# Patient Record
Sex: MALE | Race: WHITE | NOT HISPANIC OR LATINO | Employment: OTHER | URBAN - METROPOLITAN AREA
[De-identification: names, ages, dates, MRNs, and addresses within clinical notes are randomized per-mention and may not be internally consistent; named-entity substitution may affect disease eponyms.]

---

## 2017-08-02 ENCOUNTER — HOSPITAL ENCOUNTER (OUTPATIENT)
Dept: FAMILY MEDICINE CLINIC | Facility: CLINIC | Age: 72
Setting detail: SPECIMEN
Discharge: HOME | End: 2017-08-02
Attending: HOSPITALIST | Admitting: HOSPITALIST

## 2017-08-02 LAB
ALBUMIN SERPL-MCNC: 4.1 G/DL (ref 3.5–4.8)
ALBUMIN/GLOB SERPL: 1.5 {RATIO} (ref 1–1.7)
ALP SERPL-CCNC: 46 IU/L (ref 32–91)
ALT SERPL-CCNC: 32 IU/L (ref 17–63)
ANION GAP SERPL CALC-SCNC: 14.6 MMOL/L (ref 10–20)
AST SERPL-CCNC: 33 IU/L (ref 15–41)
BASOPHILS # BLD AUTO: 0 10*3/UL (ref 0–0.2)
BASOPHILS NFR BLD AUTO: 1 % (ref 0–2)
BILIRUB SERPL-MCNC: 1.3 MG/DL (ref 0.3–1.2)
BUN SERPL-MCNC: 6 MG/DL (ref 8–20)
BUN/CREAT SERPL: 5.5 (ref 6.2–20.3)
CALCIUM SERPL-MCNC: 9.6 MG/DL (ref 8.9–10.3)
CHLORIDE SERPL-SCNC: 102 MMOL/L (ref 101–111)
CHOLEST SERPL-MCNC: 302 MG/DL
CHOLEST/HDLC SERPL: 5 {RATIO}
CONV CO2: 26 MMOL/L (ref 22–32)
CONV LDL CHOLESTEROL DIRECT: 195 MG/DL (ref 0–100)
CONV TOTAL PROTEIN: 6.8 G/DL (ref 6.1–7.9)
CREAT UR-MCNC: 1.1 MG/DL (ref 0.7–1.2)
DIFFERENTIAL METHOD BLD: (no result)
EOSINOPHIL # BLD AUTO: 0.1 10*3/UL (ref 0–0.3)
EOSINOPHIL # BLD AUTO: 4 % (ref 0–3)
ERYTHROCYTE [DISTWIDTH] IN BLOOD BY AUTOMATED COUNT: 13.5 % (ref 11.5–14.5)
FOLATE SERPL-MCNC: >24.8 NG/ML (ref 5.9–24.8)
GLOBULIN UR ELPH-MCNC: 2.7 G/DL (ref 2.5–3.8)
GLUCOSE SERPL-MCNC: 121 MG/DL (ref 65–99)
HCT VFR BLD AUTO: 42.2 % (ref 40–54)
HDLC SERPL-MCNC: 61 MG/DL
HGB BLD-MCNC: 15 G/DL (ref 14–18)
LDLC/HDLC SERPL: 3.2 {RATIO}
LIPID INTERPRETATION: ABNORMAL
LYMPHOCYTES # BLD AUTO: 1 10*3/UL (ref 0.8–4.8)
LYMPHOCYTES NFR BLD AUTO: 30 % (ref 18–42)
MCH RBC QN AUTO: 35.4 PG (ref 26–32)
MCHC RBC AUTO-ENTMCNC: 35.4 G/DL (ref 32–36)
MCV RBC AUTO: 100 FL (ref 80–94)
MONOCYTES # BLD AUTO: 0.4 10*3/UL (ref 0.1–1.3)
MONOCYTES NFR BLD AUTO: 12 % (ref 2–11)
NEUTROPHILS # BLD AUTO: 1.8 10*3/UL (ref 2.3–8.6)
NEUTROPHILS NFR BLD AUTO: 53 % (ref 50–75)
NRBC BLD AUTO-RTO: 0 /100{WBCS}
NRBC/RBC NFR BLD MANUAL: 0 10*3/UL
PLATELET # BLD AUTO: 167 10*3/UL (ref 150–450)
PMV BLD AUTO: 7.8 FL (ref 7.4–10.4)
POTASSIUM SERPL-SCNC: 4.6 MMOL/L (ref 3.6–5.1)
PSA SERPL-MCNC: 0.54 NG/ML (ref 0–4)
RBC # BLD AUTO: 4.22 10*6/UL (ref 4.6–6)
SODIUM SERPL-SCNC: 138 MMOL/L (ref 136–144)
TESTOST SERPL-MCNC: 3.75 NG/ML (ref 1.7–7.8)
TRIGL SERPL-MCNC: 241 MG/DL
TSH SERPL-ACNC: 1.05 UIU/ML (ref 0.34–5.6)
VIT B12 SERPL-MCNC: 444 PG/ML (ref 180–914)
VLDLC SERPL CALC-MCNC: 46.3 MG/DL
WBC # BLD AUTO: 3.4 10*3/UL (ref 4.5–11.5)

## 2017-08-29 ENCOUNTER — HOSPITAL ENCOUNTER (OUTPATIENT)
Dept: FAMILY MEDICINE CLINIC | Facility: CLINIC | Age: 72
Setting detail: SPECIMEN
Discharge: HOME | End: 2017-08-29
Attending: HOSPITALIST | Admitting: HOSPITALIST

## 2018-04-05 ENCOUNTER — HOSPITAL ENCOUNTER (OUTPATIENT)
Dept: NUCLEAR MEDICINE | Facility: HOSPITAL | Age: 73
Discharge: HOME | End: 2018-04-05
Attending: NURSE PRACTITIONER | Admitting: NURSE PRACTITIONER

## 2018-04-12 ENCOUNTER — HOSPITAL ENCOUNTER (OUTPATIENT)
Dept: RESPIRATORY THERAPY | Facility: HOSPITAL | Age: 73
Discharge: HOME | End: 2018-04-12
Attending: HOSPITALIST | Admitting: HOSPITALIST

## 2018-05-10 ENCOUNTER — HOSPITAL ENCOUNTER (OUTPATIENT)
Dept: RESPIRATORY THERAPY | Facility: HOSPITAL | Age: 73
Discharge: HOME | End: 2018-05-10
Attending: INTERNAL MEDICINE | Admitting: INTERNAL MEDICINE

## 2018-05-10 LAB
BASOPHILS # BLD AUTO: 0 10*3/UL (ref 0–0.2)
BASOPHILS NFR BLD AUTO: 1 % (ref 0–2)
DIFFERENTIAL METHOD BLD: (no result)
EOSINOPHIL # BLD AUTO: 0.1 10*3/UL (ref 0–0.3)
EOSINOPHIL # BLD AUTO: 2 % (ref 0–3)
ERYTHROCYTE [DISTWIDTH] IN BLOOD BY AUTOMATED COUNT: 14.7 % (ref 11.5–14.5)
HCT VFR BLD AUTO: 39.8 % (ref 40–54)
HGB BLD-MCNC: 13.2 G/DL (ref 14–18)
LYMPHOCYTES # BLD AUTO: 1.1 10*3/UL (ref 0.8–4.8)
LYMPHOCYTES NFR BLD AUTO: 29 % (ref 18–42)
MCH RBC QN AUTO: 31.2 PG (ref 26–32)
MCHC RBC AUTO-ENTMCNC: 33.1 G/DL (ref 32–36)
MCV RBC AUTO: 94.2 FL (ref 80–94)
MONOCYTES # BLD AUTO: 0.5 10*3/UL (ref 0.1–1.3)
MONOCYTES NFR BLD AUTO: 14 % (ref 2–11)
NEUTROPHILS # BLD AUTO: 2 10*3/UL (ref 2.3–8.6)
NEUTROPHILS NFR BLD AUTO: 54 % (ref 50–75)
NRBC BLD AUTO-RTO: 0 /100{WBCS}
NRBC/RBC NFR BLD MANUAL: 0 10*3/UL
PLATELET # BLD AUTO: 143 10*3/UL (ref 150–450)
PMV BLD AUTO: 8.1 FL (ref 7.4–10.4)
RBC # BLD AUTO: 4.23 10*6/UL (ref 4.6–6)
WBC # BLD AUTO: 3.7 10*3/UL (ref 4.5–11.5)

## 2018-06-04 ENCOUNTER — HOSPITAL ENCOUNTER (OUTPATIENT)
Dept: RESPIRATORY THERAPY | Facility: HOSPITAL | Age: 73
Discharge: HOME | End: 2018-06-04
Attending: INTERNAL MEDICINE | Admitting: INTERNAL MEDICINE

## 2018-11-02 ENCOUNTER — HOSPITAL ENCOUNTER (OUTPATIENT)
Dept: FAMILY MEDICINE CLINIC | Facility: CLINIC | Age: 73
Setting detail: SPECIMEN
Discharge: HOME | End: 2018-11-02
Attending: HOSPITALIST | Admitting: HOSPITALIST

## 2018-11-02 LAB
ALBUMIN SERPL-MCNC: 4.2 G/DL (ref 3.5–4.8)
ALBUMIN/GLOB SERPL: 1.7 {RATIO} (ref 1–1.7)
ALP SERPL-CCNC: 53 IU/L (ref 32–91)
ALT SERPL-CCNC: 27 IU/L (ref 17–63)
ANION GAP SERPL CALC-SCNC: 13.1 MMOL/L (ref 10–20)
AST SERPL-CCNC: 28 IU/L (ref 15–41)
BASOPHILS # BLD AUTO: 0 10*3/UL (ref 0–0.2)
BASOPHILS NFR BLD AUTO: 1 % (ref 0–2)
BILIRUB SERPL-MCNC: 1 MG/DL (ref 0.3–1.2)
BUN SERPL-MCNC: 21 MG/DL (ref 8–20)
BUN/CREAT SERPL: 21 (ref 6.2–20.3)
CALCIUM SERPL-MCNC: 9.2 MG/DL (ref 8.9–10.3)
CHLORIDE SERPL-SCNC: 101 MMOL/L (ref 101–111)
CHOLEST SERPL-MCNC: 264 MG/DL
CHOLEST/HDLC SERPL: 3.9 {RATIO}
CONV CO2: 28 MMOL/L (ref 22–32)
CONV LDL CHOLESTEROL DIRECT: 174 MG/DL (ref 0–100)
CONV TOTAL PROTEIN: 6.7 G/DL (ref 6.1–7.9)
CREAT UR-MCNC: 1 MG/DL (ref 0.7–1.2)
DIFFERENTIAL METHOD BLD: (no result)
EOSINOPHIL # BLD AUTO: 0.1 10*3/UL (ref 0–0.3)
EOSINOPHIL # BLD AUTO: 2 % (ref 0–3)
ERYTHROCYTE [DISTWIDTH] IN BLOOD BY AUTOMATED COUNT: 15 % (ref 11.5–14.5)
GLOBULIN UR ELPH-MCNC: 2.5 G/DL (ref 2.5–3.8)
GLUCOSE SERPL-MCNC: 92 MG/DL (ref 65–99)
HCT VFR BLD AUTO: 38.4 % (ref 40–54)
HDLC SERPL-MCNC: 68 MG/DL
HGB BLD-MCNC: 14.1 G/DL (ref 14–18)
LDLC/HDLC SERPL: 2.6 {RATIO}
LIPID INTERPRETATION: ABNORMAL
LYMPHOCYTES # BLD AUTO: 1.2 10*3/UL (ref 0.8–4.8)
LYMPHOCYTES NFR BLD AUTO: 32 % (ref 18–42)
MCH RBC QN AUTO: 35.4 PG (ref 26–32)
MCHC RBC AUTO-ENTMCNC: 36.6 G/DL (ref 32–36)
MCV RBC AUTO: 96.7 FL (ref 80–94)
MONOCYTES # BLD AUTO: 0.6 10*3/UL (ref 0.1–1.3)
MONOCYTES NFR BLD AUTO: 16 % (ref 2–11)
NEUTROPHILS # BLD AUTO: 1.9 10*3/UL (ref 2.3–8.6)
NEUTROPHILS NFR BLD AUTO: 49 % (ref 50–75)
NRBC BLD AUTO-RTO: 0 /100{WBCS}
NRBC/RBC NFR BLD MANUAL: 0 10*3/UL
PLATELET # BLD AUTO: 196 10*3/UL (ref 150–450)
PMV BLD AUTO: 8.2 FL (ref 7.4–10.4)
POTASSIUM SERPL-SCNC: 4.1 MMOL/L (ref 3.6–5.1)
RBC # BLD AUTO: 3.97 10*6/UL (ref 4.6–6)
SODIUM SERPL-SCNC: 138 MMOL/L (ref 136–144)
TRIGL SERPL-MCNC: 233 MG/DL
VLDLC SERPL CALC-MCNC: 21.9 MG/DL
WBC # BLD AUTO: 3.7 10*3/UL (ref 4.5–11.5)

## 2019-02-14 ENCOUNTER — HOSPITAL ENCOUNTER (OUTPATIENT)
Dept: URGENT CARE | Facility: CLINIC | Age: 74
Discharge: HOME | End: 2019-02-14
Attending: FAMILY MEDICINE | Admitting: FAMILY MEDICINE

## 2019-02-25 ENCOUNTER — HOSPITAL ENCOUNTER (OUTPATIENT)
Dept: RESPIRATORY THERAPY | Facility: HOSPITAL | Age: 74
Discharge: HOME | End: 2019-02-25
Attending: NURSE PRACTITIONER | Admitting: NURSE PRACTITIONER

## 2019-03-18 ENCOUNTER — HOSPITAL ENCOUNTER (OUTPATIENT)
Dept: LAB | Facility: HOSPITAL | Age: 74
Discharge: HOME | End: 2019-03-18
Attending: INTERNAL MEDICINE | Admitting: INTERNAL MEDICINE

## 2019-03-18 LAB
BASOPHILS # BLD AUTO: 0 10*3/UL (ref 0–0.2)
BASOPHILS NFR BLD AUTO: 1 % (ref 0–2)
DIFFERENTIAL METHOD BLD: (no result)
EOSINOPHIL # BLD AUTO: 0.1 10*3/UL (ref 0–0.3)
EOSINOPHIL # BLD AUTO: 2 % (ref 0–3)
ERYTHROCYTE [DISTWIDTH] IN BLOOD BY AUTOMATED COUNT: 14 % (ref 11.5–14.5)
HCT VFR BLD AUTO: 38.7 % (ref 40–54)
HGB BLD-MCNC: 13.4 G/DL (ref 14–18)
LYMPHOCYTES # BLD AUTO: 0.9 10*3/UL (ref 0.8–4.8)
LYMPHOCYTES NFR BLD AUTO: 18 % (ref 18–42)
MCH RBC QN AUTO: 32.8 PG (ref 26–32)
MCHC RBC AUTO-ENTMCNC: 34.5 G/DL (ref 32–36)
MCV RBC AUTO: 94.9 FL (ref 80–94)
MONOCYTES # BLD AUTO: 0.5 10*3/UL (ref 0.1–1.3)
MONOCYTES NFR BLD AUTO: 10 % (ref 2–11)
NEUTROPHILS # BLD AUTO: 3.7 10*3/UL (ref 2.3–8.6)
NEUTROPHILS NFR BLD AUTO: 69 % (ref 50–75)
NRBC BLD AUTO-RTO: 0 /100{WBCS}
NRBC/RBC NFR BLD MANUAL: 0 10*3/UL
PLATELET # BLD AUTO: 200 10*3/UL (ref 150–450)
PMV BLD AUTO: 7.4 FL (ref 7.4–10.4)
RBC # BLD AUTO: 4.07 10*6/UL (ref 4.6–6)
WBC # BLD AUTO: 5.3 10*3/UL (ref 4.5–11.5)

## 2019-04-24 ENCOUNTER — HOSPITAL ENCOUNTER (OUTPATIENT)
Dept: RESPIRATORY THERAPY | Facility: HOSPITAL | Age: 74
Discharge: HOME | End: 2019-04-24
Attending: INTERNAL MEDICINE | Admitting: INTERNAL MEDICINE

## 2019-05-14 ENCOUNTER — HOSPITAL ENCOUNTER (OUTPATIENT)
Dept: FAMILY MEDICINE CLINIC | Facility: CLINIC | Age: 74
Setting detail: SPECIMEN
Discharge: HOME | End: 2019-05-14
Attending: HOSPITALIST | Admitting: HOSPITALIST

## 2019-05-15 LAB
ALBUMIN SERPL-MCNC: 3.9 G/DL (ref 3.5–4.8)
ALBUMIN/GLOB SERPL: 1.6 {RATIO} (ref 1–1.7)
ALP SERPL-CCNC: 42 IU/L (ref 32–91)
ALT SERPL-CCNC: 27 IU/L (ref 17–63)
AMYLASE SERPL-CCNC: 21 U/L (ref 36–128)
ANION GAP SERPL CALC-SCNC: 15.9 MMOL/L (ref 10–20)
AST SERPL-CCNC: 27 IU/L (ref 15–41)
BASOPHILS # BLD AUTO: 0 10*3/UL (ref 0–0.2)
BASOPHILS NFR BLD AUTO: 1 % (ref 0–2)
BILIRUB SERPL-MCNC: 1 MG/DL (ref 0.3–1.2)
BUN SERPL-MCNC: 15 MG/DL (ref 8–20)
BUN/CREAT SERPL: 15 (ref 6.2–20.3)
CALCIUM SERPL-MCNC: 9.1 MG/DL (ref 8.9–10.3)
CHLORIDE SERPL-SCNC: 101 MMOL/L (ref 101–111)
CONV CO2: 24 MMOL/L (ref 22–32)
CONV TOTAL PROTEIN: 6.3 G/DL (ref 6.1–7.9)
CREAT UR-MCNC: 1 MG/DL (ref 0.7–1.2)
DIFFERENTIAL METHOD BLD: (no result)
EOSINOPHIL # BLD AUTO: 0.3 10*3/UL (ref 0–0.3)
EOSINOPHIL # BLD AUTO: 4 % (ref 0–3)
ERYTHROCYTE [DISTWIDTH] IN BLOOD BY AUTOMATED COUNT: 13.6 % (ref 11.5–14.5)
GLOBULIN UR ELPH-MCNC: 2.4 G/DL (ref 2.5–3.8)
GLUCOSE SERPL-MCNC: 103 MG/DL (ref 65–99)
HCT VFR BLD AUTO: 40.5 % (ref 40–54)
HGB BLD-MCNC: 14 G/DL (ref 14–18)
INR PPP: 1.1
LYMPHOCYTES # BLD AUTO: 1.5 10*3/UL (ref 0.8–4.8)
LYMPHOCYTES NFR BLD AUTO: 25 % (ref 18–42)
MCH RBC QN AUTO: 34.2 PG (ref 26–32)
MCHC RBC AUTO-ENTMCNC: 34.5 G/DL (ref 32–36)
MCV RBC AUTO: 99.1 FL (ref 80–94)
MICRO REPORT STATUS: NORMAL
MONOCYTES # BLD AUTO: 0.7 10*3/UL (ref 0.1–1.3)
MONOCYTES NFR BLD AUTO: 11 % (ref 2–11)
NEUTROPHILS # BLD AUTO: 3.6 10*3/UL (ref 2.3–8.6)
NEUTROPHILS NFR BLD AUTO: 59 % (ref 50–75)
NRBC BLD AUTO-RTO: 0 /100{WBCS}
NRBC/RBC NFR BLD MANUAL: 0 10*3/UL
PLATELET # BLD AUTO: 189 10*3/UL (ref 150–450)
PMV BLD AUTO: 9.1 FL (ref 7.4–10.4)
POTASSIUM SERPL-SCNC: 3.9 MMOL/L (ref 3.6–5.1)
PROTHROMBIN TIME: 10.9 SEC (ref 9.6–11.7)
RBC # BLD AUTO: 4.09 10*6/UL (ref 4.6–6)
SODIUM SERPL-SCNC: 137 MMOL/L (ref 136–144)
WBC # BLD AUTO: 6.1 10*3/UL (ref 4.5–11.5)

## 2019-06-18 RX ORDER — DULOXETIN HYDROCHLORIDE 60 MG/1
60 CAPSULE, DELAYED RELEASE ORAL DAILY
Qty: 30 CAPSULE | Refills: 6 | Status: SHIPPED | OUTPATIENT
Start: 2019-06-18 | End: 2019-06-25 | Stop reason: SDUPTHER

## 2019-06-19 ENCOUNTER — TRANSCRIBE ORDERS (OUTPATIENT)
Dept: PHYSICAL THERAPY | Facility: CLINIC | Age: 74
End: 2019-06-19

## 2019-06-19 DIAGNOSIS — M23.91 INTERNAL DERANGEMENT OF RIGHT KNEE: Primary | ICD-10-CM

## 2019-06-21 ENCOUNTER — OFFICE VISIT (OUTPATIENT)
Dept: PHYSICAL THERAPY | Facility: CLINIC | Age: 74
End: 2019-06-21

## 2019-06-21 DIAGNOSIS — M25.561 ACUTE PAIN OF RIGHT KNEE: ICD-10-CM

## 2019-06-21 DIAGNOSIS — M23.91 DERANGEMENT OF COLLATERAL LIGAMENT OF RIGHT KNEE: Primary | ICD-10-CM

## 2019-06-21 PROCEDURE — 97140 MANUAL THERAPY 1/> REGIONS: CPT | Performed by: PHYSICAL THERAPIST

## 2019-06-21 PROCEDURE — 97162 PT EVAL MOD COMPLEX 30 MIN: CPT | Performed by: PHYSICAL THERAPIST

## 2019-06-21 PROCEDURE — 97110 THERAPEUTIC EXERCISES: CPT | Performed by: PHYSICAL THERAPIST

## 2019-06-21 NOTE — PROGRESS NOTES
Physical Therapy Initial Evaluation and Plan of Care      Patient: Omar Choudhary   : 1945  Diagnosis/ICD-10 Code:  No primary diagnosis found.  Referring practitioner: Harsha Samuels, *  Date of Initial Visit: 2019  Today's Date: 2019  Patient seen for Visit count could not be calculated. Make sure you are using a visit which is associated with an episode. sessions           Subjective Questionnaire: Knee outcome survey: 43% (Two questions were left blank, however score determined through subjective history)      Subjective Evaluation    History of Present Illness  Mechanism of injury: Pt reports that surgery was on May 21 for an ACL repair. They also trimmed meniscus and cleaned out the MCL as well. Used allograft for repair. Pt had PT and used CPM. Had follow-up with surgeon and his knee was doing well with good ROM. Pt can walk up the stairs normally with brace on knee, but down stairs is still tricky so he does one step. Pt initially hurt knee because of a skiing accident. Lost control and tried to lower to ground and felt pop in knee. No terrible pain but pt unable to put weight through it. This happened on . Most recent follow-up with surgeon was May 30th. Pt was told that he could stop using crutches after 2 weeks. He progressed to single crutch and then no crutches for ~1 week now. Pain is better in knee now. Occasional throbbing especially when up on feet more. Pt has been exercising at his neighbor's pool. Pt did two weeks outpatient PT and then just got back home early . Pt feels like he needs to work on strengthening now. MD follow-up is in August.     Pain  Current pain ratin  At best pain ratin  At worst pain ratin  Quality: throbbing  Relieving factors: ice, rest and medications  Aggravating factors: movement, standing, stairs and ambulation             Objective       Observations   Left Knee   Positive for incision.     Right Knee   Positive for  incision.     Additional Observation Details  Tape still on R knee     Tenderness     Right Knee   Tenderness in the medial joint line.     Active Range of Motion     Right Knee   Flexion: 130 degrees   Extensor la degrees     Additional Active Range of Motion Details  No pain noted with AROM, only tightness     Patellar Mobility     Right Knee Patellar tendons within functional limits include the medial and lateral. Hypomobile in the superior and inferior patellar tendon(s).     Ambulation   Weight-Bearing Status   Weight-Bearing Status (Right): weight-bearing as tolerated      Observational Gait   Gait: antalgic   Decreased right stance time and right step length.          Assessment & Plan     Assessment  Impairments: abnormal coordination, abnormal gait, abnormal muscle firing, abnormal or restricted ROM, activity intolerance, impaired balance, impaired physical strength, lacks appropriate home exercise program, pain with function and weight-bearing intolerance  Assessment details: Pt is a 73 year old male s/p ACL reconstruction on 2019. Pt demonstrates atrophy in R quad with decreased activation. Pt demonstrates 5 degree extensor lag during ROM assessment and SLR. Pt displays some tenderness over medial aspect of knee. Min hypomobility noted with superior and inferior glides to R patella. Min swelling noted. Pt is ambulating without crutches with brace on and min gait deviations.   Prognosis: good  Functional Limitations: carrying objects, lifting, walking, pulling, pushing, uncomfortable because of pain, standing and unable to perform repetitive tasks  Goals  Plan Goals: STG:  Pt will display increased quad activation during SLR and other tasks within 3 weeks.     Pt will demonstrate increased ROM to 0 degrees extension within 3 weeks.     LTG:  Pt will be independent with home exercises within 6 weeks to assist with pain management and continue to improve function.     Pt will display improved  ability to ambulate with no gait deviations and without brace within 6 weeks.     Pt will be able to return to hobbies and functional tasks by end of POC with no increased pain in knee.     Pt will display increased score on the Knee outcome survey to 85% by end of POC.     Plan  Therapy options: will be seen for skilled physical therapy services  Planned modality interventions: cryotherapy, electrical stimulation/Russian stimulation and thermotherapy (hydrocollator packs)  Planned therapy interventions: manual therapy, motor coordination training, neuromuscular re-education, soft tissue mobilization, strengthening, stretching, therapeutic activities, joint mobilization, home exercise program, gait training, functional ROM exercises, flexibility, body mechanics training, balance/weight-bearing training and ADL retraining  Duration in visits: 15        Manual Therapy:    8     mins  46917;  Therapeutic Exercise:    40     mins  70757;     Neuromuscular Nayeli:        mins  60857;    Therapeutic Activity:          mins  56429;     Gait Training:           mins  03763;     Ultrasound:          mins  14086;    Electrical Stimulation:         mins  24086 ( );  Dry Needling          mins self-pay    Timed Treatment:   38   mins   Total Treatment:     63   mins    PT SIGNATURE: Shalini Hammer, PT   DATE TREATMENT INITIATED: 6/21/2019    Initial Certification  Certification Period: 9/19/2019  I certify that the therapy services are furnished while this patient is under my care.  The services outlined above are required by this patient, and will be reviewed every 90 days.     PHYSICIAN: Harsha Samuels MD      DATE:     Please sign and return via fax to  .. Thank you, [facility].

## 2019-06-25 ENCOUNTER — TELEPHONE (OUTPATIENT)
Dept: FAMILY MEDICINE CLINIC | Facility: CLINIC | Age: 74
End: 2019-06-25

## 2019-06-25 RX ORDER — ALENDRONATE SODIUM 70 MG/1
70 TABLET ORAL
Qty: 12 TABLET | Refills: 3 | Status: SHIPPED | OUTPATIENT
Start: 2019-06-25 | End: 2019-06-25 | Stop reason: SDUPTHER

## 2019-06-25 RX ORDER — ALENDRONATE SODIUM 70 MG/1
70 TABLET ORAL
Qty: 12 TABLET | Refills: 3 | Status: SHIPPED | OUTPATIENT
Start: 2019-06-25 | End: 2019-09-23

## 2019-06-25 RX ORDER — DULOXETIN HYDROCHLORIDE 60 MG/1
60 CAPSULE, DELAYED RELEASE ORAL DAILY
Qty: 30 CAPSULE | Refills: 6 | Status: SHIPPED | OUTPATIENT
Start: 2019-06-25 | End: 2019-06-26 | Stop reason: SDUPTHER

## 2019-06-25 NOTE — TELEPHONE ENCOUNTER
Jazmin left a message from this pt to send his script to [facility]. See message below.

## 2019-06-25 NOTE — TELEPHONE ENCOUNTER
He got the Rx for osteopenia but not the Duloxetine he will call tomorrow if he does not hear from [facility] today.

## 2019-06-25 NOTE — TELEPHONE ENCOUNTER
----- Message from Harsha Samuels MD sent at 6/18/2019 12:51 PM EDT -----  Let patient know I got his note and we will be calling in a rx for him to his pharmacy to help with the moods/depression

## 2019-06-25 NOTE — TELEPHONE ENCOUNTER
New meds sent to [facility].  Added a med for depression and for his osteopenia.  CRP is nothing to worry about after surgery.  We can check it again in a couple of months

## 2019-06-25 NOTE — TELEPHONE ENCOUNTER
1) PATIENT RECEIVED YESI BARNES'S MESSAGE. [facility], [facility], IS THE PHARMACY HE WANTS MED SENT TO PLEASE.    2) RE: HIS LAB RESULTS, THEY SHOW HE HAS OSTEOPENIA. HE WANTS TO KNOW IF HE SHOULD BE TAKING A SUPPLEMENT.    3) RE: HIS ELEVATED C-REACTIVE PROTEIN, HE WANTS TO KNOW YOUR ADVICE ON THAT AS TO WHAT HE NEEDS TO DO.    THANK YOU.

## 2019-06-26 ENCOUNTER — OFFICE VISIT (OUTPATIENT)
Dept: PHYSICAL THERAPY | Facility: CLINIC | Age: 74
End: 2019-06-26

## 2019-06-26 DIAGNOSIS — M25.561 ACUTE PAIN OF RIGHT KNEE: ICD-10-CM

## 2019-06-26 DIAGNOSIS — M23.91 DERANGEMENT OF COLLATERAL LIGAMENT OF RIGHT KNEE: Primary | ICD-10-CM

## 2019-06-26 PROCEDURE — 97112 NEUROMUSCULAR REEDUCATION: CPT | Performed by: PHYSICAL THERAPIST

## 2019-06-26 PROCEDURE — 97110 THERAPEUTIC EXERCISES: CPT | Performed by: PHYSICAL THERAPIST

## 2019-06-26 PROCEDURE — 97140 MANUAL THERAPY 1/> REGIONS: CPT | Performed by: PHYSICAL THERAPIST

## 2019-06-26 RX ORDER — DULOXETIN HYDROCHLORIDE 60 MG/1
60 CAPSULE, DELAYED RELEASE ORAL DAILY
Qty: 30 CAPSULE | Refills: 6 | OUTPATIENT
Start: 2019-06-26 | End: 2020-08-11

## 2019-06-26 NOTE — TELEPHONE ENCOUNTER
Pt called yesterday and said he cannot have the DUloxtine on a mail order it has to be sent to [facility] [address] In [place].

## 2019-06-26 NOTE — PROGRESS NOTES
Physical Therapy Daily Progress Note      Patient: Omar Choudhary   : 1945  Diagnosis/ICD-10 Code:  Derangement of collateral ligament of right knee [M23.91]  Referring practitioner: Harsha Samuels, *  Date of Initial Visit: Type: THERAPY  Noted: 2019  Today's Date: 2019  Patient seen for 2 sessions         Omar Choudhary reports: that his knee is feeling better. He is taking less of the OTC pain medication now. He did some yardwork after last session and felt like it was too much on his knee.       Objective   See Exercise, Manual, and Modality Logs for complete treatment.       Assessment & Plan     Assessment  Assessment details: Pt demonstrates fatigue in R quad with SLR exercises as well as fatigue in hip abductors with sidelying hip abduction. Pt required tactile cues and vc's for proper technique. Min assistance given at ankle during SLR. Pt instructed to perform with brace locked in ext at home due to decreased quad control at this time. No c/o pain during session. Pt tolerated added exercises well.         Progress per Plan of Care           Manual Therapy:    10     mins  01220;  Therapeutic Exercise:    20     mins  34982;     Neuromuscular Nayeli:    10    mins  79848;    Therapeutic Activity:          mins  87101;     Gait Training:           mins  60414;     Ultrasound:          mins  99275;    Electrical Stimulation:         mins  47225 ( );  Dry Needling          mins self-pay    Timed Treatment:   40   mins   Total Treatment:     40   mins    Shalini Hammer, PT  Physical Therapist

## 2019-07-03 ENCOUNTER — OFFICE VISIT (OUTPATIENT)
Dept: PHYSICAL THERAPY | Facility: CLINIC | Age: 74
End: 2019-07-03

## 2019-07-03 DIAGNOSIS — M25.561 ACUTE PAIN OF RIGHT KNEE: ICD-10-CM

## 2019-07-03 DIAGNOSIS — M23.91 DERANGEMENT OF COLLATERAL LIGAMENT OF RIGHT KNEE: Primary | ICD-10-CM

## 2019-07-03 PROCEDURE — 97110 THERAPEUTIC EXERCISES: CPT | Performed by: PHYSICAL THERAPIST

## 2019-07-03 PROCEDURE — 97140 MANUAL THERAPY 1/> REGIONS: CPT | Performed by: PHYSICAL THERAPIST

## 2019-07-03 NOTE — PROGRESS NOTES
Physical Therapy Daily Progress Note        Patient: Omar Choudhary   : 1945  Diagnosis/ICD-10 Code:  Derangement of collateral ligament of right knee [M23.91]  Referring practitioner: Harsha Samuels, *  Date of Initial Visit: Type: THERAPY  Noted: 2019  Today's Date: 7/3/2019  Patient seen for 3 sessions         Omar Choudhary reports: that his leg has been sore from the exercises. Mostly muscle soreness, but he did have some soreness in knee after walking and sleeping without brace yesterday.       Objective   See Exercise, Manual, and Modality Logs for complete treatment.       Assessment & Plan     Assessment  Assessment details: Pt demonstrates increased fatigue with exercise, especially with SLR. Less extensor lag noted during SLR this date. Pt demonstrates no pain with exercises and is progressing well.         Progress per Plan of Care           Manual Therapy:    10     mins  18900;  Therapeutic Exercise:    28     mins  03338;     Neuromuscular Nayeli:        mins  27226;    Therapeutic Activity:          mins  73136;     Gait Training:           mins  14285;     Ultrasound:          mins  33101;    Electrical Stimulation:         mins  90005 ( );  Dry Needling          mins self-pay    Timed Treatment:   38   mins   Total Treatment:     38   mins    Shalini Hammer, PT  Physical Therapist

## 2019-07-10 ENCOUNTER — OFFICE VISIT (OUTPATIENT)
Dept: PHYSICAL THERAPY | Facility: CLINIC | Age: 74
End: 2019-07-10

## 2019-07-10 DIAGNOSIS — M25.561 ACUTE PAIN OF RIGHT KNEE: ICD-10-CM

## 2019-07-10 DIAGNOSIS — M23.91 DERANGEMENT OF COLLATERAL LIGAMENT OF RIGHT KNEE: Primary | ICD-10-CM

## 2019-07-10 PROCEDURE — 97110 THERAPEUTIC EXERCISES: CPT | Performed by: PHYSICAL THERAPIST

## 2019-07-10 PROCEDURE — 97140 MANUAL THERAPY 1/> REGIONS: CPT | Performed by: PHYSICAL THERAPIST

## 2019-07-10 NOTE — PROGRESS NOTES
Physical Therapy Daily Progress Note    VISIT#: 4    Subjective   Omar Choudhary reports: that his knee is doing well overall. He still has some aching in his knee. It does feel like it's getting stronger.       Objective     See Exercise, Manual, and Modality Logs for complete treatment.       Assessment & Plan     Assessment  Assessment details: Pt demonstrates increased fatigue with added exercises this date. Pt displays no pain in knee during treatment. Pt displays weakness in quad. Pt tolerated treatment well overall.           Progress per Plan of Care            Timed:         Manual Therapy:    8     mins  57266;     Therapeutic Exercise:    32     mins  52214;     Neuromuscular Nayeli:        mins  31704;    Therapeutic Activity:          mins  13526;     Gait Training:           mins  14729;     Ultrasound:          mins  05806;    Ionto                                   mins   80186  Self Care                            mins   43761    Un-Timed:  Electrical Stimulation:         mins  65176 ( );  Traction          mins 08523    Timed Treatment:   40   mins   Total Treatment:     40   mins    Shalini Hammer, PT, DPT, OCS  [place] License # [id number]  Physical Therapist

## 2019-07-17 ENCOUNTER — OFFICE VISIT (OUTPATIENT)
Dept: PHYSICAL THERAPY | Facility: CLINIC | Age: 74
End: 2019-07-17

## 2019-07-17 DIAGNOSIS — M25.561 ACUTE PAIN OF RIGHT KNEE: ICD-10-CM

## 2019-07-17 DIAGNOSIS — M23.91 DERANGEMENT OF COLLATERAL LIGAMENT OF RIGHT KNEE: Primary | ICD-10-CM

## 2019-07-17 PROCEDURE — 97140 MANUAL THERAPY 1/> REGIONS: CPT | Performed by: PHYSICAL THERAPIST

## 2019-07-17 PROCEDURE — 97110 THERAPEUTIC EXERCISES: CPT | Performed by: PHYSICAL THERAPIST

## 2019-07-17 NOTE — PROGRESS NOTES
Physical Therapy Daily Progress Note    VISIT#: 5    Subjective   Omar Choudhary reports: that his muscles have been sore from the exercises, but not in knee.       Objective     See Exercise, Manual, and Modality Logs for complete treatment.       Assessment & Plan     Assessment  Assessment details: Pt demonstrates mod fatigue with exercises. Pt displays no pain in knee during treatment. Pt tolerated added exercises and progression well. Pt is progressing well overall.           Progress per Plan of Care            Timed:         Manual Therapy:    10     mins  98654;     Therapeutic Exercise:    30     mins  75071;     Neuromuscular Nayeli:        mins  62256;    Therapeutic Activity:          mins  96072;     Gait Training:           mins  91697;     Ultrasound:          mins  43989;    Ionto                                   mins   09014  Self Care                            mins   12030    Un-Timed:  Electrical Stimulation:         mins  28087 ( );  Traction          mins 11728    Timed Treatment:   40   mins   Total Treatment:     40   mins    Shalini Hammer, PT, DPT, OCS  [place] License # [id number]  Physical Therapist

## 2019-07-31 ENCOUNTER — OFFICE VISIT (OUTPATIENT)
Dept: PHYSICAL THERAPY | Facility: CLINIC | Age: 74
End: 2019-07-31

## 2019-07-31 DIAGNOSIS — M25.561 ACUTE PAIN OF RIGHT KNEE: ICD-10-CM

## 2019-07-31 DIAGNOSIS — M23.91 DERANGEMENT OF COLLATERAL LIGAMENT OF RIGHT KNEE: Primary | ICD-10-CM

## 2019-07-31 PROCEDURE — 97140 MANUAL THERAPY 1/> REGIONS: CPT | Performed by: PHYSICAL THERAPIST

## 2019-07-31 PROCEDURE — 97110 THERAPEUTIC EXERCISES: CPT | Performed by: PHYSICAL THERAPIST

## 2019-07-31 NOTE — PROGRESS NOTES
Physical Therapy Daily Progress Note    VISIT#: 6    Subjective   Omar Choudhary reports: that he is doing well. Still having trouble going down steps, but has been able to if he puts foot flat on step.       Objective     See Exercise, Manual, and Modality Logs for complete treatment.       Assessment & Plan     Assessment  Assessment details: Pt displays improved strength of R quad. Pt demonstrates increased fatigue with exercises. Pt tolerated increased resistance well. No c/o pain during treatment.           Progress per Plan of Care            Timed:         Manual Therapy:    10     mins  12674;     Therapeutic Exercise:    35     mins  08724;     Neuromuscular Nayeli:        mins  42595;    Therapeutic Activity:          mins  69096;     Gait Training:           mins  18274;     Ultrasound:          mins  37321;    Ionto                                   mins   29334  Self Care                            mins   82442    Un-Timed:  Electrical Stimulation:        mins  76967 ( );  Traction          mins 91079    Timed Treatment:   45   mins   Total Treatment:     45   mins    Shalini Hammer, PT, DPT, OCS  [place] License # [id number]  Physical Therapist

## 2019-08-12 RX ORDER — LOSARTAN POTASSIUM AND HYDROCHLOROTHIAZIDE 25; 100 MG/1; MG/1
TABLET ORAL
Qty: 90 TABLET | Refills: 0 | Status: SHIPPED | OUTPATIENT
Start: 2019-08-12 | End: 2019-08-30 | Stop reason: SDUPTHER

## 2019-08-30 ENCOUNTER — TELEPHONE (OUTPATIENT)
Dept: FAMILY MEDICINE CLINIC | Facility: CLINIC | Age: 74
End: 2019-08-30

## 2019-08-30 RX ORDER — LOSARTAN POTASSIUM AND HYDROCHLOROTHIAZIDE 25; 100 MG/1; MG/1
1 TABLET ORAL DAILY
Qty: 90 TABLET | Refills: 0 | Status: SHIPPED | OUTPATIENT
Start: 2019-08-30 | End: 2019-09-03 | Stop reason: SDUPTHER

## 2019-09-03 RX ORDER — MONTELUKAST SODIUM 10 MG/1
TABLET ORAL
Qty: 90 TABLET | Refills: 0 | Status: SHIPPED | OUTPATIENT
Start: 2019-09-03 | End: 2020-01-29 | Stop reason: SDUPTHER

## 2019-09-03 RX ORDER — LOSARTAN POTASSIUM AND HYDROCHLOROTHIAZIDE 25; 100 MG/1; MG/1
1 TABLET ORAL DAILY
Qty: 90 TABLET | Refills: 0 | Status: SHIPPED | OUTPATIENT
Start: 2019-09-03 | End: 2020-05-07 | Stop reason: SDUPTHER

## 2019-09-04 ENCOUNTER — OFFICE VISIT (OUTPATIENT)
Dept: PHYSICAL THERAPY | Facility: CLINIC | Age: 74
End: 2019-09-04

## 2019-09-04 DIAGNOSIS — M25.561 ACUTE PAIN OF RIGHT KNEE: ICD-10-CM

## 2019-09-04 DIAGNOSIS — M23.91 DERANGEMENT OF COLLATERAL LIGAMENT OF RIGHT KNEE: Primary | ICD-10-CM

## 2019-09-04 PROCEDURE — 97140 MANUAL THERAPY 1/> REGIONS: CPT | Performed by: PHYSICAL THERAPIST

## 2019-09-04 PROCEDURE — 97110 THERAPEUTIC EXERCISES: CPT | Performed by: PHYSICAL THERAPIST

## 2019-09-12 DIAGNOSIS — M54.6 THORACIC BACK PAIN, UNSPECIFIED BACK PAIN LATERALITY, UNSPECIFIED CHRONICITY: Primary | ICD-10-CM

## 2019-09-13 ENCOUNTER — TELEPHONE (OUTPATIENT)
Dept: FAMILY MEDICINE CLINIC | Facility: CLINIC | Age: 74
End: 2019-09-13

## 2019-09-13 NOTE — TELEPHONE ENCOUNTER
Found the note it is at their office as well. Spoke with [place] she will take care of the rest. Thanks again.

## 2019-09-13 NOTE — TELEPHONE ENCOUNTER
The reason was on the report he brought in and I put the information on it.  Find it and get it scanned into his chart

## 2019-09-13 NOTE — TELEPHONE ENCOUNTER
Received a call from Dr Robins's office about this pt. wanting to know before they solange him what exactly is Dr Lay looking for with this pt. Please let me know so I can call them back. [phone] ext. [phone]

## 2019-09-16 DIAGNOSIS — K44.9 DIAPHRAGMATIC HERNIA WITHOUT OBSTRUCTION AND WITHOUT GANGRENE: Primary | ICD-10-CM

## 2019-09-24 ENCOUNTER — HOSPITAL ENCOUNTER (OUTPATIENT)
Dept: GENERAL RADIOLOGY | Facility: HOSPITAL | Age: 74
Discharge: HOME | End: 2019-09-24
Admitting: THORACIC SURGERY (CARDIOTHORACIC VASCULAR SURGERY)

## 2019-09-24 DIAGNOSIS — K44.9 DIAPHRAGMATIC HERNIA WITHOUT OBSTRUCTION AND WITHOUT GANGRENE: ICD-10-CM

## 2019-09-24 PROCEDURE — 71046 X-RAY EXAM CHEST 2 VIEWS: CPT

## 2019-09-25 ENCOUNTER — TREATMENT (OUTPATIENT)
Dept: PHYSICAL THERAPY | Facility: CLINIC | Age: 74
End: 2019-09-25

## 2019-09-25 DIAGNOSIS — M23.91 DERANGEMENT OF COLLATERAL LIGAMENT OF RIGHT KNEE: Primary | ICD-10-CM

## 2019-09-25 DIAGNOSIS — M25.561 ACUTE PAIN OF RIGHT KNEE: ICD-10-CM

## 2019-09-25 PROCEDURE — 97110 THERAPEUTIC EXERCISES: CPT | Performed by: PHYSICAL THERAPIST

## 2019-09-25 NOTE — PROGRESS NOTES
Physical Therapy Daily Progress Note    VISIT#: 8    Subjective   Omar Choudhary reports: that his knee is doing well. He has bee going to the gym and leg seems to be getting stronger.       Objective     See Exercise, Manual, and Modality Logs for complete treatment.       Assessment & Plan     Assessment  Assessment details: Pt demonstrates weakness with end range ext during weight-bearing exercises. Pt displays difficulty with balance as well. Overall increased strength noted in R LE. Pt tolerated treatment well with no c/o pain in knee, only soreness in surrounding musculature.           Progress per Plan of Care            Timed:         Manual Therapy:    5     mins  64107;     Therapeutic Exercise:    40     mins  50022;     Neuromuscular Nayeli:        mins  54657;    Therapeutic Activity:          mins  12637;     Gait Training:           mins  44916;     Ultrasound:          mins  85466;    Ionto                                   mins   26033  Self Care                            mins   02352    Un-Timed:  Electrical Stimulation:         mins  02421 ( );  Traction          mins 85549    Timed Treatment:   45   mins   Total Treatment:     45   mins    Shalini Hammer, PT, DPT, OCS  [place] License # [id number]  Physical Therapist

## 2019-09-26 ENCOUNTER — OFFICE VISIT (OUTPATIENT)
Dept: SURGERY | Facility: CLINIC | Age: 74
End: 2019-09-26

## 2019-09-26 VITALS
OXYGEN SATURATION: 96 % | HEART RATE: 62 BPM | SYSTOLIC BLOOD PRESSURE: 127 MMHG | TEMPERATURE: 98.2 F | DIASTOLIC BLOOD PRESSURE: 86 MMHG | WEIGHT: 179 LBS | BODY MASS INDEX: 28.04 KG/M2

## 2019-09-26 DIAGNOSIS — R06.02 SHORTNESS OF BREATH: ICD-10-CM

## 2019-09-26 DIAGNOSIS — J98.6 DIAPHRAGM PARALYSIS: Primary | ICD-10-CM

## 2019-09-26 DIAGNOSIS — G47.33 OBSTRUCTIVE SLEEP APNEA SYNDROME: ICD-10-CM

## 2019-09-26 DIAGNOSIS — J45.20 MILD INTERMITTENT ASTHMA WITHOUT COMPLICATION: ICD-10-CM

## 2019-09-26 PROBLEM — E78.5 HYPERLIPIDEMIA: Status: ACTIVE | Noted: 2019-09-26

## 2019-09-26 PROBLEM — M54.31 SCIATICA OF RIGHT SIDE: Status: ACTIVE | Noted: 2017-08-29

## 2019-09-26 PROBLEM — R05.3 CHRONIC COUGH: Status: ACTIVE | Noted: 2019-02-14

## 2019-09-26 PROBLEM — K57.90 DIVERTICULOSIS: Status: ACTIVE | Noted: 2019-09-26

## 2019-09-26 PROBLEM — R09.82 POSTNASAL DRIP: Status: ACTIVE | Noted: 2018-01-16

## 2019-09-26 PROBLEM — M19.90 OSTEOARTHRITIS: Status: ACTIVE | Noted: 2019-09-26

## 2019-09-26 PROBLEM — M23.91 INTERNAL DERANGEMENT OF RIGHT KNEE: Status: ACTIVE | Noted: 2019-05-14

## 2019-09-26 PROBLEM — Z80.3 FAMILY HISTORY OF MALIGNANT NEOPLASM OF BREAST: Status: ACTIVE | Noted: 2019-09-26

## 2019-09-26 PROBLEM — M85.80 OSTEOPENIA: Status: ACTIVE | Noted: 2019-09-26

## 2019-09-26 PROBLEM — N40.0 BENIGN PROSTATIC HYPERPLASIA: Status: ACTIVE | Noted: 2019-09-26

## 2019-09-26 PROBLEM — J45.909 ASTHMA: Status: ACTIVE | Noted: 2019-02-14

## 2019-09-26 PROBLEM — M53.80 OTHER SPECIFIED DORSOPATHIES, SITE UNSPECIFIED: Status: ACTIVE | Noted: 2019-09-26

## 2019-09-26 PROCEDURE — 99204 OFFICE O/P NEW MOD 45 MIN: CPT | Performed by: THORACIC SURGERY (CARDIOTHORACIC VASCULAR SURGERY)

## 2019-09-26 RX ORDER — UBIDECARENONE 50 MG
CAPSULE ORAL DAILY
Status: ON HOLD | COMMUNITY
End: 2022-10-04

## 2019-09-26 RX ORDER — AMPICILLIN TRIHYDRATE 250 MG
1 CAPSULE ORAL DAILY
COMMUNITY

## 2019-09-26 RX ORDER — CHLORAL HYDRATE 500 MG
1000 CAPSULE ORAL
COMMUNITY

## 2019-09-26 RX ORDER — VITAMIN B COMPLEX
1 CAPSULE ORAL DAILY
COMMUNITY

## 2019-09-26 RX ORDER — ACETAMINOPHEN 500 MG
500 TABLET ORAL EVERY 6 HOURS PRN
COMMUNITY

## 2019-09-26 RX ORDER — CALCIUM CARBONATE 300MG(750)
2 TABLET,CHEWABLE ORAL
COMMUNITY
End: 2021-12-03

## 2019-09-26 RX ORDER — MELOXICAM 15 MG/1
TABLET ORAL EVERY 24 HOURS
COMMUNITY
Start: 2018-04-10 | End: 2019-11-05 | Stop reason: SDUPTHER

## 2019-09-26 RX ORDER — CLONAZEPAM 0.5 MG/1
TABLET ORAL
COMMUNITY
Start: 2018-11-02 | End: 2019-11-05 | Stop reason: SDUPTHER

## 2019-09-26 RX ORDER — BUDESONIDE AND FORMOTEROL FUMARATE DIHYDRATE 160; 4.5 UG/1; UG/1
AEROSOL RESPIRATORY (INHALATION)
COMMUNITY
Start: 2013-03-15 | End: 2020-06-01 | Stop reason: SDUPTHER

## 2019-09-26 RX ORDER — SILDENAFIL CITRATE 20 MG/1
TABLET ORAL
COMMUNITY
Start: 2019-05-14 | End: 2020-05-07 | Stop reason: SDUPTHER

## 2019-09-26 RX ORDER — ZOLPIDEM TARTRATE 10 MG/1
TABLET ORAL EVERY 24 HOURS
COMMUNITY
Start: 2018-11-02 | End: 2019-11-05 | Stop reason: SDUPTHER

## 2019-09-26 NOTE — PROGRESS NOTES
Thoracic surgery consult  Reason for consult: Left diaphragmatic paralysis  Requesting physician: Dr. Samuels    Chief complaint shortness of breath with exertion    Subjective   I have reviewed the hospital record and reviewed the accompanying physician notes.  From [facility] in [place] I have discussed the case with the following physicians: Dr. Samuels indicated nonoperative therapy    History of Present Illness  This patient began to note that he was short of breath with exertion approximately 5 to 6 years ago.  He swims a lot and he also noted decreased buoyancy in the pool.  He also noted that he could not take his deep of breath is normal.  Nonetheless his physical activity remained relatively high.  Recently he was at altitude in [place] and noted rather severe shortness of breath after climbing only one flight of stairs.  This led to a medical work-up.  He was evaluated in [place] at [facility].  Extensive work-up was performed.  This is been reviewed in its entirety by me.  His lung capacity was normal his FVC and FEV1 were symmetrically reduced at approximately 70% of predicted.  His residual volume was 129% of predicted.  His diffusion capacity was 82% of predicted.  Cardiac work-up revealed some degree of diastolic dysfunction.  Exercise stress testing was basically normal.  PA and lateral chest x-ray along with a CT scan demonstrate left diaphragmatic elevation with atelectasis of the left lower lobe.  Sniffed test was positive for a paralyzed diaphragm with paradoxical movement and minimal movement.  The patient has obstructive sleep apnea.  He was instructed to be sure to optimize his  positive pressure system.  Surgical intervention was not specifically recommended.  Patient is otherwise relatively medically healthy.  Does not have any specific surgical contraindication.    Review of Systems  All systems have been reviewed by me and with the patient.  They have been scanned into  the chart.  Pertinent positives include depression arthritic discomfort in the back and legs hemorrhoids high blood pressure.  All other systems are negative.    Past Medical History:   Diagnosis Date   • Allergic    • Arthritis    • Asthma    • Cataract    • Depression    • Osteopenia    • Shortness of breath       Social History     Socioeconomic History   • Marital status:      Spouse name: Not on file   • Number of children: Not on file   • Years of education: Not on file   • Highest education level: Not on file   Tobacco Use   • Smoking status: Former Smoker     Start date:      Last attempt to quit:      Years since quittin.7   • Smokeless tobacco: Never Used   Substance and Sexual Activity   • Alcohol use: Yes     Alcohol/week: 7.2 oz     Types: 10 Glasses of wine, 2 Cans of beer per week   • Drug use: Yes     Types: Marijuana, Mescaline, Psilocybin     Comment: 50 years ago while in college     family history includes Aortic aneurysm in his father; Breast cancer in his mother; Heart attack in his father; Heart disease in his mother; Hypertension in his mother; Liver cancer in his father; Stroke in his mother.   Past Surgical History:   Procedure Laterality Date   • MOUTH SURGERY         Objective      Vital Signs  Temp:  [98.2 °F (36.8 °C)] 98.2 °F (36.8 °C)  Heart Rate:  [62] 62  BP: (127)/(86) 127/86    [unfilled]    Physical Exam   Constitutional: He is oriented to person, place, and time. He appears well-developed and well-nourished. No distress.   HENT:   Head: Normocephalic and atraumatic.   Right Ear: External ear normal.   Left Ear: External ear normal.   Mouth/Throat: No oropharyngeal exudate.   Eyes: Conjunctivae and EOM are normal. Pupils are equal, round, and reactive to light. Right eye exhibits no discharge. Left eye exhibits no discharge.   Neck: Normal range of motion. No JVD present. No tracheal deviation present. No thyromegaly present.   Cardiovascular: Normal rate and  regular rhythm.   Pulmonary/Chest: Effort normal. No stridor. No respiratory distress. He has no wheezes.   Dullness to percussion and diminished breath sounds at the left base.   Abdominal: Soft. Bowel sounds are normal. He exhibits no distension and no mass. There is no tenderness. There is no guarding.   Musculoskeletal: Normal range of motion. He exhibits no edema, tenderness or deformity.   Lymphadenopathy:     He has no cervical adenopathy.   Neurological: He is alert and oriented to person, place, and time. No cranial nerve deficit. He exhibits normal muscle tone.   Skin: Skin is warm and dry. Capillary refill takes less than 2 seconds. No rash noted. He is not diaphoretic. No erythema. No pallor.   Psychiatric: He has a normal mood and affect. His behavior is normal. Judgment and thought content normal.        Results Review:  I have personally reviewed the following radiographs and reviewed the radiology reports.  My independent finds are elevation of the left hemidiaphragm with associated atelectasis of the left lower lobe    Lab Results:  Lab Results (last 24 hours)     ** No results found for the last 24 hours. **           Meds    Current Outpatient Medications:   •  acetaminophen (TYLENOL) 500 MG tablet, Take 500 mg by mouth Every 6 (Six) Hours As Needed for Mild Pain ., Disp: , Rfl:   •  Alpha-Lipoic Acid 100 MG capsule, Take  by mouth., Disp: , Rfl:   •  B Complex Vitamins (VITAMIN B COMPLEX) capsule capsule, Take  by mouth Daily., Disp: , Rfl:   •  budesonide-formoterol (SYMBICORT) 160-4.5 MCG/ACT inhaler, SYMBICORT 160-4.5 MCG/ACT AERO, Disp: , Rfl:   •  clonazePAM (KLONOPIN) 0.5 MG tablet, KLONOPIN 0.5 MG TABS, Disp: , Rfl:   •  coenzyme Q10 50 MG capsule capsule, Take  by mouth Daily., Disp: , Rfl:   •  diphenhydrAMINE HCl (ANTI-HIST PO), Take  by mouth., Disp: , Rfl:   •  DULoxetine (CYMBALTA) 60 MG capsule, Take 1 capsule by mouth Daily., Disp: 30 capsule, Rfl: 6  •   losartan-hydrochlorothiazide (HYZAAR) 100-25 MG per tablet, TAKE 1 TABLET BY MOUTH DAILY, Disp: 90 tablet, Rfl: 0  •  Melatonin 10 MG tablet dispersible, Take 2 tablets by mouth., Disp: , Rfl:   •  meloxicam (MOBIC) 15 MG tablet, Daily., Disp: , Rfl:   •  montelukast (SINGULAIR) 10 MG tablet, TAKE ONE TABLET BY MOUTH ONE TIME DAILY, Disp: 90 tablet, Rfl: 0  •  Omega-3 Fatty Acids (FISH OIL) 1000 MG capsule capsule, Take  by mouth Daily With Breakfast., Disp: , Rfl:   •  Red Yeast Rice 600 MG capsule, Take  by mouth., Disp: , Rfl:   •  sildenafil (REVATIO) 20 MG tablet, SILDENAFIL CITRATE 20 MG TABS, Disp: , Rfl:   •  zolpidem (AMBIEN) 10 MG tablet, Daily., Disp: , Rfl:         Assessment/Plan     #1 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  2.  Work-up also reveals diastolic dysfunction to a mild degree and dysphasia to clear liquids.  #3 reactive airways disease  Diaphragmatic plication including risk benefits which are limited and alternatives were discussed with the patient.  At this point the patient would prefer to continue on his current state and consider further medical management of his other possible contributing factors to his dyspnea including diastolic dysfunction and reactive airways disease.  He should continue using his CPAP optimally.    Gil Fan MD  [facility]  09/26/19  3:14 PM

## 2019-11-05 ENCOUNTER — OFFICE VISIT (OUTPATIENT)
Dept: FAMILY MEDICINE CLINIC | Facility: CLINIC | Age: 74
End: 2019-11-05

## 2019-11-05 VITALS
RESPIRATION RATE: 8 BRPM | DIASTOLIC BLOOD PRESSURE: 94 MMHG | SYSTOLIC BLOOD PRESSURE: 140 MMHG | HEART RATE: 72 BPM | BODY MASS INDEX: 28.19 KG/M2 | WEIGHT: 180 LBS | TEMPERATURE: 99.2 F

## 2019-11-05 DIAGNOSIS — M15.9 OSTEOARTHRITIS OF MULTIPLE JOINTS, UNSPECIFIED OSTEOARTHRITIS TYPE: ICD-10-CM

## 2019-11-05 DIAGNOSIS — I10 ESSENTIAL HYPERTENSION: ICD-10-CM

## 2019-11-05 DIAGNOSIS — N40.0 BENIGN PROSTATIC HYPERPLASIA WITHOUT LOWER URINARY TRACT SYMPTOMS: ICD-10-CM

## 2019-11-05 DIAGNOSIS — E78.2 MIXED HYPERLIPIDEMIA: ICD-10-CM

## 2019-11-05 DIAGNOSIS — J45.20 MILD INTERMITTENT ASTHMA WITHOUT COMPLICATION: ICD-10-CM

## 2019-11-05 DIAGNOSIS — E34.9 TESTOSTERONE DEFICIENCY: ICD-10-CM

## 2019-11-05 DIAGNOSIS — Z00.00 MEDICARE ANNUAL WELLNESS VISIT, SUBSEQUENT: ICD-10-CM

## 2019-11-05 DIAGNOSIS — Z12.5 SCREENING FOR PROSTATE CANCER: ICD-10-CM

## 2019-11-05 DIAGNOSIS — Z23 IMMUNIZATION DUE: Primary | ICD-10-CM

## 2019-11-05 PROCEDURE — 90653 IIV ADJUVANT VACCINE IM: CPT | Performed by: HOSPITALIST

## 2019-11-05 PROCEDURE — 84403 ASSAY OF TOTAL TESTOSTERONE: CPT | Performed by: HOSPITALIST

## 2019-11-05 PROCEDURE — 84439 ASSAY OF FREE THYROXINE: CPT | Performed by: HOSPITALIST

## 2019-11-05 PROCEDURE — 80061 LIPID PANEL: CPT | Performed by: HOSPITALIST

## 2019-11-05 PROCEDURE — 90670 PCV13 VACCINE IM: CPT | Performed by: HOSPITALIST

## 2019-11-05 PROCEDURE — 80053 COMPREHEN METABOLIC PANEL: CPT | Performed by: HOSPITALIST

## 2019-11-05 PROCEDURE — 84443 ASSAY THYROID STIM HORMONE: CPT | Performed by: HOSPITALIST

## 2019-11-05 PROCEDURE — G0008 ADMIN INFLUENZA VIRUS VAC: HCPCS | Performed by: HOSPITALIST

## 2019-11-05 PROCEDURE — 36415 COLL VENOUS BLD VENIPUNCTURE: CPT | Performed by: HOSPITALIST

## 2019-11-05 PROCEDURE — G0103 PSA SCREENING: HCPCS | Performed by: HOSPITALIST

## 2019-11-05 PROCEDURE — 85025 COMPLETE CBC W/AUTO DIFF WBC: CPT | Performed by: HOSPITALIST

## 2019-11-05 PROCEDURE — 82607 VITAMIN B-12: CPT | Performed by: HOSPITALIST

## 2019-11-05 PROCEDURE — G0009 ADMIN PNEUMOCOCCAL VACCINE: HCPCS | Performed by: HOSPITALIST

## 2019-11-05 PROCEDURE — 82746 ASSAY OF FOLIC ACID SERUM: CPT | Performed by: HOSPITALIST

## 2019-11-05 PROCEDURE — G0439 PPPS, SUBSEQ VISIT: HCPCS | Performed by: HOSPITALIST

## 2019-11-05 RX ORDER — ZOLPIDEM TARTRATE 10 MG/1
10 TABLET ORAL EVERY 24 HOURS
Qty: 30 TABLET | Refills: 3 | Status: SHIPPED | OUTPATIENT
Start: 2019-11-05 | End: 2020-08-24 | Stop reason: SDUPTHER

## 2019-11-05 RX ORDER — MELOXICAM 15 MG/1
15 TABLET ORAL EVERY 24 HOURS
Qty: 90 TABLET | Refills: 3 | Status: SHIPPED | OUTPATIENT
Start: 2019-11-05 | End: 2020-12-28 | Stop reason: SDUPTHER

## 2019-11-05 RX ORDER — CLONAZEPAM 0.5 MG/1
0.5 TABLET ORAL 2 TIMES DAILY PRN
Qty: 30 TABLET | Refills: 0 | Status: SHIPPED | OUTPATIENT
Start: 2019-11-05 | End: 2020-03-28 | Stop reason: SDUPTHER

## 2019-11-05 NOTE — PROGRESS NOTES
Subsequent Medicare Wellness Visit   The ABC's of the Annual Wellness Visit    Chief Complaint   Patient presents with   • Medicare Wellness-subsequent       HPI:  Omar Choudhary, -1945, is a 74 y.o. male who presents for a Subsequent Medicare Wellness Visit.    Here for annual exam and check up    Patient states other than the above problems, they are doing ok overall and has no other significant complaints, They also report their routine health maintenence items are UTD, They report their colonoscopy is UTD and They report their PSA is UTD    Health Habits and Functional and Cognitive Screening and Depression Screening:  Functional & Cognitive Status 2019   Do you have difficulty preparing food and eating? No   Do you have difficulty bathing yourself, getting dressed or grooming yourself? No   Do you have difficulty using the toilet? No   Do you have difficulty moving around from place to place? No   Do you have trouble with steps or getting out of a bed or a chair? No   Current Diet Well Balanced Diet   Dental Exam Up to date   Eye Exam Up to date   Exercise (times per week) 3 times per week   Current Exercise Activities Include Cardiovasular Workout on Exercise Equipment   Do you need help using the phone?  No   Are you deaf or do you have serious difficulty hearing?  No   Do you need help with transportation? No   Do you need help shopping? No   Do you need help preparing meals?  No   Do you need help with housework?  No   Do you need help with laundry? No   Do you need help taking your medications? No   Do you need help managing money? No   Do you ever drive or ride in a car without wearing a seat belt? No       Compared to one year ago, the patient feels his physical health and mental health  is the same     Depression Screen:  Patient denies the depression symptoms of depressed mood, feelings of hopelessness, insomnia, hypersomnia, difficulty concentrating, suicidal thoughts and significant  weight change      Past Medical/Family/Social History:  The following portions of the patient's history were reviewed and updated as appropriate and these included the meds, PMHx, family and social histories    Allergies   Allergen Reactions   • Statins Myalgia         Current Outpatient Medications:   •  acetaminophen (TYLENOL) 500 MG tablet, Take 500 mg by mouth Every 6 (Six) Hours As Needed for Mild Pain ., Disp: , Rfl:   •  Alpha-Lipoic Acid 100 MG capsule, Take  by mouth., Disp: , Rfl:   •  B Complex Vitamins (VITAMIN B COMPLEX) capsule capsule, Take  by mouth Daily., Disp: , Rfl:   •  budesonide-formoterol (SYMBICORT) 160-4.5 MCG/ACT inhaler, SYMBICORT 160-4.5 MCG/ACT AERO, Disp: , Rfl:   •  clonazePAM (KLONOPIN) 0.5 MG tablet, Take 1 tablet by mouth 2 (Two) Times a Day As Needed for Seizures., Disp: 30 tablet, Rfl: 0  •  coenzyme Q10 50 MG capsule capsule, Take  by mouth Daily., Disp: , Rfl:   •  diphenhydrAMINE HCl (ANTI-HIST PO), Take  by mouth., Disp: , Rfl:   •  DULoxetine (CYMBALTA) 60 MG capsule, Take 1 capsule by mouth Daily., Disp: 30 capsule, Rfl: 6  •  losartan-hydrochlorothiazide (HYZAAR) 100-25 MG per tablet, TAKE 1 TABLET BY MOUTH DAILY, Disp: 90 tablet, Rfl: 0  •  Melatonin 10 MG tablet dispersible, Take 2 tablets by mouth., Disp: , Rfl:   •  meloxicam (MOBIC) 15 MG tablet, Take 1 tablet by mouth Daily., Disp: 90 tablet, Rfl: 3  •  montelukast (SINGULAIR) 10 MG tablet, TAKE ONE TABLET BY MOUTH ONE TIME DAILY, Disp: 90 tablet, Rfl: 0  •  Omega-3 Fatty Acids (FISH OIL) 1000 MG capsule capsule, Take  by mouth Daily With Breakfast., Disp: , Rfl:   •  Red Yeast Rice 600 MG capsule, Take  by mouth., Disp: , Rfl:   •  sildenafil (REVATIO) 20 MG tablet, SILDENAFIL CITRATE 20 MG TABS, Disp: , Rfl:   •  zolpidem (AMBIEN) 10 MG tablet, Take 1 tablet by mouth Daily., Disp: 30 tablet, Rfl: 3    Family History   Problem Relation Age of Onset   • Heart disease Mother    • Hypertension Mother    • Breast cancer  Mother    • Stroke Mother    • Aortic aneurysm Father    • Heart attack Father    • Liver cancer Father        Social History     Tobacco Use   • Smoking status: Former Smoker     Start date:      Last attempt to quit:      Years since quittin.8   • Smokeless tobacco: Never Used   Substance Use Topics   • Alcohol use: Yes     Alcohol/week: 7.2 oz     Types: 10 Glasses of wine, 2 Cans of beer per week       Past Surgical History:   Procedure Laterality Date   • MOUTH SURGERY         Patient Active Problem List   Diagnosis   • Acute suppurative otitis media   • Benign prostatic hyperplasia   • Bursitis   • Otitis externa   • Otitis media   • Chronic cough   • Depression   • Diverticulosis   • Family history of malignant neoplasm of breast   • Hyperlipidemia   • Hypertension   • Internal derangement of right knee   • Knee effusion   • Asthma   • Obstructive sleep apnea syndrome   • Reactive airway disease   • Osteoarthritis   • Osteopenia   • Other specified dorsopathies, site unspecified   • Pain in knee   • Postnasal drip   • Prepatellar bursitis   • Sciatica of right side   • Shortness of breath   • Weight loss   • Wheezing   • Diaphragm paralysis       Review of Systems   Constitutional: Negative for chills and fever.   Respiratory: Negative for chest tightness and shortness of breath.    Cardiovascular: Negative for chest pain.   Gastrointestinal: Negative for abdominal pain.   Musculoskeletal: Negative for arthralgias and myalgias.   Neurological: Negative for headache.       Objective     Vitals:    19 1502   BP: 140/94   BP Location: Left arm   Patient Position: Sitting   Cuff Size: Adult   Pulse: 72   Resp: 8   Temp: 99.2 °F (37.3 °C)   TempSrc: Oral   Weight: 81.6 kg (180 lb)       Wt Readings from Last 4 Encounters:   19 81.6 kg (180 lb)   19 81.2 kg (179 lb)   19 81.6 kg (179 lb 16 oz)   19 81.6 kg (179 lb 16 oz)       Body mass index is 28.19 kg/m².    Patient's  Body mass index is 28.19 kg/m². BMI is within normal parameters. No follow-up required..      No exam data present    The patient has no evidence of cognitve impairment.     Physical Exam   Constitutional: He is oriented to person, place, and time. He appears well-developed and well-nourished.   HENT:   Head: Normocephalic and atraumatic.   Right Ear: External ear normal.   Left Ear: External ear normal.   Nose: Nose normal.   Mouth/Throat: Oropharynx is clear and moist.   Eyes: EOM are normal. Pupils are equal, round, and reactive to light.   Neck: Neck supple. No thyromegaly present.   Cardiovascular: Normal rate, regular rhythm and normal heart sounds.   No murmur heard.  Pulmonary/Chest: Effort normal and breath sounds normal. No respiratory distress. He has no wheezes. He has no rales.   Abdominal: Soft. Bowel sounds are normal. There is no tenderness. There is no rebound and no guarding.   Musculoskeletal: Normal range of motion.   Neurological: He is alert and oriented to person, place, and time.   Skin: Skin is warm and dry. No rash noted. No erythema.   Psychiatric: He has a normal mood and affect. His behavior is normal.   Nursing note and vitals reviewed.      Recent Lab Results:     Lab Results   Component Value Date    CHOL 264 (H) 11/02/2018    TRIG 233 (H) 11/02/2018    HDL 68 11/02/2018    LDLHDL 2.6 11/02/2018       Assessment/Plan   Age-appropriate Screening Schedule:  Refer to the list below for future screening recommendations based on patient's age, sex and/or medical conditions.      Health Maintenance   Topic Date Due   • TDAP/TD VACCINES (1 - Tdap) 07/24/1966   • ZOSTER VACCINE (1 of 2) 07/24/1997   • PNEUMOCOCCAL VACCINES (65+ LOW/MEDIUM RISK) (1 of 2 - PCV13) 07/24/2012   • COLONOSCOPY  06/17/2019   • DXA SCAN  06/21/2019   • INFLUENZA VACCINE  08/01/2019   • LIPID PANEL  11/02/2019       Medicare Risks and Personalized Health Plan:   We went over their medications, current problems  and  all the recommended health maintenence items for their age group      CMS-Preventive Services Quick Reference  Medicare Preventive Services were  Addressed that were applicable:      Advance Care Planning:  End of life care and wishes were discussed    Diagnoses and all orders for this visit:    1. Medicare annual wellness visit, subsequent (Primary)  -     CBC & Differential  -     Comprehensive Metabolic Panel  -     Lipid Panel  -     PSA Screen  -     T4, Free  -     TSH  -     Testosterone  -     Vitamin B12 & Folate    2. Mixed hyperlipidemia  -     CBC & Differential  -     Comprehensive Metabolic Panel  -     Lipid Panel  -     PSA Screen  -     T4, Free  -     TSH  -     Testosterone  -     Vitamin B12 & Folate    3. Essential hypertension  -     CBC & Differential  -     Comprehensive Metabolic Panel  -     Lipid Panel  -     PSA Screen  -     T4, Free  -     TSH  -     Testosterone  -     Vitamin B12 & Folate    4. Mild intermittent asthma without complication  -     CBC & Differential  -     Comprehensive Metabolic Panel  -     Lipid Panel  -     PSA Screen  -     T4, Free  -     TSH  -     Testosterone  -     Vitamin B12 & Folate    5. Osteoarthritis of multiple joints, unspecified osteoarthritis type  -     CBC & Differential  -     Comprehensive Metabolic Panel  -     Lipid Panel  -     PSA Screen  -     T4, Free  -     TSH  -     Testosterone  -     Vitamin B12 & Folate    6. Benign prostatic hyperplasia without lower urinary tract symptoms  -     CBC & Differential  -     Comprehensive Metabolic Panel  -     Lipid Panel  -     PSA Screen  -     T4, Free  -     TSH  -     Testosterone  -     Vitamin B12 & Folate    7. Screening for prostate cancer  -     CBC & Differential  -     Comprehensive Metabolic Panel  -     Lipid Panel  -     PSA Screen  -     T4, Free  -     TSH  -     Testosterone  -     Vitamin B12 & Folate    8. Testosterone deficiency  -     CBC & Differential  -     Comprehensive  Metabolic Panel  -     Lipid Panel  -     PSA Screen  -     T4, Free  -     TSH  -     Testosterone  -     Vitamin B12 & Folate    Other orders  -     meloxicam (MOBIC) 15 MG tablet; Take 1 tablet by mouth Daily.  Dispense: 90 tablet; Refill: 3  -     zolpidem (AMBIEN) 10 MG tablet; Take 1 tablet by mouth Daily.  Dispense: 30 tablet; Refill: 3  -     clonazePAM (KLONOPIN) 0.5 MG tablet; Take 1 tablet by mouth 2 (Two) Times a Day As Needed for Seizures.  Dispense: 30 tablet; Refill: 0        This patient overall looks good related to their annual checkup.  Problem areas were addressed and they were encouraged to continue good lifestyle habits and behaviors. During this visit for their annual exam, we reviewed their personal history, social history and family history.  We went over their medications and all the recommended health maintenence items for their age group. They were given the opportunity to ask questions and discuss other concerns.      An After Visit Summary and PPPS with all of these plans were given to the patient.      Follow Up:  Return if symptoms worsen or fail to improve.

## 2019-11-06 ENCOUNTER — TELEPHONE (OUTPATIENT)
Dept: FAMILY MEDICINE CLINIC | Facility: CLINIC | Age: 74
End: 2019-11-06

## 2019-11-06 DIAGNOSIS — E78.2 MIXED HYPERLIPIDEMIA: Primary | ICD-10-CM

## 2019-11-06 LAB
ALBUMIN SERPL-MCNC: 4.5 G/DL (ref 3.5–5.2)
ALBUMIN/GLOB SERPL: 1.6 G/DL
ALP SERPL-CCNC: 50 U/L (ref 39–117)
ALT SERPL W P-5'-P-CCNC: 27 U/L (ref 1–41)
ANION GAP SERPL CALCULATED.3IONS-SCNC: 12.5 MMOL/L (ref 5–15)
AST SERPL-CCNC: 28 U/L (ref 1–40)
BASOPHILS # BLD AUTO: 0.01 10*3/MM3 (ref 0–0.2)
BASOPHILS NFR BLD AUTO: 0.2 % (ref 0–1.5)
BILIRUB SERPL-MCNC: 0.7 MG/DL (ref 0.2–1.2)
BUN BLD-MCNC: 14 MG/DL (ref 8–23)
BUN/CREAT SERPL: 14.4 (ref 7–25)
CALCIUM SPEC-SCNC: 9.5 MG/DL (ref 8.6–10.5)
CHLORIDE SERPL-SCNC: 101 MMOL/L (ref 98–107)
CHOLEST SERPL-MCNC: 280 MG/DL (ref 0–200)
CO2 SERPL-SCNC: 26.5 MMOL/L (ref 22–29)
CREAT BLD-MCNC: 0.97 MG/DL (ref 0.76–1.27)
DEPRECATED RDW RBC AUTO: 45.8 FL (ref 37–54)
EOSINOPHIL # BLD AUTO: 0.1 10*3/MM3 (ref 0–0.4)
EOSINOPHIL NFR BLD AUTO: 2.2 % (ref 0.3–6.2)
ERYTHROCYTE [DISTWIDTH] IN BLOOD BY AUTOMATED COUNT: 12.8 % (ref 12.3–15.4)
FOLATE SERPL-MCNC: >20 NG/ML (ref 4.78–24.2)
GFR SERPL CREATININE-BSD FRML MDRD: 76 ML/MIN/1.73
GLOBULIN UR ELPH-MCNC: 2.9 GM/DL
GLUCOSE BLD-MCNC: 98 MG/DL (ref 65–99)
HCT VFR BLD AUTO: 42.1 % (ref 37.5–51)
HDLC SERPL-MCNC: 64 MG/DL (ref 40–60)
HGB BLD-MCNC: 14.2 G/DL (ref 13–17.7)
IMM GRANULOCYTES # BLD AUTO: 0.01 10*3/MM3 (ref 0–0.05)
IMM GRANULOCYTES NFR BLD AUTO: 0.2 % (ref 0–0.5)
LDLC SERPL CALC-MCNC: 183 MG/DL (ref 0–100)
LDLC/HDLC SERPL: 2.85 {RATIO}
LYMPHOCYTES # BLD AUTO: 1.16 10*3/MM3 (ref 0.7–3.1)
LYMPHOCYTES NFR BLD AUTO: 25.1 % (ref 19.6–45.3)
MCH RBC QN AUTO: 32.2 PG (ref 26.6–33)
MCHC RBC AUTO-ENTMCNC: 33.7 G/DL (ref 31.5–35.7)
MCV RBC AUTO: 95.5 FL (ref 79–97)
MONOCYTES # BLD AUTO: 0.57 10*3/MM3 (ref 0.1–0.9)
MONOCYTES NFR BLD AUTO: 12.3 % (ref 5–12)
NEUTROPHILS # BLD AUTO: 2.77 10*3/MM3 (ref 1.7–7)
NEUTROPHILS NFR BLD AUTO: 60 % (ref 42.7–76)
NRBC BLD AUTO-RTO: 0 /100 WBC (ref 0–0.2)
PLATELET # BLD AUTO: 180 10*3/MM3 (ref 140–450)
PMV BLD AUTO: 10.7 FL (ref 6–12)
POTASSIUM BLD-SCNC: 4.1 MMOL/L (ref 3.5–5.2)
PROT SERPL-MCNC: 7.4 G/DL (ref 6–8.5)
PSA SERPL-MCNC: 0.38 NG/ML (ref 0–4)
RBC # BLD AUTO: 4.41 10*6/MM3 (ref 4.14–5.8)
SODIUM BLD-SCNC: 140 MMOL/L (ref 136–145)
T4 FREE SERPL-MCNC: 1.17 NG/DL (ref 0.93–1.7)
TESTOST SERPL-MCNC: 374 NG/DL (ref 193–740)
TRIGL SERPL-MCNC: 167 MG/DL (ref 0–150)
TSH SERPL DL<=0.05 MIU/L-ACNC: 1.81 UIU/ML (ref 0.27–4.2)
VIT B12 BLD-MCNC: 338 PG/ML (ref 211–946)
VLDLC SERPL-MCNC: 33.4 MG/DL (ref 5–40)
WBC NRBC COR # BLD: 4.62 10*3/MM3 (ref 3.4–10.8)

## 2019-11-06 NOTE — PROGRESS NOTES
Cholesterol was high, send diet sheets and repeat in 6-8 weeks after diet changes and if it doesn't improve, they will need to consider medication adjustment or the addition of medication.

## 2019-11-13 ENCOUNTER — OFFICE VISIT (OUTPATIENT)
Dept: PULMONOLOGY | Facility: HOSPITAL | Age: 74
End: 2019-11-13

## 2019-11-13 ENCOUNTER — TREATMENT (OUTPATIENT)
Dept: PHYSICAL THERAPY | Facility: CLINIC | Age: 74
End: 2019-11-13

## 2019-11-13 VITALS
BODY MASS INDEX: 28.93 KG/M2 | HEART RATE: 78 BPM | HEIGHT: 66 IN | DIASTOLIC BLOOD PRESSURE: 81 MMHG | OXYGEN SATURATION: 96 % | WEIGHT: 180 LBS | SYSTOLIC BLOOD PRESSURE: 117 MMHG

## 2019-11-13 DIAGNOSIS — M23.91 DERANGEMENT OF COLLATERAL LIGAMENT OF RIGHT KNEE: Primary | ICD-10-CM

## 2019-11-13 DIAGNOSIS — G47.33 OSA (OBSTRUCTIVE SLEEP APNEA): Primary | ICD-10-CM

## 2019-11-13 DIAGNOSIS — M25.561 ACUTE PAIN OF RIGHT KNEE: ICD-10-CM

## 2019-11-13 PROCEDURE — 97110 THERAPEUTIC EXERCISES: CPT | Performed by: PHYSICAL THERAPIST

## 2019-11-13 PROCEDURE — G0463 HOSPITAL OUTPT CLINIC VISIT: HCPCS

## 2019-11-13 NOTE — PROGRESS NOTES
Physical Therapy Daily Progress Note    VISIT#: 9    Subjective   Omar Choudhary reports: that his knee has been doing well overall. He has been having more muscle soreness but thinks he might be doing too much. He has had some pain on front/inside of knee when going down stairs for the last few days. Otherwise things are going well.       Objective     See Exercise, Manual, and Modality Logs for complete treatment.       Assessment & Plan     Assessment  Assessment details: Pt demonstrates improved ROM of R knee to 0-140 degrees without pain. Patellar mobility is WNL. Some tenderness noted on MCL. Pt instructed to decrease strength training for LE's to twice a week. Discussed using ice on knee and decreasing activity level to decrease pain in knee again. Pt instructed to call or return if he has any questions or other issues. Pt is agreeable to d/c with HEP at this time.           Other: D/c pt with HEP in 2 weeks if he does not return for further treatment.             Timed:         Manual Therapy:         mins  21916;     Therapeutic Exercise:    8     mins  10108;     Neuromuscular Nayeli:        mins  74398;    Therapeutic Activity:          mins  58382;     Gait Training:           mins  85037;     Ultrasound:          mins  47871;    Ionto                                   mins   58446  Self Care                            mins   34482    Un-Timed:  Electrical Stimulation:         mins  71786 ( );  Traction          mins 96626    Timed Treatment:   8   mins   Total Treatment:     8   mins    Shalini Hammer, PT, DPT, OCS  [place] License # [id number]  Physical Therapist

## 2019-11-13 NOTE — PROGRESS NOTES
Pulmonary/ Critical Care/ sleep medicine OUTPATIENT Consult/ Follow up Note        Patient Name:  Omar Choudhary    :  1945    Medical Record:  [id number]    Primary Care Physician     Harsha Samuels MD    Reason for consultation    Omar Choudhary is a 74 y.o. male who is here for follow up of sleep disturbance Last office visit 7 months ago. The patient has a diagnosis of obstructive sleep apnea. The AHI on polysomnography was noted to be 16 per hour. The lowest oxygen saturation recorded was 94 percent. Current treatment includes CPAP therapy. The patient is using a nasal pillows. By report there is good compliance with treatment, good tolerance of treatment and good symptom control. The patient denies any current problems tolerating therapy. The patient denies fatigue, sleepiness when sedentary, snoring or unrefreshing sleep. The patient describes this as unchanged. Symptoms are exacerbated by nothing. Symptoms are relieved by CPAP. The patient goes to sleep at 10:00 p.m. The patient gets up at 7:30 a.m. The patient's Clinton Sleepiness Scale today is 4.  Machine is very noisy and needs replacement. Patient believes the current machine is almost 10 years old    Additional reasons for visit:    Shortness of breath is described as the following:  The patient has shortness of air that occurs with exertion. It is unchanged since the last visit. The patient has not required hospitalization or ER visit since the last visit. Symptoms include cough and wheezing. Symptoms are relieved by rest and bronchodilator use. The patient is currently able to do activities of daily living without limitations. Current treatment includes inhaled beta agonists and leukotriene inhibitors.   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 He is using symbicort and albuterol. Overall feeling stable    Review of Systems    General Present- Fatigue. Not Present-  Appetite Loss, Chills, Fever, Night Sweats and Weight Loss.  Skin Not Present- Cold Skin, New Lesions, Rash and Skin Color Changes.  HEENT Present- Nasal Congestion. Not Present- Ear Pain, Hoarseness, Nose Bleed, Rhinitis, Sore Throat, Swollen glands in neck and Visual Disturbances.  Respiratory Present- Cough, Difficulty Breathing and Wheezing. Not Present- Bloody sputum.  Cardiovascular Not Present- Chest Pain, Edema, Fainting, Irregular Heart Beat, Leg Pain and/or Swelling, Orthopnea and Palpitations.  Gastrointestinal Present- Diarrhea. Not Present- Abdominal Pain, Change in Bowel Habits, Difficulty Swallowing, Heartburn, Indigestion, Nausea and Vomiting.  Male Genitourinary Present- Frequency and Hesitancy.  Musculoskeletal Present- Joint Pain, Joint Swelling and Muscle Pain. Not Present- Leg Cramps, Muscle Weakness and Swelling of Extremities.  Neurological Not Present- Dizziness, Numbness and Tingling.  Psychiatric Not Present- Anxiety, Depression, Insomnia and Memory Loss.  Endocrine Not Present- Appetite Changes and Excessive Thirst.  Hematology Not Present- Anemia, Easy Bruising and Enlarged Lymph Nodes    Medical History    Past Medical History:   Diagnosis Date   • Allergic    • Arthritis    • Asthma    • Cataract    • Depression    • Osteopenia    • Shortness of breath    • Sleep apnea, obstructive         Surgical History    Past Surgical History:   Procedure Laterality Date   • KNEE ARTHROSCOPY W/ ACL RECONSTRUCTION     • MOUTH SURGERY          Family History    Family History   Problem Relation Age of Onset   • Heart disease Mother    • Hypertension Mother    • Breast cancer Mother    • Stroke Mother    • Aortic aneurysm Father    • Heart attack Father    • Liver cancer Father        Social History    Social History     Tobacco Use   • Smoking status: Former Smoker     Start date:      Last attempt to quit:      Years since quittin.8   • Smokeless tobacco: Never Used   Substance Use  Topics   • Alcohol use: Yes     Alcohol/week: 7.2 oz     Types: 10 Glasses of wine, 2 Cans of beer per week        Allergies    Allergies   Allergen Reactions   • Statins Myalgia         Medications    Current Outpatient Medications on File Prior to Visit   Medication Sig Dispense Refill   • acetaminophen (TYLENOL) 500 MG tablet Take 500 mg by mouth Every 6 (Six) Hours As Needed for Mild Pain .     • Alpha-Lipoic Acid 100 MG capsule Take  by mouth.     • B Complex Vitamins (VITAMIN B COMPLEX) capsule capsule Take  by mouth Daily.     • budesonide-formoterol (SYMBICORT) 160-4.5 MCG/ACT inhaler SYMBICORT 160-4.5 MCG/ACT AERO     • clonazePAM (KLONOPIN) 0.5 MG tablet Take 1 tablet by mouth 2 (Two) Times a Day As Needed for Seizures. 30 tablet 0   • coenzyme Q10 50 MG capsule capsule Take  by mouth Daily.     • diphenhydrAMINE HCl (ANTI-HIST PO) Take  by mouth.     • DULoxetine (CYMBALTA) 60 MG capsule Take 1 capsule by mouth Daily. 30 capsule 6   • losartan-hydrochlorothiazide (HYZAAR) 100-25 MG per tablet TAKE 1 TABLET BY MOUTH DAILY 90 tablet 0   • Melatonin 10 MG tablet dispersible Take 2 tablets by mouth.     • meloxicam (MOBIC) 15 MG tablet Take 1 tablet by mouth Daily. 90 tablet 3   • montelukast (SINGULAIR) 10 MG tablet TAKE ONE TABLET BY MOUTH ONE TIME DAILY 90 tablet 0   • Omega-3 Fatty Acids (FISH OIL) 1000 MG capsule capsule Take  by mouth Daily With Breakfast.     • Red Yeast Rice 600 MG capsule Take  by mouth.     • sildenafil (REVATIO) 20 MG tablet SILDENAFIL CITRATE 20 MG TABS     • zolpidem (AMBIEN) 10 MG tablet Take 1 tablet by mouth Daily. 30 tablet 3     No current facility-administered medications on file prior to visit.        Physical Exam    Vitals:    11/13/19 1507   BP: 117/81   Pulse: 78   SpO2: 96%       Constitutional:  Well developed, well nourished, no acute distress, non-toxic appearance   Eyes:  PERRL, conjunctiva normal   HENT:  Atraumatic, external ears normal, nose normal, oropharynx  moist, no pharyngeal exudates. mallampatti   Neck- normal range of motion, no tenderness, supple   Respiratory:  No respiratory distress, normal breath sounds, no rales, no wheezing   Cardiovascular:  Normal rate, normal rhythm, no murmurs, no gallops, no rubs   GI:  Soft, nondistended, normal bowel sounds, nontender, no organomegaly, no mass, no rebound, no guarding   :  No costovertebral angle tenderness   Musculoskeletal:  No edema, no tenderness, no deformities. Back- no tenderness  Integument:  Well hydrated, no rash   Lymphatic:  No lymphadenopathy noted   Neurologic:  Alert & oriented x 3, CN 2-12 normal, normal motor function, normal sensory function, no focal deficits noted   Psychiatric:  Speech and behavior appropriate      Assessment & PLAN:    There are no diagnoses linked to this encounter.   Asthma (J45.909)  Impression: CXR 1/19: Elevated L Hemidiaphragm with bibasilar atelectasis  IgE 5/18: 765  RAST Panel 5/18: +ve  PFts 4/18: Non specific ventilatory defect. Diffusion capacity moderately reduced (FEV1 78%, TLC 82%, DLCO 76, DL/%  Repeat Lab 3/19: WBC 5.3, Eosinophil count normal. Mold Panel:negative. IgE: 515  Tests at [facility] [place]; PFts 8/19: Non specific ventilatory defect. FEV1 70%, + D response, %, DLCO 82%   Methacholine chalenge test 8/19: negative  HRCT : No ILD  VQ: Negative except decrease in LLL related to paralyzed diaphragm  Metabolic cardiopulmonary stress test: negative  Echo: EF 60%, RVSP 31 min  Tried Breo and Bevespi with no improvement in symptoms  Remains on Singulair, symbicort and albuterol.   -Also on allergy shots now    Obstructive sleep apnea (G47.33)  Impression: PAP compliance data reviewed. Days with machine use are 125. Average use 8hrs 27 min. Average AHI 0.2. Patient is on CPAP/BiPAP pressure 8cm.  Patient is compliant with using CPAP/BiPAP therapy and is benefitting from PAP therapy.  Will order a new CPAP at 8cm    Hypertension  (I10)  Impression: Normotensive today. Continue current therapy.    Patient says that he is planning to go to [facility] and will be at [place]000 feet. He has used supplemental O2 when he is at that altitude in the past. He will get it from one of the DMEs when he gets there and pays for it from his pocket for the duration of his trip    F/U 6 weeks for CPAP certification of new machine

## 2020-01-08 ENCOUNTER — DOCUMENTATION (OUTPATIENT)
Dept: PHYSICAL THERAPY | Facility: CLINIC | Age: 75
End: 2020-01-08

## 2020-01-08 NOTE — PROGRESS NOTES
Discharge Summary  Discharge Summary from Physical Therapy Report      Number of Visits: 9     Goals: All Met    Discharge Plan: Continue with current home exercise program as instructed    Comments: Pt was doing well. Pt has made good overall progress. Pt is independent with HEP.     Date of Discharge: 1-8-2020      PT SIGNATURE: Shalini Hammer PT, DPT, OCS           [place] License # [id number]

## 2020-01-29 RX ORDER — MONTELUKAST SODIUM 10 MG/1
10 TABLET ORAL DAILY
Qty: 90 TABLET | Refills: 0 | Status: SHIPPED | OUTPATIENT
Start: 2020-01-29 | End: 2020-11-30 | Stop reason: SDUPTHER

## 2020-02-05 ENCOUNTER — TELEPHONE (OUTPATIENT)
Dept: FAMILY MEDICINE CLINIC | Facility: CLINIC | Age: 75
End: 2020-02-05

## 2020-02-05 NOTE — TELEPHONE ENCOUNTER
Please call pt and remind him that he needs to make misc appt to have his cholesterol checked. thanks

## 2020-03-27 ENCOUNTER — TELEPHONE (OUTPATIENT)
Dept: FAMILY MEDICINE CLINIC | Facility: CLINIC | Age: 75
End: 2020-03-27

## 2020-03-27 NOTE — TELEPHONE ENCOUNTER
Please call pt and remind him that it is time for him to come back in for a recheck on his cholesterol. Please have him make a fasting lab appt. thanks

## 2020-03-27 NOTE — TELEPHONE ENCOUNTER
Pt scheduled fasting lab appointment 3/31/20 9:40 am.    Pt is requesting RF of Clonazepam to be sent to pharmacy. States they requested 1 week ago, but it was probably sent under [facility]'s name. 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 Asking if you would authorize refill. Thank you.

## 2020-03-28 DIAGNOSIS — F41.9 ANXIETY: Primary | ICD-10-CM

## 2020-03-28 RX ORDER — CLONAZEPAM 0.5 MG/1
0.5 TABLET ORAL 2 TIMES DAILY PRN
Qty: 30 TABLET | Refills: 0 | Status: SHIPPED | OUTPATIENT
Start: 2020-03-28 | End: 2020-03-30 | Stop reason: SDUPTHER

## 2020-03-30 DIAGNOSIS — F41.9 ANXIETY: ICD-10-CM

## 2020-03-30 RX ORDER — CLONAZEPAM 0.5 MG/1
0.5 TABLET ORAL 2 TIMES DAILY PRN
Qty: 30 TABLET | Refills: 0 | Status: SHIPPED | OUTPATIENT
Start: 2020-03-30 | End: 2020-12-22

## 2020-03-30 NOTE — TELEPHONE ENCOUNTER
PATIENT NEEDS THE CLONAZEPAM SENT TO THE LOCAL [facility] NOT THE MAIL ORDER. REMOVED MAIL ORDER PHARMACY, HE HAS NEVER USED THIS PHARMACY BEFORE.     PATIENT CALLBACK [phone]

## 2020-04-13 DIAGNOSIS — E78.2 MIXED HYPERLIPIDEMIA: Primary | ICD-10-CM

## 2020-05-07 RX ORDER — LOSARTAN POTASSIUM AND HYDROCHLOROTHIAZIDE 25; 100 MG/1; MG/1
1 TABLET ORAL DAILY
Qty: 90 TABLET | Refills: 0 | Status: SHIPPED | OUTPATIENT
Start: 2020-05-07 | End: 2020-08-06 | Stop reason: SDUPTHER

## 2020-05-07 RX ORDER — SILDENAFIL CITRATE 20 MG/1
20 TABLET ORAL AS NEEDED
Qty: 50 TABLET | Refills: 0 | Status: SHIPPED | OUTPATIENT
Start: 2020-05-07 | End: 2020-08-24 | Stop reason: SDUPTHER

## 2020-06-01 RX ORDER — BUDESONIDE AND FORMOTEROL FUMARATE DIHYDRATE 160; 4.5 UG/1; UG/1
2 AEROSOL RESPIRATORY (INHALATION)
Qty: 10.2 G | Refills: 1 | Status: SHIPPED | OUTPATIENT
Start: 2020-06-01 | End: 2021-06-17

## 2020-06-09 ENCOUNTER — TELEPHONE (OUTPATIENT)
Dept: PULMONOLOGY | Facility: HOSPITAL | Age: 75
End: 2020-06-09

## 2020-06-10 RX ORDER — ALBUTEROL SULFATE 90 UG/1
2 AEROSOL, METERED RESPIRATORY (INHALATION) EVERY 4 HOURS PRN
Qty: 1 INHALER | Refills: 0 | Status: SHIPPED | OUTPATIENT
Start: 2020-06-10 | End: 2021-08-11 | Stop reason: SDUPTHER

## 2020-06-10 NOTE — TELEPHONE ENCOUNTER
Will send electronically one inhaler. LOV was Nov 2019 and was to f/u in 6 weeks but has not yet. He will need an appt for further refills.

## 2020-08-06 RX ORDER — LOSARTAN POTASSIUM AND HYDROCHLOROTHIAZIDE 25; 100 MG/1; MG/1
1 TABLET ORAL DAILY
Qty: 90 TABLET | Refills: 0 | Status: SHIPPED | OUTPATIENT
Start: 2020-08-06 | End: 2020-12-22

## 2020-08-11 PROCEDURE — 87070 CULTURE OTHR SPECIMN AEROBIC: CPT | Performed by: FAMILY MEDICINE

## 2020-08-11 PROCEDURE — 87186 SC STD MICRODIL/AGAR DIL: CPT | Performed by: FAMILY MEDICINE

## 2020-08-11 PROCEDURE — 87205 SMEAR GRAM STAIN: CPT | Performed by: FAMILY MEDICINE

## 2020-08-11 PROCEDURE — 87077 CULTURE AEROBIC IDENTIFY: CPT | Performed by: FAMILY MEDICINE

## 2020-08-13 ENCOUNTER — TELEPHONE (OUTPATIENT)
Dept: FAMILY MEDICINE CLINIC | Facility: CLINIC | Age: 75
End: 2020-08-13

## 2020-08-13 NOTE — TELEPHONE ENCOUNTER
Patient called and requested a referral for a dermatologist for red spots   Patient was an [facility] and was assigned to Dr. Kwong      Please advise   [phone]

## 2020-08-14 NOTE — TELEPHONE ENCOUNTER
Left detailed message on patient's voice mail at 11:21am.  I told him to call back if his insurance requires a referral.

## 2020-08-18 ENCOUNTER — LAB (OUTPATIENT)
Dept: FAMILY MEDICINE CLINIC | Facility: CLINIC | Age: 75
End: 2020-08-18

## 2020-08-18 DIAGNOSIS — E78.2 MIXED HYPERLIPIDEMIA: ICD-10-CM

## 2020-08-18 LAB
CHOLEST SERPL-MCNC: 214 MG/DL (ref 0–200)
HDLC SERPL-MCNC: 57 MG/DL (ref 40–60)
LDLC SERPL CALC-MCNC: 132 MG/DL (ref 0–100)
LDLC/HDLC SERPL: 2.32 {RATIO}
TRIGL SERPL-MCNC: 123 MG/DL (ref 0–150)
VLDLC SERPL-MCNC: 24.6 MG/DL (ref 5–40)

## 2020-08-18 PROCEDURE — 80061 LIPID PANEL: CPT | Performed by: FAMILY MEDICINE

## 2020-08-18 PROCEDURE — 36415 COLL VENOUS BLD VENIPUNCTURE: CPT

## 2020-08-24 DIAGNOSIS — I10 ESSENTIAL HYPERTENSION: ICD-10-CM

## 2020-08-24 DIAGNOSIS — G47.00 INSOMNIA, UNSPECIFIED TYPE: Primary | ICD-10-CM

## 2020-08-24 DIAGNOSIS — E55.9 VITAMIN D DEFICIENCY: ICD-10-CM

## 2020-08-24 DIAGNOSIS — R73.09 ELEVATED GLUCOSE: ICD-10-CM

## 2020-08-24 DIAGNOSIS — E78.2 MIXED HYPERLIPIDEMIA: Primary | ICD-10-CM

## 2020-08-24 DIAGNOSIS — Z12.5 SCREENING FOR PROSTATE CANCER: ICD-10-CM

## 2020-08-24 RX ORDER — ZOLPIDEM TARTRATE 10 MG/1
10 TABLET ORAL EVERY 24 HOURS
Qty: 30 TABLET | Refills: 3 | Status: SHIPPED | OUTPATIENT
Start: 2020-08-24 | End: 2020-11-30

## 2020-08-24 RX ORDER — SILDENAFIL CITRATE 20 MG/1
20 TABLET ORAL AS NEEDED
Qty: 50 TABLET | Refills: 0 | Status: SHIPPED | OUTPATIENT
Start: 2020-08-24 | End: 2020-12-03

## 2020-08-24 NOTE — PROGRESS NOTES
Spoke with pt. He is asking if you woul call in a refill of sildenafil 20mg and Ambien 10mg to [facility] pharmacy. thanks

## 2020-11-24 ENCOUNTER — LAB (OUTPATIENT)
Dept: FAMILY MEDICINE CLINIC | Facility: CLINIC | Age: 75
End: 2020-11-24

## 2020-11-24 DIAGNOSIS — E78.2 MIXED HYPERLIPIDEMIA: ICD-10-CM

## 2020-11-24 DIAGNOSIS — I10 ESSENTIAL HYPERTENSION: ICD-10-CM

## 2020-11-24 DIAGNOSIS — Z12.5 SCREENING FOR PROSTATE CANCER: ICD-10-CM

## 2020-11-24 DIAGNOSIS — R73.09 ELEVATED GLUCOSE: ICD-10-CM

## 2020-11-24 DIAGNOSIS — E55.9 VITAMIN D DEFICIENCY: ICD-10-CM

## 2020-11-24 LAB
25(OH)D3 SERPL-MCNC: 37.3 NG/ML (ref 30–100)
ALBUMIN SERPL-MCNC: 4.3 G/DL (ref 3.5–5.2)
ALBUMIN/GLOB SERPL: 2 G/DL
ALP SERPL-CCNC: 52 U/L (ref 39–117)
ALT SERPL W P-5'-P-CCNC: 26 U/L (ref 1–41)
ANION GAP SERPL CALCULATED.3IONS-SCNC: 12.9 MMOL/L (ref 5–15)
AST SERPL-CCNC: 33 U/L (ref 1–40)
BASOPHILS # BLD MANUAL: 0.04 10*3/MM3 (ref 0–0.2)
BASOPHILS NFR BLD AUTO: 1 % (ref 0–1.5)
BILIRUB SERPL-MCNC: 0.7 MG/DL (ref 0–1.2)
BUN SERPL-MCNC: 22 MG/DL (ref 8–23)
BUN/CREAT SERPL: 21.2 (ref 7–25)
CALCIUM SPEC-SCNC: 9.3 MG/DL (ref 8.6–10.5)
CHLORIDE SERPL-SCNC: 99 MMOL/L (ref 98–107)
CHOLEST SERPL-MCNC: 232 MG/DL (ref 0–200)
CO2 SERPL-SCNC: 27.1 MMOL/L (ref 22–29)
CREAT SERPL-MCNC: 1.04 MG/DL (ref 0.76–1.27)
DEPRECATED RDW RBC AUTO: 42.9 FL (ref 37–54)
EOSINOPHIL # BLD MANUAL: 0.33 10*3/MM3 (ref 0–0.4)
EOSINOPHIL NFR BLD MANUAL: 9.1 % (ref 0.3–6.2)
ERYTHROCYTE [DISTWIDTH] IN BLOOD BY AUTOMATED COUNT: 12.8 % (ref 12.3–15.4)
GFR SERPL CREATININE-BSD FRML MDRD: 70 ML/MIN/1.73
GLOBULIN UR ELPH-MCNC: 2.2 GM/DL
GLUCOSE SERPL-MCNC: 109 MG/DL (ref 65–99)
HBA1C MFR BLD: 5.4 % (ref 4.8–5.6)
HCT VFR BLD AUTO: 41.5 % (ref 37.5–51)
HDLC SERPL-MCNC: 73 MG/DL (ref 40–60)
HGB BLD-MCNC: 13.8 G/DL (ref 13–17.7)
LDLC SERPL CALC-MCNC: 147 MG/DL (ref 0–100)
LDLC/HDLC SERPL: 1.99 {RATIO}
LYMPHOCYTES # BLD MANUAL: 1.11 10*3/MM3 (ref 0.7–3.1)
LYMPHOCYTES NFR BLD MANUAL: 18.2 % (ref 5–12)
LYMPHOCYTES NFR BLD MANUAL: 30.3 % (ref 19.6–45.3)
MCH RBC QN AUTO: 30.7 PG (ref 26.6–33)
MCHC RBC AUTO-ENTMCNC: 33.3 G/DL (ref 31.5–35.7)
MCV RBC AUTO: 92.2 FL (ref 79–97)
MONOCYTES # BLD AUTO: 0.67 10*3/MM3 (ref 0.1–0.9)
NEUTROPHILS # BLD AUTO: 1.52 10*3/MM3 (ref 1.7–7)
NEUTROPHILS NFR BLD MANUAL: 41.4 % (ref 42.7–76)
PLAT MORPH BLD: NORMAL
PLATELET # BLD AUTO: 176 10*3/MM3 (ref 140–450)
PMV BLD AUTO: 10.3 FL (ref 6–12)
POTASSIUM SERPL-SCNC: 4.3 MMOL/L (ref 3.5–5.2)
PROT SERPL-MCNC: 6.5 G/DL (ref 6–8.5)
PSA SERPL-MCNC: 0.49 NG/ML (ref 0–4)
RBC # BLD AUTO: 4.5 10*6/MM3 (ref 4.14–5.8)
RBC MORPH BLD: NORMAL
SODIUM SERPL-SCNC: 139 MMOL/L (ref 136–145)
TRIGL SERPL-MCNC: 69 MG/DL (ref 0–150)
TSH SERPL DL<=0.05 MIU/L-ACNC: 2.31 UIU/ML (ref 0.27–4.2)
VLDLC SERPL-MCNC: 12 MG/DL (ref 5–40)
WBC # BLD AUTO: 3.66 10*3/MM3 (ref 3.4–10.8)
WBC MORPH BLD: NORMAL

## 2020-11-24 PROCEDURE — 80061 LIPID PANEL: CPT | Performed by: FAMILY MEDICINE

## 2020-11-24 PROCEDURE — G0103 PSA SCREENING: HCPCS | Performed by: FAMILY MEDICINE

## 2020-11-24 PROCEDURE — 85025 COMPLETE CBC W/AUTO DIFF WBC: CPT | Performed by: FAMILY MEDICINE

## 2020-11-24 PROCEDURE — 80053 COMPREHEN METABOLIC PANEL: CPT | Performed by: FAMILY MEDICINE

## 2020-11-24 PROCEDURE — 83036 HEMOGLOBIN GLYCOSYLATED A1C: CPT | Performed by: FAMILY MEDICINE

## 2020-11-24 PROCEDURE — 84443 ASSAY THYROID STIM HORMONE: CPT | Performed by: FAMILY MEDICINE

## 2020-11-24 PROCEDURE — 85007 BL SMEAR W/DIFF WBC COUNT: CPT

## 2020-11-24 PROCEDURE — 82306 VITAMIN D 25 HYDROXY: CPT | Performed by: FAMILY MEDICINE

## 2020-11-24 PROCEDURE — 36415 COLL VENOUS BLD VENIPUNCTURE: CPT

## 2020-11-30 ENCOUNTER — OFFICE VISIT (OUTPATIENT)
Dept: FAMILY MEDICINE CLINIC | Facility: CLINIC | Age: 75
End: 2020-11-30

## 2020-11-30 VITALS
HEIGHT: 66 IN | HEART RATE: 62 BPM | BODY MASS INDEX: 28.45 KG/M2 | DIASTOLIC BLOOD PRESSURE: 90 MMHG | SYSTOLIC BLOOD PRESSURE: 170 MMHG | TEMPERATURE: 97.1 F | OXYGEN SATURATION: 98 % | WEIGHT: 177 LBS

## 2020-11-30 DIAGNOSIS — F32.A DEPRESSION, UNSPECIFIED DEPRESSION TYPE: ICD-10-CM

## 2020-11-30 DIAGNOSIS — R93.7 ABNORMAL FINDINGS ON DIAGNOSTIC IMAGING OF OTHER PARTS OF MUSCULOSKELETAL SYSTEM: ICD-10-CM

## 2020-11-30 DIAGNOSIS — I10 ESSENTIAL HYPERTENSION: ICD-10-CM

## 2020-11-30 DIAGNOSIS — Z00.00 MEDICARE ANNUAL WELLNESS VISIT, SUBSEQUENT: Primary | ICD-10-CM

## 2020-11-30 DIAGNOSIS — J45.20 MILD INTERMITTENT ASTHMA WITHOUT COMPLICATION: ICD-10-CM

## 2020-11-30 DIAGNOSIS — E78.2 MIXED HYPERLIPIDEMIA: ICD-10-CM

## 2020-11-30 DIAGNOSIS — G47.33 OBSTRUCTIVE SLEEP APNEA SYNDROME: ICD-10-CM

## 2020-11-30 PROCEDURE — 99214 OFFICE O/P EST MOD 30 MIN: CPT | Performed by: FAMILY MEDICINE

## 2020-11-30 PROCEDURE — G0439 PPPS, SUBSEQ VISIT: HCPCS | Performed by: FAMILY MEDICINE

## 2020-11-30 RX ORDER — MONTELUKAST SODIUM 10 MG/1
10 TABLET ORAL DAILY
Qty: 90 TABLET | Refills: 3 | Status: SHIPPED | OUTPATIENT
Start: 2020-11-30 | End: 2022-01-20

## 2020-11-30 RX ORDER — ESCITALOPRAM OXALATE 10 MG/1
10 TABLET ORAL DAILY
Qty: 90 TABLET | Refills: 3 | Status: SHIPPED | OUTPATIENT
Start: 2020-11-30 | End: 2021-06-17

## 2020-11-30 RX ORDER — EZETIMIBE 10 MG/1
10 TABLET ORAL DAILY
Qty: 90 TABLET | Refills: 1 | Status: SHIPPED | OUTPATIENT
Start: 2020-11-30 | End: 2021-06-17

## 2020-12-03 RX ORDER — SILDENAFIL CITRATE 20 MG/1
TABLET ORAL
Qty: 50 TABLET | Refills: 0 | Status: SHIPPED | OUTPATIENT
Start: 2020-12-03 | End: 2021-01-20

## 2020-12-10 ENCOUNTER — HOSPITAL ENCOUNTER (OUTPATIENT)
Dept: BONE DENSITY | Facility: HOSPITAL | Age: 75
Discharge: HOME | End: 2020-12-10

## 2020-12-10 ENCOUNTER — HOSPITAL ENCOUNTER (OUTPATIENT)
Dept: ULTRASOUND IMAGING | Facility: HOSPITAL | Age: 75
Discharge: HOME | End: 2020-12-10

## 2020-12-10 DIAGNOSIS — Z00.00 MEDICARE ANNUAL WELLNESS VISIT, SUBSEQUENT: ICD-10-CM

## 2020-12-10 DIAGNOSIS — R93.7 ABNORMAL FINDINGS ON DIAGNOSTIC IMAGING OF OTHER PARTS OF MUSCULOSKELETAL SYSTEM: ICD-10-CM

## 2020-12-10 PROCEDURE — 76706 US ABDL AORTA SCREEN AAA: CPT

## 2020-12-10 PROCEDURE — 77080 DXA BONE DENSITY AXIAL: CPT

## 2020-12-22 DIAGNOSIS — F41.9 ANXIETY: ICD-10-CM

## 2020-12-22 RX ORDER — LOSARTAN POTASSIUM AND HYDROCHLOROTHIAZIDE 25; 100 MG/1; MG/1
1 TABLET ORAL DAILY
Qty: 90 TABLET | Refills: 3 | Status: SHIPPED | OUTPATIENT
Start: 2020-12-22 | End: 2022-04-05

## 2020-12-22 RX ORDER — CLONAZEPAM 0.5 MG/1
TABLET ORAL
Qty: 30 TABLET | Refills: 0 | Status: SHIPPED | OUTPATIENT
Start: 2020-12-22 | End: 2021-06-16 | Stop reason: SDUPTHER

## 2020-12-28 RX ORDER — MELOXICAM 15 MG/1
15 TABLET ORAL EVERY 24 HOURS
Qty: 90 TABLET | Refills: 3 | Status: SHIPPED | OUTPATIENT
Start: 2020-12-28 | End: 2022-04-05

## 2020-12-28 NOTE — TELEPHONE ENCOUNTER
Looks like we decided on Lexapro instead of Wellbutrin. Let me know if he wants Wellbutrin instead of Lexapro

## 2020-12-28 NOTE — TELEPHONE ENCOUNTER
Patient called in stating the pharmacy is still waiting for an approval for the meloxicam (MOBIC) 15 MG tablet.    Patient also said he and the doctor spoke about Wellbutrin, but it has not been sent to the pharmacy.    [facility] [place]    Best call back # [phone]

## 2021-01-20 RX ORDER — SILDENAFIL CITRATE 20 MG/1
TABLET ORAL
Qty: 50 TABLET | Refills: 2 | Status: SHIPPED | OUTPATIENT
Start: 2021-01-20 | End: 2021-06-16 | Stop reason: SDUPTHER

## 2021-02-25 DIAGNOSIS — G47.00 INSOMNIA, UNSPECIFIED TYPE: ICD-10-CM

## 2021-02-26 RX ORDER — ZOLPIDEM TARTRATE 10 MG/1
10 TABLET ORAL NIGHTLY PRN
Qty: 30 TABLET | Refills: 0 | Status: SHIPPED | OUTPATIENT
Start: 2021-02-26 | End: 2021-06-16 | Stop reason: SDUPTHER

## 2021-05-07 ENCOUNTER — TELEPHONE (OUTPATIENT)
Dept: FAMILY MEDICINE CLINIC | Facility: CLINIC | Age: 76
End: 2021-05-07

## 2021-05-07 NOTE — TELEPHONE ENCOUNTER
Caller: Omar Choudhary    Relationship: Self    Best call back number: [phone]    What is the best time to reach you: ANYTIME    Who are you requesting to speak with (clinical staff, provider,  specific staff member): CLINICAL STAFF    Do you know the name of the person who called: OMAR CHOUDHARY    What was the call regarding: PATIENT SAID HE IS HAVING A COUGH WITH PHLEGM RECENTLY FOR ABOUT THREE WEEKS, WHICH IS OUT OF THE ORDINARY FOR HIM    HE SAID FIVE YEARS AGO HE HAD A LUNG CANCER SCREENING, BUT IT WAS NEGATIVE    HE IS WANTING TO KNOW IF DR. GASPAR THINKS HE SHOULD HAVE ONE AGAIN     Do you require a callback: YES

## 2021-05-28 ENCOUNTER — TELEPHONE (OUTPATIENT)
Dept: FAMILY MEDICINE CLINIC | Facility: CLINIC | Age: 76
End: 2021-05-28

## 2021-06-16 ENCOUNTER — OFFICE VISIT (OUTPATIENT)
Dept: FAMILY MEDICINE CLINIC | Facility: CLINIC | Age: 76
End: 2021-06-16

## 2021-06-16 VITALS
TEMPERATURE: 97.7 F | OXYGEN SATURATION: 100 % | SYSTOLIC BLOOD PRESSURE: 134 MMHG | DIASTOLIC BLOOD PRESSURE: 66 MMHG | WEIGHT: 184.8 LBS | BODY MASS INDEX: 29.7 KG/M2 | HEIGHT: 66 IN | HEART RATE: 64 BPM | RESPIRATION RATE: 16 BRPM

## 2021-06-16 DIAGNOSIS — G89.29 CHRONIC LOW BACK PAIN WITH RIGHT-SIDED SCIATICA, UNSPECIFIED BACK PAIN LATERALITY: Primary | ICD-10-CM

## 2021-06-16 DIAGNOSIS — G47.00 INSOMNIA, UNSPECIFIED TYPE: ICD-10-CM

## 2021-06-16 DIAGNOSIS — M54.41 CHRONIC LOW BACK PAIN WITH RIGHT-SIDED SCIATICA, UNSPECIFIED BACK PAIN LATERALITY: Primary | ICD-10-CM

## 2021-06-16 DIAGNOSIS — F41.9 ANXIETY: ICD-10-CM

## 2021-06-16 PROCEDURE — 99214 OFFICE O/P EST MOD 30 MIN: CPT | Performed by: NURSE PRACTITIONER

## 2021-06-16 RX ORDER — ZOLPIDEM TARTRATE 10 MG/1
10 TABLET ORAL NIGHTLY PRN
Qty: 30 TABLET | Refills: 0 | Status: SHIPPED | OUTPATIENT
Start: 2021-06-16 | End: 2021-08-12

## 2021-06-16 RX ORDER — SILDENAFIL CITRATE 20 MG/1
TABLET ORAL
Qty: 50 TABLET | Refills: 2 | Status: SHIPPED | OUTPATIENT
Start: 2021-06-16 | End: 2021-10-07

## 2021-06-16 RX ORDER — CLONAZEPAM 0.5 MG/1
0.5 TABLET ORAL 2 TIMES DAILY PRN
Qty: 30 TABLET | Refills: 0 | Status: SHIPPED | OUTPATIENT
Start: 2021-06-16 | End: 2021-08-12 | Stop reason: SDUPTHER

## 2021-06-16 NOTE — PROGRESS NOTES
"Chief Complaint  Back Pain    Subjective          Omar Choudhary presents to [facility]  History of Present Illness  Here today with right low back pain with radiation down the leg, all the way to the ankles at times  This has been ongoing for 25ish years, however seems to be worsening  Has decreased flexibility, is affecting his quality of like  The pain is chronic, worse after certain activities, specifically things like mowing, lifting/carrying things, cycling  Denies any loss of bowel or bladder control  Has seen [facility] in the past, has called them to be seen and was advised that he would need an MRI and a referral in order to be seen by their pain management specialists  Would like mri of the [facility] and referral today    Objective   Vital Signs:   /66 (BP Location: Left arm, Patient Position: Sitting, Cuff Size: Adult)   Pulse 64   Temp 97.7 °F (36.5 °C) (Oral)   Resp 16   Ht 167.6 cm (66\")   Wt 83.8 kg (184 lb 12.8 oz)   SpO2 100%   BMI 29.83 kg/m²     Physical Exam  Vitals reviewed.   Constitutional:       Appearance: Normal appearance.   Neck:      Vascular: No carotid bruit.   Cardiovascular:      Rate and Rhythm: Normal rate.      Pulses: Normal pulses.      Heart sounds: Normal heart sounds.   Pulmonary:      Effort: Pulmonary effort is normal.      Breath sounds: Normal breath sounds.   Musculoskeletal:      Cervical back: Normal range of motion and neck supple.      Lumbar back: No tenderness or bony tenderness. Decreased range of motion. Negative right straight leg raise test and negative left straight leg raise test.   Neurological:      General: No focal deficit present.      Mental Status: He is alert.        Result Review :                 Assessment and Plan    Diagnoses and all orders for this visit:    1. Chronic low back pain with right-sided sciatica, unspecified back pain laterality (Primary)  -     MRI Lumbar Spine Without " Contrast; Future  -     Ambulatory Referral to Pain Management    2. Anxiety  -     clonazePAM (KlonoPIN) 0.5 MG tablet; Take 1 tablet by mouth 2 (Two) Times a Day As Needed for Anxiety.  Dispense: 30 tablet; Refill: 0    3. Insomnia, unspecified type  -     zolpidem (AMBIEN) 10 MG tablet; Take 1 tablet by mouth At Night As Needed for Sleep.  Dispense: 30 tablet; Refill: 0    Other orders  -     sildenafil (REVATIO) 20 MG tablet; Take 1-2 tablets by mouth as needed for E.D.  Dispense: 50 tablet; Refill: 2        Follow Up   No follow-ups on file.  Patient was given instructions and counseling regarding his condition or for health maintenance advice. Please see specific information pulled into the AVS if appropriate.

## 2021-06-17 ENCOUNTER — LAB (OUTPATIENT)
Dept: LAB | Facility: HOSPITAL | Age: 76
End: 2021-06-17

## 2021-06-17 ENCOUNTER — OFFICE VISIT (OUTPATIENT)
Dept: PULMONOLOGY | Facility: HOSPITAL | Age: 76
End: 2021-06-17

## 2021-06-17 VITALS
DIASTOLIC BLOOD PRESSURE: 69 MMHG | SYSTOLIC BLOOD PRESSURE: 112 MMHG | RESPIRATION RATE: 13 BRPM | HEART RATE: 80 BPM | OXYGEN SATURATION: 94 % | TEMPERATURE: 98.3 F | HEIGHT: 66 IN | WEIGHT: 184 LBS | BODY MASS INDEX: 29.57 KG/M2

## 2021-06-17 DIAGNOSIS — J45.909 ASTHMA, UNSPECIFIED ASTHMA SEVERITY, UNSPECIFIED WHETHER COMPLICATED, UNSPECIFIED WHETHER PERSISTENT: ICD-10-CM

## 2021-06-17 DIAGNOSIS — J45.909 ASTHMA, UNSPECIFIED ASTHMA SEVERITY, UNSPECIFIED WHETHER COMPLICATED, UNSPECIFIED WHETHER PERSISTENT: Primary | ICD-10-CM

## 2021-06-17 PROBLEM — R05.3 CHRONIC COUGH: Status: RESOLVED | Noted: 2019-02-14 | Resolved: 2021-06-17

## 2021-06-17 PROCEDURE — 86003 ALLG SPEC IGE CRUDE XTRC EA: CPT | Performed by: INTERNAL MEDICINE

## 2021-06-17 PROCEDURE — 36415 COLL VENOUS BLD VENIPUNCTURE: CPT

## 2021-06-17 PROCEDURE — G0463 HOSPITAL OUTPT CLINIC VISIT: HCPCS

## 2021-06-17 PROCEDURE — 82785 ASSAY OF IGE: CPT | Performed by: INTERNAL MEDICINE

## 2021-06-17 PROCEDURE — 82785 ASSAY OF IGE: CPT

## 2021-06-17 RX ORDER — BUDESONIDE AND FORMOTEROL FUMARATE DIHYDRATE 160; 4.5 UG/1; UG/1
2 AEROSOL RESPIRATORY (INHALATION)
Qty: 120 EACH | Refills: 12 | Status: SHIPPED | OUTPATIENT
Start: 2021-06-17 | End: 2022-07-12 | Stop reason: SDUPTHER

## 2021-06-17 RX ORDER — THEOPHYLLINE 300 MG/1
300 TABLET, EXTENDED RELEASE ORAL DAILY
Qty: 30 TABLET | Refills: 11 | Status: SHIPPED | OUTPATIENT
Start: 2021-06-17 | End: 2021-11-16 | Stop reason: SDUPTHER

## 2021-06-17 NOTE — PROGRESS NOTES
PULMONARY/ CRITICAL CARE/ SLEEP MEDICINE OUTPATIENT CONSULT/ FOLLOW UP NOTE        Patient Name:  Omar Choudhary    :  1945    Medical Record:  [id number]    PRIMARY CARE PHYSICIAN     Dennis Kwong MD    REASON FOR CONSULTATION    Omar Choudhary is a 75 y.o. male who is referred for consultation for asthma/ELINOR  REVIEW OF SYSTEMS    Constitutional:  Denies fever or chills   Eyes:  Denies change in visual acuity   HENT:  Denies nasal congestion or sore throat   Respiratory:  Denies cough or shortness of breath   Cardiovascular:  Denies chest pain or edema   GI:  Denies abdominal pain, nausea, vomiting, bloody stools or diarrhea   :  Denies dysuria   Musculoskeletal:  Denies back pain or joint pain   Integument:  Denies rash   Neurologic:  Denies headache, focal weakness or sensory changes   Endocrine:  Denies polyuria or polydipsia   Lymphatic:  Denies swollen glands   Psychiatric:  Denies depression or anxiety     MEDICAL HISTORY    Past Medical History:   Diagnosis Date   • Allergic    • Arthritis    • Asthma    • Cataract    • Depression    • Family history of malignant neoplasm of breast 2019   • Hemidiaphragm paralysis     Left   • Hyperlipidemia 2019   • Hypertension 3/1/2012   • Osteopenia    • Shortness of breath    • Sleep apnea, obstructive         SURGICAL HISTORY    Past Surgical History:   Procedure Laterality Date   • KNEE ARTHROSCOPY W/ ACL RECONSTRUCTION     • MOUTH SURGERY          FAMILY HISTORY    Family History   Problem Relation Age of Onset   • Heart disease Mother    • Hypertension Mother    • Breast cancer Mother    • Stroke Mother    • Aortic aneurysm Father    • Heart attack Father    • Liver cancer Father        SOCIAL HISTORY    Social History     Tobacco Use   • Smoking status: Former Smoker     Packs/day: 0.50     Years: 10.00     Pack years: 5.00     Types: Cigarettes     Start date:      Quit date:      Years since quittin.4   • Smokeless  tobacco: Never Used   Substance Use Topics   • Alcohol use: Yes     Alcohol/week: 12.0 standard drinks     Types: 10 Glasses of wine, 2 Cans of beer per week        ALLERGIES    Allergies   Allergen Reactions   • Statins Myalgia         MEDICATIONS    Current Outpatient Medications on File Prior to Visit   Medication Sig Dispense Refill   • acetaminophen (TYLENOL) 500 MG tablet Take 500 mg by mouth Every 6 (Six) Hours As Needed for Mild Pain .     • albuterol sulfate  (90 Base) MCG/ACT inhaler Inhale 2 puffs Every 4 (Four) Hours As Needed for Wheezing or Shortness of Air. Patient due for appt for further refills. 1 inhaler 0   • Alpha-Lipoic Acid 100 MG capsule Take  by mouth.     • B Complex Vitamins (VITAMIN B COMPLEX) capsule capsule Take  by mouth Daily.     • Calcium Carb-Cholecalciferol (calcium-vitamin D) 500-200 MG-UNIT per tablet Take 2 tablets by mouth Daily. 180 tablet 3   • clonazePAM (KlonoPIN) 0.5 MG tablet Take 1 tablet by mouth 2 (Two) Times a Day As Needed for Anxiety. 30 tablet 0   • coenzyme Q10 50 MG capsule capsule Take  by mouth Daily.     • diphenhydrAMINE HCl (ANTI-HIST PO) Take  by mouth.     • losartan-hydrochlorothiazide (HYZAAR) 100-25 MG per tablet TAKE 1 TABLET BY MOUTH DAILY  90 tablet 3   • Melatonin 10 MG tablet dispersible Take 2 tablets by mouth.     • meloxicam (Mobic) 15 MG tablet Take 1 tablet by mouth Daily. 90 tablet 3   • montelukast (SINGULAIR) 10 MG tablet Take 1 tablet by mouth Daily. 90 tablet 3   • Omega-3 Fatty Acids (FISH OIL) 1000 MG capsule capsule Take  by mouth Daily With Breakfast.     • Red Yeast Rice 600 MG capsule Take  by mouth.     • sildenafil (REVATIO) 20 MG tablet Take 1-2 tablets by mouth as needed for E.D. 50 tablet 2   • triamcinolone (KENALOG) 0.1 % cream Apply  topically to the appropriate area as directed 2 (Two) Times a Day. 28.4 g 0   • zolpidem (AMBIEN) 10 MG tablet Take 1 tablet by mouth At Night As Needed for Sleep. 30 tablet 0   •  "[DISCONTINUED] budesonide-formoterol (Symbicort) 160-4.5 MCG/ACT inhaler Inhale 2 puffs 2 (Two) Times a Day. 10.2 g 1   • [DISCONTINUED] escitalopram (Lexapro) 10 MG tablet Take 1 tablet by mouth Daily. 90 tablet 3   • [DISCONTINUED] ezetimibe (Zetia) 10 MG tablet Take 1 tablet by mouth Daily. 90 tablet 1     No current facility-administered medications on file prior to visit.       PHYSICAL EXAM    Vitals:    06/17/21 1439   BP: 112/69   BP Location: Left arm   Patient Position: Sitting   Cuff Size: Adult   Pulse: 80   Resp: 13   Temp: 98.3 °F (36.8 °C)   TempSrc: Oral   SpO2: 94%   Weight: 83.5 kg (184 lb)   Height: 167.6 cm (66\")        Constitutional:  Well developed, well nourished, no acute distress, non-toxic appearance   Eyes:  PERRL, conjunctiva normal   HENT:  Atraumatic, external ears normal, nose normal, oropharynx moist, no pharyngeal exudates. mallampatti   Neck- normal range of motion, no tenderness, supple   Respiratory:  No respiratory distress, normal breath sounds, no rales, no wheezing   Cardiovascular:  Normal rate, normal rhythm, no murmurs, no gallops, no rubs   GI:  Soft, nondistended, normal bowel sounds, nontender, no organomegaly, no mass, no rebound, no guarding   :  No costovertebral angle tenderness   Musculoskeletal:  No edema, no tenderness, no deformities. Back- no tenderness  Integument:  Well hydrated, no rash   Lymphatic:  No lymphadenopathy noted   Neurologic:  Alert & oriented x 3, CN 2-12 normal, normal motor function, normal sensory function, no focal deficits noted   Psychiatric:  Speech and behavior appropriate     No radiology results for the last 90 days.       ASSESSMENT & PLAN:      Asthma (J45.909)  Impression: CXR 1/19: Elevated L Hemidiaphragm with bibasilar atelectasis  IgE 5/18: 765  RAST Panel 5/18: +ve  PFts 4/18:  (FEV1 78%, TLC 82%, DLCO 76, DL/%  Repeat Lab 3/19: WBC 5.3, Eosinophil count normal. Mold Panel:negative.   IgE: 515    Tests at [facility] [facility]; PFts 8/19: Non specific ventilatory defect. FEV1 70%, + D response, %, DLCO 82%   Methacholine chalenge test 8/19: negative  HRCT : No ILD  VQ: Negative except decrease in LLL related to paralyzed diaphragm  Metabolic cardiopulmonary stress test: negative  Echo: EF 60%, RVSP 31 min  Tried Breo and Bevespi with no improvement in symptoms  Remains on Singulair,   symbicort  expensive and albuterol.   -Also on allergy shots now     Obstructive sleep apnea (G47.33)  Impression: PAP compliance data reviewed. Days with machine use are 125. Average use 8hrs 27 min. Average AHI 0.2. Patient is on CPAP/BiPAP pressure 8cm.  Patient is compliant with using CPAP/BiPAP therapy and is benefitting from PAP therapy.  Will order a new CPAP at 8-20     Hypertension (I10)             This document has been electronically signed by  Jerrod Lagunas MD  14:50 EDT

## 2021-06-18 ENCOUNTER — TELEPHONE (OUTPATIENT)
Dept: PULMONOLOGY | Facility: HOSPITAL | Age: 76
End: 2021-06-18

## 2021-06-22 LAB — IGE SERPL-ACNC: 554 IU/ML (ref 6–495)

## 2021-07-01 LAB
A ALTERNATA IGE QN: <0.1 KU/L
A FUMIGATUS IGE QN: <0.1 KU/L
AMER ROACH IGE QN: <0.1 KU/L
BAHIA GRASS IGE QN: 0.1 KU/L
BERMUDA GRASS IGE QN: <0.1 KU/L
BOXELDER IGE QN: <0.1 KU/L
C HERBARUM IGE QN: <0.1 KU/L
CAT DANDER IGE QN: 0.23 KU/L
CMN PIGWEED IGE QN: <0.1 KU/L
COMMON RAGWEED IGE QN: <0.1 KU/L
CONV CLASS DESCRIPTION: ABNORMAL
D FARINAE IGE QN: 0.14 KU/L
D PTERONYSS IGE QN: 0.18 KU/L
DOG DANDER IGE QN: 0.49 KU/L
ENGL PLANTAIN IGE QN: <0.1 KU/L
HAZELNUT POLN IGE QN: ABNORMAL
IGE SERPL-ACNC: 559 IU/ML (ref 6–495)
JOHNSON GRASS IGE QN: 0.1 KU/L
KENT BLUE GRASS IGE QN: <0.1 KU/L
LONDON PLANE IGE QN: ABNORMAL
M RACEMOSUS IGE QN: <0.1 KU/L
MT JUNIPER IGE QN: 0.27 KU/L
MUGWORT IGE QN: <0.1 KU/L
NETTLE IGE QN: ABNORMAL
P NOTATUM IGE QN: <0.1 KU/L
S BOTRYOSUM IGE QN: ABNORMAL
SHEEP SORREL IGE QN: 0.1 KU/L
SWEET GUM IGE QN: ABNORMAL
WHITE ELM IGE QN: <0.1 KU/L
WHITE HICKORY IGE QN: ABNORMAL
WHITE MULBERRY IGE QN: <0.1 KU/L
WHITE OAK IGE QN: 0.12 KU/L

## 2021-07-12 ENCOUNTER — HOSPITAL ENCOUNTER (OUTPATIENT)
Dept: MRI IMAGING | Facility: HOSPITAL | Age: 76
Discharge: HOME | End: 2021-07-12
Admitting: NURSE PRACTITIONER

## 2021-07-12 DIAGNOSIS — M54.41 CHRONIC LOW BACK PAIN WITH RIGHT-SIDED SCIATICA, UNSPECIFIED BACK PAIN LATERALITY: ICD-10-CM

## 2021-07-12 DIAGNOSIS — G89.29 CHRONIC LOW BACK PAIN WITH RIGHT-SIDED SCIATICA, UNSPECIFIED BACK PAIN LATERALITY: ICD-10-CM

## 2021-07-12 PROCEDURE — 72148 MRI LUMBAR SPINE W/O DYE: CPT

## 2021-07-22 ENCOUNTER — TELEPHONE (OUTPATIENT)
Dept: FAMILY MEDICINE CLINIC | Facility: CLINIC | Age: 76
End: 2021-07-22

## 2021-07-22 NOTE — TELEPHONE ENCOUNTER
Caller: Omar Choudhary    Relationship: Self    Best call back number: [phone]    What form or medical record are you requesting: OFFICE NOTES FROM 07/12/21, AS WELL AS NOTES FROM THE MRI RESULTS    PATIENT SAID THEY ALSO DO NOT HAVE HIS REFERRAL YET     Who is requesting this form or medical record from you: [facility]     How would you like to receive the form or medical records (pick-up, mail, fax): FAX  If fax, what is the fax number: [phone]      PHONE: [phone]    Timeframe paperwork needed: ASAP    Additional notes: PATIENT SAID THAT THE [facility] HAS NOT RECEIVED HIS REFERRAL YET AND ALSO NEED OFFICE NOTES

## 2021-08-09 ENCOUNTER — TELEPHONE (OUTPATIENT)
Dept: FAMILY MEDICINE CLINIC | Facility: CLINIC | Age: 76
End: 2021-08-09

## 2021-08-09 NOTE — TELEPHONE ENCOUNTER
Caller: Omar Choudhary    Relationship to patient: Self    Best call back number:[phone]    Chief complaint: LUMBAR PAIN, OSTEOARTHRITIS     Type of visit: OFFICE VISIT    Requested date: BEFORE 8/17 SINCE HE WILL LEAVING [facility] FOR 6 WEEKS.     Additional notes: WANTS TO SEE DR VALLE

## 2021-08-11 ENCOUNTER — OFFICE VISIT (OUTPATIENT)
Dept: PULMONOLOGY | Facility: HOSPITAL | Age: 76
End: 2021-08-11

## 2021-08-11 VITALS
SYSTOLIC BLOOD PRESSURE: 132 MMHG | WEIGHT: 175 LBS | TEMPERATURE: 98.2 F | HEIGHT: 66 IN | RESPIRATION RATE: 12 BRPM | HEART RATE: 74 BPM | DIASTOLIC BLOOD PRESSURE: 85 MMHG | BODY MASS INDEX: 28.12 KG/M2 | OXYGEN SATURATION: 97 %

## 2021-08-11 DIAGNOSIS — J45.909 ASTHMA, UNSPECIFIED ASTHMA SEVERITY, UNSPECIFIED WHETHER COMPLICATED, UNSPECIFIED WHETHER PERSISTENT: Primary | ICD-10-CM

## 2021-08-11 PROCEDURE — G0463 HOSPITAL OUTPT CLINIC VISIT: HCPCS

## 2021-08-11 RX ORDER — ALBUTEROL SULFATE 90 UG/1
2 AEROSOL, METERED RESPIRATORY (INHALATION) EVERY 4 HOURS PRN
Qty: 18 G | Refills: 3 | Status: SHIPPED | OUTPATIENT
Start: 2021-08-11 | End: 2022-12-14 | Stop reason: SDUPTHER

## 2021-08-11 RX ORDER — HYDROCODONE BITARTRATE AND ACETAMINOPHEN 7.5; 325 MG/1; MG/1
1 TABLET ORAL EVERY 8 HOURS PRN
COMMUNITY
End: 2021-12-29 | Stop reason: SDUPTHER

## 2021-08-11 NOTE — PROGRESS NOTES
PULMONARY/ CRITICAL CARE/ SLEEP MEDICINE OUTPATIENT CONSULT/ FOLLOW UP NOTE        Patient Name:  Omar Choudhary    :  1945    Medical Record:  [id number]    PRIMARY CARE PHYSICIAN     Dennis Kwong MD    REASON FOR CONSULTATION    Omar Choudhary is a 76 y.o. male who is referred for consultation for Asthma  REVIEW OF SYSTEMS    Constitutional:  Denies fever or chills   Eyes:  Denies change in visual acuity   HENT:  Denies nasal congestion or sore throat   Respiratory:  Denies cough or shortness of breath   Cardiovascular:  Denies chest pain or edema   GI:  Denies abdominal pain, nausea, vomiting, bloody stools or diarrhea   :  Denies dysuria   Musculoskeletal:  Denies back pain or joint pain   Integument:  Denies rash   Neurologic:  Denies headache, focal weakness or sensory changes   Endocrine:  Denies polyuria or polydipsia   Lymphatic:  Denies swollen glands   Psychiatric:  Denies depression or anxiety     MEDICAL HISTORY    Past Medical History:   Diagnosis Date   • Allergic    • Arthritis    • Asthma    • Cataract    • Depression    • Family history of malignant neoplasm of breast 2019   • Hemidiaphragm paralysis     Left   • Hyperlipidemia 2019   • Hypertension 3/1/2012   • Osteopenia    • Reactive airway disease 1/15/2016   • Shortness of breath    • Sleep apnea, obstructive         SURGICAL HISTORY    Past Surgical History:   Procedure Laterality Date   • KNEE ARTHROSCOPY W/ ACL RECONSTRUCTION     • MOUTH SURGERY          FAMILY HISTORY    Family History   Problem Relation Age of Onset   • Heart disease Mother    • Hypertension Mother    • Breast cancer Mother    • Stroke Mother    • Aortic aneurysm Father    • Heart attack Father    • Liver cancer Father        SOCIAL HISTORY    Social History     Tobacco Use   • Smoking status: Former Smoker     Packs/day: 0.50     Years: 10.00     Pack years: 5.00     Types: Cigarettes     Start date:      Quit date: 1992     Years  since quittin.6   • Smokeless tobacco: Never Used   Substance Use Topics   • Alcohol use: Yes     Alcohol/week: 12.0 standard drinks     Types: 10 Glasses of wine, 2 Cans of beer per week        ALLERGIES    Allergies   Allergen Reactions   • Statins Myalgia         MEDICATIONS    Current Outpatient Medications on File Prior to Visit   Medication Sig Dispense Refill   • acetaminophen (TYLENOL) 500 MG tablet Take 500 mg by mouth Every 6 (Six) Hours As Needed for Mild Pain .     • Alpha-Lipoic Acid 100 MG capsule Take  by mouth.     • B Complex Vitamins (VITAMIN B COMPLEX) capsule capsule Take  by mouth Daily.     • budesonide-formoterol (SYMBICORT) 160-4.5 MCG/ACT inhaler Inhale 2 puffs 2 (Two) Times a Day. 120 each 12   • Calcium Carb-Cholecalciferol (calcium-vitamin D) 500-200 MG-UNIT per tablet Take 2 tablets by mouth Daily. 180 tablet 3   • clonazePAM (KlonoPIN) 0.5 MG tablet Take 1 tablet by mouth 2 (Two) Times a Day As Needed for Anxiety. 30 tablet 0   • coenzyme Q10 50 MG capsule capsule Take  by mouth Daily.     • diphenhydrAMINE HCl (ANTI-HIST PO) Take  by mouth.     • HYDROcodone-acetaminophen (NORCO) 7.5-325 MG per tablet Take 1 tablet by mouth Every 8 (Eight) Hours As Needed for Moderate Pain .     • losartan-hydrochlorothiazide (HYZAAR) 100-25 MG per tablet TAKE 1 TABLET BY MOUTH DAILY  90 tablet 3   • Melatonin 10 MG tablet dispersible Take 2 tablets by mouth.     • meloxicam (Mobic) 15 MG tablet Take 1 tablet by mouth Daily. 90 tablet 3   • montelukast (SINGULAIR) 10 MG tablet Take 1 tablet by mouth Daily. 90 tablet 3   • Omega-3 Fatty Acids (FISH OIL) 1000 MG capsule capsule Take  by mouth Daily With Breakfast.     • Red Yeast Rice 600 MG capsule Take  by mouth.     • sildenafil (REVATIO) 20 MG tablet Take 1-2 tablets by mouth as needed for E.D. 50 tablet 2   • theophylline (THEODUR) 300 MG 12 hr tablet Take 1 tablet by mouth Daily. May substitute for generic 30 tablet 11   • triamcinolone  "(KENALOG) 0.1 % cream Apply  topically to the appropriate area as directed 2 (Two) Times a Day. 28.4 g 0   • zolpidem (AMBIEN) 10 MG tablet Take 1 tablet by mouth At Night As Needed for Sleep. 30 tablet 0   • [DISCONTINUED] albuterol sulfate  (90 Base) MCG/ACT inhaler Inhale 2 puffs Every 4 (Four) Hours As Needed for Wheezing or Shortness of Air. Patient due for appt for further refills. 1 inhaler 0     No current facility-administered medications on file prior to visit.       PHYSICAL EXAM    Vitals:    08/11/21 1440   BP: 132/85   BP Location: Left arm   Patient Position: Sitting   Cuff Size: Adult   Pulse: 74   Resp: 12   Temp: 98.2 °F (36.8 °C)   TempSrc: Oral   SpO2: 97%   Weight: 79.4 kg (175 lb)   Height: 167.6 cm (66\")        Constitutional:  Well developed, well nourished, no acute distress, non-toxic appearance   Eyes:  PERRL, conjunctiva normal   HENT:  Atraumatic, external ears normal, nose normal, oropharynx moist, no pharyngeal exudates. mallampatti   Neck- normal range of motion, no tenderness, supple   Respiratory:  No respiratory distress, normal breath sounds, no rales, no wheezing   Cardiovascular:  Normal rate, normal rhythm, no murmurs, no gallops, no rubs   GI:  Soft, nondistended, normal bowel sounds, nontender, no organomegaly, no mass, no rebound, no guarding   :  No costovertebral angle tenderness   Musculoskeletal:  No edema, no tenderness, no deformities. Back- no tenderness  Integument:  Well hydrated, no rash   Lymphatic:  No lymphadenopathy noted   Neurologic:  Alert & oriented x 3, CN 2-12 normal, normal motor function, normal sensory function, no focal deficits noted   Psychiatric:  Speech and behavior appropriate     MRI Lumbar Spine Without Contrast    Result Date: 7/12/2021  1. Severe multilevel degenerative changes within the lumbar spine. Please refer to body of report for level by level findings. There is severe multilevel lumbar neural foraminal stenosis encroaching " on multiple lumbar nerve roots. There is multilevel lumbar canal stenosis, most severe at the L3-4 level. 2. Endplate edema is present at T12-L1, L2-3, and to a lesser degree, L4-5. This is favored to be on the basis of degenerative change. The trabecular impaction injury not excluded, although no discrete fracture line is seen. 3. 25 degrees lumbar levoscoliosis.  Electronically Signed By-Pamella Moraes MD On:7/12/2021 3:45 PM This report was finalized on [id number] by  Pamella Moraes MD.         ASSESSMENT & PLAN:        Asthma (J45.909)  Impression: CXR 1/19: Elevated L Hemidiaphragm with bibasilar atelectasis  IgE 5/18: 765  RAST Panel 5/18: +ve  PFts 4/18:  (FEV1 78%, TLC 82%, DLCO 76, DL/%  Repeat Lab 3/19: WBC 5.3, Eosinophil count normal. Mold Panel:negative.     IgE: 515  Discussed with patient possible use of Xolair once he return from his trip to [place]    Tests at [facility]; PFts 8/19: Non specific ventilatory defect. FEV1 70%, + D response, %, DLCO 82%   Methacholine chalenge test 8/19: negative  HRCT : No ILD  VQ: Negative except decrease in LLL related to paralyzed diaphragm  Metabolic cardiopulmonary stress test: negative  Echo: EF 60%, RVSP 31 min  Tried Breo and Bevespi with no improvement in symptoms  Remains on Singulair,   symbicort  expensive and albuterol.   -Also on allergy shots now     Obstructive sleep apnea (G47.33)  Has new ResMed machine,  CPAP at 8-20   Average use 8hrs 27 min. Average AHI 0.2. Patient is on CPAP/BiPAP pressure 8cm.  Patient is compliant with using CPAP/BiPAP therapy and is benefitting from PAP therapy.     Hypertension (I10)                  This document has been electronically signed by  Jerrod Lagunas MD  15:04 EDT

## 2021-08-12 ENCOUNTER — OFFICE VISIT (OUTPATIENT)
Dept: FAMILY MEDICINE CLINIC | Facility: CLINIC | Age: 76
End: 2021-08-12

## 2021-08-12 VITALS
SYSTOLIC BLOOD PRESSURE: 110 MMHG | WEIGHT: 173 LBS | HEART RATE: 79 BPM | TEMPERATURE: 97.1 F | RESPIRATION RATE: 16 BRPM | BODY MASS INDEX: 27.8 KG/M2 | HEIGHT: 66 IN | DIASTOLIC BLOOD PRESSURE: 80 MMHG | OXYGEN SATURATION: 97 %

## 2021-08-12 DIAGNOSIS — M48.062 SPINAL STENOSIS OF LUMBAR REGION WITH NEUROGENIC CLAUDICATION: Primary | ICD-10-CM

## 2021-08-12 DIAGNOSIS — F41.9 ANXIETY: ICD-10-CM

## 2021-08-12 DIAGNOSIS — M48.061 DEGENERATIVE LUMBAR SPINAL STENOSIS: ICD-10-CM

## 2021-08-12 PROCEDURE — 99214 OFFICE O/P EST MOD 30 MIN: CPT | Performed by: PHYSICIAN ASSISTANT

## 2021-08-12 RX ORDER — CLONAZEPAM 0.5 MG/1
0.5 TABLET ORAL 2 TIMES DAILY PRN
Qty: 30 TABLET | Refills: 0 | Status: SHIPPED | OUTPATIENT
Start: 2021-08-12 | End: 2021-08-16

## 2021-08-12 NOTE — PROGRESS NOTES
"Subjective   Omar Choudhary is a 76 y.o. male.     Chief Complaint   Patient presents with   • Back Pain   • Arthritis       /80 (BP Location: Left arm)   Pulse 79   Temp 97.1 °F (36.2 °C) (Infrared)   Resp 16   Ht 167.6 cm (66\")   Wt 78.5 kg (173 lb)   SpO2 97%   BMI 27.92 kg/m²     BP Readings from Last 3 Encounters:   08/12/21 110/80   08/11/21 132/85   06/17/21 112/69       Wt Readings from Last 3 Encounters:   08/12/21 78.5 kg (173 lb)   08/11/21 79.4 kg (175 lb)   06/17/21 83.5 kg (184 lb)       HPI Presents to the clinic for back pain, htn, insomnia, and asthma. He initially dg to pain management and was unfortunately discharged from pain management at [facility] due to having hydrocodone in his system. He was prescribed hydrocodone but did not place this on his medication list as it was not a chronic medication. He was prescribed the hydrocodone from a previous ACL repair and his back pain was so severe he took one of these pills. He does have severe stenosis at L3/L4, he has endplate edema at T12-L3. He has nerve root impingement at multiple levels. He did have one injection at [facility] and this did help. He is not interested in pain meds at this time. He does have issues with alcohol use and is currently in therapy for this. He rides bikes and hikes regularly.     The following portions of the patient's history were reviewed and updated as appropriate: allergies, current medications, past family history, past medical history, past social history, past surgical history and problem list.    Review of Systems    Objective   Physical Exam  Constitutional:       Appearance: He is well-developed.   HENT:      Head: Normocephalic and atraumatic.   Eyes:      Conjunctiva/sclera: Conjunctivae normal.      Pupils: Pupils are equal, round, and reactive to light.   Cardiovascular:      Rate and Rhythm: Normal rate and regular rhythm.      Heart sounds: No murmur heard.     Pulmonary:      Effort: Pulmonary " effort is normal.      Breath sounds: Normal breath sounds.   Abdominal:      General: Bowel sounds are normal.      Palpations: Abdomen is soft.      Tenderness: There is no abdominal tenderness.   Musculoskeletal:         General: Tenderness (lower back pain) present. No deformity. Normal range of motion.      Cervical back: Normal range of motion and neck supple.   Lymphadenopathy:      Cervical: No cervical adenopathy.   Skin:     General: Skin is warm and dry.      Capillary Refill: Capillary refill takes less than 2 seconds.      Findings: No rash.   Neurological:      Mental Status: He is alert and oriented to person, place, and time.      Cranial Nerves: No cranial nerve deficit.   Psychiatric:         Behavior: Behavior normal.         Thought Content: Thought content normal.         Judgment: Judgment normal.           Diagnoses and all orders for this visit:    1. Spinal stenosis of lumbar region with neurogenic claudication (Primary)  Comments:  Would benefit from epidurals. I will try to get him in to pain management. He does not have risk for abuse in my opinion.  He is not even requesting pain meds.   Orders:  -     Ambulatory Referral to Pain Management    2. Degenerative lumbar spinal stenosis  -     Ambulatory Referral to Pain Management    3. Anxiety  -     clonazePAM (KlonoPIN) 0.5 MG tablet; Take 1 tablet by mouth 2 (Two) Times a Day As Needed for Anxiety.  Dispense: 30 tablet; Refill: 0        Return if symptoms worsen or fail to improve.

## 2021-08-13 DIAGNOSIS — F41.9 ANXIETY: ICD-10-CM

## 2021-08-16 RX ORDER — CLONAZEPAM 0.5 MG/1
TABLET ORAL
Qty: 30 TABLET | Refills: 0 | Status: SHIPPED | OUTPATIENT
Start: 2021-08-16 | End: 2022-01-24

## 2021-09-23 DIAGNOSIS — G47.33 OSA (OBSTRUCTIVE SLEEP APNEA): Primary | ICD-10-CM

## 2021-10-04 ENCOUNTER — OFFICE VISIT (OUTPATIENT)
Dept: PAIN MEDICINE | Facility: CLINIC | Age: 76
End: 2021-10-04

## 2021-10-04 VITALS
HEART RATE: 62 BPM | BODY MASS INDEX: 27.8 KG/M2 | RESPIRATION RATE: 16 BRPM | SYSTOLIC BLOOD PRESSURE: 143 MMHG | WEIGHT: 173 LBS | HEIGHT: 66 IN | OXYGEN SATURATION: 96 % | DIASTOLIC BLOOD PRESSURE: 83 MMHG

## 2021-10-04 DIAGNOSIS — M48.061 SPINAL STENOSIS OF LUMBAR REGION WITHOUT NEUROGENIC CLAUDICATION: Primary | ICD-10-CM

## 2021-10-04 DIAGNOSIS — G47.00 INSOMNIA, UNSPECIFIED TYPE: ICD-10-CM

## 2021-10-04 PROCEDURE — 99203 OFFICE O/P NEW LOW 30 MIN: CPT | Performed by: STUDENT IN AN ORGANIZED HEALTH CARE EDUCATION/TRAINING PROGRAM

## 2021-10-04 NOTE — PROGRESS NOTES
CHIEF COMPLAINT  Chief Complaint   Patient presents with   • Back Pain     Low back and right leg pain No narcotics       Primary Care  Arslan Carlson PA-C    Subjective   Omar Choudhary is a 76 y.o. male  who presents for chronic low back pain with both axial and radicular features.  He states that he has had intermittent pain for many years however he has not sought any treatment.  Recently, his pain began to impact his activities of daily living and he sought treatment from his primary care in the form of an MRI.  It showed substantial degenerative changes including severe stenosis at multiple levels as well as foraminal narrowing.  He also has severe scoliosis and severe degenerative disc disease with facet arthropathy.  He received 1 epidural steroid injection from an outside pain provider which did provide him significant benefit however he was discharged to do a failed drug screen.  He admits that he had taken a oxycodone that he had leftover from surgery and forgot to inform the previous provider of this.  He denies any significant numbness or tingling or weakness but does complain of axial type back pain with radiation in the buttock bilaterally.    History of Present Illness     Location: Low back with radiation in the buttock bilaterally  Onset: Years ago  Duration: Progressively worsening  Timing: Constant throughout the day  Quality: Sharp, stabbing, aching  Severity: Today: 6       Last Week: 6       Worst: 7  Modifying Factors: The pain is worse with physical activity and movement and exercise and slightly improved with rest and stretching    Physical Therapy: no    Interval Update 10/04/2021:     The following portions of the patient's history were reviewed and updated as appropriate: allergies, current medications, past family history, past medical history, past social history, past surgical history and problem list.      Current Outpatient Medications:   •  acetaminophen (TYLENOL) 500 MG  tablet, Take 500 mg by mouth Every 6 (Six) Hours As Needed for Mild Pain ., Disp: , Rfl:   •  albuterol sulfate  (90 Base) MCG/ACT inhaler, Inhale 2 puffs Every 4 (Four) Hours As Needed for Wheezing or Shortness of Air., Disp: 18 g, Rfl: 3  •  Alpha-Lipoic Acid 100 MG capsule, Take  by mouth., Disp: , Rfl:   •  B Complex Vitamins (VITAMIN B COMPLEX) capsule capsule, Take  by mouth Daily., Disp: , Rfl:   •  budesonide-formoterol (SYMBICORT) 160-4.5 MCG/ACT inhaler, Inhale 2 puffs 2 (Two) Times a Day., Disp: 120 each, Rfl: 12  •  Calcium Carb-Cholecalciferol (calcium-vitamin D) 500-200 MG-UNIT per tablet, Take 2 tablets by mouth Daily., Disp: 180 tablet, Rfl: 3  •  clonazePAM (KlonoPIN) 0.5 MG tablet, TAKE ONE TABLET BY MOUTH TWICE DAILY AS NEEDED for anxiety, Disp: 30 tablet, Rfl: 0  •  coenzyme Q10 50 MG capsule capsule, Take  by mouth Daily., Disp: , Rfl:   •  diphenhydrAMINE HCl (ANTI-HIST PO), Take  by mouth., Disp: , Rfl:   •  HYDROcodone-acetaminophen (NORCO) 7.5-325 MG per tablet, Take 1 tablet by mouth Every 8 (Eight) Hours As Needed for Moderate Pain ., Disp: , Rfl:   •  losartan-hydrochlorothiazide (HYZAAR) 100-25 MG per tablet, TAKE 1 TABLET BY MOUTH DAILY , Disp: 90 tablet, Rfl: 3  •  Melatonin 10 MG tablet dispersible, Take 2 tablets by mouth., Disp: , Rfl:   •  meloxicam (Mobic) 15 MG tablet, Take 1 tablet by mouth Daily., Disp: 90 tablet, Rfl: 3  •  montelukast (SINGULAIR) 10 MG tablet, Take 1 tablet by mouth Daily., Disp: 90 tablet, Rfl: 3  •  Omega-3 Fatty Acids (FISH OIL) 1000 MG capsule capsule, Take  by mouth Daily With Breakfast., Disp: , Rfl:   •  Red Yeast Rice 600 MG capsule, Take  by mouth., Disp: , Rfl:   •  sildenafil (REVATIO) 20 MG tablet, Take 1-2 tablets by mouth as needed for E.D., Disp: 50 tablet, Rfl: 2  •  theophylline (THEODUR) 300 MG 12 hr tablet, Take 1 tablet by mouth Daily. May substitute for generic, Disp: 30 tablet, Rfl: 11  •  triamcinolone (KENALOG) 0.1 % cream,  "Apply  topically to the appropriate area as directed 2 (Two) Times a Day., Disp: 28.4 g, Rfl: 0    Review of Systems   Musculoskeletal: Positive for arthralgias, back pain, gait problem and myalgias.   Neurological: Negative for weakness and numbness.       Vitals:    10/04/21 1018   BP: 143/83   Pulse: 62   Resp: 16   SpO2: 96%   Weight: 78.5 kg (173 lb)   Height: 167.6 cm (66\")   PainSc:   6         Objective   Physical Exam  Vitals and nursing note reviewed.   Constitutional:       General: He is not in acute distress.     Appearance: Normal appearance. He is well-developed and normal weight.   Neck:      Trachea: No tracheal deviation.   Musculoskeletal:      Comments: Lumbar Spine Exam:  Tender to palpation over the lumbar paraspinal musculature Yes  Limited range of motion secondary to pain Yes  Facet loading positive: bilateral  Facets tender to palpation: bilateral  Straight leg raise test positive: Equivocal       Neurological:      General: No focal deficit present.      Mental Status: He is alert.      Sensory: No sensory deficit.      Motor: No weakness.           Assessment/Plan   Problems Addressed this Visit     None      Visit Diagnoses     Spinal stenosis of lumbar region without neurogenic claudication    -  Primary    Relevant Orders    Epidural Block      Diagnoses       Codes Comments    Spinal stenosis of lumbar region without neurogenic claudication    -  Primary ICD-10-CM: M48.061  ICD-9-CM: 724.02           Plan:  1. His MRI shows very severe degenerative changes throughout the lumbar spine.  2. He did report good benefit from a previous L4-5 epidural steroid injection by an outside pain physician  3. We will plan to repeat this injection next available  4. We also discussed that he may be a candidate for microdosing of intrathecal pain medication.  Given his severe degenerative change, I do not feel that he would significantly benefit from surgical intervention.  As he may end up being on " narcotic therapy for a prolonged period of time, he may be a excellent candidate for Gil cervantes.  --- Follow-up next available for lumbar epidural steroid injection           INSPECT REPORT    As part of the patient's treatment plan, I may be prescribing controlled substances. The patient has been made aware of appropriate use of such medications, including potential risk of somnolence, limited ability to drive and/or work safely, and the potential for dependence or overdose. It has also bee made clear that these medications are for use by this patient only, without concomitant use of alcohol or other substances unless prescribed.     Patient has completed prescribing agreement detailing terms of continued prescribing of controlled substances, including monitoring PRASANTH reports, urine drug screening, and pill counts if necessary. The patient is aware that inappropriate use will results in cessation of prescribing such medications.    INSPECT report has been reviewed and scanned into the patient's chart.    As the clinician, I personally reviewed the INSPECT from 11/1/2021.    History and physical exam exhibit continued safe and appropriate use of controlled substances.      EMR Dragon/Transcription disclaimer:   Much of this encounter note is an electronic transcription/translation of spoken language to printed text. The electronic translation of spoken language may permit erroneous, or at times, nonsensical words or phrases to be inadvertently transcribed; Although I have reviewed the note for such errors, some may still exist.

## 2021-10-07 RX ORDER — ZOLPIDEM TARTRATE 10 MG/1
TABLET ORAL
Qty: 30 TABLET | Refills: 0 | Status: SHIPPED | OUTPATIENT
Start: 2021-10-07 | End: 2022-02-22

## 2021-10-07 RX ORDER — SILDENAFIL CITRATE 20 MG/1
TABLET ORAL
Qty: 50 TABLET | Refills: 0 | Status: SHIPPED | OUTPATIENT
Start: 2021-10-07 | End: 2021-12-15

## 2021-10-11 ENCOUNTER — HOSPITAL ENCOUNTER (OUTPATIENT)
Dept: PAIN MEDICINE | Facility: HOSPITAL | Age: 76
Discharge: HOME | End: 2021-10-11

## 2021-10-11 VITALS
HEIGHT: 66 IN | OXYGEN SATURATION: 97 % | SYSTOLIC BLOOD PRESSURE: 108 MMHG | DIASTOLIC BLOOD PRESSURE: 79 MMHG | TEMPERATURE: 97.7 F | WEIGHT: 175 LBS | RESPIRATION RATE: 16 BRPM | BODY MASS INDEX: 28.12 KG/M2 | HEART RATE: 66 BPM

## 2021-10-11 DIAGNOSIS — M48.061 SPINAL STENOSIS OF LUMBAR REGION WITHOUT NEUROGENIC CLAUDICATION: Primary | ICD-10-CM

## 2021-10-11 DIAGNOSIS — R52 PAIN: ICD-10-CM

## 2021-10-11 PROCEDURE — 77003 FLUOROGUIDE FOR SPINE INJECT: CPT

## 2021-10-11 PROCEDURE — 62323 NJX INTERLAMINAR LMBR/SAC: CPT | Performed by: STUDENT IN AN ORGANIZED HEALTH CARE EDUCATION/TRAINING PROGRAM

## 2021-10-11 PROCEDURE — 0 IOPAMIDOL 41 % SOLUTION: Performed by: STUDENT IN AN ORGANIZED HEALTH CARE EDUCATION/TRAINING PROGRAM

## 2021-10-11 PROCEDURE — 25010000002 METHYLPREDNISOLONE PER 40 MG: Performed by: STUDENT IN AN ORGANIZED HEALTH CARE EDUCATION/TRAINING PROGRAM

## 2021-10-11 RX ORDER — METHYLPREDNISOLONE ACETATE 40 MG/ML
40 INJECTION, SUSPENSION INTRA-ARTICULAR; INTRALESIONAL; INTRAMUSCULAR; SOFT TISSUE ONCE
Status: COMPLETED | OUTPATIENT
Start: 2021-10-11 | End: 2021-10-11

## 2021-10-11 RX ORDER — BUPIVACAINE HYDROCHLORIDE 5 MG/ML
10 INJECTION, SOLUTION EPIDURAL; INTRACAUDAL ONCE
Status: COMPLETED | OUTPATIENT
Start: 2021-10-11 | End: 2021-10-11

## 2021-10-11 RX ADMIN — IOPAMIDOL 3 ML: 408 INJECTION, SOLUTION INTRATHECAL at 14:46

## 2021-10-11 RX ADMIN — BUPIVACAINE HYDROCHLORIDE 10 ML: 5 INJECTION, SOLUTION EPIDURAL; INTRACAUDAL; PERINEURAL at 14:46

## 2021-10-11 RX ADMIN — METHYLPREDNISOLONE ACETATE 40 MG: 40 INJECTION, SUSPENSION INTRA-ARTICULAR; INTRALESIONAL; INTRAMUSCULAR; INTRASYNOVIAL; SOFT TISSUE at 14:46

## 2021-10-11 NOTE — PROCEDURES
Lumbar Epidural Steroid Injection  [facility]    PREOPERATIVE DIAGNOSIS:   Chronic low back pain, Lumbar Degenerative Disc Disease and Lumbar Disc Displacement  POSTOPERATIVE DIAGNOSIS:  Same as preop diagnosis    PROCEDURE:   Lumbar Epidural Steroid Injection, Therapeutic Translaminar Injection, with epidurogram, at  L4/L5 level    PRE-PROCEDURE DISCUSSION WITH PATIENT:    Risks and complications were discussed with the patient prior to starting the procedure and informed consent was obtained.  We discussed various topics including but not limited to bleeding, infection, injury, paralysis, nerve injury, dural puncture, coma, death, worsening of clinical picture, lack of pain relief, and postprocedural soreness.    SURGEON:  Michael Kwong MD    REASON FOR PROCEDURE:    Previous clinically significant therapeutic effect is noted., Degenerative changes are noted in the area. and Radicular pain pattern seems consistent with this dermatome.    SEDATION:  Patient declined administration of moderate sedation    ANESTHETIC:  Marcaine 0.25%  STEROID:   Methylprednisolone (DEPO MEDROL) 40mg/ml    DESCRIPTON OF PROCEDURE:    After obtaining informed consent, I.V. was not started in the preop area.   The patient was taken to the operating room and placed in the prone position.   All pressure points were well padded.  The lumbar spine area was prepped with Chloraprep and draped in a sterile fashion.  Under fluoroscopic guidance, the above mentioned interlaminar space was identified. Skin and subcutaneous tissues were anesthetized with 1% lidocaine in the middle of the space. A Tuohy needle was introduced through the skin and advanced to this interlaminar space and into the epidural space under fluoroscopic guidance and verified with loss-of-resistance technique to air.  After confirming the position of the needle with the fluoroscope with all the views, and after aspiration was confirmed negative for blood and CSF,  1.5 mL of Omnipaque was injected.  After seeing appropriate epidurogram with lateral and PA views, a total of 3 cc solution was injected, consisting of 4cc of local anesthetic as above, with normal saline and injectable steroid as above.     ESTIMATED BLOOD LOSS:  <5 mL  SPECIMENS:  None    COMPLICATIONS:     No complications were noted., There was no indication of vascular uptake on live injection of contrast dye., There was no indication of intrathecal uptake on live injection of contrast dye. and There was not any evidence of dural puncture.      TOLERANCE & DISCHARGE CONDITION:    The patient tolerated the procedure well.  The patient was transported to the recovery area without difficulties.  The patient was discharged to home under the care of family in stable and satisfactory condition.    PLAN OF CARE:  1. The patient was given our standard instruction sheet.  2. The patient will Return to clinic 3-4 wks  3. The patient will resume all medications as per the medication reconciliation sheet.         hair removal not indicated

## 2021-10-11 NOTE — DISCHARGE INSTRUCTIONS
EPIDURAL STEROID INJECTION          An epidural steroid injection is a shot of steroid medicine and numbing medicine that is given into the space between the spinal cord and the bones of the back (epidural space).  The injection helps relieve pain by an irritated or swollen nerve root.    TELL YOUR HEALTH CARE PROVIDER ABOUT:  • Any allergies you have  • All medicines you are taking including any over the counter medicines  • Any blood disorders you have  • Any surgeries you have had  • Any medical conditions you have  • Whether you are pregnant or may be pregnant    WHAT ARE THE RISK?  Generally, this is a safe procedure. However,problems may occur, including  • Headache  • Bleeding  • Infection  • Allergic Reaction  • Nerve Damage    WHAT CAN I EXPECT AFTER THE PROCEDURE?    INJECTION SITE  • Remove the Band-Aid/s after 24 hours  • Check your injection site every day for signs of infection.  Check for:             Redness             Bleeding (small amt is normal)             Warmth             Pus or bad odor  • Some numbness may be experienced for several hours following the procedure.  • Avoid using heat on the injection site for 24 hours. You may use ice intermittently if needed by placing a         towel between your skin and the ice bag and using the ice for 20 minutes 2-3 times a day.  • Do not take baths, swim or use a hot tub for 24 hours.    ACTIVITY  • No strenuous activity for 24 hours then return to normal activity as tolerated.  • If your leg is numb, no driving until full sensation and strength has returned.    GENERAL INSTRUCTIONS:  • The injection site may feel numb, use ice with caution if numbness is present and no heat for 24 hours or until numbness is gone.   • If you have numbness or weakness in your arm or leg, use those areas with caution until normal sensation returns.  • It is not uncommon to notice an increase in discomfort or a change in the location of discomfort for 3-4 days after  the procedure.  If discomfort is noticed at the injection site, ice may be            applied to that area for 20 min 2-3 times a day.  • Take the pain medicine your physician has prescribed or over the counter pain relievers as long as you do not have any contraindications.  • If you are a diabetic, monitor your blood sugar closely.  The steroids used in your procedure may increase your blood sugar level up to 36 hours after the injection.  If your blood sugar is greater than 250, call the physician that helps you monitor your blood sugar.  • Keep all follow-up visits as scheduled by your health care provider. This is important.    CONTACT OUR OFFICE IF:  • You have any of these signs of infection            -Redness, swelling, or warmth around your injection site.            -Fluid or blood coming from your injection site (small amt of blood is normal)            -Pus or a bad odor from your injection site            -A fever  • You develop a severe headache or a stiff neck  • You lose control of your bladder or bowel movements      [facility] HOURS   • Monday-Friday 7:30 am. - 4:00 pm.  For any problem related to your procedure we can be reached at [phone]  • If you experience an emergency with your procedure, call [phone] or go to the emergency room.

## 2021-10-19 ENCOUNTER — TELEPHONE (OUTPATIENT)
Dept: FAMILY MEDICINE CLINIC | Facility: CLINIC | Age: 76
End: 2021-10-19

## 2021-10-19 NOTE — TELEPHONE ENCOUNTER
Caller: Omar Choudhary    Relationship: Self    Best call back number: [phone]    What orders are you requesting (i.e. lab or imaging): COLONOSCOPY    In what timeframe would the patient need to come in: BEFORE December WELLNESS

## 2021-10-26 ENCOUNTER — TELEPHONE (OUTPATIENT)
Dept: FAMILY MEDICINE CLINIC | Facility: CLINIC | Age: 76
End: 2021-10-26

## 2021-10-26 NOTE — TELEPHONE ENCOUNTER
Caller: Omar Choudhary    Relationship: Self    Best call back number: [phone]     What is the best time to reach you: ANYTIME     Who are you requesting to speak with (clinical staff, provider,  specific staff     member):CLINICAL   Do you know the name of the person who called:OMAR    What was the call regarding: MR. CHOUDHARY PULLED HIS HAMSTRING 10 DAYS AGO, HE NOTICED A HEMATOMA BEHIND HIS RIGHT KNEE . HE WOULD LIKE TO KNOW IF DR GASPAR WOULD LIKE TO SEE HIM OR IF HE COULD BE REFERRED TO  [facility]     Do you require a callback: YES

## 2021-10-27 ENCOUNTER — OFFICE VISIT (OUTPATIENT)
Dept: FAMILY MEDICINE CLINIC | Facility: CLINIC | Age: 76
End: 2021-10-27

## 2021-10-27 VITALS
BODY MASS INDEX: 29.86 KG/M2 | OXYGEN SATURATION: 98 % | DIASTOLIC BLOOD PRESSURE: 72 MMHG | RESPIRATION RATE: 10 BRPM | HEART RATE: 64 BPM | HEIGHT: 66 IN | SYSTOLIC BLOOD PRESSURE: 143 MMHG | WEIGHT: 185.8 LBS

## 2021-10-27 DIAGNOSIS — Z23 ENCOUNTER FOR ADMINISTRATION OF VACCINE: ICD-10-CM

## 2021-10-27 DIAGNOSIS — S76.311A HAMSTRING STRAIN, RIGHT, INITIAL ENCOUNTER: Primary | ICD-10-CM

## 2021-10-27 PROCEDURE — 90662 IIV NO PRSV INCREASED AG IM: CPT | Performed by: NURSE PRACTITIONER

## 2021-10-27 PROCEDURE — 99213 OFFICE O/P EST LOW 20 MIN: CPT | Performed by: NURSE PRACTITIONER

## 2021-10-27 PROCEDURE — G0008 ADMIN INFLUENZA VIRUS VAC: HCPCS | Performed by: NURSE PRACTITIONER

## 2021-10-27 NOTE — PROGRESS NOTES
"Chief Complaint  hamstring     Subjective          Omar Choudhary presents to [facility]  History of Present Illness    Around 10/19-20 went to [place] and went parasailing  Aborted the \"mission\" twice due to not enough draft to inflate the parachute  Felt a ham string pull the second time, but now 10 days later, he is still having pain in his right leg  He has had some improvement, does feel better with heat application as well as topical pain reliever  Tells me that he looked at it yesterday and he has a bruise in his right posterior thigh  He denies any discomfort or decreased rom of the knee or problems with ambulation      Objective   Vital Signs:   /72   Pulse 64   Resp 10   Ht 167.6 cm (66\")   Wt 84.3 kg (185 lb 12.8 oz)   SpO2 98%   BMI 29.99 kg/m²     Physical Exam  Constitutional:       Appearance: Normal appearance.   Cardiovascular:      Rate and Rhythm: Normal rate.   Pulmonary:      Breath sounds: Normal breath sounds.   Musculoskeletal:        Legs:    Skin:     Capillary Refill: Capillary refill takes less than 2 seconds.   Neurological:      Mental Status: He is alert and oriented to person, place, and time.        Result Review :                 Assessment and Plan    Diagnoses and all orders for this visit:    1. Hamstring strain, right, initial encounter (Primary)  -     Ambulatory Referral to Physical Therapy Evaluate and treat    2. Encounter for administration of vaccine  -     Fluzone High-Dose 65+yrs ([id number])        Follow Up   Return if symptoms worsen or fail to improve.  Patient was given instructions and counseling regarding his condition or for health maintenance advice. Please see specific information pulled into the AVS if appropriate.       "

## 2021-10-27 NOTE — PATIENT INSTRUCTIONS
Hamstring Strain Rehab  Ask your health care provider which exercises are safe for you. Do exercises exactly as told by your health care provider and adjust them as directed. It is normal to feel mild stretching, pulling, tightness, or discomfort as you do these exercises. Stop right away if you feel sudden pain or your pain gets worse. Do not begin these exercises until told by your health care provider.  Stretching and range-of-motion exercises  These exercises warm up your muscles and joints and improve the movement and flexibility of your thighs. These exercises also help to relieve pain, numbness, and tingling. Talk to your health care provider about these restrictions.  Knee extension, seated    1. Sit with your left / right heel propped on a chair, a coffee table, or a footstool. Do not have anything under your knee to support it.  2. Allow your leg muscles to relax, letting gravity straighten out your knee (extension). You should feel a stretch behind your left / right knee.  3. If told by your health care provider, deepen the stretch by placing a __________ weight on your thigh, just above your kneecap.  4. Hold this position for __________ seconds.  Repeat __________ times. Complete this exercise __________ times a day.  Seated stretch  This exercise is sometimes called hamstrings and adductors stretch.  1. Sit on the floor with your legs stretched wide. Keep your knees straight during this exercise.  2. Keeping your head and back in a straight line, bend at your waist to reach for your left foot (position A). You should feel a stretch in your right inner thigh (adductors).  3. Hold this position for __________ seconds. Then slowly return to the upright position.  4. Keeping your head and back in a straight line, bend at your waist to reach forward (position B). You should feel a stretch behind both of your thighs or knees (hamstrings).  5. Hold this position for __________ seconds. Then slowly return to  the upright position.  6. Keeping your head and back in a straight line, bend at your waist to reach for your right foot (position C). You should feel a stretch in your left inner thigh (adductors).  7. Hold this position for __________ seconds. Then slowly return to the upright position.  Repeat __________ times. Complete this exercise __________ times a day.  Hamstrings stretch, supine    1. Lie on your back (supine position).  2. Loop a belt or towel over the ball of your left / right foot. The ball of your foot is on the walking surface, right under your toes.  3. Straighten your left / right knee and slowly pull on the belt or towel to raise your leg.  ? Do not let your left / right knee bend while you do this.  ? Keep your other leg flat on the floor.  ? Raise the left / right leg until you feel a gentle stretch behind your left / right knee or thigh (hamstrings).  4. Hold this position for __________ seconds.  5. Slowly return your leg to the starting position.  Repeat __________ times. Complete this exercise __________ times a day.  Strengthening exercises  These exercises build strength and endurance in your thighs. Endurance is the ability to use your muscles for a long time, even after they get tired.  Straight leg raises, prone  This exercise strengthens the muscles that move the hips (hip extensors).  1. Lie on your abdomen on a firm surface (prone position).  2. Tense the muscles in your buttocks and lift your left / right leg about 4 inches (10 cm). Keep your knee straight as you lift your leg. If you cannot lift your leg that high without arching your back, place a pillow under your hips.  3. Hold the position for __________ seconds.  4. Slowly lower your leg to the starting position.  5. Allow your muscles to relax completely before you start the next repetition.  Repeat __________ times. Complete this exercise __________ times a day.  Bridge  This exercise strengthens the muscles in your buttocks  and the back of your thighs (hip extensors).  1. Lie on your back on a firm surface with your knees bent and your feet flat on the floor.  2. Tighten your buttocks muscles and lift your bottom off the floor until the trunk of your body is level with your thighs.  ? You should feel the muscles working in your buttocks and the back of your thighs.  ? Do not arch your back.  3. Hold this position for __________ seconds.  4. Slowly lower your hips to the starting position.  5. Let your buttocks muscles relax completely between repetitions.  6. If told by your health care provider, keep your bottom lifted off the floor while you slowly walk your feet away from you as far as you can control. Hold for __________ seconds, then slowly walk your feet back toward you.  Repeat __________ times. Complete this exercise __________ times a day.  Lateral walking with band  This is an exercise in which you walk sideways (lateral), with tension provided by an exercise band. The exercise strengthens the muscles in your hip (hip abductors).  1.  a long hallway.  2. Wrap a loop of exercise band around your legs, just above your knees.  3. Bend your knees gently and drop your hips down and back so your weight is over your heels.  4. Step to the side to move down the length of the hallway, keeping your toes pointed ahead of you and keeping tension in the band.  5. Repeat, leading with your other leg.  Repeat __________ times. Complete this exercise __________ times a day.  Single leg stand with reaching  This exercise is also called eccentric hamstring stretch.  1. Stand on your left / right foot. Keep your big toe down on the floor and try to keep your arch lifted.  2. Slowly reach down toward the floor as far as you can while keeping your balance. Lowering your thigh under tension is called eccentric stretching.  3. Hold this position for __________ seconds.  Repeat __________ times. Complete this exercise __________ times a  day.  Plank, prone  This exercise strengthens muscles in your abdomen and core area.  1. Lie on your abdomen on the floor (prone position),and prop yourself up on your elbows. Your hands should be straight out in front of you, and your elbows should be below your shoulders. Position your feet similar to a push-up position so your toes are on the ground.  2. Tighten your abdominal muscles and lift your body off the floor.  ? Do not arch your back.  ? Do not hold your breath.  3. Hold this position for __________ seconds.  Repeat __________ times. Complete this exercise __________ times a day.  This information is not intended to replace advice given to you by your health care provider. Make sure you discuss any questions you have with your health care provider.  Document Revised: 04/09/2020 Document Reviewed: 12/16/2019  [employer] Patient Education © 2021 [employer]    Hamstring Strain Rehab  Ask your health care provider which exercises are safe for you. Do exercises exactly as told by your health care provider and adjust them as directed. It is normal to feel mild stretching, pulling, tightness, or discomfort as you do these exercises. Stop right away if you feel sudden pain or your pain gets worse. Do not begin these exercises until told by your health care provider.  Stretching and range-of-motion exercises  These exercises warm up your muscles and joints and improve the movement and flexibility of your thighs. These exercises also help to relieve pain, numbness, and tingling. Talk to your health care provider about these restrictions.  Knee extension, seated    5. Sit with your left / right heel propped on a chair, a coffee table, or a footstool. Do not have anything under your knee to support it.  6. Allow your leg muscles to relax, letting gravity straighten out your knee (extension). You should feel a stretch behind your left / right knee.  7. If told by your health care provider, deepen the stretch by  placing a __________ weight on your thigh, just above your kneecap.  8. Hold this position for __________ seconds.  Repeat __________ times. Complete this exercise __________ times a day.  Seated stretch  This exercise is sometimes called hamstrings and adductors stretch.  8. Sit on the floor with your legs stretched wide. Keep your knees straight during this exercise.  9. Keeping your head and back in a straight line, bend at your waist to reach for your left foot (position A). You should feel a stretch in your right inner thigh (adductors).  10. Hold this position for __________ seconds. Then slowly return to the upright position.  11. Keeping your head and back in a straight line, bend at your waist to reach forward (position B). You should feel a stretch behind both of your thighs or knees (hamstrings).  12. Hold this position for __________ seconds. Then slowly return to the upright position.  13. Keeping your head and back in a straight line, bend at your waist to reach for your right foot (position C). You should feel a stretch in your left inner thigh (adductors).  14. Hold this position for __________ seconds. Then slowly return to the upright position.  Repeat __________ times. Complete this exercise __________ times a day.  Hamstrings stretch, supine    6. Lie on your back (supine position).  7. Loop a belt or towel over the ball of your left / right foot. The ball of your foot is on the walking surface, right under your toes.  8. Straighten your left / right knee and slowly pull on the belt or towel to raise your leg.  ? Do not let your left / right knee bend while you do this.  ? Keep your other leg flat on the floor.  ? Raise the left / right leg until you feel a gentle stretch behind your left / right knee or thigh (hamstrings).  9. Hold this position for __________ seconds.  10. Slowly return your leg to the starting position.  Repeat __________ times. Complete this exercise __________ times a  day.  Strengthening exercises  These exercises build strength and endurance in your thighs. Endurance is the ability to use your muscles for a long time, even after they get tired.  Straight leg raises, prone  This exercise strengthens the muscles that move the hips (hip extensors).  6. Lie on your abdomen on a firm surface (prone position).  7. Tense the muscles in your buttocks and lift your left / right leg about 4 inches (10 cm). Keep your knee straight as you lift your leg. If you cannot lift your leg that high without arching your back, place a pillow under your hips.  8. Hold the position for __________ seconds.  9. Slowly lower your leg to the starting position.  10. Allow your muscles to relax completely before you start the next repetition.  Repeat __________ times. Complete this exercise __________ times a day.  Bridge  This exercise strengthens the muscles in your buttocks and the back of your thighs (hip extensors).  7. Lie on your back on a firm surface with your knees bent and your feet flat on the floor.  8. Tighten your buttocks muscles and lift your bottom off the floor until the trunk of your body is level with your thighs.  ? You should feel the muscles working in your buttocks and the back of your thighs.  ? Do not arch your back.  9. Hold this position for __________ seconds.  10. Slowly lower your hips to the starting position.  11. Let your buttocks muscles relax completely between repetitions.  12. If told by your health care provider, keep your bottom lifted off the floor while you slowly walk your feet away from you as far as you can control. Hold for __________ seconds, then slowly walk your feet back toward you.  Repeat __________ times. Complete this exercise __________ times a day.  Lateral walking with band  This is an exercise in which you walk sideways (lateral), with tension provided by an exercise band. The exercise strengthens the muscles in your hip (hip abductors).  6.   a long hallway.  7. Wrap a loop of exercise band around your legs, just above your knees.  8. Bend your knees gently and drop your hips down and back so your weight is over your heels.  9. Step to the side to move down the length of the hallway, keeping your toes pointed ahead of you and keeping tension in the band.  10. Repeat, leading with your other leg.  Repeat __________ times. Complete this exercise __________ times a day.  Single leg stand with reaching  This exercise is also called eccentric hamstring stretch.  4. Stand on your left / right foot. Keep your big toe down on the floor and try to keep your arch lifted.  5. Slowly reach down toward the floor as far as you can while keeping your balance. Lowering your thigh under tension is called eccentric stretching.  6. Hold this position for __________ seconds.  Repeat __________ times. Complete this exercise __________ times a day.  Plank, prone  This exercise strengthens muscles in your abdomen and core area.  4. Lie on your abdomen on the floor (prone position),and prop yourself up on your elbows. Your hands should be straight out in front of you, and your elbows should be below your shoulders. Position your feet similar to a push-up position so your toes are on the ground.  5. Tighten your abdominal muscles and lift your body off the floor.  ? Do not arch your back.  ? Do not hold your breath.  6. Hold this position for __________ seconds.  Repeat __________ times. Complete this exercise __________ times a day.  This information is not intended to replace advice given to you by your health care provider. Make sure you discuss any questions you have with your health care provider.  Document Revised: 04/09/2020 Document Reviewed: 12/16/2019  [employer] Patient Education © 2021 [employer]    Hamstring Strain    A hamstring strain happens when the muscles in the back of the thighs (hamstring muscles) are overstretched or torn. The hamstring muscles are used  in straightening the hips, bending the knees, and pulling back the legs. This injury is often called a pulled hamstring muscle. The tissue that connects the muscle to a bone (tendon) may also be affected.  The severity of a hamstring strain may be rated in degrees or grades. First-degree (or grade 1) strains have the least amount of muscle tearing and pain. Second-degree and third-degree (grade 2 and 3) strains have increasingly more tearing and pain.  What are the causes?  This condition is caused by a sudden, violent force being placed on the hamstring muscles, stretching them too far. This often happens during activities that involve running, jumping, kicking, or weight lifting.  What increases the risk?  Hamstring strains are especially common in athletes. The following factors may also make you more likely to develop this condition:  · Having low strength, endurance, or flexibility of the hamstring muscles.  · Doing high-impact physical activity or sports.  · Having poor physical fitness.  · Having a previous leg injury.  · Having tired (fatigued) muscles.  What are the signs or symptoms?  Symptoms of this condition include:  · Pain in the back of the thigh.  · Swelling.  · Bruising.  · Muscle spasms.  · Trouble moving the affected muscle because of pain.  For severe strains, you may feel popping or snapping in the back of your thigh when the injury occurs.  How is this diagnosed?  This condition is diagnosed based on your symptoms, your medical history, and a physical exam.  How is this treated?  Treatment for this condition usually involves:  · Protecting, resting, icing, applying compression, and elevating the injured area (PRICE therapy).  · Medicines. Your health care provider may recommend medicines to help reduce pain or inflammation.  · Doing exercises to regain strength and flexibility in the muscles. Your health care provider will tell you when it is okay to begin exercising.  Follow these  instructions at home:  PRICE therapy  Use PRICE therapy to promote muscle healing during the first 2-3 days after your injury, or as told by your health care provider.  · Protect the muscle from being injured again.  · Rest your injury. This usually involves limiting your normal activities and not using the injured hamstring muscle. Talk with your health care provider about how you should limit your activities.  · Apply ice to the injured area:  ? Put ice in a plastic bag.  ? Place a towel between your skin and the bag.  ? Leave the ice on for 20 minutes, 2-3 times a day. After the third day, switch to applying heat as told.  · Put pressure (compression) on your injured hamstring by wrapping it with an elastic bandage. Be careful not to wrap it too tightly. That may interfere with blood circulation or may increase swelling.  · Raise (elevate) your injured hamstring above the level of your heart as often as possible. When you are lying down, you can do this by putting a pillow under your thigh.    Activity  · Begin exercising or stretching only as told by your health care provider.  · Do not return to full activity level until your health care provider approves.  · To help prevent muscle strains in the future, always warm up before exercising and stretch afterward.  General instructions  · Take over-the-counter and prescription medicines only as told by your health care provider.  · If directed, apply heat to the affected area as often as told by your health care provider. Use the heat source that your health care provider recommends, such as a moist heat pack or a heating pad.  ? Place a towel between your skin and the heat source.  ? Leave the heat on for 20-30 minutes.  ? Remove the heat if your skin turns bright red. This is especially important if you are unable to feel pain, heat, or cold. You may have a greater risk of getting burned.  · Keep all follow-up visits as told by your health care provider. This is  important.  Contact a health care provider if you have:  · Increasing pain or swelling in the injured area.  · Numbness, tingling, or a significant loss of strength in the injured area.  Get help right away if:  · Your foot or your toes become cold or turn blue.  Summary  · A hamstring strain happens when the muscles in the back of the thighs (hamstring muscles) are overstretched or torn.  · This injury can be caused by a sudden, violent force being placed on the hamstring muscles, causing them to stretch too far.  · Symptoms include pain, swelling, and muscle spasms in the injured area.  · Treatment includes what is called PRICE therapy: protecting, resting, icing, applying compression, and elevating the injured area.  This information is not intended to replace advice given to you by your health care provider. Make sure you discuss any questions you have with your health care provider.  Document Revised: 11/30/2018 Document Reviewed: 11/15/2018  [employer] Patient Education © 2021 [employer]

## 2021-11-01 ENCOUNTER — OFFICE VISIT (OUTPATIENT)
Dept: PAIN MEDICINE | Facility: CLINIC | Age: 76
End: 2021-11-01

## 2021-11-01 VITALS
BODY MASS INDEX: 29.99 KG/M2 | DIASTOLIC BLOOD PRESSURE: 75 MMHG | SYSTOLIC BLOOD PRESSURE: 118 MMHG | RESPIRATION RATE: 16 BRPM | HEART RATE: 65 BPM | OXYGEN SATURATION: 98 % | HEIGHT: 65 IN | WEIGHT: 180 LBS

## 2021-11-01 DIAGNOSIS — M48.061 SPINAL STENOSIS OF LUMBAR REGION WITHOUT NEUROGENIC CLAUDICATION: Primary | ICD-10-CM

## 2021-11-01 PROCEDURE — 99213 OFFICE O/P EST LOW 20 MIN: CPT | Performed by: STUDENT IN AN ORGANIZED HEALTH CARE EDUCATION/TRAINING PROGRAM

## 2021-11-01 NOTE — PROGRESS NOTES
CHIEF COMPLAINT  Chief Complaint   Patient presents with   • Back Pain     lumbar-- last injection gave 75%  x 4days  then declined  and pain in back--no cbd use currently-- hydrocodone PRN ld 10/28-- pt reviewed meds       Primary Care  Dennis Kwong MD    Subjective   Omar Choudhary is a 76 y.o. male  who presents for chronic low back pain with both axial and radicular features.  He states that he has had intermittent pain for many years however he has not sought any treatment.  Recently, his pain began to impact his activities of daily living and he sought treatment from his primary care in the form of an MRI.  It showed substantial degenerative changes including severe stenosis at multiple levels as well as foraminal narrowing.  He also has severe scoliosis and severe degenerative disc disease with facet arthropathy.  He received 1 epidural steroid injection from an outside pain provider which did provide him significant benefit however he was discharged to do a failed drug screen.  He admits that he had taken a oxycodone that he had leftover from surgery and forgot to inform the previous provider of this.  He denies any significant numbness or tingling or weakness but does complain of axial type back pain with radiation in the buttock bilaterally.    Back Pain  Pertinent negatives include no numbness or weakness.        Location: Low back with radiation in the buttock bilaterally  Onset: Years ago  Duration: Progressively worsening  Timing: Constant throughout the day  Quality: Sharp, stabbing, aching  Severity: Today: 6       Last Week: 6       Worst: 7  Modifying Factors: The pain is worse with physical activity and movement and exercise and slightly improved with rest and stretching    Physical Therapy: no    Interval Update 11/01/2021: He reports approximately percent pain relief for 1 week with a gradual return of symptoms. He states that he was initially feeling extremely well and dissipated in  several strenuous activities including ambulating. He states that after he did the activities, he had a recurrence of pain.    The following portions of the patient's history were reviewed and updated as appropriate: allergies, current medications, past family history, past medical history, past social history, past surgical history and problem list.      Current Outpatient Medications:   •  acetaminophen (TYLENOL) 500 MG tablet, Take 500 mg by mouth Every 6 (Six) Hours As Needed for Mild Pain ., Disp: , Rfl:   •  albuterol sulfate  (90 Base) MCG/ACT inhaler, Inhale 2 puffs Every 4 (Four) Hours As Needed for Wheezing or Shortness of Air., Disp: 18 g, Rfl: 3  •  Alpha-Lipoic Acid 100 MG capsule, Take  by mouth., Disp: , Rfl:   •  B Complex Vitamins (VITAMIN B COMPLEX) capsule capsule, Take  by mouth Daily., Disp: , Rfl:   •  budesonide-formoterol (SYMBICORT) 160-4.5 MCG/ACT inhaler, Inhale 2 puffs 2 (Two) Times a Day., Disp: 120 each, Rfl: 12  •  Calcium Carb-Cholecalciferol (calcium-vitamin D) 500-200 MG-UNIT per tablet, Take 2 tablets by mouth Daily., Disp: 180 tablet, Rfl: 3  •  clonazePAM (KlonoPIN) 0.5 MG tablet, TAKE ONE TABLET BY MOUTH TWICE DAILY AS NEEDED for anxiety, Disp: 30 tablet, Rfl: 0  •  coenzyme Q10 50 MG capsule capsule, Take  by mouth Daily., Disp: , Rfl:   •  diphenhydrAMINE HCl (ANTI-HIST PO), Take  by mouth., Disp: , Rfl:   •  HYDROcodone-acetaminophen (NORCO) 7.5-325 MG per tablet, Take 1 tablet by mouth Every 8 (Eight) Hours As Needed for Moderate Pain ., Disp: , Rfl:   •  losartan-hydrochlorothiazide (HYZAAR) 100-25 MG per tablet, TAKE 1 TABLET BY MOUTH DAILY , Disp: 90 tablet, Rfl: 3  •  Melatonin 10 MG tablet dispersible, Take 2 tablets by mouth., Disp: , Rfl:   •  meloxicam (Mobic) 15 MG tablet, Take 1 tablet by mouth Daily., Disp: 90 tablet, Rfl: 3  •  montelukast (SINGULAIR) 10 MG tablet, Take 1 tablet by mouth Daily., Disp: 90 tablet, Rfl: 3  •  Omega-3 Fatty Acids (FISH OIL)  "1000 MG capsule capsule, Take  by mouth Daily With Breakfast., Disp: , Rfl:   •  Red Yeast Rice 600 MG capsule, Take  by mouth., Disp: , Rfl:   •  sildenafil (REVATIO) 20 MG tablet, TAKE 1-2 TABLETS BY MOUTH AS NEEDED for e.d., Disp: 50 tablet, Rfl: 0  •  theophylline (THEODUR) 300 MG 12 hr tablet, Take 1 tablet by mouth Daily. May substitute for generic, Disp: 30 tablet, Rfl: 11  •  triamcinolone (KENALOG) 0.1 % cream, Apply  topically to the appropriate area as directed 2 (Two) Times a Day., Disp: 28.4 g, Rfl: 0  •  zolpidem (AMBIEN) 10 MG tablet, TAKE ONE TABLET BY MOUTH nightly AS NEEDED FOR SLEEP , Disp: 30 tablet, Rfl: 0    Review of Systems   Musculoskeletal: Positive for arthralgias, back pain, gait problem and myalgias.   Neurological: Negative for weakness and numbness.       Vitals:    11/01/21 0953   BP: 118/75   Pulse: 65   Resp: 16   SpO2: 98%   Weight: 81.6 kg (180 lb)   Height: 165.1 cm (65\")   PainSc:   7         Objective   Physical Exam  Vitals and nursing note reviewed.   Constitutional:       General: He is not in acute distress.     Appearance: Normal appearance. He is well-developed and normal weight.   Neck:      Trachea: No tracheal deviation.   Musculoskeletal:      Comments: Lumbar Spine Exam:  Tender to palpation over the lumbar paraspinal musculature Yes  Limited range of motion secondary to pain Yes  Facet loading positive: bilateral  Facets tender to palpation: bilateral  Straight leg raise test positive: Equivocal       Neurological:      General: No focal deficit present.      Mental Status: He is alert.      Sensory: No sensory deficit.      Motor: No weakness.           Assessment/Plan   Problems Addressed this Visit     None      Visit Diagnoses     Spinal stenosis of lumbar region without neurogenic claudication    -  Primary    Relevant Orders    Epidural Block      Diagnoses       Codes Comments    Spinal stenosis of lumbar region without neurogenic claudication    -  Primary " ICD-10-CM: M48.061  ICD-9-CM: 724.02           Plan:  1. Given his initial axial benefit from his first lumbar epidural, will plan for repeat lumbar epidural steroid injection  --- Follow-up next available for lumbar epidural steroid injection           INSPECT REPORT    As part of the patient's treatment plan, I may be prescribing controlled substances. The patient has been made aware of appropriate use of such medications, including potential risk of somnolence, limited ability to drive and/or work safely, and the potential for dependence or overdose. It has also bee made clear that these medications are for use by this patient only, without concomitant use of alcohol or other substances unless prescribed.     Patient has completed prescribing agreement detailing terms of continued prescribing of controlled substances, including monitoring PRASANTH reports, urine drug screening, and pill counts if necessary. The patient is aware that inappropriate use will results in cessation of prescribing such medications.    INSPECT report has been reviewed and scanned into the patient's chart.    As the clinician, I personally reviewed the INSPECT from 11/1/2021.    History and physical exam exhibit continued safe and appropriate use of controlled substances.      EMR Dragon/Transcription disclaimer:   Much of this encounter note is an electronic transcription/translation of spoken language to printed text. The electronic translation of spoken language may permit erroneous, or at times, nonsensical words or phrases to be inadvertently transcribed; Although I have reviewed the note for such errors, some may still exist.

## 2021-11-03 ENCOUNTER — TREATMENT (OUTPATIENT)
Dept: PHYSICAL THERAPY | Facility: CLINIC | Age: 76
End: 2021-11-03

## 2021-11-03 DIAGNOSIS — S76.311A HAMSTRING STRAIN, RIGHT, INITIAL ENCOUNTER: Primary | ICD-10-CM

## 2021-11-03 PROCEDURE — 97140 MANUAL THERAPY 1/> REGIONS: CPT | Performed by: PHYSICAL THERAPIST

## 2021-11-03 PROCEDURE — 97161 PT EVAL LOW COMPLEX 20 MIN: CPT | Performed by: PHYSICAL THERAPIST

## 2021-11-03 PROCEDURE — 97110 THERAPEUTIC EXERCISES: CPT | Performed by: PHYSICAL THERAPIST

## 2021-11-03 NOTE — PROGRESS NOTES
Physical Therapy Initial Evaluation and Plan of Care    Patient: Omar Choudhary   : 1945  Diagnosis/ICD-10 Code:  Hamstring strain, right, initial encounter [S76.311A]  Referring practitioner: EMILY Fraire    Subjective Evaluation    History of Present Illness  Date of onset: 10/19/2021  Mechanism of injury: 76 year old male s/p injury while parasailing when he was running to try to get up in the air and had to abruptly stop. States he felt a pop and spasm in the back of his thigh. Reports pain with standing and walking.       Patient Occupation: retired  Pain  At worst pain ratin  Location: R posterior knee/ thigh  Quality: tight  Aggravating factors: standing, squatting, movement, lifting and repetitive movement    Diagnostic Tests  No diagnostic tests performed    Treatments  Previous treatment: physical therapy  Patient Goals  Patient goals for therapy: increased strength, decreased pain, increased motion and independence with ADLs/IADLs         Subjective Questionnaire: LEFS: 60%    Objective          Active Range of Motion     Lumbar   Normal active range of motion  Left Hip   Normal active range of motion    Right Hip   Normal active range of motion  Left Knee   Normal active range of motion    Right Knee   Normal active range of motion    Strength/Myotome Testing     Left Hip   Planes of Motion   Flexion: 4+  Extension: 4+  Abduction: 4+  Adduction: 4+    Right Hip   Planes of Motion   Flexion: 4+  Extension: 4  Abduction: 4+  Adduction: 4+    Left Knee   Flexion: 5  Extension: 5  Quadriceps contraction: good    Right Knee   Flexion: 4+  Extension: 5  Quadriceps contraction: good    Tests     Right Knee   Negative anterior Lachman, lateral Davey and medial Davey.     Lumbar Flexibility Comments:   R HS tightness- mod to severe          Assessment & Plan     Assessment  Impairments: impaired physical strength, lacks appropriate home exercise program and pain with  function  Assessment details: 76 year old male s/p injury while parasailing when he was running to try to get up in the air and had to abruptly stop. States he felt a pop and spasm in the back of his thigh. Reports pain with standing and walking. Pt presents with TTP over medial R HS, tightness of R HS mm, mild weakness in R LE. Pt would benefit from skilled PT to address the above findings and improve pt's ease with functional mobility and return to PLOF.    Barriers to therapy: None  Prognosis: good  Functional Limitations: walking, uncomfortable because of pain, sitting, standing, stooping and unable to perform repetitive tasks  Goals  Plan Goals: ST.Pt will report reduction in worst pain level to 4/10 in 2 weeks.  2. Pt will demo good tolerance and compliance with HEP in 2 weeks.    LT.Pt will be independent with HEP in 6 weeks for self management and prevention of re-occurrence.   2.Pt will improve strength of R LE to grossly 4+/5 throughout in 6 weeks.  3.Pt will improve flexibility of R HS mm to mild restriction in 6 weeks.  4.Pt will report reduction in worst pain level to 2/10 in 6 weeks.      Plan  Therapy options: will be seen for skilled physical therapy services  Planned modality interventions: cryotherapy, electrical stimulation/Russian stimulation, thermotherapy (hydrocollator packs) and ultrasound  Planned therapy interventions: flexibility, functional ROM exercises, gait training, home exercise program, joint mobilization, neuromuscular re-education, manual therapy, strengthening, stretching and therapeutic activities  Frequency: 2x week  Duration in visits: 20  Treatment plan discussed with: patient        Timed:  Manual Therapy:    15     mins  [id number];  Therapeutic Exercise:    10     mins  06765;       Untimed:  Low Eval                        15     Mins  [id number]      Timed Treatment:   25   mins   Total Treatment:     40   mins    PT SIGNATURE: Unique Herrera, PT       DATE TREATMENT  INITIATED: 11/3/2021    Medicare Initial Certification  Certification Period: 2/1/2022  I certify that the therapy services are furnished while this patient is under my care.  The services outlined above are required by this patient, and will be reviewed every 90 days.     PHYSICIAN: Ang Irizarry, APRN      DATE:     Please sign and return via fax to [phone].. Thank you, [facility].

## 2021-11-09 ENCOUNTER — TREATMENT (OUTPATIENT)
Dept: PHYSICAL THERAPY | Facility: CLINIC | Age: 76
End: 2021-11-09

## 2021-11-11 ENCOUNTER — TREATMENT (OUTPATIENT)
Dept: PHYSICAL THERAPY | Facility: CLINIC | Age: 76
End: 2021-11-11

## 2021-11-11 DIAGNOSIS — S76.311A HAMSTRING STRAIN, RIGHT, INITIAL ENCOUNTER: Primary | ICD-10-CM

## 2021-11-11 PROCEDURE — 97140 MANUAL THERAPY 1/> REGIONS: CPT | Performed by: PHYSICAL THERAPIST

## 2021-11-11 PROCEDURE — 97110 THERAPEUTIC EXERCISES: CPT | Performed by: PHYSICAL THERAPIST

## 2021-11-11 NOTE — PROGRESS NOTES
Physical Therapy Daily Progress Note  Visit: 2          Patient: Omar Choudhary   : 1945  Diagnosis/ICD-10 Code:  Hamstring strain, right, initial encounter [S76.311A]  Referring practitioner: EMILY Fraire  Date of Initial Visit: Type: THERAPY  Noted: 11/3/2021  Today's Date: 2021      Subjective     Omar Choudhary reports: doing better. Compliance with HEP    Objective   See Exercise, Manual, and Modality Logs for complete treatment.       Assessment/Plan    Good response to scrapping techniques today. Demo compliance and good carry over of HEP. No adverse reaction to tx session.         Timed:  Manual Therapy:    23     mins  33071;  Therapeutic Exercise:    15     mins  [id number];         Timed Treatment:   38   mins   Total Treatment:     38   mins  Unique Herrera PT  Physical Therapist

## 2021-11-15 ENCOUNTER — HOSPITAL ENCOUNTER (OUTPATIENT)
Dept: PAIN MEDICINE | Facility: HOSPITAL | Age: 76
Discharge: HOME | End: 2021-11-15

## 2021-11-15 VITALS
HEART RATE: 69 BPM | BODY MASS INDEX: 28.77 KG/M2 | HEIGHT: 66 IN | TEMPERATURE: 97.3 F | OXYGEN SATURATION: 96 % | WEIGHT: 179 LBS | DIASTOLIC BLOOD PRESSURE: 85 MMHG | SYSTOLIC BLOOD PRESSURE: 121 MMHG | RESPIRATION RATE: 16 BRPM

## 2021-11-15 DIAGNOSIS — R52 PAIN: ICD-10-CM

## 2021-11-15 DIAGNOSIS — M48.061 SPINAL STENOSIS OF LUMBAR REGION WITHOUT NEUROGENIC CLAUDICATION: ICD-10-CM

## 2021-11-15 PROCEDURE — 62323 NJX INTERLAMINAR LMBR/SAC: CPT | Performed by: STUDENT IN AN ORGANIZED HEALTH CARE EDUCATION/TRAINING PROGRAM

## 2021-11-15 PROCEDURE — 0 IOPAMIDOL 41 % SOLUTION: Performed by: STUDENT IN AN ORGANIZED HEALTH CARE EDUCATION/TRAINING PROGRAM

## 2021-11-15 PROCEDURE — 77003 FLUOROGUIDE FOR SPINE INJECT: CPT

## 2021-11-15 PROCEDURE — 25010000002 METHYLPREDNISOLONE PER 40 MG: Performed by: STUDENT IN AN ORGANIZED HEALTH CARE EDUCATION/TRAINING PROGRAM

## 2021-11-15 RX ORDER — METHYLPREDNISOLONE ACETATE 40 MG/ML
40 INJECTION, SUSPENSION INTRA-ARTICULAR; INTRALESIONAL; INTRAMUSCULAR; SOFT TISSUE ONCE
Status: COMPLETED | OUTPATIENT
Start: 2021-11-15 | End: 2021-11-15

## 2021-11-15 RX ORDER — BUPIVACAINE HYDROCHLORIDE 5 MG/ML
10 INJECTION, SOLUTION EPIDURAL; INTRACAUDAL ONCE
Status: COMPLETED | OUTPATIENT
Start: 2021-11-15 | End: 2021-11-15

## 2021-11-15 RX ADMIN — BUPIVACAINE HYDROCHLORIDE 2.5 ML: 5 INJECTION, SOLUTION EPIDURAL; INTRACAUDAL; PERINEURAL at 13:55

## 2021-11-15 RX ADMIN — IOPAMIDOL 1 ML: 408 INJECTION, SOLUTION INTRATHECAL at 13:55

## 2021-11-15 RX ADMIN — METHYLPREDNISOLONE ACETATE 40 MG: 40 INJECTION, SUSPENSION INTRA-ARTICULAR; INTRALESIONAL; INTRAMUSCULAR; INTRASYNOVIAL; SOFT TISSUE at 13:55

## 2021-11-15 NOTE — DISCHARGE INSTRUCTIONS
EPIDURAL STEROID INJECTION          An epidural steroid injection is a shot of steroid medicine and numbing medicine that is given into the space between the spinal cord and the bones of the back (epidural space).  The injection helps relieve pain by an irritated or swollen nerve root.    TELL YOUR HEALTH CARE PROVIDER ABOUT:  • Any allergies you have  • All medicines you are taking including any over the counter medicines  • Any blood disorders you have  • Any surgeries you have had  • Any medical conditions you have  • Whether you are pregnant or may be pregnant    WHAT ARE THE RISK?  Generally, this is a safe procedure. However,problems may occur, including  • Headache  • Bleeding  • Infection  • Allergic Reaction  • Nerve Damage    WHAT CAN I EXPECT AFTER THE PROCEDURE?    INJECTION SITE  • Remove the Band-Aid/s after 24 hours  • Check your injection site every day for signs of infection.  Check for:             Redness             Bleeding (small amt is normal)             Warmth             Pus or bad odor  • Some numbness may be experienced for several hours following the procedure.  • Avoid using heat on the injection site for 24 hours. You may use ice intermittently if needed by placing a         towel between your skin and the ice bag and using the ice for 20 minutes 2-3 times a day.  • Do not take baths, swim or use a hot tub for 24 hours.    ACTIVITY  • No strenuous activity for 24 hours then return to normal activity as tolerated.  • If your leg is numb, no driving until full sensation and strength has returned.    GENERAL INSTRUCTIONS:  • The injection site may feel numb, use ice with caution if numbness is present and no heat for 24 hours or until numbness is gone.   • If you have numbness or weakness in your arm or leg, use those areas with caution until normal sensation returns.  • It is not uncommon to notice an increase in discomfort or a change in the location of discomfort for 3-4 days after  the procedure.  If discomfort is noticed at the injection site, ice may be            applied to that area for 20 min 2-3 times a day.  • Take the pain medicine your physician has prescribed or over the counter pain relievers as long as you do not have any contraindications.  • If you are a diabetic, monitor your blood sugar closely.  The steroids used in your procedure may increase your blood sugar level up to 36 hours after the injection.  If your blood sugar is greater than 250, call the physician that helps you monitor your blood sugar.  • Keep all follow-up visits as scheduled by your health care provider. This is important.    CONTACT OUR OFFICE IF:  • You have any of these signs of infection            -Redness, swelling, or warmth around your injection site.            -Fluid or blood coming from your injection site (small amt of blood is normal)            -Pus or a bad odor from your injection site            -A fever  • You develop a severe headache or a stiff neck  • You lose control of your bladder or bowel movements      PAIN MANAGEMENT CENTER HOURS   • Monday-Friday 7:30 am. - 4:00 pm.  For any problem related to your procedure we can be reached at [phone]  • If you experience an emergency with your procedure, call [phone] or go to the emergency room.

## 2021-11-15 NOTE — PROCEDURES
Lumbar Epidural Steroid Injection #2  [facility]    PREOPERATIVE DIAGNOSIS:   Chronic low back pain, Lumbar Degenerative Disc Disease and Lumbar Disc Displacement  POSTOPERATIVE DIAGNOSIS:  Same as preop diagnosis    PROCEDURE:   Lumbar Epidural Steroid Injection, Therapeutic Translaminar Injection, with epidurogram, at  L4/L5 level    PRE-PROCEDURE DISCUSSION WITH PATIENT:    Risks and complications were discussed with the patient prior to starting the procedure and informed consent was obtained.  We discussed various topics including but not limited to bleeding, infection, injury, paralysis, nerve injury, dural puncture, coma, death, worsening of clinical picture, lack of pain relief, and postprocedural soreness.    SURGEON:  Michael Kwong MD    REASON FOR PROCEDURE:    Previous clinically significant therapeutic effect is noted., Degenerative changes are noted in the area. and Radicular pain pattern seems consistent with this dermatome.    SEDATION:  Patient declined administration of moderate sedation    ANESTHETIC:  Marcaine 0.25%  STEROID:   Methylprednisolone (DEPO MEDROL) 40mg/ml    DESCRIPTON OF PROCEDURE:    After obtaining informed consent, I.V. was not started in the preop area.   The patient was taken to the operating room and placed in the prone position.   All pressure points were well padded.  The lumbar spine area was prepped with Chloraprep and draped in a sterile fashion.  Under fluoroscopic guidance, the above mentioned interlaminar space was identified. Skin and subcutaneous tissues were anesthetized with 1% lidocaine in the middle of the space. A Tuohy needle was introduced through the skin and advanced to this interlaminar space and into the epidural space under fluoroscopic guidance and verified with loss-of-resistance technique to air.  After confirming the position of the needle with the fluoroscope with all the views, and after aspiration was confirmed negative for blood and CSF,  1.5 mL of Omnipaque was injected.  After seeing appropriate epidurogram with lateral and PA views, a total of 3 cc solution was injected, consisting of 4cc of local anesthetic as above, with normal saline and injectable steroid as above.     ESTIMATED BLOOD LOSS:  <5 mL  SPECIMENS:  None    COMPLICATIONS:     No complications were noted., There was no indication of vascular uptake on live injection of contrast dye., There was no indication of intrathecal uptake on live injection of contrast dye. and There was not any evidence of dural puncture.      TOLERANCE & DISCHARGE CONDITION:    The patient tolerated the procedure well.  The patient was transported to the recovery area without difficulties.  The patient was discharged to home under the care of family in stable and satisfactory condition.    PLAN OF CARE:  1. The patient was given our standard instruction sheet.  2. The patient will Return to clinic 4-6 wks  3. The patient will resume all medications as per the medication reconciliation sheet.

## 2021-11-16 ENCOUNTER — OFFICE VISIT (OUTPATIENT)
Dept: PULMONOLOGY | Facility: HOSPITAL | Age: 76
End: 2021-11-16

## 2021-11-16 VITALS
OXYGEN SATURATION: 96 % | BODY MASS INDEX: 28.77 KG/M2 | SYSTOLIC BLOOD PRESSURE: 118 MMHG | DIASTOLIC BLOOD PRESSURE: 76 MMHG | WEIGHT: 179 LBS | HEIGHT: 66 IN | RESPIRATION RATE: 12 BRPM | HEART RATE: 65 BPM

## 2021-11-16 DIAGNOSIS — J45.909 ASTHMA, UNSPECIFIED ASTHMA SEVERITY, UNSPECIFIED WHETHER COMPLICATED, UNSPECIFIED WHETHER PERSISTENT: ICD-10-CM

## 2021-11-16 PROBLEM — G47.33 OBSTRUCTIVE SLEEP APNEA SYNDROME: Status: ACTIVE | Noted: 2021-11-16

## 2021-11-16 PROCEDURE — G0463 HOSPITAL OUTPT CLINIC VISIT: HCPCS

## 2021-11-16 RX ORDER — THEOPHYLLINE 300 MG/1
300 TABLET, EXTENDED RELEASE ORAL DAILY
Qty: 90 TABLET | Refills: 1 | Status: SHIPPED | OUTPATIENT
Start: 2021-11-16 | End: 2022-05-17 | Stop reason: SDUPTHER

## 2021-11-16 NOTE — PROGRESS NOTES
PULMONARY/ CRITICAL CARE/ SLEEP MEDICINE OUTPATIENT CONSULT/ FOLLOW UP NOTE        Patient Name:  Omar Choudhary    :  1945    Medical Record:  [id number]    PRIMARY CARE PHYSICIAN     Dennis Kwong MD    REASON FOR CONSULTATION    Omar Choudhary is a 76 y.o. male who is referred for consultation for Asthma  REVIEW OF SYSTEMS    Constitutional:  Denies fever or chills   Eyes:  Denies change in visual acuity   HENT:  Denies nasal congestion or sore throat   Respiratory:  Denies cough or shortness of breath   Cardiovascular:  Denies chest pain or edema   GI:  Denies abdominal pain, nausea, vomiting, bloody stools or diarrhea   :  Denies dysuria   Musculoskeletal:  Denies back pain or joint pain   Integument:  Denies rash   Neurologic:  Denies headache, focal weakness or sensory changes   Endocrine:  Denies polyuria or polydipsia   Lymphatic:  Denies swollen glands   Psychiatric:  Denies depression or anxiety     MEDICAL HISTORY    Past Medical History:   Diagnosis Date   • Allergic    • Arthritis    • Asthma    • Cataract    • Depression    • Family history of malignant neoplasm of breast 2019   • Hemidiaphragm paralysis     Left   • Hyperlipidemia 2019   • Hypertension 3/1/2012   • Leg pain, right    • Low back pain    • Osteopenia    • Reactive airway disease 1/15/2016   • Shortness of breath    • Sleep apnea, obstructive         SURGICAL HISTORY    Past Surgical History:   Procedure Laterality Date   • KNEE ARTHROSCOPY W/ ACL RECONSTRUCTION     • MOUTH SURGERY          FAMILY HISTORY    Family History   Problem Relation Age of Onset   • Heart disease Mother    • Hypertension Mother    • Breast cancer Mother    • Stroke Mother    • Aortic aneurysm Father    • Heart attack Father    • Liver cancer Father        SOCIAL HISTORY    Social History     Tobacco Use   • Smoking status: Former Smoker     Packs/day: 0.50     Years: 10.00     Pack years: 5.00     Types: Cigarettes     Start  date:      Quit date:      Years since quittin.8   • Smokeless tobacco: Never Used   Substance Use Topics   • Alcohol use: Yes     Alcohol/week: 12.0 standard drinks     Types: 10 Glasses of wine, 2 Cans of beer per week        ALLERGIES    Allergies   Allergen Reactions   • Statins Myalgia         MEDICATIONS    Current Outpatient Medications on File Prior to Visit   Medication Sig Dispense Refill   • acetaminophen (TYLENOL) 500 MG tablet Take 500 mg by mouth Every 6 (Six) Hours As Needed for Mild Pain .     • albuterol sulfate  (90 Base) MCG/ACT inhaler Inhale 2 puffs Every 4 (Four) Hours As Needed for Wheezing or Shortness of Air. 18 g 3   • Alpha-Lipoic Acid 100 MG capsule Take  by mouth.     • B Complex Vitamins (VITAMIN B COMPLEX) capsule capsule Take  by mouth Daily.     • budesonide-formoterol (SYMBICORT) 160-4.5 MCG/ACT inhaler Inhale 2 puffs 2 (Two) Times a Day. 120 each 12   • Calcium Carb-Cholecalciferol (calcium-vitamin D) 500-200 MG-UNIT per tablet Take 2 tablets by mouth Daily. 180 tablet 3   • clonazePAM (KlonoPIN) 0.5 MG tablet TAKE ONE TABLET BY MOUTH TWICE DAILY AS NEEDED for anxiety 30 tablet 0   • coenzyme Q10 50 MG capsule capsule Take  by mouth Daily.     • diphenhydrAMINE HCl (ANTI-HIST PO) Take 1 tablet by mouth Daily As Needed.     • HYDROcodone-acetaminophen (NORCO) 7.5-325 MG per tablet Take 1 tablet by mouth Every 8 (Eight) Hours As Needed for Moderate Pain .     • losartan-hydrochlorothiazide (HYZAAR) 100-25 MG per tablet TAKE 1 TABLET BY MOUTH DAILY  90 tablet 3   • Melatonin 10 MG tablet dispersible Take 2 tablets by mouth.     • meloxicam (Mobic) 15 MG tablet Take 1 tablet by mouth Daily. 90 tablet 3   • montelukast (SINGULAIR) 10 MG tablet Take 1 tablet by mouth Daily. 90 tablet 3   • Omega-3 Fatty Acids (FISH OIL) 1000 MG capsule capsule Take  by mouth Daily With Breakfast.     • Red Yeast Rice 600 MG capsule Take  by mouth.     • sildenafil (REVATIO) 20 MG  "tablet TAKE 1-2 TABLETS BY MOUTH AS NEEDED for e.d. 50 tablet 0   • triamcinolone (KENALOG) 0.1 % cream Apply  topically to the appropriate area as directed 2 (Two) Times a Day. 28.4 g 0   • zolpidem (AMBIEN) 10 MG tablet TAKE ONE TABLET BY MOUTH nightly AS NEEDED FOR SLEEP  30 tablet 0   • [DISCONTINUED] theophylline (THEODUR) 300 MG 12 hr tablet Take 1 tablet by mouth Daily. May substitute for generic 30 tablet 11     Current Facility-Administered Medications on File Prior to Visit   Medication Dose Route Frequency Provider Last Rate Last Admin   • [COMPLETED] bupivacaine (PF) (MARCAINE) 0.5 % injection 10 mL  10 mL Injection Once Prakash Kwong MD   2.5 mL at 11/15/21 1355   • [COMPLETED] iopamidol (ISOVUE-M 200) injection 41%  3 mL Injection Once in imaging Prakash Kwong MD   1 mL at 11/15/21 1355   • [COMPLETED] methylPREDNISolone acetate (DEPO-medrol) injection 40 mg  40 mg Epidural Once Prakash Kwong MD   40 mg at 11/15/21 1355       PHYSICAL EXAM    Vitals:    11/16/21 1329   BP: 118/76   BP Location: Left arm   Patient Position: Sitting   Cuff Size: Adult   Pulse: 65   Resp: 12   SpO2: 96%   Weight: 81.2 kg (179 lb)   Height: 167.6 cm (66\")        Constitutional:  Well developed, well nourished, no acute distress, non-toxic appearance   Eyes:  PERRL, conjunctiva normal   HENT:  Atraumatic, external ears normal, nose normal, oropharynx moist, no pharyngeal exudates. mallampatti   Neck- normal range of motion, no tenderness, supple   Respiratory:  No respiratory distress, normal breath sounds, no rales, no wheezing   Cardiovascular:  Normal rate, normal rhythm, no murmurs, no gallops, no rubs   GI:  Soft, nondistended, normal bowel sounds, nontender, no organomegaly, no mass, no rebound, no guarding   :  No costovertebral angle tenderness   Musculoskeletal:  No edema, no tenderness, no deformities. Back- no tenderness  Integument:  Well hydrated, no rash   Lymphatic:  No lymphadenopathy noted "   Neurologic:  Alert & oriented x 3, CN 2-12 normal, normal motor function, normal sensory function, no focal deficits noted   Psychiatric:  Speech and behavior appropriate     No radiology results for the last 90 days.      IgE   6 - 495 IU/mL 559 High   554 High     D. pteronyssinus (dust mite)   Class 0/I kU/L 0.18 Abnormal      D. farinae (dust mite)   Class 0/I kU/L 0.14 Abnormal      Cat Dander   Class 0/I kU/L 0.23 Abnormal      Dog Dander, IgE   Class I kU/L 0.49 Abnormal      [place] Grass   Class 0 kU/L <0.10     [place] Blue[place] IgE   Class 0 kU/L <0.10     Bill Grass   Class 0/I kU/L 0.10 Abnormal      [place] Grass   Class 0/I kU/L 0.10 Abnormal      Cockroach, American   Class 0 kU/L <0.10     Penicillium chrysogen   Class 0 kU/L <0.10     Cladosporium herbarum   Class 0 kU/L <0.10     Aspergillus fumigatus   Class 0 kU/L <0.10     Mucor racemosus   Class 0 kU/L <0.10     Alternaria alternata   Class 0 kU/L <0.10     Stemphylium herbarum      Comment: Test not performed. Insufficient specimen to perform or complete   analysis.   CONTACT:ANAM PIERCE 7/1/2021   [place], White   Class 0/I kU/L 0.12 Abnormal      Elm, American   Class 0 kU/L <0.10     Maple/Bent   Class 0 kU/L <0.10     Hazelnut Tree      Comment: Test not performed. Insufficient specimen to perform or complete   analysis.   CONTACT:ANAM PIERCE 7/1/2021   Daisy Kim      Comment: Test not performed. Insufficient specimen to perform or complete   analysis.   CONTACT:ANAM PIERCE 7/1/2021   Maple [place]      Comment: Test not performed. Insufficient specimen to perform or complete   analysis.   CONTACT:ANAM PIERCE 7/1/2021   White [place]   Class 0 kU/L <0.10     Cedar, Mountain   Class 0/I kU/L 0.27 Abnormal      Sweet Gum      Comment: Test not performed. Insufficient specimen to perform or complete   analysis.   CONTACT:ANAM PIERCE 7/1/2021   Ragweed, Common/Short   Class 0 kU/L <0.10     Mugwort   Class 0 kU/L <0.10     English  Plantain   Class 0 kU/L <0.10     Pigweed, Rough/Common   Class 0 kU/L <0.10     Sheep [place]   Class 0/I kU/L 0.10 Abnormal            ASSESSMENT & PLAN:       Asthma (J45.909)  Impression: CXR 1/19: Elevated L Hemidiaphragm with bibasilar atelectasis  IgE 5/18: 765  RAST Panel 5/18: +ve  PFts 4/18:  (FEV1 78%, TLC 82%, DLCO 76, DL/%  Repeat Lab 3/19: WBC 5.3, Eosinophil count normal. Mold Panel:negative.      IgE: 515  Discussed with patient possible use of Xolair   Feels better allergy injections    Plan to repeat IgE level and CBC with differential     Tests at [facility]; PFts 8/19: Non specific ventilatory defect. FEV1 70%, + D response, %, DLCO 82%   Methacholine chalenge test 8/19: negative  HRCT : No ILD  VQ: Negative except decrease in LLL related to paralyzed diaphragm  Metabolic cardiopulmonary stress test: negative  Echo: EF 60%, RVSP 31 min  Tried Breo and Bevespi with no improvement in symptoms  Remains on Singulair,   symbicort  expensive and albuterol.   -Also on allergy shots now     Obstructive sleep apnea (G47.33)  Has new ResMed machine,  CPAP at 8-20   Average use 8hrs 27 min. Average AHI 4.6. Patient is on AutoSet 8-20 pressure   Patient is compliant with using CPAP/BiPAP therapy and is benefitting from PAP therapy.     Hypertension (I10)                     This document has been electronically signed by  Jerrod Lagunas MD  13:38 EST

## 2021-11-22 ENCOUNTER — TELEPHONE (OUTPATIENT)
Dept: PAIN MEDICINE | Facility: CLINIC | Age: 76
End: 2021-11-22

## 2021-11-22 NOTE — TELEPHONE ENCOUNTER
Pt LM stating that after his last LESI he had excruciating pain at the injection site for about a week after. Pt states that he had to lay flat on the floor with ice for a week until the pain subsided. Pt just wanted to let you know so this can be discussed at his next appt with you.

## 2021-11-23 ENCOUNTER — TREATMENT (OUTPATIENT)
Dept: PHYSICAL THERAPY | Facility: CLINIC | Age: 76
End: 2021-11-23

## 2021-11-23 DIAGNOSIS — S76.311A HAMSTRING STRAIN, RIGHT, INITIAL ENCOUNTER: Primary | ICD-10-CM

## 2021-11-23 PROCEDURE — 97110 THERAPEUTIC EXERCISES: CPT | Performed by: PHYSICAL THERAPIST

## 2021-11-23 PROCEDURE — 97140 MANUAL THERAPY 1/> REGIONS: CPT | Performed by: PHYSICAL THERAPIST

## 2021-11-23 NOTE — PROGRESS NOTES
Physical Therapy Daily Progress Note  Visit: 3        Patient: Omar Choudhary   : 1945  Diagnosis/ICD-10 Code:  Hamstring strain, right, initial encounter [S76.311A]  Referring practitioner: EMILY Fraire  Date of Initial Visit: Type: THERAPY  Noted: 11/3/2021  Today's Date: 2021  Patient seen for 3 sessions         Subjective     Omar Choudhary reports: epidural last week for back pain. Overall HS pain has improved as well.     Objective   See Exercise, Manual, and Modality Logs for complete treatment.       Assessment/Plan    Good tolerance to manual intervention. Less tenderness noted today. Demo foam roll for HEP. Pt with good carry over. No adverse reaction to tx session.         Timed:  Manual Therapy:    23     mins  11721;  Therapeutic Exercise:    15     mins  [id number];         Timed Treatment:   38   mins   Total Treatment:     38   mins        Unique Herrera PT  Physical Therapist  PT license: [id number]

## 2021-11-24 ENCOUNTER — LAB (OUTPATIENT)
Dept: FAMILY MEDICINE CLINIC | Facility: CLINIC | Age: 76
End: 2021-11-24

## 2021-11-24 DIAGNOSIS — I10 ESSENTIAL HYPERTENSION: Primary | ICD-10-CM

## 2021-11-24 DIAGNOSIS — E03.9 HYPOTHYROIDISM, UNSPECIFIED TYPE: ICD-10-CM

## 2021-11-24 DIAGNOSIS — E55.9 VITAMIN D DEFICIENCY: ICD-10-CM

## 2021-11-24 DIAGNOSIS — E78.2 MIXED HYPERLIPIDEMIA: ICD-10-CM

## 2021-11-24 LAB
25(OH)D3 SERPL-MCNC: 42.8 NG/ML (ref 30–100)
ALBUMIN SERPL-MCNC: 4.6 G/DL (ref 3.5–5.2)
ALBUMIN/GLOB SERPL: 2.2 G/DL
ALP SERPL-CCNC: 55 U/L (ref 39–117)
ALT SERPL W P-5'-P-CCNC: 26 U/L (ref 1–41)
ANION GAP SERPL CALCULATED.3IONS-SCNC: 9.8 MMOL/L (ref 5–15)
AST SERPL-CCNC: 32 U/L (ref 1–40)
BASOPHILS # BLD AUTO: 0.02 10*3/MM3 (ref 0–0.2)
BASOPHILS NFR BLD AUTO: 0.4 % (ref 0–1.5)
BILIRUB SERPL-MCNC: 0.4 MG/DL (ref 0–1.2)
BUN SERPL-MCNC: 21 MG/DL (ref 8–23)
BUN/CREAT SERPL: 20 (ref 7–25)
CALCIUM SPEC-SCNC: 9.4 MG/DL (ref 8.6–10.5)
CHLORIDE SERPL-SCNC: 102 MMOL/L (ref 98–107)
CHOLEST SERPL-MCNC: 274 MG/DL (ref 0–200)
CO2 SERPL-SCNC: 28.2 MMOL/L (ref 22–29)
CREAT SERPL-MCNC: 1.05 MG/DL (ref 0.76–1.27)
DEPRECATED RDW RBC AUTO: 46.3 FL (ref 37–54)
EOSINOPHIL # BLD AUTO: 0.14 10*3/MM3 (ref 0–0.4)
EOSINOPHIL NFR BLD AUTO: 3.1 % (ref 0.3–6.2)
ERYTHROCYTE [DISTWIDTH] IN BLOOD BY AUTOMATED COUNT: 13.8 % (ref 12.3–15.4)
GFR SERPL CREATININE-BSD FRML MDRD: 69 ML/MIN/1.73
GLOBULIN UR ELPH-MCNC: 2.1 GM/DL
GLUCOSE SERPL-MCNC: 101 MG/DL (ref 65–99)
HCT VFR BLD AUTO: 43.2 % (ref 37.5–51)
HDLC SERPL-MCNC: 79 MG/DL (ref 40–60)
HGB BLD-MCNC: 14.3 G/DL (ref 13–17.7)
IMM GRANULOCYTES # BLD AUTO: 0.02 10*3/MM3 (ref 0–0.05)
IMM GRANULOCYTES NFR BLD AUTO: 0.4 % (ref 0–0.5)
LDLC SERPL CALC-MCNC: 178 MG/DL (ref 0–100)
LDLC/HDLC SERPL: 2.21 {RATIO}
LYMPHOCYTES # BLD AUTO: 1.41 10*3/MM3 (ref 0.7–3.1)
LYMPHOCYTES NFR BLD AUTO: 31.5 % (ref 19.6–45.3)
MCH RBC QN AUTO: 30.8 PG (ref 26.6–33)
MCHC RBC AUTO-ENTMCNC: 33.1 G/DL (ref 31.5–35.7)
MCV RBC AUTO: 93.1 FL (ref 79–97)
MONOCYTES # BLD AUTO: 0.69 10*3/MM3 (ref 0.1–0.9)
MONOCYTES NFR BLD AUTO: 15.4 % (ref 5–12)
NEUTROPHILS NFR BLD AUTO: 2.19 10*3/MM3 (ref 1.7–7)
NEUTROPHILS NFR BLD AUTO: 49.2 % (ref 42.7–76)
NRBC BLD AUTO-RTO: 0 /100 WBC (ref 0–0.2)
PLATELET # BLD AUTO: 208 10*3/MM3 (ref 140–450)
PMV BLD AUTO: 9.7 FL (ref 6–12)
POTASSIUM SERPL-SCNC: 4.1 MMOL/L (ref 3.5–5.2)
PROT SERPL-MCNC: 6.7 G/DL (ref 6–8.5)
RBC # BLD AUTO: 4.64 10*6/MM3 (ref 4.14–5.8)
SODIUM SERPL-SCNC: 140 MMOL/L (ref 136–145)
TRIGL SERPL-MCNC: 102 MG/DL (ref 0–150)
TSH SERPL DL<=0.05 MIU/L-ACNC: 1.42 UIU/ML (ref 0.27–4.2)
VLDLC SERPL-MCNC: 17 MG/DL (ref 5–40)
WBC NRBC COR # BLD: 4.47 10*3/MM3 (ref 3.4–10.8)

## 2021-11-24 PROCEDURE — 80053 COMPREHEN METABOLIC PANEL: CPT | Performed by: FAMILY MEDICINE

## 2021-11-24 PROCEDURE — 84443 ASSAY THYROID STIM HORMONE: CPT | Performed by: FAMILY MEDICINE

## 2021-11-24 PROCEDURE — 85025 COMPLETE CBC W/AUTO DIFF WBC: CPT | Performed by: FAMILY MEDICINE

## 2021-11-24 PROCEDURE — 82306 VITAMIN D 25 HYDROXY: CPT | Performed by: FAMILY MEDICINE

## 2021-11-24 PROCEDURE — 80061 LIPID PANEL: CPT | Performed by: FAMILY MEDICINE

## 2021-11-24 PROCEDURE — 36415 COLL VENOUS BLD VENIPUNCTURE: CPT

## 2021-12-03 ENCOUNTER — OFFICE VISIT (OUTPATIENT)
Dept: FAMILY MEDICINE CLINIC | Facility: CLINIC | Age: 76
End: 2021-12-03

## 2021-12-03 ENCOUNTER — LAB (OUTPATIENT)
Dept: FAMILY MEDICINE CLINIC | Facility: CLINIC | Age: 76
End: 2021-12-03

## 2021-12-03 VITALS
WEIGHT: 185 LBS | RESPIRATION RATE: 16 BRPM | BODY MASS INDEX: 29.73 KG/M2 | TEMPERATURE: 97.8 F | HEIGHT: 66 IN | SYSTOLIC BLOOD PRESSURE: 118 MMHG | DIASTOLIC BLOOD PRESSURE: 68 MMHG | OXYGEN SATURATION: 97 % | HEART RATE: 77 BPM

## 2021-12-03 DIAGNOSIS — G47.33 OBSTRUCTIVE SLEEP APNEA SYNDROME: ICD-10-CM

## 2021-12-03 DIAGNOSIS — E78.2 MIXED HYPERLIPIDEMIA: ICD-10-CM

## 2021-12-03 DIAGNOSIS — I10 PRIMARY HYPERTENSION: ICD-10-CM

## 2021-12-03 DIAGNOSIS — J45.30 MILD PERSISTENT ASTHMA, UNSPECIFIED WHETHER COMPLICATED: ICD-10-CM

## 2021-12-03 DIAGNOSIS — Z00.00 MEDICARE ANNUAL WELLNESS VISIT, SUBSEQUENT: Primary | ICD-10-CM

## 2021-12-03 DIAGNOSIS — M48.061 DEGENERATIVE LUMBAR SPINAL STENOSIS: ICD-10-CM

## 2021-12-03 DIAGNOSIS — Z12.5 ENCOUNTER FOR SCREENING FOR MALIGNANT NEOPLASM OF PROSTATE: ICD-10-CM

## 2021-12-03 PROBLEM — M53.80 OTHER SPECIFIED DORSOPATHIES, SITE UNSPECIFIED: Status: RESOLVED | Noted: 2019-09-26 | Resolved: 2021-12-03

## 2021-12-03 PROBLEM — J45.909 ASTHMA: Status: RESOLVED | Noted: 2021-11-16 | Resolved: 2021-12-03

## 2021-12-03 PROCEDURE — 99214 OFFICE O/P EST MOD 30 MIN: CPT | Performed by: FAMILY MEDICINE

## 2021-12-03 PROCEDURE — 1160F RVW MEDS BY RX/DR IN RCRD: CPT | Performed by: FAMILY MEDICINE

## 2021-12-03 PROCEDURE — 86803 HEPATITIS C AB TEST: CPT | Performed by: FAMILY MEDICINE

## 2021-12-03 PROCEDURE — 36415 COLL VENOUS BLD VENIPUNCTURE: CPT | Performed by: FAMILY MEDICINE

## 2021-12-03 PROCEDURE — G0103 PSA SCREENING: HCPCS | Performed by: FAMILY MEDICINE

## 2021-12-03 PROCEDURE — G0439 PPPS, SUBSEQ VISIT: HCPCS | Performed by: FAMILY MEDICINE

## 2021-12-03 PROCEDURE — 1170F FXNL STATUS ASSESSED: CPT | Performed by: FAMILY MEDICINE

## 2021-12-03 RX ORDER — GABAPENTIN 300 MG/1
CAPSULE ORAL
Qty: 90 CAPSULE | Refills: 5 | Status: SHIPPED | OUTPATIENT
Start: 2021-12-03 | End: 2022-06-06

## 2021-12-03 RX ORDER — PRAVASTATIN SODIUM 10 MG
10 TABLET ORAL DAILY
Qty: 90 TABLET | Refills: 3 | Status: SHIPPED | OUTPATIENT
Start: 2021-12-03 | End: 2022-10-08 | Stop reason: HOSPADM

## 2021-12-03 NOTE — PROGRESS NOTES
The ABCs of the Annual Wellness Visit  Subsequent Medicare Wellness Visit    Chief Complaint   Patient presents with   • Medicare Wellness-subsequent     Subjective    History of Present Illness:  Omar Choudhary is a 76 y.o. male who presents for a Subsequent Medicare Wellness Visit.    Chronic low back pain: following w/ pain mgmt- [facility]. He has had 3 epidurals, which has not been helpful for his back. Patient also wonders about this making him more impulsive. 7/2021 MRI w/ severe degenerative changes of the lumbar spine and 25 degrees of levoscoliosis. He is maintained on meloxicam 15 daily, tumeric daily, Tylenol 500 PRN, and codeine/Tylenol 8/350 from his sister from [employer]    HTN: 118/68 today. Maintained on Hyzaar 100/25 daily. No LH/dizziness, CP or SOB.    HLD: Last lipid panel w/ . Unable to tolerate statin due to myalgias and Zetia resulted in diarrhea. Doing some regular exercise     Asthma: hx L hemidiaphragm paralysis and asthma. Maintained on Symbicort BID (using PRN), theophylline and montelukast. Chronic SOB but stable/well controlled at this time. No nighttime awakening w/ cough. Follows w/ pulm- Draw     ELINOR: maintained on CPAP. No issues at this time. Working well for him and well controlled. Follows w/ Draw    [facility]     The following portions of the patient's history were reviewed and   updated as appropriate: allergies, current medications, past family history, past medical history, past social history, past surgical history and problem list.    Compared to one year ago, the patient feels his physical   health is worse.    Compared to one year ago, the patient feels his mental   health is better.    Recent Hospitalizations:  He was not admitted to the hospital during the last year.     Current Medical Providers:  Patient Care Team:  Dennis Kwong MD as PCP - General (Internal Medicine)  Arslan Carlson PA-C  as Physician Assistant (Physician Assistant)  Ang Irizarry APRN as Nurse Practitioner (Family Medicine)    Outpatient Medications Prior to Visit   Medication Sig Dispense Refill   • acetaminophen (TYLENOL) 500 MG tablet Take 500 mg by mouth Every 6 (Six) Hours As Needed for Mild Pain .     • albuterol sulfate  (90 Base) MCG/ACT inhaler Inhale 2 puffs Every 4 (Four) Hours As Needed for Wheezing or Shortness of Air. 18 g 3   • Alpha-Lipoic Acid 100 MG capsule Take  by mouth.     • B Complex Vitamins (VITAMIN B COMPLEX) capsule capsule Take  by mouth Daily.     • budesonide-formoterol (SYMBICORT) 160-4.5 MCG/ACT inhaler Inhale 2 puffs 2 (Two) Times a Day. 120 each 12   • Calcium Carb-Cholecalciferol (calcium-vitamin D) 500-200 MG-UNIT per tablet Take 2 tablets by mouth Daily. 180 tablet 3   • clonazePAM (KlonoPIN) 0.5 MG tablet TAKE ONE TABLET BY MOUTH TWICE DAILY AS NEEDED for anxiety 30 tablet 0   • coenzyme Q10 50 MG capsule capsule Take  by mouth Daily.     • diphenhydrAMINE HCl (ANTI-HIST PO) Take 1 tablet by mouth Daily As Needed.     • losartan-hydrochlorothiazide (HYZAAR) 100-25 MG per tablet TAKE 1 TABLET BY MOUTH DAILY  90 tablet 3   • meloxicam (Mobic) 15 MG tablet Take 1 tablet by mouth Daily. 90 tablet 3   • montelukast (SINGULAIR) 10 MG tablet Take 1 tablet by mouth Daily. 90 tablet 3   • sildenafil (REVATIO) 20 MG tablet TAKE 1-2 TABLETS BY MOUTH AS NEEDED for e.d. 50 tablet 0   • theophylline (THEODUR) 300 MG 12 hr tablet Take 1 tablet by mouth Daily. May substitute for generic 90 tablet 1   • triamcinolone (KENALOG) 0.1 % cream Apply  topically to the appropriate area as directed 2 (Two) Times a Day. 28.4 g 0   • zolpidem (AMBIEN) 10 MG tablet TAKE ONE TABLET BY MOUTH nightly AS NEEDED FOR SLEEP  30 tablet 0   • HYDROcodone-acetaminophen (NORCO) 7.5-325 MG per tablet Take 1 tablet by mouth Every 8 (Eight) Hours As Needed for Moderate Pain .     • Omega-3 Fatty Acids (FISH OIL) 1000 MG  capsule capsule Take  by mouth Daily With Breakfast.     • Red Yeast Rice 600 MG capsule Take  by mouth.     • Melatonin 10 MG tablet dispersible Take 2 tablets by mouth.       No facility-administered medications prior to visit.     Opioid medication/s are on active medication list.  and I have evaluated his active treatment plan and pain score trends (see table).  There were no vitals filed for this visit.  I have reviewed the chart for potential of high risk medication and harmful drug interactions in the elderly.          Aspirin is not on active medication list.  Aspirin use is not indicated based on review of current medical condition/s. Risk of harm outweighs potential benefits.  .    Patient Active Problem List   Diagnosis   • Benign prostatic hyperplasia   • Bursitis   • Depression   • Diverticulosis   • Family history of malignant neoplasm of breast   • Hyperlipidemia   • Hypertension   • Internal derangement of right knee   • Knee effusion   • Asthma   • Obstructive sleep apnea syndrome   • Osteoarthritis   • Osteopenia   • Pain in knee   • Postnasal drip   • Prepatellar bursitis   • Sciatica of right side   • Diaphragm paralysis   • Spinal stenosis of lumbar region with neurogenic claudication   • Degenerative lumbar spinal stenosis   • Anxiety     Advance Care Planning  Advance Directive is not on file.  ACP discussion was held with the patient during this visit. Patient does not have an advance directive, information provided.  Review of Systems   Constitutional: Negative for chills and fever.   HENT: Negative for congestion and rhinorrhea.    Eyes: Negative for visual disturbance.   Respiratory: Positive for shortness of breath. Negative for cough.    Cardiovascular: Negative for chest pain and palpitations.   Gastrointestinal: Negative for abdominal pain and vomiting.   Genitourinary: Negative for difficulty urinating and dysuria.   Musculoskeletal: Positive for back pain. Negative for arthralgias.  "  Skin: Negative for rash.   Neurological: Negative for dizziness, light-headedness and numbness.   Psychiatric/Behavioral: Negative for dysphoric mood and sleep disturbance. The patient is not nervous/anxious.         Objective    Vitals:    12/03/21 1459   BP: 118/68   BP Location: Left arm   Patient Position: Sitting   Cuff Size: Large Adult   Pulse: 77   Resp: 16   Temp: 97.8 °F (36.6 °C)   TempSrc: Temporal   SpO2: 97%   Weight: 83.9 kg (185 lb)   Height: 167.6 cm (66\")     BMI Readings from Last 1 Encounters:   12/03/21 29.86 kg/m²   BMI is above normal parameters. Recommendations include: exercise counseling and nutrition counseling    Does the patient have evidence of cognitive impairment? No    Physical Exam  Constitutional:       General: He is not in acute distress.     Appearance: He is well-developed.   HENT:      Head: Normocephalic and atraumatic.      Right Ear: Tympanic membrane and external ear normal. There is no impacted cerumen.      Left Ear: Tympanic membrane and external ear normal. There is no impacted cerumen.      Nose: Nose normal.      Mouth/Throat:      Mouth: Mucous membranes are moist.      Pharynx: No oropharyngeal exudate or posterior oropharyngeal erythema.   Eyes:      General: No scleral icterus.        Right eye: No discharge.         Left eye: No discharge.      Extraocular Movements: Extraocular movements intact.      Conjunctiva/sclera: Conjunctivae normal.      Pupils: Pupils are equal, round, and reactive to light.   Neck:      Thyroid: No thyromegaly.      Vascular: No carotid bruit.   Cardiovascular:      Rate and Rhythm: Normal rate and regular rhythm.      Heart sounds: Normal heart sounds. No murmur heard.      Pulmonary:      Effort: Pulmonary effort is normal. No respiratory distress.      Breath sounds: Wheezing present. No rales.   Abdominal:      General: Bowel sounds are normal. There is no distension.      Palpations: Abdomen is soft.      Tenderness: There is " no abdominal tenderness.   Musculoskeletal:         General: No deformity. Normal range of motion.      Cervical back: Normal range of motion and neck supple.   Lymphadenopathy:      Cervical: No cervical adenopathy.   Skin:     General: Skin is warm and dry.      Findings: No rash.   Neurological:      General: No focal deficit present.      Mental Status: He is alert and oriented to person, place, and time. Mental status is at baseline.      Cranial Nerves: No cranial nerve deficit.      Sensory: No sensory deficit.   Psychiatric:         Behavior: Behavior normal.         Thought Content: Thought content normal.       Lab Results   Component Value Date    TRIG 102 2021    HDL 79 (H) 2021     (H) 2021    VLDL 17 2021        HEALTH RISK ASSESSMENT    Smoking Status:  Social History     Tobacco Use   Smoking Status Former Smoker   • Packs/day: 0.50   • Years: 10.00   • Pack years: 5.00   • Types: Cigarettes   • Start date:    • Quit date:    • Years since quittin.9   Smokeless Tobacco Never Used     Alcohol Consumption:  Social History     Substance and Sexual Activity   Alcohol Use Yes   • Alcohol/week: 12.0 standard drinks   • Types: 10 Glasses of wine, 2 Cans of beer per week     Fall Risk Screen:  [facility] Fall Risk Assessment was completed, and patient is at LOW risk for falls.Assessment completed on:12/3/2021    Depression Screening:  PHQ-2/PHQ-9 Depression Screening 12/3/2021   Little interest or pleasure in doing things 0   Feeling down, depressed, or hopeless 0   Trouble falling or staying asleep, or sleeping too much -   Feeling tired or having little energy -   Poor appetite or overeating -   Feeling bad about yourself - or that you are a failure or have let yourself or your family down -   Trouble concentrating on things, such as reading the newspaper or watching television -   Moving or speaking so slowly that other people could have noticed. Or the opposite -  being so fidgety or restless that you have been moving around a lot more than usual -   Thoughts that you would be better off dead, or of hurting yourself in some way -   Total Score 0   If you checked off any problems, how difficult have these problems made it for you to do your work, take care of things at home, or get along with other people? -     Health Habits and Functional and Cognitive Screening:  Functional & Cognitive Status 12/3/2021   Do you have difficulty preparing food and eating? No   Do you have difficulty bathing yourself, getting dressed or grooming yourself? No   Do you have difficulty using the toilet? No   Do you have difficulty moving around from place to place? No   Do you have trouble with steps or getting out of a bed or a chair? No   Current Diet Unhealthy Diet   Dental Exam Up to date   Eye Exam Up to date   Exercise (times per week) 3 times per week        Exercise Frequency Comment Do to back pain. Has not been able to do so.   Current Exercises Include Bicycling Outdoors   Current Exercise Activities Include -   Do you need help using the phone?  No   Are you deaf or do you have serious difficulty hearing?  No   Do you need help with transportation? No   Do you need help shopping? No   Do you need help preparing meals?  No   Do you need help with housework?  No   Do you need help with laundry? No   Do you need help taking your medications? No   Do you need help managing money? No   Do you ever drive or ride in a car without wearing a seat belt? No   Have you felt unusual stress, anger or loneliness in the last month? No   Who do you live with? Spouse   If you need help, do you have trouble finding someone available to you? No   Have you been bothered in the last four weeks by sexual problems? No   Do you have difficulty concentrating, remembering or making decisions? No     Age-appropriate Screening Schedule:  Refer to the list below for future screening recommendations based on  patient's age, sex and/or medical conditions. Orders for these recommended tests are listed in the plan section. The patient has been provided with a written plan.    Health Maintenance   Topic Date Due   • TDAP/TD VACCINES (1 - Tdap) Never done   • ZOSTER VACCINE (1 of 2) 06/16/2022 (Originally 7/24/1997)   • LIPID PANEL  11/24/2022   • DXA SCAN  12/10/2022   • INFLUENZA VACCINE  Completed        Assessment/Plan   CMS Preventative Services Quick Reference  Risk Factors Identified During Encounter  Chronic Pain   Immunizations Discussed/Encouraged (specific Immunizations; Influenza  Obesity/Overweight   The above risks/problems have been discussed with the patient.  Follow up actions/plans if indicated are seen below in the Assessment/Plan Section.  Pertinent information has been shared with the patient in the After Visit Summary.    Diagnoses and all orders for this visit:    1. Medicare annual wellness visit, subsequent (Primary)  -     Hepatitis C Antibody  -     PSA Screen  - Recent labs reviewed.  -  Counseled regarding diet, exercise, weight loss, and preventative health maintenance items/immunizations below    2. Degenerative lumbar spinal stenosis  -     Start gabapentin (NEURONTIN) 300 MG capsule; Take 1 tablet in the AM and 2 tablets at night. Start with 1 at night  Dispense: 90 capsule; Refill: 5  - Continue home meloxicam 15 daily  - Continue home Tylenol and Norco as needed  - Continue pain management [id number]    3. Primary hypertension: 118/68 today   -Continue home Hyzaar 100/25 mg daily    4. Mixed hyperlipidemia: Previously failed atorvastatin and rosuvastatin.  Unable to tolerate Zetia due to diarrhea.   today  -     Start pravastatin (PRAVACHOL) 10 MG tablet; Take 1 tablet by mouth Daily.  Dispense: 90 tablet; Refill: 3    5. Mild persistent asthma, unspecified whether complicated   -Continue home Singulair and theophylline per pulmonary   -Continue Symbicort twice daily as needed  due to cost   -Continue pulmonary follow-up-draw    6. Obstructive sleep apnea syndrome   -Continue home CPAP    7. Encounter for screening for malignant neoplasm of prostate   -     PSA Screen    Preventative  Colonoscopy: 2013, due 2023  PSA: 0.489 (11/2020), ordered today  AAA: 12/2020- 3.6cm, due 12/2022 for repeat  DEXA: 12/2020- osteopenia  Shingles: 2020  Pneumonia: Prevnar 11/2019, Pneumovax 8/2017  Tdap: reportedly 2015  Influenza: 2021  COVID: Completed 3 shots Pfizer (9/2021)    Follow Up:   Return in about 4 months (around 4/3/2022) for Recheck- 30min- HLD.     An After Visit Summary and PPPS were made available to the patient.

## 2021-12-04 LAB
HCV AB SER DONR QL: NORMAL
PSA SERPL-MCNC: 0.46 NG/ML (ref 0–4)

## 2021-12-07 ENCOUNTER — TREATMENT (OUTPATIENT)
Dept: PHYSICAL THERAPY | Facility: CLINIC | Age: 76
End: 2021-12-07

## 2021-12-07 DIAGNOSIS — S76.311A HAMSTRING STRAIN, RIGHT, INITIAL ENCOUNTER: Primary | ICD-10-CM

## 2021-12-07 PROCEDURE — 97140 MANUAL THERAPY 1/> REGIONS: CPT | Performed by: PHYSICAL THERAPIST

## 2021-12-07 NOTE — PROGRESS NOTES
Physical Therapy Daily Progress Note  Visit: 4        Patient: Omar Choudhary   : 1945  Diagnosis/ICD-10 Code:  Hamstring strain, right, initial encounter [S76.311A]  Referring practitioner: EMILY Fraire  Date of Initial Visit: Type: THERAPY  Noted: 11/3/2021  Today's Date: 2021  Patient seen for 4 sessions         Subjective     Omar Choudhary reports: getting better, but has not been foam rolling.     Objective   See Exercise, Manual, and Modality Logs for complete treatment.       Assessment/Plan  Advised to cont foam rolling and stretching. Good response to STM and scrapping techniques to medial HS.            Timed:  Manual Therapy:    25     mins  50530;  Therapeutic Exercise:    5     mins  15267;         Timed Treatment:   30   mins   Total Treatment:     30   mins        Unique Herrera PT  Physical Therapist  PT license: [id number]

## 2021-12-15 RX ORDER — SILDENAFIL CITRATE 20 MG/1
TABLET ORAL
Qty: 50 TABLET | Refills: 0 | Status: SHIPPED | OUTPATIENT
Start: 2021-12-15 | End: 2022-01-26

## 2021-12-21 ENCOUNTER — TELEPHONE (OUTPATIENT)
Dept: ORTHOPEDICS | Facility: OTHER | Age: 76
End: 2021-12-21

## 2021-12-29 ENCOUNTER — OFFICE VISIT (OUTPATIENT)
Dept: PAIN MEDICINE | Facility: CLINIC | Age: 76
End: 2021-12-29

## 2021-12-29 VITALS
WEIGHT: 185 LBS | DIASTOLIC BLOOD PRESSURE: 84 MMHG | HEIGHT: 66 IN | BODY MASS INDEX: 29.73 KG/M2 | SYSTOLIC BLOOD PRESSURE: 166 MMHG | HEART RATE: 60 BPM | OXYGEN SATURATION: 97 % | RESPIRATION RATE: 16 BRPM

## 2021-12-29 DIAGNOSIS — M48.061 SPINAL STENOSIS OF LUMBAR REGION WITHOUT NEUROGENIC CLAUDICATION: Primary | ICD-10-CM

## 2021-12-29 DIAGNOSIS — Z79.899 HIGH RISK MEDICATION USE: Primary | ICD-10-CM

## 2021-12-29 PROCEDURE — 99214 OFFICE O/P EST MOD 30 MIN: CPT | Performed by: STUDENT IN AN ORGANIZED HEALTH CARE EDUCATION/TRAINING PROGRAM

## 2021-12-29 RX ORDER — LIDOCAINE 50 MG/G
1 PATCH TOPICAL EVERY 24 HOURS
Qty: 30 EACH | Refills: 1 | Status: SHIPPED | OUTPATIENT
Start: 2021-12-29 | End: 2022-05-19 | Stop reason: SDUPTHER

## 2021-12-29 RX ORDER — HYDROCODONE BITARTRATE AND ACETAMINOPHEN 7.5; 325 MG/1; MG/1
1 TABLET ORAL DAILY
Qty: 30 TABLET | Refills: 0 | Status: SHIPPED | OUTPATIENT
Start: 2021-12-29 | End: 2022-05-19 | Stop reason: SDUPTHER

## 2021-12-29 NOTE — PROGRESS NOTES
CHIEF COMPLAINT  Chief Complaint   Patient presents with   • Back Pain     LOW BACK PAIN. LAST EPIDURAL HAD PAIN AT INJECTION SITE FOR FIRST WEEK. NORCO LAST DOSE 12/28/21.    • Pain     DOES NOT THINK HE IS REACTING WELL TO MEDS IN EPIDURAL. WENT TO ACCUPUCTURE DR IN [facility] SAYS THAT HELPS.        Primary Care  Bickers, Dennis Peters MD    Subjective   Omar Choudhary is a 76 y.o. male  who presents for chronic low back pain with both axial and radicular features.  He states that he has had intermittent pain for many years however he has not sought any treatment.  Recently, his pain began to impact his activities of daily living and he sought treatment from his primary care in the form of an MRI.  It showed substantial degenerative changes including severe stenosis at multiple levels as well as foraminal narrowing.  He also has severe scoliosis and severe degenerative disc disease with facet arthropathy.  He received 1 epidural steroid injection from an outside pain provider which did provide him significant benefit however he was discharged to do a failed drug screen.  He admits that he had taken a oxycodone that he had leftover from surgery and forgot to inform the previous provider of this.  He denies any significant numbness or tingling or weakness but does complain of axial type back pain with radiation in the buttock bilaterally.    Back Pain  Pertinent negatives include no numbness or weakness.   Pain  Associated symptoms include arthralgias and myalgias. Pertinent negatives include no numbness or weakness.        Location: Low back with radiation in the buttock bilaterally  Onset: Years ago  Duration: Progressively worsening  Timing: Constant throughout the day  Quality: Sharp, stabbing, aching  Severity: Today: 6       Last Week: 6       Worst: 7  Modifying Factors: The pain is worse with physical activity and movement and exercise and slightly improved with rest and stretching    Physical Therapy:  no    Interval Update 12/29/2021: He does not feel he gets any significant relief with his epidural.  He states he feels the medication causes him to have psychiatric symptoms.  He states that his most recent epidural is extremely painful.    The following portions of the patient's history were reviewed and updated as appropriate: allergies, current medications, past family history, past medical history, past social history, past surgical history and problem list.      Current Outpatient Medications:   •  acetaminophen (TYLENOL) 500 MG tablet, Take 500 mg by mouth Every 6 (Six) Hours As Needed for Mild Pain ., Disp: , Rfl:   •  albuterol sulfate  (90 Base) MCG/ACT inhaler, Inhale 2 puffs Every 4 (Four) Hours As Needed for Wheezing or Shortness of Air., Disp: 18 g, Rfl: 3  •  Alpha-Lipoic Acid 100 MG capsule, Take  by mouth., Disp: , Rfl:   •  B Complex Vitamins (VITAMIN B COMPLEX) capsule capsule, Take  by mouth Daily., Disp: , Rfl:   •  budesonide-formoterol (SYMBICORT) 160-4.5 MCG/ACT inhaler, Inhale 2 puffs 2 (Two) Times a Day., Disp: 120 each, Rfl: 12  •  Calcium Carb-Cholecalciferol (calcium-vitamin D) 500-200 MG-UNIT per tablet, Take 2 tablets by mouth Daily., Disp: 180 tablet, Rfl: 3  •  clonazePAM (KlonoPIN) 0.5 MG tablet, TAKE ONE TABLET BY MOUTH TWICE DAILY AS NEEDED for anxiety, Disp: 30 tablet, Rfl: 0  •  coenzyme Q10 50 MG capsule capsule, Take  by mouth Daily., Disp: , Rfl:   •  diphenhydrAMINE HCl (ANTI-HIST PO), Take 1 tablet by mouth Daily As Needed., Disp: , Rfl:   •  gabapentin (NEURONTIN) 300 MG capsule, Take 1 tablet in the AM and 2 tablets at night. Start with 1 at night, Disp: 90 capsule, Rfl: 5  •  HYDROcodone-acetaminophen (NORCO) 7.5-325 MG per tablet, Take 1 tablet by mouth Daily., Disp: 30 tablet, Rfl: 0  •  losartan-hydrochlorothiazide (HYZAAR) 100-25 MG per tablet, TAKE 1 TABLET BY MOUTH DAILY , Disp: 90 tablet, Rfl: 3  •  meloxicam (Mobic) 15 MG tablet, Take 1 tablet by mouth  "Daily., Disp: 90 tablet, Rfl: 3  •  montelukast (SINGULAIR) 10 MG tablet, Take 1 tablet by mouth Daily., Disp: 90 tablet, Rfl: 3  •  Omega-3 Fatty Acids (FISH OIL) 1000 MG capsule capsule, Take  by mouth Daily With Breakfast., Disp: , Rfl:   •  pravastatin (PRAVACHOL) 10 MG tablet, Take 1 tablet by mouth Daily., Disp: 90 tablet, Rfl: 3  •  Red Yeast Rice 600 MG capsule, Take  by mouth., Disp: , Rfl:   •  sildenafil (REVATIO) 20 MG tablet, TAKE 1-2 TABLETS BY MOUTH AS NEEDED for e.d., Disp: 50 tablet, Rfl: 0  •  theophylline (THEODUR) 300 MG 12 hr tablet, Take 1 tablet by mouth Daily. May substitute for generic, Disp: 90 tablet, Rfl: 1  •  triamcinolone (KENALOG) 0.1 % cream, Apply  topically to the appropriate area as directed 2 (Two) Times a Day., Disp: 28.4 g, Rfl: 0  •  zolpidem (AMBIEN) 10 MG tablet, TAKE ONE TABLET BY MOUTH nightly AS NEEDED FOR SLEEP , Disp: 30 tablet, Rfl: 0  •  lidocaine (LIDODERM) 5 %, Place 1 patch on the skin as directed by provider Daily. Remove & Discard patch within 12 hours or as directed by MD, Disp: 30 each, Rfl: 1    Review of Systems   Musculoskeletal: Positive for arthralgias, back pain, gait problem and myalgias.   Neurological: Negative for weakness and numbness.       Vitals:    12/29/21 1357 12/29/21 1409   BP: 170/91 166/84   Pulse: 60    Resp: 16    SpO2: 97%    Weight: 83.9 kg (185 lb)    Height: 167.6 cm (66\")    PainSc:   7          Objective   Physical Exam  Vitals and nursing note reviewed.   Constitutional:       General: He is not in acute distress.     Appearance: Normal appearance. He is well-developed and normal weight.   Neck:      Trachea: No tracheal deviation.   Musculoskeletal:      Comments: Lumbar Spine Exam:  Tender to palpation over the lumbar paraspinal musculature Yes  Limited range of motion secondary to pain Yes  Facet loading positive: bilateral  Facets tender to palpation: bilateral  Straight leg raise test positive: Equivocal       Neurological:    "   General: No focal deficit present.      Mental Status: He is alert.      Sensory: No sensory deficit.      Motor: No weakness.           Assessment/Plan   Problems Addressed this Visit     None      Visit Diagnoses     Spinal stenosis of lumbar region without neurogenic claudication    -  Primary    Relevant Medications    HYDROcodone-acetaminophen (NORCO) 7.5-325 MG per tablet    Other Relevant Orders    Ambulatory Referral to Neurosurgery    Back Brace    Ambulatory Referral to Physical Therapy Evaluate and treat      Diagnoses       Codes Comments    Spinal stenosis of lumbar region without neurogenic claudication    -  Primary ICD-10-CM: M48.061  ICD-9-CM: 724.02           Plan:  1. Given his failure of moderate epidurals, will plan to send to neurosurgery for second opinion  2. Is also requesting narcotics.  We will start 7.5 mg daily  3. We will also prescribe lidocaine patch  4. UDS inspect today  5. We will also refer to physical therapy for low back pain  6. Referral for back brace  --- Follow-up 1 month           INSPECT REPORT    As part of the patient's treatment plan, I may be prescribing controlled substances. The patient has been made aware of appropriate use of such medications, including potential risk of somnolence, limited ability to drive and/or work safely, and the potential for dependence or overdose. It has also bee made clear that these medications are for use by this patient only, without concomitant use of alcohol or other substances unless prescribed.     Patient has completed prescribing agreement detailing terms of continued prescribing of controlled substances, including monitoring PRASANTH reports, urine drug screening, and pill counts if necessary. The patient is aware that inappropriate use will results in cessation of prescribing such medications.    INSPECT report has been reviewed and scanned into the patient's chart.    As the clinician, I personally reviewed the INSPECT from  12/28/2021.    History and physical exam exhibit continued safe and appropriate use of controlled substances.      EMR Dragon/Transcription disclaimer:   Much of this encounter note is an electronic transcription/translation of spoken language to printed text. The electronic translation of spoken language may permit erroneous, or at times, nonsensical words or phrases to be inadvertently transcribed; Although I have reviewed the note for such errors, some may still exist.

## 2022-01-04 ENCOUNTER — TELEPHONE (OUTPATIENT)
Dept: PAIN MEDICINE | Facility: CLINIC | Age: 77
End: 2022-01-04

## 2022-01-10 NOTE — PROGRESS NOTES
Neurosurgical Consultation      Omar Choudhary is a 76 y.o. male is being seen for consultation today at the request of Prakash Kwong MD for spinal stenosis of the lumbar region.    Chief Complaint   Patient presents with   • Back Pain     Pain down bilateral buttock and both thighs.         Previous treatment:    HPI: This is a 76-year-old gentleman who presents to the neurosurgery clinic for evaluation of back and bilateral leg pain.  He has a history of chronic low back pain for greater than 20 years that has been managed conservatively.  However over the last 6 to 9 months he has had increased back pain with a sensation of sharp knives in his back that now radiates into his legs.  The pain is worse in his right leg than his left leg.  This summer he was able to walk greater than 3 miles a day without significant symptomatology but now with walking half of a mile he has significant pain in his back and in his legs bilaterally that inhibits this activity.  He has undergone 3 spinal injections that were able to provide less than 1 week of relief.  He has been managing his pain with TENS unit and inversion table.  His pain is worsened with hyperextension and improved with flexion.  He does have a positive shopping cart sign.  He has also been taking gabapentin which does provide some relief of his leg symptoms.  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  He has never had spine surgery.  He was an every day smoker for 24 years and does drink approximately 4 drinks of alcohol per day.    Past Medical History:   Diagnosis Date   • Allergic    • Arthritis    • Asthma    • Cataract    • Depression    • Family history of malignant neoplasm of breast 9/26/2019   • Hemidiaphragm paralysis     Left   • Hyperlipidemia 9/26/2019   • Hypertension 3/1/2012   • Leg pain, right    • Low back pain    • Osteopenia    • Reactive airway disease 1/15/2016   • Shortness of breath    • Sleep apnea,  obstructive         Past Surgical History:   Procedure Laterality Date   • KNEE ARTHROSCOPY W/ ACL RECONSTRUCTION     • MOUTH SURGERY          Current Outpatient Medications on File Prior to Visit   Medication Sig Dispense Refill   • acetaminophen (TYLENOL) 500 MG tablet Take 500 mg by mouth Every 6 (Six) Hours As Needed for Mild Pain .     • albuterol sulfate  (90 Base) MCG/ACT inhaler Inhale 2 puffs Every 4 (Four) Hours As Needed for Wheezing or Shortness of Air. 18 g 3   • Alpha-Lipoic Acid 100 MG capsule Take  by mouth.     • B Complex Vitamins (VITAMIN B COMPLEX) capsule capsule Take  by mouth Daily.     • budesonide-formoterol (SYMBICORT) 160-4.5 MCG/ACT inhaler Inhale 2 puffs 2 (Two) Times a Day. 120 each 12   • Calcium Carb-Cholecalciferol (calcium-vitamin D) 500-200 MG-UNIT per tablet Take 2 tablets by mouth Daily. 180 tablet 3   • Carboxymethylcellul-Glycerin 0.5-0.9 % solution Apply  to eye(s) as directed by provider Every 8 (Eight) Hours.     • clonazePAM (KlonoPIN) 0.5 MG tablet TAKE ONE TABLET BY MOUTH TWICE DAILY AS NEEDED for anxiety 30 tablet 0   • coenzyme Q10 50 MG capsule capsule Take  by mouth Daily.     • diphenhydrAMINE HCl (ANTI-HIST PO) Take 1 tablet by mouth Daily As Needed.     • gabapentin (NEURONTIN) 300 MG capsule Take 1 tablet in the AM and 2 tablets at night. Start with 1 at night 90 capsule 5   • HYDROcodone-acetaminophen (NORCO) 7.5-325 MG per tablet Take 1 tablet by mouth Daily. 30 tablet 0   • lidocaine (LIDODERM) 5 % Place 1 patch on the skin as directed by provider Daily. Remove & Discard patch within 12 hours or as directed by MD 30 each 1   • losartan-hydrochlorothiazide (HYZAAR) 100-25 MG per tablet TAKE 1 TABLET BY MOUTH DAILY  90 tablet 3   • meloxicam (Mobic) 15 MG tablet Take 1 tablet by mouth Daily. 90 tablet 3   • montelukast (SINGULAIR) 10 MG tablet Take 1 tablet by mouth Daily. 90 tablet 3   • Omega-3 Fatty Acids (FISH OIL) 1000 MG capsule capsule Take  by  mouth Daily With Breakfast.     • pravastatin (PRAVACHOL) 10 MG tablet Take 1 tablet by mouth Daily. 90 tablet 3   • Red Yeast Rice 600 MG capsule Take  by mouth.     • sildenafil (REVATIO) 20 MG tablet TAKE 1-2 TABLETS BY MOUTH AS NEEDED for e.d. 50 tablet 0   • theophylline (THEODUR) 300 MG 12 hr tablet Take 1 tablet by mouth Daily. May substitute for generic 90 tablet 1   • triamcinolone (KENALOG) 0.1 % cream Apply  topically to the appropriate area as directed 2 (Two) Times a Day. 28.4 g 0   • zolpidem (AMBIEN) 10 MG tablet TAKE ONE TABLET BY MOUTH nightly AS NEEDED FOR SLEEP  30 tablet 0     No current facility-administered medications on file prior to visit.        Allergies   Allergen Reactions   • Statins Myalgia        Social History     Socioeconomic History   • Marital status:    Tobacco Use   • Smoking status: Former Smoker     Packs/day: 0.50     Years: 10.00     Pack years: 5.00     Types: Cigarettes     Start date:      Quit date:      Years since quittin.0   • Smokeless tobacco: Never Used   Vaping Use   • Vaping Use: Never used   Substance and Sexual Activity   • Alcohol use: Yes     Alcohol/week: 12.0 standard drinks     Types: 10 Glasses of wine, 2 Cans of beer per week   • Drug use: Never     Types: Marijuana, Mescaline, Psilocybin     Comment: 50 years ago while in college    • Sexual activity: Yes          Review of Systems   Constitutional: Positive for activity change.   HENT: Negative.    Eyes: Negative.    Respiratory: Negative.  Negative for chest tightness and shortness of breath.    Cardiovascular: Negative.  Negative for chest pain.   Gastrointestinal: Negative.    Endocrine: Negative.    Genitourinary: Negative.    Musculoskeletal: Positive for arthralgias, back pain and myalgias.        Bilateral buttock and thighs   Skin: Negative.    Neurological: Positive for numbness ( with tingling, bilateral legs).   Hematological: Negative.    Psychiatric/Behavioral:  "Negative.         Physical Examination:     Vitals:    01/11/22 1439   BP: 170/90   Pulse: 62   Resp: 16   Temp: 97.5 °F (36.4 °C)   SpO2: 96%   Weight: 83.9 kg (185 lb)   Height: 167.6 cm (66\")        Physical Exam  Eyes:      General: Lids are normal.      Extraocular Movements: Extraocular movements intact.      Pupils: Pupils are equal, round, and reactive to light.   Neurological:      Deep Tendon Reflexes:      Reflex Scores:       Patellar reflexes are 1+ on the right side and 1+ on the left side.       Achilles reflexes are 0 on the right side and 0 on the left side.  Psychiatric:         Speech: Speech normal.          Neurological Exam  Mental Status  Awake, alert and oriented to person, place and time. Speech is normal. Language is fluent with no aphasia. Attention and concentration are normal.    Cranial Nerves  CN II: Visual acuity is normal. Visual fields full to confrontation.  CN III, IV, VI: Extraocular movements intact bilaterally. Normal lids and orbits bilaterally. Pupils equal round and reactive to light bilaterally.  CN V: Facial sensation is normal.  CN VII: Full and symmetric facial movement.  CN IX, X: Palate elevates symmetrically. Normal gag reflex.  CN XI: Shoulder shrug strength is normal.  CN XII: Tongue midline without atrophy or fasciculations.    Motor  Normal muscle bulk throughout. No fasciculations present. Normal muscle tone. Strength is 5/5 in all four extremities except as noted.  4+ out of 5 bilateral hip flexion.    Sensory  Nonspecific dermatomal pattern of leg symptoms.    Reflexes                                           Right                      Left  Patellar                                1+                         1+  Achilles                                0                         0    Right pathological reflexes: Regina's present. Ankle clonus absent.  Left pathological reflexes: Regina's absent. Ankle clonus absent.     Negative SI joint evocative " maneuvers.    Result Review  The following data was reviewed by: John Do MD on 01/11/2022:    Data reviewed: Radiologic studies MRI of the lumbar spine from July 12, 2021 reveals lumbar coronal scoliosis.  He has severe degenerative disc disease throughout all visible levels.  He has significant spondylosis as well as multilevel spondylolisthesis.  He has severe central canal stenosis at multiple levels probably worst at L3-L4.  He has multilevel neuroforaminal stenosis bilaterally.     Assessment/plan:  This is a 76-year-old gentleman with severe degenerative disc disease, spondylosis, spondylolisthesis resulting in severe spinal canal stenosis and neuroforaminal stenosis.  His history is consistent with neurogenic claudication as well as bilateral radiculopathy.  With his history of unilateral diaphragm paralysis and right sided Regina's I him concern for cervical spondylosis and possible central and foraminal stenosis.  I have subsequently ordered an MRI of the cervical spine.  I have also ordered upright full-length scoliosis films and flexion-extension films of the lumbar spine to evaluate for the spinal pelvic parameters and the dynamic nature of any spondylolisthesis.  Once he has completed this imaging he will return to see me.      Diagnoses and all orders for this visit:    1. Spinal stenosis of lumbar region with neurogenic claudication (Primary)  -     MRI Cervical Spine Without Contrast; Future  -     XR Scoliosis Complete Including Supine & Erect; Future  -     XR Spine Lumbar Complete With Flex & Ext; Future    2. Degenerative lumbar spinal stenosis  -     MRI Cervical Spine Without Contrast; Future  -     XR Scoliosis Complete Including Supine & Erect; Future  -     XR Spine Lumbar Complete With Flex & Ext; Future         Return for Recheck after completion of imaging.            John Do MD

## 2022-01-11 ENCOUNTER — OFFICE VISIT (OUTPATIENT)
Dept: NEUROSURGERY | Facility: CLINIC | Age: 77
End: 2022-01-11

## 2022-01-11 VITALS
DIASTOLIC BLOOD PRESSURE: 90 MMHG | OXYGEN SATURATION: 96 % | HEIGHT: 66 IN | TEMPERATURE: 97.5 F | WEIGHT: 185 LBS | HEART RATE: 62 BPM | BODY MASS INDEX: 29.73 KG/M2 | SYSTOLIC BLOOD PRESSURE: 170 MMHG | RESPIRATION RATE: 16 BRPM

## 2022-01-11 DIAGNOSIS — M48.062 SPINAL STENOSIS OF LUMBAR REGION WITH NEUROGENIC CLAUDICATION: Primary | ICD-10-CM

## 2022-01-11 DIAGNOSIS — M48.061 DEGENERATIVE LUMBAR SPINAL STENOSIS: ICD-10-CM

## 2022-01-11 PROBLEM — H11.151 PINGUECULA OF RIGHT EYE: Status: ACTIVE | Noted: 2021-05-06

## 2022-01-11 PROCEDURE — 99204 OFFICE O/P NEW MOD 45 MIN: CPT | Performed by: NEUROLOGICAL SURGERY

## 2022-01-14 ENCOUNTER — PATIENT ROUNDING (BHMG ONLY) (OUTPATIENT)
Dept: NEUROSURGERY | Facility: CLINIC | Age: 77
End: 2022-01-14

## 2022-01-19 DIAGNOSIS — J45.20 MILD INTERMITTENT ASTHMA WITHOUT COMPLICATION: ICD-10-CM

## 2022-01-20 RX ORDER — MONTELUKAST SODIUM 10 MG/1
10 TABLET ORAL DAILY
Qty: 90 TABLET | Refills: 0 | Status: SHIPPED | OUTPATIENT
Start: 2022-01-20 | End: 2023-01-31

## 2022-01-21 ENCOUNTER — TELEPHONE (OUTPATIENT)
Dept: PAIN MEDICINE | Facility: CLINIC | Age: 77
End: 2022-01-21

## 2022-01-21 NOTE — TELEPHONE ENCOUNTER
1/21/22-- SPOKE TO  PT AND  ALSO TOLD HIM MAYBE  TRY CALLING  THE HOSPITAL  AND SEE IF THERE WHERE ANY  SUPPORT  GROUPS  THEY KNOW ABOUT

## 2022-01-21 NOTE — TELEPHONE ENCOUNTER
1/21/22-- PT CALLED LEFT VM THAT HE WOULD LIKE TO KNOW IF YOU HAVE  OR KNOW OF A  SUPPORT GROUP  FOR PAIN MANAGEMENT -- TO DISCUSS  WITH OTHER PEOPLE AND INTERACT WITH HOW  THEY ARE DEALING WITH THEIR PAIN AND ECT--PLEASE ADVISE

## 2022-01-23 DIAGNOSIS — F41.9 ANXIETY: ICD-10-CM

## 2022-01-24 RX ORDER — CLONAZEPAM 0.5 MG/1
TABLET ORAL
Qty: 30 TABLET | Refills: 0 | Status: SHIPPED | OUTPATIENT
Start: 2022-01-24 | End: 2022-02-22

## 2022-01-26 RX ORDER — SILDENAFIL CITRATE 20 MG/1
TABLET ORAL
Qty: 50 TABLET | Refills: 0 | Status: SHIPPED | OUTPATIENT
Start: 2022-01-26 | End: 2022-03-08

## 2022-02-03 ENCOUNTER — HOSPITAL ENCOUNTER (OUTPATIENT)
Dept: MRI IMAGING | Facility: HOSPITAL | Age: 77
Discharge: HOME | End: 2022-02-03

## 2022-02-03 ENCOUNTER — HOSPITAL ENCOUNTER (OUTPATIENT)
Dept: GENERAL RADIOLOGY | Facility: HOSPITAL | Age: 77
Discharge: HOME | End: 2022-02-03

## 2022-02-03 ENCOUNTER — APPOINTMENT (OUTPATIENT)
Dept: MRI IMAGING | Facility: HOSPITAL | Age: 77
End: 2022-02-03

## 2022-02-03 DIAGNOSIS — M48.061 DEGENERATIVE LUMBAR SPINAL STENOSIS: ICD-10-CM

## 2022-02-03 DIAGNOSIS — M48.062 SPINAL STENOSIS OF LUMBAR REGION WITH NEUROGENIC CLAUDICATION: ICD-10-CM

## 2022-02-03 PROCEDURE — 72141 MRI NECK SPINE W/O DYE: CPT

## 2022-02-03 PROCEDURE — 72114 X-RAY EXAM L-S SPINE BENDING: CPT

## 2022-02-03 PROCEDURE — 72082 X-RAY EXAM ENTIRE SPI 2/3 VW: CPT

## 2022-02-07 NOTE — PROGRESS NOTES
Neurosurgical Consultation      Omar Choudhary is a 76 y.o. male is here today for follow-up after a cervical MRI and spine xrays.     Chief Complaint   Patient presents with   • Back Pain     Bilateral buttock and leg pain.        Previous treatment: Tens, Inversion table, MIKEY, Acupuncture, Gabapentin, Back brace, NSAID's, Tylenol, Norco and he does HEP, Stretching, and Mediation    HPI: This is a 76-year-old gentleman who presents in follow-up to me for continued evaluation of back and bilateral leg pain most consistent with neurogenic claudication.  The symptoms acutely worsened over the last 9 or 10 months.  He had undergone spinal injections with short-term relief.  His MRI does show severe spondylosis as well as degenerative disc disease worse at L3-L4 but severe at many levels.  On physical exam he had right sided Regina's and I recommended an MRI of the cervical spine as well scoliosis films and flexion-extension films of the lumbar spine.  Upon evaluation today his symptoms are relatively stable.  He does continue to have neurogenic claudication symptoms.  He does not describe severe history of of myelopathy.    Past Medical History:   Diagnosis Date   • Allergic    • Arthritis    • Asthma    • Cataract    • Depression    • Family history of malignant neoplasm of breast 9/26/2019   • Hemidiaphragm paralysis     Left   • Hyperlipidemia 9/26/2019   • Hypertension 3/1/2012   • Leg pain, right    • Low back pain    • Osteopenia    • Reactive airway disease 1/15/2016   • Shortness of breath    • Sleep apnea, obstructive         Past Surgical History:   Procedure Laterality Date   • KNEE ARTHROSCOPY W/ ACL RECONSTRUCTION     • MOUTH SURGERY          Current Outpatient Medications on File Prior to Visit   Medication Sig Dispense Refill   • acetaminophen (TYLENOL) 500 MG tablet Take 500 mg by mouth Every 6 (Six) Hours As Needed for Mild Pain .     • albuterol sulfate  (90 Base) MCG/ACT inhaler Inhale 2  puffs Every 4 (Four) Hours As Needed for Wheezing or Shortness of Air. 18 g 3   • Alpha-Lipoic Acid 100 MG capsule Take  by mouth.     • B Complex Vitamins (VITAMIN B COMPLEX) capsule capsule Take  by mouth Daily.     • budesonide-formoterol (SYMBICORT) 160-4.5 MCG/ACT inhaler Inhale 2 puffs 2 (Two) Times a Day. 120 each 12   • Calcium Carb-Cholecalciferol (calcium-vitamin D) 500-200 MG-UNIT per tablet Take 2 tablets by mouth Daily. 180 tablet 3   • Carboxymethylcellul-Glycerin 0.5-0.9 % solution Apply  to eye(s) as directed by provider Every 8 (Eight) Hours.     • clonazePAM (KlonoPIN) 0.5 MG tablet TAKE ONE TABLET BY MOUTH TWICE DAILY AS NEEDED for anxiety 30 tablet 0   • coenzyme Q10 50 MG capsule capsule Take  by mouth Daily.     • diphenhydrAMINE HCl (ANTI-HIST PO) Take 1 tablet by mouth Daily As Needed.     • gabapentin (NEURONTIN) 300 MG capsule Take 1 tablet in the AM and 2 tablets at night. Start with 1 at night 90 capsule 5   • HYDROcodone-acetaminophen (NORCO) 7.5-325 MG per tablet Take 1 tablet by mouth Daily. 30 tablet 0   • lidocaine (LIDODERM) 5 % Place 1 patch on the skin as directed by provider Daily. Remove & Discard patch within 12 hours or as directed by MD 30 each 1   • losartan-hydrochlorothiazide (HYZAAR) 100-25 MG per tablet TAKE 1 TABLET BY MOUTH DAILY  90 tablet 3   • meloxicam (Mobic) 15 MG tablet Take 1 tablet by mouth Daily. 90 tablet 3   • montelukast (SINGULAIR) 10 MG tablet TAKE 1 TABLET BY MOUTH DAILY 90 tablet 0   • Omega-3 Fatty Acids (FISH OIL) 1000 MG capsule capsule Take  by mouth Daily With Breakfast.     • pravastatin (PRAVACHOL) 10 MG tablet Take 1 tablet by mouth Daily. 90 tablet 3   • Red Yeast Rice 600 MG capsule Take  by mouth.     • sildenafil (REVATIO) 20 MG tablet take 1 to 2 tablets by mouth as needed for e.d. 50 tablet 0   • theophylline (THEODUR) 300 MG 12 hr tablet Take 1 tablet by mouth Daily. May substitute for generic 90 tablet 1   • triamcinolone (KENALOG) 0.1  "% cream Apply  topically to the appropriate area as directed 2 (Two) Times a Day. 28.4 g 0   • zolpidem (AMBIEN) 10 MG tablet TAKE ONE TABLET BY MOUTH nightly AS NEEDED FOR SLEEP  30 tablet 0     No current facility-administered medications on file prior to visit.        Allergies   Allergen Reactions   • Statins Myalgia        Social History     Socioeconomic History   • Marital status:    Tobacco Use   • Smoking status: Former Smoker     Packs/day: 0.50     Years: 10.00     Pack years: 5.00     Types: Cigarettes     Start date:      Quit date:      Years since quittin.1   • Smokeless tobacco: Never Used   Vaping Use   • Vaping Use: Never used   Substance and Sexual Activity   • Alcohol use: Yes     Alcohol/week: 12.0 standard drinks     Types: 10 Glasses of wine, 2 Cans of beer per week   • Drug use: Never     Types: Marijuana, Mescaline, Psilocybin     Comment: 50 years ago while in college    • Sexual activity: Yes          Review of Systems   Constitutional: Positive for activity change.   Respiratory: Negative for chest tightness and shortness of breath.    Cardiovascular: Negative for chest pain.   Musculoskeletal: Positive for back pain and myalgias.   Neurological: Positive for numbness.        Positive for tingling        Physical Examination:     Vitals:    22 1212   BP: 117/77   Pulse: 72   Resp: 17   Temp: 98.5 °F (36.9 °C)   SpO2: 98%   Weight: 83.9 kg (185 lb)   Height: 167.6 cm (66\")        Physical Exam     Neurological Exam   The patient's neurologic exam is stable compared to my last evaluation including continued right sided Regina's.    Result Review  The following data was reviewed by: John Do MD on 2022:    Data reviewed: Radiologic studies Scoliosis x-rays do not suggest severe sagittal vertical axis issues.  He does have focal lumbar scoliosis with severe degeneration.  The flexion-extension film of his lumbar spine does not show any abnormal " motion.  MRI of his cervical spine shows severe spondylosis and degenerative disc disease.  Multilevel spondylolisthesis.  Multilevel spinal canal stenosis as well as neuroforaminal stenosis.     Assessment/plan:  This is a 76-year-old gentleman with a remote history of high level of physical activity who has had significant worsening in his quality of life secondary to neurogenic claudication.  His lumbar spine reveals multilevel spondylolisthesis with central canal stenosis.  He is myelopathic on physical exam and a new MRI of his cervical spine reveals severe spondylosis of his neck as well as spinal canal stenosis somewhat explaining his myelopathy.  The surgical approach to the cervical spine issue is most likely a posterior cervical laminectomy and fusion over a large segment of his cervical spine.  The surgical treatment for his lumbar spine is a multilevel interbody cage with posterior laminectomy and fusion across again multiple levels.  He is not currently interested in the surgical options.  I believe it is reasonable to have him undergo a full course of physical therapy including core strengthening and stretching maneuvers as well as possible lumbar spinal traction to assist with improving his quality of life.  He can undergo repeat injections in his lumbar spine if he desires however I expect they will provide the same amount of relief as they have in the past.  He will follow up with me in 6 months to reevaluate his current status.      Diagnoses and all orders for this visit:    1. Spinal stenosis of lumbar region with neurogenic claudication (Primary)  -     Ambulatory Referral to Physical Therapy Evaluate and treat; Strengthening, Stretching    2. Cervical spondylosis with myelopathy  -     Ambulatory Referral to Physical Therapy Evaluate and treat; Strengthening, Stretching         Return in about 6 months (around 8/8/2022).            John Do MD

## 2022-02-08 ENCOUNTER — OFFICE VISIT (OUTPATIENT)
Dept: NEUROSURGERY | Facility: CLINIC | Age: 77
End: 2022-02-08

## 2022-02-08 VITALS
DIASTOLIC BLOOD PRESSURE: 77 MMHG | BODY MASS INDEX: 29.73 KG/M2 | OXYGEN SATURATION: 98 % | HEIGHT: 66 IN | WEIGHT: 185 LBS | TEMPERATURE: 98.5 F | RESPIRATION RATE: 17 BRPM | HEART RATE: 72 BPM | SYSTOLIC BLOOD PRESSURE: 117 MMHG

## 2022-02-08 DIAGNOSIS — M48.062 SPINAL STENOSIS OF LUMBAR REGION WITH NEUROGENIC CLAUDICATION: Primary | ICD-10-CM

## 2022-02-08 DIAGNOSIS — M47.12 CERVICAL SPONDYLOSIS WITH MYELOPATHY: ICD-10-CM

## 2022-02-08 PROCEDURE — 99214 OFFICE O/P EST MOD 30 MIN: CPT | Performed by: NEUROLOGICAL SURGERY

## 2022-02-21 ENCOUNTER — HOSPITAL ENCOUNTER (OUTPATIENT)
Dept: PHYSICAL THERAPY | Facility: HOSPITAL | Age: 77
Setting detail: THERAPIES SERIES
Discharge: HOME | End: 2022-02-21

## 2022-02-21 DIAGNOSIS — M48.062 SPINAL STENOSIS OF LUMBAR REGION WITH NEUROGENIC CLAUDICATION: Primary | ICD-10-CM

## 2022-02-21 DIAGNOSIS — M25.60 RANGE OF MOTION DEFICIT: ICD-10-CM

## 2022-02-21 DIAGNOSIS — F41.9 ANXIETY: ICD-10-CM

## 2022-02-21 DIAGNOSIS — M47.12 CERVICAL SPONDYLOSIS WITH MYELOPATHY: ICD-10-CM

## 2022-02-21 DIAGNOSIS — G47.00 INSOMNIA, UNSPECIFIED TYPE: ICD-10-CM

## 2022-02-21 DIAGNOSIS — R29.898 WEAKNESS OF RIGHT LOWER EXTREMITY: ICD-10-CM

## 2022-02-21 PROCEDURE — 97530 THERAPEUTIC ACTIVITIES: CPT

## 2022-02-21 PROCEDURE — 97110 THERAPEUTIC EXERCISES: CPT

## 2022-02-21 PROCEDURE — 97161 PT EVAL LOW COMPLEX 20 MIN: CPT

## 2022-02-21 NOTE — THERAPY EVALUATION
Outpatient Physical Therapy Ortho Initial Evaluation  [facility]     Patient Name: Omar Choudhary  : 1945  MRN: [id number]  Today's Date: 2022      Visit Date: 2022    Patient Active Problem List   Diagnosis   • Benign prostatic hyperplasia   • Bursitis   • Depression   • Diverticulosis   • Family history of malignant neoplasm of breast   • Hyperlipidemia   • Hypertension   • Internal derangement of right knee   • Knee effusion   • Asthma   • Obstructive sleep apnea syndrome   • Osteoarthritis   • Osteopenia   • Pain in knee   • Postnasal drip   • Prepatellar bursitis   • Sciatica of right side   • Diaphragm paralysis   • Spinal stenosis of lumbar region with neurogenic claudication   • Degenerative lumbar spinal stenosis   • Anxiety   • Pinguecula of right eye        Past Medical History:   Diagnosis Date   • Allergic    • Arthritis    • Asthma    • Cataract    • Depression    • Family history of malignant neoplasm of breast 2019   • Hemidiaphragm paralysis     Left   • Hyperlipidemia 2019   • Hypertension 3/1/2012   • Leg pain, right    • Low back pain    • Osteopenia    • Reactive airway disease 1/15/2016   • Shortness of breath    • Sleep apnea, obstructive         Past Surgical History:   Procedure Laterality Date   • KNEE ARTHROSCOPY W/ ACL RECONSTRUCTION     • MOUTH SURGERY         Visit Dx:     ICD-10-CM ICD-9-CM   1. Spinal stenosis of lumbar region with neurogenic claudication  M48.062 724.03   2. Weakness of right lower extremity  R29.898 729.89   3. Cervical spondylosis with myelopathy  M47.12 721.1   4. Range of motion deficit  M25.60 719.50          Patient History     Row Name 22 1100 22 0924          History    Chief Complaint Difficulty Walking; Difficulty with daily activities; Muscle weakness; Pain  -JA Difficulty Walking; Difficulty with daily activities; Muscle weakness; Pain (P)    -patient     Type of Pain Back pain; Hip pain; Lower Extremity /  "Leg  -JA Back pain; Hip pain; Lower Extremity / Leg (P)    -patient     Date Current Problem(s) Began 10/02/21  -JA 10/02/21 (P)    -patient     Brief Description of Current Complaint In 1992 he went to see [facility] spine clinic and was told he had the spine of a 73 y/o and was told he was not surg candidate. He states eurosurgon said too many laminectomies would be required (and fusion) and he wants pt to try physical therapy. Last Aug/sept was hiking 3 miles a day in mountains. Began having pain. Tried MIKEY in Oct/Nov/Dec but states he may have \"squandered\" them because he would work in the yard and also went parasailing -- also, they made him manic and he is just getting back to normal.  Acupuncture was helpful but he noticed he couldn't walk far afterward - he feels it revealed his structural muscles are weak.  Booked for a barge/bicyling trip April 13. Cannot walk for more than 1/2 mile before pain shoots into buttock and down leg.  No current exercise program..  Has access to restorative yoga online.  Has a little neuropathy in jarvis feet.  R rad sx >L.  Morning med he takes gabapentin and meloxicam, if bad adds tylenol and hydrocodone. 2 gabapentin at night.  -JA Stenotic pain from lumbar area, through glutes and down legs (P)    -patient     Patient/Caregiver Goals Relieve pain; Return to prior level of function; Improve mobility; Improve strength; Know what to do to help the symptoms  -JA Relieve pain; Return to prior level of function; Improve mobility; Improve strength; Know what to do to help the symptoms (P)    -patient     Hand Dominance right-handed  -JA right-handed (P)    -patient     Occupation/sports/leisure activities Walking, bicycling, swimming, and travel on planes (intercontinental), trains, and and motor .  -JA Walking, bicycling, swimming, and travel on planes (intercontinental), trains, and and motor . (P)    -patient     Patient seeing anyone else for problem(s)? Massage " therapists, [facility]  -MEGHA [facility]age therapists, [facility] (P)    -patient     What clinical tests have you had for this problem? X-ray; MRI  -JA X-ray; MRI (P)    -patient            Pain     Pain Location Back; Leg  -MEGHA Back; Leg  R  -JA     Pain at Present 5; 6  R>L  -MEGHA 5; 6  -JA            Fall Risk Assessment    Any falls in the past year: No  -JA No (P)    -patient            Services    Are you currently receiving Home Health services No  -MEGHA No (P)    -patient     Do you plan to receive Home Health services in the near future No  -MEGHA No (P)    -patient            Daily Activities    Primary Language English  -JA English (P)    -patient     How does patient learn best? Listening; Reading; Demonstration; Pictures/Video  -MEGHA Listening; Reading; Demonstration; Pictures/Video (P)    -patient            Safety    Are you being hurt, hit, or frightened by anyone at home or in your life? No  -MEGHA No (P)    -patient     Are you being neglected by a caregiver No  -MEGHA No (P)    -patient     Have you had any of the following issues with Depression; Anxiety  -JA Depression; Anxiety (P)    -patient           User Key  (r) = Recorded By, (t) = Taken By, (c) = Cosigned By    Initials Name Provider Type    Patricia Mays, PT Physical Therapist    patient Omar Choudhary --                 PT Ortho     Row Name 02/21/22 1200       Quarter Clearing    Quarter Clearing Lower Quarter Clearing  -       Neural Tension Signs- Lower Quarter Clearing    SLR Bilateral:; Negative  -MEGHA       Myotomal Screen- Lower Quarter Clearing    Hip flexion (L2) Right:; 3+ (Fair +); Left:; 4+ (Good +)  -JA    Knee extension (L3) Right:; 3+ (Fair +); Left:; 4+ (Good +)  -MEGHA    Ankle DF (L4) Bilateral:; 4 (Good); 4+ (Good +)  -MEGHA    Ankle PF (S1) Bilateral:; 4 (Good)  -JA    Knee flexion (S2) Right:; 4- (Good -); Left:; 4+ (Good +)  -MEGHA       Lumbar ROM Screen- Lower Quarter Clearing    Lumbar Flexion Normal  -MEGHA     Lumbar Extension Impaired  50%  -JA    Lumbar Lateral Flexion Impaired  L 25%, R 10%  -JA    Lumbar Rotation Impaired  R 20%, L 50%  -JA       SI/Hip Screen- Lower Quarter Clearing    Gertrude's/Darian's test Bilateral:; Negative  R hip mildly tighter than L, no pain in position  -       MMT (Manual Muscle Testing)    Rt Lower Ext Rt Hip Internal (Medial) Rotation; Rt Hip External (Lateral) Rotation; Rt Hip ABduction; Rt Hip Extension  -JA    Lt Lower Ext Lt Hip Extension; Lt Hip ABduction; Lt Hip Internal (Medial) Rotation; Lt Hip External (Lateral) Rotation  -       MMT Right Lower Ext    Rt Hip Extension MMT, Gross Movement (3/5) fair  -JA    Rt Hip ABduction MMT, Gross Movement (3/5) fair  -JA    Rt Hip Internal (Medial) Rotation MMT, Gross Movement (3+/5) fair plus  -JA    Rt Hip External (Lateral) Rotation MMT, Gross Movement (3+/5) fair plus  -JA       MMT Left Lower Ext    Lt Hip Extension MMT, Gross Movement (4-/5) good minus  -JA    Lt Hip ABduction MMT, Gross Movement (4-/5) good minus  -JA    Lt Hip Internal (Medial) Rotation MMT, Gross Movement (3+/5) fair plus  -JA    Lt Hip External (Lateral) Rotation MMT, Gross Movement (4-/5) good minus  -JA       Transfers    Comment (Transfers) STS assist w/  -JA       Gait/Stairs (Locomotion)    Comment (Gait/Stairs) asymmetrical stride, increased lateral weight shift, decreased jordy  -    Row Name 02/21/22 1100       Posture/Observations    Alignment Options Forward head; Cervical lordosis; Thoracic kyphosis; Rounded shoulders; Scapular elevation; Scapular winging; Scoliosis; Lumbar lordosis; Iliac crests  -    Forward Head Mild; Moderate; Increased  -    Cervical Lordosis Decreased  -    Thoracic Kyphosis Increased  -    Rounded Shoulders Increased  -    Scoliosis Mild  -          User Key  (r) = Recorded By, (t) = Taken By, (c) = Cosigned By    Initials Name Provider Type    Patricia Mays, PT Physical Therapist                             Therapy Education  Education Details: [id number]  Educated on spinal anatomy with spine model to increase pt's understanding of position of pelvis and how controlling anterior tilt can reduce (or just not add to) stenotic symptoms/pain.  Worked on finding neutral spine in sitting and supine and Qped.  Educated for controlling intensity to prevent recruiting other muscle substitutions.  Worked on appropriate intensity with each ex on HEP.  Recommended he use recumbent bike to begin conditioning since he has a cycling vacation soon.  Given: HEP, Symptoms/condition management, Pain management, Posture/body mechanics  Program: New  How Provided: Verbal, Demonstration, Written  Provided to: Patient  Level of Understanding: Verbalized, Demonstrated      PT OP Goals     Row Name 02/21/22 1300          PT Short Term Goals    STG Date to Achieve 03/23/22  -MEGHA     STG 1 Pt will be independent with initial HEP.  -MEGHA     STG 1 Progress New  -MEGHA     STG 2 Pt will demonstrate correct sitting and standing posture to reduce strain on lumbar spine.  -MEGHA     STG 2 Progress New  -MEGHA     STG 3 Pt will demonstrate good body mechanics with bending, lifting and reaching ADLs  to reduce strain on spine.  -MEGHA     STG 3 Progress New  [facility]            Long Term Goals    LTG Date to Achieve 04/22/22  -MEGHA     LTG 1 Pt will be independent with advanced HEP for spinal stabilization for improved self-management of symptoms.  -MEGHA     LTG 1 Progress New  -MEGHA     LTG 2 Pt will report 50% decrease in pain/radicular symptoms.  -MEGHA     LTG 2 Progress New  -MEGHA     LTG 3 Pt will score 54% or less on BENEDICT indicating decrease in perceived functional disability.  -MEGHA     LTG 3 Progress New  -MEGHA     LTG 4 Pt will demonstrate increased R LE strength to 4/5 or better.  -MEGHA     LT 4 Progress New  -JA            Time Calculation    PT Goal Re-Cert Due Date 05/22/22  -MEGHA           User Key  (r) = Recorded By, (t) = Taken By, (c) = Cosigned By     Initials Name Provider Type    Patricia Mays, PT Physical Therapist                 PT Assessment/Plan     Row Name 02/21/22 1300          PT Assessment    Functional Limitations Impaired gait; Limitation in home management; Limitations in community activities; Limitations in functional capacity and performance; Performance in leisure activities; Performance in self-care ADL; Performance in sport activities  -MEGHA     Impairments Pain; Muscle strength; Range of motion; Poor body mechanics; Posture; Gait; Endurance  -MEGHA     Assessment Comments Omar Choudhary is a 76 y.o. male referred to outpatient physical therapy for evaluation and treatment of spinal stenosis of lumbar region with neurogenic claudication and cervical spondylosis with myelopathy.  Patient presents with c/o LBP with radiating pain into R LE> L LE with long history of degenerative changes in spine. He demonstrates decreased trunk ROM, weakness in core/hip girdle/LE R>L, and poor body mechanics. Signs and symptoms are consistent with referring diagnosis.  Pertinent comorbidities and personal factors that may affect progress include, but are not limited to, multilevel and chronicity of degenerative changes.  This condition is stable. Recommend skilled PT to address functional deficits. Thank you for this referral.  -MEGHA     Please refer to paper survey for additional self-reported information Yes  -JA     Rehab Potential Good  -MEGHA     Patient/caregiver participated in establishment of treatment plan and goals Yes  -MEGHA     Patient would benefit from skilled therapy intervention Yes  -MEGHA            PT Plan    PT Frequency 2x/week  -MEGHA     Predicted Duration of Therapy Intervention (PT) 12 visits  -MEGHA     Planned CPT's? PT EVAL LOW COMPLEXITY: 26905; PT RE-EVAL: 79856; PT THER PROC EA 15 MIN: 87536; PT THER ACT EA 15 MIN: 92812; PT MANUAL THERAPY EA 15 MIN: 38399; PT NEUROMUSC RE-EDUCATION EA 15 MIN: 84419; PT GAIT TRAINING EA 15 MIN: 21247; PT  SELF CARE/HOME MGMT/TRAIN EA 15: 85205; PT AQUATIC THERAPY EA 15 MIN: 22371; PT HOT OR COLD PACK TREAT MCARE; PT TRACTION LUMBAR: 91108; PT TRACTION CERVICAL: 17096  -JA     PT Plan Comments warm-up REC bike, review HEP, consider lumbar traction (w/MH), educate for body mechanics with transitional movements (log roll, STS, car transfer); NOTE: we focused on LB today, may need to eval cervical spine in future  -MEGHA           User Key  (r) = Recorded By, (t) = Taken By, (c) = Cosigned By    Initials Name Provider Type    Patricia Mays, PT Physical Therapist                   OP Exercises     Row Name 02/21/22 1100             Subjective Comments    Subjective Comments initial eval  -JA              Subjective Pain    Able to rate subjective pain? yes  -JA      Pre-Treatment Pain Level 5  -JA              Total Minutes    11744 - PT Therapeutic Exercise Minutes 15  -JA      09853 - PT Therapeutic Activity Minutes 10  -JA              Exercise 1    Exercise Name 1 Education (see Therapy Ed  tab)  -JA      Time 1 10 min  -JA              Exercise 2    Exercise Name 2 Qped cat to neutral and then add TA isometrics during second set  -JA      Cueing 2 Verbal; Tactile  -JA      Sets 2 2  -JA      Reps 2 10  -JA      Time 2 5 sec  -JA              Exercise 3    Exercise Name 3 HL Piriformis stretch  -JA      Cueing 3 Verbal; Tactile  -JA      Reps 3 3  -JA      Time 3 20sec  -JA      Additional Comments aim for mild-med intensity stretch  -JA              Exercise 4    Exercise Name 4 Unilateral BKFO w/TA  -JA      Cueing 4 Verbal; Tactile; Demo  -JA      Sets 4 2 on R, 1 on L  -JA      Reps 4 10  -JA      Additional Comments small controlled range  -JA              Exercise 5    Exercise Name 5 seated PPT  -JA      Cueing 5 Verbal; Tactile; Demo  -JA      Reps 5 10  -JA      Time 5 5 sec  -JA            User Key  (r) = Recorded By, (t) = Taken By, (c) = Cosigned By    Initials Name Provider Type    MEGHA Reyes  Patricia GUO, PT Physical Therapist                              Outcome Measure Options: Modifed Owestry  Modified Oswestry  Modified Oswestry Score/Comments: 64%; VAS 5-6      Time Calculation:     Start Time: 1145  Stop Time: 1245  Time Calculation (min): 60 min  Timed Charges  34477 - PT Therapeutic Exercise Minutes: 15  13735 - PT Therapeutic Activity Minutes: 10  Untimed Charges  PT Eval/Re-eval Minutes: 30  Total Minutes  Timed Charges Total Minutes: 25  Untimed Charges Total Minutes: 30   Total Minutes: 55     Therapy Charges for Today     Code Description Service Date Service Provider Modifiers Qty    99900933284 HC PT THER PROC EA 15 MIN 2/21/2022 Patricia Reyes, PT GP 1    29284639472 HC PT THERAPEUTIC ACT EA 15 MIN 2/21/2022 Patricia Reyes, PT GP 1    50679875076 HC PT EVAL LOW COMPLEXITY 2 2/21/2022 Patricia Reyes, PT GP 1          PT G-Codes  Outcome Measure Options: Modifed Owestry  Modified Oswestry Score/Comments: 64%; VAS 5-6         Patricia Reyes PT  2/21/2022

## 2022-02-22 RX ORDER — ZOLPIDEM TARTRATE 10 MG/1
TABLET ORAL
Qty: 30 TABLET | Refills: 2 | Status: SHIPPED | OUTPATIENT
Start: 2022-02-22 | End: 2022-05-24

## 2022-02-22 RX ORDER — CLONAZEPAM 0.5 MG/1
TABLET ORAL
Qty: 30 TABLET | Refills: 2 | Status: ON HOLD | OUTPATIENT
Start: 2022-02-22 | End: 2022-10-11

## 2022-02-28 ENCOUNTER — HOSPITAL ENCOUNTER (OUTPATIENT)
Dept: PHYSICAL THERAPY | Facility: HOSPITAL | Age: 77
Setting detail: THERAPIES SERIES
Discharge: HOME | End: 2022-02-28

## 2022-02-28 DIAGNOSIS — M47.12 CERVICAL SPONDYLOSIS WITH MYELOPATHY: ICD-10-CM

## 2022-02-28 DIAGNOSIS — S76.311A HAMSTRING STRAIN, RIGHT, INITIAL ENCOUNTER: ICD-10-CM

## 2022-02-28 DIAGNOSIS — R29.898 WEAKNESS OF RIGHT LOWER EXTREMITY: ICD-10-CM

## 2022-02-28 DIAGNOSIS — M25.60 RANGE OF MOTION DEFICIT: ICD-10-CM

## 2022-02-28 DIAGNOSIS — M48.062 SPINAL STENOSIS OF LUMBAR REGION WITH NEUROGENIC CLAUDICATION: Primary | ICD-10-CM

## 2022-02-28 PROCEDURE — 97530 THERAPEUTIC ACTIVITIES: CPT

## 2022-02-28 PROCEDURE — 97110 THERAPEUTIC EXERCISES: CPT

## 2022-03-08 RX ORDER — SILDENAFIL CITRATE 20 MG/1
TABLET ORAL
Qty: 50 TABLET | Refills: 0 | Status: SHIPPED | OUTPATIENT
Start: 2022-03-08 | End: 2022-04-05

## 2022-03-10 ENCOUNTER — APPOINTMENT (OUTPATIENT)
Dept: PHYSICAL THERAPY | Facility: HOSPITAL | Age: 77
End: 2022-03-10

## 2022-03-14 ENCOUNTER — HOSPITAL ENCOUNTER (OUTPATIENT)
Dept: PHYSICAL THERAPY | Facility: HOSPITAL | Age: 77
Setting detail: THERAPIES SERIES
Discharge: HOME | End: 2022-03-14

## 2022-03-14 DIAGNOSIS — M48.062 SPINAL STENOSIS OF LUMBAR REGION WITH NEUROGENIC CLAUDICATION: Primary | ICD-10-CM

## 2022-03-14 DIAGNOSIS — R29.898 WEAKNESS OF RIGHT LOWER EXTREMITY: ICD-10-CM

## 2022-03-14 PROCEDURE — 97110 THERAPEUTIC EXERCISES: CPT

## 2022-03-14 PROCEDURE — 97530 THERAPEUTIC ACTIVITIES: CPT

## 2022-03-14 NOTE — THERAPY TREATMENT NOTE
Outpatient Physical Therapy Ortho Treatment Note  [facility]     Patient Name: Omar Choudhary  : 1945  MRN: [id number]  Today's Date: 3/14/2022      Visit Date: 2022    Visit Dx:    ICD-10-CM ICD-9-CM   1. Spinal stenosis of lumbar region with neurogenic claudication  M48.062 724.03   2. Weakness of right lower extremity  R29.898 729.89       Patient Active Problem List   Diagnosis   • Benign prostatic hyperplasia   • Bursitis   • Depression   • Diverticulosis   • Family history of malignant neoplasm of breast   • Hyperlipidemia   • Hypertension   • Internal derangement of right knee   • Knee effusion   • Asthma   • Obstructive sleep apnea syndrome   • Osteoarthritis   • Osteopenia   • Pain in knee   • Postnasal drip   • Prepatellar bursitis   • Sciatica of right side   • Diaphragm paralysis   • Spinal stenosis of lumbar region with neurogenic claudication   • Degenerative lumbar spinal stenosis   • Anxiety   • Pinguecula of right eye        Past Medical History:   Diagnosis Date   • Allergic    • Arthritis    • Asthma    • Cataract    • Depression    • Family history of malignant neoplasm of breast 2019   • Hemidiaphragm paralysis     Left   • Hyperlipidemia 2019   • Hypertension 3/1/2012   • Leg pain, right    • Low back pain    • Osteopenia    • Reactive airway disease 1/15/2016   • Shortness of breath    • Sleep apnea, obstructive         Past Surgical History:   Procedure Laterality Date   • KNEE ARTHROSCOPY W/ ACL RECONSTRUCTION     • MOUTH SURGERY                          PT Assessment/Plan     Row Name 22 1400          PT Assessment    Assessment Comments Pt reports overall positive response to therapy overall with decrease in need for bacl brace thorughout daily activities.Progressed standing activities to include STS with TrA, AR press, and shoulder extension, pt with excellent tolerance and improved awareness of body mechanics and appropriate activation with cues for  breathing. Provided updated HEP and reviewed with pt, he reports understanding and remains motivated to participate with skilled PT.  -RS            PT Plan    PT Plan Comments Consider side steps, marching for gait training, dead bug  -RS           User Key  (r) = Recorded By, (t) = Taken By, (c) = Cosigned By    Initials Name Provider Type    RS Blank Rivero, PT Physical Therapist                   OP Exercises     Row Name 03/14/22 1400             Subjective Comments    Subjective Comments I am feeling better, I am wearing the brace less  -RS              Subjective Pain    Able to rate subjective pain? yes  -RS      Pre-Treatment Pain Level 0  -RS              Total Minutes    75446 - PT Therapeutic Exercise Minutes 26  -RS      13549 - PT Therapeutic Activity Minutes 15  -RS              Exercise 2    Exercise Name 2 Qped cat to neutral and then add TA isometrics  -RS      Cueing 2 Verbal;Tactile  -RS      Sets 2 2  -RS      Reps 2 10  -RS      Time 2 5 sec  -RS              Exercise 3    Exercise Name 3 HL Piriformis stretch  -RS      Cueing 3 Verbal;Tactile  -RS      Reps 3 3  -RS      Time 3 20sec  -RS              Exercise 4    Exercise Name 4 Unilateral BKFO w/TA  -RS      Cueing 4 Verbal;Tactile;Demo  -RS      Sets 4 2 on R, 1 on L  -RS      Reps 4 10  -RS      Additional Comments small controlled range  -RS              Exercise 5    Exercise Name 5 STS with TrA  -RS      Cueing 5 Verbal;Demo  -RS      Reps 5 10  -RS              Exercise 6    Exercise Name 6 Nustep L3  -RS      Cueing 6 Verbal;Demo  -RS      Time 6 5 min  -RS              Exercise 7    Exercise Name 7 SL clamshell  -RS      Cueing 7 Verbal  -RS      Reps 7 15 B  -RS      Additional Comments cues for form  -RS              Exercise 8    Exercise Name 8 Seated swiss ball roll outs  -RS      Cueing 8 Verbal;Demo  -RS      Reps 8 10  -RS      Time 8 5 sec  -RS              Exercise 9    Exercise Name 9 AR press  -RS      Cueing 9  Verbal;Demo  -RS      Reps 9 10  -RS      Time 9 RTB  -RS              Exercise 10    Exercise Name 10 shoulder ext with TrA  -RS      Cueing 10 Verbal;Demo  -RS      Sets 10 2  -RS      Reps 10 10  -RS      Time 10 alternate next time  -RS      Additional Comments RTB  -RS            User Key  (r) = Recorded By, (t) = Taken By, (c) = Cosigned By    Initials Name Provider Type    Blank Robertson PT Physical Therapist                              PT OP Goals     Row Name 03/14/22 1400          PT Short Term Goals    STG Date to Achieve 03/23/22  -RS     STG 1 Pt will be independent with initial HEP.  -RS     STG 1 Progress Ongoing  -RS     STG 2 Pt will demonstrate correct sitting and standing posture to reduce strain on lumbar spine.  -RS     STG 2 Progress Ongoing  -RS     STG 3 Pt will demonstrate good body mechanics with bending, lifting and reaching ADLs  to reduce strain on spine.  -RS     STG 3 Progress Ongoing  -RS            Long Term Goals    LTG Date to Achieve 04/22/22  -RS     LTG 1 Pt will be independent with advanced HEP for spinal stabilization for improved self-management of symptoms.  -RS     LTG 1 Progress Ongoing  -RS     LTG 2 Pt will report 50% decrease in pain/radicular symptoms.  -RS     LTG 2 Progress Ongoing  -RS     LTG 3 Pt will score 54% or less on BENEDICT indicating decrease in perceived functional disability.  -RS     LTG 3 Progress Ongoing  -RS     LTG 4 Pt will demonstrate increased R LE strength to 4/5 or better.  -RS     LTG 4 Progress Ongoing  -RS           User Key  (r) = Recorded By, (t) = Taken By, (c) = Cosigned By    Initials Name Provider Type    Blank Robertson PT Physical Therapist                Therapy Education  Education Details: updated HEP  Program: Reinforced, Modified  How Provided: Demonstration, Verbal, Written  Provided to: Patient  Level of Understanding: Verbalized, Demonstrated              Time Calculation:   Start Time: 1415  Stop Time: 1457  Time  Calculation (min): 42 min  Timed Charges  24960 - PT Therapeutic Exercise Minutes: 26  55961 - PT Therapeutic Activity Minutes: 15  Total Minutes  Timed Charges Total Minutes: 41   Total Minutes: 41  Therapy Charges for Today     Code Description Service Date Service Provider Modifiers Qty    90002030942  PT THER PROC EA 15 MIN 3/14/2022 Blank Rivero, PT GP 2    [id number]  PT THERAPEUTIC ACT EA 15 MIN 3/14/2022 Blank Rivero, PT GP 1                    Blank Rivero PT  3/14/2022

## 2022-03-17 ENCOUNTER — HOSPITAL ENCOUNTER (OUTPATIENT)
Dept: PHYSICAL THERAPY | Facility: HOSPITAL | Age: 77
Setting detail: THERAPIES SERIES
Discharge: HOME | End: 2022-03-17

## 2022-03-17 DIAGNOSIS — R29.898 WEAKNESS OF RIGHT LOWER EXTREMITY: ICD-10-CM

## 2022-03-17 DIAGNOSIS — M48.062 SPINAL STENOSIS OF LUMBAR REGION WITH NEUROGENIC CLAUDICATION: Primary | ICD-10-CM

## 2022-03-17 PROCEDURE — 97110 THERAPEUTIC EXERCISES: CPT

## 2022-03-17 PROCEDURE — 97140 MANUAL THERAPY 1/> REGIONS: CPT

## 2022-03-17 NOTE — THERAPY TREATMENT NOTE
Outpatient Physical Therapy Ortho Treatment Note  [facility]     Patient Name: Omar Choudhary  : 1945  MRN: [id number]  Today's Date: 3/17/2022      Visit Date: 2022    Visit Dx:    ICD-10-CM ICD-9-CM   1. Spinal stenosis of lumbar region with neurogenic claudication  M48.062 724.03   2. Weakness of right lower extremity  R29.898 729.89       Patient Active Problem List   Diagnosis   • Benign prostatic hyperplasia   • Bursitis   • Depression   • Diverticulosis   • Family history of malignant neoplasm of breast   • Hyperlipidemia   • Hypertension   • Internal derangement of right knee   • Knee effusion   • Asthma   • Obstructive sleep apnea syndrome   • Osteoarthritis   • Osteopenia   • Pain in knee   • Postnasal drip   • Prepatellar bursitis   • Sciatica of right side   • Diaphragm paralysis   • Spinal stenosis of lumbar region with neurogenic claudication   • Degenerative lumbar spinal stenosis   • Anxiety   • Pinguecula of right eye        Past Medical History:   Diagnosis Date   • Allergic    • Arthritis    • Asthma    • Cataract    • Depression    • Family history of malignant neoplasm of breast 2019   • Hemidiaphragm paralysis     Left   • Hyperlipidemia 2019   • Hypertension 3/1/2012   • Leg pain, right    • Low back pain    • Osteopenia    • Reactive airway disease 1/15/2016   • Shortness of breath    • Sleep apnea, obstructive         Past Surgical History:   Procedure Laterality Date   • KNEE ARTHROSCOPY W/ ACL RECONSTRUCTION     • MOUTH SURGERY                          PT Assessment/Plan     Row Name 22 1411          PT Assessment    Assessment Comments Pt with increased pain following last visit which he attributes to AR. Pt reports feeling good immediately after, however increased pain that evening and next several days. Initiated manual stm to B hips and lumbar tissues with pain rated 3/10 at end of session (6/10 initially). Focused on stretching today and advised  pt to continue with stretching during flare up and resume strengthening over the weekend if pain allows.  -CN            PT Plan    PT Plan Comments Assess response to stm and continue if needed R>L. Consider resuming strengthening and progressing as able.  -CN           User Key  (r) = Recorded By, (t) = Taken By, (c) = Cosigned By    Initials Name Provider Type    Tere Murray, CHAYO Physical Therapist                   OP Exercises     Row Name 03/17/22 1200 03/17/22 1100          Subjective Comments    Subjective Comments I have been hurting since last time. I felt fine when I left here but I have been hurting since then.  -CN --            Subjective Pain    Able to rate subjective pain? yes  -CN --     Pre-Treatment Pain Level 6  -CN --            Total Minutes    48619 - PT Therapeutic Exercise Minutes 15  -CN --     76497 - PT Manual Therapy Minutes -- 25  -CN            Exercise 1    Exercise Name 1 LTR  -CN --     Cueing 1 Verbal;Demo  -CN --     Reps 1 10  -CN --     Time 1 5 sec  -CN --            Exercise 3    Exercise Name 3 HL Piriformis stretch  -CN --     Cueing 3 Verbal;Tactile  -CN --     Reps 3 3  -CN --     Time 3 20sec  -CN --            Exercise 6    Exercise Name 6 Nustep L3  -CN --     Cueing 6 Verbal;Demo  -CN --     Time 6 5 min  -CN --            Exercise 7    Exercise Name 7 --  -CN --            Exercise 11    Exercise Name 11 Reviewed exercise modifications if flare up continues  -CN --           User Key  (r) = Recorded By, (t) = Taken By, (c) = Cosigned By    Initials Name Provider Type    Tere Murray, PT Physical Therapist                         Manual Rx (last 36 hours)     Manual Treatments     Row Name 03/17/22 1100             Total Minutes    11980 - PT Manual Therapy Minutes 25  -CN              Manual Rx 1    Manual Rx 1 Location B stm to piriformis/glutes, QL, paraspinals  -NICOLASA      Manual Rx 1 Type SLing with pillow between knees  -CN             User Key  (r) = Recorded By, (t) = Taken By, (c) = Cosigned By    Initials Name Provider Type    Tere Murray, PT Physical Therapist                    Therapy Education  Given: HEP, Symptoms/condition management, Pain management, Posture/body mechanics  Program: Reinforced, Modified  How Provided: Demonstration, Verbal  Provided to: Patient  Level of Understanding: Verbalized, Demonstrated              Time Calculation:   Start Time: 1219  Stop Time: 1300  Time Calculation (min): 41 min  Timed Charges  55962 - PT Therapeutic Exercise Minutes: 15  50652 - PT Manual Therapy Minutes: 25  Total Minutes  Timed Charges Total Minutes: 15   Total Minutes: 15  Therapy Charges for Today     Code Description Service Date Service Provider Modifiers Qty    04243688780 HC PT THER PROC EA 15 MIN 3/17/2022 Tere Granger, PT GP 1    73003064597 HC PT MANUAL THERAPY EA 15 MIN 3/17/2022 Tere Granger, PT GP 2                    Tere Granger PT  3/17/2022

## 2022-03-21 ENCOUNTER — APPOINTMENT (OUTPATIENT)
Dept: PHYSICAL THERAPY | Facility: HOSPITAL | Age: 77
End: 2022-03-21

## 2022-03-24 ENCOUNTER — HOSPITAL ENCOUNTER (OUTPATIENT)
Dept: PHYSICAL THERAPY | Facility: HOSPITAL | Age: 77
Setting detail: THERAPIES SERIES
Discharge: HOME | End: 2022-03-24

## 2022-03-24 DIAGNOSIS — M48.062 SPINAL STENOSIS OF LUMBAR REGION WITH NEUROGENIC CLAUDICATION: Primary | ICD-10-CM

## 2022-03-24 DIAGNOSIS — R29.898 WEAKNESS OF RIGHT LOWER EXTREMITY: ICD-10-CM

## 2022-03-24 PROCEDURE — 97140 MANUAL THERAPY 1/> REGIONS: CPT

## 2022-03-24 PROCEDURE — 97110 THERAPEUTIC EXERCISES: CPT

## 2022-03-24 NOTE — THERAPY TREATMENT NOTE
Outpatient Physical Therapy Ortho Treatment Note  [facility]     Patient Name: Omar Choudhary  : 1945  MRN: [id number]  Today's Date: 3/24/2022      Visit Date: 2022    Visit Dx:    ICD-10-CM ICD-9-CM   1. Spinal stenosis of lumbar region with neurogenic claudication  M48.062 724.03   2. Weakness of right lower extremity  R29.898 729.89       Patient Active Problem List   Diagnosis   • Benign prostatic hyperplasia   • Bursitis   • Depression   • Diverticulosis   • Family history of malignant neoplasm of breast   • Hyperlipidemia   • Hypertension   • Internal derangement of right knee   • Knee effusion   • Asthma   • Obstructive sleep apnea syndrome   • Osteoarthritis   • Osteopenia   • Pain in knee   • Postnasal drip   • Prepatellar bursitis   • Sciatica of right side   • Diaphragm paralysis   • Spinal stenosis of lumbar region with neurogenic claudication   • Degenerative lumbar spinal stenosis   • Anxiety   • Pinguecula of right eye        Past Medical History:   Diagnosis Date   • Allergic    • Arthritis    • Asthma    • Cataract    • Depression    • Family history of malignant neoplasm of breast 2019   • Hemidiaphragm paralysis     Left   • Hyperlipidemia 2019   • Hypertension 3/1/2012   • Leg pain, right    • Low back pain    • Osteopenia    • Reactive airway disease 1/15/2016   • Shortness of breath    • Sleep apnea, obstructive         Past Surgical History:   Procedure Laterality Date   • KNEE ARTHROSCOPY W/ ACL RECONSTRUCTION     • MOUTH SURGERY                          PT Assessment/Plan     Row Name 22 1235          PT Assessment    Assessment Comments Pt with good response following last visit with addition of stm. Pt with good relief of symptoms since Monday and participated in 2 Yoga classes this week. No pain during class, however increased pain this morning with difficulty ambulating into clinic (stopping twice to stretch). Discussed activity modification as  well as 90/90 position as pt tends to place lumbar roll underneath in supine. Pt will try this evening and will monitor pain levels until next visit.  -CN            PT Plan    PT Plan Comments Assess response to stm and may consider DDN B hips (R>L). Follow up regarding flare ups (yoga?, supine 90/90).  -CN           User Key  (r) = Recorded By, (t) = Taken By, (c) = Cosigned By    Initials Name Provider Type    Tere Murray, CHAYO Physical Therapist                   OP Exercises     Row Name 03/24/22 1200 03/24/22 1100          Subjective Comments    Subjective Comments It is really hurting today, I had to stop twice on my walk in from the car to stretch.  -CN --            Subjective Pain    Able to rate subjective pain? yes  -CN --     Pre-Treatment Pain Level 7  -CN --            Total Minutes    20456 - PT Therapeutic Exercise Minutes 15  -CN --     86247 - PT Manual Therapy Minutes -- 25  -CN            Exercise 1    Exercise Name 1 LTR  -CN --     Cueing 1 Verbal;Demo  -CN --     Reps 1 10  -CN --     Time 1 5 sec  -CN --            Exercise 3    Exercise Name 3 HL Piriformis stretch  -CN --     Cueing 3 Verbal;Tactile  -CN --     Reps 3 3  -CN --     Time 3 20sec  -CN --            Exercise 6    Exercise Name 6 Nustep L5  -CN --     Cueing 6 Verbal;Demo  -CN --     Time 6 5 min  -CN --            Exercise 11    Exercise Name 11 Discussed 90/90 position with lying down  -CN --           User Key  (r) = Recorded By, (t) = Taken By, (c) = Cosigned By    Initials Name Provider Type    Tere Murray, PT Physical Therapist                         Manual Rx (last 36 hours)     Manual Treatments     Row Name 03/24/22 1100             Total Minutes    82156 - PT Manual Therapy Minutes 25  -CN              Manual Rx 1    Manual Rx 1 Location B stm to piriformis/glutes, QL, paraspinals  -CN      Manual Rx 1 Type SLing with pillow between knees  -CN            User Key  (r) = Recorded By, (t) =  Taken By, (c) = Cosigned By    Initials Name Provider Type    Tere Murray, CHAYO Physical Therapist                 PT OP Goals     Row Name 03/24/22 1200          PT Short Term Goals    STG Date to Achieve 03/23/22  -CN     STG 1 Pt will be independent with initial HEP.  -CN     STG 1 Progress Ongoing  -CN     STG 1 Progress Comments Only several exercises at this time per recent flare ups.  -CN     STG 2 Pt will demonstrate correct sitting and standing posture to reduce strain on lumbar spine.  -CN     STG 2 Progress Ongoing  -CN     STG 3 Pt will demonstrate good body mechanics with bending, lifting and reaching ADLs  to reduce strain on spine.  -CN     STG 3 Progress Ongoing  -CN            Long Term Goals    LTG Date to Achieve 04/22/22  -CN     LTG 1 Pt will be independent with advanced HEP for spinal stabilization for improved self-management of symptoms.  -CN     LTG 1 Progress Ongoing  -CN     LTG 2 Pt will report 50% decrease in pain/radicular symptoms.  -CN     LTG 2 Progress Ongoing  -CN     LTG 3 Pt will score 54% or less on BENEDICT indicating decrease in perceived functional disability.  -CN     LTG 3 Progress Ongoing  -CN     LTG 4 Pt will demonstrate increased R LE strength to 4/5 or better.  -CN     LTG 4 Progress Ongoing  -CN           User Key  (r) = Recorded By, (t) = Taken By, (c) = Cosigned By    Initials Name Provider Type    Tere Murray, CHAYO Physical Therapist                Therapy Education  Given: HEP, Symptoms/condition management, Pain management, Posture/body mechanics  Program: Reinforced, Modified  How Provided: Demonstration, Verbal  Provided to: Patient  Level of Understanding: Verbalized, Demonstrated              Time Calculation:   Start Time: 1132  Stop Time: 1215  Time Calculation (min): 43 min  Timed Charges  30253 - PT Therapeutic Exercise Minutes: 15  19762 - PT Manual Therapy Minutes: 25  Total Minutes  Timed Charges Total Minutes: 15   Total Minutes:  15  Therapy Charges for Today     Code Description Service Date Service Provider Modifiers Qty    39065641805  PT THER PROC EA 15 MIN 3/24/2022 Tere Granger, PT GP 1    86911843070 HC PT MANUAL THERAPY EA 15 MIN 3/24/2022 Tere Granger, PT GP 2                    Tere Granger, PT  3/24/2022

## 2022-03-28 ENCOUNTER — HOSPITAL ENCOUNTER (OUTPATIENT)
Dept: PHYSICAL THERAPY | Facility: HOSPITAL | Age: 77
Setting detail: THERAPIES SERIES
Discharge: HOME | End: 2022-03-28

## 2022-03-28 DIAGNOSIS — S76.311A HAMSTRING STRAIN, RIGHT, INITIAL ENCOUNTER: ICD-10-CM

## 2022-03-28 DIAGNOSIS — M48.062 SPINAL STENOSIS OF LUMBAR REGION WITH NEUROGENIC CLAUDICATION: Primary | ICD-10-CM

## 2022-03-28 DIAGNOSIS — R29.898 WEAKNESS OF RIGHT LOWER EXTREMITY: ICD-10-CM

## 2022-03-28 DIAGNOSIS — M25.60 RANGE OF MOTION DEFICIT: ICD-10-CM

## 2022-03-28 DIAGNOSIS — M47.12 CERVICAL SPONDYLOSIS WITH MYELOPATHY: ICD-10-CM

## 2022-03-28 PROCEDURE — 97110 THERAPEUTIC EXERCISES: CPT

## 2022-03-28 PROCEDURE — 97140 MANUAL THERAPY 1/> REGIONS: CPT

## 2022-03-29 NOTE — THERAPY PROGRESS REPORT/RE-CERT
Outpatient Physical Therapy Ortho Progress Note  [facility]     Patient Name: Omar Choudhary  : 1945  MRN: [id number]  Today's Date: 3/28/2022      Visit Date: 2022    Patient Active Problem List   Diagnosis   • Benign prostatic hyperplasia   • Bursitis   • Depression   • Diverticulosis   • Family history of malignant neoplasm of breast   • Hyperlipidemia   • Hypertension   • Internal derangement of right knee   • Knee effusion   • Asthma   • Obstructive sleep apnea syndrome   • Osteoarthritis   • Osteopenia   • Pain in knee   • Postnasal drip   • Prepatellar bursitis   • Sciatica of right side   • Diaphragm paralysis   • Spinal stenosis of lumbar region with neurogenic claudication   • Degenerative lumbar spinal stenosis   • Anxiety   • Pinguecula of right eye        Past Medical History:   Diagnosis Date   • Allergic    • Arthritis    • Asthma    • Cataract    • Depression    • Family history of malignant neoplasm of breast 2019   • Hemidiaphragm paralysis     Left   • Hyperlipidemia 2019   • Hypertension 3/1/2012   • Leg pain, right    • Low back pain    • Osteopenia    • Reactive airway disease 1/15/2016   • Shortness of breath    • Sleep apnea, obstructive         Past Surgical History:   Procedure Laterality Date   • KNEE ARTHROSCOPY W/ ACL RECONSTRUCTION     • MOUTH SURGERY         Visit Dx:     ICD-10-CM ICD-9-CM   1. Spinal stenosis of lumbar region with neurogenic claudication  M48.062 724.03   2. Weakness of right lower extremity  R29.898 729.89   3. Cervical spondylosis with myelopathy  M47.12 721.1   4. Range of motion deficit  M25.60 719.50   5. Hamstring strain, right, initial encounter  S76.311A 843.8             22 1300   Subjective Comments   Subjective Comments Tere has really helped me. My pain is 3-4 now. I am leaving in 2 weeks for [place] and [place] to [place].   Subjective Pain   Able to rate subjective pain? yes  "  Pre-Treatment Pain Level 4   Total Minutes   31273 - PT Therapeutic Exercise Minutes 23   Exercise 1   Exercise Name 1 LTR   Cueing 1 Verbal;Demo   Reps 1 10   Time 1 5 sec   Exercise 2   Exercise Name 2 seated ham stretch   Cueing 2 Verbal;Tactile;Demo   Reps 2 3   Time 2 20sec   Exercise 3   Exercise Name 3 HL Piriformis stretch   Cueing 3 Verbal;Tactile   Reps 3 3   Time 3 20sec   Additional Comments workedon \"fine-tuning\" where stretch is felt by increasing/decreasing angle of pull with increase/decrease hip flex and rotation   Exercise 4   Exercise Name 4 educated/demonstrated seated piri stretch for use on plane or in airport   Exercise 5   Exercise Name 5 seated nerve glide (LAQ w/ankle DF-PF)   Reps 5 3   Time 5 20sec   Additional Comments can do on plane/in airport   Exercise 6   Exercise Name 6 Nustep L5   Cueing 6 Verbal;Demo   Time 6 5 min   Exercise 7   Exercise Name 7 SL clamshell   Cueing 7 Verbal   Reps 7 10 B   Exercise 11   Exercise Name 11 Discussed 90/90 position with lying down                       Therapy Education  Education Details: Worked on stretching to improve hip flexibility and address piriformis and educated for seated stretches to perform intermittently during upcoming travels to avoid accumulation of stresses/pain from prolonged sitting.  Discussed importance of cycling prior to trip to gradually condition.  Given: HEP, Symptoms/condition management, Pain management, Posture/body mechanics, Mobility training  Program: Reinforced, Progressed  How Provided: Verbal, Demonstration  Provided to: Patient  Level of Understanding: Verbalized, Demonstrated      PT OP Goals     Row Name 03/28/22 1600          PT Short Term Goals    STG Date to Achieve 03/23/22  -MEGHA     STG 1 Pt will be independent with initial HEP.  -MEGHA     STG 1 Progress Met  -MEGHA     STG 2 Pt will demonstrate correct sitting and standing posture to reduce strain on lumbar spine.  -MEGHA     STG 2 Progress Partially Met  -JA  "    STG 3 Pt will demonstrate good body mechanics with bending, lifting and reaching ADLs  to reduce strain on spine.  -MEGHA     STG 3 Progress Progressing  -MEGHA            Long Term Goals    LTG Date to Achieve 04/22/22  -MEGHA     LTG 1 Pt will be independent with advanced HEP for spinal stabilization for improved self-management of symptoms.  -MEGHA     LTG 1 Progress Ongoing  -MEGHA     LTG 2 Pt will report 50% decrease in pain/radicular symptoms.  -MEGHA     LTG 2 Progress Ongoing  -MEGHA     LTG 3 Pt will score 54% or less on BENEDICT indicating decrease in perceived functional disability.  -MEGHA     LTG 3 Progress Ongoing  -MEGHA     LTG 4 Pt will demonstrate increased R LE strength to 4/5 or better.  -MEGHA     LTG 4 Progress Ongoing  -MEGHA           User Key  (r) = Recorded By, (t) = Taken By, (c) = Cosigned By    Initials Name Provider Type    Patricia Mays, PT Physical Therapist                 PT Assessment/Plan     Row Name 03/28/22 1600          PT Assessment    Assessment Comments Mendez Choudhary has been seen for 7 visits for for eval/tx of spinal stenosis of lumbar region with neurogenic claudication and cervical spondylosis with myelopathy.  Patient presented with c/o LBP with radiating pain into R LE> L LE with long history of degenerative changes in spine and demonstrated decreased trunk ROM, weakness in core/hip girdle/LE R>L, and poor body mechanics. He has been responding well to therapy however he has a lower tolerance of advancing core strengthening/stabilization and still needs to be in more supported positions. Education for posture and body mechanics is also helping with better self-management of symptoms. He has met/partially met 2 of 3 STGs, LTGs remain ongoing.  Pt is a good candidate to continue skilled therapy services.  -MEGHA            PT Plan    PT Plan Comments assess response to last visit, reinforce and look for carryover with posture/body mechanics, did he get to do a little cycling? (he is going to begin  gradual return to prepare for his upcoming trip)  -MEGHA           User Key  (r) = Recorded By, (t) = Taken By, (c) = Cosigned By    Initials Name Provider Type    Patricia Mays PT Physical Therapist                    Manual Rx (last 36 hours)     Manual Treatments     Row Name 03/28/22 1300             Total Minutes    74444 - PT Manual Therapy Minutes 20  -MEGHA              Manual Rx 1    Manual Rx 1 Location B stm to piriformis/glutes, QL, paraspinals  -MEGHA      Manual Rx 1 Type SLing with pillow between knees  -MEGHA            User Key  (r) = Recorded By, (t) = Taken By, (c) = Cosigned By    Initials Name Provider Type    Patricia Mays PT Physical Therapist                                      Time Calculation:     Start Time: 1330  Stop Time: 1415  Time Calculation (min): 45 min  Timed Charges  71577 - PT Therapeutic Exercise Minutes: 23  37505 - PT Manual Therapy Minutes: 20  Total Minutes  Timed Charges Total Minutes: 43   Total Minutes: 43     Therapy Charges for Today     Code Description Service Date Service Provider Modifiers Qty    50808090562  PT THER PROC EA 15 MIN 3/28/2022 Patricia Reyes PT GP 2    [id number]  PT MANUAL THERAPY EA 15 MIN 3/28/2022 Patricia Reyes PT GP 1                    Patricia Reyes PT  3/28/2022

## 2022-03-31 ENCOUNTER — APPOINTMENT (OUTPATIENT)
Dept: PHYSICAL THERAPY | Facility: HOSPITAL | Age: 77
End: 2022-03-31

## 2022-04-04 ENCOUNTER — HOSPITAL ENCOUNTER (OUTPATIENT)
Dept: PHYSICAL THERAPY | Facility: HOSPITAL | Age: 77
Setting detail: THERAPIES SERIES
Discharge: HOME | End: 2022-04-04

## 2022-04-04 DIAGNOSIS — R29.898 WEAKNESS OF RIGHT LOWER EXTREMITY: ICD-10-CM

## 2022-04-04 DIAGNOSIS — S76.311A HAMSTRING STRAIN, RIGHT, INITIAL ENCOUNTER: ICD-10-CM

## 2022-04-04 DIAGNOSIS — M47.12 CERVICAL SPONDYLOSIS WITH MYELOPATHY: ICD-10-CM

## 2022-04-04 DIAGNOSIS — M25.60 RANGE OF MOTION DEFICIT: ICD-10-CM

## 2022-04-04 DIAGNOSIS — M48.062 SPINAL STENOSIS OF LUMBAR REGION WITH NEUROGENIC CLAUDICATION: Primary | ICD-10-CM

## 2022-04-04 PROCEDURE — 97140 MANUAL THERAPY 1/> REGIONS: CPT

## 2022-04-04 PROCEDURE — 97110 THERAPEUTIC EXERCISES: CPT

## 2022-04-04 NOTE — THERAPY TREATMENT NOTE
Outpatient Physical Therapy Ortho Treatment Note  [facility]     Patient Name: Omar Choudhary  : 1945  MRN: [id number]  Today's Date: 2022      Visit Date: 2022    Visit Dx:    ICD-10-CM ICD-9-CM   1. Spinal stenosis of lumbar region with neurogenic claudication  M48.062 724.03   2. Weakness of right lower extremity  R29.898 729.89   3. Cervical spondylosis with myelopathy  M47.12 721.1   4. Range of motion deficit  M25.60 719.50   5. Hamstring strain, right, initial encounter  S76.311A 843.8       Patient Active Problem List   Diagnosis   • Benign prostatic hyperplasia   • Bursitis   • Depression   • Diverticulosis   • Family history of malignant neoplasm of breast   • Hyperlipidemia   • Hypertension   • Internal derangement of right knee   • Knee effusion   • Asthma   • Obstructive sleep apnea syndrome   • Osteoarthritis   • Osteopenia   • Pain in knee   • Postnasal drip   • Prepatellar bursitis   • Sciatica of right side   • Diaphragm paralysis   • Spinal stenosis of lumbar region with neurogenic claudication   • Degenerative lumbar spinal stenosis   • Anxiety   • Pinguecula of right eye        Past Medical History:   Diagnosis Date   • Allergic    • Arthritis    • Asthma    • Cataract    • Depression    • Family history of malignant neoplasm of breast 2019   • Hemidiaphragm paralysis     Left   • Hyperlipidemia 2019   • Hypertension 3/1/2012   • Leg pain, right    • Low back pain    • Osteopenia    • Reactive airway disease 1/15/2016   • Shortness of breath    • Sleep apnea, obstructive         Past Surgical History:   Procedure Laterality Date   • KNEE ARTHROSCOPY W/ ACL RECONSTRUCTION     • MOUTH SURGERY                          PT Assessment/Plan     Row Name 22 1400          PT Assessment    Assessment Comments Mendez reports decreasing pain and states this is the first time he has felt hopeful because he is gaining relief particularly due to performing/maintaining  PPT in standing/walking that reduces his pain.  He also notes improved understanding of body mechanics and positioing to control or avoid increasing stresses that cause pain.  His jarvis hip girdle is signficantly weak and he has poor control over HL hip abduction and adduction with AROM.  Added resisted hip abd and add to HEP.  He will be leaving to go out of town after this week, one visit remains until then.  Pt plans to resume therapy after return from traveling.  -JA            PT Plan    PT Plan Comments Added resisted hip abd and add to HEP (please give print out on desk).  -MEGHA           User Key  (r) = Recorded By, (t) = Taken By, (c) = Cosigned By    Initials Name Provider Type    Patricia Mays, PT Physical Therapist                   OP Exercises     Row Name 04/04/22 1100             Subjective Comments    Subjective Comments Feeling  better, it is the first time I have been hopeful.  -MEGHA              Subjective Pain    Able to rate subjective pain? yes  -MEGHA      Pre-Treatment Pain Level 3  -JA              Total Minutes    67583 - PT Therapeutic Exercise Minutes 23  -JA      54822 - PT Manual Therapy Minutes 20  -JA              Exercise 1    Exercise Name 1 LTR  -JA      Cueing 1 Verbal;Demo  -JA      Reps 1 10  -JA      Time 1 5 sec  -JA              Exercise 2    Exercise Name 2 seated ham stretch  -JA      Cueing 2 Verbal;Tactile;Demo  -JA      Reps 2 3  -JA      Time 2 20sec  -JA              Exercise 3    Exercise Name 3 HL Piriformis stretch  -JA      Cueing 3 Verbal;Tactile  -JA      Reps 3 3  -JA      Time 3 20sec  -JA              Exercise 4    Exercise Name 4 --  -JA              Exercise 5    Exercise Name 5 seated nerve glide (LAQ w/ankle DF-PF)  -JA      Reps 5 --  -JA      Time 5 --  -JA              Exercise 6    Exercise Name 6 Nustep L5  -JA      Cueing 6 Verbal;Demo  -JA      Time 6 5 min  -JA              Exercise 7    Exercise Name 7 SL clamshell  -JA      Cueing 7 Verbal  -JA       Reps 7 10 B  -JA      Additional Comments cued for position and excursion  -JA              Exercise 8    Exercise Name 8 added HL hip add w/small ball  -JA      Cueing 8 Verbal;Tactile  -JA      Reps 8 10ea  -JA      Time 8 cued TA  -JA      Additional Comments prior to unilateral had him perform jarvis a few times to increase awareness of how the stronger hip can mask the weaker  -JA              Exercise 9    Exercise Name 9 added HL hip abd w/resistance band  -JA      Cueing 9 Verbal;Tactile  -JA      Sets 9 2  -JA      Reps 9 10  -JA      Time 9 red; cued TA  -JA      Additional Comments prior to unilateral had him perform jarvis a few times to increase awareness of how the stronger hip can mask the weaker  -JA              Exercise 11    Exercise Name 11 --  -JA            User Key  (r) = Recorded By, (t) = Taken By, (c) = Cosigned By    Initials Name Provider Type    Patricia Mays, PT Physical Therapist                         Manual Rx (last 36 hours)     Manual Treatments     Row Name 04/04/22 1100             Total Minutes    17203 - PT Manual Therapy Minutes 20  -JA              Manual Rx 1    Manual Rx 1 Location B stm to piriformis/glutes, QL, paraspinals  -JA      Manual Rx 1 Type SLing with pillow between knees  -              Manual Rx 2    Manual Rx 2 Location PNF TA/hip girdle strengthening rhythmic stabilization and resistance through small range  -JA            User Key  (r) = Recorded By, (t) = Taken By, (c) = Cosigned By    Initials Name Provider Type    Patricia Mays, PT Physical Therapist                    Therapy Education  Education Details: Educated why massage to guarded/tense but weak muscle causes more soreness and feeling of weakness. Added  hip add and abd in HL to HEP.  Given: HEP, Symptoms/condition management, Pain management, Posture/body mechanics, Mobility training  Program: Reinforced, Progressed  How Provided: Verbal, Demonstration, Written  Provided to:  Patient  Level of Understanding: Verbalized, Demonstrated              Time Calculation:   Start Time: 1100  Stop Time: 1145  Time Calculation (min): 45 min  Timed Charges  20075 - PT Therapeutic Exercise Minutes: 23  09459 - PT Manual Therapy Minutes: 20  Total Minutes  Timed Charges Total Minutes: 43   Total Minutes: 43  Therapy Charges for Today     Code Description Service Date Service Provider Modifiers Qty    65251791163  PT THER PROC EA 15 MIN 4/4/2022 Patricia Reyes, PT GP 2    [id number]  PT MANUAL THERAPY EA 15 MIN 4/4/2022 Patricia Reeys PT GP 1                    Patricia Reyes PT  4/4/2022

## 2022-04-05 RX ORDER — LOSARTAN POTASSIUM AND HYDROCHLOROTHIAZIDE 25; 100 MG/1; MG/1
1 TABLET ORAL DAILY
Qty: 90 TABLET | Refills: 0 | Status: SHIPPED | OUTPATIENT
Start: 2022-04-05 | End: 2022-07-01

## 2022-04-05 RX ORDER — SILDENAFIL CITRATE 20 MG/1
TABLET ORAL
Qty: 50 TABLET | Refills: 0 | Status: SHIPPED | OUTPATIENT
Start: 2022-04-05 | End: 2022-06-06

## 2022-04-05 RX ORDER — MELOXICAM 15 MG/1
15 TABLET ORAL EVERY 24 HOURS
Qty: 90 TABLET | Refills: 0 | Status: SHIPPED | OUTPATIENT
Start: 2022-04-05 | End: 2022-07-01

## 2022-04-07 ENCOUNTER — HOSPITAL ENCOUNTER (OUTPATIENT)
Dept: PHYSICAL THERAPY | Facility: HOSPITAL | Age: 77
Setting detail: THERAPIES SERIES
Discharge: HOME | End: 2022-04-07

## 2022-04-07 DIAGNOSIS — R29.898 WEAKNESS OF RIGHT LOWER EXTREMITY: ICD-10-CM

## 2022-04-07 DIAGNOSIS — M47.12 CERVICAL SPONDYLOSIS WITH MYELOPATHY: ICD-10-CM

## 2022-04-07 DIAGNOSIS — M48.062 SPINAL STENOSIS OF LUMBAR REGION WITH NEUROGENIC CLAUDICATION: Primary | ICD-10-CM

## 2022-04-07 DIAGNOSIS — M25.60 RANGE OF MOTION DEFICIT: ICD-10-CM

## 2022-04-07 PROCEDURE — 97140 MANUAL THERAPY 1/> REGIONS: CPT

## 2022-04-07 PROCEDURE — 97110 THERAPEUTIC EXERCISES: CPT

## 2022-04-07 NOTE — THERAPY TREATMENT NOTE
Outpatient Physical Therapy Ortho Treatment Note  [facility]     Patient Name: Omar Choudhary  : 1945  MRN: [id number]  Today's Date: 2022      Visit Date: 2022    Visit Dx:    ICD-10-CM ICD-9-CM   1. Spinal stenosis of lumbar region with neurogenic claudication  M48.062 724.03   2. Weakness of right lower extremity  R29.898 729.89   3. Cervical spondylosis with myelopathy  M47.12 721.1   4. Range of motion deficit  M25.60 719.50       Patient Active Problem List   Diagnosis   • Benign prostatic hyperplasia   • Bursitis   • Depression   • Diverticulosis   • Family history of malignant neoplasm of breast   • Hyperlipidemia   • Hypertension   • Internal derangement of right knee   • Knee effusion   • Asthma   • Obstructive sleep apnea syndrome   • Osteoarthritis   • Osteopenia   • Pain in knee   • Postnasal drip   • Prepatellar bursitis   • Sciatica of right side   • Diaphragm paralysis   • Spinal stenosis of lumbar region with neurogenic claudication   • Degenerative lumbar spinal stenosis   • Anxiety   • Pinguecula of right eye        Past Medical History:   Diagnosis Date   • Allergic    • Arthritis    • Asthma    • Cataract    • Depression    • Family history of malignant neoplasm of breast 2019   • Hemidiaphragm paralysis     Left   • Hyperlipidemia 2019   • Hypertension 3/1/2012   • Leg pain, right    • Low back pain    • Osteopenia    • Reactive airway disease 1/15/2016   • Shortness of breath    • Sleep apnea, obstructive         Past Surgical History:   Procedure Laterality Date   • KNEE ARTHROSCOPY W/ ACL RECONSTRUCTION     • MOUTH SURGERY                          PT Assessment/Plan     Row Name 22 1501          PT Assessment    Assessment Comments Pt with increased pain this am with unknown cause, and pt voices frustration regarding inconsistency of symptoms. Discussed upcoming travel at length and given several seated exercises to perform on plane/in airport as  needed. Updated HEP handouts with added exercises and advised to walk the aisle of the plane intermittently during the flight.  -CN            PT Plan    PT Plan Comments Pt will return following trip and will progress as needed at that time.  -CN           User Key  (r) = Recorded By, (t) = Taken By, (c) = Cosigned By    Initials Name Provider Type    Tere Murray PT Physical Therapist                   OP Exercises     Row Name 04/07/22 1300 04/07/22 1200          Subjective Comments    Subjective Comments -- I had some pain this morning. It is intermittent and I'm not sure what causes it. I leave for my trip on Monday.  -CN            Subjective Pain    Able to rate subjective pain? -- yes  -CN            Total Minutes    44602 - PT Therapeutic Exercise Minutes -- 23  -CN     06856 - PT Manual Therapy Minutes 20  -CN --            Exercise 4    Exercise Name 4 -- Reviewed current HEP and given update handouts  -CN            Exercise 6    Exercise Name 6 -- Nustep L5  -CN     Cueing 6 -- Verbal;Demo  -CN     Time 6 -- 5 min  -CN            Exercise 10    Exercise Name 10 -- Seated piriformis/figure 4 stretch  -CN     Cueing 10 -- Verbal;Demo  -CN     Reps 10 -- 3 ea  -CN     Time 10 -- 20 sec  -CN            Exercise 11    Exercise Name 11 -- Seated HS stretch  -CN     Cueing 11 -- Verbal;Demo  -CN     Reps 11 -- 3  -CN     Time 11 -- 20 sec  -CN            Exercise 12    Exercise Name 12 -- Seated lumbar SB/rot stretch  -CN     Cueing 12 -- Verbal;Demo  -CN     Time 12 -- 3  -CN     Additional Comments -- 20 sec  -CN            Exercise 13    Exercise Name 13 -- Doorway lat stretch  -CN     Cueing 13 -- Verbal;Demo  -CN     Reps 13 -- 3  -CN     Time 13 -- 20 sec  -CN           User Key  (r) = Recorded By, (t) = Taken By, (c) = Cosigned By    Initials Name Provider Type    Tere Murray, CHAYO Physical Therapist                         Manual Rx (last 36 hours)     Manual Treatments      Row Name 04/07/22 1300             Total Minutes    63622 - PT Manual Therapy Minutes 20  -CN              Manual Rx 1    Manual Rx 1 Location B stm to piriformis/glutes, QL, paraspinals  -CN      Manual Rx 1 Type SLing with pillow between knees  -CN            User Key  (r) = Recorded By, (t) = Taken By, (c) = Cosigned By    Initials Name Provider Type    Tere Murray, PT Physical Therapist                 PT OP Goals     Row Name 04/07/22 1500          PT Short Term Goals    STG Date to Achieve 03/23/22  -CN     STG 1 Pt will be independent with initial HEP.  -CN     STG 1 Progress Met  -CN     STG 2 Pt will demonstrate correct sitting and standing posture to reduce strain on lumbar spine.  -CN     STG 2 Progress Partially Met  -CN     STG 3 Pt will demonstrate good body mechanics with bending, lifting and reaching ADLs  to reduce strain on spine.  -CN     STG 3 Progress Progressing  -CN            Long Term Goals    LTG Date to Achieve 04/22/22  -CN     LTG 1 Pt will be independent with advanced HEP for spinal stabilization for improved self-management of symptoms.  -CN     LTG 1 Progress Ongoing  -CN     LTG 1 Progress Comments Udated HEP handouts today.  -CN     LTG 2 Pt will report 50% decrease in pain/radicular symptoms.  -CN     LTG 2 Progress Ongoing  -CN     LTG 3 Pt will score 54% or less on BENEDICT indicating decrease in perceived functional disability.  -CN     LTG 3 Progress Ongoing  -CN     LTG 4 Pt will demonstrate increased R LE strength to 4/5 or better.  -CN     LTG 4 Progress Ongoing  -CN           User Key  (r) = Recorded By, (t) = Taken By, (c) = Cosigned By    Initials Name Provider Type    Tere Murray, CHAYO Physical Therapist                Therapy Education  Given: HEP, Symptoms/condition management, Pain management, Posture/body mechanics, Mobility training  Program: Reinforced, Progressed  How Provided: Verbal, Demonstration, Written  Provided to: Patient  Level of  Understanding: Verbalized, Demonstrated, Teach back education performed              Time Calculation:   Start Time: 1130  Stop Time: 1218  Time Calculation (min): 48 min  Timed Charges  43086 - PT Therapeutic Exercise Minutes: 23  35042 - PT Manual Therapy Minutes: 20  Total Minutes  Timed Charges Total Minutes: 20   Total Minutes: 20  Therapy Charges for Today     Code Description Service Date Service Provider Modifiers Qty    [id number]  PT THER PROC EA 15 MIN 4/7/2022 Tere Granger, PT GP 2    [id number] HC PT MANUAL THERAPY EA 15 MIN 4/7/2022 Tere Granger, PT GP 1                    Tere Granger, PT  4/7/2022

## 2022-05-09 ENCOUNTER — APPOINTMENT (OUTPATIENT)
Dept: PHYSICAL THERAPY | Facility: HOSPITAL | Age: 77
End: 2022-05-09

## 2022-05-09 DIAGNOSIS — E78.2 MIXED HYPERLIPIDEMIA: ICD-10-CM

## 2022-05-09 DIAGNOSIS — R73.09 ELEVATED HEMOGLOBIN A1C: ICD-10-CM

## 2022-05-09 DIAGNOSIS — E55.9 VITAMIN D DEFICIENCY: ICD-10-CM

## 2022-05-09 DIAGNOSIS — I10 ESSENTIAL HYPERTENSION: ICD-10-CM

## 2022-05-09 DIAGNOSIS — K57.90 DIVERTICULOSIS: Primary | ICD-10-CM

## 2022-05-12 ENCOUNTER — APPOINTMENT (OUTPATIENT)
Dept: PHYSICAL THERAPY | Facility: HOSPITAL | Age: 77
End: 2022-05-12

## 2022-05-16 ENCOUNTER — HOSPITAL ENCOUNTER (OUTPATIENT)
Dept: PHYSICAL THERAPY | Facility: HOSPITAL | Age: 77
Setting detail: THERAPIES SERIES
Discharge: HOME | End: 2022-05-16

## 2022-05-16 DIAGNOSIS — M48.062 SPINAL STENOSIS OF LUMBAR REGION WITH NEUROGENIC CLAUDICATION: Primary | ICD-10-CM

## 2022-05-16 DIAGNOSIS — R29.898 WEAKNESS OF RIGHT LOWER EXTREMITY: ICD-10-CM

## 2022-05-16 PROCEDURE — 97140 MANUAL THERAPY 1/> REGIONS: CPT

## 2022-05-16 PROCEDURE — 97110 THERAPEUTIC EXERCISES: CPT

## 2022-05-17 ENCOUNTER — LAB (OUTPATIENT)
Dept: FAMILY MEDICINE CLINIC | Facility: CLINIC | Age: 77
End: 2022-05-17

## 2022-05-17 ENCOUNTER — OFFICE VISIT (OUTPATIENT)
Dept: PULMONOLOGY | Facility: HOSPITAL | Age: 77
End: 2022-05-17

## 2022-05-17 ENCOUNTER — LAB (OUTPATIENT)
Dept: LAB | Facility: HOSPITAL | Age: 77
End: 2022-05-17

## 2022-05-17 DIAGNOSIS — J45.20 MILD INTERMITTENT ASTHMA WITHOUT COMPLICATION: ICD-10-CM

## 2022-05-17 DIAGNOSIS — R73.09 ELEVATED HEMOGLOBIN A1C: ICD-10-CM

## 2022-05-17 DIAGNOSIS — K57.90 DIVERTICULOSIS: ICD-10-CM

## 2022-05-17 DIAGNOSIS — J45.20 MILD INTERMITTENT ASTHMA WITHOUT COMPLICATION: Primary | ICD-10-CM

## 2022-05-17 DIAGNOSIS — E55.9 VITAMIN D DEFICIENCY: ICD-10-CM

## 2022-05-17 DIAGNOSIS — J45.909 ASTHMA, UNSPECIFIED ASTHMA SEVERITY, UNSPECIFIED WHETHER COMPLICATED, UNSPECIFIED WHETHER PERSISTENT: Primary | ICD-10-CM

## 2022-05-17 DIAGNOSIS — I10 ESSENTIAL HYPERTENSION: ICD-10-CM

## 2022-05-17 DIAGNOSIS — E78.2 MIXED HYPERLIPIDEMIA: ICD-10-CM

## 2022-05-17 LAB
25(OH)D3 SERPL-MCNC: 42.3 NG/ML (ref 30–100)
ALBUMIN SERPL-MCNC: 4.6 G/DL (ref 3.5–5.2)
ALBUMIN/GLOB SERPL: 2.3 G/DL
ALP SERPL-CCNC: 70 U/L (ref 39–117)
ALT SERPL W P-5'-P-CCNC: 24 U/L (ref 1–41)
ANION GAP SERPL CALCULATED.3IONS-SCNC: 10.7 MMOL/L (ref 5–15)
AST SERPL-CCNC: 26 U/L (ref 1–40)
BASOPHILS # BLD AUTO: 0.04 10*3/MM3 (ref 0–0.2)
BASOPHILS # BLD AUTO: 0.04 10*3/MM3 (ref 0–0.2)
BASOPHILS NFR BLD AUTO: 0.6 % (ref 0–1.5)
BASOPHILS NFR BLD AUTO: 0.8 % (ref 0–1.5)
BILIRUB SERPL-MCNC: 0.7 MG/DL (ref 0–1.2)
BUN SERPL-MCNC: 17 MG/DL (ref 8–23)
BUN/CREAT SERPL: 16.5 (ref 7–25)
CALCIUM SPEC-SCNC: 9.4 MG/DL (ref 8.6–10.5)
CHLORIDE SERPL-SCNC: 108 MMOL/L (ref 98–107)
CHOLEST SERPL-MCNC: 230 MG/DL (ref 0–200)
CO2 SERPL-SCNC: 24.3 MMOL/L (ref 22–29)
CREAT SERPL-MCNC: 1.03 MG/DL (ref 0.76–1.27)
DEPRECATED RDW RBC AUTO: 41.2 FL (ref 37–54)
DEPRECATED RDW RBC AUTO: 42.8 FL (ref 37–54)
EGFRCR SERPLBLD CKD-EPI 2021: 76.2 ML/MIN/1.73
EOSINOPHIL # BLD AUTO: 0.09 10*3/MM3 (ref 0–0.4)
EOSINOPHIL # BLD AUTO: 0.09 10*3/MM3 (ref 0–0.4)
EOSINOPHIL NFR BLD AUTO: 1.3 % (ref 0.3–6.2)
EOSINOPHIL NFR BLD AUTO: 1.7 % (ref 0.3–6.2)
ERYTHROCYTE [DISTWIDTH] IN BLOOD BY AUTOMATED COUNT: 12.7 % (ref 12.3–15.4)
ERYTHROCYTE [DISTWIDTH] IN BLOOD BY AUTOMATED COUNT: 13 % (ref 12.3–15.4)
GLOBULIN UR ELPH-MCNC: 2 GM/DL
GLUCOSE SERPL-MCNC: 96 MG/DL (ref 65–99)
HBA1C MFR BLD: 5.7 % (ref 3.5–5.6)
HCT VFR BLD AUTO: 39.5 % (ref 37.5–51)
HCT VFR BLD AUTO: 41.8 % (ref 37.5–51)
HDLC SERPL-MCNC: 64 MG/DL (ref 40–60)
HGB BLD-MCNC: 13.6 G/DL (ref 13–17.7)
HGB BLD-MCNC: 14.3 G/DL (ref 13–17.7)
IMM GRANULOCYTES # BLD AUTO: 0.02 10*3/MM3 (ref 0–0.05)
IMM GRANULOCYTES # BLD AUTO: 0.03 10*3/MM3 (ref 0–0.05)
IMM GRANULOCYTES NFR BLD AUTO: 0.4 % (ref 0–0.5)
IMM GRANULOCYTES NFR BLD AUTO: 0.4 % (ref 0–0.5)
LDLC SERPL CALC-MCNC: 129 MG/DL (ref 0–100)
LDLC/HDLC SERPL: 1.93 {RATIO}
LYMPHOCYTES # BLD AUTO: 1.32 10*3/MM3 (ref 0.7–3.1)
LYMPHOCYTES # BLD AUTO: 1.34 10*3/MM3 (ref 0.7–3.1)
LYMPHOCYTES NFR BLD AUTO: 19.1 % (ref 19.6–45.3)
LYMPHOCYTES NFR BLD AUTO: 26 % (ref 19.6–45.3)
MCH RBC QN AUTO: 31.2 PG (ref 26.6–33)
MCH RBC QN AUTO: 31.3 PG (ref 26.6–33)
MCHC RBC AUTO-ENTMCNC: 34.2 G/DL (ref 31.5–35.7)
MCHC RBC AUTO-ENTMCNC: 34.4 G/DL (ref 31.5–35.7)
MCV RBC AUTO: 90.6 FL (ref 79–97)
MCV RBC AUTO: 91.5 FL (ref 79–97)
MONOCYTES # BLD AUTO: 0.51 10*3/MM3 (ref 0.1–0.9)
MONOCYTES # BLD AUTO: 0.74 10*3/MM3 (ref 0.1–0.9)
MONOCYTES NFR BLD AUTO: 10.7 % (ref 5–12)
MONOCYTES NFR BLD AUTO: 9.9 % (ref 5–12)
NEUTROPHILS NFR BLD AUTO: 3.15 10*3/MM3 (ref 1.7–7)
NEUTROPHILS NFR BLD AUTO: 4.69 10*3/MM3 (ref 1.7–7)
NEUTROPHILS NFR BLD AUTO: 61.2 % (ref 42.7–76)
NEUTROPHILS NFR BLD AUTO: 67.9 % (ref 42.7–76)
NRBC BLD AUTO-RTO: 0 /100 WBC (ref 0–0.2)
NRBC BLD AUTO-RTO: 0 /100 WBC (ref 0–0.2)
PLATELET # BLD AUTO: 222 10*3/MM3 (ref 140–450)
PLATELET # BLD AUTO: 232 10*3/MM3 (ref 140–450)
PMV BLD AUTO: 10 FL (ref 6–12)
PMV BLD AUTO: 9.9 FL (ref 6–12)
POTASSIUM SERPL-SCNC: 4.5 MMOL/L (ref 3.5–5.2)
PROT SERPL-MCNC: 6.6 G/DL (ref 6–8.5)
RBC # BLD AUTO: 4.36 10*6/MM3 (ref 4.14–5.8)
RBC # BLD AUTO: 4.57 10*6/MM3 (ref 4.14–5.8)
SODIUM SERPL-SCNC: 143 MMOL/L (ref 136–145)
T4 FREE SERPL-MCNC: 1.18 NG/DL (ref 0.93–1.7)
TESTOST SERPL-MCNC: 360 NG/DL (ref 193–740)
TRIGL SERPL-MCNC: 213 MG/DL (ref 0–150)
TSH SERPL DL<=0.05 MIU/L-ACNC: 1.46 UIU/ML (ref 0.27–4.2)
VIT B12 BLD-MCNC: 417 PG/ML (ref 211–946)
VLDLC SERPL-MCNC: 37 MG/DL (ref 5–40)
WBC NRBC COR # BLD: 5.15 10*3/MM3 (ref 3.4–10.8)
WBC NRBC COR # BLD: 6.91 10*3/MM3 (ref 3.4–10.8)

## 2022-05-17 PROCEDURE — 84439 ASSAY OF FREE THYROXINE: CPT | Performed by: FAMILY MEDICINE

## 2022-05-17 PROCEDURE — 80061 LIPID PANEL: CPT | Performed by: FAMILY MEDICINE

## 2022-05-17 PROCEDURE — 85025 COMPLETE CBC W/AUTO DIFF WBC: CPT | Performed by: FAMILY MEDICINE

## 2022-05-17 PROCEDURE — 82607 VITAMIN B-12: CPT | Performed by: FAMILY MEDICINE

## 2022-05-17 PROCEDURE — 82785 ASSAY OF IGE: CPT

## 2022-05-17 PROCEDURE — 36415 COLL VENOUS BLD VENIPUNCTURE: CPT

## 2022-05-17 PROCEDURE — 84403 ASSAY OF TOTAL TESTOSTERONE: CPT | Performed by: FAMILY MEDICINE

## 2022-05-17 PROCEDURE — 84443 ASSAY THYROID STIM HORMONE: CPT | Performed by: FAMILY MEDICINE

## 2022-05-17 PROCEDURE — 80053 COMPREHEN METABOLIC PANEL: CPT | Performed by: FAMILY MEDICINE

## 2022-05-17 PROCEDURE — 85025 COMPLETE CBC W/AUTO DIFF WBC: CPT

## 2022-05-17 PROCEDURE — G0463 HOSPITAL OUTPT CLINIC VISIT: HCPCS

## 2022-05-17 PROCEDURE — 83036 HEMOGLOBIN GLYCOSYLATED A1C: CPT | Performed by: FAMILY MEDICINE

## 2022-05-17 PROCEDURE — 82306 VITAMIN D 25 HYDROXY: CPT | Performed by: FAMILY MEDICINE

## 2022-05-17 RX ORDER — BUDESONIDE, GLYCOPYRROLATE, AND FORMOTEROL FUMARATE 160; 9; 4.8 UG/1; UG/1; UG/1
2 AEROSOL, METERED RESPIRATORY (INHALATION) 2 TIMES DAILY
Qty: 2 EACH | Refills: 0 | Status: ON HOLD | COMMUNITY
Start: 2022-05-17 | End: 2022-10-11

## 2022-05-17 NOTE — PROGRESS NOTES
PULMONARY/ CRITICAL CARE/ SLEEP MEDICINE OUTPATIENT CONSULT/ FOLLOW UP NOTE        Patient Name:  Omar Choudhary    :  1945    Medical Record:  [id number]    PRIMARY CARE PHYSICIAN     Dennis Kwong MD    REASON FOR CONSULTATION    Omar Choudhary is a 76 y.o. male who is referred for consultation for asthma  REVIEW OF SYSTEMS    Constitutional:  Denies fever or chills   Eyes:  Denies change in visual acuity   HENT:  Denies nasal congestion or sore throat   Respiratory:  Denies cough or shortness of breath   Cardiovascular:  Denies chest pain or edema   GI:  Denies abdominal pain, nausea, vomiting, bloody stools or diarrhea   :  Denies dysuria   Musculoskeletal:  Denies back pain or joint pain   Integument:  Denies rash   Neurologic:  Denies headache, focal weakness or sensory changes   Endocrine:  Denies polyuria or polydipsia   Lymphatic:  Denies swollen glands   Psychiatric:  Denies depression or anxiety     MEDICAL HISTORY    Past Medical History:   Diagnosis Date   • Allergic    • Arthritis    • Asthma    • Cataract    • Depression    • Family history of malignant neoplasm of breast 2019   • Hemidiaphragm paralysis     Left   • Hyperlipidemia 2019   • Hypertension 3/1/2012   • Leg pain, right    • Low back pain    • Osteopenia    • Reactive airway disease 1/15/2016   • Shortness of breath    • Sleep apnea, obstructive         SURGICAL HISTORY    Past Surgical History:   Procedure Laterality Date   • KNEE ARTHROSCOPY W/ ACL RECONSTRUCTION     • MOUTH SURGERY          FAMILY HISTORY    Family History   Problem Relation Age of Onset   • Heart disease Mother    • Hypertension Mother    • Breast cancer Mother    • Stroke Mother    • Aortic aneurysm Father    • Heart attack Father    • Liver cancer Father        SOCIAL HISTORY    Social History     Tobacco Use   • Smoking status: Former Smoker     Packs/day: 0.50     Years: 10.00     Pack years: 5.00     Types: Cigarettes     Start  date:      Quit date:      Years since quittin.3   • Smokeless tobacco: Never Used   Substance Use Topics   • Alcohol use: Yes     Alcohol/week: 12.0 standard drinks     Types: 10 Glasses of wine, 2 Cans of beer per week        ALLERGIES    Allergies   Allergen Reactions   • Statins Myalgia         MEDICATIONS    Current Outpatient Medications on File Prior to Visit   Medication Sig Dispense Refill   • acetaminophen (TYLENOL) 500 MG tablet Take 500 mg by mouth Every 6 (Six) Hours As Needed for Mild Pain .     • albuterol sulfate  (90 Base) MCG/ACT inhaler Inhale 2 puffs Every 4 (Four) Hours As Needed for Wheezing or Shortness of Air. 18 g 3   • Alpha-Lipoic Acid 100 MG capsule Take  by mouth.     • B Complex Vitamins (VITAMIN B COMPLEX) capsule capsule Take  by mouth Daily.     • budesonide-formoterol (SYMBICORT) 160-4.5 MCG/ACT inhaler Inhale 2 puffs 2 (Two) Times a Day. 120 each 12   • Calcium Carb-Cholecalciferol (calcium-vitamin D) 500-200 MG-UNIT per tablet Take 2 tablets by mouth Daily. 180 tablet 3   • Carboxymethylcellul-Glycerin 0.5-0.9 % solution Apply  to eye(s) as directed by provider Every 8 (Eight) Hours.     • clonazePAM (KlonoPIN) 0.5 MG tablet TAKE ONE TABLET BY MOUTH TWICE A DAY AS NEEDED FOR ANXIETY 30 tablet 2   • coenzyme Q10 50 MG capsule capsule Take  by mouth Daily.     • diphenhydrAMINE HCl (ANTI-HIST PO) Take 1 tablet by mouth Daily As Needed.     • gabapentin (NEURONTIN) 300 MG capsule Take 1 tablet in the AM and 2 tablets at night. Start with 1 at night 90 capsule 5   • HYDROcodone-acetaminophen (NORCO) 7.5-325 MG per tablet Take 1 tablet by mouth Daily. 30 tablet 0   • lidocaine (LIDODERM) 5 % Place 1 patch on the skin as directed by provider Daily. Remove & Discard patch within 12 hours or as directed by MD 30 each 1   • losartan-hydrochlorothiazide (HYZAAR) 100-25 MG per tablet TAKE 1 TABLET BY MOUTH DAILY 90 tablet 0   • meloxicam (MOBIC) 15 MG tablet TAKE 1 TABLET  BY MOUTH DAILY 90 tablet 0   • montelukast (SINGULAIR) 10 MG tablet TAKE 1 TABLET BY MOUTH DAILY 90 tablet 0   • Omega-3 Fatty Acids (FISH OIL) 1000 MG capsule capsule Take  by mouth Daily With Breakfast.     • pravastatin (PRAVACHOL) 10 MG tablet Take 1 tablet by mouth Daily. 90 tablet 3   • Red Yeast Rice 600 MG capsule Take  by mouth.     • sildenafil (REVATIO) 20 MG tablet TAKE ONE TO TWO TABLETS BY MOUTH AS NEEDED FOR E.D. 50 tablet 0   • theophylline (THEODUR) 300 MG 12 hr tablet Take 1 tablet by mouth Daily. May substitute for generic 90 tablet 1   • triamcinolone (KENALOG) 0.1 % cream Apply  topically to the appropriate area as directed 2 (Two) Times a Day. 28.4 g 0   • zolpidem (AMBIEN) 10 MG tablet TAKE ONE TABLET BY MOUTH nightly AS NEEDED FOR SLEEP 30 tablet 2     No current facility-administered medications on file prior to visit.       PHYSICAL EXAM    There were no vitals filed for this visit.     Constitutional:  Well developed, well nourished, no acute distress, non-toxic appearance   Eyes:  PERRL, conjunctiva normal   HENT:  Atraumatic, external ears normal, nose normal, oropharynx moist, no pharyngeal exudates. mallampatti   Neck- normal range of motion, no tenderness, supple   Respiratory:  No respiratory distress, normal breath sounds, no rales, no wheezing   Cardiovascular:  Normal rate, normal rhythm, no murmurs, no gallops, no rubs   GI:  Soft, nondistended, normal bowel sounds, nontender, no organomegaly, no mass, no rebound, no guarding   :  No costovertebral angle tenderness   Musculoskeletal:  No edema, no tenderness, no deformities. Back- no tenderness  Integument:  Well hydrated, no rash   Lymphatic:  No lymphadenopathy noted   Neurologic:  Alert & oriented x 3, CN 2-12 normal, normal motor function, normal sensory function, no focal deficits noted   Psychiatric:  Speech and behavior appropriate     No radiology results for the last 90 days.       ASSESSMENT & PLAN:      Asthma  (J45.909)  paralysed Left hemidiapghram    Impression: CXR 1/19: Elevated L Hemidiaphragm with bibasilar atelectasis  IgE 5/18: 765  RAST Panel 5/18: +ve  PFts 4/18:  (FEV1 78%, TLC 82%, DLCO 76, DL/%  Repeat Lab 3/19: WBC 5.3, Eosinophil count normal. Mold Panel:negative.       IgE: 515  Xolair weekly injections vs allergy shots  Feels better allergy injections     Tests at [facility]; PFts 8/19: Non specific ventilatory defect. FEV1 70%, + D response, %, DLCO 82%   Methacholine chalenge test 8/19: negative  HRCT : No ILD  VQ: Negative except decrease in LLL related to paralyzed diaphragm  Metabolic cardiopulmonary stress test: negative  Echo: EF 60%, RVSP 31 min  Tried Breo and Bevespi with no improvement in symptoms  Remains on Singulair,   symbicort  expensive and albuterol.   -Also on allergy shots now     Obstructive sleep apnea (G47.33)  Has new ResMed machine,  CPAP at 8-20  Average use 8hrs 27 min. Average AHI 4.2. Patient is on AutoSet 8-20 pressure   Patient is compliant with using CPAP/BiPAP therapy and is benefitting from PAP therapy.     Hypertension (I10)    PLAN:      repeat IgE level and CBC with differential        This document has been electronically signed by  EMILY Biggs  15:49 EDT

## 2022-05-19 ENCOUNTER — OFFICE VISIT (OUTPATIENT)
Dept: PAIN MEDICINE | Facility: CLINIC | Age: 77
End: 2022-05-19

## 2022-05-19 ENCOUNTER — APPOINTMENT (OUTPATIENT)
Dept: PHYSICAL THERAPY | Facility: HOSPITAL | Age: 77
End: 2022-05-19

## 2022-05-19 VITALS
BODY MASS INDEX: 29.73 KG/M2 | WEIGHT: 185 LBS | HEIGHT: 66 IN | DIASTOLIC BLOOD PRESSURE: 95 MMHG | HEART RATE: 68 BPM | RESPIRATION RATE: 16 BRPM | SYSTOLIC BLOOD PRESSURE: 145 MMHG | OXYGEN SATURATION: 97 %

## 2022-05-19 DIAGNOSIS — M48.061 SPINAL STENOSIS OF LUMBAR REGION WITHOUT NEUROGENIC CLAUDICATION: ICD-10-CM

## 2022-05-19 PROCEDURE — 99214 OFFICE O/P EST MOD 30 MIN: CPT | Performed by: STUDENT IN AN ORGANIZED HEALTH CARE EDUCATION/TRAINING PROGRAM

## 2022-05-19 PROCEDURE — G0463 HOSPITAL OUTPT CLINIC VISIT: HCPCS | Performed by: STUDENT IN AN ORGANIZED HEALTH CARE EDUCATION/TRAINING PROGRAM

## 2022-05-19 RX ORDER — LIDOCAINE 50 MG/G
1 PATCH TOPICAL EVERY 24 HOURS
Qty: 30 EACH | Refills: 1 | Status: SHIPPED | OUTPATIENT
Start: 2022-05-19 | End: 2023-04-06

## 2022-05-19 RX ORDER — HYDROCODONE BITARTRATE AND ACETAMINOPHEN 7.5; 325 MG/1; MG/1
1 TABLET ORAL 2 TIMES DAILY PRN
Qty: 60 TABLET | Refills: 0 | Status: SHIPPED | OUTPATIENT
Start: 2022-05-19 | End: 2022-08-10 | Stop reason: SDUPTHER

## 2022-05-19 NOTE — PROGRESS NOTES
CHIEF COMPLAINT  Chief Complaint   Patient presents with   • Back Pain     Low right side pain [place] LD 5/17/22       Primary Care  Dennis Kwong MD    Subjective   Omar Choudhary is a 76 y.o. male  who presents for chronic low back pain with both axial and radicular features.  He states that he has had intermittent pain for many years however he has not sought any treatment.  Recently, his pain began to impact his activities of daily living and he sought treatment from his primary care in the form of an MRI.  It showed substantial degenerative changes including severe stenosis at multiple levels as well as foraminal narrowing.  He also has severe scoliosis and severe degenerative disc disease with facet arthropathy.  He received 1 epidural steroid injection from an outside pain provider which did provide him significant benefit however he was discharged to do a failed drug screen.  He admits that he had taken a oxycodone that he had leftover from surgery and forgot to inform the previous provider of this.  He denies any significant numbness or tingling or weakness but does complain of axial type back pain with radiation in the buttock bilaterally.    Back Pain  Pertinent negatives include no numbness or weakness.   Pain  Associated symptoms include arthralgias and myalgias. Pertinent negatives include no numbness or weakness.        Location: Low back with radiation in the buttock bilaterally  Onset: Years ago  Duration: Progressively worsening  Timing: Constant throughout the day  Quality: Sharp, stabbing, aching  Severity: Today: 6       Last Week: 6       Worst: 7  Modifying Factors: The pain is worse with physical activity and movement and exercise and slightly improved with rest and stretching    Physical Therapy: no    Interval Update 05/19/2022: He presents today to discuss further options.  He did not get significant benefit from most recent lumbar epidural.  He has been taking her hydrocodone  which does provide some benefit along with lidocaine patches and lidocaine creams.  He also uses a combination of ibuprofen, meloxicam, and acetaminophen.    The following portions of the patient's history were reviewed and updated as appropriate: allergies, current medications, past family history, past medical history, past social history, past surgical history and problem list.      Current Outpatient Medications:   •  acetaminophen (TYLENOL) 500 MG tablet, Take 500 mg by mouth Every 6 (Six) Hours As Needed for Mild Pain ., Disp: , Rfl:   •  albuterol sulfate  (90 Base) MCG/ACT inhaler, Inhale 2 puffs Every 4 (Four) Hours As Needed for Wheezing or Shortness of Air., Disp: 18 g, Rfl: 3  •  Alpha-Lipoic Acid 100 MG capsule, Take  by mouth., Disp: , Rfl:   •  B Complex Vitamins (VITAMIN B COMPLEX) capsule capsule, Take  by mouth Daily., Disp: , Rfl:   •  Budeson-Glycopyrrol-Formoterol (Breztri Aerosphere) 160-9-4.8 MCG/ACT aerosol inhaler, Inhale 2 puffs 2 (Two) Times a Day., Disp: 2 each, Rfl: 0  •  budesonide-formoterol (SYMBICORT) 160-4.5 MCG/ACT inhaler, Inhale 2 puffs 2 (Two) Times a Day., Disp: 120 each, Rfl: 12  •  Calcium Carb-Cholecalciferol (calcium-vitamin D) 500-200 MG-UNIT per tablet, Take 2 tablets by mouth Daily., Disp: 180 tablet, Rfl: 3  •  Carboxymethylcellul-Glycerin 0.5-0.9 % solution, Apply  to eye(s) as directed by provider Every 8 (Eight) Hours., Disp: , Rfl:   •  clonazePAM (KlonoPIN) 0.5 MG tablet, TAKE ONE TABLET BY MOUTH TWICE A DAY AS NEEDED FOR ANXIETY, Disp: 30 tablet, Rfl: 2  •  coenzyme Q10 50 MG capsule capsule, Take  by mouth Daily., Disp: , Rfl:   •  diphenhydrAMINE HCl (ANTI-HIST PO), Take 1 tablet by mouth Daily As Needed., Disp: , Rfl:   •  gabapentin (NEURONTIN) 300 MG capsule, Take 1 tablet in the AM and 2 tablets at night. Start with 1 at night, Disp: 90 capsule, Rfl: 5  •  HYDROcodone-acetaminophen (NORCO) 7.5-325 MG per tablet, Take 1 tablet by mouth 2 (Two) Times a  "Day As Needed for Moderate Pain ., Disp: 60 tablet, Rfl: 0  •  lidocaine (LIDODERM) 5 %, Place 1 patch on the skin as directed by provider Daily. Remove & Discard patch within 12 hours or as directed by MD, Disp: 30 each, Rfl: 1  •  losartan-hydrochlorothiazide (HYZAAR) 100-25 MG per tablet, TAKE 1 TABLET BY MOUTH DAILY, Disp: 90 tablet, Rfl: 0  •  meloxicam (MOBIC) 15 MG tablet, TAKE 1 TABLET BY MOUTH DAILY, Disp: 90 tablet, Rfl: 0  •  montelukast (SINGULAIR) 10 MG tablet, TAKE 1 TABLET BY MOUTH DAILY, Disp: 90 tablet, Rfl: 0  •  Omega-3 Fatty Acids (FISH OIL) 1000 MG capsule capsule, Take  by mouth Daily With Breakfast., Disp: , Rfl:   •  pravastatin (PRAVACHOL) 10 MG tablet, Take 1 tablet by mouth Daily., Disp: 90 tablet, Rfl: 3  •  Red Yeast Rice 600 MG capsule, Take  by mouth., Disp: , Rfl:   •  sildenafil (REVATIO) 20 MG tablet, TAKE ONE TO TWO TABLETS BY MOUTH AS NEEDED FOR E.D., Disp: 50 tablet, Rfl: 0  •  triamcinolone (KENALOG) 0.1 % cream, Apply  topically to the appropriate area as directed 2 (Two) Times a Day., Disp: 28.4 g, Rfl: 0  •  zolpidem (AMBIEN) 10 MG tablet, TAKE ONE TABLET BY MOUTH nightly AS NEEDED FOR SLEEP, Disp: 30 tablet, Rfl: 2    Current Facility-Administered Medications:   •  theophylline (SAJAN-24) 24 hr capsule 300 mg, 300 mg, Oral, Q24H, Delia Ramon APRN    Review of Systems   Musculoskeletal: Positive for arthralgias, back pain, gait problem and myalgias.   Neurological: Negative for weakness and numbness.       Vitals:    05/19/22 1336   BP: 145/95   Pulse: 68   Resp: 16   SpO2: 97%   Weight: 83.9 kg (185 lb)   Height: 167.6 cm (66\")   PainSc:   5         Objective   Physical Exam  Vitals and nursing note reviewed.   Constitutional:       General: He is not in acute distress.     Appearance: Normal appearance. He is well-developed and normal weight.   Neck:      Trachea: No tracheal deviation.   Musculoskeletal:      Comments: Lumbar Spine Exam:  Tender to palpation over " the lumbar paraspinal musculature Yes  Limited range of motion secondary to pain Yes  Facet loading positive: bilateral  Facets tender to palpation: bilateral  Straight leg raise test positive: Equivocal       Neurological:      General: No focal deficit present.      Mental Status: He is alert.      Sensory: No sensory deficit.      Motor: No weakness.           Assessment & Plan   Problems Addressed this Visit    None     Visit Diagnoses     Spinal stenosis of lumbar region without neurogenic claudication        Relevant Medications    HYDROcodone-acetaminophen (NORCO) 7.5-325 MG per tablet      Diagnoses       Codes Comments    Spinal stenosis of lumbar region without neurogenic claudication     ICD-10-CM: M48.061  ICD-9-CM: 724.02           Plan:  1. Continue hydrocodone 7.5 mg twice daily  2. I encouraged him to follow back up with neurosurgery as I do not feel that any further interventional techniques will provide him significant benefit  3. Can also refill lidocaine patches  --- Follow-up 3 months           INSPECT REPORT    As part of the patient's treatment plan, I may be prescribing controlled substances. The patient has been made aware of appropriate use of such medications, including potential risk of somnolence, limited ability to drive and/or work safely, and the potential for dependence or overdose. It has also bee made clear that these medications are for use by this patient only, without concomitant use of alcohol or other substances unless prescribed.     Patient has completed prescribing agreement detailing terms of continued prescribing of controlled substances, including monitoring PRASANTH reports, urine drug screening, and pill counts if necessary. The patient is aware that inappropriate use will results in cessation of prescribing such medications.    INSPECT report has been reviewed and scanned into the patient's chart.    As the clinician, I personally reviewed the INSPECT from  5/18/2022.    History and physical exam exhibit continued safe and appropriate use of controlled substances.      EMR Dragon/Transcription disclaimer:   Much of this encounter note is an electronic transcription/translation of spoken language to printed text. The electronic translation of spoken language may permit erroneous, or at times, nonsensical words or phrases to be inadvertently transcribed; Although I have reviewed the note for such errors, some may still exist.

## 2022-05-20 NOTE — THERAPY PROGRESS REPORT/RE-CERT
Outpatient Physical Therapy Ortho Re-Assessment  [facility]     Patient Name: Omar Choudhary  : 1945  MRN: [id number]  Today's Date: 2022      Visit Date: 2022    Patient Active Problem List   Diagnosis   • Benign prostatic hyperplasia   • Bursitis   • Depression   • Diverticulosis   • Family history of malignant neoplasm of breast   • Hyperlipidemia   • Hypertension   • Internal derangement of right knee   • Knee effusion   • Asthma   • Obstructive sleep apnea syndrome   • Osteoarthritis   • Osteopenia   • Pain in knee   • Postnasal drip   • Prepatellar bursitis   • Sciatica of right side   • Diaphragm paralysis   • Spinal stenosis of lumbar region with neurogenic claudication   • Degenerative lumbar spinal stenosis   • Anxiety   • Pinguecula of right eye        Past Medical History:   Diagnosis Date   • Allergic    • Arthritis    • Asthma    • Cataract    • Depression    • Family history of malignant neoplasm of breast 2019   • Hemidiaphragm paralysis     Left   • Hyperlipidemia 2019   • Hypertension 3/1/2012   • Leg pain, right    • Low back pain    • Osteopenia    • Reactive airway disease 1/15/2016   • Shortness of breath    • Sleep apnea, obstructive         Past Surgical History:   Procedure Laterality Date   • KNEE ARTHROSCOPY W/ ACL RECONSTRUCTION     • MOUTH SURGERY         Visit Dx:     ICD-10-CM ICD-9-CM   1. Spinal stenosis of lumbar region with neurogenic claudication  M48.062 724.03   2. Weakness of right lower extremity  R29.898 729.89                             Therapy Education  Given: HEP, Symptoms/condition management, Pain management, Posture/body mechanics, Mobility training  Program: Reinforced  How Provided: Verbal, Demonstration  Provided to: Patient  Level of Understanding: Verbalized, Demonstrated, Teach back education performed          PT Assessment/Plan     Row Name 22 1200          PT Assessment    Functional Limitations Impaired  gait;Limitation in home management;Limitations in community activities;Limitations in functional capacity and performance;Performance in leisure activities;Performance in self-care ADL;Performance in sport activities  -CN     Impairments Pain;Muscle strength;Range of motion;Poor body mechanics;Posture;Gait;Endurance  -CN     Assessment Comments Omar Choudhary has been seen for 9 physical therapy sessions for LBP.  Treatment has included therapeutic exercise, manual therapy, therapeutic activity and patient education with home exercise program . Progress to physical therapy goals is fair. Pt has met or partially met 2/3 STG and 0/4 LTG. Pt previously progressing well toward goals with intermittent flare ups. However, pt recently returned from 1 month trip to [place] where he was riding bicycle 20 miles/day for first part of trip as well as walking distances with walking sticks. Pt reports intermittent symptoms while on trip, however increased symptoms upon return flight where he was sitting for most of 6 hour trip. Pt with increased symptoms overall today and tolerated manual and gentle exercises well. Upon rising from table, pt with significant increase in symptoms and difficulty WBing through R LE. Pt wheeled to front of building and assisted to car per pain. Pt able to safely ambulate without LOB. He will benefit from continued skilled physical therapy to address remaining impairments and functional limitations.  -CN     Please refer to paper survey for additional self-reported information Yes  -CN     Rehab Potential Good  -CN     Patient/caregiver participated in establishment of treatment plan and goals Yes  -CN     Patient would benefit from skilled therapy intervention Yes  -CN            PT Plan    PT Frequency 2x/week  -CN     Predicted Duration of Therapy Intervention (PT) 6-10 visits  -CN     Planned CPT's? PT RE-EVAL: 85427;PT THER PROC EA 15 MIN: 19506;PT THER ACT EA 15 MIN: 04960;PT MANUAL THERAPY EA  15 MIN: 51537;PT NEUROMUSC RE-EDUCATION EA 15 MIN: 53918;PT GAIT TRAINING EA 15 MIN: 07501;PT AQUATIC THERAPY EA 15 MIN: 26199;PT SELF CARE/HOME MGMT/TRAIN EA 15: 15925;PT HOT OR COLD PACK TREAT MCARE;PT ELECTRICAL STIM UNATTEND: ;PT TRACTION LUMBAR: 61765  -CN     PT Plan Comments Continue to progress strengthening as tolerated. Follow up regarding R sided pain with ambulation following todays visit.  -CN           User Key  (r) = Recorded By, (t) = Taken By, (c) = Cosigned By    Initials Name Provider Type    Tere Murray, PT Physical Therapist                      05/16/22 1200   PT Short Term Goals   STG Date to Achieve 03/23/22   STG 1 Pt will be independent with initial HEP.   STG 1 Progress Met   STG 2 Pt will demonstrate correct sitting and standing posture to reduce strain on lumbar spine.   STG 2 Progress Partially Met   STG 3 Pt will demonstrate good body mechanics with bending, lifting and reaching ADLs  to reduce strain on spine.   STG 3 Progress Progressing   STG 3 Progress Comments Pt avoiding lifting due to symptoms.   Long Term Goals   LTG Date to Achieve 04/22/22   LTG 1 Pt will be independent with advanced HEP for spinal stabilization for improved self-management of symptoms.   LTG 1 Progress Ongoing   LTG 1 Progress Comments Minimal progression today per increased pain levels.   LTG 2 Pt will report 50% decrease in pain/radicular symptoms.   LTG 2 Progress Ongoing   LTG 2 Progress Comments Pt reports improvement in symptoms, however back to IE status following 6 hour flight.   LTG 3 Pt will score 54% or less on BENEDICT indicating decrease in perceived functional disability.   LTG 3 Progress Ongoing   LTG 4 Pt will demonstrate increased R LE strength to 4/5 or better.   LTG 4 Progress Ongoing   LTG 4 Progress Comments No change at this time, likely due to increased pain today.            05/18/22 0800   Subjective Comments   Subjective Comments Well during the first part of our  trip, we were riding bikes 20 miles/day. That felt good. I used my walking sticks after that in [place]. The 6 hour flight back increased my symptoms. I would say I'm back to where I was when I started therapy after that. I go to pain management and the neurosurgeon this summer.   Subjective Pain   Able to rate subjective pain? yes   Pre-Treatment Pain Level 5   Total Minutes   61102 - PT Therapeutic Exercise Minutes 10   Exercise 1   Exercise Name 1 LTR   Cueing 1 Verbal;Demo   Reps 1 10   Time 1 5 sec   Exercise 3   Exercise Name 3 HL Piriformis stretch   Cueing 3 Verbal;Tactile   Reps 3 3   Time 3 20sec   Exercise 6   Exercise Name 6 Nustep L5   Cueing 6 Verbal;Demo   Time 6 5 min               05/18/22 0700   Total Minutes   27990 - PT Manual Therapy Minutes 30   Manual Rx 1   Manual Rx 1 Location B stm to piriformis/glutes, QL, paraspinals   Manual Rx 1 Type SLing with pillow between knees                  Time Calculation:     Start Time: 1132  Stop Time: 1215  Time Calculation (min): 43 min  Timed Charges  25579 - PT Therapeutic Exercise Minutes: 10  64274 - PT Manual Therapy Minutes: 30  Total Minutes  Timed Charges Total Minutes: 10   Total Minutes: 10                   Tere Granger, PT  5/16/2022

## 2022-05-23 ENCOUNTER — HOSPITAL ENCOUNTER (OUTPATIENT)
Dept: PHYSICAL THERAPY | Facility: HOSPITAL | Age: 77
Setting detail: THERAPIES SERIES
Discharge: HOME | End: 2022-05-23

## 2022-05-23 DIAGNOSIS — M48.062 SPINAL STENOSIS OF LUMBAR REGION WITH NEUROGENIC CLAUDICATION: Primary | ICD-10-CM

## 2022-05-23 DIAGNOSIS — R29.898 WEAKNESS OF RIGHT LOWER EXTREMITY: ICD-10-CM

## 2022-05-23 PROCEDURE — 97110 THERAPEUTIC EXERCISES: CPT | Performed by: PHYSICAL THERAPIST

## 2022-05-23 PROCEDURE — 97140 MANUAL THERAPY 1/> REGIONS: CPT | Performed by: PHYSICAL THERAPIST

## 2022-05-23 NOTE — THERAPY TREATMENT NOTE
"    Outpatient Physical Therapy Ortho Treatment Note  [facility]     Patient Name: Omar Choudhary  : 1945  MRN: [id number]  Today's Date: 2022      Visit Date: 2022    Visit Dx:    ICD-10-CM ICD-9-CM   1. Spinal stenosis of lumbar region with neurogenic claudication  M48.062 724.03   2. Weakness of right lower extremity  R29.898 729.89       Patient Active Problem List   Diagnosis   • Benign prostatic hyperplasia   • Bursitis   • Depression   • Diverticulosis   • Family history of malignant neoplasm of breast   • Hyperlipidemia   • Hypertension   • Internal derangement of right knee   • Knee effusion   • Asthma   • Obstructive sleep apnea syndrome   • Osteoarthritis   • Osteopenia   • Pain in knee   • Postnasal drip   • Prepatellar bursitis   • Sciatica of right side   • Diaphragm paralysis   • Spinal stenosis of lumbar region with neurogenic claudication   • Degenerative lumbar spinal stenosis   • Anxiety   • Pinguecula of right eye        Past Medical History:   Diagnosis Date   • Allergic    • Arthritis    • Asthma    • Cataract    • Depression    • Family history of malignant neoplasm of breast 2019   • Hemidiaphragm paralysis     Left   • Hyperlipidemia 2019   • Hypertension 3/1/2012   • Leg pain, right    • Low back pain    • Osteopenia    • Reactive airway disease 1/15/2016   • Shortness of breath    • Sleep apnea, obstructive         Past Surgical History:   Procedure Laterality Date   • KNEE ARTHROSCOPY W/ ACL RECONSTRUCTION     • MOUTH SURGERY          PT Ortho     Row Name 22 8670       Subjective Comments    Subjective Comments Feeling good today. Notes that back \"catches\" sometimes and is relieved by bending backwards. Rode 14 miles on bike this past weekend. Felt good but had to stop & lie down at [address] due to fatigue/heat.  -JS       Subjective Pain    Able to rate subjective pain? yes  -JS    Pre-Treatment Pain Level 5  -JS          User Key  (r) = Recorded " "By, (t) = Taken By, (c) = Cosigned By    Initials Name Provider Type    Jazmin De La Fuente, PT Physical Therapist                             PT Assessment/Plan     Row Name 05/23/22 4162          PT Assessment    Assessment Comments Patient presents with improvement in symptoms upon arrival today. Reviewed sitting posture with pt demonstrating improved understanding of positioning and need for breaks with prolonged sitting. Resumed initial core stabilization exercises in hooklying without exacerbation. Manual therapy performed gentlly & limited time to determine response, with pt continuing to feel increased discomfort in R side of low back initially upon standing. Symptoms improved by performing standing lumbar extension and did not persist. Patient able to ambulate out of clinic withoutincreased discomfort at the end of the treatment session.  -JS            PT Plan    PT Plan Comments Continue PT. Consider further core stabilization as tolerated, standing rows with TrA, sit<->stand with TrA. Gentle flexibility exercises. Assess further response to STM today, consider limiting/discontinuing manual therapy pending pt's response.  -JS           User Key  (r) = Recorded By, (t) = Taken By, (c) = Cosigned By    Initials Name Provider Type    Jazmin De La Fuente, PT Physical Therapist                   OP Exercises     Row Name 05/23/22 1139             Subjective Comments    Subjective Comments Feeling good today. Notes that back \"catches\" sometimes and is relieved by bending backwards. Rode 14 miles on bike this past weekend. Felt good but had to stop & lie down at [address] due to fatigue/heat.  -JS              Subjective Pain    Able to rate subjective pain? yes  -JS      Pre-Treatment Pain Level 5  -JS              Total Minutes    08891 - PT Therapeutic Exercise Minutes 30  -JS      94002 - PT Manual Therapy Minutes 10  -JS              Exercise 1    Exercise Name 1 LTR  -JS      Cueing 1 Verbal;Demo  -JS      Reps 1 10  " -JS      Time 1 5 sec  -JS              Exercise 3    Exercise Name 3 HL Piriformis stretch  -JS      Cueing 3 Verbal;Tactile  -JS      Reps 3 3  -JS      Time 3 20sec  -JS      Additional Comments gently  -JS              Exercise 6    Exercise Name 6 Nustep L5  -JS      Cueing 6 Verbal;Demo  -JS      Time 6 5 min  -JS              Exercise 8    Exercise Name 8 added HL hip add w/small ball  -JS      Cueing 8 Verbal;Tactile  -JS      Reps 8 10ea  -JS      Time 8 cued TA. 3 sec  -JS              Exercise 9    Exercise Name 9 added HL hip abd w/resistance band  -JS      Cueing 9 Verbal;Tactile  -JS      Sets 9 2  -JS      Reps 9 10  -JS      Time 9 red, cued TA. 3 sec  -JS              Exercise 11    Exercise Name 11 Seated HS stretch  -JS      Cueing 11 Verbal;Demo  -JS      Reps 11 3  -JS      Time 11 20 sec  -JS              Exercise 13    Exercise Name 13 Doorway lat stretch  -JS      Cueing 13 Verbal;Demo  -JS      Reps 13 3  -JS      Time 13 20 sec  -JS              Exercise 14    Exercise Name 14 Doorway pec stretch in lunge position for hip flexor stretch  -JS      Cueing 14 Verbal;Demo  -JS      Reps 14 3  -JS      Time 14 20 sec  -JS            User Key  (r) = Recorded By, (t) = Taken By, (c) = Cosigned By    Initials Name Provider Type    Jazmin De La Fuente, PT Physical Therapist                         Manual Rx (last 36 hours)     Manual Treatments     Row Name 05/23/22 1130             Total Minutes    07731 - PT Manual Therapy Minutes 10  -JS              Manual Rx 1    Manual Rx 1 Location B stm to piriformis/glutes, QL, paraspinals  -JS      Manual Rx 1 Type SLing with pillow between knees  -JS      Manual Rx 1 Grade Performed gently- 5 min each side  -JS            User Key  (r) = Recorded By, (t) = Taken By, (c) = Cosigned By    Initials Name Provider Type    Jazmin De La Fuente, PT Physical Therapist                 PT OP Goals     Row Name 05/23/22 1130          PT Short Term Goals    STG Date to Achieve  03/23/22  -JS     STG 1 Pt will be independent with initial HEP.  -JS     STG 1 Progress Met  -JS     STG 2 Pt will demonstrate correct sitting and standing posture to reduce strain on lumbar spine.  -JS     STG 2 Progress Partially Met  -JS     STG 2 Progress Comments Reports use of lumbar roll when sitting in car, increased awareness in clinic  -JS     STG 3 Pt will demonstrate good body mechanics with bending, lifting and reaching ADLs  to reduce strain on spine.  -JS     STG 3 Progress Progressing  -JS            Long Term Goals    LTG Date to Achieve 04/22/22  -JS     LTG 1 Pt will be independent with advanced HEP for spinal stabilization for improved self-management of symptoms.  -JS     LTG 1 Progress Ongoing  -JS     LTG 2 Pt will report 50% decrease in pain/radicular symptoms.  -JS     LTG 2 Progress Ongoing  -JS     LTG 3 Pt will score 54% or less on BENEDICT indicating decrease in perceived functional disability.  -JS     LTG 3 Progress Ongoing  -JS     LTG 4 Pt will demonstrate increased R LE strength to 4/5 or better.  -JS     LTG 4 Progress Ongoing  -JS           User Key  (r) = Recorded By, (t) = Taken By, (c) = Cosigned By    Initials Name Provider Type    Jazmin De La Fuente PT Physical Therapist                               Time Calculation:   Start Time: 1130  Stop Time: 1215  Time Calculation (min): 45 min  Timed Charges  61523 - PT Therapeutic Exercise Minutes: 30  06782 - PT Manual Therapy Minutes: 10  Total Minutes  Timed Charges Total Minutes: 40   Total Minutes: 40  Therapy Charges for Today     Code Description Service Date Service Provider Modifiers Qty    45849777593 HC PT THER PROC EA 15 MIN 5/23/2022 Jazmin Laguerre, PT GP 2    [id number] HC PT MANUAL THERAPY EA 15 MIN 5/23/2022 Jazmin Laguerre, PT GP 1                    Jazmin Laguerre PT  5/23/2022

## 2022-05-24 ENCOUNTER — OFFICE VISIT (OUTPATIENT)
Dept: FAMILY MEDICINE CLINIC | Facility: CLINIC | Age: 77
End: 2022-05-24

## 2022-05-24 VITALS
OXYGEN SATURATION: 98 % | HEIGHT: 66 IN | WEIGHT: 186 LBS | SYSTOLIC BLOOD PRESSURE: 160 MMHG | BODY MASS INDEX: 29.89 KG/M2 | HEART RATE: 54 BPM | TEMPERATURE: 97.5 F | RESPIRATION RATE: 16 BRPM | DIASTOLIC BLOOD PRESSURE: 93 MMHG

## 2022-05-24 DIAGNOSIS — G89.29 CHRONIC LOW BACK PAIN, UNSPECIFIED BACK PAIN LATERALITY, UNSPECIFIED WHETHER SCIATICA PRESENT: ICD-10-CM

## 2022-05-24 DIAGNOSIS — M54.50 CHRONIC LOW BACK PAIN, UNSPECIFIED BACK PAIN LATERALITY, UNSPECIFIED WHETHER SCIATICA PRESENT: ICD-10-CM

## 2022-05-24 DIAGNOSIS — I10 PRIMARY HYPERTENSION: Primary | ICD-10-CM

## 2022-05-24 DIAGNOSIS — E78.2 MIXED HYPERLIPIDEMIA: ICD-10-CM

## 2022-05-24 LAB — IGE SERPL-ACNC: 501 IU/ML (ref 6–495)

## 2022-05-24 PROCEDURE — 99214 OFFICE O/P EST MOD 30 MIN: CPT | Performed by: FAMILY MEDICINE

## 2022-05-26 ENCOUNTER — HOSPITAL ENCOUNTER (OUTPATIENT)
Dept: PHYSICAL THERAPY | Facility: HOSPITAL | Age: 77
Setting detail: THERAPIES SERIES
Discharge: HOME | End: 2022-05-26

## 2022-05-26 DIAGNOSIS — S76.311A HAMSTRING STRAIN, RIGHT, INITIAL ENCOUNTER: ICD-10-CM

## 2022-05-26 DIAGNOSIS — M47.12 CERVICAL SPONDYLOSIS WITH MYELOPATHY: ICD-10-CM

## 2022-05-26 DIAGNOSIS — M48.062 SPINAL STENOSIS OF LUMBAR REGION WITH NEUROGENIC CLAUDICATION: Primary | ICD-10-CM

## 2022-05-26 DIAGNOSIS — R29.898 WEAKNESS OF RIGHT LOWER EXTREMITY: ICD-10-CM

## 2022-05-26 DIAGNOSIS — M25.60 RANGE OF MOTION DEFICIT: ICD-10-CM

## 2022-05-26 PROCEDURE — 97110 THERAPEUTIC EXERCISES: CPT

## 2022-05-26 NOTE — THERAPY TREATMENT NOTE
"    Outpatient Physical Therapy Ortho Treatment Note  [facility]     Patient Name: Omar Choudhary  : 1945  MRN: [id number]  Today's Date: 2022      Visit Date: 2022    Visit Dx:    ICD-10-CM ICD-9-CM   1. Spinal stenosis of lumbar region with neurogenic claudication  M48.062 724.03   2. Weakness of right lower extremity  R29.898 729.89   3. Cervical spondylosis with myelopathy  M47.12 721.1   4. Range of motion deficit  M25.60 719.50   5. Hamstring strain, right, initial encounter  S76.311A 843.8       Patient Active Problem List   Diagnosis   • Benign prostatic hyperplasia   • Bursitis   • Depression   • Diverticulosis   • Family history of malignant neoplasm of breast   • Hyperlipidemia   • Hypertension   • Internal derangement of right knee   • Knee effusion   • Asthma   • Obstructive sleep apnea syndrome   • Osteoarthritis   • Osteopenia   • Pain in knee   • Postnasal drip   • Prepatellar bursitis   • Sciatica of right side   • Diaphragm paralysis   • Spinal stenosis of lumbar region with neurogenic claudication   • Degenerative lumbar spinal stenosis   • Anxiety   • Pinguecula of right eye        Past Medical History:   Diagnosis Date   • Allergic    • Arthritis    • Asthma    • Cataract    • Depression    • Family history of malignant neoplasm of breast 2019   • Hemidiaphragm paralysis     Left   • Hyperlipidemia 2019   • Hypertension 3/1/2012   • Leg pain, right    • Low back pain    • Osteopenia    • Reactive airway disease 1/15/2016   • Shortness of breath    • Sleep apnea, obstructive         Past Surgical History:   Procedure Laterality Date   • KNEE ARTHROSCOPY W/ ACL RECONSTRUCTION     • MOUTH SURGERY                          PT Assessment/Plan     Row Name 22 1355          PT Assessment    Assessment Comments Mr. Choudhary has been seen 11 times for spinal stenosis, had earlier episode of pain in back (7/10), before he came today, but states he did \"his move\" and " got it to relieve. Pt moves into full extension and some rotation to relieve pain.  He has this happen again when getting up from ex table without log rolling. Otherwise, he is does well with tolerating all of his exercises.  And states he is trying to modify his habits of bending over.  -LP     Please refer to paper survey for additional self-reported information Yes  -LP     Rehab Potential Good  -LP     Patient/caregiver participated in establishment of treatment plan and goals Yes  -LP     Patient would benefit from skilled therapy intervention Yes  -LP            PT Plan    PT Frequency 2x/week  -LP     PT Plan Comments Continue to progress core strengthening.  -LP           User Key  (r) = Recorded By, (t) = Taken By, (c) = Cosigned By    Initials Name Provider Type    LP Tiffany Freeman, PT Physical Therapist                   OP Exercises     Row Name 05/26/22 1100             Subjective Comments    Subjective Comments I was a 7/10 just before coming here, but I did my extension move and it was better  -LP              Subjective Pain    Able to rate subjective pain? yes  -LP      Pre-Treatment Pain Level 3  -LP              Total Minutes    00274 - PT Therapeutic Exercise Minutes 40  -LP              Exercise 1    Exercise Name 1 LTR  -LP      Cueing 1 Verbal;Demo  -LP      Reps 1 10  -LP      Time 1 5 sec  -LP              Exercise 3    Exercise Name 3 HL Piriformis stretch  -LP      Cueing 3 Verbal;Tactile  -LP      Reps 3 3  -LP      Time 3 20s  -LP              Exercise 6    Exercise Name 6 Nustep L5  -LP      Cueing 6 Verbal;Demo  -LP      Time 6 5 min  -LP              Exercise 8    Exercise Name 8 added HL hip add w/small ball  -LP      Cueing 8 Verbal;Tactile  -LP      Sets 8 2  -LP      Reps 8 10ea  -LP      Time 8 3-5s  -LP      Additional Comments cuing for TA  -LP              Exercise 9    Exercise Name 9 added HL hip abd w/resistance band  -LP      Cueing 9 Verbal;Tactile  -LP      Sets 9 2   -LP      Reps 9 10  -LP      Time 9 red, cued TA. 3 sec  -LP              Exercise 10    Exercise Name 10 STS  -LP      Cueing 10 Verbal;Demo  -LP      Sets 10 1  -LP      Reps 10 10  -LP      Additional Comments cuing for T-A recruitment and glut squeeze  -LP              Exercise 11    Exercise Name 11 Seated HS stretch  -LP      Cueing 11 Verbal;Demo  -LP      Reps 11 3  -LP      Time 11 20 sec  -LP              Exercise 12    Exercise Name 12 Supine hip flex with SPPT  -LP      Cueing 12 Verbal;Tactile;Demo  -LP      Reps 12 12  -LP      Additional Comments then added opp UE flexion with knee flex; supported dying bug  -LP              Exercise 14    Exercise Name 14 Doorway pec stretch in lunge position for hip flexor stretch  -LP      Cueing 14 Verbal;Demo  -LP      Time 14 20s  -LP            User Key  (r) = Recorded By, (t) = Taken By, (c) = Cosigned By    Initials Name Provider Type    LP Tiffany Freeman, PT Physical Therapist                              PT OP Goals     Row Name 05/26/22 1300          PT Short Term Goals    STG Date to Achieve 03/23/22  -LP     STG 1 Pt will be independent with initial HEP.  -LP     STG 1 Progress Met  -LP     STG 2 Pt will demonstrate correct sitting and standing posture to reduce strain on lumbar spine.  -LP     STG 2 Progress Partially Met  -LP     STG 3 Pt will demonstrate good body mechanics with bending, lifting and reaching ADLs  to reduce strain on spine.  -LP     STG 3 Progress Progressing  -LP     STG 3 Progress Comments pt did not use log roll at first today, and had immediate pain at LB  -LP            Long Term Goals    LTG Date to Achieve 04/22/22  -LP     LTG 1 Pt will be independent with advanced HEP for spinal stabilization for improved self-management of symptoms.  -LP     LTG 1 Progress Ongoing  -LP     LTG 2 Pt will report 50% decrease in pain/radicular symptoms.  -LP     LTG 2 Progress Ongoing  -LP     LTG 3 Pt will score 54% or less on BENEDICT  indicating decrease in perceived functional disability.  -LP     LTG 3 Progress Ongoing  -LP     LTG 4 Pt will demonstrate increased R LE strength to 4/5 or better.  -LP     LTG 4 Progress Ongoing  -LP           User Key  (r) = Recorded By, (t) = Taken By, (c) = Cosigned By    Initials Name Provider Type    LP Tiffany Freeman, PT Physical Therapist                Therapy Education  Education Details: discussed keeping neutral back for more things; log rolling in bed, reaching for things  Given: HEP, Posture/body mechanics  Program: Reinforced, New  How Provided: Verbal, Demonstration  Provided to: Patient  Level of Understanding: Teach back education performed              Time Calculation:   Start Time: 1130  Stop Time: 1215  Time Calculation (min): 45 min  Timed Charges  05022 - PT Therapeutic Exercise Minutes: 40  Total Minutes  Timed Charges Total Minutes: 40   Total Minutes: 40  Therapy Charges for Today     Code Description Service Date Service Provider Modifiers Qty    03759963883 HC PT THER PROC EA 15 MIN 5/26/2022 Tiffany Freeman, PT GP 3                    Tiffany Freeman PT  5/26/2022

## 2022-05-31 ENCOUNTER — HOSPITAL ENCOUNTER (OUTPATIENT)
Dept: PHYSICAL THERAPY | Facility: HOSPITAL | Age: 77
Setting detail: THERAPIES SERIES
Discharge: HOME | End: 2022-05-31

## 2022-05-31 DIAGNOSIS — M48.062 SPINAL STENOSIS OF LUMBAR REGION WITH NEUROGENIC CLAUDICATION: Primary | ICD-10-CM

## 2022-05-31 DIAGNOSIS — M25.60 RANGE OF MOTION DEFICIT: ICD-10-CM

## 2022-05-31 DIAGNOSIS — M47.12 CERVICAL SPONDYLOSIS WITH MYELOPATHY: ICD-10-CM

## 2022-05-31 DIAGNOSIS — R29.898 WEAKNESS OF RIGHT LOWER EXTREMITY: ICD-10-CM

## 2022-05-31 DIAGNOSIS — S76.311A HAMSTRING STRAIN, RIGHT, INITIAL ENCOUNTER: ICD-10-CM

## 2022-05-31 PROCEDURE — 97110 THERAPEUTIC EXERCISES: CPT

## 2022-06-02 ENCOUNTER — HOSPITAL ENCOUNTER (OUTPATIENT)
Dept: PHYSICAL THERAPY | Facility: HOSPITAL | Age: 77
Setting detail: THERAPIES SERIES
Discharge: HOME | End: 2022-06-02

## 2022-06-02 DIAGNOSIS — R29.898 WEAKNESS OF RIGHT LOWER EXTREMITY: ICD-10-CM

## 2022-06-02 DIAGNOSIS — M48.062 SPINAL STENOSIS OF LUMBAR REGION WITH NEUROGENIC CLAUDICATION: Primary | ICD-10-CM

## 2022-06-02 PROCEDURE — 97110 THERAPEUTIC EXERCISES: CPT

## 2022-06-02 NOTE — THERAPY TREATMENT NOTE
Outpatient Physical Therapy Ortho Treatment Note  [facility]     Patient Name: Omar Choudhary  : 1945  MRN: [id number]  Today's Date: 2022      Visit Date: 2022    Visit Dx:    ICD-10-CM ICD-9-CM   1. Spinal stenosis of lumbar region with neurogenic claudication  M48.062 724.03   2. Weakness of right lower extremity  R29.898 729.89       Patient Active Problem List   Diagnosis   • Benign prostatic hyperplasia   • Bursitis   • Depression   • Diverticulosis   • Family history of malignant neoplasm of breast   • Hyperlipidemia   • Hypertension   • Internal derangement of right knee   • Knee effusion   • Asthma   • Obstructive sleep apnea syndrome   • Osteoarthritis   • Osteopenia   • Pain in knee   • Postnasal drip   • Prepatellar bursitis   • Sciatica of right side   • Diaphragm paralysis   • Spinal stenosis of lumbar region with neurogenic claudication   • Degenerative lumbar spinal stenosis   • Anxiety   • Pinguecula of right eye        Past Medical History:   Diagnosis Date   • Allergic    • Arthritis    • Asthma    • Cataract    • Depression    • Family history of malignant neoplasm of breast 2019   • Hemidiaphragm paralysis     Left   • Hyperlipidemia 2019   • Hypertension 3/1/2012   • Leg pain, right    • Low back pain    • Osteopenia    • Reactive airway disease 1/15/2016   • Shortness of breath    • Sleep apnea, obstructive         Past Surgical History:   Procedure Laterality Date   • KNEE ARTHROSCOPY W/ ACL RECONSTRUCTION     • MOUTH SURGERY                          PT Assessment/Plan     Row Name 22 1500          PT Assessment    Assessment Comments Pt with low pain levels today and slight improvement in symptoms over past several weeks. However, pt continues to voice frustration regarding pain levels with activities such as biking. Pt I with current treatment plan and discussed hold on PT until his return to neurosurgeon next week. Pt likely nearing plateau with  skilled PT at this time and may return pending MD recommendations.  -CN            PT Plan    PT Plan Comments Hold on PT until visit with neurosurgeon next week.  -CN           User Key  (r) = Recorded By, (t) = Taken By, (c) = Cosigned By    Initials Name Provider Type    Tere Murray, PT Physical Therapist                   OP Exercises     Row Name 06/02/22 1100             Subjective Comments    Subjective Comments I am good today. I had some pain bending over to get my shoes. I go to the neurosurgeon next week.  -CN              Subjective Pain    Able to rate subjective pain? yes  -CN      Pre-Treatment Pain Level 2  -CN              Total Minutes    61358 - PT Therapeutic Exercise Minutes 40  -CN              Exercise 1    Exercise Name 1 LTR  -CN      Cueing 1 Verbal;Demo  -CN      Reps 1 10  -CN      Time 1 5 sec  -CN              Exercise 3    Exercise Name 3 HL Piriformis stretch  -CN      Cueing 3 Verbal;Tactile  -CN      Reps 3 3  -CN      Time 3 20s  -CN              Exercise 6    Exercise Name 6 Nustep L5  -CN      Cueing 6 Verbal;Demo  -CN      Time 6 5 min  -CN              Exercise 8    Exercise Name 8 HL hip add w/small ball  -CN      Cueing 8 Verbal;Tactile  -CN      Sets 8 3  -CN      Reps 8 10ea  -CN      Time 8 3-5s  -CN      Additional Comments B/uni  -CN              Exercise 9    Exercise Name 9 HL hip abd w/resistance band  -CN      Cueing 9 Verbal;Tactile  -CN      Sets 9 3  -CN      Reps 9 10  -CN      Time 9 red, cued TA. 3 sec  -CN      Additional Comments Cristian/uni  -CN              Exercise 10    Exercise Name 10 STS  -CN      Cueing 10 Verbal;Demo  -CN      Reps 10 10  -CN      Additional Comments cuing for T-A recruitment and glute squeeze; on foam with L2 ball flex  -CN              Exercise 11    Exercise Name 11 Seated HS stretch  -CN      Cueing 11 Verbal;Demo  -CN      Reps 11 3  -CN      Time 11 20 sec  -CN              Exercise 12    Exercise Name 12 Supine hip  flex with SPPT  -CN      Cueing 12 Verbal;Tactile;Demo  -CN      Reps 12 12  -CN              Exercise 13    Exercise Name 13 added hip bridge w/TA  -CN      Cueing 13 Verbal;Demo  -CN      Sets 13 1 set w/hip add, 1 set w/hip abd  -CN      Reps 13 15 ea  -CN              Exercise 16    Exercise Name 16 Given updated HEP, indications for return to PT  -CN            User Key  (r) = Recorded By, (t) = Taken By, (c) = Cosigned By    Initials Name Provider Type    Tere Murray, CHAYO Physical Therapist                              PT OP Goals     Row Name 06/02/22 1400          PT Short Term Goals    STG Date to Achieve 03/23/22  -CN     STG 1 Pt will be independent with initial HEP.  -CN     STG 1 Progress Met  -CN     STG 2 Pt will demonstrate correct sitting and standing posture to reduce strain on lumbar spine.  -CN     STG 2 Progress Partially Met  -CN     STG 3 Pt will demonstrate good body mechanics with bending, lifting and reaching ADLs  to reduce strain on spine.  -CN     STG 3 Progress Progressing  -CN            Long Term Goals    LTG Date to Achieve 04/22/22  -CN     LTG 1 Pt will be independent with advanced HEP for spinal stabilization for improved self-management of symptoms.  -CN     LTG 1 Progress Met  -CN     LTG 1 Progress Comments Given updated HEP.  -CN     LTG 2 Pt will report 50% decrease in pain/radicular symptoms.  -CN     LTG 2 Progress Ongoing  -CN     LTG 2 Progress Comments Conitnues with radicular symptoms with flex/L rot.  -CN     LTG 3 Pt will score 54% or less on BENEDICT indicating decrease in perceived functional disability.  -CN     LTG 3 Progress Ongoing  -CN     LTG 4 Pt will demonstrate increased R LE strength to 4/5 or better.  -CN     LTG 4 Progress Ongoing  -CN           User Key  (r) = Recorded By, (t) = Taken By, (c) = Cosigned By    Initials Name Provider Type    Tere Murray, CHAYO Physical Therapist                Therapy Education  Given:  Symptoms/condition management, Pain management, Posture/body mechanics, Mobility training  Program: Reinforced, Progressed  How Provided: Verbal, Demonstration  Provided to: Patient  Level of Understanding: Verbalized, Demonstrated              Time Calculation:   Start Time: 1132  Stop Time: 1215  Time Calculation (min): 43 min  Timed Charges  79839 - PT Therapeutic Exercise Minutes: 40  Total Minutes  Timed Charges Total Minutes: 40   Total Minutes: 40  Therapy Charges for Today     Code Description Service Date Service Provider Modifiers Qty    01639932587  PT THER PROC EA 15 MIN 6/2/2022 Tere Granger, PT GP 3                    Tere Granger, PT  6/2/2022

## 2022-06-05 DIAGNOSIS — M48.061 DEGENERATIVE LUMBAR SPINAL STENOSIS: ICD-10-CM

## 2022-06-06 ENCOUNTER — TELEPHONE (OUTPATIENT)
Dept: FAMILY MEDICINE CLINIC | Facility: CLINIC | Age: 77
End: 2022-06-06

## 2022-06-06 DIAGNOSIS — M48.061 DEGENERATIVE LUMBAR SPINAL STENOSIS: ICD-10-CM

## 2022-06-06 RX ORDER — GABAPENTIN 300 MG/1
CAPSULE ORAL
Qty: 120 CAPSULE | Refills: 5 | Status: SHIPPED | OUTPATIENT
Start: 2022-06-06 | End: 2022-06-06

## 2022-06-06 RX ORDER — GABAPENTIN 300 MG/1
CAPSULE ORAL
Qty: 120 CAPSULE | Refills: 5 | Status: SHIPPED | OUTPATIENT
Start: 2022-06-06 | End: 2023-04-04

## 2022-06-06 RX ORDER — GABAPENTIN 300 MG/1
CAPSULE ORAL
Qty: 90 CAPSULE | Refills: 0 | Status: SHIPPED | OUTPATIENT
Start: 2022-06-06 | End: 2022-06-06 | Stop reason: SDUPTHER

## 2022-06-06 RX ORDER — SILDENAFIL CITRATE 20 MG/1
TABLET ORAL
Qty: 50 TABLET | Refills: 0 | Status: SHIPPED | OUTPATIENT
Start: 2022-06-06 | End: 2022-08-23

## 2022-06-06 NOTE — TELEPHONE ENCOUNTER
Caller: Omar Choudhary    Relationship: Self    Best call back number: [phone]      Which medication are you concerned about: gabapentin (NEURONTIN) 300 MG capsule    Who prescribed you this medication: DR CATES     What are your concerns: PHARMACY NEEDS VERBAL OKAY TO FILL EARLY. PATIENT STATES HE WAS TOLD HE COULD START TAKING AN EXTRA PILL AROUND LUNCHTIME EACH DAY AND IS NOW OUT    How long have you had these concerns:   [id number] - [place], [place] - [address]  - [phone]  - [phone]   [phone]

## 2022-06-06 NOTE — PROGRESS NOTES
Neurosurgical Consultation      Omar Choudhary is a 76 y.o. male is here today for follow-up. He has back, buttock and leg pain.    Chief Complaint   Patient presents with   • Back Pain     Bilateral buttock and leg pain        Previous treatment: Physical therapy, Lumbar MIKEY, Lidocaine patches, Norco, Pilate's.  Bicycling, Gabapentin, NSAIDS,    HPI: Is a 76-year-old gentleman who presents to the neurosurgery clinic to follow-up on lumbar spinal stenosis with neurogenic claudication and radiculopathy.  He was last evaluated by me on February 8, 2022.  At that juncture his quality of life was diminished by the pathology however we elected to continue pursuing aggressive conservative measures with hopes of avoiding surgical intervention.  I recommended a full course of physical therapy which he thoroughly enveloped himself in.  He does note overall improvement in his symptomatology.  This did allow him to take a trip to [place] and while there ambulate extensively as well as biking extensively.  He does note that even with this improvement from the physical therapy he does continue to have decreased overall physical abilities secondary to claudication.  He also has right sided leg pain that is intermittent but severe in nature.  The pain radiates down his right leg most consistent with a L4 nerve root distribution.  This comes on with specific movements and he does get some relief with leaning to the right and extension posteriorly.  He does keep a cane with him on a relatively regular basis at this point just in case he has the nerve root irritation that sometimes causes his leg to give out.  However with all this history he does still note that he continues to be able to bike up to 18 miles a day at this juncture.  The patient notes that he was recently seen by the pain management specialist Dr. Kwong who felt as though there were limited interventions from his perspective that could provide significant  improvement in his quality of life.    Past Medical History:   Diagnosis Date   • Allergic    • Arthritis    • Asthma    • Cataract    • Depression    • Family history of malignant neoplasm of breast 9/26/2019   • Hemidiaphragm paralysis     Left   • Hyperlipidemia 9/26/2019   • Hypertension 3/1/2012   • Leg pain, right    • Low back pain    • Osteopenia    • Reactive airway disease 1/15/2016   • Shortness of breath    • Sleep apnea, obstructive         Past Surgical History:   Procedure Laterality Date   • KNEE ARTHROSCOPY W/ ACL RECONSTRUCTION     • MOUTH SURGERY          Current Outpatient Medications on File Prior to Visit   Medication Sig Dispense Refill   • acetaminophen (TYLENOL) 500 MG tablet Take 500 mg by mouth Every 6 (Six) Hours As Needed for Mild Pain .     • albuterol sulfate  (90 Base) MCG/ACT inhaler Inhale 2 puffs Every 4 (Four) Hours As Needed for Wheezing or Shortness of Air. 18 g 3   • Alpha-Lipoic Acid 100 MG capsule Take  by mouth.     • B Complex Vitamins (VITAMIN B COMPLEX) capsule capsule Take  by mouth Daily.     • Budeson-Glycopyrrol-Formoterol (Breztri Aerosphere) 160-9-4.8 MCG/ACT aerosol inhaler Inhale 2 puffs 2 (Two) Times a Day. 2 each 0   • budesonide-formoterol (SYMBICORT) 160-4.5 MCG/ACT inhaler Inhale 2 puffs 2 (Two) Times a Day. 120 each 12   • Calcium Carb-Cholecalciferol (calcium-vitamin D) 500-200 MG-UNIT per tablet Take 2 tablets by mouth Daily. 180 tablet 3   • Carboxymethylcellul-Glycerin 0.5-0.9 % solution Apply  to eye(s) as directed by provider Every 8 (Eight) Hours.     • clonazePAM (KlonoPIN) 0.5 MG tablet TAKE ONE TABLET BY MOUTH TWICE A DAY AS NEEDED FOR ANXIETY 30 tablet 2   • coenzyme Q10 50 MG capsule capsule Take  by mouth Daily.     • diphenhydrAMINE HCl (ANTI-HIST PO) Take 1 tablet by mouth Daily As Needed.     • gabapentin (NEURONTIN) 300 MG capsule take 1 capsule by mouth every morning, 1 capsule in afternoon and 2 capsules at night 120 capsule 5   •  HYDROcodone-acetaminophen (NORCO) 7.5-325 MG per tablet Take 1 tablet by mouth 2 (Two) Times a Day As Needed for Moderate Pain . 60 tablet 0   • lidocaine (LIDODERM) 5 % Place 1 patch on the skin as directed by provider Daily. Remove & Discard patch within 12 hours or as directed by MD 30 each 1   • losartan-hydrochlorothiazide (HYZAAR) 100-25 MG per tablet TAKE 1 TABLET BY MOUTH DAILY 90 tablet 0   • meloxicam (MOBIC) 15 MG tablet TAKE 1 TABLET BY MOUTH DAILY 90 tablet 0   • montelukast (SINGULAIR) 10 MG tablet TAKE 1 TABLET BY MOUTH DAILY 90 tablet 0   • Omega-3 Fatty Acids (FISH OIL) 1000 MG capsule capsule Take  by mouth Daily With Breakfast.     • pravastatin (PRAVACHOL) 10 MG tablet Take 1 tablet by mouth Daily. 90 tablet 3   • Red Yeast Rice 600 MG capsule Take  by mouth.     • sildenafil (REVATIO) 20 MG tablet take 1 to 2 tablets by mouth as needed for erectile dysfunction 50 tablet 0   • theophylline (SAJAN-24) 300 MG 24 hr capsule Take 300 mg by mouth Daily.     • theophylline (THEODUR) 300 MG 12 hr tablet Take 300 mg by mouth Daily.     • triamcinolone (KENALOG) 0.1 % cream Apply  topically to the appropriate area as directed 2 (Two) Times a Day. 28.4 g 0     Current Facility-Administered Medications on File Prior to Visit   Medication Dose Route Frequency Provider Last Rate Last Admin   • theophylline (SAJAN-24) 24 hr capsule 300 mg  300 mg Oral Q24H Delia Ramon APRN            Allergies   Allergen Reactions   • Statins Myalgia        Social History     Socioeconomic History   • Marital status:    Tobacco Use   • Smoking status: Former Smoker     Packs/day: 0.50     Years: 10.00     Pack years: 5.00     Types: Cigarettes     Start date:      Quit date:      Years since quittin.4   • Smokeless tobacco: Never Used   Vaping Use   • Vaping Use: Never used   Substance and Sexual Activity   • Alcohol use: Yes     Alcohol/week: 12.0 standard drinks     Types: 10 Glasses of wine, 2  "Cans of beer per week   • Drug use: Never     Types: Marijuana, Mescaline, Psilocybin     Comment: 50 years ago while in college    • Sexual activity: Yes          Review of Systems   Respiratory: Negative for chest tightness and shortness of breath.    Cardiovascular: Negative for chest pain.   Musculoskeletal: Positive for back pain.        Buttock & leg pain        Physical Examination:     Vitals:    06/07/22 1237   BP: 143/75   Pulse: 61   Resp: 18   Temp: 97.4 °F (36.3 °C)   SpO2: 98%   Weight: 84.4 kg (186 lb)   Height: 167.6 cm (65.98\")        Physical Exam     Neurological Exam   Patient's neurologic exam is stable compared to my last evaluation without any new red flag signs or symptoms.    Result Review  The following data was reviewed by: John Do MD on 06/07/2022:    Data reviewed: Radiologic studies No new imaging obtained today.  We reviewed the MRI cervical and thoracic spine as well as lumbar flexion-extension and scoliosis x-rays.     Assessment/plan:  This is a 76-year-old gentleman with focal coronal lumbar scoliosis with severe multilevel spinal canal stenosis resulting in neurogenic claudication and radiculopathy.  He continues to aggressively engage with conservative measures including physical therapy.  He does get benefit from this however his quality of life still does remain depressed compared to approximately a year ago.  I had an extensive discussion with him about surgical options and after explanation of the possibilities as well as risks and benefits we are going to defer until later this year.  I have encouraged him to continue attempting as much physical activity as possible to evaluate for the deficits that are being caused by the lumbar spine.  He will work to return to my clinic in mid September for reevaluation.  If he has any questions or concerns or significant decline between now and then he should call the clinic to schedule an appointment.    Diagnoses and all orders " for this visit:    1. Spinal stenosis of lumbar region with neurogenic claudication (Primary)    2. Degenerative lumbar spinal stenosis    3. Lumbar radiculopathy, chronic         Return in about 3 months (around 9/7/2022).            John Do MD

## 2022-06-07 ENCOUNTER — OFFICE VISIT (OUTPATIENT)
Dept: NEUROSURGERY | Facility: CLINIC | Age: 77
End: 2022-06-07

## 2022-06-07 VITALS
WEIGHT: 186 LBS | BODY MASS INDEX: 29.89 KG/M2 | DIASTOLIC BLOOD PRESSURE: 75 MMHG | HEIGHT: 66 IN | OXYGEN SATURATION: 98 % | RESPIRATION RATE: 18 BRPM | HEART RATE: 61 BPM | SYSTOLIC BLOOD PRESSURE: 143 MMHG | TEMPERATURE: 97.4 F

## 2022-06-07 DIAGNOSIS — M54.16 LUMBAR RADICULOPATHY, CHRONIC: ICD-10-CM

## 2022-06-07 DIAGNOSIS — M48.061 DEGENERATIVE LUMBAR SPINAL STENOSIS: ICD-10-CM

## 2022-06-07 DIAGNOSIS — M48.062 SPINAL STENOSIS OF LUMBAR REGION WITH NEUROGENIC CLAUDICATION: Primary | ICD-10-CM

## 2022-06-07 PROCEDURE — 99214 OFFICE O/P EST MOD 30 MIN: CPT | Performed by: NEUROLOGICAL SURGERY

## 2022-06-07 RX ORDER — THEOPHYLLINE 300 MG/1
300 TABLET, EXTENDED RELEASE ORAL DAILY
COMMUNITY
Start: 2022-05-26 | End: 2023-01-19 | Stop reason: SDUPTHER

## 2022-07-01 RX ORDER — LOSARTAN POTASSIUM AND HYDROCHLOROTHIAZIDE 25; 100 MG/1; MG/1
1 TABLET ORAL DAILY
Qty: 90 TABLET | Refills: 0 | Status: SHIPPED | OUTPATIENT
Start: 2022-07-01 | End: 2022-10-14 | Stop reason: HOSPADM

## 2022-07-01 RX ORDER — MELOXICAM 15 MG/1
15 TABLET ORAL EVERY 24 HOURS
Qty: 90 TABLET | Refills: 0 | Status: ON HOLD | OUTPATIENT
Start: 2022-07-01 | End: 2022-10-04

## 2022-07-12 DIAGNOSIS — J45.909 ASTHMA, UNSPECIFIED ASTHMA SEVERITY, UNSPECIFIED WHETHER COMPLICATED, UNSPECIFIED WHETHER PERSISTENT: Primary | ICD-10-CM

## 2022-07-12 RX ORDER — BUDESONIDE AND FORMOTEROL FUMARATE DIHYDRATE 160; 4.5 UG/1; UG/1
2 AEROSOL RESPIRATORY (INHALATION)
Qty: 120 EACH | Refills: 3 | Status: ON HOLD | OUTPATIENT
Start: 2022-07-12 | End: 2022-10-04

## 2022-07-14 ENCOUNTER — TELEPHONE (OUTPATIENT)
Dept: FAMILY MEDICINE CLINIC | Facility: CLINIC | Age: 77
End: 2022-07-14

## 2022-07-14 RX ORDER — CYCLOBENZAPRINE HCL 10 MG
10 TABLET ORAL 3 TIMES DAILY PRN
Qty: 30 TABLET | Refills: 1 | Status: SHIPPED | OUTPATIENT
Start: 2022-07-14 | End: 2022-08-26 | Stop reason: SDUPTHER

## 2022-07-14 NOTE — TELEPHONE ENCOUNTER
Caller: Omar Choudhary    Relationship: Self    Best call back number: [phone]    What medication are you requesting: MUSCLE RELAXER    What are your current symptoms: HIPS TO SHINS ARE KNOTTED AND PAINFUL, HARD TO BE MOBILE    How long have you been experiencing symptoms: 3-4 DAYS    Have you had these symptoms before:    [] Yes  [x] No    Have you been treated for these symptoms before:   [] Yes  [x] No    If a prescription is needed, what is your preferred pharmacy and phone number: [id number] - [place], [place] - [address] - [phone] [facility] [phone] FX     Additional notes:TRIED SLANPAS AND MASSAGE WITH MINIMAL RELIEF

## 2022-07-21 ENCOUNTER — TREATMENT (OUTPATIENT)
Dept: PHYSICAL THERAPY | Facility: CLINIC | Age: 77
End: 2022-07-21

## 2022-07-21 DIAGNOSIS — M48.062 SPINAL STENOSIS OF LUMBAR REGION WITH NEUROGENIC CLAUDICATION: Primary | ICD-10-CM

## 2022-07-21 PROCEDURE — 97110 THERAPEUTIC EXERCISES: CPT | Performed by: PHYSICAL THERAPIST

## 2022-07-21 PROCEDURE — 97112 NEUROMUSCULAR REEDUCATION: CPT | Performed by: PHYSICAL THERAPIST

## 2022-07-21 NOTE — PROGRESS NOTES
Re-Assessment / Re-Certification        Patient: Omar Choudhary   : 1945  Diagnosis/ICD-10 Code:  Spinal stenosis of lumbar region with neurogenic claudication [M48.062]  Referring practitioner: John Do MD  Date of Initial Visit: Type: THERAPY  Noted: 2022  Today's Date: 2022  Patient seen for 14 sessions      Subjective:     Subjective Questionnaire: Oswestry: 56%  Clinical Progress: unchanged  Home Program Compliance: Yes  Treatment has included: therapeutic exercise, neuromuscular re-education, manual therapy and electrical stimulation    Subjective 75 yo male with c/o LBP and R LE pain. Pt with a long history of LBP. Pt has tried a few rounds of epidurals and various other therapies, no improvement. Pt referred to therapy in [place], which helped significantly. Was doing well last summer. Pt was able to take a trip to [place]. Has been riding his E bike consistently, which has been going well. Pt with insidious exacerbation ~1 month ago. Having pain from his R hip, along the lateral R LE to his ankle. 3/10 pain now and 8/10 at worst. Pt's goal is to improve walking tolerance and return to using his E bike, manage symptoms. Using lumbar brace prn.   MD follow up: prn  Objective          Active Range of Motion     Additional Active Range of Motion Details  Flexion and bilateral SB limited    Strength/Myotome Testing     Right Hip   Planes of Motion   Flexion: 4  Abduction: 4    Right Knee   Flexion: 4+  Extension: 4+    Right Ankle/Foot   Dorsiflexion: 4+  Plantar flexion: 4+    Tests     Lumbar     Right   Positive passive SLR.    Posture is guarded   Further testing deferred due to c/o pain.  Assessment & Plan     Assessment  Impairments: abnormal coordination, abnormal gait, abnormal muscle tone, abnormal or restricted ROM, activity intolerance, impaired physical strength, lacks appropriate home exercise program and pain with function  Functional Limitations: carrying objects,  lifting, sleeping, walking, pulling, pushing, moving in bed, sitting, standing, stooping and unable to perform repetitive tasks  Assessment details: 75 yo male with c/o LBP. Pt is with a good response to treatment this date and would benefit from further evaluation and treatment to address the above impairments.    Prognosis: fair    Goals  Plan Goals: New Goals  Short term goals, 1 week: Tolerate HEP progression.  Voice compliance with activity modification.  Report improvement in symptoms.    Long term goals, 6 weeks: Improve RIO score to 42%.  AROM to approach wfl.  4+/5 strength or better throughout.  Symptoms to show signs of centralization.  Independent with HEP.    Plan  Therapy options: will be seen for skilled therapy services  Other planned modality interventions: modalities prn  Planned therapy interventions: abdominal trunk stabilization, body mechanics training, flexibility, functional ROM exercises, home exercise program, manual therapy, neuromuscular re-education, postural training, strengthening, stretching and therapeutic activities  Frequency: 2x week  Duration in weeks: 6  Treatment plan discussed with: patient         Recommendations: Resume therapy    Kendall Brown, PT, DPT, OCS  [place] license: [id number]  Physical Therapist      Based upon review of the patient's progress and continued therapy plan, it is my medical opinion that Omar Choudhary should continue physical therapy treatment at [facility]  [address] [place] [address] [phone].    Provider: _____________________________    John Do MD     Timed:         Manual Therapy:         mins  78918;     Therapeutic Exercise:    40     mins  62394;     Neuromuscular Nayeli:    13    mins  18675;    Therapeutic Activity:          mins  61805;     Gait Training:           mins  82921;     Ultrasound:          mins  43016;    Ionto                                   mins    14278  Self Care                            mins   17764    Un-Timed:  Electrical Stimulation:         mins  59263 ( );  Traction          mins 72745  Low Eval          Mins  48671  Mod Eval          Mins  44554  High Eval                            Mins  03614  Re-Eval                               mins  85549      Timed Treatment:   53   mins   Total Treatment:     53   mins

## 2022-07-26 ENCOUNTER — TREATMENT (OUTPATIENT)
Dept: PHYSICAL THERAPY | Facility: CLINIC | Age: 77
End: 2022-07-26

## 2022-07-26 DIAGNOSIS — M48.062 SPINAL STENOSIS OF LUMBAR REGION WITH NEUROGENIC CLAUDICATION: Primary | ICD-10-CM

## 2022-07-26 DIAGNOSIS — M25.60 RANGE OF MOTION DEFICIT: ICD-10-CM

## 2022-07-26 DIAGNOSIS — M47.12 CERVICAL SPONDYLOSIS WITH MYELOPATHY: ICD-10-CM

## 2022-07-26 DIAGNOSIS — R29.898 WEAKNESS OF RIGHT LOWER EXTREMITY: ICD-10-CM

## 2022-07-26 PROCEDURE — 97140 MANUAL THERAPY 1/> REGIONS: CPT | Performed by: PHYSICAL THERAPIST

## 2022-07-26 PROCEDURE — 97110 THERAPEUTIC EXERCISES: CPT | Performed by: PHYSICAL THERAPIST

## 2022-07-26 PROCEDURE — 97112 NEUROMUSCULAR REEDUCATION: CPT | Performed by: PHYSICAL THERAPIST

## 2022-07-26 NOTE — PROGRESS NOTES
Physical Therapy Daily Progress Note      Patient: Omar Choudhary   : 1945  Diagnosis/ICD-10 Code:  Spinal stenosis of lumbar region with neurogenic claudication [M48.062]  Referring practitioner: John Do MD  Date of Initial Visit: Type: THERAPY  Noted: 2022  Today's Date: 2022  Patient seen for 15 sessions             Subjective     Objective   See Exercise, Manual, and Modality Logs for complete treatment.       Assessment/Plan  Pt tolerated all activities well, especially positional traction and manual techniques.      Progress per Plan of Care           Timed:         Manual Therapy:    10     mins  94691;     Therapeutic Exercise:    20     mins  18317;     Neuromuscular Nayeli:    10    mins  39952;        Timed Treatment:   40   mins   Total Treatment:     40   mins        Gil Perry PTA  Physical Therapist Assistant

## 2022-07-29 ENCOUNTER — TREATMENT (OUTPATIENT)
Dept: PHYSICAL THERAPY | Facility: CLINIC | Age: 77
End: 2022-07-29

## 2022-07-29 DIAGNOSIS — R29.898 WEAKNESS OF RIGHT LOWER EXTREMITY: ICD-10-CM

## 2022-07-29 DIAGNOSIS — M25.60 RANGE OF MOTION DEFICIT: ICD-10-CM

## 2022-07-29 DIAGNOSIS — S76.311A HAMSTRING STRAIN, RIGHT, INITIAL ENCOUNTER: ICD-10-CM

## 2022-07-29 DIAGNOSIS — M48.062 SPINAL STENOSIS OF LUMBAR REGION WITH NEUROGENIC CLAUDICATION: Primary | ICD-10-CM

## 2022-07-29 DIAGNOSIS — M47.12 CERVICAL SPONDYLOSIS WITH MYELOPATHY: ICD-10-CM

## 2022-07-29 PROCEDURE — 97012 MECHANICAL TRACTION THERAPY: CPT | Performed by: PHYSICAL THERAPIST

## 2022-07-29 PROCEDURE — 97110 THERAPEUTIC EXERCISES: CPT | Performed by: PHYSICAL THERAPIST

## 2022-07-29 NOTE — PROGRESS NOTES
Physical Therapy Daily Treatment Note  Patient: Omar Choudhary   : 1945   Referring practitioner: John Do MD  Date of initial visit: Type: THERAPY  Noted: 2022   Today's date: 2022   Patient seen for 16 visits    Visit Diagnoses:    ICD-10-CM ICD-9-CM   1. Spinal stenosis of lumbar region with neurogenic claudication  M48.062 724.03   2. Weakness of right lower extremity  R29.898 729.89   3. Cervical spondylosis with myelopathy  M47.12 721.1   4. Range of motion deficit  M25.60 719.50   5. Hamstring strain, right, initial encounter  S76.311A 843.8       Subjective   Pt reports: Symptoms centralize with positional distraction. HEP going well.      Objective     See Exercise, Manual, and Modality Logs for complete treatment.     Patient Education: HEP    Assessment/Plan Good response to txn trial. Will continue next visit as appropriate. Rx limited by pt times constraints.      Progress per Plan of Care            Timed:         Manual Therapy:         mins  45519;     Therapeutic Exercise:    15     mins  07000;     Neuromuscular Nayeli:        mins  43895;    Therapeutic Activity:          mins  23316;     Gait Training:           mins  86295;     Ultrasound:          mins  75753;    Ionto                                   mins   33096  Self Care                            mins   66827    Un-Timed:  Electrical Stimulation:         mins  69970 ( );  Traction     15     mins 44768  Low Eval          Mins  22195  Mod Eval          Mins  06099  High Eval                            Mins  56994  Re-Eval                               mins  89287    Timed Treatment:   15   mins   Total Treatment:     30   mins      Kendall Brown, PT, DPT, OCS  [place] license: [id number]  Physical Therapist

## 2022-08-05 ENCOUNTER — TREATMENT (OUTPATIENT)
Dept: PHYSICAL THERAPY | Facility: CLINIC | Age: 77
End: 2022-08-05

## 2022-08-05 DIAGNOSIS — R29.898 WEAKNESS OF RIGHT LOWER EXTREMITY: ICD-10-CM

## 2022-08-05 DIAGNOSIS — M48.062 SPINAL STENOSIS OF LUMBAR REGION WITH NEUROGENIC CLAUDICATION: Primary | ICD-10-CM

## 2022-08-05 DIAGNOSIS — M47.12 CERVICAL SPONDYLOSIS WITH MYELOPATHY: ICD-10-CM

## 2022-08-05 PROCEDURE — 97012 MECHANICAL TRACTION THERAPY: CPT | Performed by: PHYSICAL THERAPIST

## 2022-08-05 PROCEDURE — 97110 THERAPEUTIC EXERCISES: CPT | Performed by: PHYSICAL THERAPIST

## 2022-08-05 NOTE — PROGRESS NOTES
Physical Therapy Daily Treatment Note  Patient: Omar Choudhary   : 1945   Referring practitioner: John Do MD  Date of initial visit: Type: THERAPY  Noted: 2022   Today's date: 2022   Patient seen for 17 visits    Visit Diagnoses:    ICD-10-CM ICD-9-CM   1. Spinal stenosis of lumbar region with neurogenic claudication  M48.062 724.03   2. Weakness of right lower extremity  R29.898 729.89   3. Cervical spondylosis with myelopathy  M47.12 721.1       Subjective   Pt reports: Exacerbation in LE pain the last day or so. Symptoms improve with positional distraction.      Objective     See Exercise, Manual, and Modality Logs for complete treatment.     Patient Education: HEP    Assessment/Plan Symptoms centralized post visit. Pt having some relief lying prone.      Progress per Plan of Care            Timed:         Manual Therapy:         mins  82745;     Therapeutic Exercise:    8     mins  83493;     Neuromuscular Nayeli:        mins  62830;    Therapeutic Activity:          mins  43928;     Gait Training:           mins  18755;     Ultrasound:          mins  72923;    Ionto                                   mins   17257  Self Care                            mins   77102    Un-Timed:  Electrical Stimulation:         mins  85465 ( );  Traction     15     mins 79258  Low Eval          Mins  72958  Mod Eval          Mins  22975  High Eval                            Mins  78485  Re-Eval                               mins  82410    Timed Treatment:   8   mins   Total Treatment:     23   mins      Kendall Brown, PT, DPT, OCS  [place] license: [id number]  Physical Therapist

## 2022-08-09 ENCOUNTER — TREATMENT (OUTPATIENT)
Dept: PHYSICAL THERAPY | Facility: CLINIC | Age: 77
End: 2022-08-09

## 2022-08-09 DIAGNOSIS — R29.898 WEAKNESS OF RIGHT LOWER EXTREMITY: ICD-10-CM

## 2022-08-09 DIAGNOSIS — M48.062 SPINAL STENOSIS OF LUMBAR REGION WITH NEUROGENIC CLAUDICATION: Primary | ICD-10-CM

## 2022-08-09 PROCEDURE — 97112 NEUROMUSCULAR REEDUCATION: CPT | Performed by: PHYSICAL THERAPIST

## 2022-08-09 PROCEDURE — 97012 MECHANICAL TRACTION THERAPY: CPT | Performed by: PHYSICAL THERAPIST

## 2022-08-09 PROCEDURE — 97110 THERAPEUTIC EXERCISES: CPT | Performed by: PHYSICAL THERAPIST

## 2022-08-09 NOTE — PROGRESS NOTES
Physical Therapy Daily Treatment Note  Patient: Omar Choudhary   : 1945   Referring practitioner: John Do MD  Date of initial visit: Type: THERAPY  Noted: 2022   Today's date: 2022   Patient seen for 18 visits    Visit Diagnoses:    ICD-10-CM ICD-9-CM   1. Spinal stenosis of lumbar region with neurogenic claudication  M48.062 724.03   2. Weakness of right lower extremity  R29.898 729.89       Subjective   Pt reports: Insidious exacerbation in LE pain over the weekend, improved as of today. HEP going well.      Objective     See Exercise, Manual, and Modality Logs for complete treatment.     Patient Education: HEP     Assessment/Plan Symptoms centralized post visit.      Progress per Plan of Care            Timed:         Manual Therapy:         mins  05642;     Therapeutic Exercise:    15     mins  50878;     Neuromuscular Nayeli:      8  mins  50110;    Therapeutic Activity:          mins  07455;     Gait Training:           mins  35652;     Ultrasound:          mins  67098;    Ionto                                   mins   28348  Self Care                            mins   71182    Un-Timed:  Electrical Stimulation:         mins  89257 ( );  Traction     15     mins 74226  Low Eval          Mins  67954  Mod Eval          Mins  48399  High Eval                            Mins  39587  Re-Eval                               mins  49858    Timed Treatment:   23   mins   Total Treatment:     38   mins      Kendall Brown PT, DPT, OCS  [place] license: [id number]  Physical Therapist

## 2022-08-10 ENCOUNTER — OFFICE VISIT (OUTPATIENT)
Dept: PAIN MEDICINE | Facility: CLINIC | Age: 77
End: 2022-08-10

## 2022-08-10 VITALS
HEART RATE: 76 BPM | OXYGEN SATURATION: 96 % | DIASTOLIC BLOOD PRESSURE: 79 MMHG | SYSTOLIC BLOOD PRESSURE: 141 MMHG | RESPIRATION RATE: 16 BRPM | WEIGHT: 186 LBS | BODY MASS INDEX: 30.04 KG/M2

## 2022-08-10 DIAGNOSIS — M48.061 SPINAL STENOSIS OF LUMBAR REGION WITHOUT NEUROGENIC CLAUDICATION: ICD-10-CM

## 2022-08-10 PROCEDURE — 99214 OFFICE O/P EST MOD 30 MIN: CPT | Performed by: STUDENT IN AN ORGANIZED HEALTH CARE EDUCATION/TRAINING PROGRAM

## 2022-08-10 RX ORDER — HYDROCODONE BITARTRATE AND ACETAMINOPHEN 7.5; 325 MG/1; MG/1
1 TABLET ORAL 2 TIMES DAILY PRN
Qty: 60 TABLET | Refills: 0 | Status: SHIPPED | OUTPATIENT
Start: 2022-08-10 | End: 2022-10-08 | Stop reason: HOSPADM

## 2022-08-10 NOTE — PROGRESS NOTES
CHIEF COMPLAINT  Chief Complaint   Patient presents with   • Back Pain     3 month f/u for Spinal stenosis of Lumbar region treated w/Hydrocodone 8/10 @ 12 pm        Primary Care  Dennis Kwong MD    Subjective   Omar Choudhary is a 77 y.o. male  who presents for chronic low back pain with both axial and radicular features.  He states that he has had intermittent pain for many years however he has not sought any treatment.  Recently, his pain began to impact his activities of daily living and he sought treatment from his primary care in the form of an MRI.  It showed substantial degenerative changes including severe stenosis at multiple levels as well as foraminal narrowing.  He also has severe scoliosis and severe degenerative disc disease with facet arthropathy.  He received 1 epidural steroid injection from an outside pain provider which did provide him significant benefit however he was discharged to do a failed drug screen.  He admits that he had taken a oxycodone that he had leftover from surgery and forgot to inform the previous provider of this.  He denies any significant numbness or tingling or weakness but does complain of axial type back pain with radiation in the buttock bilaterally.    Back Pain  Pertinent negatives include no numbness or weakness.   Pain  Associated symptoms include arthralgias and myalgias. Pertinent negatives include no numbness or weakness.        Location: Low back with radiation in the buttock bilaterally  Onset: Years ago  Duration: Progressively worsening  Timing: Constant throughout the day  Quality: Sharp, stabbing, aching  Severity: Today: 5       Last Week: 6       Worst: 7  Modifying Factors: The pain is worse with physical activity and movement and exercise and slightly improved with rest and stretching    Physical Therapy: no    Interval Update 08/10/2022: He is planning to return to the neurosurgeon for further evaluation.  He is considering his options.  He  continues to get some benefit with his hydrocodone.  He continues to remain extremely active and wishes to remain so long as possible while minimizing his pain and improving his quality of life.    The following portions of the patient's history were reviewed and updated as appropriate: allergies, current medications, past family history, past medical history, past social history, past surgical history and problem list.      Current Outpatient Medications:   •  acetaminophen (TYLENOL) 500 MG tablet, Take 500 mg by mouth Every 6 (Six) Hours As Needed for Mild Pain ., Disp: , Rfl:   •  albuterol sulfate  (90 Base) MCG/ACT inhaler, Inhale 2 puffs Every 4 (Four) Hours As Needed for Wheezing or Shortness of Air., Disp: 18 g, Rfl: 3  •  Alpha-Lipoic Acid 100 MG capsule, Take  by mouth., Disp: , Rfl:   •  B Complex Vitamins (VITAMIN B COMPLEX) capsule capsule, Take  by mouth Daily., Disp: , Rfl:   •  Budeson-Glycopyrrol-Formoterol (Breztri Aerosphere) 160-9-4.8 MCG/ACT aerosol inhaler, Inhale 2 puffs 2 (Two) Times a Day., Disp: 2 each, Rfl: 0  •  budesonide-formoterol (SYMBICORT) 160-4.5 MCG/ACT inhaler, Inhale 2 puffs 2 (Two) Times a Day., Disp: 120 each, Rfl: 3  •  Calcium Carb-Cholecalciferol (calcium-vitamin D) 500-200 MG-UNIT per tablet, Take 2 tablets by mouth Daily., Disp: 180 tablet, Rfl: 3  •  Carboxymethylcellul-Glycerin 0.5-0.9 % solution, Apply  to eye(s) as directed by provider Every 8 (Eight) Hours., Disp: , Rfl:   •  clonazePAM (KlonoPIN) 0.5 MG tablet, TAKE ONE TABLET BY MOUTH TWICE A DAY AS NEEDED FOR ANXIETY, Disp: 30 tablet, Rfl: 2  •  coenzyme Q10 50 MG capsule capsule, Take  by mouth Daily., Disp: , Rfl:   •  cyclobenzaprine (FLEXERIL) 10 MG tablet, Take 1 tablet by mouth 3 (Three) Times a Day As Needed for Muscle Spasms., Disp: 30 tablet, Rfl: 1  •  diphenhydrAMINE HCl (ANTI-HIST PO), Take 1 tablet by mouth Daily As Needed., Disp: , Rfl:   •  gabapentin (NEURONTIN) 300 MG capsule, take 1  capsule by mouth every morning, 1 capsule in afternoon and 2 capsules at night, Disp: 120 capsule, Rfl: 5  •  HYDROcodone-acetaminophen (NORCO) 7.5-325 MG per tablet, Take 1 tablet by mouth 2 (Two) Times a Day As Needed for Moderate Pain ., Disp: 60 tablet, Rfl: 0  •  lidocaine (LIDODERM) 5 %, Place 1 patch on the skin as directed by provider Daily. Remove & Discard patch within 12 hours or as directed by MD, Disp: 30 each, Rfl: 1  •  losartan-hydrochlorothiazide (HYZAAR) 100-25 MG per tablet, TAKE 1 TABLET BY MOUTH DAILY, Disp: 90 tablet, Rfl: 0  •  meloxicam (MOBIC) 15 MG tablet, TAKE 1 TABLET BY MOUTH DAILY, Disp: 90 tablet, Rfl: 0  •  montelukast (SINGULAIR) 10 MG tablet, TAKE 1 TABLET BY MOUTH DAILY, Disp: 90 tablet, Rfl: 0  •  Omega-3 Fatty Acids (FISH OIL) 1000 MG capsule capsule, Take  by mouth Daily With Breakfast., Disp: , Rfl:   •  pravastatin (PRAVACHOL) 10 MG tablet, Take 1 tablet by mouth Daily., Disp: 90 tablet, Rfl: 3  •  Red Yeast Rice 600 MG capsule, Take  by mouth., Disp: , Rfl:   •  sildenafil (REVATIO) 20 MG tablet, take 1 to 2 tablets by mouth as needed for erectile dysfunction, Disp: 50 tablet, Rfl: 0  •  theophylline (SAJAN-24) 300 MG 24 hr capsule, Take 300 mg by mouth Daily., Disp: , Rfl:   •  theophylline (THEODUR) 300 MG 12 hr tablet, Take 300 mg by mouth Daily., Disp: , Rfl:   •  triamcinolone (KENALOG) 0.1 % cream, Apply  topically to the appropriate area as directed 2 (Two) Times a Day., Disp: 28.4 g, Rfl: 0    Current Facility-Administered Medications:   •  theophylline (SAJAN-24) 24 hr capsule 300 mg, 300 mg, Oral, Q24H, Delia Ramon APRN    Review of Systems   Musculoskeletal: Positive for arthralgias, back pain, gait problem and myalgias.   Neurological: Negative for weakness and numbness.       Vitals:    08/10/22 1333   BP: 141/79   Pulse: 76   Resp: 16   SpO2: 96%   Weight: 84.4 kg (186 lb)   PainSc:   5         Objective   Physical Exam  Vitals and nursing note reviewed.    Constitutional:       General: He is not in acute distress.     Appearance: Normal appearance. He is well-developed and normal weight.   Neck:      Trachea: No tracheal deviation.   Musculoskeletal:      Comments: Lumbar Spine Exam:  Tender to palpation over the lumbar paraspinal musculature Yes  Limited range of motion secondary to pain Yes  Facet loading positive: bilateral  Facets tender to palpation: bilateral  Straight leg raise test positive: Equivocal       Neurological:      General: No focal deficit present.      Mental Status: He is alert.      Sensory: No sensory deficit.      Motor: No weakness.           Assessment & Plan   Problems Addressed this Visit    None     Visit Diagnoses     Spinal stenosis of lumbar region without neurogenic claudication        Relevant Medications    HYDROcodone-acetaminophen (NORCO) 7.5-325 MG per tablet      Diagnoses       Codes Comments    Spinal stenosis of lumbar region without neurogenic claudication     ICD-10-CM: M48.061  ICD-9-CM: 724.02           Plan:  1. Can refill hydrocodone 7.5 mg twice daily.  Does not take the medication every day  2. Will return to neurosurgery surgical planning  3. Can also refill lidocaine patches  --- Follow-up 3 months           INSPECT REPORT    As part of the patient's treatment plan, I may be prescribing controlled substances. The patient has been made aware of appropriate use of such medications, including potential risk of somnolence, limited ability to drive and/or work safely, and the potential for dependence or overdose. It has also bee made clear that these medications are for use by this patient only, without concomitant use of alcohol or other substances unless prescribed.     Patient has completed prescribing agreement detailing terms of continued prescribing of controlled substances, including monitoring PRASANTH reports, urine drug screening, and pill counts if necessary. The patient is aware that inappropriate use will  results in cessation of prescribing such medications.    INSPECT report has been reviewed and scanned into the patient's chart.    As the clinician, I personally reviewed the INSPECT from 8/9/2022.    History and physical exam exhibit continued safe and appropriate use of controlled substances.      EMR Dragon/Transcription disclaimer:   Much of this encounter note is an electronic transcription/translation of spoken language to printed text. The electronic translation of spoken language may permit erroneous, or at times, nonsensical words or phrases to be inadvertently transcribed; Although I have reviewed the note for such errors, some may still exist.

## 2022-08-11 NOTE — PROGRESS NOTES
Neurosurgical Consultation      Omar Choudhary is a 77 y.o. male is here today for follow-up on back, buttock and leg pain. Today patient reports increased back pain, bilateral buttock & leg pain.    Chief Complaint   Patient presents with   • Back Pain     Bilateral buttock and leg pain        Previous treatment: Physical therapy, Lumbar MIKEY, Lidocaine patches, Norco, Pilate's.  Bicycling, Gabapentin, NSAIDS    HPI: This is a 77-year-old gentleman who I have been following for an extended period of time at this juncture.  He has severe multilevel spondylosis resulting in disc height loss and spinal canal stenosis as well as neuroforaminal stenosis.  He has progressive neurogenic claudication.  When I initially met him his quality of life was relatively high and I felt as though surgical intervention would be unlikely to significantly improve his baseline however with successive appointments I have come to appreciate his progressive debility.  Upon evaluation today he describes significant life altering neurogenic claudication.  He struggles to walk greater than 30 yards without needing to sit and rest which is a significant difference compared to only a year ago when he was regularly walking 2 to 3 miles a day.  He is requiring more pain medicine as well as more anti-inflammatory medicine.  He continues to work with physical therapy diligently and they do feel as though he has had some improvement in overall functional stamina and muscle strength.  He is having significantly more pain even at rest into his right more than his left leg.  His imaging is greater than 14 months old at this juncture and he does Galzemic detailing describe significant changes even compared to my last evaluation.  He has elected to cancel his trip to [place] which was scheduled for the end of this month secondary to his neurogenic claudication.    Past Medical History:   Diagnosis Date   • Allergic    • Arthritis    • Asthma    •  Cataract    • Depression    • Family history of malignant neoplasm of breast 9/26/2019   • Hemidiaphragm paralysis     Left   • Hyperlipidemia 9/26/2019   • Hypertension 3/1/2012   • Leg pain, right    • Low back pain    • Osteopenia    • Reactive airway disease 1/15/2016   • Shortness of breath    • Sleep apnea, obstructive         Past Surgical History:   Procedure Laterality Date   • KNEE ARTHROSCOPY W/ ACL RECONSTRUCTION     • MOUTH SURGERY          Current Outpatient Medications on File Prior to Visit   Medication Sig Dispense Refill   • acetaminophen (TYLENOL) 500 MG tablet Take 500 mg by mouth Every 6 (Six) Hours As Needed for Mild Pain .     • albuterol sulfate  (90 Base) MCG/ACT inhaler Inhale 2 puffs Every 4 (Four) Hours As Needed for Wheezing or Shortness of Air. 18 g 3   • Alpha-Lipoic Acid 100 MG capsule Take  by mouth.     • B Complex Vitamins (VITAMIN B COMPLEX) capsule capsule Take  by mouth Daily.     • Budeson-Glycopyrrol-Formoterol (Breztri Aerosphere) 160-9-4.8 MCG/ACT aerosol inhaler Inhale 2 puffs 2 (Two) Times a Day. 2 each 0   • budesonide-formoterol (SYMBICORT) 160-4.5 MCG/ACT inhaler Inhale 2 puffs 2 (Two) Times a Day. 120 each 3   • Calcium Carb-Cholecalciferol (calcium-vitamin D) 500-200 MG-UNIT per tablet Take 2 tablets by mouth Daily. 180 tablet 3   • Carboxymethylcellul-Glycerin 0.5-0.9 % solution Apply  to eye(s) as directed by provider Every 8 (Eight) Hours.     • clonazePAM (KlonoPIN) 0.5 MG tablet TAKE ONE TABLET BY MOUTH TWICE A DAY AS NEEDED FOR ANXIETY 30 tablet 2   • coenzyme Q10 50 MG capsule capsule Take  by mouth Daily.     • cyclobenzaprine (FLEXERIL) 10 MG tablet Take 1 tablet by mouth 3 (Three) Times a Day As Needed for Muscle Spasms. 30 tablet 1   • diphenhydrAMINE HCl (ANTI-HIST PO) Take 1 tablet by mouth Daily As Needed.     • gabapentin (NEURONTIN) 300 MG capsule take 1 capsule by mouth every morning, 1 capsule in afternoon and 2 capsules at night 120 capsule 5    • HYDROcodone-acetaminophen (NORCO) 7.5-325 MG per tablet Take 1 tablet by mouth 2 (Two) Times a Day As Needed for Moderate Pain . 60 tablet 0   • lidocaine (LIDODERM) 5 % Place 1 patch on the skin as directed by provider Daily. Remove & Discard patch within 12 hours or as directed by MD 30 each 1   • losartan-hydrochlorothiazide (HYZAAR) 100-25 MG per tablet TAKE 1 TABLET BY MOUTH DAILY 90 tablet 0   • meloxicam (MOBIC) 15 MG tablet TAKE 1 TABLET BY MOUTH DAILY 90 tablet 0   • montelukast (SINGULAIR) 10 MG tablet TAKE 1 TABLET BY MOUTH DAILY 90 tablet 0   • Omega-3 Fatty Acids (FISH OIL) 1000 MG capsule capsule Take  by mouth Daily With Breakfast.     • pravastatin (PRAVACHOL) 10 MG tablet Take 1 tablet by mouth Daily. 90 tablet 3   • Red Yeast Rice 600 MG capsule Take  by mouth.     • sildenafil (REVATIO) 20 MG tablet take 1 to 2 tablets by mouth as needed for erectile dysfunction 50 tablet 0   • theophylline (SAJAN-24) 300 MG 24 hr capsule Take 300 mg by mouth Daily.     • theophylline (THEODUR) 300 MG 12 hr tablet Take 300 mg by mouth Daily.     • triamcinolone (KENALOG) 0.1 % cream Apply  topically to the appropriate area as directed 2 (Two) Times a Day. 28.4 g 0     Current Facility-Administered Medications on File Prior to Visit   Medication Dose Route Frequency Provider Last Rate Last Admin   • theophylline (SAJAN-24) 24 hr capsule 300 mg  300 mg Oral Q24H Delia Ramon APRN            Allergies   Allergen Reactions   • Statins Myalgia        Social History     Socioeconomic History   • Marital status:    Tobacco Use   • Smoking status: Former Smoker     Packs/day: 0.50     Years: 10.00     Pack years: 5.00     Types: Cigarettes     Start date:      Quit date:      Years since quittin.6   • Smokeless tobacco: Never Used   Vaping Use   • Vaping Use: Never used   Substance and Sexual Activity   • Alcohol use: Yes     Alcohol/week: 12.0 standard drinks     Types: 10 Glasses of wine,  "2 Cans of beer per week   • Drug use: Never     Types: Marijuana, Mescaline, Psilocybin     Comment: 50 years ago while in college    • Sexual activity: Yes          Review of Systems   Constitutional: Positive for activity change.   Respiratory: Negative for chest tightness and shortness of breath.    Cardiovascular: Negative for chest pain.   Musculoskeletal: Positive for back pain, gait problem and myalgias.        Bilateral buttock and leg pain   Neurological: Negative for weakness and numbness.   Psychiatric/Behavioral: Positive for sleep disturbance.        Physical Examination:     Vitals:    08/12/22 1034   BP: 150/92   Pulse: 66   Resp: 18   SpO2: 98%   Weight: 84.4 kg (186 lb)   Height: 167.6 cm (65.98\")        Physical Exam     Neurological Exam   Neurologic exam objectively is relatively stable compared to my last evaluation.  No new red flag signs.    Result Review  The following data was reviewed by: John Do MD on 08/12/2022:    Data reviewed: Radiologic studies No new imaging reviewed today.     Assessment/plan:  This is a 77-year-old gentleman with severe spondylosis and resultant spinal canal stenosis and neuroforaminal stenosis in the lumbar spine resulting in progressive neurogenic claudication and now some component of radiculopathy.  He has failed extensive conservative therapies including physical therapy and pain management.  He is no longer a good candidate for pain management.  I am ordering him a new MRI of his lumbar spine in order to have updated imaging for which to better evaluate for surgical intervention.  We will work to have this scheduled as soon as possible.  He is intending to seek care with a physiotherapist in [place] via telehealth in the near future.  After completion of this opinion I recommend returning to see me for further surgical discussion.  I have encouraged him to call with any questions or concerns.    Diagnoses and all orders for this visit:    1. Spinal " stenosis of lumbar region with neurogenic claudication (Primary)  -     MRI Lumbar Spine Without Contrast; Future         Return in about 4 weeks (around 9/9/2022).            John Do MD

## 2022-08-12 ENCOUNTER — OFFICE VISIT (OUTPATIENT)
Dept: NEUROSURGERY | Facility: CLINIC | Age: 77
End: 2022-08-12

## 2022-08-12 VITALS
WEIGHT: 186 LBS | HEIGHT: 66 IN | RESPIRATION RATE: 18 BRPM | SYSTOLIC BLOOD PRESSURE: 150 MMHG | DIASTOLIC BLOOD PRESSURE: 92 MMHG | HEART RATE: 66 BPM | BODY MASS INDEX: 29.89 KG/M2 | OXYGEN SATURATION: 98 %

## 2022-08-12 DIAGNOSIS — M48.062 SPINAL STENOSIS OF LUMBAR REGION WITH NEUROGENIC CLAUDICATION: Primary | ICD-10-CM

## 2022-08-12 PROCEDURE — 99213 OFFICE O/P EST LOW 20 MIN: CPT | Performed by: NEUROLOGICAL SURGERY

## 2022-08-23 DIAGNOSIS — M48.062 SPINAL STENOSIS OF LUMBAR REGION WITH NEUROGENIC CLAUDICATION: ICD-10-CM

## 2022-08-23 RX ORDER — SILDENAFIL CITRATE 20 MG/1
TABLET ORAL
Qty: 50 TABLET | Refills: 0 | Status: SHIPPED | OUTPATIENT
Start: 2022-08-23 | End: 2022-11-15

## 2022-08-25 ENCOUNTER — TELEPHONE (OUTPATIENT)
Dept: NEUROSURGERY | Facility: CLINIC | Age: 77
End: 2022-08-25

## 2022-08-25 NOTE — TELEPHONE ENCOUNTER
Patient is aware I have been unsuccessful at sending his records over. He will send me a [employer] message when he gets home with the fax number. He asked me about getting the disc to them as well. I told him he will need to  the disc from [facility] and mail that to them. He verbalized understanding.

## 2022-08-25 NOTE — TELEPHONE ENCOUNTER
I have been trying to fax his records since his last office visit. They do not go through. I sent the patient a Traction message asking for the correct fax. There is no fax number listed on this telephone encounter either therefore I cannot send the records. I called and LVM for Marilee to call our office back. If she calls back please obtain the correct fax number.

## 2022-08-25 NOTE — TELEPHONE ENCOUNTER
Caller: Omar Choudhary    Relationship: Self    Best call back number:     What form or medical record are you requesting: MRI REPORT LUMBAR FROM 8/22/2022    Who is requesting this form or medical record from you: DR. BECKY FELIX [facility]    How would you like to receive the form or medical records (pick-up, mail, fax):  If fax, what is the fax number: ASKING FOR IT TO      Timeframe paperwork needed: ASAP HAS VIDEO CALL NEXT WEEK 9/1/2022    Additional notes: MANDA SHARMA; PHONE # [phone]   PLEASE FAX RECORDS ASAP OR CONTACT PT TO [facility].    PLEASE CONTACT PATIENT ONCE MRI HAS BEEN FAXED/SENT

## 2022-08-26 ENCOUNTER — TREATMENT (OUTPATIENT)
Dept: PHYSICAL THERAPY | Facility: CLINIC | Age: 77
End: 2022-08-26

## 2022-08-26 ENCOUNTER — TELEPHONE (OUTPATIENT)
Dept: FAMILY MEDICINE CLINIC | Facility: CLINIC | Age: 77
End: 2022-08-26

## 2022-08-26 DIAGNOSIS — M47.12 CERVICAL SPONDYLOSIS WITH MYELOPATHY: ICD-10-CM

## 2022-08-26 DIAGNOSIS — R29.898 WEAKNESS OF RIGHT LOWER EXTREMITY: ICD-10-CM

## 2022-08-26 DIAGNOSIS — M48.062 SPINAL STENOSIS OF LUMBAR REGION WITH NEUROGENIC CLAUDICATION: Primary | ICD-10-CM

## 2022-08-26 PROCEDURE — 97110 THERAPEUTIC EXERCISES: CPT | Performed by: PHYSICAL THERAPIST

## 2022-08-26 PROCEDURE — 97140 MANUAL THERAPY 1/> REGIONS: CPT | Performed by: PHYSICAL THERAPIST

## 2022-08-26 RX ORDER — CYCLOBENZAPRINE HCL 10 MG
10 TABLET ORAL 3 TIMES DAILY PRN
Qty: 30 TABLET | Refills: 1 | Status: ON HOLD | OUTPATIENT
Start: 2022-08-26 | End: 2022-10-04

## 2022-08-26 NOTE — TELEPHONE ENCOUNTER
Patient is requesting a refill for Cyclobenzaprine 10 mg tab one tab three times daily as needed.     Patient uses [facility] on file.

## 2022-08-26 NOTE — PROGRESS NOTES
Physical Therapy Daily Treatment Note  Patient: Omar Choudhary   : 1945   Referring practitioner: John Do MD  Date of initial visit: Type: THERAPY  Noted: 2022   Today's date: 2022   Patient seen for 19 visits    Visit Diagnoses:    ICD-10-CM ICD-9-CM   1. Spinal stenosis of lumbar region with neurogenic claudication  M48.062 724.03   2. Weakness of right lower extremity  R29.898 729.89   3. Cervical spondylosis with myelopathy  M47.12 721.1       Subjective   Pt reports: Has had exacerbation in pain the last few days, from buttock to ankle level. Had MRI recently, see report.      Objective     See Exercise, Manual, and Modality Logs for complete treatment.     Patient Education: HEP    Assessment/Plan Limited tolerance toward therapy this date due to c/o pain. Symptoms centralized with positional distraction and L SB mobilization.      Progress per Plan of Care            Timed:         Manual Therapy:    23     mins  23753;     Therapeutic Exercise:    15     mins  00130;     Neuromuscular Nayeli:        mins  21044;    Therapeutic Activity:          mins  02495;     Gait Training:           mins  64908;     Ultrasound:          mins  23295;    Ionto                                   mins   64491  Self Care                            mins   22864    Un-Timed:  Electrical Stimulation:         mins  45218 ( );  Traction     7     mins 71129  Low Eval          Mins  99041  Mod Eval          Mins  83652  High Eval                            Mins  17163  Re-Eval                               mins  33662    Timed Treatment:   38   mins   Total Treatment:     45   mins      Kendall Brown, PT, DPT, OCS  [place] license: [id number]  Physical Therapist

## 2022-09-01 ENCOUNTER — TELEPHONE (OUTPATIENT)
Dept: PHYSICAL THERAPY | Facility: OTHER | Age: 77
End: 2022-09-01

## 2022-09-06 NOTE — PROGRESS NOTES
Neurosurgical Consultation      Omar Choudhary is a 77 y.o. male is here today for follow-up. Patient is reporting increased pain in Low Back radiating down right leg into Foot.    No chief complaint on file.       Previous treatment:    HPI: This is a 77-year-old gentleman who I become quite familiar with.  He has had a progressive decline in overall functional ability.  This is likely related to his severe degenerative disc disease as well as focal lumbar scoliosis causing severe multilevel spinal canal stenosis as well as severe multilevel neuroforaminal stenosis.  The stenosis is likely the source of neurogenic claudication that is progressively limiting his ability as well as increased pain in the radicular patterns that he is experiencing.  He did receive a second opinion from an outside orthopedic surgeon who agreed that he had significant degenerative disc disease and multilevel canal stenosis resulting in radiculopathy and neurogenic claudication.  He underwent a new MRI of his lumbar spine as his prior MRI had become somewhat outdated and with the progressive nature of his symptoms it was important to have updated imaging.    Past Medical History:   Diagnosis Date   • Allergic    • Arthritis    • Asthma    • Cataract    • Depression    • Family history of malignant neoplasm of breast 9/26/2019   • Hemidiaphragm paralysis     Left   • Hyperlipidemia 9/26/2019   • Hypertension 3/1/2012   • Leg pain, right    • Low back pain    • Osteopenia    • Reactive airway disease 1/15/2016   • Shortness of breath    • Sleep apnea, obstructive         Past Surgical History:   Procedure Laterality Date   • KNEE ARTHROSCOPY W/ ACL RECONSTRUCTION     • MOUTH SURGERY          Current Outpatient Medications on File Prior to Visit   Medication Sig Dispense Refill   • acetaminophen (TYLENOL) 500 MG tablet Take 500 mg by mouth Every 6 (Six) Hours As Needed for Mild Pain .     • albuterol sulfate  (90 Base) MCG/ACT  inhaler Inhale 2 puffs Every 4 (Four) Hours As Needed for Wheezing or Shortness of Air. 18 g 3   • Alpha-Lipoic Acid 100 MG capsule Take  by mouth.     • B Complex Vitamins (VITAMIN B COMPLEX) capsule capsule Take  by mouth Daily.     • Budeson-Glycopyrrol-Formoterol (Breztri Aerosphere) 160-9-4.8 MCG/ACT aerosol inhaler Inhale 2 puffs 2 (Two) Times a Day. 2 each 0   • budesonide-formoterol (SYMBICORT) 160-4.5 MCG/ACT inhaler Inhale 2 puffs 2 (Two) Times a Day. 120 each 3   • Calcium Carb-Cholecalciferol (calcium-vitamin D) 500-200 MG-UNIT per tablet Take 2 tablets by mouth Daily. 180 tablet 3   • Carboxymethylcellul-Glycerin 0.5-0.9 % solution Apply  to eye(s) as directed by provider Every 8 (Eight) Hours.     • clonazePAM (KlonoPIN) 0.5 MG tablet TAKE ONE TABLET BY MOUTH TWICE A DAY AS NEEDED FOR ANXIETY 30 tablet 2   • coenzyme Q10 50 MG capsule capsule Take  by mouth Daily.     • cyclobenzaprine (FLEXERIL) 10 MG tablet Take 1 tablet by mouth 3 (Three) Times a Day As Needed for Muscle Spasms. 30 tablet 1   • diphenhydrAMINE HCl (ANTI-HIST PO) Take 1 tablet by mouth Daily As Needed.     • gabapentin (NEURONTIN) 300 MG capsule take 1 capsule by mouth every morning, 1 capsule in afternoon and 2 capsules at night 120 capsule 5   • HYDROcodone-acetaminophen (NORCO) 7.5-325 MG per tablet Take 1 tablet by mouth 2 (Two) Times a Day As Needed for Moderate Pain . 60 tablet 0   • lidocaine (LIDODERM) 5 % Place 1 patch on the skin as directed by provider Daily. Remove & Discard patch within 12 hours or as directed by MD 30 each 1   • losartan-hydrochlorothiazide (HYZAAR) 100-25 MG per tablet TAKE 1 TABLET BY MOUTH DAILY 90 tablet 0   • meloxicam (MOBIC) 15 MG tablet TAKE 1 TABLET BY MOUTH DAILY 90 tablet 0   • montelukast (SINGULAIR) 10 MG tablet TAKE 1 TABLET BY MOUTH DAILY 90 tablet 0   • Omega-3 Fatty Acids (FISH OIL) 1000 MG capsule capsule Take  by mouth Daily With Breakfast.     • pravastatin (PRAVACHOL) 10 MG tablet  Take 1 tablet by mouth Daily. 90 tablet 3   • Red Yeast Rice 600 MG capsule Take  by mouth.     • sildenafil (REVATIO) 20 MG tablet take 1 to 2 tablets by mouth as needed for erectile dysfunction. 50 tablet 0   • theophylline (SAJAN-24) 300 MG 24 hr capsule Take 300 mg by mouth Daily.     • theophylline (THEODUR) 300 MG 12 hr tablet Take 300 mg by mouth Daily.     • triamcinolone (KENALOG) 0.1 % cream Apply  topically to the appropriate area as directed 2 (Two) Times a Day. 28.4 g 0     Current Facility-Administered Medications on File Prior to Visit   Medication Dose Route Frequency Provider Last Rate Last Admin   • theophylline (SAJAN-24) 24 hr capsule 300 mg  300 mg Oral Q24H Delia Ramon APRN            Allergies   Allergen Reactions   • Statins Myalgia        Social History     Socioeconomic History   • Marital status:    Tobacco Use   • Smoking status: Former Smoker     Packs/day: 0.50     Years: 10.00     Pack years: 5.00     Types: Cigarettes     Start date:      Quit date:      Years since quittin.7   • Smokeless tobacco: Never Used   Vaping Use   • Vaping Use: Never used   Substance and Sexual Activity   • Alcohol use: Yes     Alcohol/week: 12.0 standard drinks     Types: 10 Glasses of wine, 2 Cans of beer per week   • Drug use: Never     Types: Marijuana, Mescaline, Psilocybin     Comment: 50 years ago while in college    • Sexual activity: Yes          Review of Systems   Constitutional: Positive for activity change.   Eyes: Negative.    Respiratory: Negative.    Cardiovascular: Negative.    Gastrointestinal: Negative.    Endocrine: Negative.    Genitourinary: Negative.    Musculoskeletal: Positive for back pain (into Right leg).   Neurological: Positive for weakness and numbness.   Psychiatric/Behavioral: Positive for sleep disturbance.        Physical Examination:     Vitals:    22 1314   BP: 151/99   Pulse: 71   SpO2: 98%   Weight: 83.7 kg (184 lb 8 oz)   Height: 167.6  "cm (65.98\")        Physical Exam     Neurological Exam   Patient's neurological exam is stable compared to my last evaluation without any new red flag signs.    Result Review  The following data was reviewed by: John Do MD on 09/09/2022:    Data reviewed: Radiologic studies MRI of the lumbar spine was personally reviewed.  It shows lumbar scoliosis with right-sided concavity to the curve.  There is severe multilevel central canal stenosis worst at L3-L4 but also significant at L2-L3.  There is severe multilevel neuroforaminal stenosis from soft tissue stenosis as well as bony stenosis.     Assessment/plan:  This is a 77-year-old gentleman who has become relatively well-known to me at this juncture.  When I first met him he had signs and symptoms of neurogenic claudication and some radiculopathy but this was not life altering and manageable with conservative measures.  He has engaged in significant conservative measures including physical therapy and pain management evaluation and has had a relative precipitous decline in his functional status with significant decrease in his quality of life.  His new imaging confirms multilevel central as well as neuroforaminal stenosis.  He does have lumbar scoliosis.  I have discussed with him the possibility of surgical intervention including the options of extent of surgical intervention.  We had a very extensive discussion about these options.  This includes focal single level decompression versus multilevel decompression and fusion.  He would like to continue with physical therapy at this time.  He will think about surgical options as well as discuss with his family and friends about surgical options.  I have offered him a short course of baclofen to see if this provides some increased relief compared to Flexeril as he has significant muscle spasms.  He is already on gabapentin.  He will call me in the future with a more definitive plan if he elects to proceed with " surgical intervention.    Diagnoses and all orders for this visit:    1. Spinal stenosis of lumbar region with neurogenic claudication (Primary)  -     Ambulatory Referral to Physical Therapy Evaluate and treat    2. Degenerative lumbar spinal stenosis  -     Ambulatory Referral to Physical Therapy Evaluate and treat    3. Lumbar radiculopathy, chronic  -     Ambulatory Referral to Physical Therapy Evaluate and treat    Other orders  -     baclofen (LIORESAL) 10 MG tablet; Take 1 tablet by mouth 3 (Three) Times a Day.  Dispense: 45 tablet; Refill: 0         No follow-ups on file.            John Do MD

## 2022-09-09 ENCOUNTER — OFFICE VISIT (OUTPATIENT)
Dept: NEUROSURGERY | Facility: CLINIC | Age: 77
End: 2022-09-09

## 2022-09-09 VITALS
WEIGHT: 184.5 LBS | HEIGHT: 66 IN | SYSTOLIC BLOOD PRESSURE: 151 MMHG | DIASTOLIC BLOOD PRESSURE: 99 MMHG | OXYGEN SATURATION: 98 % | BODY MASS INDEX: 29.65 KG/M2 | HEART RATE: 71 BPM

## 2022-09-09 DIAGNOSIS — M48.062 SPINAL STENOSIS OF LUMBAR REGION WITH NEUROGENIC CLAUDICATION: Primary | ICD-10-CM

## 2022-09-09 DIAGNOSIS — M48.061 DEGENERATIVE LUMBAR SPINAL STENOSIS: ICD-10-CM

## 2022-09-09 DIAGNOSIS — M54.16 LUMBAR RADICULOPATHY, CHRONIC: ICD-10-CM

## 2022-09-09 PROCEDURE — 99214 OFFICE O/P EST MOD 30 MIN: CPT | Performed by: NEUROLOGICAL SURGERY

## 2022-09-09 RX ORDER — BACLOFEN 10 MG/1
10 TABLET ORAL 3 TIMES DAILY
Qty: 45 TABLET | Refills: 0 | Status: SHIPPED | OUTPATIENT
Start: 2022-09-09 | End: 2022-10-08 | Stop reason: HOSPADM

## 2022-09-12 ENCOUNTER — TELEPHONE (OUTPATIENT)
Dept: NEUROSURGERY | Facility: CLINIC | Age: 77
End: 2022-09-12

## 2022-09-12 NOTE — TELEPHONE ENCOUNTER
Patient called and said he would like to do multilevel decompression and fusion. The patient also wanted to the followin. How long will the procedure take and 2. How long will he be under general anesthesia.

## 2022-09-13 NOTE — TELEPHONE ENCOUNTER
Patient called regarding his last message from yesterday 9/12/22. He is wanting a surgery date and has other questions regarding the surgery. I did send the most recent physical Therapy order to [facility] for his continued treatment.  Patient called again today 9/15/2022 and wants to schedule his surgery and also wants to know how long will the surgery take.

## 2022-09-16 NOTE — TELEPHONE ENCOUNTER
We are working on his surgery case, clearances and authorizations. Once we obtain all of these we will call the patient back with dates etc.

## 2022-09-20 NOTE — TELEPHONE ENCOUNTER
Patient called to schedule his surgery. I let patient know that Niesha is working on his case and once she gets everything finalized she will call him. I told patient Niesha will discuss date with him when she calls.

## 2022-09-21 ENCOUNTER — TREATMENT (OUTPATIENT)
Dept: PHYSICAL THERAPY | Facility: CLINIC | Age: 77
End: 2022-09-21

## 2022-09-21 DIAGNOSIS — R29.898 WEAKNESS OF RIGHT LOWER EXTREMITY: ICD-10-CM

## 2022-09-21 DIAGNOSIS — M25.60 RANGE OF MOTION DEFICIT: ICD-10-CM

## 2022-09-21 DIAGNOSIS — M48.062 SPINAL STENOSIS OF LUMBAR REGION WITH NEUROGENIC CLAUDICATION: Primary | ICD-10-CM

## 2022-09-21 DIAGNOSIS — M47.12 CERVICAL SPONDYLOSIS WITH MYELOPATHY: ICD-10-CM

## 2022-09-21 PROCEDURE — 97140 MANUAL THERAPY 1/> REGIONS: CPT | Performed by: PHYSICAL THERAPIST

## 2022-09-21 PROCEDURE — 97110 THERAPEUTIC EXERCISES: CPT | Performed by: PHYSICAL THERAPIST

## 2022-09-21 PROCEDURE — 97012 MECHANICAL TRACTION THERAPY: CPT | Performed by: PHYSICAL THERAPIST

## 2022-09-21 NOTE — PROGRESS NOTES
Physical Therapy Daily Treatment Note  Patient: Omar Choudhary   : 1945   Referring practitioner: John Do MD  Date of initial visit: Type: THERAPY  Noted: 2022   Today's date: 2022   Patient seen for 20 visits    Visit Diagnoses:    ICD-10-CM ICD-9-CM   1. Spinal stenosis of lumbar region with neurogenic claudication  M48.062 724.03   2. Weakness of right lower extremity  R29.898 729.89   3. Cervical spondylosis with myelopathy  M47.12 721.1   4. Range of motion deficit  M25.60 719.50       Subjective   Pt reports: Pt going to schedule fusion. HEP going well. Distal symptoms somewhat better vs last visit.      Objective     See Exercise, Manual, and Modality Logs for complete treatment.     Patient Education: HEP    Assessment/Plan Responds well to rx. Will continue pending MD plans.      Progress per Plan of Care            Timed:         Manual Therapy:    15     mins  90885;     Therapeutic Exercise:    23  mins  40856;     Neuromuscular Nayeli:        mins  29813;    Therapeutic Activity:          mins  27857;     Gait Training:           mins  41301;     Ultrasound:          mins  95039;    Ionto                                   mins   87114  Self Care                            mins   37663    Un-Timed:  Electrical Stimulation:         mins  53872 ( );  Traction     15     mins 99787  Low Eval          Mins  78909  Mod Eval          Mins  42912  High Eval                            Mins  46196  Re-Eval                               mins  42850    Timed Treatment:   38   mins   Total Treatment:     53   mins      Kendall Brown, PT, DPT, OCS  [place] license: [id number]  Physical Therapist

## 2022-09-22 NOTE — TELEPHONE ENCOUNTER
I lm for patient told him I am waiting on dr rivers to put in surgery orders so I can schedule case, once this is done I will call patient with details

## 2022-09-23 ENCOUNTER — TELEPHONE (OUTPATIENT)
Dept: FAMILY MEDICINE CLINIC | Facility: CLINIC | Age: 77
End: 2022-09-23

## 2022-09-23 NOTE — TELEPHONE ENCOUNTER
Patient needs to schedule a surg clearance appt with Dr. Kwong. They can discuss a handicap placard at that time.

## 2022-09-23 NOTE — TELEPHONE ENCOUNTER
Caller: Omar Choudhary    Relationship: Self    Best call back number: [phone]    What form or medical record are you requestin.SURGICAL CLEARANCE    2. HANDICAP PLACARD    Who is requesting this form or medical record from you: 1.SURGEON DR DC AMIN    2.SELF    How would you like to receive the form or medical records (pick-up, mail, fax): 1.FAX TO DR DC AMIN [phone]     2.    Timeframe paperwork needed: SCHEDULED ON 10/4/2022 FOR LAMINECTOMY AND FUSION  L3,L4,L5

## 2022-09-26 PROBLEM — M54.16 LUMBAR RADICULOPATHY, CHRONIC: Status: ACTIVE | Noted: 2022-09-26

## 2022-09-26 RX ORDER — TURMERIC 400 MG
1 CAPSULE ORAL DAILY
COMMUNITY

## 2022-09-26 RX ORDER — CARBOXYMETHYLCELLULOSE SODIUM 5 MG/ML
1 SOLUTION/ DROPS OPHTHALMIC 3 TIMES DAILY PRN
COMMUNITY
End: 2023-04-06

## 2022-09-27 ENCOUNTER — TRANSCRIBE ORDERS (OUTPATIENT)
Dept: FAMILY MEDICINE CLINIC | Facility: CLINIC | Age: 77
End: 2022-09-27

## 2022-09-27 ENCOUNTER — HOSPITAL ENCOUNTER (OUTPATIENT)
Dept: GENERAL RADIOLOGY | Facility: HOSPITAL | Age: 77
Discharge: HOME | End: 2022-09-27

## 2022-09-27 ENCOUNTER — HOSPITAL ENCOUNTER (OUTPATIENT)
Dept: CARDIOLOGY | Facility: HOSPITAL | Age: 77
Discharge: HOME | End: 2022-09-27

## 2022-09-27 ENCOUNTER — LAB (OUTPATIENT)
Dept: LAB | Facility: HOSPITAL | Age: 77
End: 2022-09-27

## 2022-09-27 LAB
ABO GROUP BLD: NORMAL
ANION GAP SERPL CALCULATED.3IONS-SCNC: 11 MMOL/L (ref 5–15)
BLD GP AB SCN SERPL QL: NEGATIVE
BUN SERPL-MCNC: 15 MG/DL (ref 8–23)
BUN/CREAT SERPL: 12.6 (ref 7–25)
CALCIUM SPEC-SCNC: 9.5 MG/DL (ref 8.6–10.5)
CHLORIDE SERPL-SCNC: 103 MMOL/L (ref 98–107)
CO2 SERPL-SCNC: 25 MMOL/L (ref 22–29)
CREAT SERPL-MCNC: 1.19 MG/DL (ref 0.76–1.27)
DEPRECATED RDW RBC AUTO: 42 FL (ref 37–54)
EGFRCR SERPLBLD CKD-EPI 2021: 63.7 ML/MIN/1.73
ERYTHROCYTE [DISTWIDTH] IN BLOOD BY AUTOMATED COUNT: 12.6 % (ref 12.3–15.4)
GLUCOSE SERPL-MCNC: 107 MG/DL (ref 65–99)
HCT VFR BLD AUTO: 39.8 % (ref 37.5–51)
HGB BLD-MCNC: 14 G/DL (ref 13–17.7)
MCH RBC QN AUTO: 31.4 PG (ref 26.6–33)
MCHC RBC AUTO-ENTMCNC: 35.2 G/DL (ref 31.5–35.7)
MCV RBC AUTO: 89.2 FL (ref 79–97)
MRSA DNA SPEC QL NAA+PROBE: NORMAL
PLATELET # BLD AUTO: 146 10*3/MM3 (ref 140–450)
PMV BLD AUTO: 9.9 FL (ref 6–12)
POTASSIUM SERPL-SCNC: 4 MMOL/L (ref 3.5–5.2)
RBC # BLD AUTO: 4.46 10*6/MM3 (ref 4.14–5.8)
RH BLD: POSITIVE
SODIUM SERPL-SCNC: 139 MMOL/L (ref 136–145)
T&S EXPIRATION DATE: NORMAL
WBC NRBC COR # BLD: 3.09 10*3/MM3 (ref 3.4–10.8)

## 2022-09-27 PROCEDURE — 85027 COMPLETE CBC AUTOMATED: CPT

## 2022-09-27 PROCEDURE — 87641 MR-STAPH DNA AMP PROBE: CPT | Performed by: NEUROLOGICAL SURGERY

## 2022-09-27 PROCEDURE — 93005 ELECTROCARDIOGRAM TRACING: CPT | Performed by: NEUROLOGICAL SURGERY

## 2022-09-27 PROCEDURE — 86900 BLOOD TYPING SEROLOGIC ABO: CPT

## 2022-09-27 PROCEDURE — 86850 RBC ANTIBODY SCREEN: CPT | Performed by: NEUROLOGICAL SURGERY

## 2022-09-27 PROCEDURE — 86900 BLOOD TYPING SEROLOGIC ABO: CPT | Performed by: NEUROLOGICAL SURGERY

## 2022-09-27 PROCEDURE — 80048 BASIC METABOLIC PNL TOTAL CA: CPT

## 2022-09-27 PROCEDURE — 86901 BLOOD TYPING SEROLOGIC RH(D): CPT

## 2022-09-27 PROCEDURE — 71046 X-RAY EXAM CHEST 2 VIEWS: CPT

## 2022-09-27 PROCEDURE — 86901 BLOOD TYPING SEROLOGIC RH(D): CPT | Performed by: NEUROLOGICAL SURGERY

## 2022-09-27 PROCEDURE — 93010 ELECTROCARDIOGRAM REPORT: CPT | Performed by: INTERNAL MEDICINE

## 2022-09-28 ENCOUNTER — OFFICE VISIT (OUTPATIENT)
Dept: FAMILY MEDICINE CLINIC | Facility: CLINIC | Age: 77
End: 2022-09-28

## 2022-09-28 VITALS
DIASTOLIC BLOOD PRESSURE: 90 MMHG | HEIGHT: 66 IN | OXYGEN SATURATION: 96 % | SYSTOLIC BLOOD PRESSURE: 150 MMHG | WEIGHT: 188.2 LBS | BODY MASS INDEX: 30.25 KG/M2 | HEART RATE: 68 BPM | RESPIRATION RATE: 20 BRPM | TEMPERATURE: 97.3 F

## 2022-09-28 DIAGNOSIS — M48.062 SPINAL STENOSIS OF LUMBAR REGION WITH NEUROGENIC CLAUDICATION: Primary | ICD-10-CM

## 2022-09-28 PROCEDURE — 99214 OFFICE O/P EST MOD 30 MIN: CPT | Performed by: FAMILY MEDICINE

## 2022-09-28 NOTE — PROGRESS NOTES
Chief Complaint   Patient presents with   • surgery clearence     IGGY  Omar Choudhary is a 77 y.o. male that presents for   Chief Complaint   Patient presents with   • surgery clearence     Patient reports that he was dismissed from pain mgmt after they told him that they could no longer get a needle in his back for epidural. He was directed back to his neurosurgeon. After multiple appointments w/ Dr Madsen, he has decided to move forward w/ lateral lumbar interbody fusion of L2-4 w/ decompression. This is planned for 10/4/22.     He has tremendous low back pain that radiates down the R leg. He has been taking meloxicam 15 daily, Norco 7.5 BID PRN. Patient presents today for surgical clearance and handicap placard. Currently doing PT for back. Back limits aerobic exercise- previously walked 2-4 miles daily and rode bike regularly. Still came up stairs to 3rd floor office appt today.    Review of Systems   Musculoskeletal: Positive for arthralgias, back pain and gait problem.     The following portions of the patient's history were reviewed and updated as appropriate: problem list, past medical history, past surgical history, allergies, current medication    Problem List Tab  Patient History Tab  Immunizations Tab  Medications Tab  Chart Review Tab  Care Everywhere Tab  Synopsis Tab    PE  Vitals:    09/28/22 0922   BP: 150/90   Pulse: 68   Resp: 20   Temp: 97.3 °F (36.3 °C)   SpO2: 96%     Body mass index is 30.39 kg/m².  General: Well nourished, NAD  Head: AT/NC  Eyes: EOMI, anicteric sclera  Resp: CTAB, SCR, BS equal  CV: RRR w/o m/r/g; 2+ pulses  GI: Soft, NT/ND, +BS  MSK: FROM, no deformity, no edema  Skin: Warm, dry, intact  Neuro: Alert and oriented. No focal deficits  Psych: Appropriate mood and affect    Imaging  XR Chest PA & Lateral    Result Date: 9/27/2022   1. Chronic left basilar scarring. No acute chest finding.  Electronically Signed By-Pamella Moraes MD On:9/27/2022 12:45 PM This report was  finalized on [id number] by  Pamella Moraes MD.      Assessment & Plan   Omar Choudhary is a 77 y.o. male that presents for   Chief Complaint   Patient presents with   • surgery clearence     Diagnoses and all orders for this visit:    1. Spinal stenosis of lumbar region with neurogenic claudication (Primary)  - Letter completed for surgical clearance  - Handicap placard application completed today  - Cont home meloxicam 15 daily and Norco 7.5 twice daily as needed  -Continue neurosurgery and pain management follow-up   -- Plan for surgery October 4     Return if symptoms worsen or fail to improve.

## 2022-09-29 ENCOUNTER — HOSPITAL ENCOUNTER (OUTPATIENT)
Dept: CT IMAGING | Facility: HOSPITAL | Age: 77
Discharge: HOME | End: 2022-09-29
Admitting: NEUROLOGICAL SURGERY

## 2022-09-29 DIAGNOSIS — M54.16 LUMBAR RADICULOPATHY, CHRONIC: ICD-10-CM

## 2022-09-29 DIAGNOSIS — M48.062 SPINAL STENOSIS OF LUMBAR REGION WITH NEUROGENIC CLAUDICATION: ICD-10-CM

## 2022-09-29 DIAGNOSIS — M48.061 DEGENERATIVE LUMBAR SPINAL STENOSIS: ICD-10-CM

## 2022-09-29 LAB — QT INTERVAL: 433 MS

## 2022-09-29 PROCEDURE — 72131 CT LUMBAR SPINE W/O DYE: CPT

## 2022-09-30 ENCOUNTER — TELEPHONE (OUTPATIENT)
Dept: NEUROSURGERY | Facility: CLINIC | Age: 77
End: 2022-09-30

## 2022-10-03 ENCOUNTER — ANESTHESIA EVENT (OUTPATIENT)
Dept: PERIOP | Facility: HOSPITAL | Age: 77
End: 2022-10-03

## 2022-10-03 ENCOUNTER — TELEPHONE (OUTPATIENT)
Dept: NEUROSURGERY | Facility: CLINIC | Age: 77
End: 2022-10-03

## 2022-10-03 ENCOUNTER — HOSPITAL ENCOUNTER (OUTPATIENT)
Facility: HOSPITAL | Age: 77
Setting detail: SURGERY ADMIT
End: 2022-10-03
Attending: NEUROLOGICAL SURGERY | Admitting: NEUROLOGICAL SURGERY

## 2022-10-04 ENCOUNTER — HOSPITAL ENCOUNTER (INPATIENT)
Facility: HOSPITAL | Age: 77
LOS: 4 days | Discharge: NURSING, REHAB OR LONG-TERM CARE | End: 2022-10-08
Attending: NEUROLOGICAL SURGERY | Admitting: NEUROLOGICAL SURGERY

## 2022-10-04 ENCOUNTER — APPOINTMENT (OUTPATIENT)
Dept: GENERAL RADIOLOGY | Facility: HOSPITAL | Age: 77
End: 2022-10-04

## 2022-10-04 ENCOUNTER — APPOINTMENT (OUTPATIENT)
Dept: CT IMAGING | Facility: HOSPITAL | Age: 77
End: 2022-10-04

## 2022-10-04 ENCOUNTER — ANESTHESIA (OUTPATIENT)
Dept: PERIOP | Facility: HOSPITAL | Age: 77
End: 2022-10-04

## 2022-10-04 ENCOUNTER — ANESTHESIA EVENT (OUTPATIENT)
Dept: PERIOP | Facility: HOSPITAL | Age: 77
End: 2022-10-04

## 2022-10-04 DIAGNOSIS — M54.16 LUMBAR RADICULOPATHY, CHRONIC: ICD-10-CM

## 2022-10-04 DIAGNOSIS — M48.061 DEGENERATIVE LUMBAR SPINAL STENOSIS: ICD-10-CM

## 2022-10-04 DIAGNOSIS — M48.062 SPINAL STENOSIS OF LUMBAR REGION WITH NEUROGENIC CLAUDICATION: ICD-10-CM

## 2022-10-04 PROCEDURE — C1713 ANCHOR/SCREW BN/BN,TIS/BN: HCPCS | Performed by: NEUROLOGICAL SURGERY

## 2022-10-04 PROCEDURE — 22558 ARTHRD ANT NTRBD MIN DSC LUM: CPT | Performed by: NEUROLOGICAL SURGERY

## 2022-10-04 PROCEDURE — 22853 INSJ BIOMECHANICAL DEVICE: CPT | Performed by: NEUROLOGICAL SURGERY

## 2022-10-04 PROCEDURE — 72131 CT LUMBAR SPINE W/O DYE: CPT

## 2022-10-04 PROCEDURE — 25010000002 SUCCINYLCHOLINE PER 20 MG: Performed by: NURSE ANESTHETIST, CERTIFIED REGISTERED

## 2022-10-04 PROCEDURE — 25010000002 FENTANYL CITRATE (PF) 100 MCG/2ML SOLUTION: Performed by: NURSE ANESTHETIST, CERTIFIED REGISTERED

## 2022-10-04 PROCEDURE — 76000 FLUOROSCOPY <1 HR PHYS/QHP: CPT

## 2022-10-04 PROCEDURE — 22585 ARTHRD ANT NTRBD MIN DSC EA: CPT | Performed by: NEUROLOGICAL SURGERY

## 2022-10-04 PROCEDURE — 61783 SCAN PROC SPINAL: CPT | Performed by: NEUROLOGICAL SURGERY

## 2022-10-04 PROCEDURE — 0SB20ZZ EXCISION OF LUMBAR VERTEBRAL DISC, OPEN APPROACH: ICD-10-PCS | Performed by: NEUROLOGICAL SURGERY

## 2022-10-04 PROCEDURE — 25010000002 VANCOMYCIN 1 G RECONSTITUTED SOLUTION: Performed by: NEUROLOGICAL SURGERY

## 2022-10-04 PROCEDURE — 25010000002 ONDANSETRON PER 1 MG: Performed by: NURSE ANESTHETIST, CERTIFIED REGISTERED

## 2022-10-04 PROCEDURE — 0SG10A0 FUSION OF 2 OR MORE LUMBAR VERTEBRAL JOINTS WITH INTERBODY FUSION DEVICE, ANTERIOR APPROACH, ANTERIOR COLUMN, OPEN APPROACH: ICD-10-PCS | Performed by: NEUROLOGICAL SURGERY

## 2022-10-04 PROCEDURE — 25010000002 HYDRALAZINE PER 20 MG: Performed by: NURSE ANESTHETIST, CERTIFIED REGISTERED

## 2022-10-04 PROCEDURE — 25010000002 HYDROMORPHONE 1 MG/ML SOLUTION

## 2022-10-04 PROCEDURE — 25010000002 PROPOFOL 10 MG/ML EMULSION: Performed by: NURSE ANESTHETIST, CERTIFIED REGISTERED

## 2022-10-04 PROCEDURE — 25010000002 CEFAZOLIN PER 500 MG: Performed by: NEUROLOGICAL SURGERY

## 2022-10-04 PROCEDURE — 25010000002 HYDROMORPHONE 1 MG/ML SOLUTION: Performed by: NURSE ANESTHETIST, CERTIFIED REGISTERED

## 2022-10-04 PROCEDURE — 72100 X-RAY EXAM L-S SPINE 2/3 VWS: CPT

## 2022-10-04 PROCEDURE — 25010000002 DEXAMETHASONE PER 1 MG: Performed by: NURSE ANESTHETIST, CERTIFIED REGISTERED

## 2022-10-04 DEVICE — ALLOGRAFT BONE OSTEOAMP SELECT FLO 10CC: Type: IMPLANTABLE DEVICE | Site: SPINE LUMBAR | Status: FUNCTIONAL

## 2022-10-04 DEVICE — I-FACTOR™ PUTTY, 5.0 CC SYRINGE
Type: IMPLANTABLE DEVICE | Status: FUNCTIONAL
Brand: I-FACTOR™ PEPTIDE ENHANCED BONE GRAFT

## 2022-10-04 DEVICE — RISE-L SPACER 22 X 50MM, 8-15MM, 6DEG
Type: IMPLANTABLE DEVICE | Site: SPINE LUMBAR | Status: FUNCTIONAL
Brand: RISE

## 2022-10-04 DEVICE — RISE-L SPACER 22 X 45MM, 8-15MM, 6DEG
Type: IMPLANTABLE DEVICE | Site: SPINE LUMBAR | Status: FUNCTIONAL
Brand: RISE

## 2022-10-04 DEVICE — FLOSEAL HEMOSTATIC MATRIX, 10ML
Type: IMPLANTABLE DEVICE | Site: SPINE LUMBAR | Status: FUNCTIONAL
Brand: FLOSEAL HEMOSTATIC MATRIX

## 2022-10-04 DEVICE — ALLOGRFT FIBR OSTEOAMP SELECT 5CC: Type: IMPLANTABLE DEVICE | Site: SPINE LUMBAR | Status: FUNCTIONAL

## 2022-10-04 RX ORDER — IPRATROPIUM BROMIDE AND ALBUTEROL SULFATE 2.5; .5 MG/3ML; MG/3ML
3 SOLUTION RESPIRATORY (INHALATION) ONCE AS NEEDED
Status: DISCONTINUED | OUTPATIENT
Start: 2022-10-04 | End: 2022-10-04 | Stop reason: HOSPADM

## 2022-10-04 RX ORDER — SODIUM CHLORIDE 0.9 % (FLUSH) 0.9 %
10 SYRINGE (ML) INJECTION AS NEEDED
Status: DISCONTINUED | OUTPATIENT
Start: 2022-10-04 | End: 2022-10-04 | Stop reason: HOSPADM

## 2022-10-04 RX ORDER — SODIUM CHLORIDE 0.9 % (FLUSH) 0.9 %
10 SYRINGE (ML) INJECTION AS NEEDED
Status: DISCONTINUED | OUTPATIENT
Start: 2022-10-04 | End: 2022-10-08 | Stop reason: HOSPADM

## 2022-10-04 RX ORDER — VANCOMYCIN HYDROCHLORIDE 1 G/20ML
INJECTION, POWDER, LYOPHILIZED, FOR SOLUTION INTRAVENOUS AS NEEDED
Status: DISCONTINUED | OUTPATIENT
Start: 2022-10-04 | End: 2022-10-04 | Stop reason: HOSPADM

## 2022-10-04 RX ORDER — GLYCOPYRROLATE 0.2 MG/ML
INJECTION INTRAMUSCULAR; INTRAVENOUS AS NEEDED
Status: DISCONTINUED | OUTPATIENT
Start: 2022-10-04 | End: 2022-10-04 | Stop reason: SURG

## 2022-10-04 RX ORDER — FENTANYL CITRATE 50 UG/ML
50 INJECTION, SOLUTION INTRAMUSCULAR; INTRAVENOUS
Status: DISCONTINUED | OUTPATIENT
Start: 2022-10-04 | End: 2022-10-04 | Stop reason: HOSPADM

## 2022-10-04 RX ORDER — CHOLECALCIFEROL (VITAMIN D3) 125 MCG
5 CAPSULE ORAL NIGHTLY PRN
Status: DISCONTINUED | OUTPATIENT
Start: 2022-10-04 | End: 2022-10-08 | Stop reason: HOSPADM

## 2022-10-04 RX ORDER — HYDROCODONE BITARTRATE AND ACETAMINOPHEN 5; 325 MG/1; MG/1
2 TABLET ORAL EVERY 4 HOURS PRN
Status: DISCONTINUED | OUTPATIENT
Start: 2022-10-04 | End: 2022-10-08 | Stop reason: HOSPADM

## 2022-10-04 RX ORDER — SODIUM CHLORIDE, SODIUM LACTATE, POTASSIUM CHLORIDE, CALCIUM CHLORIDE 600; 310; 30; 20 MG/100ML; MG/100ML; MG/100ML; MG/100ML
9 INJECTION, SOLUTION INTRAVENOUS CONTINUOUS PRN
Status: DISCONTINUED | OUTPATIENT
Start: 2022-10-04 | End: 2022-10-04 | Stop reason: HOSPADM

## 2022-10-04 RX ORDER — GABAPENTIN 300 MG/1
300 CAPSULE ORAL 2 TIMES DAILY
Status: DISCONTINUED | OUTPATIENT
Start: 2022-10-05 | End: 2022-10-08 | Stop reason: HOSPADM

## 2022-10-04 RX ORDER — THEOPHYLLINE 300 MG/1
300 TABLET, EXTENDED RELEASE ORAL DAILY
Status: DISCONTINUED | OUTPATIENT
Start: 2022-10-05 | End: 2022-10-08

## 2022-10-04 RX ORDER — BISACODYL 10 MG
10 SUPPOSITORY, RECTAL RECTAL DAILY PRN
Status: DISCONTINUED | OUTPATIENT
Start: 2022-10-04 | End: 2022-10-08 | Stop reason: HOSPADM

## 2022-10-04 RX ORDER — BUPIVACAINE HYDROCHLORIDE AND EPINEPHRINE 2.5; 5 MG/ML; UG/ML
INJECTION, SOLUTION EPIDURAL; INFILTRATION; INTRACAUDAL; PERINEURAL AS NEEDED
Status: DISCONTINUED | OUTPATIENT
Start: 2022-10-04 | End: 2022-10-04 | Stop reason: HOSPADM

## 2022-10-04 RX ORDER — BUDESONIDE AND FORMOTEROL FUMARATE DIHYDRATE 160; 4.5 UG/1; UG/1
2 AEROSOL RESPIRATORY (INHALATION)
Status: DISCONTINUED | OUTPATIENT
Start: 2022-10-04 | End: 2022-10-08 | Stop reason: HOSPADM

## 2022-10-04 RX ORDER — NALOXONE HCL 0.4 MG/ML
0.4 VIAL (ML) INJECTION AS NEEDED
Status: DISCONTINUED | OUTPATIENT
Start: 2022-10-04 | End: 2022-10-04 | Stop reason: HOSPADM

## 2022-10-04 RX ORDER — PROCHLORPERAZINE EDISYLATE 5 MG/ML
10 INJECTION INTRAMUSCULAR; INTRAVENOUS ONCE AS NEEDED
Status: DISCONTINUED | OUTPATIENT
Start: 2022-10-04 | End: 2022-10-04 | Stop reason: HOSPADM

## 2022-10-04 RX ORDER — FLUMAZENIL 0.1 MG/ML
0.1 INJECTION INTRAVENOUS AS NEEDED
Status: DISCONTINUED | OUTPATIENT
Start: 2022-10-04 | End: 2022-10-04 | Stop reason: HOSPADM

## 2022-10-04 RX ORDER — SODIUM CHLORIDE, SODIUM LACTATE, POTASSIUM CHLORIDE, CALCIUM CHLORIDE 600; 310; 30; 20 MG/100ML; MG/100ML; MG/100ML; MG/100ML
INJECTION, SOLUTION INTRAVENOUS CONTINUOUS PRN
Status: DISCONTINUED | OUTPATIENT
Start: 2022-10-04 | End: 2022-10-04 | Stop reason: SURG

## 2022-10-04 RX ORDER — FENTANYL CITRATE 50 UG/ML
25 INJECTION, SOLUTION INTRAMUSCULAR; INTRAVENOUS
Status: DISCONTINUED | OUTPATIENT
Start: 2022-10-04 | End: 2022-10-04 | Stop reason: HOSPADM

## 2022-10-04 RX ORDER — ONDANSETRON 2 MG/ML
INJECTION INTRAMUSCULAR; INTRAVENOUS AS NEEDED
Status: DISCONTINUED | OUTPATIENT
Start: 2022-10-04 | End: 2022-10-04 | Stop reason: SURG

## 2022-10-04 RX ORDER — HYDRALAZINE HYDROCHLORIDE 20 MG/ML
INJECTION INTRAMUSCULAR; INTRAVENOUS AS NEEDED
Status: DISCONTINUED | OUTPATIENT
Start: 2022-10-04 | End: 2022-10-04 | Stop reason: SURG

## 2022-10-04 RX ORDER — FENTANYL CITRATE 50 UG/ML
INJECTION, SOLUTION INTRAMUSCULAR; INTRAVENOUS AS NEEDED
Status: DISCONTINUED | OUTPATIENT
Start: 2022-10-04 | End: 2022-10-04 | Stop reason: SURG

## 2022-10-04 RX ORDER — DIPHENHYDRAMINE HYDROCHLORIDE 50 MG/ML
12.5 INJECTION INTRAMUSCULAR; INTRAVENOUS ONCE AS NEEDED
Status: DISCONTINUED | OUTPATIENT
Start: 2022-10-04 | End: 2022-10-04 | Stop reason: HOSPADM

## 2022-10-04 RX ORDER — ENOXAPARIN SODIUM 100 MG/ML
40 INJECTION SUBCUTANEOUS DAILY
Status: DISCONTINUED | OUTPATIENT
Start: 2022-10-05 | End: 2022-10-08 | Stop reason: HOSPADM

## 2022-10-04 RX ORDER — GABAPENTIN 300 MG/1
600 CAPSULE ORAL NIGHTLY
Status: DISCONTINUED | OUTPATIENT
Start: 2022-10-04 | End: 2022-10-08 | Stop reason: HOSPADM

## 2022-10-04 RX ORDER — HYDROCODONE BITARTRATE AND ACETAMINOPHEN 7.5; 325 MG/1; MG/1
1 TABLET ORAL ONCE AS NEEDED
Status: COMPLETED | OUTPATIENT
Start: 2022-10-04 | End: 2022-10-04

## 2022-10-04 RX ORDER — NALOXONE HCL 0.4 MG/ML
0.4 VIAL (ML) INJECTION
Status: DISCONTINUED | OUTPATIENT
Start: 2022-10-04 | End: 2022-10-08 | Stop reason: HOSPADM

## 2022-10-04 RX ORDER — EPHEDRINE SULFATE 5 MG/ML
INJECTION INTRAVENOUS AS NEEDED
Status: DISCONTINUED | OUTPATIENT
Start: 2022-10-04 | End: 2022-10-04 | Stop reason: SURG

## 2022-10-04 RX ORDER — ONDANSETRON 4 MG/1
4 TABLET, FILM COATED ORAL EVERY 6 HOURS PRN
Status: DISCONTINUED | OUTPATIENT
Start: 2022-10-04 | End: 2022-10-08 | Stop reason: HOSPADM

## 2022-10-04 RX ORDER — SODIUM CHLORIDE 0.9 % (FLUSH) 0.9 %
10 SYRINGE (ML) INJECTION EVERY 12 HOURS SCHEDULED
Status: DISCONTINUED | OUTPATIENT
Start: 2022-10-04 | End: 2022-10-04 | Stop reason: HOSPADM

## 2022-10-04 RX ORDER — PROPOFOL 10 MG/ML
VIAL (ML) INTRAVENOUS AS NEEDED
Status: DISCONTINUED | OUTPATIENT
Start: 2022-10-04 | End: 2022-10-04 | Stop reason: SURG

## 2022-10-04 RX ORDER — DOCUSATE SODIUM 100 MG/1
100 CAPSULE, LIQUID FILLED ORAL 2 TIMES DAILY PRN
Status: DISCONTINUED | OUTPATIENT
Start: 2022-10-04 | End: 2022-10-08 | Stop reason: HOSPADM

## 2022-10-04 RX ORDER — BACLOFEN 10 MG/1
10 TABLET ORAL 3 TIMES DAILY PRN
Status: DISCONTINUED | OUTPATIENT
Start: 2022-10-04 | End: 2022-10-08 | Stop reason: HOSPADM

## 2022-10-04 RX ORDER — GABAPENTIN 300 MG/1
300 CAPSULE ORAL 3 TIMES DAILY
Status: DISCONTINUED | OUTPATIENT
Start: 2022-10-04 | End: 2022-10-04

## 2022-10-04 RX ORDER — MONTELUKAST SODIUM 10 MG/1
10 TABLET ORAL DAILY
Status: DISCONTINUED | OUTPATIENT
Start: 2022-10-04 | End: 2022-10-08 | Stop reason: HOSPADM

## 2022-10-04 RX ORDER — SUCCINYLCHOLINE CHLORIDE 20 MG/ML
INJECTION INTRAMUSCULAR; INTRAVENOUS AS NEEDED
Status: DISCONTINUED | OUTPATIENT
Start: 2022-10-04 | End: 2022-10-04 | Stop reason: SURG

## 2022-10-04 RX ORDER — ATORVASTATIN CALCIUM 10 MG/1
10 TABLET, FILM COATED ORAL DAILY
Status: DISCONTINUED | OUTPATIENT
Start: 2022-10-04 | End: 2022-10-08 | Stop reason: HOSPADM

## 2022-10-04 RX ORDER — ONDANSETRON 2 MG/ML
4 INJECTION INTRAMUSCULAR; INTRAVENOUS ONCE AS NEEDED
Status: DISCONTINUED | OUTPATIENT
Start: 2022-10-04 | End: 2022-10-04 | Stop reason: HOSPADM

## 2022-10-04 RX ORDER — ROCURONIUM BROMIDE 10 MG/ML
INJECTION, SOLUTION INTRAVENOUS AS NEEDED
Status: DISCONTINUED | OUTPATIENT
Start: 2022-10-04 | End: 2022-10-04 | Stop reason: SURG

## 2022-10-04 RX ORDER — CLONAZEPAM 0.5 MG/1
0.5 TABLET ORAL 2 TIMES DAILY PRN
Status: DISCONTINUED | OUTPATIENT
Start: 2022-10-04 | End: 2022-10-08 | Stop reason: HOSPADM

## 2022-10-04 RX ORDER — ACETAMINOPHEN 500 MG
1000 TABLET ORAL ONCE
Status: COMPLETED | OUTPATIENT
Start: 2022-10-04 | End: 2022-10-04

## 2022-10-04 RX ORDER — SODIUM CHLORIDE 0.9 % (FLUSH) 0.9 %
10 SYRINGE (ML) INJECTION EVERY 12 HOURS SCHEDULED
Status: DISCONTINUED | OUTPATIENT
Start: 2022-10-04 | End: 2022-10-08 | Stop reason: HOSPADM

## 2022-10-04 RX ORDER — GABAPENTIN 300 MG/1
600 CAPSULE ORAL ONCE
Status: COMPLETED | OUTPATIENT
Start: 2022-10-04 | End: 2022-10-04

## 2022-10-04 RX ORDER — ALBUTEROL SULFATE 90 UG/1
2 AEROSOL, METERED RESPIRATORY (INHALATION) EVERY 4 HOURS PRN
Status: DISCONTINUED | OUTPATIENT
Start: 2022-10-04 | End: 2022-10-08 | Stop reason: HOSPADM

## 2022-10-04 RX ORDER — DEXAMETHASONE SODIUM PHOSPHATE 4 MG/ML
INJECTION, SOLUTION INTRA-ARTICULAR; INTRALESIONAL; INTRAMUSCULAR; INTRAVENOUS; SOFT TISSUE AS NEEDED
Status: DISCONTINUED | OUTPATIENT
Start: 2022-10-04 | End: 2022-10-04 | Stop reason: SURG

## 2022-10-04 RX ORDER — ACETAMINOPHEN 325 MG/1
650 TABLET ORAL EVERY 4 HOURS PRN
Status: DISCONTINUED | OUTPATIENT
Start: 2022-10-04 | End: 2022-10-08 | Stop reason: HOSPADM

## 2022-10-04 RX ADMIN — HYDROMORPHONE HYDROCHLORIDE 0.5 MG: 1 INJECTION, SOLUTION INTRAMUSCULAR; INTRAVENOUS; SUBCUTANEOUS at 21:00

## 2022-10-04 RX ADMIN — DEXAMETHASONE SODIUM PHOSPHATE 8 MG: 4 INJECTION, SOLUTION INTRAMUSCULAR; INTRAVENOUS at 09:03

## 2022-10-04 RX ADMIN — GABAPENTIN 600 MG: 300 CAPSULE ORAL at 21:00

## 2022-10-04 RX ADMIN — PROPOFOL 50 MG: 10 INJECTION, EMULSION INTRAVENOUS at 10:17

## 2022-10-04 RX ADMIN — HYDRALAZINE HYDROCHLORIDE 5 MG: 20 INJECTION INTRAMUSCULAR; INTRAVENOUS at 11:17

## 2022-10-04 RX ADMIN — HYDROCODONE BITARTRATE AND ACETAMINOPHEN 2 TABLET: 5; 325 TABLET ORAL at 23:01

## 2022-10-04 RX ADMIN — FENTANYL CITRATE 50 MCG: 50 INJECTION, SOLUTION INTRAMUSCULAR; INTRAVENOUS at 08:01

## 2022-10-04 RX ADMIN — PROPOFOL 50 MG: 10 INJECTION, EMULSION INTRAVENOUS at 10:43

## 2022-10-04 RX ADMIN — EPHEDRINE SULFATE 5 MG: 5 INJECTION INTRAVENOUS at 07:56

## 2022-10-04 RX ADMIN — PROPOFOL INJECTABLE EMULSION 125 MCG/KG/MIN: 10 INJECTION, EMULSION INTRAVENOUS at 07:46

## 2022-10-04 RX ADMIN — PROPOFOL 50 MG: 10 INJECTION, EMULSION INTRAVENOUS at 08:01

## 2022-10-04 RX ADMIN — ROCURONIUM BROMIDE 10 MG: 10 INJECTION, SOLUTION INTRAVENOUS at 07:42

## 2022-10-04 RX ADMIN — HYDROMORPHONE HYDROCHLORIDE 1 MG: 1 INJECTION, SOLUTION INTRAMUSCULAR; INTRAVENOUS; SUBCUTANEOUS at 13:18

## 2022-10-04 RX ADMIN — HYDROMORPHONE HYDROCHLORIDE 0.5 MG: 1 INJECTION, SOLUTION INTRAMUSCULAR; INTRAVENOUS; SUBCUTANEOUS at 15:47

## 2022-10-04 RX ADMIN — MONTELUKAST 10 MG: 10 TABLET, FILM COATED ORAL at 18:17

## 2022-10-04 RX ADMIN — HYDROMORPHONE HYDROCHLORIDE 0.5 MG: 1 INJECTION, SOLUTION INTRAMUSCULAR; INTRAVENOUS; SUBCUTANEOUS at 12:32

## 2022-10-04 RX ADMIN — FENTANYL CITRATE 100 MCG: 50 INJECTION, SOLUTION INTRAMUSCULAR; INTRAVENOUS at 07:41

## 2022-10-04 RX ADMIN — PROPOFOL 150 MG: 10 INJECTION, EMULSION INTRAVENOUS at 07:41

## 2022-10-04 RX ADMIN — ROCURONIUM BROMIDE 30 MG: 10 INJECTION, SOLUTION INTRAVENOUS at 08:38

## 2022-10-04 RX ADMIN — SUGAMMADEX 100 MG: 100 INJECTION, SOLUTION INTRAVENOUS at 09:47

## 2022-10-04 RX ADMIN — GLYCOPYRROLATE 0.2 MCG: 0.2 INJECTION INTRAMUSCULAR; INTRAVENOUS at 11:01

## 2022-10-04 RX ADMIN — HYDROMORPHONE HYDROCHLORIDE 0.5 MG: 1 INJECTION, SOLUTION INTRAMUSCULAR; INTRAVENOUS; SUBCUTANEOUS at 12:44

## 2022-10-04 RX ADMIN — ACETAMINOPHEN 1000 MG: 500 TABLET ORAL at 07:13

## 2022-10-04 RX ADMIN — CEFAZOLIN 2 G: 2 INJECTION, POWDER, FOR SOLUTION INTRAMUSCULAR; INTRAVENOUS at 07:46

## 2022-10-04 RX ADMIN — HYDROCODONE BITARTRATE AND ACETAMINOPHEN 1 TABLET: 7.5; 325 TABLET ORAL at 13:18

## 2022-10-04 RX ADMIN — SUGAMMADEX 100 MG: 100 INJECTION, SOLUTION INTRAVENOUS at 11:59

## 2022-10-04 RX ADMIN — PROPOFOL 50 MG: 10 INJECTION, EMULSION INTRAVENOUS at 10:57

## 2022-10-04 RX ADMIN — CLONAZEPAM 0.5 MG: 0.5 TABLET ORAL at 23:01

## 2022-10-04 RX ADMIN — SODIUM CHLORIDE, SODIUM LACTATE, POTASSIUM CHLORIDE, AND CALCIUM CHLORIDE: .6; .31; .03; .02 INJECTION, SOLUTION INTRAVENOUS at 07:37

## 2022-10-04 RX ADMIN — Medication 10 ML: at 21:01

## 2022-10-04 RX ADMIN — PROPOFOL 50 MG: 10 INJECTION, EMULSION INTRAVENOUS at 09:22

## 2022-10-04 RX ADMIN — ONDANSETRON 4 MG: 2 INJECTION INTRAMUSCULAR; INTRAVENOUS at 09:03

## 2022-10-04 RX ADMIN — SUCCINYLCHOLINE CHLORIDE 120 MG: 20 INJECTION, SOLUTION INTRAMUSCULAR; INTRAVENOUS at 07:43

## 2022-10-04 RX ADMIN — EPHEDRINE SULFATE 10 MG: 5 INJECTION INTRAVENOUS at 08:56

## 2022-10-04 RX ADMIN — ATORVASTATIN CALCIUM 10 MG: 10 TABLET, FILM COATED ORAL at 18:17

## 2022-10-04 RX ADMIN — GABAPENTIN 600 MG: 300 CAPSULE ORAL at 07:12

## 2022-10-04 RX ADMIN — GLYCOPYRROLATE 0.3 MCG: 0.2 INJECTION INTRAMUSCULAR; INTRAVENOUS at 08:08

## 2022-10-04 RX ADMIN — HYDROCHLOROTHIAZIDE: 25 TABLET ORAL at 18:17

## 2022-10-04 RX ADMIN — FENTANYL CITRATE 50 MCG: 50 INJECTION, SOLUTION INTRAMUSCULAR; INTRAVENOUS at 08:08

## 2022-10-04 RX ADMIN — GLYCOPYRROLATE 0.2 MCG: 0.2 INJECTION INTRAMUSCULAR; INTRAVENOUS at 08:43

## 2022-10-04 NOTE — ANESTHESIA POSTPROCEDURE EVALUATION
Patient: Omar Choudhary    Procedure Summary     Date: 10/04/22 Room / Location: [facility] OR 12 / [facility] MAIN OR    Anesthesia Start: 0736 Anesthesia Stop: 1223    Procedure: DAY 1 LUMBAR LATERAL INTERBODY FUSION WITH NEURO ROBOT L2, L3.L4 (N/A Spine Lumbar) Diagnosis:       Spinal stenosis of lumbar region with neurogenic claudication      Degenerative lumbar spinal stenosis      Lumbar radiculopathy, chronic      (Spinal stenosis of lumbar region with neurogenic claudication [M48.062])      (Degenerative lumbar spinal stenosis [M48.061])      (Lumbar radiculopathy, chronic [M54.16])    Surgeons: John Do MD Provider: Kevin Garcia MD    Anesthesia Type: ERAS Protocol ASA Status: 3          Anesthesia Type: ERAS Protocol    Vitals  Vitals Value Taken Time   /85 10/04/22 1405   Temp 97.9 °F (36.6 °C) 10/04/22 1415   Pulse 68 10/04/22 1426   Resp 13 10/04/22 1415   SpO2 99 % 10/04/22 1426   Vitals shown include unvalidated device data.        Post Anesthesia Care and Evaluation    Patient location during evaluation: PACU  Patient participation: complete - patient participated  Level of consciousness: awake  Pain scale: See nurse's notes for pain score.  Pain management: adequate    Airway patency: patent  Anesthetic complications: No anesthetic complications  PONV Status: none  Cardiovascular status: acceptable  Respiratory status: acceptable  Hydration status: acceptable    Comments: Unfortunately, I am told a line cannot be taken to floor and must be removed, despite case tomorrow.    Patient seen and examined postoperatively; vital signs stable; SpO2 greater than or equal to 90%; cardiopulmonary status stable; nausea/vomiting adequately controlled; pain adequately controlled; no apparent anesthesia complications; patient discharged from anesthesia care when discharge criteria were met

## 2022-10-04 NOTE — PROGRESS NOTES
Patient states that he brought all of his medications from home and does not want to take our symbicort inhaler. He wants to take his home meds

## 2022-10-04 NOTE — CONSULTS
[facility] Medicine Services - Consult Note    Patient Name: Omar Choudhary  : 1945  MRN: [id number]  Primary Care Physician:  Dennis Kwong MD  Referring Physician: John Do MD  Date of admission: 10/4/2022    Consults    Subjective      Reason for Consult/ Chief Complaint: Medical management    History of Present Illness: Omar Choudhary is a 77 y.o. male patient with a history of severe multilevel central canal stenosis of the lumbar spine worse at L3-L4 but also significant at L2-L3 underwent fusion surgery.    Review of Systems   Constitutional: Negative.   HENT: Negative.    Eyes: Negative.    Cardiovascular: Negative.    Musculoskeletal: Positive for back pain.   Gastrointestinal: Negative.    Psychiatric/Behavioral: Negative.        Personal History     Past Medical History:   Diagnosis Date   • Allergic    • Arthritis    • Asthma    • Carpal tunnel syndrome     no pain   • Cataract    • Depression    • Family history of malignant neoplasm of breast 2019   • Hemidiaphragm paralysis     Left   • Hyperlipidemia 2019   • Hypertension 2012   • Leg pain, right    • Low back pain    • Neuropathy     feet   • Osteopenia    • Poor balance    • Reactive airway disease 01/15/2016   • Shortness of breath    • Sleep apnea, obstructive     CPAP- to bring dos       Past Surgical History:   Procedure Laterality Date   • KNEE ARTHROSCOPY W/ ACL RECONSTRUCTION Right 2019   • MOUTH SURGERY         Family History: family history includes Aortic aneurysm in his father; Breast cancer in his mother; Heart attack in his father; Heart disease in his mother; Hypertension in his mother; Liver cancer in his father; Stroke in his mother. Otherwise pertinent FHx was reviewed and not pertinent to current issue.    Social History:  reports that he quit smoking about 30 years ago. His smoking use included cigarettes. He started smoking about 56 years ago. He has a 5.00  pack-year smoking history. He has never used smokeless tobacco. He reports current alcohol use of about 12.0 standard drinks of alcohol per week. He reports that he does not use drugs.    Home Medications:   Budeson-Glycopyrrol-Formoterol, HYDROcodone-acetaminophen, Multiple Vitamins-Minerals, Red Yeast Rice, Turmeric, acetaminophen, albuterol sulfate HFA, baclofen, calcium-vitamin D, carboxymethylcellulose, clonazePAM, fish oil, gabapentin, lidocaine, losartan-hydrochlorothiazide, montelukast, pravastatin, sildenafil, theophylline, triamcinolone, and vitamin b complex    Allergies:  Allergies   Allergen Reactions   • Statins Myalgia         Objective      Vitals:  Temp:  [96.8 °F (36 °C)-98.2 °F (36.8 °C)] 97.4 °F (36.3 °C)  Heart Rate:  [53-71] 69  Resp:  [13-22] 17  BP: (104-172)/() 172/102  Flow (L/min):  [1-6] 1    Physical Exam  Vitals and nursing note reviewed.   Constitutional:       Appearance: Normal appearance.   HENT:      Right Ear: External ear normal.      Left Ear: External ear normal.   Eyes:      Extraocular Movements: Extraocular movements intact.      Pupils: Pupils are equal, round, and reactive to light.   Cardiovascular:      Rate and Rhythm: Normal rate and regular rhythm.   Pulmonary:      Effort: Pulmonary effort is normal.      Breath sounds: Normal breath sounds.   Abdominal:      General: Abdomen is flat.      Palpations: Abdomen is soft.   Skin:     General: Skin is warm and dry.   Neurological:      Mental Status: He is alert.   Psychiatric:         Mood and Affect: Mood normal.         Result Review    Result Review:  I have personally reviewed the results from the time of this admission to 10/4/2022 18:57 EDT and agree with these findings:  []  Laboratory  []  Microbiology  []  Radiology  []  EKG/Telemetry   []  Cardiology/Vascular   []  Pathology  []  Old records  []  Other:  Most notable findings include:   Lab Results   Component Value Date    WBC 3.09 (L) 09/27/2022    HGB  14.0 09/27/2022    HCT 39.8 09/27/2022    MCV 89.2 09/27/2022     09/27/2022         Assessment & Plan        Active Hospital Problems:  Active Hospital Problems    Diagnosis    • Lumbar radiculopathy, chronic      Added automatically from request for surgery [id number]     • Spinal stenosis of lumbar region with neurogenic claudication    • Degenerative lumbar spinal stenosis    • Hyperlipidemia    • Depression    • Hypertension      Assessment/plan:   Status post lumbar fusion lumbar lateral interbody fusion with neuro robot L2-L3-L4; care as per neurosurgery  Anxiety/depression continue clonazepam  Hypertension; continue with losartan hydrochlorothiazide  Allergic asthma; continue with Singulair  This patient has been examined wearing appropriate Personal Protective Equipment and discussed with hospital infection control department. 10/04/22      Signature:   Electronically signed by Eric Fang MD, 10/04/22, 7:04 PM EDT.

## 2022-10-04 NOTE — ANESTHESIA PREPROCEDURE EVALUATION
Anesthesia Evaluation     Patient summary reviewed and Nursing notes reviewed   NPO Solid Status: > 8 hours  NPO Liquid Status: > 8 hours           Airway   Mallampati: III  TM distance: >3 FB  Neck ROM: full  No difficulty expected  Dental      Pulmonary    (+) asthma,shortness of breath, sleep apnea,   Cardiovascular   Exercise tolerance: poor (<4 METS)    (+) hypertension, hyperlipidemia,       Neuro/Psych  (+) numbness, psychiatric history Anxiety and Depression,    GI/Hepatic/Renal/Endo      Musculoskeletal     Abdominal    Substance History      OB/GYN          Other   arthritis,      ROS/Med Hx Other: Previous good functional status, but very decreased last 3 months due to back pain.  Neg stress 4 years ago                Anesthesia Plan    ASA 3     ERAS Protocol     (Tylenol and gabapentin preop, quentin  )  intravenous induction     Anesthetic plan, risks, benefits, and alternatives have been provided, discussed and informed consent has been obtained with: patient.        CODE STATUS:

## 2022-10-04 NOTE — ANESTHESIA PROCEDURE NOTES
Airway  Urgency: elective    Date/Time: 10/4/2022 7:44 AM    General Information and Staff    Patient location during procedure: OR  CRNA/CAA: Mendez Jiang CRNA    Indications and Patient Condition  Indications for airway management: airway protection and CNS depression    Preoxygenated: yes  MILS maintained throughout  Mask difficulty assessment: 0 - not attempted    Final Airway Details  Final airway type: endotracheal airway      Successful airway: ETT  Cuffed: yes   Successful intubation technique: direct laryngoscopy  Facilitating devices/methods: Bougie  Endotracheal tube insertion site: oral  Blade: Gaudencio  Blade size: 3  ETT size (mm): 7.5  Cormack-Lehane Classification: grade IIa - partial view of glottis  Placement verified by: chest auscultation and capnometry   Measured from: lips  Number of attempts at approach: 1  Assessment: lips, teeth, and gum same as pre-op and atraumatic intubation

## 2022-10-04 NOTE — H&P
HPI: This is a 77-year-old gentleman who I become quite familiar with.  He has had a progressive decline in overall functional ability.  This is likely related to his severe degenerative disc disease as well as focal lumbar scoliosis causing severe multilevel spinal canal stenosis as well as severe multilevel neuroforaminal stenosis.  The stenosis is likely the source of neurogenic claudication that is progressively limiting his ability as well as increased pain in the radicular patterns that he is experiencing.  He did receive a second opinion from an outside orthopedic surgeon who agreed that he had significant degenerative disc disease and multilevel canal stenosis resulting in radiculopathy and neurogenic claudication.  He underwent a new MRI of his lumbar spine as his prior MRI had become somewhat outdated and with the progressive nature of his symptoms it was important to have updated imaging.     Medical History        Past Medical History:   Diagnosis Date   • Allergic     • Arthritis     • Asthma     • Cataract     • Depression     • Family history of malignant neoplasm of breast 9/26/2019   • Hemidiaphragm paralysis       Left   • Hyperlipidemia 9/26/2019   • Hypertension 3/1/2012   • Leg pain, right     • Low back pain     • Osteopenia     • Reactive airway disease 1/15/2016   • Shortness of breath     • Sleep apnea, obstructive              Surgical History         Past Surgical History:   Procedure Laterality Date   • KNEE ARTHROSCOPY W/ ACL RECONSTRUCTION       • MOUTH SURGERY                       Current Outpatient Medications on File Prior to Visit   Medication Sig Dispense Refill   • acetaminophen (TYLENOL) 500 MG tablet Take 500 mg by mouth Every 6 (Six) Hours As Needed for Mild Pain .       • albuterol sulfate  (90 Base) MCG/ACT inhaler Inhale 2 puffs Every 4 (Four) Hours As Needed for Wheezing or Shortness of Air. 18 g 3   • Alpha-Lipoic Acid 100 MG capsule Take  by mouth.       • B  Complex Vitamins (VITAMIN B COMPLEX) capsule capsule Take  by mouth Daily.       • Budeson-Glycopyrrol-Formoterol (Breztri Aerosphere) 160-9-4.8 MCG/ACT aerosol inhaler Inhale 2 puffs 2 (Two) Times a Day. 2 each 0   • budesonide-formoterol (SYMBICORT) 160-4.5 MCG/ACT inhaler Inhale 2 puffs 2 (Two) Times a Day. 120 each 3   • Calcium Carb-Cholecalciferol (calcium-vitamin D) 500-200 MG-UNIT per tablet Take 2 tablets by mouth Daily. 180 tablet 3   • Carboxymethylcellul-Glycerin 0.5-0.9 % solution Apply  to eye(s) as directed by provider Every 8 (Eight) Hours.       • clonazePAM (KlonoPIN) 0.5 MG tablet TAKE ONE TABLET BY MOUTH TWICE A DAY AS NEEDED FOR ANXIETY 30 tablet 2   • coenzyme Q10 50 MG capsule capsule Take  by mouth Daily.       • cyclobenzaprine (FLEXERIL) 10 MG tablet Take 1 tablet by mouth 3 (Three) Times a Day As Needed for Muscle Spasms. 30 tablet 1   • diphenhydrAMINE HCl (ANTI-HIST PO) Take 1 tablet by mouth Daily As Needed.       • gabapentin (NEURONTIN) 300 MG capsule take 1 capsule by mouth every morning, 1 capsule in afternoon and 2 capsules at night 120 capsule 5   • HYDROcodone-acetaminophen (NORCO) 7.5-325 MG per tablet Take 1 tablet by mouth 2 (Two) Times a Day As Needed for Moderate Pain . 60 tablet 0   • lidocaine (LIDODERM) 5 % Place 1 patch on the skin as directed by provider Daily. Remove & Discard patch within 12 hours or as directed by MD 30 each 1   • losartan-hydrochlorothiazide (HYZAAR) 100-25 MG per tablet TAKE 1 TABLET BY MOUTH DAILY 90 tablet 0   • meloxicam (MOBIC) 15 MG tablet TAKE 1 TABLET BY MOUTH DAILY 90 tablet 0   • montelukast (SINGULAIR) 10 MG tablet TAKE 1 TABLET BY MOUTH DAILY 90 tablet 0   • Omega-3 Fatty Acids (FISH OIL) 1000 MG capsule capsule Take  by mouth Daily With Breakfast.       • pravastatin (PRAVACHOL) 10 MG tablet Take 1 tablet by mouth Daily. 90 tablet 3   • Red Yeast Rice 600 MG capsule Take  by mouth.       • sildenafil (REVATIO) 20 MG tablet take 1 to 2  "tablets by mouth as needed for erectile dysfunction. 50 tablet 0   • theophylline (SAJAN-24) 300 MG 24 hr capsule Take 300 mg by mouth Daily.       • theophylline (THEODUR) 300 MG 12 hr tablet Take 300 mg by mouth Daily.       • triamcinolone (KENALOG) 0.1 % cream Apply  topically to the appropriate area as directed 2 (Two) Times a Day. 28.4 g 0                Current Facility-Administered Medications on File Prior to Visit   Medication Dose Route Frequency Provider Last Rate Last Admin   • theophylline (SAJAN-24) 24 hr capsule 300 mg  300 mg Oral Q24H Delia Ramon APRN                  Allergies   Allergen Reactions   • Statins Myalgia         Social History   Social History            Socioeconomic History   • Marital status:    Tobacco Use   • Smoking status: Former Smoker       Packs/day: 0.50       Years: 10.00       Pack years: 5.00       Types: Cigarettes       Start date:        Quit date:        Years since quittin.7   • Smokeless tobacco: Never Used   Vaping Use   • Vaping Use: Never used   Substance and Sexual Activity   • Alcohol use: Yes       Alcohol/week: 12.0 standard drinks       Types: 10 Glasses of wine, 2 Cans of beer per week   • Drug use: Never       Types: Marijuana, Mescaline, Psilocybin       Comment: 50 years ago while in college    • Sexual activity: Yes               Review of Systems   Constitutional: Positive for activity change.   Eyes: Negative.    Respiratory: Negative.    Cardiovascular: Negative.    Gastrointestinal: Negative.    Endocrine: Negative.    Genitourinary: Negative.    Musculoskeletal: Positive for back pain (into Right leg).   Neurological: Positive for weakness and numbness.   Psychiatric/Behavioral: Positive for sleep disturbance.         Physical Examination:                Vitals       Vitals:     22 1314   BP: 151/99   Pulse: 71   SpO2: 98%   Weight: 83.7 kg (184 lb 8 oz)   Height: 167.6 cm (65.98\")            Physical Exam "      Neurological Exam   Patient's neurological exam is stable compared to my last evaluation without any new red flag signs.     Result Review  The following data was reviewed by: John Do MD on 09/09/2022:     Data reviewed: Radiologic studies MRI of the lumbar spine was personally reviewed.  It shows lumbar scoliosis with right-sided concavity to the curve.  There is severe multilevel central canal stenosis worst at L3-L4 but also significant at L2-L3.  There is severe multilevel neuroforaminal stenosis from soft tissue stenosis as well as bony stenosis.      Assessment/plan:  This is a 77-year-old gentleman who has become relatively well-known to me at this juncture.  When I first met him he had signs and symptoms of neurogenic claudication and some radiculopathy but this was not life altering and manageable with conservative measures.  He has engaged in significant conservative measures including physical therapy and pain management evaluation and has had a relative precipitous decline in his functional status with significant decrease in his quality of life.  His new imaging confirms multilevel central as well as neuroforaminal stenosis.  He does have lumbar scoliosis.  I have discussed with him the possibility of surgical intervention including the options of extent of surgical intervention.  We had a very extensive discussion about these options.  This includes focal single level decompression versus multilevel decompression and fusion.  He would like to continue with physical therapy at this time.  He will think about surgical options as well as discuss with his family and friends about surgical options.  I have offered him a short course of baclofen to see if this provides some increased relief compared to Flexeril as he has significant muscle spasms.  He is already on gabapentin.  He will call me in the future with a more definitive plan if he elects to proceed with surgical intervention.

## 2022-10-05 ENCOUNTER — ANESTHESIA (OUTPATIENT)
Dept: PERIOP | Facility: HOSPITAL | Age: 77
End: 2022-10-05

## 2022-10-05 ENCOUNTER — APPOINTMENT (OUTPATIENT)
Dept: GENERAL RADIOLOGY | Facility: HOSPITAL | Age: 77
End: 2022-10-05

## 2022-10-05 LAB
ANION GAP SERPL CALCULATED.3IONS-SCNC: 12 MMOL/L (ref 5–15)
BASOPHILS # BLD AUTO: 0 10*3/MM3 (ref 0–0.2)
BASOPHILS NFR BLD AUTO: 0.1 % (ref 0–1.5)
BUN SERPL-MCNC: 15 MG/DL (ref 8–23)
BUN/CREAT SERPL: 15.3 (ref 7–25)
CALCIUM SPEC-SCNC: 8.5 MG/DL (ref 8.6–10.5)
CHLORIDE SERPL-SCNC: 101 MMOL/L (ref 98–107)
CO2 SERPL-SCNC: 25 MMOL/L (ref 22–29)
CREAT SERPL-MCNC: 0.98 MG/DL (ref 0.76–1.27)
DEPRECATED RDW RBC AUTO: 42.9 FL (ref 37–54)
EGFRCR SERPLBLD CKD-EPI 2021: 80.4 ML/MIN/1.73
EOSINOPHIL # BLD AUTO: 0 10*3/MM3 (ref 0–0.4)
EOSINOPHIL NFR BLD AUTO: 0.1 % (ref 0.3–6.2)
ERYTHROCYTE [DISTWIDTH] IN BLOOD BY AUTOMATED COUNT: 13.3 % (ref 12.3–15.4)
GLUCOSE SERPL-MCNC: 138 MG/DL (ref 65–99)
HCT VFR BLD AUTO: 34.6 % (ref 37.5–51)
HGB BLD-MCNC: 12.1 G/DL (ref 13–17.7)
LYMPHOCYTES # BLD AUTO: 0.7 10*3/MM3 (ref 0.7–3.1)
LYMPHOCYTES NFR BLD AUTO: 9.1 % (ref 19.6–45.3)
MCH RBC QN AUTO: 34.9 PG (ref 26.6–33)
MCHC RBC AUTO-ENTMCNC: 35.1 G/DL (ref 31.5–35.7)
MCV RBC AUTO: 99.5 FL (ref 79–97)
MONOCYTES # BLD AUTO: 0.8 10*3/MM3 (ref 0.1–0.9)
MONOCYTES NFR BLD AUTO: 10.5 % (ref 5–12)
NEUTROPHILS NFR BLD AUTO: 5.9 10*3/MM3 (ref 1.7–7)
NEUTROPHILS NFR BLD AUTO: 80.2 % (ref 42.7–76)
NRBC BLD AUTO-RTO: 0 /100 WBC (ref 0–0.2)
PLATELET # BLD AUTO: 163 10*3/MM3 (ref 140–450)
PMV BLD AUTO: 7.9 FL (ref 6–12)
POTASSIUM SERPL-SCNC: 3.8 MMOL/L (ref 3.5–5.2)
RBC # BLD AUTO: 3.47 10*6/MM3 (ref 4.14–5.8)
SODIUM SERPL-SCNC: 138 MMOL/L (ref 136–145)
WBC NRBC COR # BLD: 7.4 10*3/MM3 (ref 3.4–10.8)

## 2022-10-05 PROCEDURE — 25010000002 HYDROMORPHONE 1 MG/ML SOLUTION: Performed by: ANESTHESIOLOGY

## 2022-10-05 PROCEDURE — 82803 BLOOD GASES ANY COMBINATION: CPT

## 2022-10-05 PROCEDURE — C1713 ANCHOR/SCREW BN/BN,TIS/BN: HCPCS | Performed by: NEUROLOGICAL SURGERY

## 2022-10-05 PROCEDURE — 25010000002 HYDROMORPHONE PER 4 MG

## 2022-10-05 PROCEDURE — 25010000002 HYDROMORPHONE 1 MG/ML SOLUTION

## 2022-10-05 PROCEDURE — 63047 LAM FACETEC & FORAMOT LUMBAR: CPT | Performed by: NEUROLOGICAL SURGERY

## 2022-10-05 PROCEDURE — 94799 UNLISTED PULMONARY SVC/PX: CPT

## 2022-10-05 PROCEDURE — 84132 ASSAY OF SERUM POTASSIUM: CPT

## 2022-10-05 PROCEDURE — 0SG10K1 FUSION OF 2 OR MORE LUMBAR VERTEBRAL JOINTS WITH NONAUTOLOGOUS TISSUE SUBSTITUTE, POSTERIOR APPROACH, POSTERIOR COLUMN, OPEN APPROACH: ICD-10-PCS | Performed by: NEUROLOGICAL SURGERY

## 2022-10-05 PROCEDURE — 61783 SCAN PROC SPINAL: CPT | Performed by: NEUROLOGICAL SURGERY

## 2022-10-05 PROCEDURE — 25010000002 PROPOFOL 200 MG/20ML EMULSION

## 2022-10-05 PROCEDURE — 25010000002 PROPOFOL 10 MG/ML EMULSION

## 2022-10-05 PROCEDURE — 72100 X-RAY EXAM L-S SPINE 2/3 VWS: CPT | Performed by: NEUROLOGICAL SURGERY

## 2022-10-05 PROCEDURE — 25010000002 CEFAZOLIN PER 500 MG: Performed by: NEUROLOGICAL SURGERY

## 2022-10-05 PROCEDURE — 84295 ASSAY OF SERUM SODIUM: CPT

## 2022-10-05 PROCEDURE — 25010000002 VANCOMYCIN 1 G RECONSTITUTED SOLUTION: Performed by: NEUROLOGICAL SURGERY

## 2022-10-05 PROCEDURE — 82947 ASSAY GLUCOSE BLOOD QUANT: CPT

## 2022-10-05 PROCEDURE — 82330 ASSAY OF CALCIUM: CPT

## 2022-10-05 PROCEDURE — 25010000002 DEXAMETHASONE PER 1 MG

## 2022-10-05 PROCEDURE — 22614 ARTHRD PST TQ 1NTRSPC EA ADD: CPT | Performed by: NEUROLOGICAL SURGERY

## 2022-10-05 PROCEDURE — 63048 LAM FACETEC &FORAMOT EA ADDL: CPT | Performed by: NEUROLOGICAL SURGERY

## 2022-10-05 PROCEDURE — 85025 COMPLETE CBC W/AUTO DIFF WBC: CPT

## 2022-10-05 PROCEDURE — 80048 BASIC METABOLIC PNL TOTAL CA: CPT

## 2022-10-05 PROCEDURE — 22612 ARTHRD PST TQ 1NTRSPC LUMBAR: CPT | Performed by: NEUROLOGICAL SURGERY

## 2022-10-05 PROCEDURE — 25010000002 FENTANYL CITRATE (PF) 100 MCG/2ML SOLUTION

## 2022-10-05 PROCEDURE — 76000 FLUOROSCOPY <1 HR PHYS/QHP: CPT | Performed by: NEUROLOGICAL SURGERY

## 2022-10-05 PROCEDURE — 22842 INSERT SPINE FIXATION DEVICE: CPT | Performed by: NEUROLOGICAL SURGERY

## 2022-10-05 PROCEDURE — 94640 AIRWAY INHALATION TREATMENT: CPT

## 2022-10-05 PROCEDURE — 85014 HEMATOCRIT: CPT

## 2022-10-05 DEVICE — CREO® THREADED 6.5 X 55MM POLYAXIAL SCREW
Type: IMPLANTABLE DEVICE | Site: SPINE LUMBAR | Status: FUNCTIONAL
Brand: CREO

## 2022-10-05 DEVICE — CREO® THREADED 6.5 X 50MM POLYAXIAL SCREW
Type: IMPLANTABLE DEVICE | Site: SPINE LUMBAR | Status: FUNCTIONAL
Brand: CREO

## 2022-10-05 DEVICE — DEV CONTRL TISS STRATAFIX SPIRAL MNCRYL UD 3/0 PLS 30CM: Type: IMPLANTABLE DEVICE | Site: SPINE LUMBAR | Status: FUNCTIONAL

## 2022-10-05 DEVICE — ALLOGRAFT BONE OSTEOAMP SELECT FLO 5CC: Type: IMPLANTABLE DEVICE | Site: SPINE LUMBAR | Status: FUNCTIONAL

## 2022-10-05 DEVICE — THREADED LOCKING CAP, CREO
Type: IMPLANTABLE DEVICE | Site: SPINE LUMBAR | Status: FUNCTIONAL
Brand: CREO

## 2022-10-05 DEVICE — I-FACTOR PUTTY, 2.5CC SYRINGE
Type: IMPLANTABLE DEVICE | Site: SPINE LUMBAR | Status: FUNCTIONAL
Brand: I-FACTOR PEPTIDE ENHANCED BONE GRAFT

## 2022-10-05 DEVICE — CREO® THREADED 5.5 X 50MM POLYAXIAL SCREW
Type: IMPLANTABLE DEVICE | Site: SPINE LUMBAR | Status: FUNCTIONAL
Brand: CREO

## 2022-10-05 DEVICE — CREO® THREADED 6.5 X 45MM POLYAXIAL SCREW
Type: IMPLANTABLE DEVICE | Site: SPINE LUMBAR | Status: FUNCTIONAL
Brand: CREO

## 2022-10-05 DEVICE — 5.5MM CURVED ROD, TITANIUM ALLOY, 60MM LENGTH
Type: IMPLANTABLE DEVICE | Site: SPINE LUMBAR | Status: FUNCTIONAL
Brand: CREO

## 2022-10-05 RX ORDER — OXYCODONE HYDROCHLORIDE 5 MG/1
15 TABLET ORAL EVERY 4 HOURS PRN
Status: DISCONTINUED | OUTPATIENT
Start: 2022-10-05 | End: 2022-10-05 | Stop reason: HOSPADM

## 2022-10-05 RX ORDER — ALBUTEROL SULFATE 2.5 MG/3ML
1.25 SOLUTION RESPIRATORY (INHALATION) ONCE
Status: COMPLETED | OUTPATIENT
Start: 2022-10-05 | End: 2022-10-05

## 2022-10-05 RX ORDER — GLYCOPYRROLATE 0.2 MG/ML
INJECTION INTRAMUSCULAR; INTRAVENOUS AS NEEDED
Status: DISCONTINUED | OUTPATIENT
Start: 2022-10-05 | End: 2022-10-05 | Stop reason: SURG

## 2022-10-05 RX ORDER — OXYCODONE HYDROCHLORIDE 5 MG/1
10 TABLET ORAL ONCE AS NEEDED
Status: DISCONTINUED | OUTPATIENT
Start: 2022-10-05 | End: 2022-10-05 | Stop reason: HOSPADM

## 2022-10-05 RX ORDER — MAGNESIUM HYDROXIDE 1200 MG/15ML
LIQUID ORAL AS NEEDED
Status: DISCONTINUED | OUTPATIENT
Start: 2022-10-05 | End: 2022-10-05 | Stop reason: HOSPADM

## 2022-10-05 RX ORDER — PROMETHAZINE HYDROCHLORIDE 25 MG/1
25 SUPPOSITORY RECTAL ONCE AS NEEDED
Status: DISCONTINUED | OUTPATIENT
Start: 2022-10-05 | End: 2022-10-05 | Stop reason: HOSPADM

## 2022-10-05 RX ORDER — DROPERIDOL 2.5 MG/ML
0.62 INJECTION, SOLUTION INTRAMUSCULAR; INTRAVENOUS ONCE AS NEEDED
Status: DISCONTINUED | OUTPATIENT
Start: 2022-10-05 | End: 2022-10-05 | Stop reason: HOSPADM

## 2022-10-05 RX ORDER — PROPOFOL 10 MG/ML
INJECTION, EMULSION INTRAVENOUS AS NEEDED
Status: DISCONTINUED | OUTPATIENT
Start: 2022-10-05 | End: 2022-10-05 | Stop reason: SURG

## 2022-10-05 RX ORDER — LIDOCAINE HYDROCHLORIDE 10 MG/ML
INJECTION, SOLUTION EPIDURAL; INFILTRATION; INTRACAUDAL; PERINEURAL AS NEEDED
Status: DISCONTINUED | OUTPATIENT
Start: 2022-10-05 | End: 2022-10-05 | Stop reason: SURG

## 2022-10-05 RX ORDER — VANCOMYCIN HYDROCHLORIDE 1 G/20ML
INJECTION, POWDER, LYOPHILIZED, FOR SOLUTION INTRAVENOUS AS NEEDED
Status: DISCONTINUED | OUTPATIENT
Start: 2022-10-05 | End: 2022-10-05 | Stop reason: HOSPADM

## 2022-10-05 RX ORDER — LABETALOL HYDROCHLORIDE 5 MG/ML
5 INJECTION, SOLUTION INTRAVENOUS
Status: DISCONTINUED | OUTPATIENT
Start: 2022-10-05 | End: 2022-10-05 | Stop reason: HOSPADM

## 2022-10-05 RX ORDER — EPHEDRINE SULFATE 5 MG/ML
INJECTION INTRAVENOUS AS NEEDED
Status: DISCONTINUED | OUTPATIENT
Start: 2022-10-05 | End: 2022-10-05 | Stop reason: SURG

## 2022-10-05 RX ORDER — ACETAMINOPHEN 325 MG/1
650 TABLET ORAL ONCE AS NEEDED
Status: DISCONTINUED | OUTPATIENT
Start: 2022-10-05 | End: 2022-10-05 | Stop reason: HOSPADM

## 2022-10-05 RX ORDER — FENTANYL CITRATE 50 UG/ML
INJECTION, SOLUTION INTRAMUSCULAR; INTRAVENOUS AS NEEDED
Status: DISCONTINUED | OUTPATIENT
Start: 2022-10-05 | End: 2022-10-05 | Stop reason: SURG

## 2022-10-05 RX ORDER — LIDOCAINE HYDROCHLORIDE 10 MG/ML
0.5 INJECTION, SOLUTION INFILTRATION; PERINEURAL ONCE AS NEEDED
Status: DISCONTINUED | OUTPATIENT
Start: 2022-10-05 | End: 2022-10-05 | Stop reason: HOSPADM

## 2022-10-05 RX ORDER — IPRATROPIUM BROMIDE AND ALBUTEROL SULFATE 2.5; .5 MG/3ML; MG/3ML
3 SOLUTION RESPIRATORY (INHALATION) ONCE AS NEEDED
Status: DISCONTINUED | OUTPATIENT
Start: 2022-10-05 | End: 2022-10-05 | Stop reason: HOSPADM

## 2022-10-05 RX ORDER — HYDROMORPHONE HCL 110MG/55ML
PATIENT CONTROLLED ANALGESIA SYRINGE INTRAVENOUS AS NEEDED
Status: DISCONTINUED | OUTPATIENT
Start: 2022-10-05 | End: 2022-10-05 | Stop reason: SURG

## 2022-10-05 RX ORDER — PHENYLEPHRINE HCL IN 0.9% NACL 1 MG/10 ML
SYRINGE (ML) INTRAVENOUS AS NEEDED
Status: DISCONTINUED | OUTPATIENT
Start: 2022-10-05 | End: 2022-10-05 | Stop reason: SURG

## 2022-10-05 RX ORDER — HYDRALAZINE HYDROCHLORIDE 20 MG/ML
5 INJECTION INTRAMUSCULAR; INTRAVENOUS
Status: DISCONTINUED | OUTPATIENT
Start: 2022-10-05 | End: 2022-10-05 | Stop reason: HOSPADM

## 2022-10-05 RX ORDER — ONDANSETRON 2 MG/ML
4 INJECTION INTRAMUSCULAR; INTRAVENOUS ONCE
Status: DISCONTINUED | OUTPATIENT
Start: 2022-10-05 | End: 2022-10-05 | Stop reason: HOSPADM

## 2022-10-05 RX ORDER — ACETAMINOPHEN 650 MG/1
650 SUPPOSITORY RECTAL ONCE AS NEEDED
Status: DISCONTINUED | OUTPATIENT
Start: 2022-10-05 | End: 2022-10-05 | Stop reason: HOSPADM

## 2022-10-05 RX ORDER — MEPERIDINE HYDROCHLORIDE 25 MG/ML
12.5 INJECTION INTRAMUSCULAR; INTRAVENOUS; SUBCUTANEOUS
Status: DISCONTINUED | OUTPATIENT
Start: 2022-10-05 | End: 2022-10-05 | Stop reason: HOSPADM

## 2022-10-05 RX ORDER — NALOXONE HCL 0.4 MG/ML
0.4 VIAL (ML) INJECTION AS NEEDED
Status: DISCONTINUED | OUTPATIENT
Start: 2022-10-05 | End: 2022-10-05 | Stop reason: HOSPADM

## 2022-10-05 RX ORDER — BUPIVACAINE HYDROCHLORIDE AND EPINEPHRINE 2.5; 5 MG/ML; UG/ML
INJECTION, SOLUTION EPIDURAL; INFILTRATION; INTRACAUDAL; PERINEURAL AS NEEDED
Status: DISCONTINUED | OUTPATIENT
Start: 2022-10-05 | End: 2022-10-05 | Stop reason: HOSPADM

## 2022-10-05 RX ORDER — SODIUM CHLORIDE, SODIUM LACTATE, POTASSIUM CHLORIDE, CALCIUM CHLORIDE 600; 310; 30; 20 MG/100ML; MG/100ML; MG/100ML; MG/100ML
1000 INJECTION, SOLUTION INTRAVENOUS CONTINUOUS
Status: DISPENSED | OUTPATIENT
Start: 2022-10-05 | End: 2022-10-07

## 2022-10-05 RX ORDER — DEXAMETHASONE SODIUM PHOSPHATE 4 MG/ML
INJECTION, SOLUTION INTRA-ARTICULAR; INTRALESIONAL; INTRAMUSCULAR; INTRAVENOUS; SOFT TISSUE AS NEEDED
Status: DISCONTINUED | OUTPATIENT
Start: 2022-10-05 | End: 2022-10-05 | Stop reason: SURG

## 2022-10-05 RX ORDER — FLUMAZENIL 0.1 MG/ML
0.2 INJECTION INTRAVENOUS AS NEEDED
Status: DISCONTINUED | OUTPATIENT
Start: 2022-10-05 | End: 2022-10-05 | Stop reason: HOSPADM

## 2022-10-05 RX ORDER — SODIUM CHLORIDE 0.9 % (FLUSH) 0.9 %
10 SYRINGE (ML) INJECTION AS NEEDED
Status: DISCONTINUED | OUTPATIENT
Start: 2022-10-05 | End: 2022-10-05 | Stop reason: HOSPADM

## 2022-10-05 RX ORDER — PROMETHAZINE HYDROCHLORIDE 25 MG/1
25 TABLET ORAL ONCE AS NEEDED
Status: DISCONTINUED | OUTPATIENT
Start: 2022-10-05 | End: 2022-10-05 | Stop reason: HOSPADM

## 2022-10-05 RX ORDER — PROCHLORPERAZINE EDISYLATE 5 MG/ML
10 INJECTION INTRAMUSCULAR; INTRAVENOUS ONCE AS NEEDED
Status: DISCONTINUED | OUTPATIENT
Start: 2022-10-05 | End: 2022-10-05 | Stop reason: HOSPADM

## 2022-10-05 RX ADMIN — EPHEDRINE SULFATE 10 MG: 5 INJECTION INTRAVENOUS at 13:41

## 2022-10-05 RX ADMIN — SODIUM CHLORIDE, POTASSIUM CHLORIDE, SODIUM LACTATE AND CALCIUM CHLORIDE: 600; 310; 30; 20 INJECTION, SOLUTION INTRAVENOUS at 14:47

## 2022-10-05 RX ADMIN — HYDROMORPHONE HYDROCHLORIDE 0.5 MG: 2 INJECTION, SOLUTION INTRAMUSCULAR; INTRAVENOUS; SUBCUTANEOUS at 14:35

## 2022-10-05 RX ADMIN — PROPOFOL INJECTABLE EMULSION 100 MCG/KG/MIN: 10 INJECTION, EMULSION INTRAVENOUS at 13:35

## 2022-10-05 RX ADMIN — GLYCOPYRROLATE 0.4 MG: 0.2 INJECTION INTRAMUSCULAR; INTRAVENOUS at 13:25

## 2022-10-05 RX ADMIN — GABAPENTIN 600 MG: 300 CAPSULE ORAL at 22:58

## 2022-10-05 RX ADMIN — Medication 5 MG: at 22:58

## 2022-10-05 RX ADMIN — REMIFENTANIL HYDROCHLORIDE 1.5 MCG/KG/MIN: 1 INJECTION, POWDER, LYOPHILIZED, FOR SOLUTION INTRAVENOUS at 13:25

## 2022-10-05 RX ADMIN — EPHEDRINE SULFATE 10 MG: 5 INJECTION INTRAVENOUS at 13:39

## 2022-10-05 RX ADMIN — PROPOFOL INJECTABLE EMULSION 125 MCG/KG/MIN: 10 INJECTION, EMULSION INTRAVENOUS at 14:59

## 2022-10-05 RX ADMIN — ALBUTEROL SULFATE 1.25 MG: 2.5 SOLUTION RESPIRATORY (INHALATION) at 10:59

## 2022-10-05 RX ADMIN — HYDROMORPHONE HYDROCHLORIDE 0.5 MG: 2 INJECTION, SOLUTION INTRAMUSCULAR; INTRAVENOUS; SUBCUTANEOUS at 20:08

## 2022-10-05 RX ADMIN — CEFAZOLIN 2 G: 2 INJECTION, POWDER, FOR SOLUTION INTRAMUSCULAR; INTRAVENOUS at 17:36

## 2022-10-05 RX ADMIN — HYDROMORPHONE HYDROCHLORIDE 1 MG: 1 INJECTION, SOLUTION INTRAMUSCULAR; INTRAVENOUS; SUBCUTANEOUS at 11:08

## 2022-10-05 RX ADMIN — HYDROMORPHONE HYDROCHLORIDE 0.5 MG: 1 INJECTION, SOLUTION INTRAMUSCULAR; INTRAVENOUS; SUBCUTANEOUS at 21:35

## 2022-10-05 RX ADMIN — DEXAMETHASONE SODIUM PHOSPHATE 10 MG: 4 INJECTION, SOLUTION INTRAMUSCULAR; INTRAVENOUS at 13:35

## 2022-10-05 RX ADMIN — HYDROMORPHONE HYDROCHLORIDE 0.5 MG: 1 INJECTION, SOLUTION INTRAMUSCULAR; INTRAVENOUS; SUBCUTANEOUS at 01:01

## 2022-10-05 RX ADMIN — CEFAZOLIN 2 G: 2 INJECTION, POWDER, FOR SOLUTION INTRAMUSCULAR; INTRAVENOUS at 13:30

## 2022-10-05 RX ADMIN — HYDROMORPHONE HYDROCHLORIDE 0.5 MG: 2 INJECTION, SOLUTION INTRAMUSCULAR; INTRAVENOUS; SUBCUTANEOUS at 14:49

## 2022-10-05 RX ADMIN — Medication 10 ML: at 23:31

## 2022-10-05 RX ADMIN — BUDESONIDE AND FORMOTEROL FUMARATE DIHYDRATE 2 PUFF: 160; 4.5 AEROSOL RESPIRATORY (INHALATION) at 08:26

## 2022-10-05 RX ADMIN — REMIFENTANIL HYDROCHLORIDE 0.1 MCG/KG/MIN: 1 INJECTION, POWDER, LYOPHILIZED, FOR SOLUTION INTRAVENOUS at 13:27

## 2022-10-05 RX ADMIN — ALBUTEROL SULFATE 1.25 MG: 2.5 SOLUTION RESPIRATORY (INHALATION) at 11:03

## 2022-10-05 RX ADMIN — HYDROCODONE BITARTRATE AND ACETAMINOPHEN 2 TABLET: 5; 325 TABLET ORAL at 03:19

## 2022-10-05 RX ADMIN — SODIUM CHLORIDE, POTASSIUM CHLORIDE, SODIUM LACTATE AND CALCIUM CHLORIDE 1000 ML: 600; 310; 30; 20 INJECTION, SOLUTION INTRAVENOUS at 11:19

## 2022-10-05 RX ADMIN — HYDROMORPHONE HYDROCHLORIDE 0.5 MG: 1 INJECTION, SOLUTION INTRAMUSCULAR; INTRAVENOUS; SUBCUTANEOUS at 21:50

## 2022-10-05 RX ADMIN — PROPOFOL 150 MG: 10 INJECTION, EMULSION INTRAVENOUS at 13:29

## 2022-10-05 RX ADMIN — HYDROMORPHONE HYDROCHLORIDE 0.5 MG: 2 INJECTION, SOLUTION INTRAMUSCULAR; INTRAVENOUS; SUBCUTANEOUS at 20:18

## 2022-10-05 RX ADMIN — FENTANYL CITRATE 50 MCG: 50 INJECTION, SOLUTION INTRAMUSCULAR; INTRAVENOUS at 13:29

## 2022-10-05 RX ADMIN — EPHEDRINE SULFATE 10 MG: 5 INJECTION INTRAVENOUS at 18:56

## 2022-10-05 RX ADMIN — LIDOCAINE HYDROCHLORIDE 50 MG: 10 INJECTION, SOLUTION EPIDURAL; INFILTRATION; INTRACAUDAL; PERINEURAL at 13:29

## 2022-10-05 RX ADMIN — Medication 100 MCG: at 13:41

## 2022-10-05 RX ADMIN — FENTANYL CITRATE 50 MCG: 50 INJECTION, SOLUTION INTRAMUSCULAR; INTRAVENOUS at 14:15

## 2022-10-05 RX ADMIN — HYDROCODONE BITARTRATE AND ACETAMINOPHEN 2 TABLET: 5; 325 TABLET ORAL at 21:50

## 2022-10-05 NOTE — CASE MANAGEMENT/SOCIAL WORK
Continued Stay Note  SHANIQUA Lee     Patient Name: Omar Choudhary  MRN: [id number]  Today's Date: 10/5/2022    Admit Date: 10/4/2022    Plan: Needs cm assessment . Part 2 of surgery today 10/5   Discharge Plan     Row Name 10/05/22 1047       Plan    Plan Needs cm assessment . Part 2 of surgery today 10/5                            Expected Discharge Date and Time     Expected Discharge Date Expected Discharge Time    Oct 6, 2022         Phone communication or documentation only - no physical contact with patient or family.      Kalyani Blackman RN

## 2022-10-05 NOTE — SIGNIFICANT NOTE
10/05/22 0841   OTHER   Discipline physical therapist   Rehab Time/Intention   Session Not Performed other (see comments)  (Pt scheduled for part 2 neurosurgery today. Will attempt when medically appropriate.)   Therapy Assessment/Plan (PT)   Criteria for Skilled Interventions Met (PT) skilled treatment is necessary   Recommendation   PT - Next Appointment 10/06/22

## 2022-10-05 NOTE — ANESTHESIA PREPROCEDURE EVALUATION
Anesthesia Evaluation     Patient summary reviewed and Nursing notes reviewed   NPO Solid Status: > 8 hours  NPO Liquid Status: > 8 hours           Airway   Mallampati: II  TM distance: >3 FB  Neck ROM: full  No difficulty expected  Dental - normal exam     Pulmonary    (+) asthma,shortness of breath, sleep apnea,   Cardiovascular     (+) hypertension, hyperlipidemia,       Neuro/Psych  (+) numbness, psychiatric history,    GI/Hepatic/Renal/Endo      Musculoskeletal     Abdominal    Substance History      OB/GYN          Other   arthritis,      ROS/Med Hx Other: Negative stress 2018    EF 56%                    Anesthesia Plan    ASA 3     general and ERAS Protocol   total IV anesthesia  intravenous induction     Anesthetic plan, risks, benefits, and alternatives have been provided, discussed and informed consent has been obtained with: patient.    Plan discussed with CRNA and CAA.        CODE STATUS:

## 2022-10-05 NOTE — SIGNIFICANT NOTE
10/05/22 1618   OTHER   Discipline occupational therapist   Rehab Time/Intention   Session Not Performed other (see comments)  (Pt GENO for day 2 sx. OT will follow up when medically appropriate)   Therapy Assessment/Plan (PT)   Criteria for Skilled Interventions Met (PT) yes;skilled treatment is necessary;meets criteria   Recommendation   OT - Next Appointment 10/06/22

## 2022-10-05 NOTE — PLAN OF CARE
Goal Outcome Evaluation:      Patient is painful tonight and requires both oral and IV pain medicine. Patient was able to get up and walk in his room with assistance and walker. Patient to have second part of surgery today. Will continue to monitor.

## 2022-10-06 LAB
BASE DEFICIT: ABNORMAL
BASE EXCESS BLDV CALC-SCNC: ABNORMAL MMOL/L
CA-I BLDA-SCNC: 1.2 MMOL/L (ref 1.12–1.32)
CO2 CONTENT VENOUS: ABNORMAL
GLUCOSE BLDC GLUCOMTR-MCNC: 185 MG/DL (ref 70–105)
HCO3 BLDV-SCNC: 24.5 MMOL/L (ref 23–28)
HCT VFR BLDA CALC: 28 % (ref 38–51)
HGB BLDA-MCNC: 9.5 G/DL (ref 12–17)
PCO2 BLDV: 38.4 MM HG (ref 41–51)
PH BLDV: 7.41 PH UNITS (ref 7.31–7.41)
PO2 BLDV: 60 MM HG (ref 35–42)
POTASSIUM BLDA-SCNC: 3.6 MMOL/L (ref 3.5–4.9)
SAO2 % BLDCOV: 26 % (ref 45–75)
SODIUM BLD-SCNC: 141 MMOL/L (ref 138–146)

## 2022-10-06 PROCEDURE — 97530 THERAPEUTIC ACTIVITIES: CPT

## 2022-10-06 PROCEDURE — 94799 UNLISTED PULMONARY SVC/PX: CPT

## 2022-10-06 PROCEDURE — 97166 OT EVAL MOD COMPLEX 45 MIN: CPT

## 2022-10-06 PROCEDURE — 99024 POSTOP FOLLOW-UP VISIT: CPT | Performed by: NURSE PRACTITIONER

## 2022-10-06 PROCEDURE — 25010000002 HYDROMORPHONE 1 MG/ML SOLUTION

## 2022-10-06 PROCEDURE — 97162 PT EVAL MOD COMPLEX 30 MIN: CPT

## 2022-10-06 PROCEDURE — 97116 GAIT TRAINING THERAPY: CPT

## 2022-10-06 PROCEDURE — 94664 DEMO&/EVAL PT USE INHALER: CPT

## 2022-10-06 RX ORDER — POLYETHYLENE GLYCOL 3350 17 G/17G
17 POWDER, FOR SOLUTION ORAL DAILY
Status: DISCONTINUED | OUTPATIENT
Start: 2022-10-06 | End: 2022-10-08 | Stop reason: HOSPADM

## 2022-10-06 RX ORDER — BISACODYL 10 MG
10 SUPPOSITORY, RECTAL RECTAL DAILY
Status: DISCONTINUED | OUTPATIENT
Start: 2022-10-06 | End: 2022-10-08 | Stop reason: HOSPADM

## 2022-10-06 RX ADMIN — HYDROCODONE BITARTRATE AND ACETAMINOPHEN 2 TABLET: 5; 325 TABLET ORAL at 21:18

## 2022-10-06 RX ADMIN — HYDROCHLOROTHIAZIDE: 25 TABLET ORAL at 08:41

## 2022-10-06 RX ADMIN — HYDROCODONE BITARTRATE AND ACETAMINOPHEN 2 TABLET: 5; 325 TABLET ORAL at 04:46

## 2022-10-06 RX ADMIN — GABAPENTIN 300 MG: 300 CAPSULE ORAL at 16:09

## 2022-10-06 RX ADMIN — POLYETHYLENE GLYCOL 3350 17 G: 17 POWDER, FOR SOLUTION ORAL at 12:45

## 2022-10-06 RX ADMIN — GABAPENTIN 300 MG: 300 CAPSULE ORAL at 08:41

## 2022-10-06 RX ADMIN — Medication 10 ML: at 21:21

## 2022-10-06 RX ADMIN — THEOPHYLLINE 300 MG: 300 TABLET, EXTENDED RELEASE ORAL at 08:42

## 2022-10-06 RX ADMIN — HYDROCODONE BITARTRATE AND ACETAMINOPHEN 2 TABLET: 5; 325 TABLET ORAL at 16:09

## 2022-10-06 RX ADMIN — GABAPENTIN 600 MG: 300 CAPSULE ORAL at 21:18

## 2022-10-06 RX ADMIN — ATORVASTATIN CALCIUM 10 MG: 10 TABLET, FILM COATED ORAL at 08:41

## 2022-10-06 RX ADMIN — HYDROMORPHONE HYDROCHLORIDE 0.5 MG: 1 INJECTION, SOLUTION INTRAMUSCULAR; INTRAVENOUS; SUBCUTANEOUS at 00:48

## 2022-10-06 RX ADMIN — BUDESONIDE AND FORMOTEROL FUMARATE DIHYDRATE 2 PUFF: 160; 4.5 AEROSOL RESPIRATORY (INHALATION) at 08:54

## 2022-10-06 RX ADMIN — HYDROMORPHONE HYDROCHLORIDE 0.5 MG: 1 INJECTION, SOLUTION INTRAMUSCULAR; INTRAVENOUS; SUBCUTANEOUS at 12:45

## 2022-10-06 RX ADMIN — HYDROCODONE BITARTRATE AND ACETAMINOPHEN 2 TABLET: 5; 325 TABLET ORAL at 08:41

## 2022-10-06 RX ADMIN — MONTELUKAST 10 MG: 10 TABLET, FILM COATED ORAL at 08:41

## 2022-10-06 RX ADMIN — BUDESONIDE AND FORMOTEROL FUMARATE DIHYDRATE 2 PUFF: 160; 4.5 AEROSOL RESPIRATORY (INHALATION) at 20:02

## 2022-10-06 NOTE — PLAN OF CARE
Goal Outcome Evaluation:  Plan of Care Reviewed With: patient        Progress: no change  Outcome Evaluation: Pt is a 76 y/o male who presented to [facility] 10/4/22 who is s/p lumbar lateral interbody fusion L2, L3, L4. PMH includes hx of severe multilevel central canal stenosis of lumbar spine, depression, HTN, and neuropathy. CT lumbar spine indicates placement of interventerbral spacers at L2-L3, L3-L4 in expected position. CT lumbar spine indicates placement of interventerbral spacers at L2-L3, L3-L4 in expected position. Pt agreeable to OT eval this date. Pt sitting edge of bed with PT upon arrival. Pt reports living in [facility] home with significant other. Pt has 8 entry stairs and 14 stairs within home. Pt reports independence with ADLs at baseline. Pt reports use of cane with mobility to increase stability when taking rest breaks. Pt demo no deficits with BUE strength and ROM this date. Pt completes STS from bed with CGA and RW. Pt completes functional ambulation with min A this date. Pt demo decreased activity tolerance with functional ambulation this date. Pt likely to require max A with LB ADLs to adhere to spinal precautions at this time. Pt is well below baseline, indicating need for skilled OT intervention to address deficits. OT recommend inpatient rehab post d/c progress pending. Pt agreeable. OT to follow.

## 2022-10-06 NOTE — THERAPY EVALUATION
Patient Name: Omar Choudhary  : 1945    MRN: [id number]                              Today's Date: 10/6/2022       Admit Date: 10/4/2022    Visit Dx:     ICD-10-CM ICD-9-CM   1. Degenerative lumbar spinal stenosis  M48.061 724.02   2. Spinal stenosis of lumbar region with neurogenic claudication  M48.062 724.03   3. Lumbar radiculopathy, chronic  M54.16 724.4     Patient Active Problem List   Diagnosis   • Benign prostatic hyperplasia   • Bursitis   • Depression   • Diverticulosis   • Family history of malignant neoplasm of breast   • Hyperlipidemia   • Hypertension   • Internal derangement of right knee   • Knee effusion   • Asthma   • Obstructive sleep apnea syndrome   • Osteoarthritis   • Osteopenia   • Pain in knee   • Postnasal drip   • Prepatellar bursitis   • Sciatica of right side   • Diaphragm paralysis   • Spinal stenosis of lumbar region with neurogenic claudication   • Degenerative lumbar spinal stenosis   • Anxiety   • Ping[place]cula of right eye   • Lumbar radiculopathy, chronic     Past Medical History:   Diagnosis Date   • Allergic    • Arthritis    • Asthma    • Carpal tunnel syndrome     no pain   • Cataract    • Depression    • Family history of malignant neoplasm of breast 2019   • Hemidiaphragm paralysis     Left   • Hyperlipidemia 2019   • Hypertension 2012   • Leg pain, right    • Low back pain    • Neuropathy     feet   • Osteopenia    • Poor balance    • Reactive airway disease 01/15/2016   • Shortness of breath    • Sleep apnea, obstructive     CPAP- to bring dos     Past Surgical History:   Procedure Laterality Date   • KNEE ARTHROSCOPY W/ ACL RECONSTRUCTION Right 2019   • MOUTH SURGERY        General Information     Row Name 10/06/22 1645          Physical Therapy Time and Intention    Document Type evaluation  -HC     Mode of Treatment physical therapy  -HC     Row Name 10/06/22 1645          General Information    Patient Profile Reviewed yes  -HC     Prior Level  of Function independent:  -     Existing Precautions/Restrictions spinal;fall  Pt has brace that he can wear for comfort as needed per Dr Do  -     Row Name 10/06/22 1645          Living Environment    People in Home significant other  -     Row Name 10/06/22 1645          Home Main Entrance    Number of Stairs, Main Entrance eight  -     Row Name 10/06/22 1645          Stairs Within Home, Primary    Stairs, Within Home, Primary 14 steps to bedroom  -     Row Name 10/06/22 1645          Cognition    Orientation Status (Cognition) oriented x 4  easily distracted, requires verbal cues to stay on task and for task completion  -     Row Name 10/06/22 1645          Safety Issues, Functional Mobility    Safety Issues Affecting Function (Mobility) safety precaution awareness;insight into deficits/self-awareness  -     Impairments Affecting Function (Mobility) balance;postural/trunk control;endurance/activity tolerance;strength;pain  -           User Key  (r) = Recorded By, (t) = Taken By, (c) = Cosigned By    Initials Name Provider Type     Christiana Yuan, PT Physical Therapist               Mobility     Row Name 10/06/22 1646          Bed Mobility    Bed Mobility supine-sit  -     Supine-Sit [place] (Bed Mobility) moderate assist (50% patient effort)  -     Comment, (Bed Mobility) pt educated on logroll technique to assist with following spine precautions during bed mobility  -     Row Name 10/06/22 1646          Sit-Stand Transfer    Sit-Stand [place] (Transfers) minimum assist (75% patient effort)  -     Assistive Device (Sit-Stand Transfers) walker, front-wheeled  -     Row Name 10/06/22 1646          Gait/Stairs (Locomotion)    [place] Level (Gait) minimum assist (75% patient effort)  -     Assistive Device (Gait) walker, front-wheeled  -     Distance in Feet (Gait) 30 ft  -     Deviations/Abnormal Patterns (Gait) gait speed decreased  -     Comment,  (Gait/Stairs) decreased step length, increased reliance on RW  -HC           User Key  (r) = Recorded By, (t) = Taken By, (c) = Cosigned By    Initials Name Provider Type     Christiana Yuan, PT Physical Therapist               Obj/Interventions     Row Name 10/06/22 1647          Range of Motion Comprehensive    Comment, General Range of Motion BLEs WFL  -HC     Row Name 10/06/22 1647          Strength Comprehensive (MMT)    Comment, General Manual Muscle Testing (MMT) Assessment BLEs grossly 4/5  -HC     Row Name 10/06/22 1647          Balance    Balance Assessment sitting static balance;sitting dynamic balance;standing static balance;standing dynamic balance  -HC     Static Sitting Balance standby assist  -HC     Dynamic Sitting Balance standby assist  -HC     Static Standing Balance contact guard  -HC     Dynamic Standing Balance minimal assist  -HC     Position/Device Used, Standing Balance walker, rolling  -HC           User Key  (r) = Recorded By, (t) = Taken By, (c) = Cosigned By    Initials Name Provider Type     Christiana Yuan, PT Physical Therapist               Goals/Plan     Row Name 10/06/22 1653          Bed Mobility Goal 1 (PT)    Activity/Assistive Device (Bed Mobility Goal 1, PT) bed mobility activities, all  -HC     [place] Level/Cues Needed (Bed Mobility Goal 1, PT) standby assist  -HC     Time Frame (Bed Mobility Goal 1, PT) 2 weeks  -     Row Name 10/06/22 1653          Transfer Goal 1 (PT)    Activity/Assistive Device (Transfer Goal 1, PT) transfers, all  -HC     [place] Level/Cues Needed (Transfer Goal 1, PT) supervision required  -HC     Time Frame (Transfer Goal 1, PT) 2 weeks  -     Row Name 10/06/22 1653          Gait Training Goal 1 (PT)    Activity/Assistive Device (Gait Training Goal 1, PT) gait (walking locomotion)  -HC     [place] Level (Gait Training Goal 1, PT) supervision required  -HC     Distance (Gait Training Goal 1, PT) 150 ft  -HC     Time Frame  (Gait Training Goal 1, PT) 2 weeks  -     Row Name 10/06/22 9567          Stairs Goal 1 (PT)    Activity/Assistive Device (Stairs Goal 1, PT) stairs, all skills  -HC     [place] Level/Cues Needed (Stairs Goal 1, PT) standby assist  -HC     Number of Stairs (Stairs Goal 1, PT) 10  -HC     Time Frame (Stairs Goal 1, PT) 2 weeks  -     Row Name 10/06/22 6952          Therapy Assessment/Plan (PT)    Planned Therapy Interventions (PT) balance training;bed mobility training;gait training;postural re-education;transfer training;neuromuscular re-education;stair training;strengthening;patient/family education;home exercise program  -           User Key  (r) = Recorded By, (t) = Taken By, (c) = Cosigned By    Initials Name Provider Type    HC Christiana Yuan, PT Physical Therapist               Clinical Impression     Row Name 10/06/22 3552          Pain    Additional Documentation Pain Scale: FACES Pre/Post-Treatment (Group)  -     Row Name 10/06/22 7932          Pain Scale: FACES Pre/Post-Treatment    Pain: FACES Scale, Pretreatment 4-->hurts little more  -     Posttreatment Pain Rating 4-->hurts little more  -     Row Name 10/06/22 3169          Plan of Care Review    Outcome Evaluation Pt is 76 yo male s/p two day spine sx including lumbar lateral interbody fusion L2-4 with neuro robot on 10/4/22 and lumbar laminectomy, TLIF L2-4 on 10/5/22.  Spine sx completed due to severe degenerative disc disease, lumbar scoliosis with multilevel spinal canal stenosis.  PMH: HTN, depression, HLD.  Pt reports indep with mobility with occasional use of cane for community distances, indep with ADLs and driving.  Pt resides with significant other in house with 8 steps to enter and 14 more steps to reach 2nd floor bedroom.  On this date, pt presents with back pain but motivated to mobilize.  Pt educated on spine precautions and provided written handout.  Pt easily distracted requiring verbal cues for attention to task  and task completion.  Pt educated on logroll technique and able to complete with modA.  Pt able to complete transfers with Butch and ambulate using RW x30 ft with Butch.   Pt has [place] back brace and per Dr Do present in room, pt can wear as tolerated for comfort but does not have to wear brace.  Due to extensive spine sx, deficits at this time, and multiple steps to navigate in home environment, PT recommending IP rehab to address deficits.  PT will follow while at [facility] and continue to assess d/c needs pending progress.  -HC     Row Name 10/06/22 1647          Therapy Assessment/Plan (PT)    Patient/Family Therapy Goals Statement (PT) increase strength  -HC     Rehab Potential (PT) good, to achieve stated therapy goals  -HC     Criteria for Skilled Interventions Met (PT) yes;skilled treatment is necessary  -HC     Therapy Frequency (PT) daily  -HC     Predicted Duration of Therapy Intervention (PT) until d/c  -HC     Row Name 10/06/22 1647          Positioning and Restraints    Pre-Treatment Position in bed  -HC     Post Treatment Position chair  -HC     In Chair notified nsg;call light within reach;encouraged to call for assist;exit alarm on;with OT  -HC           User Key  (r) = Recorded By, (t) = Taken By, (c) = Cosigned By    Initials Name Provider Type    HC Christiana Yuan, PT Physical Therapist               Outcome Measures     Row Name 10/06/22 8031 10/06/22 0841       How much help from another person do you currently need...    Turning from your back to your side while in flat bed without using bedrails? 2  -HC 3  -LB    Moving from lying on back to sitting on the side of a flat bed without bedrails? 2  -HC 3  -LB    Moving to and from a bed to a chair (including a wheelchair)? 3  -HC 2  -LB    Standing up from a chair using your arms (e.g., wheelchair, bedside chair)? 3  -HC 2  -LB    Climbing 3-5 steps with a railing? 2  -HC 2  -LB    To walk in hospital room? 3  -HC 2  -LB    AM-PAC 6 Clicks Score  (PT) 15  -HC 14  -LB    Highest level of mobility 4 --> Transferred to chair/commode  -HC 4 --> Transferred to chair/commode  -LB    Row Name 10/06/22 1653 10/06/22 1336       Functional Assessment    Outcome Measure Options AM-PAC 6 Clicks Basic Mobility (PT)  -HC AM-PAC 6 Clicks Daily Activity (OT)  -MS          User Key  (r) = Recorded By, (t) = Taken By, (c) = Cosigned By    Initials Name Provider Type    HC Christiana Yuan, PT Physical Therapist    Julianna Sims, RN Registered Nurse    Thu Malhotra, OT Occupational Therapist                             Physical Therapy Education                 Title: PT OT SLP Therapies (In Progress)     Topic: Physical Therapy (In Progress)     Point: Mobility training (In Progress)     Learning Progress Summary           Patient Acceptance, E, NR by HC at 10/6/2022 1654                   Point: Precautions (In Progress)     Learning Progress Summary           Patient Acceptance, E, NR by HC at 10/6/2022 1654                               User Key     Initials Effective Dates Name Provider Type Discipline     06/16/21 -  Christiana Yuan, PT Physical Therapist PT              PT Recommendation and Plan  Planned Therapy Interventions (PT): balance training, bed mobility training, gait training, postural re-education, transfer training, neuromuscular re-education, stair training, strengthening, patient/family education, home exercise program  Plan of Care Reviewed With: patient  Outcome Evaluation: Pt is 76 yo male s/p two day spine sx including lumbar lateral interbody fusion L2-4 with neuro robot on 10/4/22 and lumbar laminectomy, TLIF L2-4 on 10/5/22.  Spine sx completed due to severe degenerative disc disease, lumbar scoliosis with multilevel spinal canal stenosis.  PMH: HTN, depression, HLD.  Pt reports indep with mobility with occasional use of cane for community distances, indep with ADLs and driving.  Pt resides with significant other in house with  8 steps to enter and 14 more steps to reach 2nd floor bedroom.  On this date, pt presents with back pain but motivated to mobilize.  Pt educated on spine precautions and provided written handout.  Pt easily distracted requiring verbal cues for attention to task and task completion.  Pt educated on logroll technique and able to complete with modA.  Pt able to complete transfers with Butch and ambulate using RW x30 ft with Butch.   Pt has [place] back brace and per Dr Do present in room, pt can wear as tolerated for comfort but does not have to wear brace.  Due to extensive spine sx, deficits at this time, and multiple steps to navigate in home environment, PT recommending IP rehab to address deficits.  PT will follow while at [facility] and continue to assess d/c needs pending progress.     Time Calculation:    PT Charges     Row Name 10/06/22 1654             Time Calculation    Start Time 1032  -      Stop Time 1104  -      Time Calculation (min) 32 min  -      PT Received On 10/06/22  -      PT - Next Appointment 10/07/22  -      PT Goal Re-Cert Due Date 10/20/22  -              Time Calculation- PT    Total Timed Code Minutes- PT 15 minute(s)  -            User Key  (r) = Recorded By, (t) = Taken By, (c) = Cosigned By    Initials Name Provider Type     Christiana Yuan, PT Physical Therapist              Therapy Charges for Today     Code Description Service Date Service Provider Modifiers Qty    [id number]  PT EVAL MOD COMPLEXITY 3 10/6/2022 Christiana Yuan, PT GP 1    [id number]  GAIT TRAINING EA 15 MIN 10/6/2022 Christiana Yuan, PT GP 1          PT G-Codes  Outcome Measure Options: AM-PAC 6 Clicks Basic Mobility (PT)  AM-PAC 6 Clicks Score (PT): 15  AM-PAC 6 Clicks Score (OT): 15    Christiana Yuan PT  10/6/2022

## 2022-10-06 NOTE — NURSING NOTE
Patient arrived from PACU alert and oriented X3. His friend Batsheva was at bedside. She asked him if he wanted to take his homeopathic medication that a holistic DR gave to him. I asked them if they spoke with Dr. Do about the medication that they are trying to take. They both said no. I told them that some herbal holistic medications can possibly interfere with the medications that we give and to not take anything without speaking with Dr. Do about it. She asked if he could take Emergen C and that the holistic Dr said that it would help him come out of anesthesia easier. I told them it would be beneficial to discuss anything that we do not have prescribed with the Dr. They both said they understood and I left the room.   When I came back in the room I noticed that She had put the Emergen C in the water while I was out of the room and I do not know if he took anything else, as I did not witness her giving him anything except the Emergen C in his water. The patient was able to eat a sandwich and reports no nausea. He is resting comfortably and his CPAP from home is on. The friend Batsheva left. Call light is in reach.

## 2022-10-06 NOTE — CONSULTS
visited per consult. 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 He was pleasant and wanted to talk about Roman [employer] and did some life review of his previous Caodaism and then Moravian renee and practices. He had a guest in room whom he says helps him, but she did no say much. He asked  to return tomorrow and have a deeper discussion over tea.  will follow up tomorrow to engage and support pt. He is hopeful for a good outcome from his surgery. There were no other needs at this time.

## 2022-10-06 NOTE — THERAPY EVALUATION
Patient Name: Omar Choudhary  : 1945    MRN: [id number]                              Today's Date: 10/6/2022       Admit Date: 10/4/2022    Visit Dx:     ICD-10-CM ICD-9-CM   1. Degenerative lumbar spinal stenosis  M48.061 724.02   2. Spinal stenosis of lumbar region with neurogenic claudication  M48.062 724.03   3. Lumbar radiculopathy, chronic  M54.16 724.4     Patient Active Problem List   Diagnosis   • Benign prostatic hyperplasia   • Bursitis   • Depression   • Diverticulosis   • Family history of malignant neoplasm of breast   • Hyperlipidemia   • Hypertension   • Internal derangement of right knee   • Knee effusion   • Asthma   • Obstructive sleep apnea syndrome   • Osteoarthritis   • Osteopenia   • Pain in knee   • Postnasal drip   • Prepatellar bursitis   • Sciatica of right side   • Diaphragm paralysis   • Spinal stenosis of lumbar region with neurogenic claudication   • Degenerative lumbar spinal stenosis   • Anxiety   • Pinguecula of right eye   • Lumbar radiculopathy, chronic     Past Medical History:   Diagnosis Date   • Allergic    • Arthritis    • Asthma    • Carpal tunnel syndrome     no pain   • Cataract    • Depression    • Family history of malignant neoplasm of breast 2019   • Hemidiaphragm paralysis     Left   • Hyperlipidemia 2019   • Hypertension 2012   • Leg pain, right    • Low back pain    • Neuropathy     feet   • Osteopenia    • Poor balance    • Reactive airway disease 01/15/2016   • Shortness of breath    • Sleep apnea, obstructive     CPAP- to bring dos     Past Surgical History:   Procedure Laterality Date   • KNEE ARTHROSCOPY W/ ACL RECONSTRUCTION Right 2019   • MOUTH SURGERY        General Information     Row Name 10/06/22 1320          OT Time and Intention    Document Type evaluation  -MS     Mode of Treatment occupational therapy  -MS     Row Name 10/06/22 1320          General Information    Patient Profile Reviewed yes  -MS     Prior Level of  Function independent:;ADL's  -MS     Existing Precautions/Restrictions spinal;fall  -MS     Barriers to Rehab none identified  -MS     Row Name 10/06/22 1320          Occupational Profile    Reason for Services/Referral (Occupational Profile) Pt is a 78 y/o male who presented to [facility] 10/4/22 who is s/p lumbar lateral interbody fusion L2, L3, L4. PMH includes hx of severe multilevel central canal stenosis of lumbar spine, depression, HTN, and neuropathy. CT lumbar spine indicates placement of interventerbral spacers at L2-L3, L3-L4 in expected position.  -MS     Successful Occupations (Occupational Profile) former , former professor  -MS     Environmental Supports and Barriers (Occupational Profile) supportive family  -MS     Patient Goals (Occupational Profile) return home  -MS     Row Name 10/06/22 1320          Living Environment    People in Home significant other  -MS     Row Name 10/06/22 1320          Home Main Entrance    Number of Stairs, Main Entrance eight  -MS     Row Name 10/06/22 1320          Stairs Within Home, Primary    Number of Stairs, Within Home, Primary other (see comments)  14 stairs in home  -MS     Row Name 10/06/22 1320          Cognition    Orientation Status (Cognition) oriented x 4  -MS     Row Name 10/06/22 1320          Safety Issues, Functional Mobility    Impairments Affecting Function (Mobility) endurance/activity tolerance;pain;strength  -MS           User Key  (r) = Recorded By, (t) = Taken By, (c) = Cosigned By    Initials Name Provider Type    Thu Malhotra OT Occupational Therapist                 Mobility/ADL's     Row Name 10/06/22 1324          Transfers    Transfers sit-stand transfer  -MS     Sit-Stand [place] (Transfers) minimum assist (75% patient effort)  -MS     Row Name 10/06/22 1324          Sit-Stand Transfer    Assistive Device (Sit-Stand Transfers) walker, front-wheeled  -MS     Row Name 10/06/22 1324          Functional Mobility    Functional  Mobility- Ind. Level minimum assist (75% patient effort);1 person + 1 person to manage equipment  -MS     Functional Mobility- Device walker, front-wheeled  -MS           User Key  (r) = Recorded By, (t) = Taken By, (c) = Cosigned By    Initials Name Provider Type    Thu Malhotra OT Occupational Therapist               Obj/Interventions     Row Name 10/06/22 1328          Sensory Assessment (Somatosensory)    Sensory Assessment (Somatosensory) UE sensation intact  -MS     Row Name 10/06/22 1328          Vision Assessment/Intervention    Visual Impairment/Limitations WNL  -MS     Row Name 10/06/22 1328          Range of Motion Comprehensive    Comment, General Range of Motion BUE ROM WNL  -MS     Row Name 10/06/22 1328          Strength Comprehensive (MMT)    Comment, General Manual Muscle Testing (MMT) Assessment BUE grossly 4/5  -MS     Row Name 10/06/22 1328          Balance    Balance Assessment sitting static balance;sitting dynamic balance;standing static balance;standing dynamic balance  -MS     Static Sitting Balance supervision  -MS     Dynamic Sitting Balance supervision  -MS     Position, Sitting Balance sitting edge of bed  -MS     Static Standing Balance contact guard  -MS     Dynamic Standing Balance contact guard  -MS     Position/Device Used, Standing Balance walker, front-wheeled  -MS           User Key  (r) = Recorded By, (t) = Taken By, (c) = Cosigned By    Initials Name Provider Type    Thu Malhotra OT Occupational Therapist               Goals/Plan     Row Name 10/06/22 1336          Bathing Goal 1 (OT)    Activity/Device (Bathing Goal 1, OT) bathing skills, all  -MS     [place] Level/Cues Needed (Bathing Goal 1, OT) independent  -MS     Time Frame (Bathing Goal 1, OT) long term goal (LTG);2 weeks  -MS     Progress/Outcomes (Bathing Goal 1, OT) new goal  -MS     Row Name 10/06/22 1336          Dressing Goal 1 (OT)    Activity/Device (Dressing Goal 1, OT) dressing skills, all  -MS      [place]/Cues Needed (Dressing Goal 1, OT) independent  -MS     Time Frame (Dressing Goal 1, OT) 2 weeks;long term goal (LTG)  -MS     Progress/Outcome (Dressing Goal 1, OT) new goal  -MS     Row Name 10/06/22 2443          Toileting Goal 1 (OT)    Activity/Device (Toileting Goal 1, OT) toileting skills, all  -MS     [place] Level/Cues Needed (Toileting Goal 1, OT) independent  -MS     Time Frame (Toileting Goal 1, OT) long term goal (LTG);2 weeks  -MS     Progress/Outcome (Toileting Goal 1, OT) new goal  -MS     Row Name 10/06/22 1335          Therapy Assessment/Plan (OT)    Planned Therapy Interventions (OT) activity tolerance training;adaptive equipment training;BADL retraining;functional balance retraining;IADL retraining;occupation/activity based interventions;strengthening exercise;transfer/mobility retraining;patient/caregiver education/training;ROM/therapeutic exercise  -MS           User Key  (r) = Recorded By, (t) = Taken By, (c) = Cosigned By    Initials Name Provider Type    MS Thu Watters, OT Occupational Therapist               Clinical Impression     Row Name 10/06/22 8270          Pain Assessment    Pain Intervention(s) Repositioned;Therapeutic presence;Emotional support  -MS     Additional Documentation Pain Scale: FACES Pre/Post-Treatment (Group)  -MS     Row Name 10/06/22 7311          Pain Scale: FACES Pre/Post-Treatment    Pain: FACES Scale, Pretreatment 2-->hurts little bit  -MS     Posttreatment Pain Rating 2-->hurts little bit  -MS     Pain Location - back  -MS     Row Name 10/06/22 5497          Plan of Care Review    Plan of Care Reviewed With patient  -MS     Progress no change  -MS     Outcome Evaluation Pt is a 78 y/o male who presented to [facility] 10/4/22 who is s/p lumbar lateral interbody fusion L2, L3, L4. PMH includes hx of severe multilevel central canal stenosis of lumbar spine, depression, HTN, and neuropathy. CT lumbar spine indicates placement of interventerbral  spacers at L2-L3, L3-L4 in expected position. CT lumbar spine indicates placement of interventerbral spacers at L2-L3, L3-L4 in expected position. Pt agreeable to OT eval this date. Pt sitting edge of bed with PT upon arrival. Pt reports living in [facility] home with significant other. Pt has 8 entry stairs and 14 stairs within home. Pt reports independence with ADLs at baseline. Pt reports use of cane with mobility to increase stability when taking rest breaks. Pt demo no deficits with BUE strength and ROM this date. Pt completes STS from bed with CGA and RW. Pt completes functional ambulation with min A this date. Pt demo decreased activity tolerance with functional ambulation this date. Pt likely to require max A with LB ADLs to adhere to spinal precautions at this time. Pt is well below baseline, indicating need for skilled OT intervention to address deficits. OT recommend inpatient rehab post d/c progress pending. Pt agreeable. OT to follow.  -MS     Row Name 10/06/22 9648          Therapy Assessment/Plan (OT)    Patient/Family Therapy Goal Statement (OT) return home  -MS     Rehab Potential (OT) good, to achieve stated therapy goals  -MS     Criteria for Skilled Therapeutic Interventions Met (OT) yes;meets criteria;skilled treatment is necessary  -MS     Therapy Frequency (OT) 5 times/wk  -MS     Predicted Duration of Therapy Intervention (OT) until d/c  -MS     Row Name 10/06/22 1330          Therapy Plan Review/Discharge Plan (OT)    Anticipated Discharge Disposition (OT) inpatient rehabilitation facility  -MS     Row Name 10/06/22 1330          Vital Signs    O2 Delivery Pre Treatment room air  -MS     O2 Delivery Intra Treatment room air  -MS     O2 Delivery Post Treatment room air  -MS     Pre Patient Position Sitting  -MS     Intra Patient Position Sitting  -MS     Post Patient Position Sitting  -MS     Row Name 10/06/22 1330          Positioning and Restraints    Pre-Treatment Position in bed  -MS     Post  Treatment Position chair  -MS     In Chair sitting;call light within reach;encouraged to call for assist;exit alarm on  -MS           User Key  (r) = Recorded By, (t) = Taken By, (c) = Cosigned By    Initials Name Provider Type    Thu Malhotra OT Occupational Therapist               Outcome Measures     Row Name 10/06/22 0612          How much help from another is currently needed...    Putting on and taking off regular lower body clothing? 1  -MS     Bathing (including washing, rinsing, and drying) 2  -MS     Toileting (which includes using toilet bed pan or urinal) 2  -MS     Putting on and taking off regular upper body clothing 3  -MS     Taking care of personal grooming (such as brushing teeth) 3  -MS     Eating meals 4  -MS     AM-PAC 6 Clicks Score (OT) 15  -MS     Row Name 10/06/22 0841          How much help from another person do you currently need...    Turning from your back to your side while in flat bed without using bedrails? 3  -LB     Moving from lying on back to sitting on the side of a flat bed without bedrails? 3  -LB     Moving to and from a bed to a chair (including a wheelchair)? 2  -LB     Standing up from a chair using your arms (e.g., wheelchair, bedside chair)? 2  -LB     Climbing 3-5 steps with a railing? 2  -LB     To walk in hospital room? 2  -LB     AM-PAC 6 Clicks Score (PT) 14  -LB     Highest level of mobility 4 --> Transferred to chair/commode  -LB     Row Name 10/06/22 8693          Functional Assessment    Outcome Measure Options AM-PAC 6 Clicks Daily Activity (OT)  -MS           User Key  (r) = Recorded By, (t) = Taken By, (c) = Cosigned By    Initials Name Provider Type    Julianna Sims RN Registered Nurse    Thu Malhotra OT Occupational Therapist                Occupational Therapy Education                 Title: PT OT SLP Therapies (Done)     Topic: Occupational Therapy (Done)     Point: ADL training (Done)     Description:   Instruct learner(s) on  proper safety adaptation and remediation techniques during self care or transfers.   Instruct in proper use of assistive devices.              Learning Progress Summary           Patient Acceptance, E,TB, VU by MS at 10/6/2022 1337                   Point: Precautions (Done)     Description:   Instruct learner(s) on prescribed precautions during self-care and functional transfers.              Learning Progress Summary           Patient Acceptance, E,TB, VU by MS at 10/6/2022 1337                   Point: Body mechanics (Done)     Description:   Instruct learner(s) on proper positioning and spine alignment during self-care, functional mobility activities and/or exercises.              Learning Progress Summary           Patient Acceptance, E,TB, VU by MS at 10/6/2022 1337                               User Key     Initials Effective Dates Name Provider Type Discipline    MS 07/13/22 -  Thu Watters OT Occupational Therapist OT              OT Recommendation and Plan  Planned Therapy Interventions (OT): activity tolerance training, adaptive equipment training, BADL retraining, functional balance retraining, IADL retraining, occupation/activity based interventions, strengthening exercise, transfer/mobility retraining, patient/caregiver education/training, ROM/therapeutic exercise  Therapy Frequency (OT): 5 times/wk  Plan of Care Review  Plan of Care Reviewed With: patient  Progress: no change  Outcome Evaluation: Pt is a 78 y/o male who presented to [facility] 10/4/22 who is s/p lumbar lateral interbody fusion L2, L3, L4. PMH includes hx of severe multilevel central canal stenosis of lumbar spine, depression, HTN, and neuropathy. CT lumbar spine indicates placement of interventerbral spacers at L2-L3, L3-L4 in expected position. CT lumbar spine indicates placement of interventerbral spacers at L2-L3, L3-L4 in expected position. Pt agreeable to OT eval this date. Pt sitting edge of bed with PT upon arrival. Pt reports  living in [facility] home with significant other. Pt has 8 entry stairs and 14 stairs within home. Pt reports independence with ADLs at baseline. Pt reports use of cane with mobility to increase stability when taking rest breaks. Pt demo no deficits with BUE strength and ROM this date. Pt completes STS from bed with CGA and RW. Pt completes functional ambulation with min A this date. Pt demo decreased activity tolerance with functional ambulation this date. Pt likely to require max A with LB ADLs to adhere to spinal precautions at this time. Pt is well below baseline, indicating need for skilled OT intervention to address deficits. OT recommend inpatient rehab post d/c progress pending. Pt agreeable. OT to follow.     Time Calculation:    Time Calculation- OT     Row Name 10/06/22 1337             Time Calculation- OT    OT Start Time 1054  -MS      OT Stop Time 1114  -MS      OT Time Calculation (min) 20 min  -MS      Total Timed Code Minutes- OT 9 minute(s)  -MS      OT Received On 10/06/22  -MS      OT - Next Appointment 10/07/22  -MS      OT Goal Re-Cert Due Date 10/20/22  -MS            User Key  (r) = Recorded By, (t) = Taken By, (c) = Cosigned By    Initials Name Provider Type    Thu Malhotra OT Occupational Therapist              Therapy Charges for Today     Code Description Service Date Service Provider Modifiers Qty    [id number]  OT THERAPEUTIC ACT EA 15 MIN 10/6/2022 Thu Watters OT GO 1    [id number]  OT EVAL MOD COMPLEXITY 3 10/6/2022 Thu Watters OT GO 1               Thu Watters OT  10/6/2022

## 2022-10-06 NOTE — PROGRESS NOTES
[facility] Medicine Services Daily Progress Note    Patient Name: Omar Choudhary  : 1945  MRN: [id number]  Primary Care Physician:  Dennis Kwong MD  Date of admission: 10/4/2022      Subjective      Chief Complaint: Back pain       Patient Reports he is doing okay.  No bowel movement since admission.  Working with therapy during my evaluation.    ROS negative except as above      Objective      Vitals:   Temp:  [97.5 °F (36.4 °C)-98.5 °F (36.9 °C)] 98.4 °F (36.9 °C)  Heart Rate:  [66-84] 80  Resp:  [10-19] 14  BP: ()/(59-82) 97/59  Flow (L/min):  [2-4] 2    Physical Exam  Vitals reviewed.   Constitutional:       Comments: Patient with Archer catheter in place  Working with therapy laying in bed   HENT:      Head: Normocephalic.   Cardiovascular:      Rate and Rhythm: Normal rate.   Pulmonary:      Effort: Pulmonary effort is normal.   Abdominal:      General: Abdomen is flat. There is distension.      Palpations: Abdomen is soft.      Comments: Mildly distended but not tender   Musculoskeletal:         General: Normal range of motion.      Cervical back: Normal range of motion.   Skin:     General: Skin is warm.   Neurological:      General: No focal deficit present.      Mental Status: He is alert and oriented to person, place, and time.   Psychiatric:         Mood and Affect: Mood normal.         Behavior: Behavior normal.             Result Review    Result Review:  I have personally reviewed the results from the time of this admission to 10/6/2022 16:52 EDT and agree with these findings:  [x]  Laboratory  []  Microbiology  []  Radiology  []  EKG/Telemetry   []  Cardiology/Vascular   []  Pathology  []  Old records  []  Other:  Most notable findings include: No new labs today    Wounds (last 24 hours)     LDA Wound     Row Name 10/06/22 0841 10/06/22 0400 10/06/22 0005       Wound 10/04/22 0849 Left lumbar spine Incision    Wound - Properties Group Placement Date:  10/04/22  -PH Placement Time: 0849  -PH Side: Left  -PH Location: lumbar spine  -PH Primary Wound Type: Incision  -PH    Dressing Appearance dry;intact;no drainage  -LB dry;intact  -DA dry;intact  -DA    Closure JOHN  -LB JOHN  -DA JOHN  -DA    Base -- dressing in place, unable to visualize  -DA dressing in place, unable to visualize  -DA    Drainage Amount -- none  -DA none  -DA    Dressing Care -- border dressing  -DA border dressing  -DA    Retired Wound - Properties Group Placement Date: 10/04/22  -PH Placement Time: 0849  -PH Side: Left  -PH Location: lumbar spine  -PH Primary Wound Type: Incision  -PH    Retired Wound - Properties Group Date first assessed: 10/04/22  -PH Time first assessed: 0849  -PH Side: Left  -PH Location: lumbar spine  -PH Primary Wound Type: Incision  -PH       Wound 10/05/22 1411 Other (See comments) lower lumbar spine Incision    Wound - Properties Group Placement Date: 10/05/22  -TO Placement Time: 1411  -TO Present on Hospital Admission: N  -TO Side: Other (See comments)  -TO Orientation: lower  -TO Location: lumbar spine  -TO Primary Wound Type: Incision  -TO, exofin and coverderm     Dressing Appearance dry;intact  -LB dried drainage;dry;intact  -DA dried drainage;intact  -DA    Closure JOHN  -LB JOHN  -DA JOHN  -DA    Drainage Amount -- -- small  -DA    Dressing Care -- border dressing  -DA border dressing  -DA    Retired Wound - Properties Group Placement Date: 10/05/22  -TO Placement Time: 1411  -TO Present on Hospital Admission: N  -TO Side: Other (See comments)  -TO Orientation: lower  -TO Location: lumbar spine  -TO Primary Wound Type: Incision  -TO, exofin and coverderm     Retired Wound - Properties Group Date first assessed: 10/05/22  -TO Time first assessed: 1411  -TO Present on Hospital Admission: N  -TO Side: Other (See comments)  -TO Location: lumbar spine  -TO Primary Wound Type: Incision  -TO, exofin and coverderm     Row Name 10/05/22 2245 10/05/22 2205 10/05/22 2155        Wound 10/04/22 0849 Left lumbar spine Incision    Wound - Properties Group Placement Date: 10/04/22  -PH Placement Time: 0849  -PH Side: Left  -PH Location: lumbar spine  -PH Primary Wound Type: Incision  -PH    Dressing Appearance dry;intact  -SD dry;intact  -JT dry;intact  -JT    Closure JOHN  -SD JOHN  -JT JOHN  -JT    Base dressing in place, unable to visualize  -SD -- --    Drainage Amount none  -SD -- --    Retired Wound - Properties Group Placement Date: 10/04/22  -PH Placement Time: 0849  -PH Side: Left  -PH Location: lumbar spine  -PH Primary Wound Type: Incision  -PH    Retired Wound - Properties Group Date first assessed: 10/04/22  -PH Time first assessed: 0849  -PH Side: Left  -PH Location: lumbar spine  -PH Primary Wound Type: Incision  -PH       Wound 10/05/22 1411 Other (See comments) lower lumbar spine Incision    Wound - Properties Group Placement Date: 10/05/22  -TO Placement Time: 1411  -TO Present on Hospital Admission: N  -TO Side: Other (See comments)  -TO Orientation: lower  -TO Location: lumbar spine  -TO Primary Wound Type: Incision  -TO, exofin and coverderm     Dressing Appearance dried drainage;intact  -SD dried drainage;intact  -JT dried drainage;intact  -JT    Closure JOHN  -SD JOHN  -JT JOHN  -JT    Retired Wound - Properties Group Placement Date: 10/05/22  -TO Placement Time: 1411  -TO Present on Hospital Admission: N  -TO Side: Other (See comments)  -TO Orientation: lower  -TO Location: lumbar spine  -TO Primary Wound Type: Incision  -TO, exofin and coverderm     Retired Wound - Properties Group Date first assessed: 10/05/22  -TO Time first assessed: 1411  -TO Present on Hospital Admission: N  -TO Side: Other (See comments)  -TO Location: lumbar spine  -TO Primary Wound Type: Incision  -TO, exofin and coverderm     Row Name 10/05/22 2125 10/05/22 2110 10/05/22 2057       Wound 10/04/22 0849 Left lumbar spine Incision    Wound - Properties Group Placement Date: 10/04/22  -PH Placement  Time: 0849 -PH Side: Left  -PH Location: lumbar spine  -PH Primary Wound Type: Incision  -PH    Dressing Appearance dry;intact  -JT dry;intact  -JT intact;dry  -JT    Closure JOHN  -JT JOHN  -JT JOHN  -JT    Retired Wound - Properties Group Placement Date: 10/04/22  -PH Placement Time: 0849 -PH Side: Left  -PH Location: lumbar spine  -PH Primary Wound Type: Incision  -PH    Retired Wound - Properties Group Date first assessed: 10/04/22  -PH Time first assessed: 0849  -PH Side: Left  -PH Location: lumbar spine  -PH Primary Wound Type: Incision  -PH       Wound 10/05/22 1411 Other (See comments) lower lumbar spine Incision    Wound - Properties Group Placement Date: 10/05/22  -TO Placement Time: 1411  -TO Present on Hospital Admission: N  -TO Side: Other (See comments)  -TO Orientation: lower  -TO Location: lumbar spine  -TO Primary Wound Type: Incision  -TO, exofin and coverderm     Dressing Appearance dried drainage;intact  -JT dried drainage;intact  -JT dried drainage;intact  -JT    Closure JOHN  -JT JOHN  -JT JOHN  -JT    Retired Wound - Properties Group Placement Date: 10/05/22  -TO Placement Time: 1411  -TO Present on Hospital Admission: N  -TO Side: Other (See comments)  -TO Orientation: lower  -TO Location: lumbar spine  -TO Primary Wound Type: Incision  -TO, exofin and coverderm     Retired Wound - Properties Group Date first assessed: 10/05/22  -TO Time first assessed: 1411  -TO Present on Hospital Admission: N  -TO Side: Other (See comments)  -TO Location: lumbar spine  -TO Primary Wound Type: Incision  -TO, exofin and coverderm           User Key  (r) = Recorded By, (t) = Taken By, (c) = Cosigned By    Initials Name Provider Type    PH Sloane Banks RN Registered Nurse    Julianna Sims RN Registered Nurse    Faith Argueta, CRISTINON Licensed Nurse    Ena Quinteros RN Registered Nurse    Queenie Ott RN Registered Nurse    Teresa Duval RN Registered Nurse                   Assessment & Plan      Brief Patient Summary:  Omar Choudhary is a 77 y.o. male who presents for neurosurgical intervention      atorvastatin, 10 mg, Oral, Daily  bisacodyl, 10 mg, Rectal, Daily  budesonide-formoterol, 2 puff, Inhalation, BID - RT  enoxaparin, 40 mg, Subcutaneous, Daily  gabapentin, 300 mg, Oral, BID  gabapentin, 600 mg, Oral, Nightly  losartan-HCTZ (HYZAAR) 100-25 combo dose, , Oral, Daily  montelukast, 10 mg, Oral, Daily  polyethylene glycol, 17 g, Oral, Daily  sodium chloride, 10 mL, Intravenous, Q12H  [MAR Hold] theophylline, 300 mg, Oral, Daily       lactated ringers, 1,000 mL, Last Rate: 25 mL/hr at 10/05/22 1324         Active Hospital Problems:  Active Hospital Problems    Diagnosis    • Lumbar radiculopathy, chronic      Added automatically from request for surgery [id number]     • Spinal stenosis of lumbar region with neurogenic claudication    • Degenerative lumbar spinal stenosis    • Hyperlipidemia    • Depression    • Hypertension      Assessment/plan:   Status post lumbar fusion lumbar lateral interbody fusion with neuro robot L2-L3-L4; care as per neurosurgery  Anxiety/depression continue clonazepam  Hypertension; continue with losartan hydrochlorothiazide  Allergic asthma; continue with Singulair    Noted to be constipated  Laxatives ordered  Including Dulcolax    Check labs in a.m.    This patient has been examined wearing appropriate Personal Protective Equipment and discussed with hospital infection control department. 10/06/22      Electronically signed by Blane Liu MD, 10/06/22, 16:52 EDT.  [facility] Hospitalist Team

## 2022-10-06 NOTE — DISCHARGE PLACEMENT REQUEST
"Omar Choudhary (77 y.o. Male)             Date of Birth   1945    Social Security Number       Address   [address] [place] [place] [address]    Home Phone   [phone]    MRN   [id number]       Nondenominational   Buddhism    Marital Status                               Admission Date   10/4/22    Admission Type   Elective    Admitting Provider   John Do MD    Attending Provider   John Do MD    Department, Room/Bed   [facility] SURGICAL INPATIENT, 4132/1       Discharge Date       Discharge Disposition       Discharge Destination                               Attending Provider: John Do MD    Allergies: Statins    Isolation: None   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)   Code Status: Not on file   Advance Care Planning Activity    Ht: 167.6 cm (65.98\")   Wt: 86.2 kg (190 lb)    Admission Cmt: None   Principal Problem: None                Active Insurance as of 10/4/2022     Primary Coverage     Payor Plan Insurance Group Employer/Plan Group    MEDICARE MEDICARE A & B      Payor Plan Address Payor Plan Phone Number Payor Plan Fax Number Effective Dates    [address] [phone]  7/1/2012 - None Entered    [facility] [id number]       Subscriber Name Subscriber Birth Date Member ID       OMAR CHOUDHARY 1945 [id number]           Secondary Coverage     Payor Plan Insurance Group Employer/Plan Group    [employer] SUP   [facility] SUP              [id number]     Coverage Address Coverage Phone Number Coverage Fax Number Effective Dates    [address] [phone]  2/2/2022 - None Entered    [facility] [address]       Subscriber Name Subscriber Birth Date Member ID       OMAR CHOUDHARY 1945 [id number]                 Emergency Contacts      (Rel.) Home Phone Work Phone Mobile Phone    CHATO AYALA (Friend) -- -- [phone]    DAVIANCHELSEY HOPSON (Daughter) [phone] -- [phone]              "

## 2022-10-06 NOTE — ANESTHESIA POSTPROCEDURE EVALUATION
Patient: Omar Choudhary    Procedure Summary     Date: 10/05/22 Room / Location: [facility] OR 12 / [facility] MAIN OR    Anesthesia Start: 1324 Anesthesia Stop: 2056    Procedure: DAY 2 LUMBAR LAMINECTOMY TRANSFORAMINAL LUMBAR INTERBODY FUSION L2,L3,L4 (Bilateral Spine Lumbar) Diagnosis:       Spinal stenosis of lumbar region with neurogenic claudication      Degenerative lumbar spinal stenosis      Lumbar radiculopathy, chronic      (Spinal stenosis of lumbar region with neurogenic claudication [M48.062])      (Degenerative lumbar spinal stenosis [M48.061])      (Lumbar radiculopathy, chronic [M54.16])    Surgeons: John Do MD Provider: Jerzy Lyons MD    Anesthesia Type: general, ERAS Protocol ASA Status: 3          Anesthesia Type: general, ERAS Protocol    Vitals  Vitals Value Taken Time   /75 10/05/22 2205   Temp 97.8 °F (36.6 °C) 10/05/22 2205   Pulse 79 10/05/22 2209   Resp 17 10/05/22 2205   SpO2 94 % 10/05/22 2209   Vitals shown include unvalidated device data.        Post Anesthesia Care and Evaluation    Patient location during evaluation: PACU  Patient participation: complete - patient participated  Level of consciousness: awake  Pain scale: See nurse's notes for pain score.  Pain management: adequate    Airway patency: patent  Anesthetic complications: No anesthetic complications  PONV Status: none  Cardiovascular status: acceptable  Respiratory status: acceptable  Hydration status: acceptable    Comments: Patient seen and examined postoperatively; vital signs stable; SpO2 greater than or equal to 90%; cardiopulmonary status stable; nausea/vomiting adequately controlled; pain adequately controlled; no apparent anesthesia complications; patient discharged from anesthesia care when discharge criteria were met

## 2022-10-06 NOTE — PROGRESS NOTES
Neurosurgery Progress Note      Patient: Omar Choudhary    YOB: 1945    Medical Record Number: [id number]    Attending Physician: John Do MD    Date of Admission: 10/4/2022  5:21 AM    Status Post: DAY 1 LUMBAR LATERAL INTERBODY FUSION WITH NEURO ROBOT L2, L3.L4    Post Operative Day Number: 2/1    Admitting Dx: Spinal stenosis of lumbar region with neurogenic claudication [M48.062]  Degenerative lumbar spinal stenosis [M48.061]  Lumbar radiculopathy, chronic [M54.16]    General Appearance:  77 y.o. male that is awake and alert sitting up in bed eating breakfast.   He feels that he is sore but does feel that his pain is controlled with his pain medication regimen.  He has no acute neurologic deficits and able to move all extremities.    Current Problem List:   Patient Active Problem List   Diagnosis   • Benign prostatic hyperplasia   • Bursitis   • Depression   • Diverticulosis   • Family history of malignant neoplasm of breast   • Hyperlipidemia   • Hypertension   • Internal derangement of right knee   • Knee effusion   • Asthma   • Obstructive sleep apnea syndrome   • Osteoarthritis   • Osteopenia   • Pain in knee   • Postnasal drip   • Prepatellar bursitis   • Sciatica of right side   • Diaphragm paralysis   • Spinal stenosis of lumbar region with neurogenic claudication   • Degenerative lumbar spinal stenosis   • Anxiety   • Pinguecula of right eye   • Lumbar radiculopathy, chronic           Current Medications:  Scheduled Meds:atorvastatin, 10 mg, Oral, Daily  budesonide-formoterol, 2 puff, Inhalation, BID - RT  enoxaparin, 40 mg, Subcutaneous, Daily  gabapentin, 300 mg, Oral, BID  gabapentin, 600 mg, Oral, Nightly  losartan-HCTZ (HYZAAR) 100-25 combo dose, , Oral, Daily  montelukast, 10 mg, Oral, Daily  sodium chloride, 10 mL, Intravenous, Q12H  [MAR Hold] theophylline, 300 mg, Oral, Daily      Continuous Infusions:lactated ringers, 1,000 mL, Last Rate: 25 mL/hr at 10/05/22  1324      PRN Meds:.•  acetaminophen  •  albuterol sulfate HFA  •  baclofen  •  bisacodyl  •  clonazePAM  •  docusate sodium  •  HYDROcodone-acetaminophen  •  HYDROmorphone **AND** naloxone  •  magnesium hydroxide  •  melatonin  •  ondansetron  •  sodium chloride      Diagnostic Tests:    Lab Results (last 24 hours)     Procedure Component Value Units Date/Time    POC Chem Panel [[id number]]  (Abnormal) Collected: 10/05/22 2007    Specimen: Venous Blood Updated: 10/06/22 0558     Glucose 185 mg/dL      Comment: Serial Number: [id number]:  [id number]        Sodium 141 mmol/L      POC Potassium 3.6 mmol/L      Ionized Calcium 1.20 mmol/L      Hematocrit 28 %      Hemoglobin 9.5 g/dL      pH, Venous 7.410 pH Units      pCO2, Venous 38.4 mm Hg      CO2 CONTENT  --     HCO3, Venous 24.5 mmol/L      Base Excess, Venous --     Base Deficit --     O2 Saturation, Venous 26.0 %      pO2, Venous 60.0 mm Hg           Imaging Results (Last 24 Hours)     Procedure Component Value Units Date/Time    FL C Arm During Surgery [[id number]] Resulted: 10/05/22 2131     Updated: 10/05/22 2131    Narrative:      This procedure was auto-finalized with no dictation required.    XR Spine Lumbar 2 or 3 View [[id number]] Resulted: 10/05/22 2123     Updated: 10/05/22 2131          Physical Exam:   General Appearance:  Alert, cooperative, in no acute distress   Head:  Normocephalic, without obvious abnormality, atraumatic   Back:    Surgical dressing clean dry and intact with no obvious swelling palpated.  1 ADE drain with recorded output of 35 mL of bloody drainage   Abdomen:   nontender, nondistended   Extremities/MSK: Moves all extremities well   Skin: No bleeding, bruising or rash   Neurologic: Cranial nerves 2 - 12 grossly intact         Vitals:    10/06/22 0507 10/06/22 0805 10/06/22 0854 10/06/22 0858   BP: 107/70 111/72     BP Location: Right arm Right arm     Patient Position: Lying Lying     Pulse: 77 66 72 68   Resp: 16 14 18 16   Temp:  98 °F (36.7 °C) 98.5 °F (36.9 °C)     TempSrc: Oral Oral     SpO2: 100% 98% 99% 97%   Weight:       Height:             Assessment: Patient is a 77-year-old male that is s/p 2 staged surgical procedure with Dr. Do.  He underwent a left lateral interbody fusion from L2-L4 on 10/4 and subsequently on 10/5 underwent a posterior decompression and instrumentation from L2-L4.  He has no acute neurologic deficits and does state is too early to tell about his betterment of his presurgical symptoms.  He is postoperative pain appears to be well controlled.  He is taking in good p.o.  Archer catheter still in place with plans of DC this morning.   He is moving all extremities.  His drain is intact with moderate amount of bloody drainage with 35 recorded at last shift and approximately another 20 to 30 mL seen in bulb this morning..  And will continue.  Estimated blood loss during surgery was 925 mL, hemoglobin this morning was 9.5.  We will continue to monitor this.  Transfuse per primary.  Plans today include mobilization with PT.  He seems very eager to begin his postoperative recovery and rehabilitation.    Plan:      Continue Pain management efforts  Continue mobilization efforts  Continue incisional care  Continue DVT Prophylaxis    Discharge Plan: TBD    This patient was examined wearing appropriate personal protective equipment.     I discussed findings with patient, family, nursing staff and Dr. Do.    Date: 10/6/2022    EMILY Mccoy  09:09 EDT

## 2022-10-06 NOTE — PLAN OF CARE
Problem: Adult Inpatient Plan of Care  Goal: Plan of Care Review  Outcome: Ongoing, Progressing  Flowsheets  Taken 10/6/2022 1653  Plan of Care Reviewed With: patient  Taken 10/6/2022 5461  Outcome Evaluation: Pt is 78 yo male s/p two day spine sx including lumbar lateral interbody fusion L2-4 with neuro robot on 10/4/22 and lumbar laminectomy, TLIF L2-4 on 10/5/22.  Spine sx completed due to severe degenerative disc disease, lumbar scoliosis with multilevel spinal canal stenosis.  PMH: HTN, depression, HLD.  Pt reports indep with mobility with occasional use of cane for community distances, indep with ADLs and driving.  Pt resides with significant other in house with 8 steps to enter and 14 more steps to reach 2nd floor bedroom.  On this date, pt presents with back pain but motivated to mobilize.  Pt educated on spine precautions and provided written handout.  Pt easily distracted requiring verbal cues for attention to task and task completion.  Pt educated on logroll technique and able to complete with modA.  Pt able to complete transfers with Butch and ambulate using RW x30 ft with Butch.  Due to extensive spine sx, deficits at this time, and multiple steps to navigate in home environment, PT recommending IP rehab to address deficits.  PT will follow while at [facility] and continue to assess d/c needs pending progress.

## 2022-10-06 NOTE — PLAN OF CARE
Goal Outcome Evaluation:              Outcome Evaluation: pt overall doing well with pain control, remains with drain and doran

## 2022-10-06 NOTE — NURSING NOTE
[facility] Acute Inpt Rehab    Referral received. Chart reviewed.  OT noted. Will follow for current PT notes and pt. discharge plans to determine rehab appropriateness.    Thanks,  Salena Hussein,RN  Rehab Admission Nurse

## 2022-10-06 NOTE — CASE MANAGEMENT/SOCIAL WORK
Discharge Planning Assessment  SHANIQUA Lee     Patient Name: Omar Choudhary  MRN: [id number]  Today's Date: 10/6/2022    Admit Date: 10/4/2022    Plan: Referral to [facility] pending acceptance. No precert needed. #2 choice is [facility]   Discharge Needs Assessment     Row Name 10/06/22 1402       Living Environment    People in Home significant other    Current Living Arrangements home    Primary Care Provided by self    Provides Primary Care For no one    Family Caregiver if Needed significant other    Quality of Family Relationships supportive    Able to Return to Prior Arrangements yes       Resource/Environmental Concerns    Resource/Environmental Concerns none    Transportation Concerns none       Transition Planning    Patient/Family Anticipates Transition to inpatient rehabilitation facility    Patient/Family Anticipated Services at Transition rehabilitation services    Transportation Anticipated family or friend will provide       Discharge Needs Assessment    Readmission Within the Last 30 Days no previous admission in last 30 days    Equipment Currently Used at Home cane, straight;cpap    Concerns to be Addressed discharge planning    Anticipated Changes Related to Illness inability to care for self    Equipment Needed After Discharge none    Outpatient/Agency/Support Group Needs inpatient rehabilitation facility    Discharge Facility/Level of Care Needs rehabilitation facility    Provided Post Acute Provider List? Yes    Post Acute Provider List Inpatient Rehab  [facility] given verbally    Provided Post Acute Provider Quality & Resource List? Yes    Post Acute Provider Quality and Resource List Inpatient Rehab    Delivered To Patient    Method of Delivery In person    Patient's Choice of Community Agency(s) Patient asks for inpatient rehab facilities in [facility]. [place] facilities given verbally. Patient asks for referral to be made to [facility] #1 and [facility]    Current  Discharge Risk lives alone               Discharge Plan     Row Name 10/06/22 1419       Plan    Plan Referral to [facility] pending acceptance. No precert needed. #2 choice is [facility]    Patient/Family in Agreement with Plan yes    Plan Comments Spoke with patient at bedside. Choices as above. PT and OT recommending IRF. Sent referral to [facility] and spoke with April.              Continued Care and Services - Admitted Since 10/4/2022     Destination     Service Provider Request Status Selected Services Address Phone Fax Patient Preferred    [facility]  Pending - No Request Sent N/A [address] [place] [address] [phone] -- --              Expected Discharge Date and Time     Expected Discharge Date Expected Discharge Time    Oct 9, 2022          Demographic Summary     Row Name 10/06/22 4958       General Information    Admission Type inpatient    Arrived From PACU/recovery room    Required Notices Provided Important Message from Medicare    Referral Source admission list    Reason for Consult discharge planning    Preferred Language English               Functional Status     Row Name 10/06/22 1358       Functional Status    Usual Activity Tolerance good    Current Activity Tolerance moderate       Functional Status, IADL    Medications independent    Meal Preparation independent    Housekeeping independent    Laundry independent    Shopping independent       Mental Status    General Appearance WDL WDL       Mental Status Summary    Recent Changes in Mental Status/Cognitive Functioning no changes              Met with patient in room wearing PPE: mask, face shield/goggles,       Maintained distance greater than six feet and spent less than 15 minutes in the room.               Kalyani Blackman RN

## 2022-10-07 LAB
ALBUMIN SERPL-MCNC: 3.2 G/DL (ref 3.5–5.2)
ALBUMIN/GLOB SERPL: 1.5 G/DL
ALP SERPL-CCNC: 52 U/L (ref 39–117)
ALT SERPL W P-5'-P-CCNC: 16 U/L (ref 1–41)
ANION GAP SERPL CALCULATED.3IONS-SCNC: 11 MMOL/L (ref 5–15)
AST SERPL-CCNC: 43 U/L (ref 1–40)
BASOPHILS # BLD AUTO: 0 10*3/MM3 (ref 0–0.2)
BASOPHILS NFR BLD AUTO: 0.3 % (ref 0–1.5)
BILIRUB SERPL-MCNC: 0.8 MG/DL (ref 0–1.2)
BUN SERPL-MCNC: 15 MG/DL (ref 8–23)
BUN/CREAT SERPL: 16.9 (ref 7–25)
CALCIUM SPEC-SCNC: 8.5 MG/DL (ref 8.6–10.5)
CHLORIDE SERPL-SCNC: 98 MMOL/L (ref 98–107)
CO2 SERPL-SCNC: 28 MMOL/L (ref 22–29)
CREAT SERPL-MCNC: 0.89 MG/DL (ref 0.76–1.27)
DEPRECATED RDW RBC AUTO: 42.4 FL (ref 37–54)
EGFRCR SERPLBLD CKD-EPI 2021: 89.4 ML/MIN/1.73
EOSINOPHIL # BLD AUTO: 0.2 10*3/MM3 (ref 0–0.4)
EOSINOPHIL NFR BLD AUTO: 2.2 % (ref 0.3–6.2)
ERYTHROCYTE [DISTWIDTH] IN BLOOD BY AUTOMATED COUNT: 13.1 % (ref 12.3–15.4)
GLOBULIN UR ELPH-MCNC: 2.1 GM/DL
GLUCOSE SERPL-MCNC: 117 MG/DL (ref 65–99)
HCT VFR BLD AUTO: 25.8 % (ref 37.5–51)
HGB BLD-MCNC: 8.8 G/DL (ref 13–17.7)
LYMPHOCYTES # BLD AUTO: 1.1 10*3/MM3 (ref 0.7–3.1)
LYMPHOCYTES NFR BLD AUTO: 14.6 % (ref 19.6–45.3)
MAGNESIUM SERPL-MCNC: 1.7 MG/DL (ref 1.6–2.4)
MCH RBC QN AUTO: 33.2 PG (ref 26.6–33)
MCHC RBC AUTO-ENTMCNC: 34 G/DL (ref 31.5–35.7)
MCV RBC AUTO: 97.6 FL (ref 79–97)
MONOCYTES # BLD AUTO: 0.7 10*3/MM3 (ref 0.1–0.9)
MONOCYTES NFR BLD AUTO: 10.2 % (ref 5–12)
NEUTROPHILS NFR BLD AUTO: 5.3 10*3/MM3 (ref 1.7–7)
NEUTROPHILS NFR BLD AUTO: 72.7 % (ref 42.7–76)
NRBC BLD AUTO-RTO: 0.1 /100 WBC (ref 0–0.2)
PHOSPHATE SERPL-MCNC: 3.1 MG/DL (ref 2.5–4.5)
PLATELET # BLD AUTO: 143 10*3/MM3 (ref 140–450)
PMV BLD AUTO: 7.8 FL (ref 6–12)
POTASSIUM SERPL-SCNC: 3.6 MMOL/L (ref 3.5–5.2)
PROT SERPL-MCNC: 5.3 G/DL (ref 6–8.5)
RBC # BLD AUTO: 2.65 10*6/MM3 (ref 4.14–5.8)
SARS-COV-2 RNA RESP QL NAA+PROBE: NOT DETECTED
SODIUM SERPL-SCNC: 137 MMOL/L (ref 136–145)
WBC NRBC COR # BLD: 7.3 10*3/MM3 (ref 3.4–10.8)

## 2022-10-07 PROCEDURE — 97116 GAIT TRAINING THERAPY: CPT

## 2022-10-07 PROCEDURE — 94799 UNLISTED PULMONARY SVC/PX: CPT

## 2022-10-07 PROCEDURE — 25010000002 HYDROMORPHONE 1 MG/ML SOLUTION

## 2022-10-07 PROCEDURE — 84100 ASSAY OF PHOSPHORUS: CPT | Performed by: INTERNAL MEDICINE

## 2022-10-07 PROCEDURE — U0003 INFECTIOUS AGENT DETECTION BY NUCLEIC ACID (DNA OR RNA); SEVERE ACUTE RESPIRATORY SYNDROME CORONAVIRUS 2 (SARS-COV-2) (CORONAVIRUS DISEASE [COVID-19]), AMPLIFIED PROBE TECHNIQUE, MAKING USE OF HIGH THROUGHPUT TECHNOLOGIES AS DESCRIBED BY CMS-2020-01-R: HCPCS | Performed by: NEUROLOGICAL SURGERY

## 2022-10-07 PROCEDURE — 97530 THERAPEUTIC ACTIVITIES: CPT

## 2022-10-07 PROCEDURE — 25010000002 ENOXAPARIN PER 10 MG

## 2022-10-07 PROCEDURE — U0005 INFEC AGEN DETEC AMPLI PROBE: HCPCS | Performed by: NEUROLOGICAL SURGERY

## 2022-10-07 PROCEDURE — 99024 POSTOP FOLLOW-UP VISIT: CPT

## 2022-10-07 PROCEDURE — 80053 COMPREHEN METABOLIC PANEL: CPT | Performed by: INTERNAL MEDICINE

## 2022-10-07 PROCEDURE — 85025 COMPLETE CBC W/AUTO DIFF WBC: CPT | Performed by: INTERNAL MEDICINE

## 2022-10-07 PROCEDURE — 97535 SELF CARE MNGMENT TRAINING: CPT

## 2022-10-07 PROCEDURE — 83735 ASSAY OF MAGNESIUM: CPT | Performed by: INTERNAL MEDICINE

## 2022-10-07 PROCEDURE — 97110 THERAPEUTIC EXERCISES: CPT

## 2022-10-07 RX ADMIN — HYDROCODONE BITARTRATE AND ACETAMINOPHEN 2 TABLET: 5; 325 TABLET ORAL at 12:56

## 2022-10-07 RX ADMIN — GABAPENTIN 300 MG: 300 CAPSULE ORAL at 17:08

## 2022-10-07 RX ADMIN — GABAPENTIN 300 MG: 300 CAPSULE ORAL at 08:39

## 2022-10-07 RX ADMIN — POLYETHYLENE GLYCOL 3350 17 G: 17 POWDER, FOR SOLUTION ORAL at 08:39

## 2022-10-07 RX ADMIN — Medication 10 ML: at 21:56

## 2022-10-07 RX ADMIN — HYDROCODONE BITARTRATE AND ACETAMINOPHEN 2 TABLET: 5; 325 TABLET ORAL at 05:07

## 2022-10-07 RX ADMIN — HYDROCODONE BITARTRATE AND ACETAMINOPHEN 2 TABLET: 5; 325 TABLET ORAL at 21:56

## 2022-10-07 RX ADMIN — MONTELUKAST 10 MG: 10 TABLET, FILM COATED ORAL at 08:39

## 2022-10-07 RX ADMIN — HYDROCODONE BITARTRATE AND ACETAMINOPHEN 2 TABLET: 5; 325 TABLET ORAL at 17:08

## 2022-10-07 RX ADMIN — ATORVASTATIN CALCIUM 10 MG: 10 TABLET, FILM COATED ORAL at 08:39

## 2022-10-07 RX ADMIN — BUDESONIDE AND FORMOTEROL FUMARATE DIHYDRATE 2 PUFF: 160; 4.5 AEROSOL RESPIRATORY (INHALATION) at 08:10

## 2022-10-07 RX ADMIN — BUDESONIDE AND FORMOTEROL FUMARATE DIHYDRATE 2 PUFF: 160; 4.5 AEROSOL RESPIRATORY (INHALATION) at 19:44

## 2022-10-07 RX ADMIN — Medication 10 ML: at 08:40

## 2022-10-07 RX ADMIN — ENOXAPARIN SODIUM 40 MG: 100 INJECTION SUBCUTANEOUS at 08:44

## 2022-10-07 RX ADMIN — THEOPHYLLINE 300 MG: 300 TABLET, EXTENDED RELEASE ORAL at 08:39

## 2022-10-07 RX ADMIN — GABAPENTIN 600 MG: 300 CAPSULE ORAL at 21:56

## 2022-10-07 RX ADMIN — BISACODYL 10 MG: 10 SUPPOSITORY RECTAL at 12:56

## 2022-10-07 RX ADMIN — HYDROMORPHONE HYDROCHLORIDE 0.5 MG: 1 INJECTION, SOLUTION INTRAMUSCULAR; INTRAVENOUS; SUBCUTANEOUS at 08:39

## 2022-10-07 NOTE — CASE MANAGEMENT/SOCIAL WORK
Continued Stay Note  SHANIQUA Lee     Patient Name: Omar Choudhary  MRN: [id number]  Today's Date: 10/7/2022    Admit Date: 10/4/2022    Plan: D/C Plan : [facility] has accepted pt and will have a bed on 10/08/22 Saturday   Discharge Plan     Row Name 10/07/22 1408       Plan    Plan D/C Plan : [facility] has accepted pt and will have a bed on 10/08/22 Saturday    Row Name 10/07/22 1241       Plan    Plan Comments Call to [facility] Rosemary eckert to inquire if patient is accepted. Per Rosemary, patient still needs review from medical director. Gave Rosemary Mcghee CM supervisor number to follow up with after 2 pm today  for acceptance. gave weekend  number for follow up if needed during the weekend. Updated weekend to do list with need for follow up               Discharge Codes    No documentation.               Expected Discharge Date and Time     Expected Discharge Date Expected Discharge Time    Oct 9, 2022             Litzy Melo RN

## 2022-10-07 NOTE — CASE MANAGEMENT/SOCIAL WORK
Continued Stay Note  SHANIQUA Lee     Patient Name: Omar Choudhary  MRN: [id number]  Today's Date: 10/7/2022    Admit Date: 10/4/2022    Plan: Referral to [facility] pending acceptance. No precert needed. #2 choice is [facility]   Discharge Plan     Row Name 10/07/22 1241       Plan    [facility]. Per Rose[facility], patient still needs review from medical director. Gave Rosemary Mcghee CM supervisor number to follow up with after 2 pm today  for acceptance. gave weekend  number for follow up if needed during the weekend. Updated weekend to do list with need for follow up                       Expected Discharge Date and Time     Expected Discharge Date Expected Discharge Time    Oct 9, 2022         Phone communication or documentation only - no physical contact with patient or family.    Kalyani Blackman, RN

## 2022-10-07 NOTE — PLAN OF CARE
Goal Outcome Evaluation:              Outcome Evaluation: drain was removed and pt has been ambulating with PT, pain is under control.

## 2022-10-07 NOTE — CASE MANAGEMENT/SOCIAL WORK
Continued Stay Note  SHANIQUA Lee     Patient Name: Omar Choudhary  MRN: [id number]  Today's Date: 10/7/2022    Admit Date: 10/4/2022    Plan: D/C Plan : [facility] has accepted and will have a bed on 10/08/22 , pt will need a COVID test prior to D/C   Discharge Plan     Row Name 10/07/22 1646       Plan    Plan D/C Plan : [facility] has accepted and will have a bed on 10/08/22 , pt will need a COVID test prior to D/C    Plan Comments [facility] requesting MRI be sent over on a Disc , spoke with Radiology and they are sending it    Row Name 10/07/22 8327       Plan    Plan D/C Plan : [facility] has accepted pt and will have a bed on 10/08/22 Saturday               Discharge Codes    No documentation.               Expected Discharge Date and Time     Expected Discharge Date Expected Discharge Time    Oct 9, 2022             Litzy Melo RN

## 2022-10-07 NOTE — PROGRESS NOTES
[facility] Medicine Services Daily Progress Note    Patient Name: Omar Choudhary  : 1945  MRN: [id number]  Primary Care Physician:  Dennis Kwong MD  Date of admission: 10/4/2022      Subjective      Chief Complaint: Back pain       Patient Reports he is doing okay.  No bowel movement since admission.  Working with therapy during my evaluation.  Patient responded well to laxatives finally today.     ROS negative except as above    Objective      Vitals:   Temp:  [97.7 °F (36.5 °C)-99 °F (37.2 °C)] 97.7 °F (36.5 °C)  Heart Rate:  [68-81] 81  Resp:  [16-20] 18  BP: ()/(54-75) 121/75    Physical Exam  Vitals reviewed.   Constitutional:       Comments: Patient with Archer catheter in place.  Girlfriend at bedside.  Working with occupational therapy laying in bed   HENT:      Head: Normocephalic.   Cardiovascular:      Rate and Rhythm: Normal rate.   Pulmonary:      Effort: Pulmonary effort is normal.   Abdominal:      General: Abdomen is flat.  No longer distended after bowel movement.     Palpations: Abdomen is soft.      Comments: Mildly distended but not tender   Musculoskeletal:         General: Normal range of motion.      Cervical back: Normal range of motion.   Skin:     General: Skin is warm.   Neurological:      General: No focal deficit present.      Mental Status: He is alert and oriented to person, place, and time.   Psychiatric:         Mood and Affect: Mood normal.         Behavior: Behavior normal.        Result Review    Result Review:  I have personally reviewed the results from the time of this admission to 10/7/2022 17:04 EDT and agree with these findings:  [x]  Laboratory  []  Microbiology  []  Radiology  []  EKG/Telemetry   []  Cardiology/Vascular   []  Pathology  []  Old records  []  Other:  Most notable findings include: Lab work normal    Wounds (last 24 hours)     LDA Wound     Row Name 10/07/22 0839 10/07/22 0400 10/06/22 2118       Wound 10/04/22  0849 Left lumbar spine Incision    Wound - Properties Group Placement Date: 10/04/22  -PH Placement Time: 0849  -PH Side: Left  -PH Location: lumbar spine  -PH Primary Wound Type: Incision  -PH    Dressing Appearance dry;intact;no drainage  -LB dry;intact;no drainage  -DA dry;intact;no drainage  -SD    Closure JOHN  -LB JOHN  -DA JOHN  -SD    Base -- dressing in place, unable to visualize  -DA dressing in place, unable to visualize  -SD    Drainage Amount -- none  -DA --    Dressing Care -- border dressing  -DA --    Retired Wound - Properties Group Placement Date: 10/04/22  -PH Placement Time: 0849  -PH Side: Left  -PH Location: lumbar spine  -PH Primary Wound Type: Incision  -PH    Retired Wound - Properties Group Date first assessed: 10/04/22  -PH Time first assessed: 0849  -PH Side: Left  -PH Location: lumbar spine  -PH Primary Wound Type: Incision  -PH       Wound 10/05/22 1411 Other (See comments) lower lumbar spine Incision    Wound - Properties Group Placement Date: 10/05/22  -TO Placement Time: 1411  -TO Present on Hospital Admission: N  -TO Side: Other (See comments)  -TO Orientation: lower  -TO Location: lumbar spine  -TO Primary Wound Type: Incision  -TO, exofin and coverderm     Dressing Appearance dry;intact;no drainage  -LB dry;intact  -DA dry;intact  -SD    Closure JOHN  -LB JOHN  -DA JOHN  -SD    Drainage Amount -- small  -DA --    Dressing Care -- border dressing  -DA --    Retired Wound - Properties Group Placement Date: 10/05/22  -TO Placement Time: 1411  -TO Present on Hospital Admission: N  -TO Side: Other (See comments)  -TO Orientation: lower  -TO Location: lumbar spine  -TO Primary Wound Type: Incision  -TO, exofin and coverderm     Retired Wound - Properties Group Date first assessed: 10/05/22  -TO Time first assessed: 1411  -TO Present on Hospital Admission: N  -TO Side: Other (See comments)  -TO Location: lumbar spine  -TO Primary Wound Type: Incision  -TO, exofin and coverderm           User  Key  (r) = Recorded By, (t) = Taken By, (c) = Cosigned By    Initials Name Provider Type    PH Sloane Banks RN Registered Nurse    Julianna Sims RN Registered Nurse    Faith Argueta, CRISTINON Licensed Nurse    Ena Quinteros RN Registered Nurse    TO Teresa Good RN Registered Nurse                      Assessment & Plan       Brief Patient Summary:  Omar Choudhary is a 77 y.o. male who presents for neurosurgical intervention        atorvastatin, 10 mg, Oral, Daily  bisacodyl, 10 mg, Rectal, Daily  budesonide-formoterol, 2 puff, Inhalation, BID - RT  enoxaparin, 40 mg, Subcutaneous, Daily  gabapentin, 300 mg, Oral, BID  gabapentin, 600 mg, Oral, Nightly  losartan-HCTZ (HYZAAR) 100-25 combo dose, , Oral, Daily  montelukast, 10 mg, Oral, Daily  polyethylene glycol, 17 g, Oral, Daily  sodium chloride, 10 mL, Intravenous, Q12H  [MAR Hold] theophylline, 300 mg, Oral, Daily        lactated ringers, 1,000 mL, Last Rate: 25 mL/hr at 10/05/22 1324           Active Hospital Problems:        Active Hospital Problems     Diagnosis     • Lumbar radiculopathy, chronic         Added automatically from request for surgery [id number]      • Spinal stenosis of lumbar region with neurogenic claudication     • Degenerative lumbar spinal stenosis     • Hyperlipidemia     • Depression     • Hypertension        Assessment/plan:   Status post lumbar fusion lumbar lateral interbody fusion with neuro robot L2-L3-L4; care as per neurosurgery  Anxiety/depression continue clonazepam  Hypertension; continue with losartan hydrochlorothiazide  Allergic asthma; continue with Singulair     Noted to be constipated  Laxatives ordered  Including Dulcolax  Had a bowel movement October 7    Lab work normal this morning      Rehab October 8       This patient has been examined wearing appropriate Personal Protective Equipment and discussed with hospital infection control department.  10/07/22      Electronically signed by  Blane Liu MD, 10/07/22, 17:04 EDT.  [facility] Hospitalist Team

## 2022-10-07 NOTE — THERAPY TREATMENT NOTE
Subjective: Pt agreeable to therapeutic plan of care. Pt present with significant other at bedside during session. Pt reports 5/10 pain pre treatment.   Cognition: oriented to Person, Place, Time and Situation    Objective:     Bed Mobility: SBA   Functional Transfers: CGA and with rolling walker  Functional Ambulation: CGA and with rolling walker    Grooming: SBA  ADL Position: supported standing  ADL Comments: Pt completed shaving and oral hygiene with SBA for increased safety.     Feeding: Modified-Independent  ADL Position: supported sitting  ADL Comments: Pt eating upon arrival sitting up in bed.    BUE exercises x2 sets, x10 reps in all planes   Vitals: WNL    Pain: 6 VAS back   Education: Provided education on importance of mobility and skilled verbal / tactile cueing throughout intervention. Pt educated on spinal precautions and follow through during session.     Assessment: Omar Choudhary presents with ADL impairments below baseline abilities which indicate the need for continued skilled intervention while inpatient. Pt tolerates bed mobility with head of bed elevated with SBA. Pt completes STS and functional ambulation with CGA and RW for increased safety. Pt tolerated standing at sink with RW for x10 to complete ADLs. Pt able to complete grooming tasks with set up. Pt noted to have fatigue with standing ADLS. Pt noted to have minimal increase in pain with activity this date. Pt deficits include increased pain, decrease in self care tasks, and decrease in functional transfers impacting independence and participation in ADLs. Tolerating session today without incident. Will continue to follow and progress as tolerated.     Plan/Recommendations:   Pt would benefit from Inpatient Rehabilitation placement at discharge from facility.   Pt desires Inpatient Rehabilitation placement at discharge. Pt cooperative; agreeable to therapeutic recommendations and plan of care.   High Intensity Therapy recommended  post-acute care. This is recommended as therapy feels the patient would require 5-6 days per week, 3 hours per day. At this time, inpatient rehabilitation (acute rehab) would be the first choice, and SNF would be second.    Modified [place]: N/A = No pre-op stroke/TIA    Post-Tx Position: Supine with HOB Elevated, Alarms activated and Call light and personal items within reach  PPE: mask, gloves and eye protection

## 2022-10-07 NOTE — NURSING NOTE
[facility]     Discussed with rehab physician. Pt. has been accepted and bed available on Sat. 10/8 if medically ready.     Will need DC summary and med. rec, before pt. leaves  Jesus and CD of images to come with pt.    Phone #: [phone]  Fax#: [phone]     Thanks,  Salena HusesinRN  Rehab Admission Nurse

## 2022-10-07 NOTE — THERAPY TREATMENT NOTE
Subjective: Pt agreeable to therapeutic plan of care.    Objective:     Bed mobility - N/A or Not attempted.  Pt up in the bathroom upon arrival  Transfers - Min-A sit to stand up to roll walker  Ambulation - 15 feet and then 45 feet CGA and with rolling walker forward posture.  Pt is able to correct with cues but unable to maintain. Slow pace, unequal step lengths, pt would stop to rest due to increased back pain.   Did not attempts steps as patient was too painful at end of gait training.      Vitals: WNL    Pain: 7 VAS  Education: Provided education on importance of mobility and skilled verbal / tactile cueing throughout intervention. Reviewed spine precautions.     Assessment: Omar Choudhary presents with functional mobility impairments which indicate the need for skilled intervention.  pt was able to tolerate a little more activity today.  Pt less impulsive demonstrating increased safety today.  Pt has very good potential to recover his mobility but will need IP rehab at d/c to allow for a safe return home.  Tolerating session today without incident. Will continue to follow and progress as tolerated.     Plan/Recommendations:   High Intensity Therapy recommended post-acute care. This is recommended as therapy feels the patient would require 5-6 days per week, 2-3 hours per day. At this time, inpatient rehabilitation (acute rehab) would be the preferred choice at d/c.  Pt requires no DME at discharge.     Pt desires Inpatient Rehabilitation placement at discharge. Pt cooperative; agreeable to therapeutic recommendations and plan of care.     Basic Mobility 6-click:  Rollin = Total, A lot = 2, A little = 3; 4 = None  Supine>Sit:   1 = Total, A lot = 2, A little = 3; 4 = None   Sit>Stand with arms:  1 = Total, A lot = 2, A little = 3; 4 = None  Bed>Chair:   1 = Total, A lot = 2, A little = 3; 4 = None  Ambulate in room:  1 = Total, A lot = 2, A little = 3; 4 = None  3-5 Steps with railin =  Total, A lot = 2, A little = 3; 4 = None  Score: 15    Modified [place]: N/A = No pre-op stroke/TIA    Post-Tx Position: Up in Chair, Alarms activated and Call light and personal items within reach  PPE: mask, gloves and eye protection

## 2022-10-07 NOTE — PLAN OF CARE
Goal Outcome Evaluation:  Plan of Care Reviewed With: patient        Progress: improving     Rested well tonight with pain controlled on oral pain medications. Awaiting rehab placement for discharge. Plan of care on going.

## 2022-10-07 NOTE — PLAN OF CARE
Assessment: Omar Choudhary presents with ADL impairments below baseline abilities which indicate the need for continued skilled intervention while inpatient. Pt tolerates bed mobility with head of bed elevated with SBA. Pt completes STS and functional ambulation with CGA and RW for increased safety. Pt tolerated standing at sink with RW for x10 to complete ADLs. Pt able to complete grooming tasks with set up. Pt noted to have fatigue with standing ADLS. Pt noted to have minimal increase in pain with activity this date. Pt deficits include increased pain, decrease in self care tasks, and decrease in functional transfers impacting independence and participation in ADLs. Tolerating session today without incident. Will continue to follow and progress as tolerated.     Plan/Recommendations:   Pt would benefit from Inpatient Rehabilitation placement at discharge from facility.   Pt desires Inpatient Rehabilitation placement at discharge. Pt cooperative; agreeable to therapeutic recommendations and plan of care.   High Intensity Therapy recommended post-acute care. This is recommended as therapy feels the patient would require 5-6 days per week, 3 hours per day. At this time, inpatient rehabilitation (acute rehab) would be the first choice, and SNF would be second.

## 2022-10-07 NOTE — PLAN OF CARE
Goal Outcome Evaluation:     Bed mobility - N/A or Not attempted.  Pt up in the bathroom upon arrival  Transfers - Min-A sit to stand up to roll walker  Ambulation - 15 feet and then 45 feet CGA and with rolling walker forward posture.  Pt is able to correct with cues but unable to maintain. Slow pace, unequal step lengths, pt would stop to rest due to increased back pain.   Did not attempts steps as patient was too painful at end of gait training.      High Intensity Therapy recommended post-acute care. This is recommended as therapy feels the patient would require 5-6 days per week, 2-3 hours per day. At this time, inpatient rehabilitation (acute rehab) would be the preferred choice at d/c.  Pt requires no DME at discharge.     Pt desires Inpatient Rehabilitation placement at discharge. Pt cooperative; agreeable to therapeutic recommendations and plan of care.

## 2022-10-07 NOTE — PROGRESS NOTES
Neurosurgery Progress Note    Date: 10/07/22     Patient: Omar Choudhary       SUBJECTIVE    Interval history:  No adverse events overnight.  Patient has no new complaints.  Still not had a bowel movement.  Urinating freely.  Complains of expected postoperative pain around surgical site.  He has been able to ambulate on his own over short distances.        Current Medications:  Scheduled Meds:atorvastatin, 10 mg, Oral, Daily  bisacodyl, 10 mg, Rectal, Daily  budesonide-formoterol, 2 puff, Inhalation, BID - RT  enoxaparin, 40 mg, Subcutaneous, Daily  gabapentin, 300 mg, Oral, BID  gabapentin, 600 mg, Oral, Nightly  losartan-HCTZ (HYZAAR) 100-25 combo dose, , Oral, Daily  montelukast, 10 mg, Oral, Daily  polyethylene glycol, 17 g, Oral, Daily  sodium chloride, 10 mL, Intravenous, Q12H  [MAR Hold] theophylline, 300 mg, Oral, Daily      Continuous Infusions:   PRN Meds: •  acetaminophen  •  albuterol sulfate HFA  •  baclofen  •  bisacodyl  •  clonazePAM  •  docusate sodium  •  HYDROcodone-acetaminophen  •  HYDROmorphone **AND** naloxone  •  magnesium hydroxide  •  melatonin  •  ondansetron  •  sodium chloride      OBJECTIVE  Vitals:    10/07/22 0100 10/07/22 0455 10/07/22 0810 10/07/22 0815   BP: 106/66 104/59     BP Location: Right arm Right arm     Patient Position: Lying Lying     Pulse: 78 77 79 80   Resp: 20 20 18 18   Temp: 98.9 °F (37.2 °C) 98.8 °F (37.1 °C)     TempSrc: Oral Oral     SpO2: 92% 94% 94% 94%   Weight:       Height:           Physical exam    General  - WD/WN male, appears their stated age, awake, cooperative, in no acute distress  HEENT  - Normocephalic, atraumatic, PERRLA, EOM intact  Respiratory  - Normal respiratory rate and effort  Musculoskeletal  - no joint swelling/tenderness  Skin  - Left lateral and posterior surgical incisions are CDI with no swelling redness or drainage.  Surgical drain with minimal serosanguineous output.  NEUROLOGIC  - A/O x3  - CN II-XII grossly intact  - Moves all  extremities symmetrically and w/ good strength  - Sensation intact throughout      Results review  CBC    CBC 9/27/22 10/5/22 10/5/22 10/7/22     0236 2007    WBC 3.09 (A) 7.40  7.30   RBC 4.46 3.47 (A)  2.65 (A)   Hemoglobin 14.0 12.1 (A) 9.5 (A) 8.8 (A)   Hematocrit 39.8 34.6 (A) 28 (A) 25.8 (A)   MCV 89.2 99.5 (A)  97.6 (A)   MCH 31.4 34.9 (A)  33.2 (A)   MCHC 35.2 35.1  34.0   RDW 12.6 13.3  13.1   Platelets 146 163  143   (A) Abnormal value              BMP    BMP 9/27/22 10/5/22 10/7/22   BUN 15 15 15   Creatinine 1.19 0.98 0.89   Sodium 139 138 137   Potassium 4.0 3.8 3.6   Chloride 103 101 98   CO2 25.0 25.0 28.0   Calcium 9.5 8.5 (A) 8.5 (A)   (A) Abnormal value                   CT Lumbar Spine Without Contrast    Result Date: 10/4/2022   DATE OF EXAM: 10/4/2022 1:58 PM  PROCEDURE: CT LUMBAR SPINE WO CONTRAST-  INDICATIONS: GLOBUS ROBOT PROTOCOLS; M48.061-Spinal stenosis, lumbar region without neurogenic claudication; M48.062-Spinal stenosis, lumbar region with neurogenic claudication; M54.16-Radiculopathy, lumbar region  COMPARISON: CT lumbar spine without IV contrast dated 9/29/2022.  TECHNIQUE: Routine transaxial slices were obtained through the lumbar spine without the administration of intravenous contrast. Reconstructed coronal and sagittal images were also obtained. Automated exposure control and iterative construction methods were used.  FINDINGS: Spinal numbering nomenclature is continued from the prior examination for continuity. There is a fully formed disc at the S1-S2 level. There are intervertebral disc spacers which have now been placed via a left lateral approach at the L2-L3 and L3-L4 levels. These appear in expected position centered within the disc spaces. There is soft tissue gas extending along the left aspect of the retroperitoneum and extraperitoneal space adjacent to the psoas likely related to the surgical site. Aside from the interval placement of the intervertebral spacers, there  are no additional significant changes. There remains multilevel degenerative disc disease and significant neuroforaminal narrowing secondary to endplate osseous spurring and facet arthropathy. There is a levoscoliosis of the lumbar spine. Please see recently performed CT from 9/29/2022 for full discussion of the level by level findings. There is a small hiatal hernia. There is left lower lobe atelectasis.      Impression: 1. Interval placement of intervertebral spacers at the L2-L3 and L3-L4 levels in expected position. 2. Soft tissue gas along the left aspect of the psoas muscle and extraperitoneal fat likely related to recent surgery. 3. Imaging was performed per [facility] Protocol for subsequent surgical guidance. If full detailed level by level analysis is desired, please see recently performed CT lumbar spine from 9/29/2022.  Electronically Signed By-Meño Yoder MD On:10/4/2022 2:39 PM This report was finalized on [id number] by  Meño Yoder MD.    CT Lumbar Spine Without Contrast    Result Date: 9/29/2022   DATE OF EXAM: 9/29/2022 3:21 PM  PROCEDURE: CT LUMBAR SPINE WO CONTRAST-  INDICATIONS: Preoperative planning; M48.062-Spinal stenosis, lumbar region with neurogenic claudication; M48.061-Spinal stenosis, lumbar region without neurogenic claudication; M54.16-Radiculopathy, lumbar region  COMPARISON: X-ray 02/03/2022, MRI lumbar spine 07/12/2021  TECHNIQUE: Routine transaxial slices were obtained through the lumbar spine without the administration of intravenous contrast. Reconstructed coronal and sagittal images were also obtained. Automated exposure control and iterative construction methods were used.  FINDINGS: There is a levoscoliosis of 30.3 degrees. There are multilevel degenerative changes throughout the lumbar area.  T12-L1: There are marginal osteophytes along the lateral aspect of the disc space greater on the left. There is marked narrowing of the disc space with more of a vacuum disc  phenomenon. There is some bulging of the annulus superimposed on an osseous bar. There is facet arthropathy. There is some narrowing of the more inferior left intervertebral foraminal area. Degenerative changes along the left lateral aspect of the disc space could encroach on the exiting nerve root. There are degenerative endplate changes about the disc space.  L1-L2: There is a broad-based impression on the thecal sac which could reflect more of an osseous bar. There is a slight retrolisthesis of L1 on L2. There is marked narrowing of the disc space. There is significant marginal osteophyte along the right lateral aspect of the disc space and to a lesser degree along the left lateral aspect. There is severe narrowing of the right intervertebral foramina and mild narrowing of the left intervertebral foramina. There some circumferential narrowing of the central canal. There are degenerative endplate changes about the disc space.  L2-L3: There is a vacuum disc phenomenon at this level. There is marked narrowing of the disc space. There is a slight retrolisthesis of L2 on L3. There is a broad-based osseous bar impressing on the thecal sac. There is a marginal osteophyte along the right lateral aspect of the disc space. There is severe narrowing of the right intervertebral foramina. The left intervertebral foramen appears at least mildly narrowed. There is  severe narrowing of the central canal. There is marked narrowing of the lateral recess areas. There are Modic type III endplate changes.  L3-L4: Marked narrowing of the disc space. There is a broad-based impression on the thecal sac that could reflect more of an osseous bar. There are marginal osteophytes along the lateral aspect of the disc space. There is subchondral cystic change along the inferior endplate of L3. There is severe narrowing of both intervertebral foramina. There is facet arthropathy greater on the left encroaching on the left lateral recess. There  is lateral recess stenosis bilaterally worse on the left. There is severe central canal stenosis.  L4-L5: There is a broad-based osseous bar impressing on the thecal sac. There is a marginal osteophyte along the left lateral aspect of the disc space. There is severe narrowing of the left intervertebral foramina and mild to moderate narrowing of the right intervertebral foramina. There is advanced facet arthropathy worse on the left. Degenerative changes encroach on the exiting left L4 nerve root and L5 nerve root. There is lateral recess stenosis worse on the left. There is moderate narrowing of the central canal.  L5-S1: There is a slight anterolisthesis of L5 on S1. There some bulging of the annulus. There is a marginal osteophyte along the left lateral aspect of the disc space. There is severe narrowing of the left intervertebral foramina. The degenerative changes encroach on the exiting left L5 nerve root. There is moderate to severe narrowing of the right intervertebral foramina. There is severe central canal stenosis.  There are degenerative changes involving the sacroiliac joints with fusion of the right sacroiliac joint noted more superiorly with anterior osseous bridging along the joint.  There is calcification associated with cystic area in the right lobe the liver that may relate to prior inflammatory or infectious process. Vascular calcifications are seen along the wall of the abdominal aorta      Impression: 1.     Levoscoliosis with multilevel degenerative change.  Electronically Signed By-Jeramy Wasserman MD On:9/29/2022 4:26 PM This report was finalized on [id number] by  Jeramy Wasserman MD.        ASSESSMENT  77 y.o. male s/p two-level LLIF and subsequent posterior decompression and instrumentation with Dr. Do.  Patient doing well postoperatively with expected levels of surgical pain. Pain is well controlled.  He is urinating freely.  Has not had a bowel movement.  Able to ambulate on his own.  PT  recommending IP rehab.  Surgical drain had sufficiently low output recorded in epic and minimal serosanguineous fluid in reservoir.  I removed his surgical drain and closed the drain site with the silk truong stitch placed in the OR.  At this point I think patient is likely appropriate for discharge to inpatient rehab once cleared by case management.      PLAN  - Continue to encourage bowel movement  - Continue routine ambulation throughout the day  - Patient appropriate for discharge to inpatient rehab once cleared by PT/OT and case management  - Call with any questions or concerns    I discussed my assessment and recommendations with Dr. Do and the nursing staff    Leon Hopper PA-C  09/26/2022  10:55 EDT      Part of this note may be an electronic transcription/translation of spoken language to printed text using the Dragon Dictation System.

## 2022-10-08 ENCOUNTER — HOSPITAL ENCOUNTER (INPATIENT)
Facility: HOSPITAL | Age: 77
LOS: 6 days | Discharge: HOME | End: 2022-10-14
Attending: PHYSICAL MEDICINE & REHABILITATION | Admitting: PHYSICAL MEDICINE & REHABILITATION

## 2022-10-08 VITALS
OXYGEN SATURATION: 99 % | SYSTOLIC BLOOD PRESSURE: 105 MMHG | TEMPERATURE: 97.6 F | HEART RATE: 89 BPM | BODY MASS INDEX: 30.53 KG/M2 | RESPIRATION RATE: 20 BRPM | DIASTOLIC BLOOD PRESSURE: 75 MMHG | WEIGHT: 190 LBS | HEIGHT: 66 IN

## 2022-10-08 DIAGNOSIS — M48.061 DEGENERATIVE LUMBAR SPINAL STENOSIS: ICD-10-CM

## 2022-10-08 DIAGNOSIS — M54.16 LUMBAR RADICULOPATHY, CHRONIC: ICD-10-CM

## 2022-10-08 DIAGNOSIS — R26.89 IMPAIRED GAIT AND MOBILITY: Primary | ICD-10-CM

## 2022-10-08 PROCEDURE — 94799 UNLISTED PULMONARY SVC/PX: CPT

## 2022-10-08 PROCEDURE — 99024 POSTOP FOLLOW-UP VISIT: CPT | Performed by: NEUROLOGICAL SURGERY

## 2022-10-08 PROCEDURE — 25010000002 ENOXAPARIN PER 10 MG

## 2022-10-08 RX ORDER — ATORVASTATIN CALCIUM 20 MG/1
10 TABLET, FILM COATED ORAL DAILY
Status: DISCONTINUED | OUTPATIENT
Start: 2022-10-09 | End: 2022-10-10

## 2022-10-08 RX ORDER — MONTELUKAST SODIUM 10 MG/1
10 TABLET ORAL DAILY
Status: DISCONTINUED | OUTPATIENT
Start: 2022-10-09 | End: 2022-10-14 | Stop reason: HOSPADM

## 2022-10-08 RX ORDER — HYDROCODONE BITARTRATE AND ACETAMINOPHEN 7.5; 325 MG/1; MG/1
1 TABLET ORAL 2 TIMES DAILY PRN
Status: DISCONTINUED | OUTPATIENT
Start: 2022-10-08 | End: 2022-10-12

## 2022-10-08 RX ORDER — CLONAZEPAM 0.5 MG/1
0.5 TABLET ORAL 2 TIMES DAILY PRN
Status: DISCONTINUED | OUTPATIENT
Start: 2022-10-08 | End: 2022-10-14 | Stop reason: HOSPADM

## 2022-10-08 RX ORDER — BACLOFEN 10 MG/1
10 TABLET ORAL 3 TIMES DAILY PRN
Start: 2022-10-08 | End: 2023-01-31

## 2022-10-08 RX ORDER — BACLOFEN 10 MG/1
10 TABLET ORAL 3 TIMES DAILY PRN
Status: DISCONTINUED | OUTPATIENT
Start: 2022-10-08 | End: 2022-10-14 | Stop reason: HOSPADM

## 2022-10-08 RX ORDER — HYDROCODONE BITARTRATE AND ACETAMINOPHEN 7.5; 325 MG/1; MG/1
2 TABLET ORAL EVERY 4 HOURS PRN
Status: DISCONTINUED | OUTPATIENT
Start: 2022-10-08 | End: 2022-10-12

## 2022-10-08 RX ORDER — ENOXAPARIN SODIUM 100 MG/ML
40 INJECTION SUBCUTANEOUS EVERY 24 HOURS
Status: DISCONTINUED | OUTPATIENT
Start: 2022-10-09 | End: 2022-10-14

## 2022-10-08 RX ORDER — GABAPENTIN 300 MG/1
600 CAPSULE ORAL NIGHTLY
Status: DISCONTINUED | OUTPATIENT
Start: 2022-10-08 | End: 2022-10-14 | Stop reason: HOSPADM

## 2022-10-08 RX ORDER — GABAPENTIN 300 MG/1
300 CAPSULE ORAL DAILY
Status: DISCONTINUED | OUTPATIENT
Start: 2022-10-09 | End: 2022-10-14 | Stop reason: HOSPADM

## 2022-10-08 RX ORDER — ENOXAPARIN SODIUM 100 MG/ML
40 INJECTION SUBCUTANEOUS DAILY
Status: ON HOLD
Start: 2022-10-09 | End: 2022-10-11

## 2022-10-08 RX ORDER — HYDROCODONE BITARTRATE AND ACETAMINOPHEN 5; 325 MG/1; MG/1
2 TABLET ORAL EVERY 4 HOURS PRN
Refills: 0 | Status: ON HOLD
Start: 2022-10-08 | End: 2022-10-11

## 2022-10-08 RX ORDER — ACETAMINOPHEN 500 MG
500 TABLET ORAL EVERY 6 HOURS PRN
Status: DISCONTINUED | OUTPATIENT
Start: 2022-10-08 | End: 2022-10-14 | Stop reason: HOSPADM

## 2022-10-08 RX ORDER — ATORVASTATIN CALCIUM 10 MG/1
10 TABLET, FILM COATED ORAL DAILY
Qty: 90 TABLET | Status: ON HOLD
Start: 2022-10-09 | End: 2022-10-11

## 2022-10-08 RX ADMIN — BUDESONIDE AND FORMOTEROL FUMARATE DIHYDRATE 2 PUFF: 160; 4.5 AEROSOL RESPIRATORY (INHALATION) at 09:32

## 2022-10-08 RX ADMIN — HYDROCODONE BITARTRATE AND ACETAMINOPHEN 2 TABLET: 5; 325 TABLET ORAL at 11:17

## 2022-10-08 RX ADMIN — ENOXAPARIN SODIUM 40 MG: 100 INJECTION SUBCUTANEOUS at 11:03

## 2022-10-08 RX ADMIN — ATORVASTATIN CALCIUM 10 MG: 10 TABLET, FILM COATED ORAL at 11:02

## 2022-10-08 RX ADMIN — POLYETHYLENE GLYCOL 3350 17 G: 17 POWDER, FOR SOLUTION ORAL at 11:17

## 2022-10-08 RX ADMIN — HYDROCHLOROTHIAZIDE: 25 TABLET ORAL at 11:03

## 2022-10-08 RX ADMIN — HYDROCODONE BITARTRATE AND ACETAMINOPHEN 2 TABLET: 5; 325 TABLET ORAL at 05:18

## 2022-10-08 RX ADMIN — Medication 10 ML: at 11:04

## 2022-10-08 RX ADMIN — HYDROCODONE BITARTRATE AND ACETAMINOPHEN 2 TABLET: 7.5; 325 TABLET ORAL at 20:15

## 2022-10-08 RX ADMIN — GABAPENTIN 600 MG: 300 CAPSULE ORAL at 20:06

## 2022-10-08 RX ADMIN — MONTELUKAST 10 MG: 10 TABLET, FILM COATED ORAL at 11:03

## 2022-10-08 RX ADMIN — GABAPENTIN 300 MG: 300 CAPSULE ORAL at 11:02

## 2022-10-08 NOTE — PLAN OF CARE
Goal Outcome Evaluation:      Patient admitted to rehab. Oriented to room. Alert and oriented x 4. Call light placed within reach. Will continue to monitor.

## 2022-10-08 NOTE — DISCHARGE SUMMARY
Date of admission: 10/4/2022    Date of discharge: 10/8/2022    Admission diagnosis: Spinal stenosis L2-L3, L3-L4 with spondylolisthesis and neurogenic claudication    Discharge diagnosis: Same as above    Procedures performed front and back L2-L3 L3-L4 lateral interbody fusion followed by posterior fixation and decompression from L2-L4 done on 2 separate days    Discharge physician and to follow-up: Patient be discharged to acute rehab today    Discharge medications: Summarized in the medicine reconciliation sheet    Hospital course: The patient is a 77-year-old gentleman whose had a long history of back and leg pain who finally got to the point where he could not stand up straight and walk and he underwent a staged front and back operation consisting of a lateral interbody fusion at L2-L3 and L3-L4 on 10/4/2022 followed by pedicle screw fixation using the robot at L to to L4 and decompression by Dr. Do.  I saw him on postoperative day 3 after the second surgery.  He was doing well.  Incisions look fine.  The drains were out.  He was voiding and taking p.o. well.  He will be discharged to [facility] acute rehab today.

## 2022-10-08 NOTE — DISCHARGE SUMMARY
[facility] Medicine Services  DISCHARGE SUMMARY    Patient Name: Omar Choudhary  : 1945  MRN: [id number]    Discharge condition: Stable  Date of Admission: 10/4/2022  Discharge Diagnosis: Status post back surgery with neurosurgeon  Date of Discharge:  10/08/22    Primary Care Physician: Dennis Kwong MD      Presenting Problem:   Spinal stenosis of lumbar region with neurogenic claudication [M48.062]  Degenerative lumbar spinal stenosis [M48.061]  Lumbar radiculopathy, chronic [M54.16]    Active and Resolved Hospital Problems:  Active Hospital Problems    Diagnosis POA   • Lumbar radiculopathy, chronic [M54.16] Yes   • Spinal stenosis of lumbar region with neurogenic claudication [M48.062] Yes   • Degenerative lumbar spinal stenosis [M48.061] Yes   • Hyperlipidemia [E78.5] Yes   • Depression [F32.A] Yes   • Hypertension [I10] Yes      Resolved Hospital Problems   No resolved problems to display.         Hospital Course     Hospital Course:  Omar Choudhary is a 77 y.o. male s/p two-level LLIF and subsequent posterior decompression and instrumentation with Dr. Do.  Patient doing well postoperatively with expected levels of surgical pain. Pain is well controlled.  He is urinating freely.    He told me he had a bowel movement on  during my evaluation. Able to ambulate on his own.  PT recommending IP rehab.  Surgical drain had sufficiently low output recorded in epic and minimal serosanguineous fluid in reservoir.  We removed his surgical drain and closed the drain site with the silk truong stitch placed in the OR.  At this point I think patient is appropriate for discharge to inpatient rehab.          Reasons For Change In Medications and Indications for New Medications:      Day of Discharge     Vital Signs:  Temp:  [97.7 °F (36.5 °C)-98.1 °F (36.7 °C)] 98 °F (36.7 °C)  Heart Rate:  [71-81] 71  Resp:  [16-18] 17  BP: (106-143)/(63-84) 143/84    Physical  Exam:  Physical Exam       Pertinent  and/or Most Recent Results     LAB RESULTS:      Lab 10/07/22  0416 10/05/22  2007 10/05/22  0236   WBC 7.30  --  7.40   HEMOGLOBIN 8.8*  --  12.1*   HEMOGLOBIN, POC  --  9.5*  --    HEMATOCRIT 25.8*  --  34.6*   HEMATOCRIT POC  --  28*  --    PLATELETS 143  --  163   NEUTROS ABS 5.30  --  5.90   LYMPHS ABS 1.10  --  0.70   MONOS ABS 0.70  --  0.80   EOS ABS 0.20  --  0.00   MCV 97.6*  --  99.5*         Lab 10/07/22  0416 10/05/22  0236   SODIUM 137 138   POTASSIUM 3.6 3.8   CHLORIDE 98 101   CO2 28.0 25.0   ANION GAP 11.0 12.0   BUN 15 15   CREATININE 0.89 0.98   EGFR 89.4 80.4   GLUCOSE 117* 138*   CALCIUM 8.5* 8.5*   MAGNESIUM 1.7  --    PHOSPHORUS 3.1  --          Lab 10/07/22  0416   TOTAL PROTEIN 5.3*   ALBUMIN 3.20*   GLOBULIN 2.1   ALT (SGPT) 16   AST (SGOT) 43*   BILIRUBIN 0.8   ALK PHOS 52                     Brief Urine Lab Results     None        Microbiology Results (last 10 days)     Procedure Component Value - Date/Time    COVID-19,CEPHEID/EVANGELINA,COR/FOREIGN/PAD/HOANG IN-HOUSE(OR EMERGENT/ADD-ON),NP SWAB IN TRANSPORT MEDIA 3-4 HR TAT, RT-PCR - Swab, Nasopharynx [[id number]]  (Normal) Collected: 10/07/22 1733    Lab Status: Final result Specimen: Swab from Nasopharynx Updated: 10/07/22 1829     COVID19 Not Detected    Narrative:      Fact sheet for providers: https://www.fda.gov/media/857023/download     Fact sheet for patients: https://www.fda.gov/media/942852/download  Fact sheet for providers: https://www.fda.gov/media/467368/download     Fact sheet for patients: https://www.fda.gov/media/036953/download          CT Lumbar Spine Without Contrast    Result Date: 10/4/2022  Impression: 1. Interval placement of intervertebral spacers at the L2-L3 and L3-L4 levels in expected position. 2. Soft tissue gas along the left aspect of the psoas muscle and extraperitoneal fat likely related to recent surgery. 3. Imaging was performed per [facility] Protocol for subsequent surgical  guidance. If full detailed level by level analysis is desired, please see recently performed CT lumbar spine from 9/29/2022.  Electronically Signed By-Meño Yoder MD On:10/4/2022 2:39 PM This report was finalized on [id number] by  Meño Yoder MD.    CT Lumbar Spine Without Contrast    Result Date: 9/29/2022  Impression: 1.     Levoscoliosis with multilevel degenerative change.  Electronically Signed By-Jeramy Wasserman MD On:9/29/2022 4:26 PM This report was finalized on [id number] by  Jeramy Wasserman MD.    XR Chest PA & Lateral    Result Date: 9/27/2022  Impression:  1. Chronic left basilar scarring. No acute chest finding.  Electronically Signed By-Pamella Moraes MD On:9/27/2022 12:45 PM This report was finalized on [id number] by  Pamella Moraes MD.                  Labs Pending at Discharge:      Procedures Performed  Procedure(s):  DAY 2 LUMBAR LAMINECTOMY TRANSFORAMINAL LUMBAR INTERBODY FUSION L2,L3,L4 WITH NEURO ROBOT         Consults:   Consults     Date and Time Order Name Status Description    10/4/2022 12:26 PM Inpatient Hospitalist Consult              Discharge Details        Discharge Medications      New Medications      Instructions Start Date   atorvastatin 10 MG tablet  Commonly known as: LIPITOR  Replaces: pravastatin 10 MG tablet   10 mg, Oral, Daily   Start Date: October 9, 2022     Enoxaparin Sodium 40 MG/0.4ML solution prefilled syringe syringe  Commonly known as: LOVENOX   40 mg, Subcutaneous, Daily   Start Date: October 9, 2022     HYDROcodone-acetaminophen 5-325 MG per tablet  Commonly known as: NORCO  Replaces: HYDROcodone-acetaminophen 7.5-325 MG per tablet   2 tablets, Oral, Every 4 Hours PRN         Changes to Medications      Instructions Start Date   baclofen 10 MG tablet  Commonly known as: LIORESAL  What changed:   · when to take this  · reasons to take this   10 mg, Oral, 3 Times Daily PRN         Continue These Medications      Instructions Start Date    acetaminophen 500 MG tablet  Commonly known as: TYLENOL   500 mg, Oral, Every 6 Hours PRN      albuterol sulfate  (90 Base) MCG/ACT inhaler  Commonly known as: PROVENTIL HFA;VENTOLIN HFA;PROAIR HFA   2 puffs, Inhalation, Every 4 Hours PRN      Breztri Aerosphere 160-9-4.8 MCG/ACT aerosol inhaler  Generic drug: Budeson-Glycopyrrol-Formoterol   2 puffs, Inhalation, 2 Times Daily      calcium-vitamin D 500-200 MG-UNIT per tablet   2 tablets, Oral, Daily      carboxymethylcellulose 0.5 % solution  Commonly known as: REFRESH PLUS   3 Times Daily PRN, Bring to hospital      clonazePAM 0.5 MG tablet  Commonly known as: KlonoPIN   TAKE ONE TABLET BY MOUTH TWICE A DAY AS NEEDED FOR ANXIETY      EMERGEN-C BLUE PO   1 tablet, Oral, Daily, LD 9-27      fish oil 1000 MG capsule capsule   1,000 mg, Oral, Daily With Breakfast, LD 9-27      gabapentin 300 MG capsule  Commonly known as: NEURONTIN   take 1 capsule by mouth every morning, 1 capsule in afternoon and 2 capsules at night      lidocaine 5 %  Commonly known as: LIDODERM   1 patch, Transdermal, Every 24 Hours, Remove & Discard patch within 12 hours or as directed by MD      losartan-hydrochlorothiazide 100-25 MG per tablet  Commonly known as: HYZAAR   1 tablet, Oral, Daily      montelukast 10 MG tablet  Commonly known as: SINGULAIR   10 mg, Oral, Daily      Red Yeast Rice 600 MG capsule   1 capsule, Oral, Daily, LD 9-27      sildenafil 20 MG tablet  Commonly known as: REVATIO   take 1 to 2 tablets by mouth as needed for erectile dysfunction.      theophylline 300 MG 12 hr tablet  Commonly known as: THEODUR   300 mg, Oral, Daily      triamcinolone 0.1 % cream  Commonly known as: KENALOG   Topical, 2 Times Daily      Turmeric 400 MG capsule   1 capsule, Oral, Daily      vitamin b complex capsule capsule   1 capsule, Oral, Daily, LD 9-27         Stop These Medications    HYDROcodone-acetaminophen 7.5-325 MG per tablet  Commonly known as: NORCO  Replaced by:  HYDROcodone-acetaminophen 5-325 MG per tablet     pravastatin 10 MG tablet  Commonly known as: PRAVACHOL  Replaced by: atorvastatin 10 MG tablet            Allergies   Allergen Reactions   • Statins Myalgia           Discharge Disposition:   Rehab Facility or Unit (DC - External)    Diet:  Hospital:  Diet Order   Procedures   • Diet Regular         Discharge Activity:         CODE STATUS:  There are no questions and answers to display.         Future Appointments   Date Time Provider Department Center   11/9/2022  1:10 PM Prakash Kwong MD [facility]   12/6/2022 11:00 AM Dennis Kwong MD [facility]   12/13/2022  1:40 PM Jerrod Lagunas MD [facility] None           Time spent on Discharge including face to face service:  40 minutes    This patient has been examined wearing appropriate Personal Protective Equipment and discussed with Nursing staff. 10/08/22      Signature: Blane Liu MD  '

## 2022-10-08 NOTE — PLAN OF CARE
Goal Outcome Evaluation:  Plan of Care Reviewed With: patient      Pt is being discharged to [facility].

## 2022-10-08 NOTE — PROGRESS NOTES
Messaged via epic chat by nurse Merced Matthews LPN to discharge this patient.  I notified her that this is Dr. Do's patient and we were just consulting.  Completed discharge at nurse request.   Neurosurgery had been also completed discharge as well.  Please excuse duplicate paperwork.

## 2022-10-08 NOTE — PLAN OF CARE
Problem: Adult Inpatient Plan of Care  Goal: Plan of Care Review  Outcome: Met  Flowsheets (Taken 10/8/2022 1307)  Plan of Care Reviewed With: patient  Goal: Patient-Specific Goal (Individualized)  Outcome: Met  Goal: Absence of Hospital-Acquired Illness or Injury  Outcome: Met  Intervention: Identify and Manage Fall Risk  Recent Flowsheet Documentation  Taken 10/8/2022 1252 by Merced Matthews LPN  Safety Promotion/Fall Prevention: safety round/check completed  Taken 10/8/2022 1000 by Merced Mtathews LPN  Safety Promotion/Fall Prevention: safety round/check completed  Taken 10/8/2022 0845 by Merced Matthews LPN  Safety Promotion/Fall Prevention: safety round/check completed  Intervention: Prevent and Manage VTE (Venous Thromboembolism) Risk  Recent Flowsheet Documentation  Taken 10/8/2022 0800 by Merced Matthews LPN  Activity Management: activity encouraged  Goal: Optimal Comfort and Wellbeing  Outcome: Met  Goal: Readiness for Transition of Care  Outcome: Met     Problem: Fall Injury Risk  Goal: Absence of Fall and Fall-Related Injury  Outcome: Met  Intervention: Promote Injury-Free Environment  Recent Flowsheet Documentation  Taken 10/8/2022 1252 by Merced Matthews LPN  Safety Promotion/Fall Prevention: safety round/check completed  Taken 10/8/2022 1000 by Merced Matthews LPN  Safety Promotion/Fall Prevention: safety round/check completed  Taken 10/8/2022 0845 by Merced Matthews LPN  Safety Promotion/Fall Prevention: safety round/check completed     Problem: Behavioral Health Comorbidity  Goal: Maintenance of Behavioral Health Symptom Control  Outcome: Met     Problem: Hypertension Comorbidity  Goal: Blood Pressure in Desired Range  Outcome: Met     Problem: Obstructive Sleep Apnea Risk or Actual Comorbidity Management  Goal: Unobstructed Breathing During Sleep  Outcome: Met     Problem: Bleeding (Spinal Surgery)  Goal: Absence of Bleeding  Outcome: Met     Problem: Bowel Motility Impaired (Spinal  Surgery)  Goal: Effective Bowel Elimination  Outcome: Met     Problem: Fluid and Electrolyte Imbalance (Spinal Surgery)  Goal: Fluid and Electrolyte Balance  Outcome: Met     Problem: Functional Ability Impaired (Spinal Surgery)  Goal: Optimal Functional Ability  Outcome: Met  Intervention: Optimize Functional Status  Recent Flowsheet Documentation  Taken 10/8/2022 0800 by Merced Matthews LPN  Activity Management: activity encouraged     Problem: Infection (Spinal Surgery)  Goal: Absence of Infection Signs and Symptoms  Outcome: Met     Problem: Neurologic Impairment (Spinal Surgery)  Goal: Optimal Neurologic Function  Outcome: Met     Problem: Ongoing Anesthesia Effects (Spinal Surgery)  Goal: Anesthesia/Sedation Recovery  Outcome: Met  Intervention: Optimize Anesthesia Recovery  Recent Flowsheet Documentation  Taken 10/8/2022 1252 by Merced Matthews LPN  Safety Promotion/Fall Prevention: safety round/check completed  Taken 10/8/2022 1000 by Merced Matthews LPN  Patient Tolerance (IS): good  Safety Promotion/Fall Prevention: safety round/check completed  Level Incentive Spirometer (mL): 1500  Number of Repetitions (IS): 10  Taken 10/8/2022 0845 by Merced Matthews LPN  Safety Promotion/Fall Prevention: safety round/check completed  Taken 10/8/2022 0800 by Merced Matthews LPN  Patient Tolerance (IS): good  Administration (IS): instruction provided, follow-up  Level Incentive Spirometer (mL): 1400  Number of Repetitions (IS): 10     Problem: Pain (Spinal Surgery)  Goal: Acceptable Pain Control  Outcome: Met     Problem: Postoperative Nausea and Vomiting (Spinal Surgery)  Goal: Nausea and Vomiting Relief  Outcome: Met     Problem: Postoperative Urinary Retention (Spinal Surgery)  Goal: Effective Urinary Elimination  Outcome: Met     Problem: Respiratory Compromise (Spinal Surgery)  Goal: Effective Oxygenation and Ventilation  Outcome: Met     Problem: Skin Injury Risk Increased  Goal: Skin Health and  Integrity  Outcome: Met   Goal Outcome Evaluation:  Plan of Care Reviewed With: patient

## 2022-10-08 NOTE — CASE MANAGEMENT/SOCIAL WORK
Continued Stay Note  SHANIQUA Lee     Patient Name: Omar Choudhary  MRN: [id number]  Today's Date: 10/8/2022    Admit Date: 10/4/2022    Plan: D/C Plan : [facility] has accepted and will have a bed on 10/08/22 , pt will need a COVID test prior to D/C   Discharge Plan     Row Name 10/08/22 1515       Plan    Plan Comments 1515 CM informed by pt nurse who says that pt friend will be able to transport pt to Rehab and will arrive in approx 30minutes, [facility]                            Expected Discharge Date and Time     Expected Discharge Date Expected Discharge Time    Oct 8, 2022         Susie Hoskins RN    Phone [phone]  Fax [phone]  Phone communication or documentation only - no physical contact with patient or family.

## 2022-10-08 NOTE — PROGRESS NOTES
POD 3,4  S/p Front/back L3/4, L4/5 fusion  Covering for Dr. Do  Vitals:    10/08/22 0500 10/08/22 0932 10/08/22 0937 10/08/22 1151   BP: 143/84   105/75   BP Location:    Right arm   Patient Position:    Sitting   Pulse: 72 74 71 89   Resp: 18 18 17 20   Temp: 98 °F (36.7 °C)   97.6 °F (36.4 °C)   TempSrc:    Oral   SpO2: 93% 94%  99%   Weight:       Height:       Doing well  Pain well controlled  Ambulating  Incisions are fine  There is swelling but that is to be expected  Taking p.o.  Voiding  [place]  Will complete [place] today  Lab Results   Component Value Date    GLUCOSE 117 (H) 10/07/2022    BUN 15 10/07/2022    CREATININE 0.89 10/07/2022    EGFRIFNONA 69 11/24/2021    BCR 16.9 10/07/2022    K 3.6 10/07/2022    CO2 28.0 10/07/2022    CALCIUM 8.5 (L) 10/07/2022    ALBUMIN 3.20 (L) 10/07/2022    LABIL2 1.6 05/14/2019    AST 43 (H) 10/07/2022    ALT 16 10/07/2022     WBC   Date Value Ref Range Status   10/07/2022 7.30 3.40 - 10.80 10*3/mm3 Final     RBC   Date Value Ref Range Status   10/07/2022 2.65 (L) 4.14 - 5.80 10*6/mm3 Final     Hemoglobin   Date Value Ref Range Status   10/07/2022 8.8 (L) 13.0 - 17.7 g/dL Final     Hematocrit   Date Value Ref Range Status   10/07/2022 25.8 (L) 37.5 - 51.0 % Final     MCV   Date Value Ref Range Status   10/07/2022 97.6 (H) 79.0 - 97.0 fL Final     MCH   Date Value Ref Range Status   10/07/2022 33.2 (H) 26.6 - 33.0 pg Final     MCHC   Date Value Ref Range Status   10/07/2022 34.0 31.5 - 35.7 g/dL Final     RDW   Date Value Ref Range Status   10/07/2022 13.1 12.3 - 15.4 % Final     RDW-SD   Date Value Ref Range Status   10/07/2022 42.4 37.0 - 54.0 fl Final     MPV   Date Value Ref Range Status   10/07/2022 7.8 6.0 - 12.0 fL Final     Platelets   Date Value Ref Range Status   10/07/2022 143 140 - 450 10*3/mm3 Final     Neutrophil %   Date Value Ref Range Status   10/07/2022 72.7 42.7 - 76.0 % Final     Lymphocyte %   Date Value Ref  Range Status   10/07/2022 14.6 (L) 19.6 - 45.3 % Final     Monocyte %   Date Value Ref Range Status   10/07/2022 10.2 5.0 - 12.0 % Final     Eosinophil %   Date Value Ref Range Status   10/07/2022 2.2 0.3 - 6.2 % Final     Basophil %   Date Value Ref Range Status   10/07/2022 0.3 0.0 - 1.5 % Final     Neutrophils, Absolute   Date Value Ref Range Status   10/07/2022 5.30 1.70 - 7.00 10*3/mm3 Final     Lymphocytes, Absolute   Date Value Ref Range Status   10/07/2022 1.10 0.70 - 3.10 10*3/mm3 Final     Monocytes, Absolute   Date Value Ref Range Status   10/07/2022 0.70 0.10 - 0.90 10*3/mm3 Final     Eosinophils, Absolute   Date Value Ref Range Status   10/07/2022 0.20 0.00 - 0.40 10*3/mm3 Final     Basophils, Absolute   Date Value Ref Range Status   10/07/2022 0.00 0.00 - 0.20 10*3/mm3 Final     nRBC   Date Value Ref Range Status   10/07/2022 0.1 0.0 - 0.2 /100 WBC Final

## 2022-10-08 NOTE — PROGRESS NOTES
[facility]  DC orders clarified per Dr. PENG Liu- 1. Lipitor, 2. Baclofen and 3. Hydrocodone. DC summary should now reflect the correct dosages    Delaney Tomas RN  Acute Rehab Admission Nurse

## 2022-10-08 NOTE — CASE MANAGEMENT/SOCIAL WORK
Continued Stay Note  SHANIQUA Lee     Patient Name: Omar Choudhary  MRN: [id number]  Today's Date: 10/8/2022    Admit Date: 10/4/2022    Plan: D/C Plan : [facility] has accepted and will have a bed on 10/08/22 , pt will need a COVID test prior to D/C   Discharge Plan     Row Name 10/08/22 1515       Plan    Plan Comments 0918 [facility]  [facility] and request CD with images be sent at [place]. This information was sent to pt nurse and Dr. Liu, who confirmed that it will be sent with pt                    Expected Discharge Date and Time     Expected Discharge Date Expected Discharge Time    Oct 8, 2022         Susie Hoskins RN    Phone [phone]  Fax [phone]  Phone communication or documentation only - no physical contact with patient or family.

## 2022-10-08 NOTE — CASE MANAGEMENT/SOCIAL WORK
Continued Stay Note  SHANIQUA Lee     Patient Name: Omar Choudhary  MRN: [id number]  Today's Date: 10/8/2022    Admit Date: 10/4/2022    Plan: D/C Plan : [facility] has accepted and will have a bed on 10/08/22 , pt will need a COVID test prior to D/C   Discharge Plan     Row Name 10/08/22 1515       Plan    Plan Comments 1215 Delaney with [facility] contacted  for assist with transportation and that he would be able to pay for it. Explained that  does not have Wheelchair Van at this time and he does not meet medical necessity for EMS. If he is financially able CM cannot provide cab voucher. Contacted pt nurse to see if she could assist pt with set up of Uber or Lyft, awating response.                    Expected Discharge Date and Time     Expected Discharge Date Expected Discharge Time    Oct 8, 2022           Susie Hoskins RN    Phone [phone]  Fax [phone]  Phone communication or documentation only - no physical contact with patient or family.

## 2022-10-08 NOTE — NURSING NOTE
[facility]  Spoke with RADHA Kumar. She reports no signs of ETOH withdrawal this am.   Awaiting MD to see today. If pt is medically ready for dc, a bed is available today. Will need a CD of images sent with pt, a Covid test resulted negative (already completed) and a DC Summary  CCP is aware. RADHA Kumar is aware.     Report can be called to [phone]    Delaney Tomas RN  Acute Rehab Admission Nurse  [phone]

## 2022-10-08 NOTE — SIGNIFICANT NOTE
10/08/22 1259   Rehab Time/Intention   Session Not Performed   (pt supposed to dc today to acute rehab)

## 2022-10-09 PROCEDURE — 97162 PT EVAL MOD COMPLEX 30 MIN: CPT

## 2022-10-09 PROCEDURE — 97110 THERAPEUTIC EXERCISES: CPT

## 2022-10-09 PROCEDURE — 25010000002 ENOXAPARIN PER 10 MG: Performed by: PHYSICAL MEDICINE & REHABILITATION

## 2022-10-09 RX ORDER — POLYETHYLENE GLYCOL 3350 17 G/17G
17 POWDER, FOR SOLUTION ORAL DAILY
Status: DISCONTINUED | OUTPATIENT
Start: 2022-10-09 | End: 2022-10-14 | Stop reason: HOSPADM

## 2022-10-09 RX ORDER — AMOXICILLIN 250 MG
1 CAPSULE ORAL 2 TIMES DAILY
Status: DISCONTINUED | OUTPATIENT
Start: 2022-10-09 | End: 2022-10-14 | Stop reason: HOSPADM

## 2022-10-09 RX ORDER — POLYETHYLENE GLYCOL 3350 17 G/17G
17 POWDER, FOR SOLUTION ORAL DAILY PRN
Status: DISCONTINUED | OUTPATIENT
Start: 2022-10-09 | End: 2022-10-14 | Stop reason: HOSPADM

## 2022-10-09 RX ORDER — BISACODYL 10 MG
10 SUPPOSITORY, RECTAL RECTAL DAILY
Status: DISCONTINUED | OUTPATIENT
Start: 2022-10-09 | End: 2022-10-11

## 2022-10-09 RX ADMIN — HYDROCODONE BITARTRATE AND ACETAMINOPHEN 2 TABLET: 7.5; 325 TABLET ORAL at 07:41

## 2022-10-09 RX ADMIN — HYDROCODONE BITARTRATE AND ACETAMINOPHEN 2 TABLET: 7.5; 325 TABLET ORAL at 23:06

## 2022-10-09 RX ADMIN — LOSARTAN POTASSIUM: 50 TABLET, FILM COATED ORAL at 07:42

## 2022-10-09 RX ADMIN — HYDROCODONE BITARTRATE AND ACETAMINOPHEN 2 TABLET: 7.5; 325 TABLET ORAL at 11:49

## 2022-10-09 RX ADMIN — GABAPENTIN 300 MG: 300 CAPSULE ORAL at 07:42

## 2022-10-09 RX ADMIN — POLYETHYLENE GLYCOL 3350 17 G: 17 POWDER, FOR SOLUTION ORAL at 13:12

## 2022-10-09 RX ADMIN — HYDROCODONE BITARTRATE AND ACETAMINOPHEN 2 TABLET: 7.5; 325 TABLET ORAL at 17:31

## 2022-10-09 RX ADMIN — ENOXAPARIN SODIUM 40 MG: 100 INJECTION SUBCUTANEOUS at 11:27

## 2022-10-09 RX ADMIN — GABAPENTIN 600 MG: 300 CAPSULE ORAL at 21:07

## 2022-10-09 RX ADMIN — MONTELUKAST SODIUM 10 MG: 10 TABLET, FILM COATED ORAL at 07:42

## 2022-10-09 RX ADMIN — ATORVASTATIN CALCIUM 10 MG: 20 TABLET, FILM COATED ORAL at 07:41

## 2022-10-09 NOTE — PROGRESS NOTES
SECTION GG    Mobility Performance:     Roll Left and Right: [place] does less than half the effort. [place] lifts, holds  or supports trunk or limbs but provides less than half the effort.   Sit to Lying: [place] does less than half the effort. [place] lifts, holds or  supports trunk or limbs but provides less than half the effort.   Lying to Sitting on Side of Bed: [place] does less than half the effort. [place]  lifts, holds or supports trunk or limbs but provides less than half the effort.   Sit to Stand: [place] does less than half the effort. [place] lifts, holds or  supports trunk or limbs but provides less than half the effort.   Chair/Bed to Chair Transfer: [place] does less than half the effort. [place]  lifts, holds or supports trunk or limbs but provides less than half the effort.   Car Transfer: Not attempted due to medical or safety concerns.   Walk 10 Feet:   [place] does less than half the effort. [place] lifts, holds or  supports trunk or limbs but provides less than half the effort.  Walk 50 Feet with 2 Turns:   [place] does less than half the effort. [place]  lifts, holds or supports trunk or limbs but provides less than half the effort.  Walk 150 Feet:   Not attempted due to medical or safety concerns.  Walking 10 Feet on Uneven Surfaces:   Not attempted due to medical or safety  concerns.  1 Step Over Curb or Up/Down Stair:   Not attempted due to medical or safety  concerns.  Picking up an Object:   Not attempted due to medical or safety concerns.  Uses Wheelchair/Scooter: Yes  Wheel 50 Feet with Two Turns: Not attempted due to medical or safety concerns.  Type of Wheelchair or Scooter Used: Manual  Wheel 150 Feet: Not attempted due to medical or safety concerns.  Type of Wheelchair/Scooter Used: Manual    Mobility Discharge Goals:   Sit to Stand: Patient completed the activities by him/herself with no  assistance from a helper.    Section J. Health Conditions (Pain):  Pain Interference with Therapy  Activities:   Occasionally  Pain Interference with Day-to-Day Activities:   Occasionally    Signed by: Marion Farias PT

## 2022-10-09 NOTE — PLAN OF CARE
Goal Outcome Evaluation:      New patient today. Slept fairly well. Norco given for back pain, with some relief. Meds with water. Usually continent, per report, but had some urinary urgency with incontinence once tonight. Back & Lt. Side incisions dry & intact.

## 2022-10-09 NOTE — PLAN OF CARE
Goal Outcome Evaluation:  Plan of Care Reviewed With: patient        Progress: improving  Outcome Evaluation: AAOx4. Cooperative with all care. Ambulates with walker assist of 1. Continent of B&B, last BM 10/9. Dressing changed to both sites by previous RN this am. No new skin concerns. VS stable; tendency for Hypotension; asymptomatic.

## 2022-10-09 NOTE — CONSULTS
Nutrition Services    Patient Name:  Omar Choudhary  YOB: 1945  MRN: [id number]  Admit Date:  10/8/2022      Comments: Pt admitted to rehab for overall function decline r/t severe DDD and focal lumbar scoliosis, neurosurgery following. S/p lumbar lateral interbody fusion with neuro robot. Having some pain at surgical site. He has not lost any weight recently and has excellent po intake (% x 4 meals) Will continue to follow      CLINICAL NUTRITION ASSESSMENT      Reason for Assessment Acute Rehab Admission     Admitting Diagnosis Spinal stenosis of lumbar region with neurogenic claudication      Medical/Surgical History   Past Medical History:   Diagnosis Date   • Allergic    • Arthritis    • Asthma    • Carpal tunnel syndrome     no pain   • Cataract    • Depression    • Family history of malignant neoplasm of breast 09/26/2019   • Hemidiaphragm paralysis     Left   • Hyperlipidemia 09/26/2019   • Hypertension 03/01/2012   • Leg pain, right    • Low back pain    • Neuropathy     feet   • Osteopenia    • Poor balance    • Reactive airway disease 01/15/2016   • Shortness of breath    • Sleep apnea, obstructive     CPAP- to bring dos       Past Surgical History:   Procedure Laterality Date   • KNEE ARTHROSCOPY W/ ACL RECONSTRUCTION Right 2019   • MOUTH SURGERY          Encounter Information        Nutrition History/Narrative:  Pt admitted to rehab for overall function decline r/t severe DDD and focal lumbar scoliosis, neurosurgery following. S/p lumbar lateral interbody fusion with neuro robot. Having some pain at surgical site. He has not lost any weight recently and has excellent po intake (% x 4 meals) Will continue to follow    Food Preferences:    Supplements:    Factors Affecting Intake: No factors at this time     Current Nutrition Orders & Evaluation of Intake       Oral Nutrition     Food Allergies NKFA   Current PO Diet Diet Regular   Supplement    PO Evaluation     Trending % PO  "Intake % x 4 meals    --  Anthropometrics        Current Height  Current Weight  BMI kg/m2 Height: 167.6 cm (65.98\")  Weight: 86.2 kg (190 lb) (10/09/22 0220)  Body mass index is 30.68 kg/m².       Ideal Body Weight (IBW) 140# (63.8 kg)    Usual Body Weight (UBW)    Weight Change/Trend Stable wt        Weight History Wt Readings from Last 15 Encounters:   10/09/22 0220 86.2 kg (190 lb)   10/04/22 1446 86.2 kg (190 lb)   09/26/22 1423 83 kg (183 lb)   09/28/22 0922 85.4 kg (188 lb 3.2 oz)   09/09/22 1314 83.7 kg (184 lb 8 oz)   08/12/22 1034 84.4 kg (186 lb)   08/10/22 1333 84.4 kg (186 lb)   06/07/22 1237 84.4 kg (186 lb)   05/24/22 0944 84.4 kg (186 lb)   05/19/22 1336 83.9 kg (185 lb)   02/08/22 1212 83.9 kg (185 lb)   01/11/22 1439 83.9 kg (185 lb)   12/29/21 1357 83.9 kg (185 lb)   12/03/21 1459 83.9 kg (185 lb)   11/16/21 1329 81.2 kg (179 lb)   11/15/21 1333 81.2 kg (179 lb)      --  Physical Findings          Physical Assessment  Alert, oriented, obese    Edema  No edema    Gastrointestinal Normoactive bowel sounds, last BM 10/7    Tubes/Drains none   Oral/Mouth Cavity WNL   Skin Surgical incision lumbar spine x2      Tests/Procedures/Labs        Tests/Procedures No new tests/procedures       Pertinent Labs     Results from last 7 days   Lab Units 10/07/22  0416 10/05/22  0236   SODIUM mmol/L 137 138   POTASSIUM mmol/L 3.6 3.8   CHLORIDE mmol/L 98 101   CO2 mmol/L 28.0 25.0   BUN mg/dL 15 15   CREATININE mg/dL 0.89 0.98   CALCIUM mg/dL 8.5* 8.5*   BILIRUBIN mg/dL 0.8  --    ALK PHOS U/L 52  --    ALT (SGPT) U/L 16  --    AST (SGOT) U/L 43*  --    GLUCOSE mg/dL 117* 138*     Results from last 7 days   Lab Units 10/07/22  0416   MAGNESIUM mg/dL 1.7   PHOSPHORUS mg/dL 3.1   HEMOGLOBIN g/dL 8.8*   HEMATOCRIT % 25.8*   WBC 10*3/mm3 7.30     Results from last 7 days   Lab Units 10/07/22  0416 10/05/22  0236   PLATELETS 10*3/mm3 143 163     COVID19   Date Value Ref Range Status   10/07/2022 Not Detected Not " Detected - Ref. Range Final     Lab Results   Component Value Date    HGBA1C 5.7 (H) 05/17/2022        Medications           Scheduled Medications atorvastatin, 10 mg, Oral, Daily  enoxaparin, 40 mg, Subcutaneous, Q24H  gabapentin, 300 mg, Oral, Daily  gabapentin, 600 mg, Oral, Nightly  losartan-HCTZ (HYZAAR) 100-25 combo dose, , Oral, Daily  montelukast, 10 mg, Oral, Daily       Infusions     PRN Medications •  acetaminophen  •  baclofen  •  clonazePAM  •  HYDROcodone-acetaminophen  •  HYDROcodone-acetaminophen        Nutrition Diagnosis        Nutrition Dx Problem  Problem: No Nutrition Diagnosis at this Time  Etiology: Other  Signs/Symptoms: Other (comment)    Comment: no nutrition dx     Intervention Goal        Intervention Goal(s) Maintain nutrition status     Nutrition Intervention        RD Action Follow Tx Progress and Care plan reviewed     Recommendations        Diet Prescription Regular    Supplement Prescription    EN Prescription    New Prescription Ordered?    --  Monitor/Evaluation        Monitor Per protocol   Education Will instruct as appropriate   --    RD to follow up per protocol.    Electronically signed by:  Anaid Yeager RD  10/09/22 11:55 EDT

## 2022-10-09 NOTE — H&P
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 male presents with long history of back and leg pain progressively worsen with impairment in ADLs and mobility. Multiple MIKEY, he stated that he was \"let go\" from pain management as they could not offer anything else to him. He underwent a staged front and back operation consisting of a lateral interbody fusion at L2-L3 and L3-L4 on 10/4/2022 followed by pedicle screw fixation using the robot at L to to L4 and decompression by Dr. Do.  No complications. He arrived last night to  [facility] acute rehab. His goal is to go back to his baseline and able to be more independent.     Family History   Problem Relation Age of Onset   • Heart disease Mother    • Hypertension Mother    • Breast cancer Mother    • Stroke Mother    • Aortic aneurysm Father    • Heart attack Father    • Liver cancer Father      Social History     Tobacco Use   • Smoking status: Former     Packs/day: 0.50     Years: 10.00     Pack years: 5.00     Types: Cigarettes     Start date:      Quit date:      Years since quittin.7   • Smokeless tobacco: Never   Vaping Use   • Vaping Use: Never used   Substance Use Topics   • Alcohol use: Yes     Alcohol/week: 12.0 standard drinks     Types: 10 Glasses of wine, 2 Cans of beer per week   • Drug use: Never     Types: Marijuana, Mescaline, Psilocybin     Comment: CBD gummies- stop now for surgery     Past Medical History:   Diagnosis Date   • Allergic    • Arthritis    • Asthma    • Carpal tunnel syndrome     no pain   • Cataract    • Depression    • Family history of malignant neoplasm of breast 2019   • Hemidiaphragm paralysis     Left   • Hyperlipidemia 2019   • Hypertension 2012   • Leg pain, right    • Low back pain    • Neuropathy     feet   • Osteopenia    • Poor balance    • Reactive airway disease 01/15/2016   • Shortness of breath  "   • Sleep apnea, obstructive     CPAP- to bring dos     Past Surgical History:   Procedure Laterality Date   • KNEE ARTHROSCOPY W/ ACL RECONSTRUCTION Right 2019   • MOUTH SURGERY       Medications Prior to Admission   Medication Sig Dispense Refill Last Dose   • atorvastatin (LIPITOR) 10 MG tablet Take 1 tablet by mouth Daily. 90 tablet  10/9/2022   • Enoxaparin Sodium (LOVENOX) 40 MG/0.4ML solution prefilled syringe syringe Inject 0.4 mL under the skin into the appropriate area as directed Daily. Indications: Prevention of Unwanted Clot in Veins   10/9/2022   • gabapentin (NEURONTIN) 300 MG capsule take 1 capsule by mouth every morning, 1 capsule in afternoon and 2 capsules at night 120 capsule 5 10/9/2022   • HYDROcodone-acetaminophen (NORCO) 5-325 MG per tablet Take 2 tablets by mouth Every 4 (Four) Hours As Needed for Moderate Pain or Severe Pain for up to 3 days.  0 10/9/2022   • losartan-hydrochlorothiazide (HYZAAR) 100-25 MG per tablet TAKE 1 TABLET BY MOUTH DAILY 90 tablet 0 10/9/2022   • montelukast (SINGULAIR) 10 MG tablet TAKE 1 TABLET BY MOUTH DAILY 90 tablet 0 10/9/2022   • acetaminophen (TYLENOL) 500 MG tablet Take 500 mg by mouth Every 6 (Six) Hours As Needed for Mild Pain .   Unknown   • albuterol sulfate  (90 Base) MCG/ACT inhaler Inhale 2 puffs Every 4 (Four) Hours As Needed for Wheezing or Shortness of Air. 18 g 3 Unknown   • B Complex Vitamins (VITAMIN B COMPLEX) capsule capsule Take 1 capsule by mouth Daily. LD 9-27   Unknown   • baclofen (LIORESAL) 10 MG tablet Take 1 tablet by mouth 3 (Three) Times a Day As Needed for Muscle Spasms.   Unknown   • Budeson-Glycopyrrol-Formoterol (Breztri Aerosphere) 160-9-4.8 MCG/ACT aerosol inhaler Inhale 2 puffs 2 (Two) Times a Day. 2 each 0 Unknown   • Calcium Carb-Cholecalciferol (calcium-vitamin D) 500-200 MG-UNIT per tablet Take 2 tablets by mouth Daily. 180 tablet 3 Unknown   • carboxymethylcellulose (REFRESH PLUS) 0.5 % solution 3 (Three) Times a  Day As Needed for Dry Eyes. Bring to hospital   Unknown   • clonazePAM (KlonoPIN) 0.5 MG tablet TAKE ONE TABLET BY MOUTH TWICE A DAY AS NEEDED FOR ANXIETY 30 tablet 2 Unknown   • lidocaine (LIDODERM) 5 % Place 1 patch on the skin as directed by provider Daily. Remove & Discard patch within 12 hours or as directed by MD 30 each 1 Unknown   • Multiple Vitamins-Minerals (EMERGEN-C BLUE PO) Take 1 tablet by mouth Daily. LD 9-27   Unknown   • Omega-3 Fatty Acids (FISH OIL) 1000 MG capsule capsule Take 1,000 mg by mouth Daily With Breakfast. LD 9-27   Unknown   • Red Yeast Rice 600 MG capsule Take 1 capsule by mouth Daily. LD 9-27   Unknown   • sildenafil (REVATIO) 20 MG tablet take 1 to 2 tablets by mouth as needed for erectile dysfunction. 50 tablet 0 Unknown   • theophylline (THEODUR) 300 MG 12 hr tablet Take 300 mg by mouth Daily.   Unknown   • triamcinolone (KENALOG) 0.1 % cream Apply  topically to the appropriate area as directed 2 (Two) Times a Day. 28.4 g 0 Unknown   • Turmeric 400 MG capsule Take 1 capsule by mouth Daily.   Unknown     Statins    Functional History:  previously ind for mobility and adls.     Objective     Review of Systems  CONSTITUTIONAL:  No weight loss, fever, chills.   HEENT:  Eyes:  No visual loss, blurred vision,      SKIN:  No rash or itching.   CARDIOVASCULAR:  No chest pain.  No palpitations or edema.   RESPIRATORY:  No shortness of breath.   GASTROINTESTINAL:  No anorexia, nausea, vomiting or diarrhea. No abdominal pain or blood. LBM few days ago.    GENITOURINARY:  No burning on urination.    NEUROLOGICAL:  see HPI    MUSCULOSKELETAL:  see HPI    HEMATOLOGIC:  No anemia, bleeding or bruising.   PSYCHIATRIC:  No history of depression or anxiety.      Wt Readings from Last 3 Encounters:   10/09/22 86.2 kg (190 lb)   10/04/22 86.2 kg (190 lb)   09/28/22 85.4 kg (188 lb 3.2 oz)     Temp Readings from Last 3 Encounters:   10/09/22 97.1 °F (36.2 °C) (Oral)   10/08/22 97.6 °F (36.4 °C) (Oral)  "  09/28/22 97.3 °F (36.3 °C) (Infrared)     BP Readings from Last 3 Encounters:   10/09/22 91/50   10/08/22 105/75   09/28/22 150/90     Pulse Readings from Last 3 Encounters:   10/09/22 64   10/08/22 89   09/28/22 68       Vitals:    10/08/22 1700 10/08/22 1924 10/09/22 0220 10/09/22 0504   BP:  101/57  91/50   BP Location:  Left arm  Right arm   Patient Position:  Lying  Lying   Pulse:  97  64   Resp:  18  18   Temp:  99.6 °F (37.6 °C)  97.1 °F (36.2 °C)   TempSrc:  Oral  Oral   SpO2:  94%  97%   Weight:   86.2 kg (190 lb)    Height: 167.6 cm (66\")  167.6 cm (65.98\")        Physical Exam:   Gen: Pleasant, full sentences, NAD. Ready to have lunch.   Pulm: CTAB; no r/r/w; non-labored   Heart; RRR; no m/r/g appreciated   Abd: soft; NT/ND; +BS    Skin: no rashes on exposed skin , surgical wound c/i. No staples or sutures.   MSK: calves soft   : no Indwelling catheter   Neuro:verbal; appropriate    Motor:  Normal bulk and tone  Strength is 5/5 in BUE, 4-/5 bilat HF, 5/5 bilat KF/KE, 5/5 bilat DF/PF  There is no clonus.     Sensory:  Sensation is intact to light touch in bilateral upper and lower extremities    Reflexes:   Reflexes were 2+ throughout and symmetric in upper and lower extremities    CN : 2nd (visual acuity, fields); 3-4-6 ( pupils, eye movement); 7 (facial symmetry, strength); 8th (hearing , finger rub); 9th (palate movement); 11th (shoulder shrug); 12th (tongue protrusion).      FL C Arm During Surgery  This procedure was auto-finalized with no dictation required.      Labs  BMP:  Results from last 7 days   Lab Units 10/07/22  0416   CREATININE mg/dL 0.89   BUN mg/dL 15   SODIUM mmol/L 137   POTASSIUM mmol/L 3.6   CHLORIDE mmol/L 98   CO2 mmol/L 28.0     CBC:  Results from last 7 days   Lab Units 10/07/22  0416   WBC 10*3/mm3 7.30   HEMOGLOBIN g/dL 8.8*   HEMATOCRIT % 25.8*   MCV fL 97.6*   PLATELETS 10*3/mm3 143     PTT:    Coagulation:       Scheduled Meds:atorvastatin, 10 mg, Oral, " Daily  enoxaparin, 40 mg, Subcutaneous, Q24H  gabapentin, 300 mg, Oral, Daily  gabapentin, 600 mg, Oral, Nightly  losartan-HCTZ (HYZAAR) 100-25 combo dose, , Oral, Daily  montelukast, 10 mg, Oral, Daily      Continuous Infusions:   PRN Meds:.•  acetaminophen  •  baclofen  •  clonazePAM  •  HYDROcodone-acetaminophen  •  HYDROcodone-acetaminophen    Assessment & Plan     Assessment and Plan:  #Rehabilitation:   Patient now with moderate to severe disability due to impairments of mobility and self care: PT/ OT/SLP evaluation and treatment for mobility training, self-care skills training, pain management, equipment assessment, safety assessment and training, cognitive/swallowing evaluation and caregiver training for a safe discharge. This would be an interdisciplinary program with physical therapy 1 hour,  occupational therapy 1 hour, and speech therapy 1 hour, 5 days a week.  Rehabilitation nursing for carryover. Weekly team conferences.  Ongoing physician follow-up . Goals are to achieve a level of independence with  mobility and self-care and improved endurance.   Rehabilitation prognosis good.  Medical prognosis good Estimated length of stay is approximately 15 days , but is only an estimation.     Spinal stenosis of lumbar region with neurogenic claudication [M48.062]    Active Hospital Problems    Diagnosis    • **Spinal stenosis of lumbar region with neurogenic claudication      Full code.     # Safety and Precautions.   -Fall, Skin.      #Diet:   -red diet     #DVT ppx:  - Lovenox.     #HTN  losartan.     #XOL  atorvastatin, 10 mg, Oral, Daily    #pain  gabapentin, 300 mg, Oral, Daily  gabapentin, 600 mg, Oral, Nightly    #h/o Asthma  montelukast, 10 mg, Oral, Daily    # bladder:   - Continent, monitor.  - PVRs, IC, time voids     # Bowel: continent.   -Bisacodyl prn , senna S , miralax prn     # Pressure injury prophylaxis:  - offloading, optimize nutrition , oob with PT.     # @ risk for orthostatic  hypotension:   -Monitor VS regularly.     # Dispo:  - Home with family.      #Post Rehab Follow up:    [facility]-   PCP  Rehab.       Shailesh Valles MD  10/09/22  12:06 EDT

## 2022-10-09 NOTE — THERAPY EVALUATION
Inpatient Rehabilitation - Physical Therapy Initial Evaluation       [facility]     Patient Name: Omar Choudhary  : 1945  MRN: [id number]    Today's Date: 10/9/2022                    Admit Date: 10/8/2022      Visit Dx:     ICD-10-CM ICD-9-CM   1. Impaired gait and mobility  R26.89 781.2       Patient Active Problem List   Diagnosis   • Benign prostatic hyperplasia   • Bursitis   • Depression   • Diverticulosis   • Family history of malignant neoplasm of breast   • Hyperlipidemia   • Hypertension   • Internal derangement of right knee   • Knee effusion   • Asthma   • Obstructive sleep apnea syndrome   • Osteoarthritis   • Osteopenia   • Pain in knee   • Postnasal drip   • Prepatellar bursitis   • Sciatica of right side   • Diaphragm paralysis   • Spinal stenosis of lumbar region with neurogenic claudication   • Degenerative lumbar spinal stenosis   • Anxiety   • Pinguecula of right eye   • Lumbar radiculopathy, chronic       Past Medical History:   Diagnosis Date   • Allergic    • Arthritis    • Asthma    • Carpal tunnel syndrome     no pain   • Cataract    • Depression    • Family history of malignant neoplasm of breast 2019   • Hemidiaphragm paralysis     Left   • Hyperlipidemia 2019   • Hypertension 2012   • Leg pain, right    • Low back pain    • Neuropathy     feet   • Osteopenia    • Poor balance    • Reactive airway disease 01/15/2016   • Shortness of breath    • Sleep apnea, obstructive     CPAP- to bring dos       Past Surgical History:   Procedure Laterality Date   • KNEE ARTHROSCOPY W/ ACL RECONSTRUCTION Right 2019   • MOUTH SURGERY         PT ASSESSMENT (last 12 hours)     IRF PT Evaluation and Treatment     Row Name 10/09/22 0900          PT Time and Intention    Document Type initial evaluation  -ADE     Mode of Treatment physical therapy  -ADE     Patient/Family/Caregiver Comments/Observations Pt sitting up in w/c with back brace on in no acute distress.  -ADE     Row Name  10/09/22 0900          General Information    Patient Profile Reviewed yes  -ADE     Existing Precautions/Restrictions spinal;fall  wear previously owned back brace for comfort  -     Row Name 10/09/22 0900          Previous Level of Function/Home Environm    Household Ambulation, Premorbid Functional Level independent  -ADE     Stairs, Premorbid Functional Level independent  -ADE     Row Name 10/09/22 0900          Living Environment    Current Living Arrangements home  -ADE     People in Home significant other  -     Row Name 10/09/22 0900          Home Main Entrance    Number of Stairs, Main Entrance eight  with 2 rails  -ADE     Row Name 10/09/22 0900          Stairs Within Home, Primary    Stairs, Within Home, Primary 14 steps to bedroom  1 rail on L  -ADE     Row Name 10/09/22 0900          Pain Assessment    Pretreatment Pain Rating 5/10  -ADE     Posttreatment Pain Rating 5/10  -ADE     Pain Location - Side/Orientation Bilateral  -ADE     Pain Location upper  just above incision  -     Pain Location - back  -ADE     Row Name 10/09/22 0900          Pain Scale: Word Pre/Post-Treatment    Pain Intervention(s) Medication (See MAR)  -     Row Name 10/09/22 0900          Cognition/Psychosocial    Affect/Mental Status (Cognition) WNL  -     Orientation Status (Cognition) oriented x 4  -ADE     Follows Commands (Cognition) follows two-step commands;over 90% accuracy;verbal cues/prompting required  -     Personal Safety Interventions gait belt;muscle strengthening facilitated;nonskid shoes/slippers when out of bed;fall prevention program maintained  -     Row Name 10/09/22 0900          Range of Motion Comprehensive    Comment, General Range of Motion B LE's WFLs  -     Row Name 10/09/22 0900          Strength Comprehensive (MMT)    Comment, General Manual Muscle Testing (MMT) Assessment B LE's grpssly 4/5 except R hip flexion 3+/5, L 3/5 limited due to back pain  -     Row Name 10/09/22 0900          Sensory  Assessment (Somatosensory)    Sensory Assessment (Somatosensory) LE sensation intact  light touch  -     Row Name 10/09/22 0900          Bed Mobility    Bed Mobility rolling left;rolling right;sidelying-sit;sit-sidelying  -     Rolling Left [place] (Bed Mobility) standby assist;verbal cues  -ADE     Rolling Right [place] (Bed Mobility) standby assist;verbal cues  -ADE     Sidelying-Sit [place] (Bed Mobility) contact guard;minimum assist (75% patient effort)  -     Sit-Sidelying [place] (Bed Mobility) contact guard;minimum assist (75% patient effort)  -     Bed Mobility, Safety Issues decreased use of legs for bridging/pushing  -     Assistive Device (Bed Mobility) bed rails;head of bed elevated  -     Comment, (Bed Mobility) reminded log roll technique for back  -     Row Name 10/09/22 0900          Transfer Assessment/Treatment    Transfers bed-chair transfer;stand-sit transfer  -     Row Name 10/09/22 0900          Bed-Chair Transfer    Bed-Chair [place] (Transfers) contact guard;minimum assist (75% patient effort);nonverbal cues (demo/gesture)  -     Assistive Device (Bed-Chair Transfers) wheelchair  -     Row Name 10/09/22 0900          Sit-Stand Transfer    Sit-Stand [place] (Transfers) minimum assist (75% patient effort);contact guard;verbal cues  -     Assistive Device (Sit-Stand Transfers) walker, front-wheeled  -     Row Name 10/09/22 0900          Stand-Sit Transfer    Stand-Sit [place] (Transfers) contact guard;minimum assist (75% patient effort);verbal cues  -     Assistive Device (Stand-Sit Transfers) walker, front-wheeled  -     Row Name 10/09/22 0900          Gait/Stairs (Locomotion)    [place] Level (Gait) contact guard;minimum assist (75% patient effort);verbal cues  -     Assistive Device (Gait) walker, front-wheeled  -     Distance in Feet (Gait) 50'  -     Deviations/Abnormal Patterns (Gait) bilateral deviations;gait speed  decreased;stride length decreased;weight shifting decreased  -     Bilateral Gait Deviations forward flexed posture;heel strike decreased  leaned onto walker when fatigued  -     Row Name 10/09/22 0900          Balance    Static Sitting Balance independent  -     Static Standing Balance contact guard  -     Position/Device Used, Standing Balance walker, rolling  -     Row Name 10/09/22 0900          Positioning and Restraints    Pre-Treatment Position sitting in chair/recliner  -     Post Treatment Position wheelchair  -     In Wheelchair sitting;call light within reach;encouraged to call for assist;exit alarm on  -     Row Name 10/09/22 0900          Therapy Assessment/Plan (PT)    Patient's Goals For Discharge return home  -     Row Name 10/09/22 0900          Therapy Assessment/Plan (PT)    Rehab Potential/Prognosis (PT) good, to achieve stated therapy goals  -     Frequency of Treatment (PT) 5 times per week;90 minutes per session  -     Estimated Duration of Therapy (PT) 2 weeks  -     Problem List (PT) balance;cognition;mobility;pain;strength  -     Comment, Therapy Assessment/Plan (PT) Pt is a 76 y/o male with diagnosis of spinal stenosis of lumbar region with neurogenic claudication. On 10/4/22, pt s/p lumbar fusion L 2-L4; on 10/5/22, lumbar laminectomy, decompression and instrumentation. Pt has some weakness of LE's and deficits with bed mobility, transfers, ambulation and balance. Pt is motivated. However, in order to go to s.o. home, he will have to ascend/descend 8 steps to get into home and 14 to bedroom. Pt will benefit from inpt acute rehab to improve pt's independence with mobility, stairs and strength.  -     Row Name 10/09/22 0900          IRF PT Goals    Bed Mobility Goal Selection (PT-IRF) bed mobility, PT goal 1  -ADE     Transfer Goal Selection (PT-IRF) transfers, PT goal 1  -ADE     Gait (Walking Locomotion) Goal Selection (PT-IRF) gait, PT goal 1  -ADE     Stairs  Goal Selection (PT-IRF) stairs, PT goal 1  -ADE     Row Name 10/09/22 0900          Bed Mobility Goal 1 (PT-IRF)    Activity/Assistive Device (Bed Mobility Goal 1, PT-IRF) bed mobility activities, all  -ADE     [place] Level (Bed Mobility Goal 1, PT-IRF) modified independence  -ADE     Time Frame (Bed Mobility Goal 1, PT-IRF) 2 weeks  -ADE     Progress/Outcomes (Bed Mobility Goal 1, PT-IRF) goal ongoing  -ADE     Row Name 10/09/22 0900          Transfer Goal 1 (PT-IRF)    Activity/Assistive Device (Transfer Goal 1, PT-IRF) all transfers  -ADE     [place] Level (Transfer Goal 1, PT-IRF) modified independence  -ADE     Time Frame (Transfer Goal 1, PT-IRF) 2 weeks  -ADE     Progress/Outcomes (Transfer Goal 1, PT-IRF) goal ongoing  -ADE     Row Name 10/09/22 0900          Gait/Walking Locomotion Goal 1 (PT-IRF)    Activity/Assistive Device (Gait/Walking Locomotion Goal 1, PT-IRF) gait (walking locomotion);walker, rolling  -ADE     Gait/Walking Locomotion Distance Goal 1 (PT-IRF) 150'  -ADE     [place] Level (Gait/Walking Locomotion Goal 1, PT-IRF) supervision required  -ADE     Time Frame (Gait/Walking Locomotion Goal 1, PT-IRF) 2 weeks  -ADE     Progress/Outcomes (Gait/Walking Locomotion Goal 1, PT-IRF) goal ongoing  -ADE     Row Name 10/09/22 0900          Stairs Goal 1 (PT-IRF)    Activity/Assistive Device (Stairs Goal 1, PT-IRF) stairs, all skills;step-to-step  -ADE     Number of Stairs (Stairs Goal 1, PT-IRF) 12  -ADE     [place] Level (Stairs Goal 1, PT-IRF) contact guard required  -ADE     Time Frame (Stairs Goal 1, PT-IRF) 2 weeks  -ADE     Progress/Outcomes (Stairs Goal 1, PT-IRF) goal ongoing  -ADE           User Key  (r) = Recorded By, (t) = Taken By, (c) = Cosigned By    Initials Name Provider Type    Marion Reddy PT Physical Therapist              Wound 10/04/22 0849 Left lumbar spine Incision (Active)   Dressing Appearance dry;intact 10/08/22 2015   Closure JOHN 10/08/22 1823   Base dressing in  place, unable to visualize 10/08/22 2015   Drainage Amount none 10/08/22 2015       Wound 10/05/22 1411 Other (See comments) lower lumbar spine Incision (Active)   Dressing Appearance dry;intact 10/08/22 2015   Closure JOHN 10/08/22 1823   Base dressing in place, unable to visualize 10/08/22 2015   Drainage Amount none 10/08/22 2015     Physical Therapy Education     Title: PT OT SLP Therapies (Done)     Topic: Physical Therapy (Done)     Point: Mobility training (Done)     Learning Progress Summary           Patient Acceptance, E,TB, VU,NR by  at 10/9/2022 1205                   Point: Home exercise program (Done)     Learning Progress Summary           Patient Acceptance, E,TB, VU,NR by  at 10/9/2022 1205                   Point: Body mechanics (Done)     Learning Progress Summary           Patient Acceptance, E,TB, VU,NR by  at 10/9/2022 1205                   Point: Precautions (Done)     Learning Progress Summary           Patient Acceptance, E,TB, VU,NR by  at 10/9/2022 1205                               User Key     Initials Effective Dates Name Provider Type Discipline     06/16/21 -  Marion Farias, PT Physical Therapist PT                PT Recommendation and Plan    Planned Therapy Interventions (PT): balance training, bed mobility training, gait training, home exercise program, neuromuscular re-education, patient/family education, postural re-education, ROM (range of motion), stair training, strengthening, stretching, transfer training  Frequency of Treatment (PT): 5 times per week, 90 minutes per session                     Time Calculation:      PT Charges     Row Name 10/09/22 1205             Time Calculation    Start Time 0900  -      Stop Time 0930  -      Time Calculation (min) 30 min  -      PT Received On 10/09/22  -      PT - Next Appointment 10/10/22  -      PT Goal Re-Cert Due Date 10/16/22  -         Time Calculation- PT    Total Timed Code Minutes- PT 30 minute(s)   -ADE            User Key  (r) = Recorded By, (t) = Taken By, (c) = Cosigned By    Initials Name Provider Type    Marion Reddy, PT Physical Therapist                Therapy Charges for Today     Code Description Service Date Service Provider Modifiers Qty    [id number]  PT EVAL MOD COMPLEXITY 2 10/9/2022 Marion Farias, PT GP 1    [id number]  PT THER PROC EA 15 MIN 10/9/2022 Marion Farias, PT GP 1            PT G-Codes  AM-PAC 6 Clicks Score (PT): 20  Patient was wearing a face mask during this therapy encounter. Therapist used appropriate personal protective equipment including mask and gloves.  Mask used was standard procedure mask. Appropriate PPE was worn during the entire therapy session. Hand hygiene was completed before and after therapy session. Patient is not in enhanced droplet precautions.         Marion Farias, PT  10/9/2022

## 2022-10-09 NOTE — PROGRESS NOTES
Inpatient Rehabilitation Functional Measures Assessment    Functional Measures  [facility] Eating:  Branch  [facility] Grooming: Branch  [facility] Bathing:  Branch  [facility] Upper Body Dressing:  Branch  [facility] Lower Body Dressing:  [facility] Toileting:  [facility] Bladder Management  Level of Assistance:  [place]  Frequency/Number of Accidents this Shift:  [facility] Bowel Management  Level of Assistance: [place]  Frequency/Number of Accidents this Shift: [facility] Bed/Chair/Wheelchair Transfer:  [facility] Toilet Transfer:  [facility] Tub/Shower Transfer:  [place]    Previously Documented Mode of Locomotion at Discharge: Field  ZIYAD Expected Mode of Locomotion at Discharge: [facility] Walk/Wheelchair:  [facility] Stairs:  [facility] Comprehension:  [facility] Expression:  [facility] Social Interaction:  [facility] Problem Solving:  [facility] Memory:  [place]    Therapy Mode Minutes  Occupational Therapy: [place]  Physical Therapy: Individual: 30 minutes.  Speech Language Pathology:  [place]    Signed by: Marion Farias PT

## 2022-10-09 NOTE — PROGRESS NOTES
Inpatient Rehabilitation Plan of Care Note    Plan of Care  Care Plan Reviewed - Updates as Follows    Body Systems    [RN] Integumentary(Active)  Current Status(10/09/2022): Back & Lt. sided Incisions from Lumbar spine surgery  Weekly Goal(10/16/2022): Incisions healing & no signs of infection.  Discharge Goal: Incisions healed    Performed Intervention(s)  Daily skin inspections, dressing changes & wound care as needed.      Psychosocial    [RN] Coping/Adjustment(Active)  Current Status(10/09/2022): Expresses appropriate coping  Weekly Goal(10/16/2022): Allow opportunity to express concerns regarding current  status.  Discharge Goal: Adequate coping regarding life changes with ongoing support.    Performed Intervention(s)  Verbalizes needs & concerns      Safety    [RN] Potential for Injury(Active)  Current Status(10/09/2022): Uses call light appropriately  Weekly Goal(10/16/2022): Instruct patient/family regarding safety precautions &  need for close supervision.  Discharge Goal: Patient/ family will be aware of risk of fall & safety in the  home setting.    Performed Intervention(s)  Items within reach, Bed/chair alarms, safety rounds, non-skid socks, & gait  belt.      Sphincter Control    [RN] Bladder & bowel management(Active)  Current Status(10/09/2022): Mostly continent of bladder & bowels  Weekly Goal(10/16/2022): Continent 100% of bladder & bowels  Discharge Goal: Remains 100% Continent    Performed Intervention(s)  Monitor intake, output, & BM's each shift.  Encourage appropriate diet, & fluid  intake.    Signed by: Ivett Brown RN

## 2022-10-09 NOTE — PROGRESS NOTES
Inpatient Rehabilitation Plan of Care Note    Plan of Care  Care Plan Reviewed - Updates as Follows    Safety    Performed Intervention(s)  Items within reach, Bed/chair alarms, safety rounds, non-skid socks, & gait  belt.  safety rounds      Psychosocial    Performed Intervention(s)  Verbalizes needs & concerns      Sphincter Control    Performed Intervention(s)  Monitor intake, output, & BM's each shift.  Encourage appropriate diet, & fluid  intake.      Body Systems    Performed Intervention(s)  Daily skin inspections, dressing changes & wound care as needed.    Signed by: Nohelia Hernández RN

## 2022-10-09 NOTE — PLAN OF CARE
Goal Outcome Evaluation:  Plan of Care Reviewed With: patient        Progress: improving   Patient is calm and cooperative. Alert and oriented x 4. He is using the call light for assistance. Ambulating with walker and 1 person assist to and from the bathroom. He participated in therapy. Will continue to monitor.

## 2022-10-10 LAB
ANION GAP SERPL CALCULATED.3IONS-SCNC: 7 MMOL/L (ref 5–15)
BASOPHILS # BLD AUTO: 0.02 10*3/MM3 (ref 0–0.2)
BASOPHILS NFR BLD AUTO: 0.4 % (ref 0–1.5)
BUN SERPL-MCNC: 13 MG/DL (ref 8–23)
BUN/CREAT SERPL: 13.4 (ref 7–25)
CALCIUM SPEC-SCNC: 8.5 MG/DL (ref 8.6–10.5)
CHLORIDE SERPL-SCNC: 99 MMOL/L (ref 98–107)
CO2 SERPL-SCNC: 30 MMOL/L (ref 22–29)
CREAT SERPL-MCNC: 0.97 MG/DL (ref 0.76–1.27)
DEPRECATED RDW RBC AUTO: 43.4 FL (ref 37–54)
EGFRCR SERPLBLD CKD-EPI 2021: 81.4 ML/MIN/1.73
EOSINOPHIL # BLD AUTO: 0.28 10*3/MM3 (ref 0–0.4)
EOSINOPHIL NFR BLD AUTO: 5.8 % (ref 0.3–6.2)
ERYTHROCYTE [DISTWIDTH] IN BLOOD BY AUTOMATED COUNT: 12.9 % (ref 12.3–15.4)
GLUCOSE SERPL-MCNC: 112 MG/DL (ref 65–99)
HCT VFR BLD AUTO: 23.2 % (ref 37.5–51)
HGB BLD-MCNC: 7.7 G/DL (ref 13–17.7)
IMM GRANULOCYTES # BLD AUTO: 0.03 10*3/MM3 (ref 0–0.05)
IMM GRANULOCYTES NFR BLD AUTO: 0.6 % (ref 0–0.5)
LYMPHOCYTES # BLD AUTO: 0.73 10*3/MM3 (ref 0.7–3.1)
LYMPHOCYTES NFR BLD AUTO: 15 % (ref 19.6–45.3)
MCH RBC QN AUTO: 30.9 PG (ref 26.6–33)
MCHC RBC AUTO-ENTMCNC: 33.2 G/DL (ref 31.5–35.7)
MCV RBC AUTO: 93.2 FL (ref 79–97)
MONOCYTES # BLD AUTO: 0.63 10*3/MM3 (ref 0.1–0.9)
MONOCYTES NFR BLD AUTO: 13 % (ref 5–12)
NEUTROPHILS NFR BLD AUTO: 3.17 10*3/MM3 (ref 1.7–7)
NEUTROPHILS NFR BLD AUTO: 65.2 % (ref 42.7–76)
NRBC BLD AUTO-RTO: 0.2 /100 WBC (ref 0–0.2)
PLATELET # BLD AUTO: 236 10*3/MM3 (ref 140–450)
PMV BLD AUTO: 9.4 FL (ref 6–12)
POTASSIUM SERPL-SCNC: 3.2 MMOL/L (ref 3.5–5.2)
RBC # BLD AUTO: 2.49 10*6/MM3 (ref 4.14–5.8)
SODIUM SERPL-SCNC: 136 MMOL/L (ref 136–145)
WBC NRBC COR # BLD: 4.86 10*3/MM3 (ref 3.4–10.8)

## 2022-10-10 PROCEDURE — 25010000002 ENOXAPARIN PER 10 MG: Performed by: PHYSICAL MEDICINE & REHABILITATION

## 2022-10-10 PROCEDURE — 80048 BASIC METABOLIC PNL TOTAL CA: CPT | Performed by: PHYSICAL MEDICINE & REHABILITATION

## 2022-10-10 PROCEDURE — 97166 OT EVAL MOD COMPLEX 45 MIN: CPT

## 2022-10-10 PROCEDURE — 97535 SELF CARE MNGMENT TRAINING: CPT

## 2022-10-10 PROCEDURE — 97110 THERAPEUTIC EXERCISES: CPT

## 2022-10-10 PROCEDURE — 85025 COMPLETE CBC W/AUTO DIFF WBC: CPT | Performed by: PHYSICAL MEDICINE & REHABILITATION

## 2022-10-10 PROCEDURE — 97530 THERAPEUTIC ACTIVITIES: CPT

## 2022-10-10 RX ORDER — PANTOPRAZOLE SODIUM 40 MG/1
40 TABLET, DELAYED RELEASE ORAL
Status: DISCONTINUED | OUTPATIENT
Start: 2022-10-10 | End: 2022-10-14 | Stop reason: HOSPADM

## 2022-10-10 RX ORDER — POTASSIUM CHLORIDE 750 MG/1
20 TABLET, FILM COATED, EXTENDED RELEASE ORAL DAILY
Status: DISCONTINUED | OUTPATIENT
Start: 2022-10-10 | End: 2022-10-14

## 2022-10-10 RX ORDER — POTASSIUM CHLORIDE 750 MG/1
40 TABLET, FILM COATED, EXTENDED RELEASE ORAL ONCE
Status: COMPLETED | OUTPATIENT
Start: 2022-10-10 | End: 2022-10-10

## 2022-10-10 RX ORDER — LOSARTAN POTASSIUM 50 MG/1
50 TABLET ORAL
Status: DISCONTINUED | OUTPATIENT
Start: 2022-10-11 | End: 2022-10-11

## 2022-10-10 RX ADMIN — POTASSIUM CHLORIDE 40 MEQ: 750 TABLET, EXTENDED RELEASE ORAL at 23:11

## 2022-10-10 RX ADMIN — GABAPENTIN 600 MG: 300 CAPSULE ORAL at 23:03

## 2022-10-10 RX ADMIN — HYDROCODONE BITARTRATE AND ACETAMINOPHEN 1 TABLET: 7.5; 325 TABLET ORAL at 12:19

## 2022-10-10 RX ADMIN — HYDROCODONE BITARTRATE AND ACETAMINOPHEN 2 TABLET: 7.5; 325 TABLET ORAL at 06:20

## 2022-10-10 RX ADMIN — HYDROCODONE BITARTRATE AND ACETAMINOPHEN 1 TABLET: 7.5; 325 TABLET ORAL at 17:45

## 2022-10-10 RX ADMIN — ENOXAPARIN SODIUM 40 MG: 100 INJECTION SUBCUTANEOUS at 12:15

## 2022-10-10 RX ADMIN — HYDROCODONE BITARTRATE AND ACETAMINOPHEN 2 TABLET: 7.5; 325 TABLET ORAL at 23:01

## 2022-10-10 RX ADMIN — MONTELUKAST SODIUM 10 MG: 10 TABLET, FILM COATED ORAL at 08:20

## 2022-10-10 RX ADMIN — GABAPENTIN 300 MG: 300 CAPSULE ORAL at 08:20

## 2022-10-10 NOTE — THERAPY EVALUATION
Inpatient Rehabilitation - Occupational Therapy Initial Evaluation    [facility]     Patient Name: Omar Choudhary  : 1945  MRN: [id number]    Today's Date: 10/10/2022                 Admit Date: 10/8/2022         ICD-10-CM ICD-9-CM   1. Impaired gait and mobility  R26.89 781.2       Patient Active Problem List   Diagnosis   • Benign prostatic hyperplasia   • Bursitis   • Depression   • Diverticulosis   • Family history of malignant neoplasm of breast   • Hyperlipidemia   • Hypertension   • Internal derangement of right knee   • Knee effusion   • Asthma   • Obstructive sleep apnea syndrome   • Osteoarthritis   • Osteopenia   • Pain in knee   • Postnasal drip   • Prepatellar bursitis   • Sciatica of right side   • Diaphragm paralysis   • Spinal stenosis of lumbar region with neurogenic claudication   • Degenerative lumbar spinal stenosis   • Anxiety   • Pinguecula of right eye   • Lumbar radiculopathy, chronic       Past Medical History:   Diagnosis Date   • Allergic    • Arthritis    • Asthma    • Carpal tunnel syndrome     no pain   • Cataract    • Depression    • Family history of malignant neoplasm of breast 2019   • Hemidiaphragm paralysis     Left   • Hyperlipidemia 2019   • Hypertension 2012   • Leg pain, right    • Low back pain    • Neuropathy     feet   • Osteopenia    • Poor balance    • Reactive airway disease 01/15/2016   • Shortness of breath    • Sleep apnea, obstructive     CPAP- to bring dos       Past Surgical History:   Procedure Laterality Date   • KNEE ARTHROSCOPY W/ ACL RECONSTRUCTION Right 2019   • MOUTH SURGERY               IRF OT ASSESSMENT FLOWSHEET (last 12 hours)     IRF OT Evaluation and Treatment     Row Name 10/10/22 1439          OT Time and Intention    Document Type initial evaluation  -DN     Mode of Treatment occupational therapy  -DN     Patient Effort good  -DN     Row Name 10/10/22 1433          General Information    Patient Profile Reviewed yes   -DN     Patient/Family/Caregiver Comments/Observations pt sitting in w/c  -DN     General Observations of Patient decline use of brace states wears it when he feel he needs too  -DN     Existing Precautions/Restrictions fall;spinal  aspen back brace he ordered prior to sx  -DN     Row Name 10/10/22 1439          Previous Level of Function/Home Environm    BADLs, Premorbid Functional Level independent  -DN     Transfers, Premorbid Functional Level independent;uses device or equipment  -DN     Row Name 10/10/22 1439          Living Environment    Current Living Arrangements home  -DN     People in Home significant other  -DN     Primary Care Provided by self  -DN     Row Name 10/10/22 1439          Home Use of Assistive/Adaptive Equipment    Equipment Currently Used at Home --  pt states SO has reacher, grab bars fold down shower chiar at her place where he will go after d/c  -DN     Row Name 10/10/22 1439          Pain Assessment    Pretreatment Pain Rating 8/10  -DN     Posttreatment Pain Rating 8/10  -DN     Pain Location incisional  -DN     Pain Location - back  -DN     Row Name 10/10/22 1439          Cognition/Psychosocial    Orientation Status (Cognition) oriented x 3  -DN     Follows Commands (Cognition) follows two-step commands  -DN     Personal Safety Interventions fall prevention program maintained;gait belt  -DN     Row Name 10/10/22 1439          General Upper Extremity Assessment (Range of Motion)    Upper Extremity: Range of Motion LUE ROM WFL;RUE ROM WFL  -DN     Row Name 10/10/22 1439          Strength (Manual Muscle Testing)    Left Upper Extremity Strength left UE strength is WFL  4/5 thougtout  -DN     Right Upper Extremity Strength right UE strength is WFL  4/5 thoughout  -DN     Left Hand, Setting 2 (Dynamometer Testing) 44  -DN     Right Hand, Setting 2 (Dynamometer Testing) 70  -DN     Left Hand: Lateral (Key) Pinch Strength (Pinch Dynamometer Testing) 11  -DN     Left Hand: [place] ([place])  Pinch Strength (Pinch Dynamometer Testing) 9  -DN     Right Hand: Lateral (Key) Pinch Strength (Pinch Dynamometer Testing) 16  -DN     Right Hand: [place] ([place]) Pinch Strength (Pinch Dynamometer Testing) 17  -DN     Additional Documentation --  pt states has had some tendon damage in L thumb and CTS in wrist in past  -DN     Row Name 10/10/22 1439          Strength Comprehensive (MMT)    Hand Strength Assessment hand  strength;hand strength (manual muscle testing)  -DN     Row Name 10/10/22 1439          Sensory Assessment (Somatosensory)    Sensory Assessment (Somatosensory) sensation intact;UE sensation intact  -DN     Row Name 10/10/22 1439          Bathing    [place] Level (Bathing) bathing skills;contact guard assist;verbal cues;nonverbal cues (demo/gesture)  -DN     Position (Bathing) supported sitting;supported standing  -DN     Comment (Bathing) sponge bath at sink  -DN     Row Name 10/10/22 1439          Upper Body Dressing    [place] Level (Upper Body Dressing) upper body dressing skills;set up assistance  -DN     Row Name 10/10/22 1439          Lower Body Dressing    [place] Level (Lower Body Dressing) doff;don;pants/bottoms;shoes/slippers;socks;underwear;contact guard assist  -DN     Position (Lower Body Dressing) supported sitting;supported standing  -DN     Row Name 10/10/22 1439          Grooming    [place] Level (Grooming) grooming skills;standby assist  -DN     Position (Grooming) supported standing  -DN     Row Name 10/10/22 1439          Toileting    [place] Level (Toileting) toileting skills;contact guard assist  -DN     Assistive Device Use (Toileting) grab bar/safety frame;raised toilet seat  -DN     Position (Toileting) supported sitting;supported standing  -DN     Row Name 10/10/22 1439          Bed Mobility    Rolling Left [place] (Bed Mobility) standby assist  -DN     Rolling Right [place] (Bed Mobility) standby assist  -DN     Row Name 10/10/22  1439          Toilet Transfer    Type (Toilet Transfer) stand pivot/stand step  -DN     [place] Level (Toilet Transfer) contact guard;verbal cues  -DN     Assistive Device (Toilet Transfer) walker, front-wheeled;commode, 3-in-1  -DN     Row Name 10/10/22 1439          Safety Issues, Functional Mobility    Safety Issues Affecting Function (Mobility) safety precaution awareness  -DN     Row Name 10/10/22 1439          Motor Skills    Motor Skills functional endurance  -DN     Functional Endurance fair with adls  -DN     Row Name 10/10/22 1439          Balance    Static Sitting Balance independent  -DN     Dynamic Sitting Balance supervision  -DN     Static Standing Balance standby assist  -DN     Dynamic Standing Balance contact guard  -DN     Position/Device Used, Standing Balance walker, rolling  -DN     Row Name 10/10/22 1439          Positioning and Restraints    Pre-Treatment Position sitting in chair/recliner  -DN     Post Treatment Position wheelchair  -DN     In Chair with PT  -DN     Row Name 10/10/22 1439          Therapy Assessment/Plan (OT)    Functional Level at Time of Evaluation (OT) pt presents with overall decreased endurance, strength coupled with pain.  Pt will benifit from skilled OT services to increase independence with adls and functional transfers  -DN     OT Diagnosis spinal stinosis of lumbar region with neurogenic claudication  -DN     Rehab Potential/Prognosis (OT) good, to achieve stated therapy goals  -DN     Frequency of Treatment (OT) 5 times per week  -DN     Estimated Duration of Therapy (OT) 1 week  -DN     Problem List (OT) problems related to;strength;pain  -DN     Activity Limitations Related to Problem List (OT) unable to transfer safely;BADLs not performed adequately or safely  -DN     Planned Therapy Interventions (OT) activity tolerance training;adaptive equipment training;BADL retraining;strengthening exercise;transfer/mobility retraining;ROM/therapeutic exercise  -DN      Row Name 10/10/22 1439          IRF OT Goals    Transfer Goal Selection (OT-IRF) transfers, OT goal 2  -DN     Bathing Goal Selection (OT-IRF) bathing, OT goal 2  -DN     UB Dressing Goal Selection (OT-IRF) UB dressing, OT goal 2  -DN     LB Dressing Goal Selection (OT-IRF) LB dressing, OT goal 2  -DN     Grooming Goal Selection (OT-IRF) grooming, OT goal 2  -DN     Toileting Goal Selection (OT-IRF) toileting, OT goal 2  -DN     Strength Goal Selection (OT-IRF) strength, OT goal 2 (free text)  -DN     Endurance Goal Selection (OT) endurance, OT goal 2  -DN     Caregiver Training Goal Selection (OT-IRF) caregiver training, OT goal 2  -DN     Row Name 10/10/22 1439          Transfer Goal 1 (OT-IRF)    Activity/Assistive Device (Transfer Goal 1, OT-IRF) toilet;walk-in shower;walker, rolling;commode, 3-in-1;shower chair  -DN     [place] Level (Transfer Goal 1, OT-IRF) modified independence  -DN     Time Frame (Transfer Goal 1, OT-IRF) short-term goal (STG)  -DN     Progress/Outcomes (Transfer Goal 1, OT-IRF) continuing progress toward goal  -DN     Row Name 10/10/22 1439          Transfer Goal 2 (OT-IRF)    Activity/Assistive Device (Transfer Goal 2, OT-IRF) toilet;walk-in shower;commode chair;walker, rolling;shower chair  -DN     [place] Level (Transfer Goal 2, OT-IRF) modified independence  -DN     Time Frame (Transfer Goal 2, OT-IRF) long-term goal (LTG)  -DN     Progress/Outcomes (Transfer Goal 2, OT-IRF) continuing progress toward goal  -DN     Row Name 10/10/22 1439          Bathing Goal 1 (OT-IRF)    Activity/Device (Bathing Goal 1, OT-IRF) bathing skills, all  -DN     [place] Level (Bathing Goal 1, OT-IRF) modified independence  -DN     Time Frame (Bathing Goal 1, OT-IRF) short-term goal (STG)  -DN     Progress/Outcomes (Bathing Goal 1, OT-IRF) continuing progress toward goal  -DN     Row Name 10/10/22 1439          Bathing Goal 2 (OT-IRF)    Activity/Device (Bathing Goal 2, OT-IRF) bathing  skills, all  -DN     [place] Level (Bathing Goal 2, OT-IRF) modified independence  -DN     Time Frame (Bathing Goal 2, OT-IRF) long-term goal (LTG)  -DN     Progress/Outcomes (Bathing Goal 2, OT-IRF) continuing progress toward goal  -DN     Row Name 10/10/22 1439          UB Dressing Goal 2 (OT-IRF)    Activity/Device (UB Dressing Goal 2, OT-IRF) upper body dressing  -DN     [place] (UB Dress Goal 2, OT-IRF) modified independence  -DN     Time Frame (UB Dressing Goal 2, OT-IRF) long-term goal (LTG)  -DN     Progress/Outcomes (UB Dressing Goal 2, OT-IRF) continuing progress toward goal  -DN     Row Name 10/10/22 1439          LB Dressing Goal 2 (OT-IRF)    Activity/Device (LB Dressing Goal 2, OT-IRF) lower body dressing  -DN     [place] (LB Dressing Goal 2, OT-IRF) modified independence  -DN     Time Frame (LB Dressing Goal 2, OT-IRF) long-term goal (LTG)  -DN     Progress/Outcomes (LB Dressing Goal 2, OT-IRF) continuing progress toward goal  -DN     Row Name 10/10/22 1439          Grooming Goal 2 (OT-IRF)    Activity/Device (Grooming Goal 2, OT-IRF) grooming skills, all  -DN     [place] (Grooming Goal 2, OT-IRF) modified independence  -DN     Time Frame (Grooming Goal 2, OT-IRF) long-term goal (LTG)  -DN     Progress/Outcomes (Grooming Goal 2, OT-IRF) continuing progress toward goal  -DN     Row Name 10/10/22 1439          Toileting Goal 2 (OT-IRF)    Activity/Device (Toileting Goal 2, OT-IRF) toileting skills, all  -DN     [place] Level (Toileting Goal 2, OT-IRF) modified independence  -DN     Progress/Outcomes (Toileting Goal 2, OT-IRF) continuing progress toward goal  -DN     Time Frame (Toileting Goal 2, OT-IRF) long-term goal (LTG)  -DN     Row Name 10/10/22 1439          Strength Goal 2 (OT-IRF)    Strength Goal 2 (OT-IRF) pt to be independent with BUE HEP at time of d/c  -DN     Time Frame (Strength Goal 2, OT-IRF) long-term goal (LTG)  -DN     Progress/Outcomes (Strength Goal 2,  OT-IRF) continuing progress toward goal  -DN     Row Name 10/10/22 1439          Endurance Goal 2 (OT)    Endurance Goal 2 (OT) during adls  -DN     Activity Level (Endurance Goal 2, OT) endurance 2 good -  -DN     Progress/Outcome (Endurance Goal 2, OT) continuing progress toward goal  -DN     Row Name 10/10/22 1439          Caregiver Training Goal 2 (OT-IRF)    Caregiver Training Goal 2 (OT-IRF) pt to be mod independent with all adls and transfers at RWX level  -DN     Time Frame (Caregiver Training Goal 2, OT-IRF) long-term goal (LTG)  -DN     Progress/Outcomes (Caregiver Training Goal 2, OT-IRF) continuing progress toward goal  -DN           User Key  (r) = Recorded By, (t) = Taken By, (c) = Cosigned By    Initials Name Effective Dates    Jose Cunningham OT 06/16/21 -                  Occupational Therapy Education     Title: PT OT SLP Therapies (Done)     Topic: Occupational Therapy (Done)     Point: ADL training (Done)     Description:   Instruct learner(s) on proper safety adaptation and remediation techniques during self care or transfers.   Instruct in proper use of assistive devices.              Learning Progress Summary           Patient Acceptance, E, VU by DN at 10/10/2022 1546    Comment: precautions per sx, adaptive tech with adls and transfer training                   Point: Home exercise program (Done)     Description:   Instruct learner(s) on appropriate technique for monitoring, assisting and/or progressing therapeutic exercises/activities.              Learning Progress Summary           Patient Acceptance, E, VU by DN at 10/10/2022 1546    Comment: precautions per sx, adaptive tech with adls and transfer training                   Point: Precautions (Done)     Description:   Instruct learner(s) on prescribed precautions during self-care and functional transfers.              Learning Progress Summary           Patient Acceptance, E, VU by DN at 10/10/2022 1546    Comment: precautions per  sx, adaptive tech with adls and transfer training                   Point: Body mechanics (Done)     Description:   Instruct learner(s) on proper positioning and spine alignment during self-care, functional mobility activities and/or exercises.              Learning Progress Summary           Patient Acceptance, E, VU by LESTER at 10/10/2022 1546    Comment: precautions per sx, adaptive tech with adls and transfer training                               User Key     Initials Effective Dates Name Provider Type Discipline    LESTER 06/16/21 -  Jose Rodrigues OT Occupational Therapist OT                    OT Recommendation and Plan    Planned Therapy Interventions (OT): activity tolerance training, adaptive equipment training, BADL retraining, strengthening exercise, transfer/mobility retraining, ROM/therapeutic exercise                    Time Calculation:      Time Calculation- OT     Row Name 10/10/22 1330 10/10/22 0800          Time Calculation- OT    OT Start Time 1330  -DN 0800  -DN     OT Stop Time 1400  -DN 0900  -DN     OT Time Calculation (min) 30 min  -DN 60 min  -DN           User Key  (r) = Recorded By, (t) = Taken By, (c) = Cosigned By    Initials Name Provider Type    Jose Cunningham OT Occupational Therapist              Therapy Charges for Today     Code Description Service Date Service Provider Modifiers Qty    [id number]  OT EVAL MOD COMPLEXITY 3 10/10/2022 Jose Rodrigues OT GO 1    [id number]  OT SELF CARE/MGMT/TRAIN EA 15 MIN 10/10/2022 Jose Rodrigues OT GO 4                   Jose Rodrigues OT  10/10/2022

## 2022-10-10 NOTE — CASE MANAGEMENT/SOCIAL WORK
Case Management Discharge Note      Final Note: [facility] acute rehab    Provided Post Acute Provider List?: Yes  Post Acute Provider List: Inpatient Rehab ([facility] given verbally)  Provided Post Acute Provider Quality & Resource List?: Yes  Post Acute Provider Quality and Resource List: Inpatient Rehab  Delivered To: Patient  Method of Delivery: In person    Selected Continued Care - Discharged on 10/8/2022 Admission date: 10/4/2022 - Discharge disposition: Rehab Facility or Unit (DC - External)            Transportation Services  Private: Car    Final Discharge Disposition Code: 62 - inpatient rehab facility

## 2022-10-10 NOTE — PROGRESS NOTES
Inpatient Rehabilitation Plan of Care Note    Plan of Care  Updated Problems/Interventions  Mobility    [PT] Bed/Chair/Wheelchair(Active)  Current Status(10/11/2022): SBA  Weekly Goal(10/18/2022): SUP  Discharge Goal: CHELLY    [PT] Bed Mobility(Active)  Current Status(10/11/2022): SBA  Weekly Goal(10/18/2022): CHELLY  Discharge Goal: CHELLY    [PT] Walk(Active)  Current Status(10/11/2022): ' w/ FWW  Weekly Goal(10/18/2022): BR with NSG  Discharge Goal: ' w/ FWW    [PT] Stairs(Active)  Current Status(10/11/2022): 8 stairs BUE, CGA  Weekly Goal(10/18/2022): PT ONLY  Discharge Goal: 14 steps 1HR CGA    Signed by: Melo Astorga, PT

## 2022-10-10 NOTE — PROGRESS NOTES
Inpatient Rehabilitation Admission  Section A. Transportation  Issues Due to Lack of Transportation:   No    Signed by: CHRISTINA Melvin

## 2022-10-10 NOTE — PROGRESS NOTES
Discharge Planning Assessment  [facility]     Patient Name: Omar Choudhary  MRN: [id number]  Today's Date: 10/10/2022    Admit Date: 10/8/2022    Plan: D/c plan is for patient to move into his significant other's home upon d/c. Patient's significiant other will be able to assist patient 24/7 until she goes on vacation at the end of the month.   Discharge Needs Assessment    No documentation.                Discharge Plan     Row Name 10/10/22 1540       Plan    Plan D/c plan is for patient to move into his significant other's home upon d/c. Patient's significiant other will be able to assist patient 24/7 until she goes on vacation at the end of the month.    Patient/Family in Agreement with Plan yes    Provided Post Acute Provider List? N/A  SW will provide patient with a post acute provider list closer to d/c.    N/A Provider List Comment SW will provide patient with a post acute provider list closer to d/c.    Provided Post Acute Provider Quality & Resource List? N/A    N/A Quality & Resource List Comment SW will provide patient with a post acute resource list closer to d/c.    Plan Comments SW completed the assessment with the patient bedside and with his significant other via telephone call and explained the rehab program. Patient lives independently in a 2 story home and has a significiant other who lives between [place] and [place], [place]. Patient plans on moving into his significant other's home upon d/c and patient's significant other will be able to assist patient if she is not on vacation. Patient is retired from being a professor and . Patient manages his own finances and receives a snf check. Completing hikes and biking are some of patient's hobbies. Patient reports he quit smoking cigarettes decades ago and drinks with his friends or after mowing the lawn 2-3 beers about 3-4 times a week. Patient reported he did have some depression in 2018 after he tore his right ACL and was  put on medication, but the medication did not help and he stopped taking the medication. Patient was using a cane and back brace over the past year due to his back pain increasing, but was able to drive and be independent in the community. At home the patient completed all of his ADLs independently. Patient's plan is to d/c and move into his significant other's home where she will be able to assist him until she goes on vacation at the end of the month. Patient will have assistance from his significant other 24/7 and from his daughter intermittently.              Continued Care and Services - Admitted Since 10/8/2022    Coordination has not been started for this encounter.          Demographic Summary    No documentation.                Functional Status     Row Name 10/10/22 1525       Functional Status    Usual Activity Tolerance good    Current Activity Tolerance fair    Functional Status Comments Patient's back pain has worsened over the past year and he was using a cane and back brace while ambulating. Patient was independent in the community and with all ADLs prior to hospitalization.       Functional Status, IADL    Medications independent    Meal Preparation independent    Housekeeping independent    Laundry independent    Shopping independent    IADL Comments Patient's back pain has worsened over the past year and he was using a cane and back brace while ambulating. Patient was independent in the community and with all ADLs prior to hospitalization.       Mental Status    General Appearance WDL appearance    General Appearance well-kept, clean;dress appropriate for weather/appropriate for setting       Mental Status Summary    Recent Changes in Mental Status/Cognitive Functioning mood    Mental Status Comments Patient would like to gain his independence back and be able to hike and bike ride again.       Employment/    Employment Status retired    Current or Previous Occupation  education;professional    Employment/ Comments Patient is retired from being a professor and .               Psychosocial     Row Name 10/10/22 1528       Values/Beliefs    Spiritual, Cultural Beliefs, Yazdanism Practices, Values that Affect Care no       Behavior WDL    Behavior WDL interactions;motor movement    Interactions appropriate to situation;cooperative;eye contact appropriate    Motor Movement no unusual gestures/mannerisms       Emotion Mood WDL    Emotion/Mood/Affect WDL affect;emotion mood    Affect affect consistent with mood    Emotion/Mood appropriate to situation       Speech WDL    Speech WDL speech    Speech clear, coherent;moderate rate and volume;effective;articulate       Perceptual State WDL    Perceptual State WDL hallucinations;perceptual state    Hallucinations denies hallucinations    Perceptual State consistent with reality       Thought Process WDL    Thought Process WDL delusions;judgment and insight;thought content;thought process    Delusions no delusions    Judgment and Insight judgment appropriate to situation;insight appropriate to situation    Thought Content relevant;logical    Thought Process linear thought process;relevant;logical       Intellectual Performance WDL    Intellectual Performance WDL intellectual performance;level of consciousness    Intellectual Performance immediate, recent, and remote memory intact;able to comprehend;oriented x 4    Level of Consciousness Alert       Coping/Stress    Major Change/Loss/Stressor loss of independence    Patient Personal Strengths future/goal oriented;motivated;strong support system;expressive of emotions;expressive of needs;positive attitude    Sources of Support adult child(all);significant other    Techniques to [place] with Loss/Stress/Change exercise;diversional activities;meditation;spiritual practice(s)    Reaction to Health Status accepting;hopeful    Understanding of Condition and Treatment adequate  understanding of medical condition;adequate understanding of treatment       Developmental Stage (Tonyaon's)    Developmental Stage Stage 8 (65 years-death/Late Adulthood) Integrity vs. Despair       C-SSRS (Recent)    Q1 Wished to be Dead (Past Month) no    Q2 Suicidal Thoughts (Past Month) no    Q6 Suicide Behavior (Lifetime) no       Violence Risk    Feels Like Hurting Others no    Previous Attempt to Harm Others no               Abuse/Neglect     Row Name 10/10/22 1532       Personal Safety    Feels Unsafe at Home or Work/School no    Feels Threatened by Someone no    Does Anyone Try to Keep You From Having Contact with Others or Doing Things Outside Your Home? no    Physical Signs of Abuse Present no               Legal     Row Name 10/10/22 1533       Financial/Legal    Source of Income pension/longterm    Financial/Environmental Concerns other (see comments)  None.    Who Manages Finances if Patient Unable Patient manages his own finances.    Application for Public Assistance not applied               Substance Abuse     Row Name 10/10/22 1533       Substance Use    Substance Use Status current alcohol use    Environment Typically Uses Alcohol private residence;with group of people    Readiness to Change Alcohol Use precontemplation    Attempts to Quit Alcohol none    Previous Substance Use Treatment none       AUDIT-C (Alcohol Use Disorders ID Test)    Q1: How often do you have a drink containing alcohol? 4 or more ti    Q2: How many drinks containing alcohol do you have on a typical day when you are drinking? 3 or 4    Q3: How often do you have six or more drinks on one occasion? Never    Audit-C Score 5       Family Member Substance Use (#4)    Previous Substance Use Treatment none               Patient Forms    No documentation.                   ANGELO Melvin

## 2022-10-10 NOTE — PROGRESS NOTES
May 24, 2022 - Dr Kwong note - [ HTN: 160/93 today. Patient reports he had quit his blood pressure due to severe lightheadedness over the weekend w/ BP 80/60. Previously prescribed Hyzaar 100/25  -            -Restart Hyzaar at 50/12.5 daily]

## 2022-10-10 NOTE — OP NOTE
Neurosurgical operative report:    Patient name: Omar Choudhary  Medical record number: [id number]  Date of procedure: October 4, 2022    Preoperative diagnosis: L2-L4 severe central canal stenosis resulting in neurogenic claudication, L2-L4 severe neuroforaminal stenosis contributing to radiculopathy    Postoperative diagnosis: Same as above    Procedure performed: Left sided lateral approach for a Transpsoas L2-L4 lateral interbody fusion, utilization of intraoperative neuro monitoring, utilization of structural allograft, utilization of biological allograft, utilization of intraoperative neuro navigation and robotic guidance.    Surgeon: Dr. John Do  Co-surgeon: Dr. Adam Flaherty  Assistant: Leon Hopper  Anesthesia: General endotracheal anesthetic  Specimens: None  Blood loss: 100 cc  Drains: None  Complications: None immediate    Indications: This is a 77-year-old gentleman who at baseline is relatively healthy.  He had a highly active life.  I did evaluate him almost a year ago for lumbar spinal canal stenosis.  He did describe very subtle neurogenic claudication and possible radiculopathy however he was tolerating it.  I recommended aggressive conservative measures including physical therapy and spinal canal injections.  He completed these diligently and they did provide some initial improvement however the quality of his life significantly declined secondary to progressive neurogenic claudication and radiculopathy.  I discussed with him the possibility of decompression of the worst lumbar levels including decompression of the neural foraminal space at these levels in order to provide him with the best opportunity for return of functioning.  I explained that with his coronal lumbar deformity and possible instability as well as need for extensive decompression surgical fixation and arthrodesis would be advisable.  After explanation of this he elected to proceed with an operative intervention.  He completed  all necessary preoperative evaluation and was appropriate for proceeding with surgical intervention.    Description of procedure: The patient was identified in the preoperative holding area via name and medical record number.  His history, physical exam, consent were all reviewed and found to be correct and appropriate for proceeding with surgery.  He was marked over the intended surgical site.  He was brought back to the operating room and handed over to anesthesia for induction of general endotracheal anesthetic.  He was transferred from the hospital bed to the operating room table in the supine position.  He was converted to the right lateral decubitus position with the left flank facing towards the ceiling.  He was secured to the table and this position.  His back flank and abdomen were cleaned with chlorhexidine and alcohol.  Disc spaces were identified with radiographic imaging.  Incision was marked out based on anatomic guidance.  Local anesthetic was instilled.  At this time the surgeon scrubbed in the area was prepped and draped in usual sterile fashion.  Timeout was performed and all categories were found to be correct and appropriate for proceeding with surgery.  Incision was made over the anterior superior iliac spine and the reference array was secured.  AP and lateral x-rays were utilized to merge the preoperative CT imaging to obtain intraoperative neuro navigation.  The robot was paired with the imaging.  Incision was made with 15 blade and carried through subcutaneous tissue with Bovie electrocautery.  External oblique was identified and spread with Metzenbaum scissors along the fibers.  Retractors were brought into provide adequate exposure.  The internal oblique was a identified and again spread with Metzenbaum scissors and retractors were repositioned and transversalis muscle and fascia were identified.  The transversalis fascia was opened up with Metzenbaum scissors and digital dissection was  utilized to identify the retroperitoneal space.  The peritoneum was swept forward.  Digital dissection was utilized to identify the psoas muscle and the vertebral bodies.  Hand-held retractors were repositioned to provide adequate visualization.  Under direct visualization it was deemed that the psoas muscle was too prominent and an anterior to the psoas approach would not be able to be adequately accomplished at either level.  We also deemed that the angle for an anterior to the psoas approach secondary to the iliac wing and rib would be unfeasible.  We therefore converted to a Hewitt psoas approach.  Incision was made where the L3-L4 disc space was approximated.  This was carried through the same layers as before and a retroperitoneal approach was obtained.  Navigation was utilized to confirm location of the L3-L4 interbody disc space.  A K wire was placed into the disc space and dilators were utilized to provide adequate visualization.  The self-retaining retractor was brought in along the dilated space to provide adequate visualization.  The retractor was expanded in order to improve visualization.  The dilators were removed and the navigation was utilized to confirm adequate placement of the self-retaining retractor.  The self-retaining retractor was secured to the robot.  The psoas muscle was stimulated to evaluate for traversing nerves.  Over the area of interest no nerve stimulated.  Bipolar was utilized to coagulate overlying muscle and Kitner was utilized to clear this away.  11 blade was used to open up the disc space and discectomy was performed with grasping instruments.  The disc base was shaved and additional disc material was removed.  A 22 x 45 mm cage with 6 degrees of lordosis and potential expansion from 8 mm to 15 mm was deemed to be the appropriate size.  This cage was packed with biological allograft and under neuro navigation guidance was placed into the L3-L4 interspace.  Radiographic imaging  was utilized during expansion of the cage to confirm absence of endplate fracture and subsidence.  The wound was copiously irrigated and hemostasis was obtained.  The self-retaining retractor was removed and the exact same procedure was performed at the L2-L3 level.  No nerve stimulated at this level.  Placement of a 22 x 50 mm cage with 6 degrees of lordosis was deemed to be appropriate.  It was also packed with biological allograft.  The cage had between 8 to 15 mm of expansion.  This cage was also expanded under radiographic guidance to confirm absence of endplate fracture.  The wound was copiously irrigated and hemostasis was obtained.  The self-retaining retractor was removed.  Both incisions were closed with 0 Vicryl stitches for the fascia followed by 2-0 Vicryl stitches for the subcutaneous tissue and running V-Loc Monocryl.  Both incisions were covered with skin glue and covered arm.  The reference array was removed from the patient's ASIS and this was copiously irrigated and closed with a stitch.  The drapes were taken down and the patient was transferred back to the hospital bed in the supine position.  He was extubated intraoperatively and transported to the postanesthesia care unit.  At the time of him being dropped off at that location no complications were evident.  At the end of the case all counts were found to be correct.  I was present and scrubbed for all key portions of the procedure and immediately available at all times.  Of note there were no changes in sensory neuro monitoring status throughout the entirety of the procedure.  The assistance of the first assist was essential for successful completion of surgical intervention including assistance with opening, closing, suction, retraction.  End of dictation.  Thank you very much.

## 2022-10-10 NOTE — PROGRESS NOTES
Inpatient Rehabilitation Functional Measures Assessment and Plan of Care    Plan of Care  Updated Problems/Interventions  Mobility    [OT] Toilet Transfers(Active)  Current Status(10/10/2022): CGA  Weekly Goal(10/14/2022): Mod Indep  Discharge Goal: Mod Indep RWX    [OT] Tub/Shower Transfers(Active)  Current Status(10/10/2022): tba  Weekly Goal(10/14/2022): tba  Discharge Goal: tba        Self Care    [OT] Bathing(Active)  Current Status(10/10/2022): SBA  Weekly Goal(10/14/2022): Mod Indep  Discharge Goal: Mod Indep    [OT] Dressing (Lower)(Active)  Current Status(10/10/2022): CGA  Weekly Goal(10/14/2022): Mod Indep  Discharge Goal: Mod Indep    [OT] Dressing (Upper)(Active)  Current Status(10/10/2022): setup  Weekly Goal(10/14/2022): Mod Indep  Discharge Goal: Mod indep    [OT] Grooming(Active)  Current Status(10/10/2022): SBA standing at sink  Weekly Goal(10/14/2022): Mod Indep  Discharge Goal: Mod Indep    [OT] Toileting(Active)  Current Status(10/10/2022): CGA  Weekly Goal(10/14/2022): Mod Indep  Discharge Goal: Mod Indep    Functional Measures  ZIYAD Eating:  Branch  ZIYAD Grooming: Branch  ZIYAD Bathing:  Branch  ZIYAD Upper Body Dressing:  Branch  ZIYAD Lower Body Dressing:  Branch  ZIYAD Toileting:  Branch    ZIYAD Bladder Management  Level of Assistance:  Branch  Frequency/Number of Accidents this Shift:  Branch    ZIYAD Bowel Management  Level of Assistance: Branch  Frequency/Number of Accidents this Shift: Branch    ZIYAD Bed/Chair/Wheelchair Transfer:  Branch  ZIYAD Toilet Transfer:  Branch  ZIYAD Tub/Shower Transfer:  Branch    Previously Documented Mode of Locomotion at Discharge: Field  ZIYAD Expected Mode of Locomotion at Discharge: Branch  ZIYAD Walk/Wheelchair:  Branch  ZIYAD Stairs:  Branch    ZIYAD Comprehension:  Branch  ZIYAD Expression:  Branch  ZIYAD Social Interaction:  Branch  ZIYAD Problem Solving:  Branch  ZIYAD Memory:  Branch    Therapy Mode Minutes  Occupational Therapy: Branch  Physical Therapy: Branch  Speech Language  Pathology:  Branch    Signed by: Jose Rodrigues, OTR/L

## 2022-10-10 NOTE — PROGRESS NOTES
LOS: 2 days   Patient Care Team:  Dennis Kwong MD as PCP - General (Internal Medicine)  Arslan Carlson PA-C as Physician Assistant (Physician Assistant)  Ang Irizarry APRN as Nurse Practitioner (Family Medicine)      STEPHEN HARMAN  1945    Diagnoses    1. IMPAIRED GAIT AND MOBILITY       ADMITTING DIAGNOSIS:  Status post October 4 October 5, 2022-staged front and back operation consisting of a lateral interbody fusion at L2-L3 and L3-L4 on 10/4/2022 followed by pedicle screw fixation using the robot at L to to L4 and decompression       Subjective   Complains of back pain.    No new weakness. Tolerates therapies.   BP low over the weekend. Appears was on older home dose of Hyzaar, which was decreased in summer.       Objective     Vitals:    10/10/22 0602   BP: 105/68   Pulse: 61   Resp: 16   Temp: 98.5 °F (36.9 °C)   SpO2: 97%       PHYSICAL EXAM:   Gen: Pleasant, full sentences, NAD. Ready to have lunch.   Pulm: CTAB; no r/r/w; non-labored   Heart; RRR; no m/r/g appreciated   Abd: soft; NT/ND; +BS    Skin: no rashes on exposed skin , surgical wound  Dressed.   MSK: calves soft   : no Indwelling catheter   Neuro:verbal; appropriate     Motor:  Normal bulk and tone  Strength is 5/5 in BUE, 4-/5 bilat HF, 5/5 bilat KF/KE, 5/5 bilat DF/PF       MEDICATIONS  Scheduled Meds:bisacodyl, 10 mg, Rectal, Daily  enoxaparin, 40 mg, Subcutaneous, Q24H  gabapentin, 300 mg, Oral, Daily  gabapentin, 600 mg, Oral, Nightly  losartan-HCTZ (HYZAAR) 100-25 combo dose, , Oral, Daily  montelukast, 10 mg, Oral, Daily  polyethylene glycol, 17 g, Oral, Daily  senna-docusate sodium, 1 tablet, Oral, BID      Continuous Infusions:   PRN Meds:.•  acetaminophen  •  baclofen  •  clonazePAM  •  HYDROcodone-acetaminophen  •  HYDROcodone-acetaminophen  •  polyethylene glycol      RESULTS  No results found for: POCGLU  Results from last 7 days   Lab Units 10/10/22  1451 10/07/22  0416 10/05/22  2007 10/05/22  0236    WBC 10*3/mm3 4.86 7.30  --  7.40   HEMOGLOBIN g/dL 7.7* 8.8*  --  12.1*   HEMOGLOBIN, POC g/dL  --   --  9.5*  --    HEMATOCRIT % 23.2* 25.8*  --  34.6*   HEMATOCRIT POC %  --   --  28*  --    PLATELETS 10*3/mm3 236 143  --  163     Results from last 7 days   Lab Units 10/10/22  1451 10/07/22  0416 10/05/22  0236   SODIUM mmol/L 136 137 138   POTASSIUM mmol/L 3.2* 3.6 3.8   CHLORIDE mmol/L 99 98 101   CO2 mmol/L 30.0* 28.0 25.0   BUN mg/dL 13 15 15   CREATININE mg/dL 0.97 0.89 0.98   CALCIUM mg/dL 8.5* 8.5* 8.5*   BILIRUBIN mg/dL  --  0.8  --    ALK PHOS U/L  --  52  --    ALT (SGPT) U/L  --  16  --    AST (SGOT) U/L  --  43*  --    GLUCOSE mg/dL 112* 117* 138*           ASSESSMENT and PLAN    Spinal stenosis of lumbar region with neurogenic claudication    Status post October 4 October 5, 2022-staged front and back operation consisting of a lateral interbody fusion at L2-L3 and L3-L4 on 10/4/2022 followed by pedicle screw fixation using the robot at L to to L4 and decompression       #Rehabilitation:   Patient now with moderate to severe disability due to impairments of mobility and self care: PT/ OT/SLP evaluation and treatment for mobility training, self-care skills training, pain management, equipment assessment, safety assessment and training, cognitive/swallowing evaluation and caregiver training for a safe discharge. This would be an interdisciplinary program with physical therapy 1 hour,  occupational therapy 1 hour, and speech therapy 1 hour, 5 days a week.  Rehabilitation nursing for carryover. Weekly team conferences.  Ongoing physician follow-up . Goals are to achieve a level of independence with  mobility and self-care and improved endurance.   Rehabilitation prognosis good.  Medical prognosis good Estimated length of stay is approximately 15 days , but is only an estimation.        # Safety and Precautions.   -Fall, Skin.      #Diet:   -red diet      #DVT ppx:  - Lovenox. SCDs    # anemia -   Oct 7 -  HGB 8.8  Oct 10 - HGB 7.7. Post op anemia. Will add PPI.      #HTN  losartan. /Hydrochlorothiazide  October 10-systolic blood pressure into the 70s yesterday.  Decrease meds to losartan 50 mg daily, hold for systolic blood pressure less than 120  Recheck CBC/BUN/creatinine - HGB trend to 7.7     #XOL  atorvastatin, 10 mg, Oral, Daily-patient states intolerance a decade ago with severe myalgia-discontinued    #pain-[place] report reviewed  gabapentin, 300 mg, Oral, Daily  gabapentin, 600 mg, Oral, Nightly    #Chronic benzodiazepime use-clonazepam     #h/o Asthma  montelukast, 10 mg, Oral, Daily     # bladder:   - Continent, monitor.  - PVRs, IC, time voids      # Bowel: continent.   -Bisacodyl prn , senna S , miralax prn      # Pressure injury prophylaxis:  - offloading, optimize nutrition , oob with PT.      # @ risk for orthostatic hypotension:   -Monitor VS regularly.      # Dispo:  - Home with family.           Adam Torres MD      During rounds, used appropriate personal protective equipment including mask and gloves.  Additional gown if indicated.  Mask used was standard procedure mask. Appropriate PPE was worn during the entire visit.  Hand hygiene was completed before and after.

## 2022-10-10 NOTE — PLAN OF CARE
Goal Outcome Evaluation:      Pt is A/Ox4, calm/cooperative, OOB x 1 w CGA w rwx. Meds taken whole w thin liquids. Pt is continent. Pt c/o pain to back; prn Norco given x2. Back and lateral flank dressings C/D/I.

## 2022-10-10 NOTE — OP NOTE
Neurosurgical operative report:    Patient name: Omar Choudhary  Medical record number: [id number]  Date of procedure: October 5, 2022    Preoperative diagnosis: L2-L4 spinal canal and neuroforaminal stenosis resulting in neurogenic claudication and radiculopathy    Postoperative diagnosis: Same as above    Procedure performed: L2-L4 pedicle fixation and posterior lateral arthrodesis, L2-L4 bilateral laminectomy and bilateral facetectomies, utilization of intraoperative neuro monitoring, utilization of intraoperative neuro navigation and robotics, utilization of biological allograft, utilization of autograft    Surgeon: Dr. John Do  Assistant: Thu Clements  Anesthesia: General endotracheal anesthetic  Specimens: None  Blood loss: 1100 cc  Drains: 7 flat epidural ADE  Complications: None immediate    Indications: This is a 77-year-old gentleman who underwent a direct lateral interbody fusion at L2-L4 yesterday.  He was scheduled for a planned posterior surgical intervention today.  He did well from the first surgical intervention.  He was found to be appropriate for proceeding with a second portion of the surgical intervention.    Description of procedure: The patient was identified in the preoperative holding area via name and medical record number.  His history, physical exam, consent were all reviewed and found to be correct and appropriate for proceeding with surgery.  He was marked over the intended surgical site.  He was brought back to the operating room and handed over to anesthesia for induction of general endotracheal anesthetic.  He was transferred from the hospital bed to the operating room table in the prone position.  His back was shaved of hair and cleaned with chlorhexidine and alcohol.  Incision was marked out based on anatomic and radiographic guidance.  Local anesthetic was instilled.  At this time the surgeon scrubbed in the area was prepped and draped in the usual sterile fashion.   Timeout was performed and all categories were found to be correct and appropriate for proceeding with surgery.  Incision was made with 15 blade and carried down with Bovie electrocautery.  Self-retaining retractor was brought into provide adequate visualization.  Schmitz instrument was utilized to clear off the fascia.  The fascia was opened up along the spinous processes of L2, L3, L4.  Subperiosteal dissection was accomplished bilaterally along the spinous process and lamina of the indicated levels.  Radiographic imaging confirmed proper levels and subsequent dissection was extended lateral to the L2-L3 and L3-L4 facet joints bilaterally.  Self-retaining retractors were repositioned in order to provide adequate visualization.  Navigation reference array was secured to the patient's posterior superior iliac spine.  X-rays were obtained and imaging was fused to the preoperative CT scan.  This was confirmed to be accurate and precise.  The robot was brought in for placement of pedicle screws.  Under neuro navigation guidance pedicle screws were placed from L2-L4 bilaterally.  A  hole was drilled along the intended trajectory followed by a drill traversing the pedicle into the vertebral body.  This pathway was tapped to prepare it for placement of a screw.  The pathway was probed to confirm absence of breach.  A pedicle screw was placed along this intended trajectory with assistance of navigation.  The L2 screws were both 5.5 mm x 50 mm.  The left-sided L3 screw was 6.5 mm x 45 mm.  The right L3 screw was 5.5 mm x 50 mm.  The L4 screws were 6.5 mm x 50 mm on the left and 6.5 mm x 55 mm on the right.  All of the screws stimulated above intended levels not indicative of nerve root irritation.  The wound was copiously irrigated and hemostasis was obtained.  Leksell rongeur and power drill bit as well as Kerrison bone punches were utilized to complete bilateral laminectomies at L2-L3 and L3-L4.  The thecal sac was fully  exposed.  The pars was disarticulated bilaterally at L2-L3 and L3-L4 for bilateral facetectomies.  The L2, L3, shoulder of L4 nerve roots were skeletonized to confirm they are fully decompressed bilaterally.  The wound was copiously irrigated and hemostasis was obtained.  We did elect to exchange the right sided L3 screw to a 5.5 mm x 45 mm after removing some of the overriding facet joint.  X-rays confirmed placement of this new screw.  The screw was restimulated and no indication of EMG response occurred at low stimulation levels.  60 mm rods were deemed to be appropriate in size and placed into the screw heads.  Setscrews were utilized to secure them in place.  These were torqued in order to secure them.  Decortication had been completed and posterior lateral autograft and allograft were placed into the gutters.  The reference array was removed and a 7 flat epidural ADE drain was placed.  Vancomycin powder was placed into the wound.  The self-retaining retractor was removed.  The muscle was reapproximated with 0 Vicryl stitches.  The fascia was closed with interrupted 0 Vicryl stitches.  The subcutaneous tissue was closed with interrupted 2-0 Vicryl stitches and the skin was brought together with a running V-Loc Monocryl.  The ADE drain was secured in place.  Incision was closed with skin glue and covered arm.  The drapes were taken down and the patient was transferred back to the hospital bed in the supine position.  He was extubated intraoperatively and he was transported to the postanesthesia care unit.  At the time of him being dropped off at that location no complications were evident.  At the end of the case all counts were found to be correct.  Of note there were no changes in sensory neuro monitoring status throughout the entirety of the procedure.  The assistance of the surgical first assist was essential for successful completion of surgical intervention including assistance with opening, closing,  suction, retraction.  End of dictation.  Thank you very much.

## 2022-10-10 NOTE — PROGRESS NOTES
Case Management  Inpatient Rehabilitation Plan of Care and Discharge Plan Note    Rehabilitation Diagnosis:  Jayjay  Date of Onset:  Jayjay    Medical Summary:  Jayjay  Past Medical History: Jayjay    Plan of Care  Updated Problems/Interventions  Field    Expected Intensity:  Jayjay  Interdisciplinary Team:  Jayjay  Estimated Length of Stay/Anticipated Discharge Date: Branch  Anticipated Discharge Destination:  Anticipated discharge destination from inpatient rehabilitation is community  discharge with assistance. Patient lives independently in a 2 story home.  Patient will move into his significant other's 2 story upon d/c and there are 8  steps to enter and 14 steps to the bedroom on the 2nd floor. Patient to d/c home  with significant other who can provide assistance, but does have a planned  vacation at the end of the month.      Based on the patient's medical and functional status, their prognosis and  expected level of functional improvement is:  Jayjay    Signed by: CHRISTINA Melvin

## 2022-10-10 NOTE — PLAN OF CARE
Goal Outcome Evaluation:  Plan of Care Reviewed With: patient        Progress: improving  Outcome Evaluation: Pt has been up in the WC most of the shift. back incs intact with medipore dressing. No drng noted. Norco given for pain.

## 2022-10-10 NOTE — PROGRESS NOTES
SECTION GG    Self Care Performance:   Oral Hygiene: [place] sets up or cleans up; patient completes activity. [place]  assists only prior to or following the activity.   Toileting Hygiene: [place] provides verbal cues and/or touching/steadying and/or  contact guard assistance as patient completes activity.   Shower/Bathe Self: [place] provides verbal cues and/or touching/steadying and/or  contact guard assistance as patient completes activity.   Upper Body Dressing: [place] provides verbal cues and/or touching/steadying  and/or contact guard assistance as patient completes activity.   Lower Body Dressing: [place]ague does less than half the effort. [place] lifts, holds  or supports trunk or limbs but provides less than half the effort.   Putting On/Taking Off Footwear: [place]ague does less than half the effort. [place]  lifts, holds or supports trunk or limbs but provides less than half the effort.    Self Care Discharge Goals:   Oral Hygiene: Patient completed the activities by him/herself with no  assistance from a helper.   Toileting Hygiene: Patient completed the activities by him/herself with no  assistance from a helper.   Upper Body Dressing: Patient completed the activities by him/herself with no  assistance from a helper.   Toileting Hygiene: Patient completed the activities by him/herself with no  assistance from a helper.   Upper Body Dressing: Patient completed the activities by him/herself with no  assistance from a helper.   Putting On/Taking Off Footwear: Patient completed the activities by him/herself  with no assistance from a helper.   Lower Body Dressing: Patient completed the activities by him/herself with no  assistance from a helper.    Mobility Toilet Transfer Performance: [place] provides verbal cues or  touching/steadying assistance as patient completes activity.    Mobility Toilet Transfer Discharge Goal: Patient completed the activities by  him/herself with no assistance from a helper.    Signed by: Jose  Lilia, OTR/L

## 2022-10-11 LAB
ANION GAP SERPL CALCULATED.3IONS-SCNC: 9.8 MMOL/L (ref 5–15)
BASOPHILS # BLD AUTO: 0.02 10*3/MM3 (ref 0–0.2)
BASOPHILS NFR BLD AUTO: 0.5 % (ref 0–1.5)
BUN SERPL-MCNC: 11 MG/DL (ref 8–23)
BUN/CREAT SERPL: 15.1 (ref 7–25)
CALCIUM SPEC-SCNC: 8.8 MG/DL (ref 8.6–10.5)
CHLORIDE SERPL-SCNC: 101 MMOL/L (ref 98–107)
CO2 SERPL-SCNC: 31.2 MMOL/L (ref 22–29)
CREAT SERPL-MCNC: 0.73 MG/DL (ref 0.76–1.27)
DEPRECATED RDW RBC AUTO: 43.5 FL (ref 37–54)
EGFRCR SERPLBLD CKD-EPI 2021: 94.9 ML/MIN/1.73
EOSINOPHIL # BLD AUTO: 0.25 10*3/MM3 (ref 0–0.4)
EOSINOPHIL NFR BLD AUTO: 6.7 % (ref 0.3–6.2)
ERYTHROCYTE [DISTWIDTH] IN BLOOD BY AUTOMATED COUNT: 13.1 % (ref 12.3–15.4)
GLUCOSE SERPL-MCNC: 123 MG/DL (ref 65–99)
HCT VFR BLD AUTO: 22.1 % (ref 37.5–51)
HGB BLD-MCNC: 7.3 G/DL (ref 13–17.7)
IMM GRANULOCYTES # BLD AUTO: 0.03 10*3/MM3 (ref 0–0.05)
IMM GRANULOCYTES NFR BLD AUTO: 0.8 % (ref 0–0.5)
LYMPHOCYTES # BLD AUTO: 0.8 10*3/MM3 (ref 0.7–3.1)
LYMPHOCYTES NFR BLD AUTO: 21.4 % (ref 19.6–45.3)
MCH RBC QN AUTO: 30.4 PG (ref 26.6–33)
MCHC RBC AUTO-ENTMCNC: 33 G/DL (ref 31.5–35.7)
MCV RBC AUTO: 92.1 FL (ref 79–97)
MONOCYTES # BLD AUTO: 0.51 10*3/MM3 (ref 0.1–0.9)
MONOCYTES NFR BLD AUTO: 13.7 % (ref 5–12)
NEUTROPHILS NFR BLD AUTO: 2.12 10*3/MM3 (ref 1.7–7)
NEUTROPHILS NFR BLD AUTO: 56.9 % (ref 42.7–76)
NRBC BLD AUTO-RTO: 0 /100 WBC (ref 0–0.2)
PLATELET # BLD AUTO: 226 10*3/MM3 (ref 140–450)
PMV BLD AUTO: 9.3 FL (ref 6–12)
POTASSIUM SERPL-SCNC: 3.8 MMOL/L (ref 3.5–5.2)
RBC # BLD AUTO: 2.4 10*6/MM3 (ref 4.14–5.8)
SODIUM SERPL-SCNC: 142 MMOL/L (ref 136–145)
WBC NRBC COR # BLD: 3.73 10*3/MM3 (ref 3.4–10.8)

## 2022-10-11 PROCEDURE — 80048 BASIC METABOLIC PNL TOTAL CA: CPT | Performed by: PHYSICAL MEDICINE & REHABILITATION

## 2022-10-11 PROCEDURE — 85025 COMPLETE CBC W/AUTO DIFF WBC: CPT | Performed by: PHYSICAL MEDICINE & REHABILITATION

## 2022-10-11 PROCEDURE — 25010000002 ENOXAPARIN PER 10 MG: Performed by: PHYSICAL MEDICINE & REHABILITATION

## 2022-10-11 PROCEDURE — 97530 THERAPEUTIC ACTIVITIES: CPT

## 2022-10-11 PROCEDURE — 97535 SELF CARE MNGMENT TRAINING: CPT

## 2022-10-11 PROCEDURE — 97110 THERAPEUTIC EXERCISES: CPT

## 2022-10-11 RX ORDER — LOSARTAN POTASSIUM 25 MG/1
25 TABLET ORAL
Status: DISCONTINUED | OUTPATIENT
Start: 2022-10-12 | End: 2022-10-14 | Stop reason: HOSPADM

## 2022-10-11 RX ORDER — HYDROCODONE BITARTRATE AND ACETAMINOPHEN 7.5; 325 MG/1; MG/1
1 TABLET ORAL 2 TIMES DAILY PRN
COMMUNITY
End: 2022-10-14 | Stop reason: HOSPADM

## 2022-10-11 RX ORDER — BUDESONIDE AND FORMOTEROL FUMARATE DIHYDRATE 80; 4.5 UG/1; UG/1
2 AEROSOL RESPIRATORY (INHALATION)
Status: DISCONTINUED | OUTPATIENT
Start: 2022-10-11 | End: 2022-10-14 | Stop reason: HOSPADM

## 2022-10-11 RX ORDER — CLONAZEPAM 0.5 MG/1
0.5 TABLET ORAL 2 TIMES DAILY PRN
COMMUNITY
End: 2022-11-16

## 2022-10-11 RX ORDER — TRIAMCINOLONE ACETONIDE 0.25 MG/G
1 CREAM TOPICAL 2 TIMES DAILY
COMMUNITY

## 2022-10-11 RX ORDER — BISACODYL 10 MG
10 SUPPOSITORY, RECTAL RECTAL DAILY PRN
Status: DISCONTINUED | OUTPATIENT
Start: 2022-10-11 | End: 2022-10-14 | Stop reason: HOSPADM

## 2022-10-11 RX ORDER — BUDESONIDE AND FORMOTEROL FUMARATE DIHYDRATE 160; 4.5 UG/1; UG/1
2 AEROSOL RESPIRATORY (INHALATION) 2 TIMES DAILY
COMMUNITY
End: 2023-03-07 | Stop reason: ALTCHOICE

## 2022-10-11 RX ADMIN — DOCUSATE SODIUM 50MG AND SENNOSIDES 8.6MG 1 TABLET: 8.6; 5 TABLET, FILM COATED ORAL at 22:09

## 2022-10-11 RX ADMIN — PANTOPRAZOLE SODIUM 40 MG: 40 TABLET, DELAYED RELEASE ORAL at 05:48

## 2022-10-11 RX ADMIN — POLYETHYLENE GLYCOL 3350 17 G: 17 POWDER, FOR SOLUTION ORAL at 05:53

## 2022-10-11 RX ADMIN — HYDROCODONE BITARTRATE AND ACETAMINOPHEN 2 TABLET: 7.5; 325 TABLET ORAL at 17:03

## 2022-10-11 RX ADMIN — ENOXAPARIN SODIUM 40 MG: 100 INJECTION SUBCUTANEOUS at 11:53

## 2022-10-11 RX ADMIN — GABAPENTIN 300 MG: 300 CAPSULE ORAL at 08:10

## 2022-10-11 RX ADMIN — BUDESONIDE AND FORMOTEROL FUMARATE DIHYDRATE 2 PUFF: 80; 4.5 AEROSOL RESPIRATORY (INHALATION) at 20:05

## 2022-10-11 RX ADMIN — GABAPENTIN 600 MG: 300 CAPSULE ORAL at 22:09

## 2022-10-11 RX ADMIN — MONTELUKAST SODIUM 10 MG: 10 TABLET, FILM COATED ORAL at 08:10

## 2022-10-11 RX ADMIN — HYDROCODONE BITARTRATE AND ACETAMINOPHEN 2 TABLET: 7.5; 325 TABLET ORAL at 11:14

## 2022-10-11 RX ADMIN — HYDROCODONE BITARTRATE AND ACETAMINOPHEN 1 TABLET: 7.5; 325 TABLET ORAL at 22:09

## 2022-10-11 RX ADMIN — POTASSIUM CHLORIDE 20 MEQ: 750 TABLET, EXTENDED RELEASE ORAL at 08:10

## 2022-10-11 RX ADMIN — LOSARTAN POTASSIUM 50 MG: 50 TABLET, FILM COATED ORAL at 08:10

## 2022-10-11 RX ADMIN — HYDROCODONE BITARTRATE AND ACETAMINOPHEN 2 TABLET: 7.5; 325 TABLET ORAL at 05:48

## 2022-10-11 NOTE — THERAPY TREATMENT NOTE
Inpatient Rehabilitation - Occupational Therapy Treatment Note    [facility]     Patient Name: Omar Choudhary  : 1945  MRN: [id number]    Today's Date: 10/11/2022                 Admit Date: 10/8/2022         ICD-10-CM ICD-9-CM   1. Impaired gait and mobility  R26.89 781.2       Patient Active Problem List   Diagnosis   • Benign prostatic hyperplasia   • Bursitis   • Depression   • Diverticulosis   • Family history of malignant neoplasm of breast   • Hyperlipidemia   • Hypertension   • Internal derangement of right knee   • Knee effusion   • Asthma   • Obstructive sleep apnea syndrome   • Osteoarthritis   • Osteopenia   • Pain in knee   • Postnasal drip   • Prepatellar bursitis   • Sciatica of right side   • Diaphragm paralysis   • Spinal stenosis of lumbar region with neurogenic claudication   • Degenerative lumbar spinal stenosis   • Anxiety   • Pinguecula of right eye   • Lumbar radiculopathy, chronic       Past Medical History:   Diagnosis Date   • Allergic    • Arthritis    • Asthma    • Carpal tunnel syndrome     no pain   • Cataract    • Depression    • Family history of malignant neoplasm of breast 2019   • Hemidiaphragm paralysis     Left   • Hyperlipidemia 2019   • Hypertension 2012   • Leg pain, right    • Low back pain    • Neuropathy     feet   • Osteopenia    • Poor balance    • Reactive airway disease 01/15/2016   • Shortness of breath    • Sleep apnea, obstructive     CPAP- to bring dos       Past Surgical History:   Procedure Laterality Date   • KNEE ARTHROSCOPY W/ ACL RECONSTRUCTION Right 2019   • MOUTH SURGERY               IRF OT ASSESSMENT FLOWSHEET (last 12 hours)     IRF OT Evaluation and Treatment     Row Name 10/11/22 1608          OT Time and Intention    Document Type daily treatment  -DN     Mode of Treatment occupational therapy  -DN     Patient Effort good  -DN     Row Name 10/11/22 1608          Pain Assessment    Pretreatment Pain Rating 6/10  -DN      Posttreatment Pain Rating 6/10  -DN     Pain Location incisional  -DN     Pain Location - back  -DN     Row Name 10/11/22 1608          Cognition/Psychosocial    Affect/Mental Status (Cognition) WNL  -DN     Personal Safety Interventions fall prevention program maintained;gait belt  -DN     Row Name 10/11/22 1608          Bathing    [place] Level (Bathing) bathing skills;supervision  -DN     Assistive Device (Bathing) hand held shower spray hose;grab bar/tub rail;shower chair  -DN     Position (Bathing) supported sitting;supported standing  -DN     Row Name 10/11/22 1608          Upper Body Dressing    [place] Level (Upper Body Dressing) upper body dressing skills;doff;don;pull over garment;set up assistance  -DN     Position (Upper Body Dressing) supported sitting  -DN     Set-up Assistance (Upper Body Dressing) orthosis application  sba with don and doff brace  -DN     Row Name 10/11/22 1608          Lower Body Dressing    [place] Level (Lower Body Dressing) doff;don;pants/bottoms;shoes/slippers;socks;underwear;supervision;verbal cues  -DN     Position (Lower Body Dressing) supported sitting  -DN     Row Name 10/11/22 1608          Grooming    [place] Level (Grooming) grooming skills;deodorant application;hair care, combing/brushing;oral care regimen;supervision  -DN     Position (Grooming) supported standing  -DN     Set-up Assistance (Grooming) obtain supplies  -DN     Row Name 10/11/22 1608          Toileting    [place] Level (Toileting) toileting skills;contact guard assist  -DN     Assistive Device Use (Toileting) grab bar/safety frame;raised toilet seat  -DN     Position (Toileting) supported sitting;supported standing  -DN     Row Name 10/11/22 1608          Balance    Dynamic Standing Balance contact guard;supervision  kitchen mobility with getting food items for fridge to table at RWX level, proper way to sit at table with chair with back at RWX level  -DN     Comment, Balance pt  able to stand at washing machine to load clothing and soap  -DN     Row Name 10/11/22 1608          Positioning and Restraints    Pre-Treatment Position in bed  -DN     Post Treatment Position wheelchair  -DN     In Chair sitting;call light within reach;encouraged to call for assist  -DN           User Key  (r) = Recorded By, (t) = Taken By, (c) = Cosigned By    Initials Name Effective Dates    Jose Cunningham OT 06/16/21 -                  Occupational Therapy Education     Title: PT OT SLP Therapies (Done)     Topic: Occupational Therapy (Done)     Point: ADL training (Done)     Description:   Instruct learner(s) on proper safety adaptation and remediation techniques during self care or transfers.   Instruct in proper use of assistive devices.              Learning Progress Summary           Patient Acceptance, E, VU by DN at 10/10/2022 1546    Comment: precautions per sx, adaptive tech with adls and transfer training                   Point: Home exercise program (Done)     Description:   Instruct learner(s) on appropriate technique for monitoring, assisting and/or progressing therapeutic exercises/activities.              Learning Progress Summary           Patient Acceptance, E, VU by DN at 10/10/2022 1546    Comment: precautions per sx, adaptive tech with adls and transfer training                   Point: Precautions (Done)     Description:   Instruct learner(s) on prescribed precautions during self-care and functional transfers.              Learning Progress Summary           Patient Acceptance, E, VU by DN at 10/10/2022 1546    Comment: precautions per sx, adaptive tech with adls and transfer training                   Point: Body mechanics (Done)     Description:   Instruct learner(s) on proper positioning and spine alignment during self-care, functional mobility activities and/or exercises.              Learning Progress Summary           Patient Acceptance, E, VU by DN at 10/10/2022 1546    Comment:  precautions per sx, adaptive tech with adls and transfer training                               User Key     Initials Effective Dates Name Provider Type Discipline    LESTER 06/16/21 -  Jose Rodrigues OT Occupational Therapist OT                    OT Recommendation and Plan    Planned Therapy Interventions (OT): activity tolerance training, adaptive equipment training, BADL retraining, strengthening exercise, transfer/mobility retraining, ROM/therapeutic exercise                    Time Calculation:      Time Calculation- OT     Row Name 10/11/22 1330 10/11/22 0800          Time Calculation- OT    OT Start Time 1330  -DN 0800  -DN     OT Stop Time 1400  -DN 0900  -DN     OT Time Calculation (min) 30 min  -DN 60 min  -DN           User Key  (r) = Recorded By, (t) = Taken By, (c) = Cosigned By    Initials Name Provider Type    DN Jose Rodrigues OT Occupational Therapist              Therapy Charges for Today     Code Description Service Date Service Provider Modifiers Qty    77291437830 HC OT EVAL MOD COMPLEXITY 3 10/10/2022 Jose Rodrigues OT GO 1    08681158878 HC OT SELF CARE/MGMT/TRAIN EA 15 MIN 10/10/2022 Jose Rodrigues OT GO 4    85941704996 HC OT SELF CARE/MGMT/TRAIN EA 15 MIN 10/11/2022 Jose Rodrigues OT GO 6                   Jose Rodrigues OT  10/11/2022

## 2022-10-11 NOTE — PROGRESS NOTES
Case Management  Inpatient Rehabilitation Team Conference    Conference Date/Time: 10/11/2022 8:58:31 AM    Team Conference Attendees:  Mayra Chiu, Pharmacist  Catherine Lyons, MSW  Rosemary Nash, PT  Jose Rodrigues, OT  Carol Ley, CTRS  Ying Burton RD, LD  Chayo Treviño, RN  Chaplain Clemente    Demographics            Age: 77Y            Gender: Male    Admission Date: 10/8/2022 5:26:00 PM  Rehabilitation Diagnosis:  Spinal stenosis of lumbar region with neurogenic  claudication?  ?  Past Medical History: Past Medical History:  DiagnosisDate  ?Allergic?  ?Arthritis?  ?Asthma?  ?Carpal tunnel syndrome?  ?no pain  ?Cataract?  ?Depression?  ?Family history of malignant neoplasm of xvetmn3209/26/2019  ?Hemidiaphragm paralysis?  ?Left  ?Euxjkxtfvwtfvn67/26/2019  ?Tpyxcraexqlx89/01/2012  ?Leg pain, right?  ?Low back pain?  ?Neuropathy?  ?feet  ?Osteopenia?  ?Poor balance?  ?Reactive airway qxrlufn94/15/2016  ?Shortness of breath?  ?Sleep apnea, obstructive?  ?CPAP- to bring dos    ?  Surgical History  Past Surgical History:  ProcedureLateralityDate  ?KNEE ARTHROSCOPY W/ ACL [id number]  ?MOUTH SURGERY      Plan of Care  Anticipated Discharge Date/Estimated Length of Stay: 15 days  Anticipated Discharge Destination: Community discharge with assistance  Discharge Plan : Patient lives independently in a 2 story home. Patient will  move into his significant other's 2 story upon d/c and there are 8 steps to  enter and 14 steps to the bedroom on the 2nd floor. Patient to d/c home with  significant other who can provide assistance, but does have a planned vacation  at the end of the month.  Medical Necessity Expected Level Rationale: Goals are to achieve a level of  independence with mobility and self-care and improved endurance.  Rehabilitation  prognosis good.  Medical prognosis good.  Intensity and Duration: an average of 3 hours/5 days per week  Medical Supervision and  24 Hour Rehab Nursing: x  Physical Therapy: x  PT Intensity/Duration: 1.5 hours / day, 5 days / week, for approximately 15  days.  Occupational Therapy: x  OT Intensity/Duration: 1.5 hours / day, 5 days / week, for approximately 15  days.  Social Work: x  Therapeutic Recreation: x  Registered Dietician: x  Updated (if changes indicated)    Anticipated Discharge Date/Estimated Length of Stay:   Discharge: Friday,  10/14/22    Based on the patient's medical and functional status, their prognosis and  expected level of functional improvement is: Goals are to achieve a level of  independence with mobility and self-care and improved endurance.  Rehabilitation  prognosis good.  Medical prognosis good.      Interdisciplinary Problem/Goals/Status    All Rehab Problems:  Body Systems    [RN] Integumentary(Active)  Current Status(10/09/2022): Back & Lt. sided Incisions from Lumbar spine surgery  Weekly Goal(10/16/2022): Incisions healing & no signs of infection.  Discharge Goal: Incisions healed        Mobility    [OT] Toilet Transfers(Active)  Current Status(10/10/2022): CGA  Weekly Goal(10/14/2022): Mod Indep  Discharge Goal: Mod Indep RWX    [OT] Tub/Shower Transfers(Active)  Current Status(10/10/2022): tba  Weekly Goal(10/14/2022): tba  Discharge Goal: tba    [PT] Bed/Chair/Wheelchair(Active)  Current Status(10/11/2022): SBA  Weekly Goal(10/18/2022): SUP  Discharge Goal: CHELLY    [PT] Bed Mobility(Active)  Current Status(10/11/2022): SBA  Weekly Goal(10/18/2022): CHELLY  Discharge Goal: CHELLY    [PT] Walk(Active)  Current Status(10/11/2022): ' w/ FWW  Weekly Goal(10/18/2022): BR with NSG  Discharge Goal: ' w/ FWW    [PT] Stairs(Active)  Current Status(10/11/2022): 8 stairs BUE, CGA  Weekly Goal(10/18/2022): PT ONLY  Discharge Goal: 14 steps 1HR CGA        Psychosocial    [RN] Coping/Adjustment(Active)  Current Status(10/11/2022): Expresses appropriate coping  Weekly Goal(10/18/2022): Allow opportunity to express  concerns regarding current  status.  Discharge Goal: Adequate coping regarding life changes with ongoing support.        Safety    [RN] Potential for Injury(Active)  Current Status(10/11/2022): Uses call light appropriately  Weekly Goal(10/18/2022): Instruct patient/family regarding safety precautions &  need for close supervision.  Discharge Goal: Patient/ family will be aware of risk of fall & safety in the  home setting.        Self Care    [OT] Bathing(Active)  Current Status(10/10/2022): SBA  Weekly Goal(10/14/2022): Mod Indep  Discharge Goal: Mod Indep    [OT] Dressing (Lower)(Active)  Current Status(10/10/2022): CGA  Weekly Goal(10/14/2022): Mod Indep  Discharge Goal: Mod Indep    [OT] Dressing (Upper)(Active)  Current Status(10/10/2022): setup  Weekly Goal(10/14/2022): Mod Indep  Discharge Goal: Mod indep    [OT] Grooming(Active)  Current Status(10/10/2022): SBA standing at sink  Weekly Goal(10/14/2022): Mod Indep  Discharge Goal: Mod Indep    [OT] Toileting(Active)  Current Status(10/10/2022): CGA  Weekly Goal(10/14/2022): Mod Indep  Discharge Goal: Mod Indep        Sphincter Control    [RN] Bladder & bowel management(Active)  Current Status(10/11/2022): Mostly continent of bladder & bowels  Weekly Goal(10/18/2022): Continent 100% of bladder & bowels  Discharge Goal: Remains 100% Continent        Comments: 10/11/22 Pain limiting factor.Has back brace. Aware of precautions.    Signed by: LUKE Cunha    Physician CoSigned By: Adam Torres 10/11/2022 10:42:02

## 2022-10-11 NOTE — PROGRESS NOTES
Inpatient Rehabilitation Plan of Care Note    Plan of Care  Care Plan Reviewed - No updates at this time.    Safety    Performed Intervention(s)  Items within reach, Bed/chair alarms, safety rounds, non-skid socks, & gait  belt.  safety rounds      Psychosocial    Performed Intervention(s)  Verbalizes needs & concerns      Sphincter Control    Performed Intervention(s)  Monitor intake, output, & BM's each shift.  Encourage appropriate diet, & fluid  intake.      Body Systems    Performed Intervention(s)  Daily skin inspections, dressing changes & wound care as needed.    Signed by: Chayo Treviño RN

## 2022-10-11 NOTE — PLAN OF CARE
Goal Outcome Evaluation:  Plan of Care Reviewed With: patient        Progress: improving  Outcome Evaluation: Back incision intact with optifoam drsssings. Pain meds as needed. Dr. Torres monitoring low hemoglobin.

## 2022-10-11 NOTE — THERAPY TREATMENT NOTE
Inpatient Rehabilitation - Physical Therapy Treatment Note       [facility]     Patient Name: Omar Choudhary  : 1945  MRN: [id number]    Today's Date: 10/11/2022                    Admit Date: 10/8/2022      Visit Dx:     ICD-10-CM ICD-9-CM   1. Impaired gait and mobility  R26.89 781.2       Patient Active Problem List   Diagnosis   • Benign prostatic hyperplasia   • Bursitis   • Depression   • Diverticulosis   • Family history of malignant neoplasm of breast   • Hyperlipidemia   • Hypertension   • Internal derangement of right knee   • Knee effusion   • Asthma   • Obstructive sleep apnea syndrome   • Osteoarthritis   • Osteopenia   • Pain in knee   • Postnasal drip   • Prepatellar bursitis   • Sciatica of right side   • Diaphragm paralysis   • Spinal stenosis of lumbar region with neurogenic claudication   • Degenerative lumbar spinal stenosis   • Anxiety   • Pinguecula of right eye   • Lumbar radiculopathy, chronic       Past Medical History:   Diagnosis Date   • Allergic    • Arthritis    • Asthma    • Carpal tunnel syndrome     no pain   • Cataract    • Depression    • Family history of malignant neoplasm of breast 2019   • Hemidiaphragm paralysis     Left   • Hyperlipidemia 2019   • Hypertension 2012   • Leg pain, right    • Low back pain    • Neuropathy     feet   • Osteopenia    • Poor balance    • Reactive airway disease 01/15/2016   • Shortness of breath    • Sleep apnea, obstructive     CPAP- to bring dos       Past Surgical History:   Procedure Laterality Date   • KNEE ARTHROSCOPY W/ ACL RECONSTRUCTION Right 2019   • MOUTH SURGERY         PT ASSESSMENT (last 12 hours)     [facility] PT Evaluation and Treatment     Row Name 10/11/22 1021          PT Time and Intention    Document Type daily treatment  -DP     Mode of Treatment individual therapy;physical therapy  -DP     Patient/Family/Caregiver Comments/Observations Pt side-lying in bed upon PT arrival and agreeable to PT  -DP      Row Name 10/11/22 1021          General Information    Patient Profile Reviewed yes  -DP     Existing Precautions/Restrictions fall;spinal  -DP     Row Name 10/11/22 1021          Pain Assessment    Pretreatment Pain Rating 8/10  -DP     Posttreatment Pain Rating 8/10  -DP     Pain Location incisional  -DP     Pain Location - back  -DP     Row Name 10/11/22 1021          Cognition/Psychosocial    Affect/Mental Status (Cognition) WNL  -DP     Orientation Status (Cognition) oriented x 3  -DP     Follows Commands (Cognition) follows two-step commands  -DP     Personal Safety Interventions safety round/check completed;elopement precautions initiated;gait belt;fall prevention program maintained;muscle strengthening facilitated;nonskid shoes/slippers when out of bed;supervised activity  -DP     Row Name 10/11/22 1021          Bed Mobility    Bed Mobility rolling left;supine-sit  -DP     Rolling Left Burt (Bed Mobility) supervision  -DP     Supine-Sit Burt (Bed Mobility) supervision  -DP     Comment, (Bed Mobility) performed x2  -DP     Row Name 10/11/22 1021          Transfer Assessment/Treatment    Transfers bed-chair transfer;sit-stand transfer;stand-sit transfer;car transfer  -DP     Row Name 10/11/22 1021          Bed-Chair Transfer    Bed-Chair Burt (Transfers) standby assist  -DP     Assistive Device (Bed-Chair Transfers) walker, front-wheeled  -DP     Row Name 10/11/22 1021          Chair-Bed Transfer    Chair-Bed Burt (Transfers) standby assist  -DP     Assistive Device (Chair-Bed Transfers) walker, front-wheeled  -DP     Row Name 10/11/22 1021          Sit-Stand Transfer    Sit-Stand Burt (Transfers) standby assist  -DP     Assistive Device (Sit-Stand Transfers) walker, front-wheeled  -DP     Row Name 10/11/22 1021          Stand-Sit Transfer    Stand-Sit Burt (Transfers) standby assist  -DP     Assistive Device (Stand-Sit Transfers) walker, front-wheeled  -DP      Row Name 10/11/22 1021          Car Transfer    Type (Car Transfer) sit-stand;stand-sit  -DP     [place] Level (Car Transfer) standby assist  -DP     Comment, (Car Transfer) Pt was SBA with transfer and pt used UE's to help assist LE's into car  -DP     Row Name 10/11/22 1021          Gait/Stairs (Locomotion)    [place] Level (Gait) standby assist  -DP     Assistive Device (Gait) walker, front-wheeled  -DP     Distance in Feet (Gait) 350'x1, 60'x2, 120'x1, 40'x1  -DP     Deviations/Abnormal Patterns (Gait) bilateral deviations;gait speed decreased;stride length decreased;weight shifting decreased  -DP     Bilateral Gait Deviations forward flexed posture;heel strike decreased  -DP     [place] Level (Stairs) stand by assist  -DP     Handrail Location (Stairs) left side (ascending);right side (descending)  -DP     Number of Steps (Stairs) 12x2, 16'x1  -DP     Ascending Technique (Stairs) step-over-step  -DP     Descending Technique (Stairs) step-to-step;step-over-step  -DP     Stairs Assessment/Intervention Pt feels weaker on RLE and was VC'd to lead descent with RLE  -DP     Comment, (Gait/Stairs) Pt trialed ambulating 40 feet without use of AD and pt required Butch and had decreased stance time on RLE as compared to LLE  -DP     Row Name 10/11/22 1021          Safety Issues, Functional Mobility    Impairments Affecting Function (Mobility) balance;postural/trunk control;endurance/activity tolerance;strength;pain  -DP     Row Name 10/11/22 1021          Motor Skills    Therapeutic Exercise aerobic  -DP     Row Name 10/11/22 1021          Aerobic Exercise    Type (Aerobic Exercise) recumbent elliptical   -DP     Time Performed (Aerobic Exercise) 7 mins  -DP     Comment, Aerobic Exercise (Therapeutic Exercise) LE's only on WL 4  -DP     Row Name 10/11/22 1021          Positioning and Restraints    Pre-Treatment Position in bed  -DP     Post Treatment Position wheelchair  -DP     In Wheelchair with OT   -DP           User Key  (r) = Recorded By, (t) = Taken By, (c) = Cosigned By    Initials Name Provider Type    DP Melo Astorga, PT Physical Therapist              Wound 10/04/22 0849 Left lumbar spine Incision (Active)   Dressing Appearance intact;dry;dried drainage 10/11/22 0742   Closure Sutures 10/11/22 0742   Base dry;scab 10/11/22 0742   Drainage Amount none 10/11/22 0742       Wound 10/05/22 1411 Other (See comments) lower lumbar spine Incision (Active)   Dressing Appearance dry;intact 10/10/22 2303   Closure Sutures 10/11/22 0742   Base clean;dressing in place, unable to visualize 10/11/22 0742   Drainage Amount none 10/11/22 0742     Physical Therapy Education     Title: PT OT SLP Therapies (Done)     Topic: Physical Therapy (Done)     Point: Mobility training (Done)     Learning Progress Summary           Patient Acceptance, E,D, VU,DU by DP at 10/11/2022 1346    Acceptance, E,D, VU,DU by DP at 10/10/2022 1548    Acceptance, E,TB, VU,NR by ADE at 10/9/2022 1205                   Point: Home exercise program (Done)     Learning Progress Summary           Patient Acceptance, E,D, VU,DU by DP at 10/11/2022 1346    Acceptance, E,D, VU,DU by DP at 10/10/2022 1548    Acceptance, E,TB, VU,NR by ADE at 10/9/2022 1205                   Point: Body mechanics (Done)     Learning Progress Summary           Patient Acceptance, E,D, VU,DU by DP at 10/11/2022 1346    Acceptance, E,D, VU,DU by DP at 10/10/2022 1548    Acceptance, E,TB, VU,NR by ADE at 10/9/2022 1205                   Point: Precautions (Done)     Learning Progress Summary           Patient Acceptance, E,D, VU,DU by DP at 10/11/2022 1346    Acceptance, E,D, VU,DU by DP at 10/10/2022 1548    Acceptance, E,TB, VU,NR by ADE at 10/9/2022 1205                               User Key     Initials Effective Dates Name Provider Type Discipline    ADE 06/16/21 -  Marion Farias, PT Physical Therapist PT    DP 08/24/21 -  Melo Astorga, PT Physical Therapist PT                 PT Recommendation and Plan                          Time Calculation:      PT Charges     Row Name 10/11/22 1348 10/11/22 1046          Time Calculation    Start Time 1300  -DP 1000  -DP     Stop Time 1330  -DP 1100  -DP     Time Calculation (min) 30 min  -DP 60 min  -DP     PT Received On -- 10/11/22  -DP     PT - Next Appointment -- 10/12/22  -DP        Time Calculation- PT    Total Timed Code Minutes- PT 30 minute(s)  -DP 60 minute(s)  -DP           User Key  (r) = Recorded By, (t) = Taken By, (c) = Cosigned By    Initials Name Provider Type    DP Melo Astorga, PT Physical Therapist                Therapy Charges for Today     Code Description Service Date Service Provider Modifiers Qty    67556575024  PT THERAPEUTIC ACT EA 15 MIN 10/10/2022 Melo Astorga, PT GP 3    [id number]  PT THER PROC EA 15 MIN 10/10/2022 Melo Astorga, PT GP 3    [id number]  PT THERAPEUTIC ACT EA 15 MIN 10/11/2022 Melo Astorga, PT GP 2        Patient was wearing a face mask during this therapy encounter. Therapist used appropriate personal protective equipment including mask and gloves.  Mask used was standard procedure mask. Appropriate PPE was worn during the entire therapy session. Hand hygiene was completed before and after therapy session. Patient is not in enhanced droplet precautions.       PT G-Codes  AM-PAC 6 Clicks Score (PT): 20      Melo Astorga PT  10/11/2022

## 2022-10-11 NOTE — PROGRESS NOTES
LOS: 3 days   Patient Care Team:  Dennis Kwong MD as PCP - General (Internal Medicine)  Arslan Carlson PA-C as Physician Assistant (Physician Assistant)  Ang Irizarry APRN as Nurse Practitioner (Family Medicine)      STEPHEN HARMAN  1945    Diagnoses    1. IMPAIRED GAIT AND MOBILITY       ADMITTING DIAGNOSIS:  Status post October 4 October 5, 2022-staged front and back operation consisting of a lateral interbody fusion at L2-L3 and L3-L4 on 10/4/2022 followed by pedicle screw fixation using the robot at L to to L4 and decompression       Subjective   Complains of back pain.  No radicular pain.  No new weakness. Tolerates therapies.           Objective     Vitals:    10/11/22 1548   BP: 116/70   Pulse: 57   Resp:    Temp:    SpO2:        PHYSICAL EXAM:   Gen: Awake alert  Pulm: CTAB; no r/r/w; non-labored   Heart; RRR; no m/r/g appreciated   Abd: soft; NT/ND; +BS    Skin: no rashes on exposed skin , surgical wound  Dressed.   MSK: calves soft   : no Indwelling catheter   Neuro:verbal; appropriate     Motor:  Normal bulk and tone  Strength is 5/5 in BUE, 4-/5 bilat HF, 5/5 bilat KF/KE, 5/5 bilat DF/PF       MEDICATIONS  Scheduled Meds:budesonide-formoterol, 2 puff, Inhalation, BID - RT  enoxaparin, 40 mg, Subcutaneous, Q24H  gabapentin, 300 mg, Oral, Daily  gabapentin, 600 mg, Oral, Nightly  [START ON 10/12/2022] losartan, 25 mg, Oral, Q24H  montelukast, 10 mg, Oral, Daily  pantoprazole, 40 mg, Oral, Q AM  polyethylene glycol, 17 g, Oral, Daily  potassium chloride, 20 mEq, Oral, Daily  senna-docusate sodium, 1 tablet, Oral, BID      Continuous Infusions:   PRN Meds:.•  acetaminophen  •  baclofen  •  bisacodyl  •  clonazePAM  •  HYDROcodone-acetaminophen  •  HYDROcodone-acetaminophen  •  polyethylene glycol      RESULTS  No results found for: POCGLU  Results from last 7 days   Lab Units 10/11/22  0643 10/10/22  1451 10/07/22  0416   WBC 10*3/mm3 3.73 4.86 7.30   HEMOGLOBIN g/dL 7.3* 7.7*  8.8*   HEMATOCRIT % 22.1* 23.2* 25.8*   PLATELETS 10*3/mm3 226 236 143     Results from last 7 days   Lab Units 10/11/22  0643 10/10/22  1451 10/07/22  0416   SODIUM mmol/L 142 136 137   POTASSIUM mmol/L 3.8 3.2* 3.6   CHLORIDE mmol/L 101 99 98   CO2 mmol/L 31.2* 30.0* 28.0   BUN mg/dL 11 13 15   CREATININE mg/dL 0.73* 0.97 0.89   CALCIUM mg/dL 8.8 8.5* 8.5*   BILIRUBIN mg/dL  --   --  0.8   ALK PHOS U/L  --   --  52   ALT (SGPT) U/L  --   --  16   AST (SGOT) U/L  --   --  43*   GLUCOSE mg/dL 123* 112* 117*           ASSESSMENT and PLAN    Spinal stenosis of lumbar region with neurogenic claudication    Status post October 4 October 5, 2022-staged front and back operation consisting of a lateral interbody fusion at L2-L3 and L3-L4 on 10/4/2022 followed by pedicle screw fixation using the robot at L to to L4 and decompression       #Rehabilitation:   Patient now with moderate to severe disability due to impairments of mobility and self care: PT/ OT/SLP evaluation and treatment for mobility training, self-care skills training, pain management, equipment assessment, safety assessment and training, cognitive/swallowing evaluation and caregiver training for a safe discharge. This would be an interdisciplinary program with physical therapy 1 hour,  occupational therapy 1 hour, and speech therapy 1 hour, 5 days a week.  Rehabilitation nursing for carryover. Weekly team conferences.  Ongoing physician follow-up . Goals are to achieve a level of independence with  mobility and self-care and improved endurance.   Rehabilitation prognosis good.  Medical prognosis good Estimated length of stay is approximately 15 days , but is only an estimation.        # Safety and Precautions.   -Fall, Skin.      #Diet:   -red diet      #DVT ppx:  - Lovenox. SCDs    # anemia -   Oct 7 - HGB 8.8  Oct 10 - HGB 7.7. Post op anemia. Will add PPI.   October 11-hemoglobin 7.3.  Reviewed with patient.  We will follow pattern.     #HTN  losartan.  /Hydrochlorothiazide  October 10-systolic blood pressure into the 70s yesterday.  Decrease meds to losartan 50 mg daily, hold for systolic blood pressure less than 120  Recheck CBC/BUN/creatinine - HGB trend to 7.7   October 11-decreased losartan to 25 mg daily hold if systolic blood pressure less than 1    #XOL  atorvastatin, 10 mg, Oral, Daily-patient states intolerance a decade ago with severe myalgia-discontinued    #pain-[place] report reviewed  gabapentin, 300 mg, Oral, Daily  gabapentin, 600 mg, Oral, Nightly    #Chronic benzodiazepime use-clonazepam     #h/o Asthma  montelukast, 10 mg, Oral, Daily  Symbicort  Hold on resuming theophylline for now with his anemia     # bladder:   - Continent, monitor.  - PVRs, IC, time voids      # Bowel: continent.   -Bisacodyl prn , senna S , miralax prn      # Pressure injury prophylaxis:  - offloading, optimize nutrition , oob with PT.      # @ risk for orthostatic hypotension:   -Monitor VS regularly.      # Dispo:  - Home with family.     Team conference-October 11-toilet transfers contact-guard.  Shower transfers to be assessed.  Transfers standby assist.  Bed mobility standby assist.  Gait 160 feet contact-guard rolling walker.  8 stairs contact-guard.  Bathing standby assist.  Lower body dressing contact-guard.  Upper body dressing set up.  Grooming standby assist.  Toileting contact-guard.  Continent mostly bowel and bladder.  Estimate length of stay-Friday         Adam Torres MD      During rounds, used appropriate personal protective equipment including mask and gloves.  Additional gown if indicated.  Mask used was standard procedure mask. Appropriate PPE was worn during the entire visit.  Hand hygiene was completed before and after.

## 2022-10-11 NOTE — THERAPY TREATMENT NOTE
Inpatient Rehabilitation - Physical Therapy Treatment Note       [facility]     Patient Name: Omar Choudhary  : 1945  MRN: [id number]    Today's Date: 10/11/2022                    Admit Date: 10/8/2022      Visit Dx:     ICD-10-CM ICD-9-CM   1. Impaired gait and mobility  R26.89 781.2       Patient Active Problem List   Diagnosis   • Benign prostatic hyperplasia   • Bursitis   • Depression   • Diverticulosis   • Family history of malignant neoplasm of breast   • Hyperlipidemia   • Hypertension   • Internal derangement of right knee   • Knee effusion   • Asthma   • Obstructive sleep apnea syndrome   • Osteoarthritis   • Osteopenia   • Pain in knee   • Postnasal drip   • Prepatellar bursitis   • Sciatica of right side   • Diaphragm paralysis   • Spinal stenosis of lumbar region with neurogenic claudication   • Degenerative lumbar spinal stenosis   • Anxiety   • Pinguecula of right eye   • Lumbar radiculopathy, chronic       Past Medical History:   Diagnosis Date   • Allergic    • Arthritis    • Asthma    • Carpal tunnel syndrome     no pain   • Cataract    • Depression    • Family history of malignant neoplasm of breast 2019   • Hemidiaphragm paralysis     Left   • Hyperlipidemia 2019   • Hypertension 2012   • Leg pain, right    • Low back pain    • Neuropathy     feet   • Osteopenia    • Poor balance    • Reactive airway disease 01/15/2016   • Shortness of breath    • Sleep apnea, obstructive     CPAP- to bring dos       Past Surgical History:   Procedure Laterality Date   • KNEE ARTHROSCOPY W/ ACL RECONSTRUCTION Right 2019   • MOUTH SURGERY         PT ASSESSMENT (last 12 hours)     IRF PT Evaluation and Treatment     Row Name 10/11/22 1021          PT Time and Intention    Document Type daily treatment  -DP     Mode of Treatment individual therapy;physical therapy  -DP     Patient/Family/Caregiver Comments/Observations Pt side-lying in bed upon PT arrival and agreeable to PT  -DP      Row Name 10/11/22 1021          General Information    Patient Profile Reviewed yes  -DP     Existing Precautions/Restrictions fall;spinal  -DP     Row Name 10/11/22 1021          Pain Assessment    Pretreatment Pain Rating 8/10  -DP     Posttreatment Pain Rating 8/10  -DP     Pain Location incisional  -DP     Pain Location - back  -DP     Row Name 10/11/22 1021          Cognition/Psychosocial    Affect/Mental Status (Cognition) WNL  -DP     Orientation Status (Cognition) oriented x 3  -DP     Follows Commands (Cognition) follows two-step commands  -DP     Personal Safety Interventions safety round/check completed;elopement precautions initiated;gait belt;fall prevention program maintained;muscle strengthening facilitated;nonskid shoes/slippers when out of bed;supervised activity  -DP     Row Name 10/11/22 1021          Bed Mobility    Bed Mobility rolling left;supine-sit  -DP     Rolling Left [place] (Bed Mobility) supervision  -DP     Supine-Sit [place] (Bed Mobility) supervision  -DP     Comment, (Bed Mobility) performed x2  -DP     Row Name 10/11/22 1021          Transfer Assessment/Treatment    Transfers bed-chair transfer;sit-stand transfer;stand-sit transfer;car transfer  -DP     Row Name 10/11/22 1021          Bed-Chair Transfer    Bed-Chair [place] (Transfers) standby assist  -DP     Assistive Device (Bed-Chair Transfers) walker, front-wheeled  -DP     Row Name 10/11/22 1021          Chair-Bed Transfer    Chair-Bed [place] (Transfers) standby assist  -DP     Assistive Device (Chair-Bed Transfers) walker, front-wheeled  -DP     Row Name 10/11/22 1021          Sit-Stand Transfer    Sit-Stand [place] (Transfers) standby assist  -DP     Assistive Device (Sit-Stand Transfers) walker, front-wheeled  -DP     Row Name 10/11/22 1021          Stand-Sit Transfer    Stand-Sit [place] (Transfers) standby assist  -DP     Assistive Device (Stand-Sit Transfers) walker, front-wheeled  -DP      Row Name 10/11/22 1021          Car Transfer    Type (Car Transfer) sit-stand;stand-sit  -DP     [place] Level (Car Transfer) standby assist  -DP     Comment, (Car Transfer) Pt was SBA with transfer and pt used UE's to help assist LE's into car  -DP     Row Name 10/11/22 1021          Gait/Stairs (Locomotion)    [place] Level (Gait) standby assist  -DP     Assistive Device (Gait) walker, front-wheeled  -DP     Distance in Feet (Gait) 350'x1, 60'x2, 120'x1, 40'x1  -DP     Deviations/Abnormal Patterns (Gait) bilateral deviations;gait speed decreased;stride length decreased;weight shifting decreased  -DP     Bilateral Gait Deviations forward flexed posture;heel strike decreased  -DP     [place] Level (Stairs) stand by assist  -DP     Handrail Location (Stairs) left side (ascending);right side (descending)  -DP     Number of Steps (Stairs) 12x2, 16'x1  -DP     Ascending Technique (Stairs) step-over-step  -DP     Descending Technique (Stairs) step-to-step;step-over-step  -DP     Stairs Assessment/Intervention Pt feels weaker on RLE and was VC'd to lead descent with RLE  -DP     Comment, (Gait/Stairs) Pt trialed ambulating 40 feet without use of AD and pt required Butch and had decreased stance time on RLE as compared to LLE  -DP     Row Name 10/11/22 1021          Safety Issues, Functional Mobility    Impairments Affecting Function (Mobility) balance;postural/trunk control;endurance/activity tolerance;strength;pain  -DP     Row Name 10/11/22 1021          Motor Skills    Therapeutic Exercise aerobic  -DP     Row Name 10/11/22 1021          Aerobic Exercise    Type (Aerobic Exercise) recumbent elliptical   -DP     Time Performed (Aerobic Exercise) 7 mins  -DP     Comment, Aerobic Exercise (Therapeutic Exercise) LE's only on WL 4  -DP     Row Name 10/11/22 1021          Positioning and Restraints    Pre-Treatment Position in bed  -DP     Post Treatment Position wheelchair  -DP     In Wheelchair with OT   -DP           User Key  (r) = Recorded By, (t) = Taken By, (c) = Cosigned By    Initials Name Provider Type    DP Melo Astorga, PT Physical Therapist              Wound 10/04/22 0849 Left lumbar spine Incision (Active)   Dressing Appearance intact;dry;dried drainage 10/11/22 0742   Closure Sutures 10/11/22 0742   Base dry;scab 10/11/22 0742   Drainage Amount none 10/11/22 0742       Wound 10/05/22 1411 Other (See comments) lower lumbar spine Incision (Active)   Dressing Appearance dry;intact 10/10/22 2303   Closure Sutures 10/11/22 0742   Base clean;dressing in place, unable to visualize 10/11/22 0742   Drainage Amount none 10/11/22 0742     Physical Therapy Education     Title: PT OT SLP Therapies (Done)     Topic: Physical Therapy (Done)     Point: Mobility training (Done)     Learning Progress Summary           Patient Acceptance, E,D, VU,DU by DP at 10/11/2022 1346    Acceptance, E,D, VU,DU by DP at 10/10/2022 1548    Acceptance, E,TB, VU,NR by ADE at 10/9/2022 1205                   Point: Home exercise program (Done)     Learning Progress Summary           Patient Acceptance, E,D, VU,DU by DP at 10/11/2022 1346    Acceptance, E,D, VU,DU by DP at 10/10/2022 1548    Acceptance, E,TB, VU,NR by ADE at 10/9/2022 1205                   Point: Body mechanics (Done)     Learning Progress Summary           Patient Acceptance, E,D, VU,DU by DP at 10/11/2022 1346    Acceptance, E,D, VU,DU by DP at 10/10/2022 1548    Acceptance, E,TB, VU,NR by ADE at 10/9/2022 1205                   Point: Precautions (Done)     Learning Progress Summary           Patient Acceptance, E,D, VU,DU by DP at 10/11/2022 1346    Acceptance, E,D, VU,DU by DP at 10/10/2022 1548    Acceptance, E,TB, VU,NR by ADE at 10/9/2022 1205                               User Key     Initials Effective Dates Name Provider Type Discipline    ADE 06/16/21 -  Marion Farias, PT Physical Therapist PT    DP 08/24/21 -  Mleo Astorga, PT Physical Therapist PT                 PT Recommendation and Plan                          Time Calculation:      PT Charges     Row Name 10/11/22 1348 10/11/22 1046          Time Calculation    Start Time 1300  -DP 1000  -DP     Stop Time 1330  -DP 1100  -DP     Time Calculation (min) 30 min  -DP 60 min  -DP     PT Received On -- 10/11/22  -DP     PT - Next Appointment -- 10/12/22  -DP        Time Calculation- PT    Total Timed Code Minutes- PT 30 minute(s)  -DP 60 minute(s)  -DP           User Key  (r) = Recorded By, (t) = Taken By, (c) = Cosigned By    Initials Name Provider Type    DP Melo Astorga, PT Physical Therapist                Therapy Charges for Today     Code Description Service Date Service Provider Modifiers Qty    46154738620 HC PT THERAPEUTIC ACT EA 15 MIN 10/10/2022 Melo Astorga, PT GP 3    03813325152 HC PT THER PROC EA 15 MIN 10/10/2022 GauriMelo estevez, PT GP 3    19160828862 HC PT THERAPEUTIC ACT EA 15 MIN 10/11/2022 GauriMelo estevez, PT GP 2    24076162801 HC PT THERAPEUTIC ACT EA 15 MIN 10/11/2022 GauriMelo, PT GP 3    80412171461 HC PT THER PROC EA 15 MIN 10/11/2022 Melo Astorga, PT GP 1        Patient was wearing a face mask during this therapy encounter. Therapist used appropriate personal protective equipment including mask and gloves.  Mask used was standard procedure mask. Appropriate PPE was worn during the entire therapy session. Hand hygiene was completed before and after therapy session. Patient is not in enhanced droplet precautions.       PT G-Codes  AM-PAC 6 Clicks Score (PT): 20      Melo Astorga, PT  10/11/2022

## 2022-10-12 ENCOUNTER — TELEPHONE (OUTPATIENT)
Dept: FAMILY MEDICINE CLINIC | Facility: CLINIC | Age: 77
End: 2022-10-12

## 2022-10-12 PROCEDURE — 25010000002 ENOXAPARIN PER 10 MG: Performed by: PHYSICAL MEDICINE & REHABILITATION

## 2022-10-12 PROCEDURE — 97530 THERAPEUTIC ACTIVITIES: CPT

## 2022-10-12 PROCEDURE — 94799 UNLISTED PULMONARY SVC/PX: CPT

## 2022-10-12 PROCEDURE — 97110 THERAPEUTIC EXERCISES: CPT

## 2022-10-12 PROCEDURE — 97535 SELF CARE MNGMENT TRAINING: CPT

## 2022-10-12 PROCEDURE — 94664 DEMO&/EVAL PT USE INHALER: CPT

## 2022-10-12 RX ORDER — HYDROCODONE BITARTRATE AND ACETAMINOPHEN 7.5; 325 MG/1; MG/1
2 TABLET ORAL EVERY 4 HOURS PRN
Status: DISCONTINUED | OUTPATIENT
Start: 2022-10-12 | End: 2022-10-14

## 2022-10-12 RX ORDER — HYDROCODONE BITARTRATE AND ACETAMINOPHEN 7.5; 325 MG/1; MG/1
1 TABLET ORAL EVERY 4 HOURS PRN
Status: DISCONTINUED | OUTPATIENT
Start: 2022-10-12 | End: 2022-10-14

## 2022-10-12 RX ADMIN — ENOXAPARIN SODIUM 40 MG: 100 INJECTION SUBCUTANEOUS at 12:24

## 2022-10-12 RX ADMIN — LOSARTAN POTASSIUM 25 MG: 25 TABLET, FILM COATED ORAL at 08:09

## 2022-10-12 RX ADMIN — BUDESONIDE AND FORMOTEROL FUMARATE DIHYDRATE 2 PUFF: 80; 4.5 AEROSOL RESPIRATORY (INHALATION) at 20:22

## 2022-10-12 RX ADMIN — BUDESONIDE AND FORMOTEROL FUMARATE DIHYDRATE 2 PUFF: 80; 4.5 AEROSOL RESPIRATORY (INHALATION) at 08:06

## 2022-10-12 RX ADMIN — GABAPENTIN 300 MG: 300 CAPSULE ORAL at 08:10

## 2022-10-12 RX ADMIN — HYDROCODONE BITARTRATE AND ACETAMINOPHEN 2 TABLET: 7.5; 325 TABLET ORAL at 19:39

## 2022-10-12 RX ADMIN — GABAPENTIN 600 MG: 300 CAPSULE ORAL at 19:39

## 2022-10-12 RX ADMIN — POTASSIUM CHLORIDE 20 MEQ: 750 TABLET, EXTENDED RELEASE ORAL at 08:09

## 2022-10-12 RX ADMIN — POLYETHYLENE GLYCOL 3350 17 G: 17 POWDER, FOR SOLUTION ORAL at 08:09

## 2022-10-12 RX ADMIN — HYDROCODONE BITARTRATE AND ACETAMINOPHEN 1 TABLET: 7.5; 325 TABLET ORAL at 06:15

## 2022-10-12 RX ADMIN — PANTOPRAZOLE SODIUM 40 MG: 40 TABLET, DELAYED RELEASE ORAL at 06:15

## 2022-10-12 RX ADMIN — HYDROCODONE BITARTRATE AND ACETAMINOPHEN 2 TABLET: 7.5; 325 TABLET ORAL at 13:34

## 2022-10-12 RX ADMIN — MONTELUKAST SODIUM 10 MG: 10 TABLET, FILM COATED ORAL at 08:10

## 2022-10-12 NOTE — TELEPHONE ENCOUNTER
Hub staff attempted to follow warm transfer process and was unsuccessful     Caller: REY    Relationship to patient:     Best call back number   [phone]         Patient is needing: SHAVONNE IS FROM [facility] AND TRYING TO MAKE HOSPITAL FU APPT FOR PATIENT WHO IS BEING DISCHARGED ON Friday.

## 2022-10-12 NOTE — PROGRESS NOTES
LOS: 4 days   Patient Care Team:  Dennis Kwong MD as PCP - General (Internal Medicine)  Arslan Carlson PA-C as Physician Assistant (Physician Assistant)  Ang Irizarry APRN as Nurse Practitioner (Family Medicine)      STEPHEN HARMAN  1945    Diagnoses    1. IMPAIRED GAIT AND MOBILITY       ADMITTING DIAGNOSIS:  Status post October 4 October 5, 2022-staged front and back operation consisting of a lateral interbody fusion at L2-L3 and L3-L4 on 10/4/2022 followed by pedicle screw fixation using the robot at L to to L4 and decompression       Subjective   Complains of back pain.  No radicular pain.  No new weakness. Tolerates therapies.           Objective     Vitals:    10/12/22 1355   BP: (!) 107/32   Pulse: 62   Resp: 18   Temp: 98.5 °F (36.9 °C)   SpO2: 98%       PHYSICAL EXAM:   Gen: Awake alert  Pulm: CTAB; no r/r/w; non-labored   Heart; RRR; no m/r/g appreciated   Abd: soft; NT/ND; +BS    Skin: no rashes on exposed skin , surgical wound  Dressed.   MSK: calves soft   : no Indwelling catheter   Neuro:verbal; appropriate     Motor:  Normal bulk and tone  Strength is 5/5 in BUE, 4-/5 bilat HF, 5/5 bilat KF/KE, 5/5 bilat DF/PF       MEDICATIONS  Scheduled Meds:budesonide-formoterol, 2 puff, Inhalation, BID - RT  enoxaparin, 40 mg, Subcutaneous, Q24H  gabapentin, 300 mg, Oral, Daily  gabapentin, 600 mg, Oral, Nightly  losartan, 25 mg, Oral, Q24H  montelukast, 10 mg, Oral, Daily  pantoprazole, 40 mg, Oral, Q AM  polyethylene glycol, 17 g, Oral, Daily  potassium chloride, 20 mEq, Oral, Daily  senna-docusate sodium, 1 tablet, Oral, BID      Continuous Infusions:   PRN Meds:.•  acetaminophen  •  baclofen  •  bisacodyl  •  clonazePAM  •  HYDROcodone-acetaminophen  •  HYDROcodone-acetaminophen  •  polyethylene glycol      RESULTS  No results found for: POCGLU  Results from last 7 days   Lab Units 10/11/22  0643 10/10/22  1451 10/07/22  0416   WBC 10*3/mm3 3.73 4.86 7.30   HEMOGLOBIN g/dL 7.3*  7.7* 8.8*   HEMATOCRIT % 22.1* 23.2* 25.8*   PLATELETS 10*3/mm3 226 236 143     Results from last 7 days   Lab Units 10/11/22  0643 10/10/22  1451 10/07/22  0416   SODIUM mmol/L 142 136 137   POTASSIUM mmol/L 3.8 3.2* 3.6   CHLORIDE mmol/L 101 99 98   CO2 mmol/L 31.2* 30.0* 28.0   BUN mg/dL 11 13 15   CREATININE mg/dL 0.73* 0.97 0.89   CALCIUM mg/dL 8.8 8.5* 8.5*   BILIRUBIN mg/dL  --   --  0.8   ALK PHOS U/L  --   --  52   ALT (SGPT) U/L  --   --  16   AST (SGOT) U/L  --   --  43*   GLUCOSE mg/dL 123* 112* 117*           ASSESSMENT and PLAN    Spinal stenosis of lumbar region with neurogenic claudication    Status post October 4 October 5, 2022-staged front and back operation consisting of a lateral interbody fusion at L2-L3 and L3-L4 on 10/4/2022 followed by pedicle screw fixation using the robot at L to to L4 and decompression       #Rehabilitation:   Patient now with moderate to severe disability due to impairments of mobility and self care: PT/ OT/SLP evaluation and treatment for mobility training, self-care skills training, pain management, equipment assessment, safety assessment and training, cognitive/swallowing evaluation and caregiver training for a safe discharge. This would be an interdisciplinary program with physical therapy 1 hour,  occupational therapy 1 hour, and speech therapy 1 hour, 5 days a week.  Rehabilitation nursing for carryover. Weekly team conferences.  Ongoing physician follow-up . Goals are to achieve a level of independence with  mobility and self-care and improved endurance.   Rehabilitation prognosis good.  Medical prognosis good Estimated length of stay is approximately 15 days , but is only an estimation.        # Safety and Precautions.   -Fall, Skin.      #Diet:   -red diet      #DVT ppx:  - Lovenox. SCDs    # anemia -   Oct 7 - HGB 8.8  Oct 10 - HGB 7.7. Post op anemia. Will add PPI.   October 11-hemoglobin 7.3.  Reviewed with patient.  We will follow  pattern.     #HTN  losartan. /Hydrochlorothiazide  October 10-systolic blood pressure into the 70s yesterday.  Decrease meds to losartan 50 mg daily, hold for systolic blood pressure less than 120  Recheck CBC/BUN/creatinine - HGB trend to 7.7   October 11-decreased losartan to 25 mg daily hold if systolic blood pressure less than 1    #XOL  atorvastatin, 10 mg, Oral, Daily-patient states intolerance a decade ago with severe myalgia-discontinued    #pain-[place] report reviewed  gabapentin, 300 mg, Oral, Daily  gabapentin, 600 mg, Oral, Nightly    #Chronic benzodiazepime use-clonazepam     #h/o Asthma  montelukast, 10 mg, Oral, Daily  Symbicort  Hold on resuming theophylline for now with his anemia     # bladder:   - Continent, monitor.  - PVRs, IC, time voids      # Bowel: continent.   -Bisacodyl prn , senna S , miralax prn      # Pressure injury prophylaxis:  - offloading, optimize nutrition , oob with PT.      # @ risk for orthostatic hypotension:   -Monitor VS regularly.      # Dispo:  - Home with family.     Team conference-October 11-toilet transfers contact-guard.  Shower transfers to be assessed.  Transfers standby assist.  Bed mobility standby assist.  Gait 160 feet contact-guard rolling walker.  8 stairs contact-guard.  Bathing standby assist.  Lower body dressing contact-guard.  Upper body dressing set up.  Grooming standby assist.  Toileting contact-guard.  Continent mostly bowel and bladder.  Estimate length of stay-Friday         Adam Torres MD      During rounds, used appropriate personal protective equipment including mask and gloves.  Additional gown if indicated.  Mask used was standard procedure mask. Appropriate PPE was worn during the entire visit.  Hand hygiene was completed before and after.

## 2022-10-12 NOTE — PROGRESS NOTES
Family conference held today, 10/12/22. Present were the patient, his significant other, PT, OT, and NSG. Discussed pt's weekly progress, current status, and discharge plans.     PT: PT is working on patient's functional mobility, ambulating and strength, and walking up and down steps. Patient is able to stand independently. Patient is using his rolling walker and ambulating 350'-160' at CGA. Patient is SBA for all transfers. Patient is SBA assist for bed mobility and today the bed rail was taken off so patient can get some practice with bed mobility and no hand rail. Patient is SBA for steps and using the hand rail. PT is recommending outpatient PT. Patient would like to go to a provider he has seen before or the provider his significant other goes to ([facility]).      OT: OT is working on patient completing ADLs and strengthening. Patient is SBA for bathing. Patient's transfer to commode is SBA and transfers to the shower are also SBA. For dressing patient is SBA for upper body dress and for lower body dressing. OT will be making patient independent in his room tomorrow. OT is recommending a home exercise plan.     NSG: Discussed patients medication list and patients f/u appointments. Patient will need an appointment with his PCP and with neurosurgery scheduled.     Discussed equipment for home. Patient will need a over the toilet commode and a rolling walker. Patient's significant other is going to [facility] to find this equipment and she cannot find it, OT will order the equipment for the patient.       D/C date discussed and patient is planned to d/c Friday, 10/14/22. Patient will d/c and stay with his significant other in her home. Patient's significant other will be able to provide SBA- supervision.

## 2022-10-12 NOTE — PLAN OF CARE
Goal Outcome Evaluation:  Plan of Care Reviewed With: patient        Progress: improving  Outcome Evaluation: Pt has been up in the WC for most of the shift with brace in place. Pain meds given as needed.

## 2022-10-12 NOTE — THERAPY TREATMENT NOTE
Inpatient Rehabilitation - Occupational Therapy Treatment Note    [facility]     Patient Name: Omar Choudhary  : 1945  MRN: [id number]    Today's Date: 10/12/2022                 Admit Date: 10/8/2022         ICD-10-CM ICD-9-CM   1. Impaired gait and mobility  R26.89 781.2       Patient Active Problem List   Diagnosis   • Benign prostatic hyperplasia   • Bursitis   • Depression   • Diverticulosis   • Family history of malignant neoplasm of breast   • Hyperlipidemia   • Hypertension   • Internal derangement of right knee   • Knee effusion   • Asthma   • Obstructive sleep apnea syndrome   • Osteoarthritis   • Osteopenia   • Pain in knee   • Postnasal drip   • Prepatellar bursitis   • Sciatica of right side   • Diaphragm paralysis   • Spinal stenosis of lumbar region with neurogenic claudication   • Degenerative lumbar spinal stenosis   • Anxiety   • Pinguecula of right eye   • Lumbar radiculopathy, chronic       Past Medical History:   Diagnosis Date   • Allergic    • Arthritis    • Asthma    • Carpal tunnel syndrome     no pain   • Cataract    • Depression    • Family history of malignant neoplasm of breast 2019   • Hemidiaphragm paralysis     Left   • Hyperlipidemia 2019   • Hypertension 2012   • Leg pain, right    • Low back pain    • Neuropathy     feet   • Osteopenia    • Poor balance    • Reactive airway disease 01/15/2016   • Shortness of breath    • Sleep apnea, obstructive     CPAP- to bring dos       Past Surgical History:   Procedure Laterality Date   • KNEE ARTHROSCOPY W/ ACL RECONSTRUCTION Right 2019   • MOUTH SURGERY               IRF OT ASSESSMENT FLOWSHEET (last 12 hours)     IRF OT Evaluation and Treatment     Row Name 10/12/22 1525          OT Time and Intention    Document Type daily treatment  -DN     Mode of Treatment occupational therapy  -DN     Patient Effort good  -DN     Comment, Evaluation/Treatment Not Performed Pt missed 30 minutes of OT tx due to pain, and  tried to reschedule pt unable to participate at that time  -DN     Row Name 10/12/22 1525          Pain Assessment    Pretreatment Pain Rating 6/10  -DN     Posttreatment Pain Rating 6/10  -DN     Pain Location incisional  -DN     Pain Location - back  -DN     Pre/Posttreatment Pain Comment nsg just came in to give meds  -DN     Row Name 10/12/22 1525          Cognition/Psychosocial    Affect/Mental Status (Cognition) WNL  -DN     Row Name 10/12/22 1525          Bathing    Comment (Bathing) pt declined bath  -DN     Row Name 10/12/22 1525          Upper Body Dressing    Comment (Upper Body Dressing) pt declined UE dressing  -DN     Row Name 10/12/22 1525          Lower Body Dressing    Comment (Lower Body Dressing) pt declined LE dressing  -DN     Row Name 10/12/22 1525          Grooming    [place] Level (Grooming) grooming skills;deodorant application;hair care, combing/brushing;wash face, hands;shave face  -DN     Position (Grooming) supported standing  -DN     Comment (Grooming) Pt Modified Independent with grooming standing at sink  -DN     Row Name 10/12/22 1525          Toileting    [place] Level (Toileting) toileting skills;adjust/manage clothing;perform perineal hygiene;supervision  -DN     Assistive Device Use (Toileting) grab bar/safety frame;raised toilet seat  -DN     Position (Toileting) supported sitting;supported standing  -DN     Comment (Toileting) RWX in place  -DN     Row Name 10/12/22 1525          Bed-Chair Transfer    Bed-Chair [place] (Transfers) supervision  -DN     Assistive Device (Bed-Chair Transfers) walker, front-wheeled  -DN     Row Name 10/12/22 1525          Chair-Bed Transfer    Chair-Bed [place] (Transfers) supervision  -DN     Assistive Device (Chair-Bed Transfers) walker, front-wheeled  -DN     Row Name 10/12/22 1525          Toilet Transfer    Type (Toilet Transfer) stand pivot/stand step  -DN     [place] Level (Toilet Transfer) supervision  -DN      Assistive Device (Toilet Transfer) grab bars/safety frame;walker, front-wheeled  -DN     Row Name 10/12/22 1525          Safety Issues, Functional Mobility    Comment, Safety Issues/Impairments (Mobility) pt is following safety precautions and techniques, plan on pt being independent in room tomorrow at RWX level  -DN     Row Name 10/12/22 1525          Motor Skills    Functional Endurance fair +  -DN     Row Name 10/12/22 1525          Elbow/Forearm (Therapeutic Exercise)    Elbow/Forearm (Therapeutic Exercise) AROM (active range of motion);strengthening exercise  -DN     Elbow/Forearm AROM (Therapeutic Exercise) bilateral;10 repetitions;3 sets  -DN     Elbow/Forearm Strengthening (Therapeutic Exercise) 2 lb free weight  -DN     Row Name 10/12/22 1525          Wrist (Therapeutic Exercise)    Wrist (Therapeutic Exercise) strengthening exercise  -DN     Wrist AROM (Therapeutic Exercise) bilateral;10 repetitions;3 sets  -DN     Wrist Strengthening (Therapeutic Exercise) 2 lb free weight  -DN     Row Name 10/12/22 1525          Hand (Therapeutic Exercise)    Hand (Therapeutic Exercise) strengthening exercise  -DN     Hand AROM/AAROM (Therapeutic Exercise) bilateral;20 repititions  -DN     Hand Strengthening (Therapeutic Exercise) hand gripper  -DN     Row Name 10/12/22 1525          Positioning and Restraints    Pre-Treatment Position sitting in chair/recliner  -DN     Post Treatment Position wheelchair  -DN     In Chair sitting;call light within reach;encouraged to call for assist  -DN           User Key  (r) = Recorded By, (t) = Taken By, (c) = Cosigned By    Initials Name Effective Dates    Jose Cunningham OT 06/16/21 -                  Occupational Therapy Education     Title: PT OT SLP Therapies (Done)     Topic: Occupational Therapy (Done)     Point: ADL training (Done)     Description:   Instruct learner(s) on proper safety adaptation and remediation techniques during self care or transfers.   Instruct in  proper use of assistive devices.              Learning Progress Summary           Patient Acceptance, E, VU by DN at 10/12/2022 1534    Comment: Pt and SO attended family conferance today to discuss current status with adls, functional transfers, back precautions, HEP, Kitchen mobility, and any need for further therapy at time of d/c    Acceptance, E, VU by DN at 10/10/2022 1546    Comment: precautions per sx, adaptive tech with adls and transfer training   Significant Other Acceptance, E, VU by DN at 10/12/2022 1534    Comment: Pt and SO attended family conferance today to discuss current status with adls, functional transfers, back precautions, HEP, Kitchen mobility, and any need for further therapy at time of d/c                   Point: Home exercise program (Done)     Description:   Instruct learner(s) on appropriate technique for monitoring, assisting and/or progressing therapeutic exercises/activities.              Learning Progress Summary           Patient Acceptance, E, VU by DN at 10/12/2022 1534    Comment: Pt and SO attended family conferance today to discuss current status with adls, functional transfers, back precautions, HEP, Kitchen mobility, and any need for further therapy at time of d/c    Acceptance, E, VU by DN at 10/10/2022 1546    Comment: precautions per sx, adaptive tech with adls and transfer training   Significant Other Acceptance, E, VU by DN at 10/12/2022 1534    Comment: Pt and SO attended family conferance today to discuss current status with adls, functional transfers, back precautions, HEP, Kitchen mobility, and any need for further therapy at time of d/c                   Point: Precautions (Done)     Description:   Instruct learner(s) on prescribed precautions during self-care and functional transfers.              Learning Progress Summary           Patient Acceptance, E, VU by DN at 10/12/2022 1534    Comment: Pt and SO attended family conferance today to discuss current status  with adls, functional transfers, back precautions, HEP, Kitchen mobility, and any need for further therapy at time of d/c    Acceptance, E, VU by DN at 10/10/2022 1546    Comment: precautions per sx, adaptive tech with adls and transfer training   Significant Other Acceptance, E, VU by DN at 10/12/2022 1534    Comment: Pt and SO attended family conferance today to discuss current status with adls, functional transfers, back precautions, HEP, Kitchen mobility, and any need for further therapy at time of d/c                   Point: Body mechanics (Done)     Description:   Instruct learner(s) on proper positioning and spine alignment during self-care, functional mobility activities and/or exercises.              Learning Progress Summary           Patient Acceptance, E, VU by DN at 10/12/2022 1534    Comment: Pt and SO attended family conferance today to discuss current status with adls, functional transfers, back precautions, HEP, Kitchen mobility, and any need for further therapy at time of d/c    Acceptance, E, VU by DN at 10/10/2022 1546    Comment: precautions per sx, adaptive tech with adls and transfer training   Significant Other Acceptance, E, VU by DN at 10/12/2022 1534    Comment: Pt and SO attended family conferance today to discuss current status with adls, functional transfers, back precautions, HEP, Kitchen mobility, and any need for further therapy at time of d/c                               User Key     Initials Effective Dates Name Provider Type Discipline    LESTER 06/16/21 -  Jose Rodrigues OT Occupational Therapist OT                    OT Recommendation and Plan    Planned Therapy Interventions (OT): activity tolerance training, adaptive equipment training, BADL retraining, strengthening exercise, transfer/mobility retraining, ROM/therapeutic exercise                    Time Calculation:      Time Calculation- OT     Row Name 10/12/22 0800             Time Calculation- OT    OT Start Time 0800  -LESTER       OT Stop Time 0900  -DN      OT Time Calculation (min) 60 min  -DN            User Key  (r) = Recorded By, (t) = Taken By, (c) = Cosigned By    Initials Name Provider Type    Jose Cunningham OT Occupational Therapist              Therapy Charges for Today     Code Description Service Date Service Provider Modifiers Qty    67002463086 HC OT SELF CARE/MGMT/TRAIN EA 15 MIN 10/11/2022 Jose Rodrigues OT GO 6    71204297231 HC OT SELF CARE/MGMT/TRAIN EA 15 MIN 10/12/2022 Jose Rodrigues OT GO 2    [id number]  OT THER PROC EA 15 MIN 10/12/2022 Jose Rodrigues OT GO 2                   Jose Rodrigues OT  10/12/2022

## 2022-10-12 NOTE — THERAPY TREATMENT NOTE
Inpatient Rehabilitation - Physical Therapy Treatment Note       [facility]     Patient Name: Omar Choudhary  : 1945  MRN: [id number]    Today's Date: 10/12/2022                    Admit Date: 10/8/2022      Visit Dx:     ICD-10-CM ICD-9-CM   1. Impaired gait and mobility  R26.89 781.2       Patient Active Problem List   Diagnosis   • Benign prostatic hyperplasia   • Bursitis   • Depression   • Diverticulosis   • Family history of malignant neoplasm of breast   • Hyperlipidemia   • Hypertension   • Internal derangement of right knee   • Knee effusion   • Asthma   • Obstructive sleep apnea syndrome   • Osteoarthritis   • Osteopenia   • Pain in knee   • Postnasal drip   • Prepatellar bursitis   • Sciatica of right side   • Diaphragm paralysis   • Spinal stenosis of lumbar region with neurogenic claudication   • Degenerative lumbar spinal stenosis   • Anxiety   • Pinguecula of right eye   • Lumbar radiculopathy, chronic       Past Medical History:   Diagnosis Date   • Allergic    • Arthritis    • Asthma    • Carpal tunnel syndrome     no pain   • Cataract    • Depression    • Family history of malignant neoplasm of breast 2019   • Hemidiaphragm paralysis     Left   • Hyperlipidemia 2019   • Hypertension 2012   • Leg pain, right    • Low back pain    • Neuropathy     feet   • Osteopenia    • Poor balance    • Reactive airway disease 01/15/2016   • Shortness of breath    • Sleep apnea, obstructive     CPAP- to bring dos       Past Surgical History:   Procedure Laterality Date   • KNEE ARTHROSCOPY W/ ACL RECONSTRUCTION Right 2019   • MOUTH SURGERY         PT ASSESSMENT (last 12 hours)     IRF PT Evaluation and Treatment     Row Name 10/12/22 1011          PT Time and Intention    Document Type daily treatment  -LB     Mode of Treatment physical therapy  -LB     Patient/Family/Caregiver Comments/Observations pt in bed lying on left side  -LB     Row Name 10/12/22 1011          General  Information    Patient Profile Reviewed yes  -LB     Existing Precautions/Restrictions fall;spinal  -LB     Row Name 10/12/22 1011          Cognition/Psychosocial    Affect/Mental Status (Cognition) WNL  -LB     Orientation Status (Cognition) oriented x 4  -LB     Follows Commands (Cognition) WFL  -LB     Personal Safety Interventions fall prevention program maintained;gait belt;muscle strengthening facilitated;nonskid shoes/slippers when out of bed  -LB     Row Name 10/12/22 1011          Bed Mobility    Rolling Left [place] (Bed Mobility) independent  -LB     Rolling Right [place] (Bed Mobility) independent  -LB     Sidelying-Sit [place] (Bed Mobility) independent  -LB     Row Name 10/12/22 1011          Sit-Stand Transfer    Sit-Stand [place] (Transfers) supervision;modified independence  -LB     Assistive Device (Sit-Stand Transfers) walker, front-wheeled  -LB     Row Name 10/12/22 1011          Stand-Sit Transfer    Stand-Sit [place] (Transfers) supervision;modified independence  -LB     Assistive Device (Stand-Sit Transfers) walker, front-wheeled  -LB     Row Name 10/12/22 1011          Gait/Stairs (Locomotion)    [place] Level (Gait) standby assist  -LB     Assistive Device (Gait) walker, front-wheeled  -LB     Distance in Feet (Gait) 250x 2 30 ft with FWW in room and Mod Indep  -LB     Deviations/Abnormal Patterns (Gait) bilateral deviations;gait speed decreased;stride length decreased;weight shifting decreased  -LB     Bilateral Gait Deviations forward flexed posture;heel strike decreased  -LB     Right Sided Gait Deviations --  decreased stance time on right, slightly antalgic  -LB     [place] Level (Stairs) modified independence  -LB     Handrail Location (Stairs) left side (ascending);right side (descending)  -LB     Number of Steps (Stairs) 8  -LB     Ascending Technique (Stairs) step-over-step  -LB     Descending Technique (Stairs) step-to-step  -LB     Row Name  10/12/22 1011          Hip (Therapeutic Exercise)    Hip (Therapeutic Exercise) strengthening exercise  -LB     Hip Strengthening (Therapeutic Exercise) bilateral;marching while seated;10 repetitions;sidelying  clams 2 sets of 10  -LB     Row Name 10/12/22 1011          Knee (Therapeutic Exercise)    Knee (Therapeutic Exercise) isometric exercises  -LB     Knee Isometrics (Therapeutic Exercise) bilateral;quad sets  -LB     Knee Strengthening (Therapeutic Exercise) bilateral;LAQ (long arc quad);10 repetitions  -LB     Row Name 10/12/22 1011          Ankle (Therapeutic Exercise)    Ankle AROM (Therapeutic Exercise) bilateral;dorsiflexion  -LB     Row Name 10/12/22 1011          Positioning and Restraints    Pre-Treatment Position in bed  -LB     Post Treatment Position wheelchair  -LB     In Wheelchair encouraged to call for assist;call light within reach;exit alarm on  -LB           User Key  (r) = Recorded By, (t) = Taken By, (c) = Cosigned By    Initials Name Provider Type     Pamella Bonilla, PT Physical Therapist              Wound 10/04/22 0849 Left lumbar spine Incision (Active)   Dressing Appearance dry;intact 10/12/22 0813   Closure JOHN 10/11/22 2209   Base dry;scab;dressing in place, unable to visualize 10/12/22 0813   Drainage Amount none 10/12/22 0813       Wound 10/05/22 1411 Other (See comments) lower lumbar spine Incision (Active)   Dressing Appearance dry;intact 10/12/22 0813   Closure JONH 10/11/22 2209   Base clean;dressing in place, unable to visualize 10/12/22 0813   Drainage Amount none 10/12/22 0813     Physical Therapy Education     Title: PT OT SLP Therapies (Done)     Topic: Physical Therapy (Done)     Point: Mobility training (Done)     Learning Progress Summary           Patient Acceptance, E,TB, VU,DU by OCTAVIO at 10/12/2022 1013    Comment: bed mobility    Acceptance, E,D, VU,DU by BRITNEY at 10/11/2022 1346    Acceptance, E,D, VU,DU by BRITNEY at 10/10/2022 1548    Acceptance, E,TB, VU,NR by ADE at  10/9/2022 1205                   Point: Home exercise program (Done)     Learning Progress Summary           Patient Acceptance, E,D, VU,DU by DP at 10/11/2022 1346    Acceptance, E,D, VU,DU by DP at 10/10/2022 1548    Acceptance, E,TB, VU,NR by ADE at 10/9/2022 1205                   Point: Body mechanics (Done)     Learning Progress Summary           Patient Acceptance, E,D, VU,DU by DP at 10/11/2022 1346    Acceptance, E,D, VU,DU by DP at 10/10/2022 1548    Acceptance, E,TB, VU,NR by ADE at 10/9/2022 1205                   Point: Precautions (Done)     Learning Progress Summary           Patient Acceptance, E,D, VU,DU by DP at 10/11/2022 1346    Acceptance, E,D, VU,DU by DP at 10/10/2022 1548    Acceptance, E,TB, VU,NR by ADE at 10/9/2022 1205                               User Key     Initials Effective Dates Name Provider Type Discipline    ADE 06/16/21 -  Marion Farias, PT Physical Therapist PT    LB 06/16/21 -  Pamella Bonilla, PT Physical Therapist PT    DP 08/24/21 -  Melo Astorga, PT Physical Therapist PT                PT Recommendation and Plan                          Time Calculation:      PT Charges     Row Name 10/12/22 1449 10/12/22 1135          Time Calculation    Start Time 1400  -LB 1000  -LB     Stop Time 1430  -LB 1100  -LB     Time Calculation (min) 30 min  -LB 60 min  -LB     PT Received On 10/12/22  -LB 10/12/22  -LB     PT - Next Appointment 10/13/22  -LB --           User Key  (r) = Recorded By, (t) = Taken By, (c) = Cosigned By    Initials Name Provider Type    LB Pamella Bonilla, PT Physical Therapist                Therapy Charges for Today     Code Description Service Date Service Provider Modifiers Qty    09594659583 HC PT THER PROC EA 15 MIN 10/12/2022 Pamella Bonilla, PT GP 3    27194442257 HC PT THERAPEUTIC ACT EA 15 MIN 10/12/2022 Pamella Bonilla, PT GP 3            PT G-Codes  AM-PAC 6 Clicks Score (PT): 20    Patient was wearing a face mask during this therapy encounter. Therapist  used appropriate personal protective equipment including mask and gloves.  Mask used was standard procedure mask. Appropriate PPE was worn during the entire therapy session. Hand hygiene was completed before and after therapy session. Patient is not in enhanced droplet precautions.       Pamella Bonilla, PT  10/12/2022

## 2022-10-12 NOTE — PLAN OF CARE
Goal Outcome Evaluation:  Plan of Care Reviewed With: patient        Progress: improving  Outcome Evaluation: Pt rested well this shift.  Pain meds x1 so far.  Pt SBA w/walker and gait belt.  Pt alert/oriented x4.  all meds whole w/thins.  bandage on back c/d/i, no drainage noted.  Cont of b/b.

## 2022-10-13 LAB
ANION GAP SERPL CALCULATED.3IONS-SCNC: 7 MMOL/L (ref 5–15)
BASOPHILS # BLD AUTO: 0.02 10*3/MM3 (ref 0–0.2)
BASOPHILS NFR BLD AUTO: 0.4 % (ref 0–1.5)
BUN SERPL-MCNC: 12 MG/DL (ref 8–23)
BUN/CREAT SERPL: 14.6 (ref 7–25)
CALCIUM SPEC-SCNC: 8.9 MG/DL (ref 8.6–10.5)
CHLORIDE SERPL-SCNC: 104 MMOL/L (ref 98–107)
CO2 SERPL-SCNC: 28 MMOL/L (ref 22–29)
CREAT SERPL-MCNC: 0.82 MG/DL (ref 0.76–1.27)
DEPRECATED RDW RBC AUTO: 42.3 FL (ref 37–54)
EGFRCR SERPLBLD CKD-EPI 2021: 91.6 ML/MIN/1.73
EOSINOPHIL # BLD AUTO: 0.23 10*3/MM3 (ref 0–0.4)
EOSINOPHIL NFR BLD AUTO: 5 % (ref 0.3–6.2)
ERYTHROCYTE [DISTWIDTH] IN BLOOD BY AUTOMATED COUNT: 12.9 % (ref 12.3–15.4)
GLUCOSE SERPL-MCNC: 104 MG/DL (ref 65–99)
HCT VFR BLD AUTO: 22.4 % (ref 37.5–51)
HGB BLD-MCNC: 7.5 G/DL (ref 13–17.7)
IMM GRANULOCYTES # BLD AUTO: 0.03 10*3/MM3 (ref 0–0.05)
IMM GRANULOCYTES NFR BLD AUTO: 0.7 % (ref 0–0.5)
LYMPHOCYTES # BLD AUTO: 0.94 10*3/MM3 (ref 0.7–3.1)
LYMPHOCYTES NFR BLD AUTO: 20.6 % (ref 19.6–45.3)
MCH RBC QN AUTO: 30.9 PG (ref 26.6–33)
MCHC RBC AUTO-ENTMCNC: 33.5 G/DL (ref 31.5–35.7)
MCV RBC AUTO: 92.2 FL (ref 79–97)
MONOCYTES # BLD AUTO: 0.62 10*3/MM3 (ref 0.1–0.9)
MONOCYTES NFR BLD AUTO: 13.6 % (ref 5–12)
NEUTROPHILS NFR BLD AUTO: 2.72 10*3/MM3 (ref 1.7–7)
NEUTROPHILS NFR BLD AUTO: 59.7 % (ref 42.7–76)
NRBC BLD AUTO-RTO: 0 /100 WBC (ref 0–0.2)
PLATELET # BLD AUTO: 272 10*3/MM3 (ref 140–450)
PMV BLD AUTO: 9.2 FL (ref 6–12)
POTASSIUM SERPL-SCNC: 4.6 MMOL/L (ref 3.5–5.2)
RBC # BLD AUTO: 2.43 10*6/MM3 (ref 4.14–5.8)
SODIUM SERPL-SCNC: 139 MMOL/L (ref 136–145)
WBC NRBC COR # BLD: 4.56 10*3/MM3 (ref 3.4–10.8)

## 2022-10-13 PROCEDURE — 80048 BASIC METABOLIC PNL TOTAL CA: CPT | Performed by: PHYSICAL MEDICINE & REHABILITATION

## 2022-10-13 PROCEDURE — 25010000002 ENOXAPARIN PER 10 MG: Performed by: PHYSICAL MEDICINE & REHABILITATION

## 2022-10-13 PROCEDURE — 97530 THERAPEUTIC ACTIVITIES: CPT

## 2022-10-13 PROCEDURE — 94799 UNLISTED PULMONARY SVC/PX: CPT

## 2022-10-13 PROCEDURE — 85025 COMPLETE CBC W/AUTO DIFF WBC: CPT | Performed by: PHYSICAL MEDICINE & REHABILITATION

## 2022-10-13 PROCEDURE — 97535 SELF CARE MNGMENT TRAINING: CPT

## 2022-10-13 RX ADMIN — PANTOPRAZOLE SODIUM 40 MG: 40 TABLET, DELAYED RELEASE ORAL at 05:22

## 2022-10-13 RX ADMIN — HYDROCODONE BITARTRATE AND ACETAMINOPHEN 2 TABLET: 7.5; 325 TABLET ORAL at 21:31

## 2022-10-13 RX ADMIN — HYDROCODONE BITARTRATE AND ACETAMINOPHEN 2 TABLET: 7.5; 325 TABLET ORAL at 17:03

## 2022-10-13 RX ADMIN — GABAPENTIN 600 MG: 300 CAPSULE ORAL at 19:44

## 2022-10-13 RX ADMIN — HYDROCODONE BITARTRATE AND ACETAMINOPHEN 2 TABLET: 7.5; 325 TABLET ORAL at 10:19

## 2022-10-13 RX ADMIN — BUDESONIDE AND FORMOTEROL FUMARATE DIHYDRATE 2 PUFF: 80; 4.5 AEROSOL RESPIRATORY (INHALATION) at 20:04

## 2022-10-13 RX ADMIN — GABAPENTIN 300 MG: 300 CAPSULE ORAL at 09:13

## 2022-10-13 RX ADMIN — MONTELUKAST SODIUM 10 MG: 10 TABLET, FILM COATED ORAL at 09:13

## 2022-10-13 RX ADMIN — ENOXAPARIN SODIUM 40 MG: 100 INJECTION SUBCUTANEOUS at 12:24

## 2022-10-13 RX ADMIN — BUDESONIDE AND FORMOTEROL FUMARATE DIHYDRATE 2 PUFF: 80; 4.5 AEROSOL RESPIRATORY (INHALATION) at 11:30

## 2022-10-13 RX ADMIN — POTASSIUM CHLORIDE 20 MEQ: 750 TABLET, EXTENDED RELEASE ORAL at 09:13

## 2022-10-13 RX ADMIN — POLYETHYLENE GLYCOL 3350 17 G: 17 POWDER, FOR SOLUTION ORAL at 09:14

## 2022-10-13 RX ADMIN — HYDROCODONE BITARTRATE AND ACETAMINOPHEN 2 TABLET: 7.5; 325 TABLET ORAL at 05:21

## 2022-10-13 NOTE — PLAN OF CARE
Goal Outcome Evaluation:      Slept fair. Meds with water. Continent of bladder. CPap with sleeping. Norco given for back pain with some relief. Declined anderson colace. Wore SCD's some during the night.

## 2022-10-13 NOTE — PROGRESS NOTES
Inpatient Rehabilitation Plan of Care Note    Plan of Care  Care Plan Reviewed - Updates as Follows    Body Systems    [RN] Integumentary(Active)  Current Status(10/13/2022): Back & Lt. sided Incisions from Lumbar spine surgery  Weekly Goal(10/20/2022): Incisions healing & no signs of infection.  Discharge Goal: Incisions healed    Performed Intervention(s)  Daily skin inspections, dressing changes & wound care as needed.      Psychosocial    [RN] Coping/Adjustment(Active)  Current Status(10/13/2022): Expresses appropriate coping  Weekly Goal(10/20/2022): Allow opportunity to express concerns regarding current  status.  Discharge Goal: Adequate coping regarding life changes with ongoing support.    Performed Intervention(s)  Verbalizes needs & concerns      Safety    [RN] Potential for Injury(Active)  Current Status(10/13/2022): Uses call light appropriately  Weekly Goal(10/20/2022): Instruct patient/family regarding safety precautions &  need for close supervision.  Discharge Goal: Patient/ family will be aware of risk of fall & safety in the  home setting.    Performed Intervention(s)  Items within reach, Bed/chair alarms, safety rounds, non-skid socks, & gait  belt.  safety rounds      Sphincter Control    [RN] Bladder & bowel management(Active)  Current Status(10/13/2022): Mostly continent of bladder & bowels  Weekly Goal(10/20/2022): Continent 100% of bladder & bowels  Discharge Goal: Remains 100% Continent    Performed Intervention(s)  Monitor intake, output, & BM's each shift.  Encourage appropriate diet, & fluid  intake.    Signed by: Ivett Brown RN

## 2022-10-13 NOTE — PROGRESS NOTES
Patient to d/c tomorrow 10/14/2022 and will be moving in with his significant other. Patient is SBA-CGA and patient's significant other will be providing assistant. Patient has a rolling walker and over the toilet commode. Patient is scheduled with [facility] on Monday 10/17/22 at 8:00am.

## 2022-10-13 NOTE — PROGRESS NOTES
"Section B. Hearing, Speech, Vision: Expression of Ideas and Wants: Expresses  complex messages without difficulty and with speech that is clear and easy to  understand.  Understanding Verbal and Non-Verbal Content: Understands: Clear comprehension  without cues or repetitions.    Section C. Cognitive Patterns: Brief Interview for Mental Status (BIMS) was  conducted.  Repetition of Three Words: Three words  Able to report correct year: Correct  Able to report correct month: Accurate within 5 days  Able to report correct day of the week: Correct  Able to recall \"sock\": Yes, no cue required  Able to recall \"blue\": Yes, no cue required  Able to recall \"bed\": Yes, no cue required    BIMS SUMMARY SCORE: 15 Cognitively intact Patient was able to complete the Brief  Interview for Mental Status    Section C. Signs and Symptoms of Delirium (from CAM): Evidence of Acute Change  in Mental Status:   No  Inattention: Behavior not present  Thinking Disorganized or Incoherent:   Behavior not present  Altered Level of Consciousness:   Behavior not present    Signed by: LAVELL Muro    "

## 2022-10-13 NOTE — PROGRESS NOTES
LOS: 5 days   Patient Care Team:  Dennis Kwong MD as PCP - General (Internal Medicine)  Arslan Carlson PA-C as Physician Assistant (Physician Assistant)  Ang Irizarry APRN as Nurse Practitioner (Family Medicine)      STEPHEN HARMAN  1945    Diagnoses    1. IMPAIRED GAIT AND MOBILITY       ADMITTING DIAGNOSIS:  Status post October 4 October 5, 2022-staged front and back operation consisting of a lateral interbody fusion at L2-L3 and L3-L4 on 10/4/2022 followed by pedicle screw fixation using the robot at L to to L4 and decompression       Subjective     Tolerates therapies  Strength same          Objective     Vitals:    10/13/22 1250   BP: 116/57   Pulse: 61   Resp: 18   Temp: 97.7 °F (36.5 °C)   SpO2: 97%       PHYSICAL EXAM:   Gen: Awake alert  Pulm: CTAB; no r/r/w; non-labored   Heart; RRR; no m/r/g appreciated   Abd: soft; NT/ND; +BS    Skin: no rashes on exposed skin , surgical wound  Dressed.   MSK: calves soft   : no Indwelling catheter   Neuro:verbal; appropriate     Motor:  Normal bulk and tone  Strength is 5/5 in BUE, 4-/5 bilat HF, 5/5 bilat KF/KE, 5/5 bilat DF/PF       MEDICATIONS  Scheduled Meds:budesonide-formoterol, 2 puff, Inhalation, BID - RT  enoxaparin, 40 mg, Subcutaneous, Q24H  gabapentin, 300 mg, Oral, Daily  gabapentin, 600 mg, Oral, Nightly  losartan, 25 mg, Oral, Q24H  montelukast, 10 mg, Oral, Daily  pantoprazole, 40 mg, Oral, Q AM  polyethylene glycol, 17 g, Oral, Daily  potassium chloride, 20 mEq, Oral, Daily  senna-docusate sodium, 1 tablet, Oral, BID      Continuous Infusions:   PRN Meds:.•  acetaminophen  •  baclofen  •  bisacodyl  •  clonazePAM  •  HYDROcodone-acetaminophen  •  HYDROcodone-acetaminophen  •  polyethylene glycol      RESULTS  No results found for: POCGLU  Results from last 7 days   Lab Units 10/13/22  0709 10/11/22  0643 10/10/22  1451   WBC 10*3/mm3 4.56 3.73 4.86   HEMOGLOBIN g/dL 7.5* 7.3* 7.7*   HEMATOCRIT % 22.4* 22.1* 23.2*    PLATELETS 10*3/mm3 272 226 236     Results from last 7 days   Lab Units 10/13/22  0709 10/11/22  0643 10/10/22  1451 10/07/22  0416   SODIUM mmol/L 139 142 136 137   POTASSIUM mmol/L 4.6 3.8 3.2* 3.6   CHLORIDE mmol/L 104 101 99 98   CO2 mmol/L 28.0 31.2* 30.0* 28.0   BUN mg/dL 12 11 13 15   CREATININE mg/dL 0.82 0.73* 0.97 0.89   CALCIUM mg/dL 8.9 8.8 8.5* 8.5*   BILIRUBIN mg/dL  --   --   --  0.8   ALK PHOS U/L  --   --   --  52   ALT (SGPT) U/L  --   --   --  16   AST (SGOT) U/L  --   --   --  43*   GLUCOSE mg/dL 104* 123* 112* 117*           ASSESSMENT and PLAN    Spinal stenosis of lumbar region with neurogenic claudication    Status post October 4 October 5, 2022-staged front and back operation consisting of a lateral interbody fusion at L2-L3 and L3-L4 on 10/4/2022 followed by pedicle screw fixation using the robot at L to to L4 and decompression       #Rehabilitation:   Patient now with moderate to severe disability due to impairments of mobility and self care: PT/ OT/SLP evaluation and treatment for mobility training, self-care skills training, pain management, equipment assessment, safety assessment and training, cognitive/swallowing evaluation and caregiver training for a safe discharge. This would be an interdisciplinary program with physical therapy 1 hour,  occupational therapy 1 hour, and speech therapy 1 hour, 5 days a week.  Rehabilitation nursing for carryover. Weekly team conferences.  Ongoing physician follow-up . Goals are to achieve a level of independence with  mobility and self-care and improved endurance.   Rehabilitation prognosis good.  Medical prognosis good Estimated length of stay is approximately 15 days , but is only an estimation.        # Safety and Precautions.   -Fall, Skin.      #Diet:   -red diet      #DVT ppx:  - Lovenox. SCDs    # anemia -   Oct 7 - HGB 8.8  Oct 10 - HGB 7.7. Post op anemia. Will add PPI.   October 11-hemoglobin 7.3.  Reviewed with patient.    will follow  pattern.  Oct 13 - HGB 7.5     #HTN  losartan. /Hydrochlorothiazide  October 10-systolic blood pressure into the 70s yesterday.  Decrease meds to losartan 50 mg daily, hold for systolic blood pressure less than 120  Recheck CBC/BUN/creatinine - HGB trend to 7.7   October 11-decreased losartan to 25 mg daily hold if systolic blood pressure less than 120  Oct 13 - losartan held today    #XOL  atorvastatin, 10 mg, Oral, Daily-patient states intolerance a decade ago with severe myalgia-discontinued    #pain-[place] report reviewed  gabapentin, 300 mg, Oral, Daily  gabapentin, 600 mg, Oral, Nightly    #Chronic benzodiazepime use-clonazepam     #h/o Asthma  montelukast, 10 mg, Oral, Daily  Symbicort  Hold on resuming theophylline for now with his anemia     # bladder:   - Continent, monitor.  - PVRs, IC, time voids      # Bowel: continent.   -Bisacodyl prn , senna S , miralax prn      # Pressure injury prophylaxis:  - offloading, optimize nutrition , oob with PT.      # @ risk for orthostatic hypotension:   -Monitor VS regularly.      # Dispo:  - Home with family.     Team conference-October 11-toilet transfers contact-guard.  Shower transfers to be assessed.  Transfers standby assist.  Bed mobility standby assist.  Gait 160 feet contact-guard rolling walker.  8 stairs contact-guard.  Bathing standby assist.  Lower body dressing contact-guard.  Upper body dressing set up.  Grooming standby assist.  Toileting contact-guard.  Continent mostly bowel and bladder.  Estimate length of stay-Friday         Adam Torres MD      During rounds, used appropriate personal protective equipment including mask and gloves.  Additional gown if indicated.  Mask used was standard procedure mask. Appropriate PPE was worn during the entire visit.  Hand hygiene was completed before and after.

## 2022-10-13 NOTE — THERAPY DISCHARGE NOTE
Inpatient Rehabilitation - Physical Therapy Treatment Note/Discharge  [facility]     Patient Name: Omar Choudhary  : 1945  MRN: [id number]  Today's Date: 10/13/2022                Admit Date: 10/8/2022    Visit Dx:    ICD-10-CM ICD-9-CM   1. Impaired gait and mobility  R26.89 781.2     Patient Active Problem List   Diagnosis   • Benign prostatic hyperplasia   • Bursitis   • Depression   • Diverticulosis   • Family history of malignant neoplasm of breast   • Hyperlipidemia   • Hypertension   • Internal derangement of right knee   • Knee effusion   • Asthma   • Obstructive sleep apnea syndrome   • Osteoarthritis   • Osteopenia   • Pain in knee   • Postnasal drip   • Prepatellar bursitis   • Sciatica of right side   • Diaphragm paralysis   • Spinal stenosis of lumbar region with neurogenic claudication   • Degenerative lumbar spinal stenosis   • Anxiety   • Pinguecula of right eye   • Lumbar radiculopathy, chronic     Past Medical History:   Diagnosis Date   • Allergic    • Arthritis    • Asthma    • Carpal tunnel syndrome     no pain   • Cataract    • Depression    • Family history of malignant neoplasm of breast 2019   • Hemidiaphragm paralysis     Left   • Hyperlipidemia 2019   • Hypertension 2012   • Leg pain, right    • Low back pain    • Neuropathy     feet   • Osteopenia    • Poor balance    • Reactive airway disease 01/15/2016   • Shortness of breath    • Sleep apnea, obstructive     CPAP- to bring dos     Past Surgical History:   Procedure Laterality Date   • KNEE ARTHROSCOPY W/ ACL RECONSTRUCTION Right 2019   • MOUTH SURGERY         PT ASSESSMENT (last 12 hours)     IRF PT Evaluation and Treatment     Row Name 10/13/22 1048          PT Time and Intention    Document Type daily treatment;discharge evaluation  -DP     Mode of Treatment individual therapy;physical therapy  -DP     Patient/Family/Caregiver Comments/Observations Pt sitting on toilet upon PT arrival  -DP     Row Name  10/13/22 1048          General Information    Patient Profile Reviewed yes  -DP     Existing Precautions/Restrictions fall;spinal  -DP     Row Name 10/13/22 1048          Pain Assessment    Pretreatment Pain Rating 8/10  -DP     Posttreatment Pain Rating 8/10  -DP     Pain Location incisional  -DP     Pain Location - back  -DP     Row Name 10/13/22 1048          Cognition/Psychosocial    Affect/Mental Status (Cognition) WNL  -DP     Orientation Status (Cognition) oriented x 4  -DP     Follows Commands (Cognition) WFL  -DP     Personal Safety Interventions safety round/check completed;elopement precautions initiated;fall prevention program maintained;gait belt;muscle strengthening facilitated;nonskid shoes/slippers when out of bed;supervised activity  -DP     Row Name 10/13/22 1048          Mobility    Advanced Gait Activity curb negotiation;rough/uneven surfaces  -DP     Additional Documentation Advanced Gait Activity (Row)  -DP     Row Name 10/13/22 1048          Transfer Assessment/Treatment    Transfers sit-stand transfer;stand-sit transfer;car transfer;toilet transfer  -DP     Row Name 10/13/22 1048          Sit-Stand Transfer    Sit-Stand [place] (Transfers) modified independence  -DP     Assistive Device (Sit-Stand Transfers) walker, front-wheeled  -DP     Row Name 10/13/22 1048          Stand-Sit Transfer    Stand-Sit [place] (Transfers) modified independence  -DP     Assistive Device (Stand-Sit Transfers) walker, front-wheeled  -DP     Row Name 10/13/22 1048          Toilet Transfer    Type (Toilet Transfer) stand-sit;sit-stand  -DP     [place] Level (Toilet Transfer) modified independence  -DP     Assistive Device (Toilet Transfer) grab bars/safety frame;walker, front-wheeled  -DP     Row Name 10/13/22 1048          Car Transfer    Type (Car Transfer) sit-stand;stand-sit  -DP     [place] Level (Car Transfer) supervision  -DP     Row Name 10/13/22 1048          Gait/Stairs (Locomotion)     [place] Level (Gait) supervision  -DP     Assistive Device (Gait) walker, front-wheeled  -DP     Distance in Feet (Gait) 250'x2, 160'x1, 80'x3,60'x1, 40'x1  -DP     Deviations/Abnormal Patterns (Gait) bilateral deviations;gait speed decreased;stride length decreased;weight shifting decreased  -DP     Bilateral Gait Deviations forward flexed posture;heel strike decreased  -DP     [place] Level (Stairs) modified independence  -DP     Handrail Location (Stairs) left side (ascending);right side (descending)  -DP     Number of Steps (Stairs) 16  -DP     Ascending Technique (Stairs) step-over-step  -DP     Descending Technique (Stairs) step-to-step  -DP     Negotiation (Ramp) ramp assistive device;ramp independence  -DP     [place] Level (Ramp) contact guard  -DP     Assistive Device (Ramp) walker, front-wheeled  -DP     Comment, (Gait/Stairs) Pt trialed ambulting 40'x1 with SBQC and SPC w/ quad tip for 60'x1 and was CGA to SBA  -DP     Row Name 10/13/22 104          Safety Issues, Functional Mobility    Impairments Affecting Function (Mobility) balance;postural/trunk control;endurance/activity tolerance;strength;pain  -DP     Row Name 10/13/22 1048          Curb Negotiation (Mobility)    [place], Curb Negotiation standby assist  -DP     Assistive Device (Curb Negotiation) walker, front-wheeled  -DP     Comment, Curb Negotiation (Mobility) Performed 2x  -DP     Row Name 10/13/22 1048          Rough/Uneven Surface Gait Skills (Mobility)    [place], Gait on Rough/Uneven Surface (Mobility) standby assist  -DP     Assistive Device (Rough/Uneven Surface Gait) walker, front-wheeled  -DP     Distance in Feet (Rough/Uneven Surface Gait) 10'x1  -DP     Row Name 10/13/22 1041          Positioning and Restraints    Pre-Treatment Position bathroom  -DP     Post Treatment Position wheelchair  -DP     In Wheelchair sitting;call light within reach;encouraged to call for assist  -DP     Row Name 10/13/22 1046           IRF PT Goals    Bed Mobility Goal Selection (PT-IRF) bed mobility, PT goal 1  -DP     Transfer Goal Selection (PT-IRF) transfers, PT goal 1  -DP     Gait (Walking Locomotion) Goal Selection (PT-IRF) gait, PT goal 1  -DP     Stairs Goal Selection (PT-IRF) stairs, PT goal 1  -DP     Row Name 10/13/22 1048          Bed Mobility Goal 1 (PT-IRF)    Activity/Assistive Device (Bed Mobility Goal 1, PT-IRF) bed mobility activities, all  -DP     [place] Level (Bed Mobility Goal 1, PT-IRF) modified independence  -DP     Time Frame (Bed Mobility Goal 1, PT-IRF) 2 weeks  -DP     Progress/Outcomes (Bed Mobility Goal 1, PT-IRF) goal met  -DP     Row Name 10/13/22 1048          Transfer Goal 1 (PT-IRF)    Activity/Assistive Device (Transfer Goal 1, PT-IRF) all transfers  -DP     [place] Level (Transfer Goal 1, PT-IRF) modified independence  -DP     Time Frame (Transfer Goal 1, PT-IRF) 2 weeks  -DP     Progress/Outcomes (Transfer Goal 1, PT-IRF) goal met  -DP     Row Name 10/13/22 1048          Gait/Walking Locomotion Goal 1 (PT-IRF)    Activity/Assistive Device (Gait/Walking Locomotion Goal 1, PT-IRF) gait (walking locomotion);walker, rolling  -DP     Gait/Walking Locomotion Distance Goal 1 (PT-IRF) 150'  -DP     [place] Level (Gait/Walking Locomotion Goal 1, PT-IRF) supervision required  -DP     Time Frame (Gait/Walking Locomotion Goal 1, PT-IRF) 2 weeks  -DP     Progress/Outcomes (Gait/Walking Locomotion Goal 1, PT-IRF) goal met  -DP     Row Name 10/13/22 1048          Stairs Goal 1 (PT-IRF)    Activity/Assistive Device (Stairs Goal 1, PT-IRF) stairs, all skills;step-to-step  -DP     Number of Stairs (Stairs Goal 1, PT-IRF) 12  -DP     [place] Level (Stairs Goal 1, PT-IRF) contact guard required  -DP     Time Frame (Stairs Goal 1, PT-IRF) 2 weeks  -DP     Progress/Outcomes (Stairs Goal 1, PT-IRF) goal met  -DP     Row Name 10/13/22 1048          Discharge Summary (PT)    Discharge Summary Statement (PT)  Pt has met all PT rehab goals with his time at [facility]. Pt is CHELLY with bed mobility, transfers, ambulation and stair navigation. Pt performs all mobility when OOB using FWW. Pt is expected to d/c home on 10/14/22 with OP PT to follow.  -DP           User Key  (r) = Recorded By, (t) = Taken By, (c) = Cosigned By    Initials Name Provider Type    Melo Wilson PT Physical Therapist                Physical Therapy Education     Title: PT OT SLP Therapies (Done)     Topic: Physical Therapy (Done)     Point: Mobility training (Done)     Learning Progress Summary           Patient Acceptance, E,D, VU,DU by DP at 10/13/2022 1147    Comment: Pt given HEP and educated on exercises.    Acceptance, E,TB, VU,DU by OCTAVIO at 10/12/2022 1013    Comment: bed mobility    Acceptance, E,D, VU,DU by DP at 10/11/2022 1346    Acceptance, E,D, VU,DU by DP at 10/10/2022 1548    Acceptance, E,TB, VU,NR by ADE at 10/9/2022 1205                   Point: Home exercise program (Done)     Learning Progress Summary           Patient Acceptance, E,D, VU,DU by DP at 10/13/2022 1147    Comment: Pt given HEP and educated on exercises.    Acceptance, E,D, VU,DU by DP at 10/11/2022 1346    Acceptance, E,D, VU,DU by DP at 10/10/2022 1548    Acceptance, E,TB, VU,NR by ADE at 10/9/2022 1205                   Point: Body mechanics (Done)     Learning Progress Summary           Patient Acceptance, E,D, VU,DU by DP at 10/13/2022 1147    Comment: Pt given HEP and educated on exercises.    Acceptance, E,D, VU,DU by DP at 10/11/2022 1346    Acceptance, E,D, VU,DU by DP at 10/10/2022 1548    Acceptance, E,TB, VU,NR by ADE at 10/9/2022 1205                   Point: Precautions (Done)     Learning Progress Summary           Patient Acceptance, E,D, VU,DU by DP at 10/13/2022 1147    Comment: Pt given HEP and educated on exercises.    Acceptance, E,D, VU,DU by DP at 10/11/2022 1346    Acceptance, E,CRIS, HELEN,GREG by BRITNEY at 10/10/2022 1548    Acceptance, E,HELEN CHILDERS,NR by ADE at  10/9/2022 1205                               User Key     Initials Effective Dates Name Provider Type Discipline    ADE 06/16/21 -  Marion Farias, PT Physical Therapist PT    LB 06/16/21 -  Pamella Bonilla, PT Physical Therapist PT    DP 08/24/21 -  Melo Astorga PT Physical Therapist PT                PT Recommendation and Plan                  Time Calculation:    PT Charges     Row Name 10/13/22 1416 10/13/22 1148          Time Calculation    Start Time 1300  -DP 1030  -DP     Stop Time 1330  -DP 1130  -DP     Time Calculation (min) 30 min  -DP 60 min  -DP     PT Received On -- 10/13/22  -DP        Time Calculation- PT    Total Timed Code Minutes- PT 30 minute(s)  -DP 60 minute(s)  -DP           User Key  (r) = Recorded By, (t) = Taken By, (c) = Cosigned By    Initials Name Provider Type    DP Melo Astorga, PT Physical Therapist                Therapy Charges for Today     Code Description Service Date Service Provider Modifiers Qty    14167278236 HC PT THERAPEUTIC ACT EA 15 MIN 10/13/2022 Melo Astorga, PT GP 4    14514809064 HC PT THERAPEUTIC ACT EA 15 MIN 10/13/2022 Melo Astorga, PT GP 2          PT G-Codes  AM-PAC 6 Clicks Score (PT): 20      Patient was wearing a face mask during this therapy encounter. Therapist used appropriate personal protective equipment including mask and gloves.  Mask used was standard procedure mask. Appropriate PPE was worn during the entire therapy session. Hand hygiene was completed before and after therapy session. Patient is not in enhanced droplet precautions.       Melo Astorga, PT  10/13/2022

## 2022-10-13 NOTE — THERAPY DISCHARGE NOTE
Inpatient Rehabilitation - IRF Occupational Therapy Treatment Note/Discharge  [facility]     Patient Name: Omar Choudhary  : 1945  MRN: [id number]  Today's Date: 10/13/2022               Admit Date: 10/8/2022       ICD-10-CM ICD-9-CM   1. Impaired gait and mobility  R26.89 781.2     Patient Active Problem List   Diagnosis   • Benign prostatic hyperplasia   • Bursitis   • Depression   • Diverticulosis   • Family history of malignant neoplasm of breast   • Hyperlipidemia   • Hypertension   • Internal derangement of right knee   • Knee effusion   • Asthma   • Obstructive sleep apnea syndrome   • Osteoarthritis   • Osteopenia   • Pain in knee   • Postnasal drip   • Prepatellar bursitis   • Sciatica of right side   • Diaphragm paralysis   • Spinal stenosis of lumbar region with neurogenic claudication   • Degenerative lumbar spinal stenosis   • Anxiety   • Ping[place]cula of right eye   • Lumbar radiculopathy, chronic     Past Medical History:   Diagnosis Date   • Allergic    • Arthritis    • Asthma    • Carpal tunnel syndrome     no pain   • Cataract    • Depression    • Family history of malignant neoplasm of breast 2019   • Hemidiaphragm paralysis     Left   • Hyperlipidemia 2019   • Hypertension 2012   • Leg pain, right    • Low back pain    • Neuropathy     feet   • Osteopenia    • Poor balance    • Reactive airway disease 01/15/2016   • Shortness of breath    • Sleep apnea, obstructive     CPAP- to bring dos     Past Surgical History:   Procedure Laterality Date   • KNEE ARTHROSCOPY W/ ACL RECONSTRUCTION Right 2019   • MOUTH SURGERY         IRF OT ASSESSMENT FLOWSHEET (last 12 hours)     IRF OT Evaluation and Treatment     Row Name 10/13/22 1510          OT Time and Intention    Document Type discharge evaluation  -DN     Mode of Treatment occupational therapy  -DN     Patient Effort good  -DN     Comment, Evaluation/Treatment Not Performed pt missed 15 minutes of tx due to stated fatigue   pt modified independent in room today and no issues per nsg or therapy, pt declined kitchen meal prep this pm  -DN     Row Name 10/13/22 1510          Previous Level of Function/Home Environm    Activity/Exercise/Self-Care Comment pt states he already has shower chiar with back and grab bars in SO home and elevated commode with handles in house, and reacher  -DN     Row Name 10/13/22 1510          Pain Assessment    Pretreatment Pain Rating 6/10  -DN     Posttreatment Pain Rating 6/10  -DN     Pain Location - Side/Orientation Bilateral  -DN     Pain Location posterior  -DN     Pain Location - back  -DN     Row Name 10/13/22 1510          Cognition/Psychosocial    Affect/Mental Status (Cognition) WNL  -DN     Orientation Status (Cognition) oriented x 4  -DN     Personal Safety Interventions --  follows precautions  -DN     Row Name 10/13/22 1510          Strength (Manual Muscle Testing)    Left Upper Extremity Strength left UE strength is WFL  -DN     Right Upper Extremity Strength right UE strength is WFL  -DN     Row Name 10/13/22 1510          Strength Comprehensive (MMT)    General Manual Muscle Testing (MMT) Assessment upper extremity strength deficits identified  -DN     Row Name 10/13/22 1510          Bathing    [place] Level (Bathing) bathing skills;modified independence  -DN     Assistive Device (Bathing) shower chair  -DN     Position (Bathing) supported sitting;supported standing  -DN     Row Name 10/13/22 1510          Upper Body Dressing    [place] Level (Upper Body Dressing) upper body dressing skills;modified independence  -DN     Position (Upper Body Dressing) supported sitting  -DN     Row Name 10/13/22 1510          Lower Body Dressing    [place] Level (Lower Body Dressing) doff;don;pants/bottoms;shoes/slippers;socks;underwear;modified independence  -DN     Assistive Device Use (Lower Body Dressing) reacher  -DN     Position (Lower Body Dressing) supported sitting;supported standing   -DN     Row Name 10/13/22 1510          Grooming    [place] Level (Grooming) grooming skills;deodorant application;hair care, combing/brushing;oral care regimen;shave face;modified independence  -DN     Position (Grooming) supported sitting;supported standing  -DN     Row Name 10/13/22 1510          Toileting    [place] Level (Toileting) toileting skills;adjust/manage clothing;perform perineal hygiene;modified independence  -DN     Assistive Device Use (Toileting) raised toilet seat;grab bar/safety frame  -DN     Position (Toileting) supported standing;supported sitting  -DN     Comment (Toileting) RWX level  -DN     Row Name 10/13/22 1510          Toilet Transfer    Type (Toilet Transfer) stand pivot/stand step  -DN     [place] Level (Toilet Transfer) modified independence  -DN     Assistive Device (Toilet Transfer) grab bars/safety frame;raised toilet seat;walker, front-wheeled  -DN     Row Name 10/13/22 1510          Shower Transfer    Type (Shower Transfer) stand pivot/stand step  -DN     [place] Level (Shower Transfer) modified independence  -DN     Assistive Device (Shower Transfer) shower chair;grab bar, tub/shower;walker, front-wheeled  -DN     Row Name 10/13/22 1510          Balance    Static Sitting Balance independent  -DN     Dynamic Sitting Balance independent  -DN     Dynamic Standing Balance independent  -DN     Row Name 10/13/22 1510          Transfer Goal 1 (OT-IRF)    Activity/Assistive Device (Transfer Goal 1, OT-IRF) toilet;walk-in shower;walker, rolling;commode, 3-in-1;shower chair  -DN     [place] Level (Transfer Goal 1, OT-IRF) modified independence  -DN     Progress/Outcomes (Transfer Goal 1, OT-IRF) goal met  -DN     Row Name 10/13/22 1510          Transfer Goal 2 (OT-IRF)    Activity/Assistive Device (Transfer Goal 2, OT-IRF) toilet;walk-in shower;commode chair;walker, rolling;shower chair  -DN     [place] Level (Transfer Goal 2, OT-IRF) modified independence   -DN     Time Frame (Transfer Goal 2, OT-IRF) long-term goal (LTG)  -DN     Progress/Outcomes (Transfer Goal 2, OT-IRF) goal met  -DN     Row Name 10/13/22 1510          Bathing Goal 1 (OT-IRF)    Activity/Device (Bathing Goal 1, OT-IRF) bathing skills, all  -DN     [place] Level (Bathing Goal 1, OT-IRF) modified independence  -DN     Time Frame (Bathing Goal 1, OT-IRF) short-term goal (STG)  -DN     Progress/Outcomes (Bathing Goal 1, OT-IRF) goal met  -DN     Row Name 10/13/22 1510          Bathing Goal 2 (OT-IRF)    Activity/Device (Bathing Goal 2, OT-IRF) bathing skills, all  -DN     [place] Level (Bathing Goal 2, OT-IRF) modified independence  -DN     Time Frame (Bathing Goal 2, OT-IRF) long-term goal (LTG)  -DN     Progress/Outcomes (Bathing Goal 2, OT-IRF) goal met  -DN     Row Name 10/13/22 1510          UB Dressing Goal 2 (OT-IRF)    Activity/Device (UB Dressing Goal 2, OT-IRF) upper body dressing  -DN     [place] (UB Dress Goal 2, OT-IRF) modified independence  -DN     Time Frame (UB Dressing Goal 2, OT-IRF) long-term goal (LTG)  -DN     Progress/Outcomes (UB Dressing Goal 2, OT-IRF) goal met  -DN     Row Name 10/13/22 1510          LB Dressing Goal 2 (OT-IRF)    Activity/Device (LB Dressing Goal 2, OT-IRF) lower body dressing  -DN     [place] (LB Dressing Goal 2, OT-IRF) modified independence  -DN     Time Frame (LB Dressing Goal 2, OT-IRF) long-term goal (LTG)  -DN     Progress/Outcomes (LB Dressing Goal 2, OT-IRF) goal met  -DN     Row Name 10/13/22 1510          Grooming Goal 2 (OT-IRF)    Activity/Device (Grooming Goal 2, OT-IRF) grooming skills, all  -DN     [place] (Grooming Goal 2, OT-IRF) modified independence  -DN     Time Frame (Grooming Goal 2, OT-IRF) long-term goal (LTG)  -DN     Progress/Outcomes (Grooming Goal 2, OT-IRF) goal met  -DN     Row Name 10/13/22 1510          Toileting Goal 2 (OT-IRF)    Activity/Device (Toileting Goal 2, OT-IRF) toileting skills, all  -DN      [place] Level (Toileting Goal 2, OT-IRF) modified independence  -DN     Progress/Outcomes (Toileting Goal 2, OT-IRF) goal met  -DN     Time Frame (Toileting Goal 2, OT-IRF) long-term goal (LTG)  -DN     Row Name 10/13/22 1510          Strength Goal 2 (OT-IRF)    Strength Goal 2 (OT-IRF) pt to be independent with BUE HEP at time of d/c  -DN     Time Frame (Strength Goal 2, OT-IRF) long-term goal (LTG)  -DN     Progress/Outcomes (Strength Goal 2, OT-IRF) goal met  -DN     Row Name 10/13/22 1510          Endurance Goal 2 (OT)    Activity Level (Endurance Goal 2, OT) endurance 2 good -  -DN     Progress/Outcome (Endurance Goal 2, OT) goal met  -DN     Row Name 10/13/22 1510          Caregiver Training Goal 2 (OT-IRF)    Caregiver Training Goal 2 (OT-IRF) pt to be mod independent with all adls and transfers at RWX level  -DN     Time Frame (Caregiver Training Goal 2, OT-IRF) long-term goal (LTG)  -DN     Progress/Outcomes (Caregiver Training Goal 2, OT-IRF) goal met  -DN           User Key  (r) = Recorded By, (t) = Taken By, (c) = Cosigned By    Initials Name Effective Dates    DN Jose Rodrigues, OT 06/16/21 -               Wound 10/04/22 0849 Left lumbar spine Incision (Active)   Dressing Appearance dressing loose 10/13/22 0921   Closure Liquid skin adhesive 10/13/22 0921   Base dry 10/13/22 0921   Periwound intact;edematous 10/13/22 0921   Drainage Amount none 10/13/22 0921   Care, Wound cleansed with;antimicrobial agent applied 10/13/22 0921   Dressing Care dressing changed 10/13/22 0921   Periwound Care dry periwound area maintained 10/13/22 0921       Wound 10/05/22 1411 Other (See comments) lower lumbar spine Incision (Active)   Dressing Appearance dressing loose 10/13/22 0921   Closure Liquid skin adhesive 10/13/22 0921   Base clean 10/13/22 0921   Periwound intact;edematous 10/13/22 0921   Drainage Amount none 10/13/22 0921   Care, Wound cleansed with;antimicrobial agent applied 10/13/22 9153   Dressing  Care dressing applied 10/13/22 0921       Occupational Therapy Education     Title: PT OT SLP Therapies (Done)     Topic: Occupational Therapy (Done)     Point: ADL training (Done)     Description:   Instruct learner(s) on proper safety adaptation and remediation techniques during self care or transfers.   Instruct in proper use of assistive devices.              Learning Progress Summary           Patient Acceptance, E,TB,D, VU,DU by DN at 10/13/2022 1531    Comment: pt demonstrated Mod independence with adls, functional transfers and HEP and issue HEP for home use.  No further OT recommded    Acceptance, E, VU by DN at 10/12/2022 1534    Comment: Pt and SO attended family conferance today to discuss current status with adls, functional transfers, back precautions, HEP, Kitchen mobility, and any need for further therapy at time of d/c    Acceptance, E, VU by DN at 10/10/2022 1546    Comment: precautions per sx, adaptive tech with adls and transfer training   Significant Other Acceptance, E, VU by DN at 10/12/2022 1534    Comment: Pt and SO attended family conferance today to discuss current status with adls, functional transfers, back precautions, HEP, Kitchen mobility, and any need for further therapy at time of d/c                   Point: Home exercise program (Done)     Description:   Instruct learner(s) on appropriate technique for monitoring, assisting and/or progressing therapeutic exercises/activities.              Learning Progress Summary           Patient Acceptance, E,TB,D, VU,DU by DN at 10/13/2022 1531    Comment: pt demonstrated Mod independence with adls, functional transfers and HEP and issue HEP for home use.  No further OT recommded    Acceptance, E, VU by DN at 10/12/2022 1534    Comment: Pt and SO attended family conferance today to discuss current status with adls, functional transfers, back precautions, HEP, Kitchen mobility, and any need for further therapy at time of d/c    Acceptance, E,  VU by DN at 10/10/2022 1546    Comment: precautions per sx, adaptive tech with adls and transfer training   Significant Other Acceptance, E, VU by DN at 10/12/2022 1534    Comment: Pt and SO attended family conferance today to discuss current status with adls, functional transfers, back precautions, HEP, Kitchen mobility, and any need for further therapy at time of d/c                   Point: Precautions (Done)     Description:   Instruct learner(s) on prescribed precautions during self-care and functional transfers.              Learning Progress Summary           Patient Acceptance, E,TB,D, VU,DU by DN at 10/13/2022 1531    Comment: pt demonstrated Mod independence with adls, functional transfers and HEP and issue HEP for home use.  No further OT recommded    Acceptance, E, VU by DN at 10/12/2022 1534    Comment: Pt and SO attended family conferance today to discuss current status with adls, functional transfers, back precautions, HEP, Kitchen mobility, and any need for further therapy at time of d/c    Acceptance, E, VU by DN at 10/10/2022 1546    Comment: precautions per sx, adaptive tech with adls and transfer training   Significant Other Acceptance, E, VU by DN at 10/12/2022 1534    Comment: Pt and SO attended family conferance today to discuss current status with adls, functional transfers, back precautions, HEP, Kitchen mobility, and any need for further therapy at time of d/c                   Point: Body mechanics (Done)     Description:   Instruct learner(s) on proper positioning and spine alignment during self-care, functional mobility activities and/or exercises.              Learning Progress Summary           Patient Acceptance, E,TB,D, VU,DU by DN at 10/13/2022 1531    Comment: pt demonstrated Mod independence with adls, functional transfers and HEP and issue HEP for home use.  No further OT recommded    Acceptance, E, VU by DN at 10/12/2022 1534    Comment: Pt and SO attended family conferance  today to discuss current status with adls, functional transfers, back precautions, HEP, Kitchen mobility, and any need for further therapy at time of d/c    Acceptance, E, VU by DN at 10/10/2022 1546    Comment: precautions per sx, adaptive tech with adls and transfer training   Significant Other Acceptance, E, VU by DN at 10/12/2022 1534    Comment: Pt and SO attended family conferance today to discuss current status with adls, functional transfers, back precautions, HEP, Kitchen mobility, and any need for further therapy at time of d/c                               User Key     Initials Effective Dates Name Provider Type Discipline    DN 06/16/21 -  Jose Rodrigues, OT Occupational Therapist OT                OT Recommendation and Plan  Anticipated Discharge Disposition (OT): home  Planned Therapy Interventions (OT): activity tolerance training, adaptive equipment training, BADL retraining, strengthening exercise, transfer/mobility retraining, ROM/therapeutic exercise           OT IRF GOALS     Row Name 10/13/22 1510 10/10/22 1439          Transfer Goal 1 (OT-IRF)    Activity/Assistive Device (Transfer Goal 1, OT-IRF) toilet;walk-in shower;walker, rolling;commode, 3-in-1;shower chair  -DN toilet;walk-in shower;walker, rolling;commode, 3-in-1;shower chair  -DN     [place] Level (Transfer Goal 1, OT-IRF) modified independence  -DN modified independence  -DN     Time Frame (Transfer Goal 1, OT-IRF) -- short-term goal (STG)  -DN     Progress/Outcomes (Transfer Goal 1, OT-IRF) goal met  -DN continuing progress toward goal  -DN        Transfer Goal 2 (OT-IRF)    Activity/Assistive Device (Transfer Goal 2, OT-IRF) toilet;walk-in shower;commode chair;walker, rolling;shower chair  -DN toilet;walk-in shower;commode chair;walker, rolling;shower chair  -DN     [place] Level (Transfer Goal 2, OT-IRF) modified independence  -DN modified independence  -DN     Time Frame (Transfer Goal 2, OT-IRF) long-term goal (LTG)   -DN long-term goal (LTG)  -DN     Progress/Outcomes (Transfer Goal 2, OT-IRF) goal met  -DN continuing progress toward goal  -DN        Bathing Goal 1 (OT-IRF)    Activity/Device (Bathing Goal 1, OT-IRF) bathing skills, all  -DN bathing skills, all  -DN     [place] Level (Bathing Goal 1, OT-IRF) modified independence  -DN modified independence  -DN     Time Frame (Bathing Goal 1, OT-IRF) short-term goal (STG)  -DN short-term goal (STG)  -DN     Progress/Outcomes (Bathing Goal 1, OT-IRF) goal met  -DN continuing progress toward goal  -DN        Bathing Goal 2 (OT-IRF)    Activity/Device (Bathing Goal 2, OT-IRF) bathing skills, all  -DN bathing skills, all  -DN     [place] Level (Bathing Goal 2, OT-IRF) modified independence  -DN modified independence  -DN     Time Frame (Bathing Goal 2, OT-IRF) long-term goal (LTG)  -DN long-term goal (LTG)  -DN     Progress/Outcomes (Bathing Goal 2, OT-IRF) goal met  -DN continuing progress toward goal  -DN        UB Dressing Goal 2 (OT-IRF)    Activity/Device (UB Dressing Goal 2, OT-IRF) upper body dressing  -DN upper body dressing  -DN     [place] (UB Dress Goal 2, OT-IRF) modified independence  -DN modified independence  -DN     Time Frame (UB Dressing Goal 2, OT-IRF) long-term goal (LTG)  -DN long-term goal (LTG)  -DN     Progress/Outcomes (UB Dressing Goal 2, OT-IRF) goal met  -DN continuing progress toward goal  -DN        LB Dressing Goal 2 (OT-IRF)    Activity/Device (LB Dressing Goal 2, OT-IRF) lower body dressing  -DN lower body dressing  -DN     [place] (LB Dressing Goal 2, OT-IRF) modified independence  -DN modified independence  -DN     Time Frame (LB Dressing Goal 2, OT-IRF) long-term goal (LTG)  -DN long-term goal (LTG)  -DN     Progress/Outcomes (LB Dressing Goal 2, OT-IRF) goal met  -DN continuing progress toward goal  -DN        Grooming Goal 2 (OT-IRF)    Activity/Device (Grooming Goal 2, OT-IRF) grooming skills, all  -DN grooming skills, all   -DN     [place] (Grooming Goal 2, OT-IRF) modified independence  -DN modified independence  -DN     Time Frame (Grooming Goal 2, OT-IRF) long-term goal (LTG)  -DN long-term goal (LTG)  -DN     Progress/Outcomes (Grooming Goal 2, OT-IRF) goal met  -DN continuing progress toward goal  -DN        Toileting Goal 2 (OT-IRF)    Activity/Device (Toileting Goal 2, OT-IRF) toileting skills, all  -DN toileting skills, all  -DN     [place] Level (Toileting Goal 2, OT-IRF) modified independence  -DN modified independence  -DN     Progress/Outcomes (Toileting Goal 2, OT-IRF) goal met  -DN continuing progress toward goal  -DN     Time Frame (Toileting Goal 2, OT-IRF) long-term goal (LTG)  -DN long-term goal (LTG)  -DN        Strength Goal 2 (OT-IRF)    Strength Goal 2 (OT-IRF) pt to be independent with BUE HEP at time of d/c  -DN pt to be independent with BUE HEP at time of d/c  -DN     Time Frame (Strength Goal 2, OT-IRF) long-term goal (LTG)  -DN long-term goal (LTG)  -DN     Progress/Outcomes (Strength Goal 2, OT-IRF) goal met  -DN continuing progress toward goal  -DN        Caregiver Training Goal 2 (OT-IRF)    Caregiver Training Goal 2 (OT-IRF) pt to be mod independent with all adls and transfers at RWX level  -DN pt to be mod independent with all adls and transfers at RWX level  -DN     Time Frame (Caregiver Training Goal 2, OT-IRF) long-term goal (LTG)  -DN long-term goal (LTG)  -DN     Progress/Outcomes (Caregiver Training Goal 2, OT-IRF) goal met  -DN continuing progress toward goal  -DN           User Key  (r) = Recorded By, (t) = Taken By, (c) = Cosigned By    Initials Name Provider Type    Jose Cunningham OT Occupational Therapist                    Time Calculation:    Time Calculation- OT     Row Name 10/13/22 1330 10/13/22 0800          Time Calculation- OT    OT Start Time 1330  -DN 0800  -DN     OT Stop Time 1345  -DN 0900  -DN     OT Time Calculation (min) 15 min  -DN 60 min  -DN           User Key   (r) = Recorded By, (t) = Taken By, (c) = Cosigned By    Initials Name Provider Type    Jose Cunningham OT Occupational Therapist                Therapy Charges for Today     Code Description Service Date Service Provider Modifiers Qty    33869372769 HC OT SELF CARE/MGMT/TRAIN EA 15 MIN 10/12/2022 Jose Rodrigues OT GO 2    [id number]  OT THER PROC EA 15 MIN 10/12/2022 Jose Rodrigues OT GO 2    [id number]  OT SELF CARE/MGMT/TRAIN EA 15 MIN 10/13/2022 Jose Rodrigues OT GO 5               OT Discharge Summary  Anticipated Discharge Disposition (OT): home  Reason for Discharge: Maximum functional potential achieved, All goals achieved  Outcomes Achieved: Able to achieve all goals within established timeline  Discharge Destination: Home, Home with assist    Jose Rodrigues OT  10/13/2022

## 2022-10-13 NOTE — PROGRESS NOTES
SECTION GG      Mobility Performance Discharge:     Roll Left and Right: Patient completed the activities by him/herself with no  assistance from a helper.   Sit to Lying: Patient completed the activities by him/herself with no  assistance from a helper.   Lying to Sitting on Side of Bed: Patient completed the activities by  him/herself with no assistance from a helper.   Sit to Stand: Patient completed the activities by him/herself with no  assistance from a helper.   Chair/Bed to Chair Transfer: Patient completed the activities by him/herself  with no assistance from a helper.   Car Transfer: [place] provides verbal cues and/or touching/steadying and/or  contact guard assistance as patient completes activity. Assistance may be  provided throughout the activity or intermittently.   Walk 10 Feet:   Patient completed the activities by him/herself with no  assistance from a helper.  Walk 50 Feet with 2 Turns:   Patient completed the activities by him/herself  with no assistance from a helper.  Walk 150 Feet:   Patient completed the activities by him/herself with no  assistance from a helper.  Walking 10 Feet on Uneven Surfaces:   [place] provides verbal cues and/or  touching/steadying and/or contact guard assistance as patient completes  activity. Assistance may be provided throughout the activity or intermittently.  1 Step Over Curb or Up/Down Stair:   [place] provides verbal cues and/or  touching/steadying and/or contact guard assistance as patient completes  activity. Assistance may be provided throughout the activity or intermittently.  4 Steps Up and Down, With/Without Rail:   Patient completed the activities by  him/herself with no assistance from a helper.  12 Steps Up and Down, With/Without Rail:   Patient completed the activities by  him/herself with no assistance from a helper.  Picking up an Object:   Not attempted due to medical or safety concerns. Uses  Wheelchair and/or Scooter: No    [facility]. Health  Conditions (Pain):  Pain Interference with Therapy Activities:   Does not apply - Patient has not  received rehabilitation therapy in the past 5 days  Pain Interference with Day-to-Day Activities:   Rarely or not at all    Signed by: Melo Astorga PT

## 2022-10-13 NOTE — PROGRESS NOTES
SECTION GG      Self Care Performance Discharge:   Oral Hygiene: Patient completed the activities by him/herself with no  assistance from a helper.   Toileting Hygiene: : Patient completed the activities by him/herself with no  assistance from a helper.   Shower/Bathe Self: Patient completed the activities by him/herself with no  assistance from a helper.   Upper Body Dressing: Patient completed the activities by him/herself with no  assistance from a helper.   Lower Body Dressing: Patient completed the activities by him/herself with no  assistance from a helper.   Putting On/Taking Off Footwear: Patient completed the activities by him/herself  with no assistance from a helper.    Mobility Toilet Transfer Discharge: Patient completed the activities by  him/herself with no assistance from a helper.    Signed by: RACHEL Sanchez/ADAM

## 2022-10-13 NOTE — DISCHARGE INSTRUCTIONS
Dx:  s/p lumbar decompression and fusion - Outpatient PT for strength, balance, mobility -  [facility] at the [address] [place] [address] Location.    On lower dose of Losartan 25 mg daily, hold for systolic blood pressure < 120    No driving until released by Neurosurgery    No alcohol on pain medications    Taper off pain medication over next 10-14 days    Pain medication refills per Neurosurgery    Ferrous sulfate 325 mg daily with lunch added for anemia. For 90 days, may hold if constipated or anemia resolved.

## 2022-10-14 VITALS
HEART RATE: 66 BPM | HEIGHT: 66 IN | TEMPERATURE: 98.2 F | BODY MASS INDEX: 32.28 KG/M2 | DIASTOLIC BLOOD PRESSURE: 53 MMHG | WEIGHT: 200.84 LBS | RESPIRATION RATE: 18 BRPM | OXYGEN SATURATION: 95 % | SYSTOLIC BLOOD PRESSURE: 112 MMHG

## 2022-10-14 PROCEDURE — 94799 UNLISTED PULMONARY SVC/PX: CPT

## 2022-10-14 PROCEDURE — 25010000002 ENOXAPARIN PER 10 MG: Performed by: PHYSICAL MEDICINE & REHABILITATION

## 2022-10-14 RX ORDER — AMOXICILLIN 250 MG
1 CAPSULE ORAL 2 TIMES DAILY PRN
Start: 2022-10-14 | End: 2022-10-14 | Stop reason: SDUPTHER

## 2022-10-14 RX ORDER — HYDROCODONE BITARTRATE AND ACETAMINOPHEN 5; 325 MG/1; MG/1
1 TABLET ORAL EVERY 6 HOURS PRN
Qty: 30 TABLET | Refills: 0 | Status: SHIPPED | OUTPATIENT
Start: 2022-10-14 | End: 2022-10-18 | Stop reason: SDUPTHER

## 2022-10-14 RX ORDER — FERROUS SULFATE 325(65) MG
325 TABLET ORAL
Qty: 30 TABLET | Refills: 2 | Status: SHIPPED | OUTPATIENT
Start: 2022-10-15 | End: 2023-01-13

## 2022-10-14 RX ORDER — FERROUS SULFATE 325(65) MG
325 TABLET ORAL
Status: DISCONTINUED | OUTPATIENT
Start: 2022-10-15 | End: 2022-10-14 | Stop reason: HOSPADM

## 2022-10-14 RX ORDER — LOSARTAN POTASSIUM 25 MG/1
25 TABLET ORAL
Qty: 30 TABLET | Refills: 0 | Status: SHIPPED | OUTPATIENT
Start: 2022-10-15 | End: 2022-10-14 | Stop reason: SDUPTHER

## 2022-10-14 RX ORDER — POLYETHYLENE GLYCOL 3350 17 G/17G
17 POWDER, FOR SOLUTION ORAL DAILY
Qty: 25 PACKET | Refills: 0
Start: 2022-10-15 | End: 2022-10-14 | Stop reason: SDUPTHER

## 2022-10-14 RX ORDER — AMOXICILLIN 250 MG
1 CAPSULE ORAL 2 TIMES DAILY PRN
Start: 2022-10-14 | End: 2022-11-09

## 2022-10-14 RX ORDER — LOSARTAN POTASSIUM 25 MG/1
25 TABLET ORAL
Qty: 30 TABLET | Refills: 0 | Status: SHIPPED | OUTPATIENT
Start: 2022-10-15

## 2022-10-14 RX ORDER — HYDROCODONE BITARTRATE AND ACETAMINOPHEN 5; 325 MG/1; MG/1
1 TABLET ORAL EVERY 4 HOURS PRN
Status: DISCONTINUED | OUTPATIENT
Start: 2022-10-14 | End: 2022-10-14 | Stop reason: HOSPADM

## 2022-10-14 RX ORDER — HYDROCODONE BITARTRATE AND ACETAMINOPHEN 5; 325 MG/1; MG/1
1 TABLET ORAL EVERY 6 HOURS PRN
Qty: 30 TABLET | Refills: 0 | Status: SHIPPED | OUTPATIENT
Start: 2022-10-14 | End: 2022-10-14 | Stop reason: SDUPTHER

## 2022-10-14 RX ORDER — FERROUS SULFATE 325(65) MG
325 TABLET ORAL
Qty: 30 TABLET | Refills: 2 | Status: SHIPPED | OUTPATIENT
Start: 2022-10-15 | End: 2022-10-14 | Stop reason: SDUPTHER

## 2022-10-14 RX ORDER — POLYETHYLENE GLYCOL 3350 17 G/17G
17 POWDER, FOR SOLUTION ORAL DAILY
Qty: 25 PACKET | Refills: 0
Start: 2022-10-15 | End: 2022-11-09

## 2022-10-14 RX ORDER — PANTOPRAZOLE SODIUM 40 MG/1
40 TABLET, DELAYED RELEASE ORAL
Qty: 15 TABLET | Refills: 0 | Status: SHIPPED | OUTPATIENT
Start: 2022-10-15 | End: 2022-10-14 | Stop reason: SDUPTHER

## 2022-10-14 RX ORDER — PANTOPRAZOLE SODIUM 40 MG/1
40 TABLET, DELAYED RELEASE ORAL
Qty: 15 TABLET | Refills: 0 | Status: SHIPPED | OUTPATIENT
Start: 2022-10-15 | End: 2022-11-09

## 2022-10-14 RX ADMIN — ENOXAPARIN SODIUM 40 MG: 100 INJECTION SUBCUTANEOUS at 11:46

## 2022-10-14 RX ADMIN — POTASSIUM CHLORIDE 20 MEQ: 750 TABLET, EXTENDED RELEASE ORAL at 08:14

## 2022-10-14 RX ADMIN — PANTOPRAZOLE SODIUM 40 MG: 40 TABLET, DELAYED RELEASE ORAL at 05:18

## 2022-10-14 RX ADMIN — BUDESONIDE AND FORMOTEROL FUMARATE DIHYDRATE 2 PUFF: 80; 4.5 AEROSOL RESPIRATORY (INHALATION) at 07:31

## 2022-10-14 RX ADMIN — LOSARTAN POTASSIUM 25 MG: 25 TABLET, FILM COATED ORAL at 08:14

## 2022-10-14 RX ADMIN — MONTELUKAST SODIUM 10 MG: 10 TABLET, FILM COATED ORAL at 08:14

## 2022-10-14 RX ADMIN — GABAPENTIN 300 MG: 300 CAPSULE ORAL at 08:14

## 2022-10-14 RX ADMIN — POLYETHYLENE GLYCOL 3350 17 G: 17 POWDER, FOR SOLUTION ORAL at 08:14

## 2022-10-14 RX ADMIN — HYDROCODONE BITARTRATE AND ACETAMINOPHEN 2 TABLET: 7.5; 325 TABLET ORAL at 10:05

## 2022-10-14 RX ADMIN — HYDROCODONE BITARTRATE AND ACETAMINOPHEN 1 TABLET: 7.5; 325 TABLET ORAL at 05:18

## 2022-10-14 RX ADMIN — DOCUSATE SODIUM 50MG AND SENNOSIDES 8.6MG 1 TABLET: 8.6; 5 TABLET, FILM COATED ORAL at 08:14

## 2022-10-14 NOTE — PROGRESS NOTES
LOS: 6 days   Patient Care Team:  Dennis Kwong MD as PCP - General (Internal Medicine)  Arslan Carlson PA-C as Physician Assistant (Physician Assistant)  Ang Irizarry APRN as Nurse Practitioner (Family Medicine)      STEPHEN HARMAN  1945    Diagnoses    1. IMPAIRED GAIT AND MOBILITY       ADMITTING DIAGNOSIS:  Status post October 4 October 5, 2022-staged front and back operation consisting of a lateral interbody fusion at L2-L3 and L3-L4 on 10/4/2022 followed by pedicle screw fixation using the robot at L to to L4 and decompression       Subjective       Patient is doing well.  Feels ready for home today.  Discussed tapering off of pain medication over the next 10 to 14days.  Starting outpatient therapy next week.  To keep his dressing dry until follow-up with his surgeon.        Objective     Vitals:    10/14/22 0513   BP: 149/73   Pulse: 56   Resp: 18   Temp: 98.1 °F (36.7 °C)   SpO2: 95%       PHYSICAL EXAM:   Gen: Awake alert  Pulm: CTAB; no r/r/w; non-labored   Heart; RRR; no m/r/g appreciated   Abd: soft; NT/ND; +BS    Skin: no rashes on exposed skin , surgical wound  Dressed.   MSK: calves soft   : no Indwelling catheter   Neuro:verbal; appropriate     Motor:  Normal bulk and tone  Strength is 5/5 in BUE, 4-/5 bilat HF, 5/5 bilat KF/KE, 5/5 bilat DF/PF       MEDICATIONS  Scheduled Meds:budesonide-formoterol, 2 puff, Inhalation, BID - RT  enoxaparin, 40 mg, Subcutaneous, Q24H  gabapentin, 300 mg, Oral, Daily  gabapentin, 600 mg, Oral, Nightly  losartan, 25 mg, Oral, Q24H  montelukast, 10 mg, Oral, Daily  pantoprazole, 40 mg, Oral, Q AM  polyethylene glycol, 17 g, Oral, Daily  potassium chloride, 20 mEq, Oral, Daily  senna-docusate sodium, 1 tablet, Oral, BID      Continuous Infusions:   PRN Meds:.•  acetaminophen  •  baclofen  •  bisacodyl  •  clonazePAM  •  HYDROcodone-acetaminophen  •  HYDROcodone-acetaminophen  •  polyethylene glycol      RESULTS  No results found for:  POCGLU  Results from last 7 days   Lab Units 10/13/22  0709 10/11/22  0643 10/10/22  1451   WBC 10*3/mm3 4.56 3.73 4.86   HEMOGLOBIN g/dL 7.5* 7.3* 7.7*   HEMATOCRIT % 22.4* 22.1* 23.2*   PLATELETS 10*3/mm3 272 226 236     Results from last 7 days   Lab Units 10/13/22  0709 10/11/22  0643 10/10/22  1451   SODIUM mmol/L 139 142 136   POTASSIUM mmol/L 4.6 3.8 3.2*   CHLORIDE mmol/L 104 101 99   CO2 mmol/L 28.0 31.2* 30.0*   BUN mg/dL 12 11 13   CREATININE mg/dL 0.82 0.73* 0.97   CALCIUM mg/dL 8.9 8.8 8.5*   GLUCOSE mg/dL 104* 123* 112*           ASSESSMENT and PLAN    Spinal stenosis of lumbar region with neurogenic claudication    Status post October 4 October 5, 2022-staged front and back operation consisting of a lateral interbody fusion at L2-L3 and L3-L4 on 10/4/2022 followed by pedicle screw fixation using the robot at L to to L4 and decompression       #Rehabilitation:   Patient now with moderate to severe disability due to impairments of mobility and self care: PT/ OT/SLP evaluation and treatment for mobility training, self-care skills training, pain management, equipment assessment, safety assessment and training, cognitive/swallowing evaluation and caregiver training for a safe discharge. This would be an interdisciplinary program with physical therapy 1 hour,  occupational therapy 1 hour, and speech therapy 1 hour, 5 days a week.  Rehabilitation nursing for carryover. Weekly team conferences.  Ongoing physician follow-up . Goals are to achieve a level of independence with  mobility and self-care and improved endurance.   Rehabilitation prognosis good.  Medical prognosis good Estimated length of stay is approximately 15 days , but is only an estimation.        # Safety and Precautions.   -Fall, Skin.      #Diet:   -red diet      #DVT ppx:  - Lovenox. SCDs    # anemia -   Oct 7 - HGB 8.8  Oct 10 - HGB 7.7. Post op anemia. Will add PPI.   October 11-hemoglobin 7.3.  Reviewed with patient.    will follow  pattern.  Oct 13 - HGB 7.5     #HTN  losartan. /Hydrochlorothiazide  October 10-systolic blood pressure into the 70s yesterday.  Decrease meds to losartan 50 mg daily, hold for systolic blood pressure less than 120  Recheck CBC/BUN/creatinine - HGB trend to 7.7   October 11-decreased losartan to 25 mg daily hold if systolic blood pressure less than 120  Oct 13 - losartan held today    #XOL  atorvastatin, 10 mg, Oral, Daily-patient states intolerance a decade ago with severe myalgia-discontinued    #pain-Adama report reviewed  gabapentin, 300 mg, Oral, Daily  gabapentin, 600 mg, Oral, Nightly    #Chronic benzodiazepime use-clonazepam     #h/o Asthma  montelukast, 10 mg, Oral, Daily  Symbicort  Hold on resuming theophylline for now with his anemia     # bladder:   - Continent, monitor.  - PVRs, IC, time voids      # Bowel: continent.   -Bisacodyl prn , senna S , miralax prn      # Pressure injury prophylaxis:  - offloading, optimize nutrition , oob with PT.      # @ risk for orthostatic hypotension:   -Monitor VS regularly.      # Dispo:  - Home with family.     Team conference-October 11-toilet transfers contact-guard.  Shower transfers to be assessed.  Transfers standby assist.  Bed mobility standby assist.  Gait 160 feet contact-guard rolling walker.  8 stairs contact-guard.  Bathing standby assist.  Lower body dressing contact-guard.  Upper body dressing set up.  Grooming standby assist.  Toileting contact-guard.  Continent mostly bowel and bladder.  Estimate length of stay-Friday         Adam Torres MD      During rounds, used appropriate personal protective equipment including mask and gloves.  Additional gown if indicated.  Mask used was standard procedure mask. Appropriate PPE was worn during the entire visit.  Hand hygiene was completed before and after.

## 2022-10-14 NOTE — PLAN OF CARE
Goal Outcome Evaluation:        Slept fair. Independent in room. Meds with water. Continent of bladder. CPap with sleeping. Norco given for back pain with some relief. Declined anderson colace. Wore SCD's some during the night.

## 2022-10-14 NOTE — PLAN OF CARE
Goal Outcome Evaluation:  Plan of Care Reviewed With: patient        Progress: improving  Outcome Evaluation: Patient ready for discharge. Discharge education complete. Brace in place OOB. Doing well with independent in the room. Pain treated as needed. Dressings, C/D/I.

## 2022-10-14 NOTE — DISCHARGE SUMMARY
"[facility] - REHABILITATION UNIT    STEPHEN HARMAN  1945    ADMIT DATE:  10/8/2022  5:26 PM  DISCHARGE DATE:  10/14/22      CHIEF COMPLAINT:    Status post October 4 October 5, 2022-staged front and back operation consisting of a lateral interbody fusion at L2-L3 and L3-L4 on 10/4/2022 followed by pedicle screw fixation using the robot at L to to L4 and decompression        HISTORY OF PRESENT ILLNESS:    Patient is a 77 y.o. male presents with long history of back and leg pain progressively worsen with impairment in ADLs and mobility. Multiple IMKEY, he stated that he was \"let go\" from pain management as they could not offer anything else to him. He underwent a staged front and back operation consisting of a lateral interbody fusion at L2-L3 and L3-L4 on 10/4/2022 followed by pedicle screw fixation using the robot at L to to L4 and decompression by Dr. Do.  No complications. He arrived last night to  [facility] acute rehab. His goal is to go back to his baseline and able to be more independent    HOSPITAL COURSE:    Patient participated in a comprehensive acute inpatient rehabilitation program.     During the hospital stay the following areas were addressed:      Status post October 4 October 5, 2022-staged front and back operation consisting of a lateral interbody fusion at L2-L3 and L3-L4 on 10/4/2022 followed by pedicle screw fixation using the robot at L to to L4 and decompression      #DVT ppx:  - Lovenox. SCDs     # anemia -   Oct 7 - HGB 8.8  Oct 10 - HGB 7.7. Post op anemia. Will add PPI.   October 11-hemoglobin 7.3.  Reviewed with patient.    will follow pattern.  Oct 13 - HGB 7.5  October 14-  add iron sulfate daily with long     #HTN  losartan. /Hydrochlorothiazide  October 10-systolic blood pressure into the 70s yesterday.  Decrease meds to losartan 50 mg daily, hold for systolic blood pressure less than 120  Recheck CBC/BUN/creatinine - HGB trend to 7.7   October 11-decreased " losartan to 25 mg daily hold if systolic blood pressure less than 120  Oct 13 - losartan held today     #   atorvastatin, 10 mg, Oral, Daily-patient states intolerance a decade ago with severe myalgia-discontinued     #pain-Adama report reviewed  gabapentin, 300 mg, Oral, Daily  gabapentin, 600 mg, Oral, Nightly  Patient to taper off hydrocodone over the next 10 to 14 days     #Chronic benzodiazepime use-clonazepam     #h/o Asthma  montelukast, 10 mg, Oral, Daily  Symbicort               Team conference-October 11-toilet transfers contact-guard.  Shower transfers to be assessed.  Transfers standby assist.  Bed mobility standby assist.  Gait 160 feet contact-guard rolling walker.  8 stairs contact-guard.  Bathing standby assist.  Lower body dressing contact-guard.  Upper body dressing set up.  Grooming standby assist.  Toileting contact-guard.  Continent mostly bowel and bladder.  Estimate length of stay-Friday    Disposition-October 14-maximized benefit from inpatient rehab.  Achieved inpatient goals.  Medically stable.  Discharge home.  Medications and follow-up reviewed.        Physical Exam near the time of discharge:    Gen: Awake alert  Pulm: CTAB; no r/r/w; non-labored   Heart; RRR; no m/r/g appreciated   Abd: soft; NT/ND; +BS    Skin:   surgical wound  Dressed.  Dressing dry and intact  MSK: calves soft      Neuro: Oriented x4     Motor:  Normal bulk and tone  Strength is  5/5 bilat KF/KE, 5/5 bilat DF/PF       RESULTS:  No results found for: POCGLU  Results from last 7 days   Lab Units 10/13/22  0709 10/11/22  0643 10/10/22  1451   WBC 10*3/mm3 4.56 3.73 4.86   HEMOGLOBIN g/dL 7.5* 7.3* 7.7*   HEMATOCRIT % 22.4* 22.1* 23.2*   PLATELETS 10*3/mm3 272 226 236     Results from last 7 days   Lab Units 10/13/22  0709 10/11/22  0643 10/10/22  1451   SODIUM mmol/L 139 142 136   POTASSIUM mmol/L 4.6 3.8 3.2*   CHLORIDE mmol/L 104 101 99   CO2 mmol/L 28.0 31.2* 30.0*   BUN mg/dL 12 11 13   CREATININE mg/dL 0.82 0.73*  0.97   CALCIUM mg/dL 8.9 8.8 8.5*   GLUCOSE mg/dL 104* 123* 112*              Discharge Medications      New Medications      Instructions Start Date   ferrous sulfate 325 (65 FE) MG tablet   325 mg, Oral, Daily With Lunch   Start Date: October 15, 2022     HYDROcodone-acetaminophen 5-325 MG per tablet  Commonly known as: NORCO  Replaces: HYDROcodone-acetaminophen 7.5-325 MG per tablet   1 tablet, Oral, Every 6 Hours PRN      losartan 25 MG tablet  Commonly known as: COZAAR   Take 1 tablet by mouth Daily. HOLD / SKIP dose as directed if systolic blood pressure is lower than 120   Start Date: October 15, 2022     pantoprazole 40 MG EC tablet  Commonly known as: PROTONIX   Take 1 tablet by mouth Every Morning For two weeks, then stop.   Start Date: October 15, 2022     polyethylene glycol 17 g packet  Commonly known as: MIRALAX   17 g, Oral, Daily   Start Date: October 15, 2022     sennosides-docusate 8.6-50 MG per tablet  Commonly known as: PERICOLACE   1 tablet, Oral, 2 Times Daily PRN         Continue These Medications      Instructions Start Date   acetaminophen 500 MG tablet  Commonly known as: TYLENOL   500 mg, Oral, Every 6 Hours PRN      albuterol sulfate  (90 Base) MCG/ACT inhaler  Commonly known as: PROVENTIL HFA;VENTOLIN HFA;PROAIR HFA   2 puffs, Inhalation, Every 4 Hours PRN      baclofen 10 MG tablet  Commonly known as: LIORESAL   10 mg, Oral, 3 Times Daily PRN      budesonide-formoterol 160-4.5 MCG/ACT inhaler  Commonly known as: SYMBICORT   2 puffs, Inhalation, 2 Times Daily      calcium-vitamin D 500-200 MG-UNIT per tablet   2 tablets, Oral, Daily      carboxymethylcellulose 0.5 % solution  Commonly known as: REFRESH PLUS   1 drop, Both Eyes, 3 Times Daily PRN, Bring to hospital      clonazePAM 0.5 MG tablet  Commonly known as: KlonoPIN   0.5 mg, Oral, 2 Times Daily PRN      EMERGEN-C BLUE PO   1 tablet, Oral, Daily, LD 9-27      fish oil 1000 MG capsule capsule   1,000 mg, Oral, Daily With  Breakfast, LD 9-27      gabapentin 300 MG capsule  Commonly known as: NEURONTIN   take 1 capsule by mouth every morning, 1 capsule in afternoon and 2 capsules at night      lidocaine 5 %  Commonly known as: LIDODERM   1 patch, Transdermal, Every 24 Hours, Remove & Discard patch within 12 hours or as directed by MD      montelukast 10 MG tablet  Commonly known as: SINGULAIR   10 mg, Oral, Daily      Red Yeast Rice 600 MG capsule   1 capsule, Oral, Daily, LD 9-27      sildenafil 20 MG tablet  Commonly known as: REVATIO   take 1 to 2 tablets by mouth as needed for erectile dysfunction.      theophylline 300 MG 12 hr tablet  Commonly known as: THEODUR   300 mg, Oral, Daily      triamcinolone 0.025 % cream  Commonly known as: KENALOG   1 application, Topical, 2 Times Daily      Turmeric 400 MG capsule   1 capsule, Oral, Daily      vitamin b complex capsule capsule   1 capsule, Oral, Daily, LD 9-27         Stop These Medications    HYDROcodone-acetaminophen 7.5-325 MG per tablet  Commonly known as: NORCO  Replaced by: HYDROcodone-acetaminophen 5-325 MG per tablet     losartan-hydrochlorothiazide 100-25 MG per tablet  Commonly known as: HYZAAR          Prescription written for Norco 5 mg dispense #30-Taper off over next 10 to 14 days  Adama report reviewed    Dennis Kwong MD Bickers, Mark Everett, MD PCP - General Internal Medicine [phone] [address] [place] [address]      Next Steps: Go on 10/18/2022  Appointment:   Instructions: You are scheduled for a follow-up appointment with Dr. Kwong on 10/18/22 at 1:00pm. Please arrive 15 minutes early and bring your ID, medication list, and insurance card.    John Do MD Snyder, Brian N, MD  Neurosurgery Spine Surgery [phone] [address] [place] [address] [place] [address]     Next Steps: Go on 10/20/2022  Appointment:   Instructions: You are scheduled for a follow-up appointment with Dr. Do on 10/20/22  at 2:15pm. Please arrive 15 minutes early and bring your ID and insurance card.    [facility] OP PT MEDICAL [facility] OP PT MEDICAL [place] [address] [phone]     Next Steps: Go on 10/17/2022  Appointment:   Instructions: You are scheduled with [facility]. Your appointment is scheduled for 10/17/22 at 8:00am. Please arrive 15 minutes before your appointment and bring your insurance card and ID.    Adam Torres MD Gormley, John Michael, MD  Physical Medicine and Rehabilitation [phone] [address] [place] [address]     Next Steps: Follow up  Appointment:   Instructions: No follow up needed with Dr Torres       Dx:  s/p lumbar decompression and fusion - Outpatient PT for strength, balance, mobility -  [facility] at the [address] [place] [address] Location.    On lower dose of Losartan 25 mg daily, hold for systolic blood pressure < 120    No driving until released by Neurosurgery    No alcohol on pain medications    Taper off pain medication over next 10-14 days    Pain medication refills per Neurosurgery    Ferrous sulfate 325 mg daily with lunch added for anemia. For 90 days, may hold if constipated or anemia resolved.       >30 on discharge    Adam Torres MD

## 2022-10-14 NOTE — PROGRESS NOTES
Inpatient Rehabilitation Plan of Care Note    Plan of Care  Care Plan Reviewed - No updates at this time.    Safety    Performed Intervention(s)  Items within reach, Bed/chair alarms, safety rounds, non-skid socks, & gait  belt.  safety rounds      Psychosocial    Performed Intervention(s)  Verbalizes needs & concerns      Sphincter Control    Performed Intervention(s)  Monitor intake, output, & BM's each shift.  Encourage appropriate diet, & fluid  intake.      Body Systems    Performed Intervention(s)  Daily skin inspections, dressing changes & wound care as needed.    Signed by: Ivett Brown RN

## 2022-10-15 ENCOUNTER — READMISSION MANAGEMENT (OUTPATIENT)
Dept: CALL CENTER | Facility: HOSPITAL | Age: 77
End: 2022-10-15

## 2022-10-15 NOTE — OUTREACH NOTE
Prep Survey    Flowsheet Row Responses   [facility] patient discharged from? [place]   Is LACE score < 7 ? No   Emergency Room discharge w/ pulse ox? No   Eligibility [facility] - REHABILITATION UNIT   Date of Admission 10/08/22   Date of Discharge 10/14/22   Discharge Disposition Home or Self Care   Discharge diagnosis s/p LUMBAR LAMINECTOMY TRANSFORAMINAL LUMBAR INTERBODY FUSION L2,L3,L4    Does the patient have one of the following disease processes/diagnoses(primary or secondary)? General Surgery   Does the patient have Home health ordered? No   Is there a DME ordered? No   Comments regarding appointments plan [facility]    Prep survey completed? Yes          ANTONIO PIERCE - Registered Nurse

## 2022-10-17 ENCOUNTER — TRANSITIONAL CARE MANAGEMENT TELEPHONE ENCOUNTER (OUTPATIENT)
Dept: CALL CENTER | Facility: HOSPITAL | Age: 77
End: 2022-10-17

## 2022-10-17 ENCOUNTER — HOSPITAL ENCOUNTER (OUTPATIENT)
Dept: PHYSICAL THERAPY | Facility: HOSPITAL | Age: 77
Setting detail: THERAPIES SERIES
Discharge: HOME | End: 2022-10-17

## 2022-10-17 ENCOUNTER — TRANSCRIBE ORDERS (OUTPATIENT)
Dept: PHYSICAL THERAPY | Facility: HOSPITAL | Age: 77
End: 2022-10-17

## 2022-10-17 DIAGNOSIS — Z98.890 BACK PAIN WITH HISTORY OF SPINAL SURGERY: Primary | ICD-10-CM

## 2022-10-17 DIAGNOSIS — M54.9 BACK PAIN WITH HISTORY OF SPINAL SURGERY: Primary | ICD-10-CM

## 2022-10-17 DIAGNOSIS — M25.60 DECREASED RANGE OF MOTION: ICD-10-CM

## 2022-10-17 DIAGNOSIS — Z98.1 S/P LUMBAR FUSION: Primary | ICD-10-CM

## 2022-10-17 DIAGNOSIS — R26.9 GAIT DIFFICULTY: ICD-10-CM

## 2022-10-17 DIAGNOSIS — R29.898 WEAKNESS OF BOTH LOWER EXTREMITIES: ICD-10-CM

## 2022-10-17 PROCEDURE — 97110 THERAPEUTIC EXERCISES: CPT

## 2022-10-17 PROCEDURE — 97161 PT EVAL LOW COMPLEX 20 MIN: CPT

## 2022-10-17 NOTE — THERAPY EVALUATION
Outpatient Physical Therapy Ortho Initial Evaluation  [facility]     Patient Name: Omar Choudhary  : 1945  MRN: [id number]  Today's Date: 10/17/2022      Visit Date: 10/17/2022    Patient Active Problem List   Diagnosis   • Benign prostatic hyperplasia   • Bursitis   • Depression   • Diverticulosis   • Family history of malignant neoplasm of breast   • Hyperlipidemia   • Hypertension   • Internal derangement of right knee   • Knee effusion   • Asthma   • Obstructive sleep apnea syndrome   • Osteoarthritis   • Osteopenia   • Pain in knee   • Postnasal drip   • Prepatellar bursitis   • Sciatica of right side   • Diaphragm paralysis   • Spinal stenosis of lumbar region with neurogenic claudication   • Degenerative lumbar spinal stenosis   • Anxiety   • Pinguecula of right eye   • Lumbar radiculopathy, chronic        Past Medical History:   Diagnosis Date   • Allergic    • Arthritis    • Asthma    • Carpal tunnel syndrome     no pain   • Cataract    • Depression    • Family history of malignant neoplasm of breast 2019   • Hemidiaphragm paralysis     Left   • Hyperlipidemia 2019   • Hypertension 2012   • Leg pain, right    • Low back pain    • Neuropathy     feet   • Osteopenia    • Poor balance    • Reactive airway disease 01/15/2016   • Shortness of breath    • Sleep apnea, obstructive     CPAP- to bring dos        Past Surgical History:   Procedure Laterality Date   • KNEE ARTHROSCOPY W/ ACL RECONSTRUCTION Right 2019   • LUMBAR FUSION Bilateral 10/5/2022    Procedure: DAY 2 LUMBAR LAMINECTOMY TRANSFORAMINAL LUMBAR INTERBODY FUSION L2,L3,L4 WITH NEURO ROBOT;  Surgeon: John Do MD;  Location: [facility];  Service: Robotics - Neuro;  Laterality: Bilateral;   • MOUTH SURGERY         Visit Dx:     ICD-10-CM ICD-9-CM   1. S/P lumbar fusion  Z98.1 V45.4   2. Decreased range of motion  M25.60 719.50   3. Weakness of both lower extremities  R29.898 729.89   4. Gait difficulty   R26.9 781.2          Patient History     Row Name 10/17/22 0800             History    Chief Complaint Muscle weakness;Difficulty with daily activities;Difficulty Walking  -      Date Current Problem(s) Began 10/04/22  surgeries on 10/4, 10/5  -      Brief Description of Current Complaint 1 week 5 days post-op; D/C rehab 10/13/22. Surgery on 10/4 and 10/5 for lumbar lateral interbody fusion wtih neuro robot L2, L3, L4, and lumbar laminectomy transforaminal lumbar interbody fusion L2, L3, L4. OPPT Feb to June 2022 - during that time was active and spent time in [place] biking traveling and the worst the transatlantic flight was the worst. By June he was not getting much pain relief. He presents today with SPC and reaching/holding wall or counters. States pain is primarily in his back and glutes but not in legs like it was before just sometimes pain in R ITB. Sees neurosurgeon Thurs and PCP tomorrow. PMH: R ACL reconstruction in 2018  -      Patient/Caregiver Goals Return to prior level of function  -      Hand Dominance right-handed  -      Occupation/sports/leisure activities Walking, bicycling, swimming, and travel on planes (intercontinental), trains, and and motor .  -      Surgery/Hospitalization 10/4 and 10/5/22  -         Pain     Pain Location Back  -      Pain at Present 7  -      Pain at Worst 8;9  -      Pain Frequency Constant/continuous  -         Fall Risk Assessment    Any falls in the past year: Unspecified  -            User Key  (r) = Recorded By, (t) = Taken By, (c) = Cosigned By    Initials Name Provider Type    Patricia Mays, PT Physical Therapist                 PT Ortho     Row Name 10/17/22 0800       Quarter Clearing    Quarter Clearing Lower Quarter Clearing  -MEGHA       Myotomal Screen- Lower Quarter Clearing    Hip flexion (L2) Right:;3 (Fair);Left:;3+ (Fair +)  -MEGHA    Knee extension (L3) Right:;3 (Fair);Left:;3+ (Fair +)  -MEGHA    Ankle DF (L4) Right:;4-  (Good -);Left:;4 (Good)  -MEGHA    Ankle PF (S1) Bilateral:;4 (Good)  -MEGHA       Lumbar ROM Screen- Lower Quarter Clearing    Lumbar Flexion --  NT secondary to recent surgery  -MEGHA    Lumbar Extension --  NT secondary to recent surgery  -MEGHA    Lumbar Lateral Flexion --  NT secondary to recent surgery  -MEGHA    Lumbar Rotation --  NT secondary to recent surgery  -MEGHA          User Key  (r) = Recorded By, (t) = Taken By, (c) = Cosigned By    Initials Name Provider Type    Patricia Mays, PT Physical Therapist                            Therapy Education  Education Details: see OP Exercises for details  Given: HEP, Symptoms/condition management, Posture/body mechanics  Program: New  How Provided: Verbal, Demonstration, Written  Provided to: Patient  Level of Understanding: Verbalized, Demonstrated      PT OP Goals     Row Name 10/17/22 1000          PT Short Term Goals    STG Date to Achieve 11/16/22  -MEGHA     STG 1 Pt will be independent with initial HEP.  -MEGHA     STG 1 Progress New  -MEGHA     STG 2 Pt will demonstrate correct sitting and standing posture to reduce strain on lumbar spine.  -MEGHA     STG 2 Progress New  -MEGHA     STG 3 Pt will demonstrate good body mechanics with bending, lifting and reaching ADLs  to reduce strain on spine.  -MEGHA     STG 3 Progress New  -MEGHA        Long Term Goals    LTG Date to Achieve 12/16/22  -MEGHA     LTG 1 Pt will be independent with advanced HEP for spinal stabilization for improved self-management of symptoms.  -MEGHA     LTG 1 Progress New  -MEGHA     LTG 2 Pt will report 50% or> decrease in pain with ADLs.  -MEGHA     LTG 2 Progress New  -MEGHA     LTG 3 Pt will score 50% or less on BENEDICT indicating decrease in perceived functional disability.  -MEGHA     LTG 3 Progress New  -MEGHA     LTG 4 Pt will demonstrate increased R LE strength to 4/5 or better.  -MEGHA     LTG 4 Progress New  -MEGHA        Time Calculation    PT Goal Re-Cert Due Date 01/15/23  -MEGHA           User Key  (r) = Recorded By, (t) = Taken By,  (c) = Cosigned By    Initials Name Provider Type    Patricia Mays, PT Physical Therapist                 PT Assessment/Plan     Row Name 10/17/22 1600          PT Assessment    Functional Limitations Impaired gait;Limitation in home management;Limitations in community activities;Limitations in functional capacity and performance;Performance in leisure activities;Performance in self-care ADL;Performance in sport activities  -MEGHA     Impairments Pain;Muscle strength;Posture;Range of motion;Poor body mechanics;Gait;Endurance;Balance  -MEGHA     Assessment Comments Omar Choudhary is a 77 y.o. male referred to outpatient physical therapy for evaluation and treatment post-op lumbar lateral interbody fusion w/neuro robot L2, L3, L4, and lumbar laminectomy transforaminal lumbar interbody fusion L2, L3, L4 (on 10/4/22 & 10/5/22).  Patient presents to therapy ambulating with SPC and weight shifted L and intermittently reaching for wall or counter for added support.  He demonstrates poor posture, weakness jarvis LEs R>L, moderate R trendelenburg gait with SPC that worsens without use of assist device. Signs and symptoms are consistent with referring diagnosis.  Pertinent comorbidities and personal factors that may affect progress include, but are not limited to, chronicity of problem prior to surgery, old R ACL repair, and pt tendency to push limits too quickly.  This condition is stable. Recommend skilled PT to address functional deficits. Thank you for this referral.  -MEGHA     Please refer to paper survey for additional self-reported information Yes  -MEGHA     Rehab Potential Good  -MEGHA     Patient/caregiver participated in establishment of treatment plan and goals Yes  -MEGHA     Patient would benefit from skilled therapy intervention Yes  -MEGHA        PT Plan    PT Frequency 2x/week  -MEGHA     Predicted Duration of Therapy Intervention (PT) 14 visits  -MEGHA     Planned CPT's? PT EVAL LOW COMPLEXITY: 48126;PT RE-EVAL: 32511;PT THER  "PROC EA 15 MIN: 01504;PT THER ACT EA 15 MIN: 57110;PT MANUAL THERAPY EA 15 MIN: 32544;PT NEUROMUSC RE-EDUCATION EA 15 MIN: 69808;PT GAIT TRAINING EA 15 MIN: 57495;PT HOT OR COLD PACK TREAT MCARE  -JA     PT Plan Comments begin Nustep, review glute and quad strengthening begun last visit and review rehab HEP that pt is doing at home, have pt fill out BENEDICT (left blank at eval)  -JA           User Key  (r) = Recorded By, (t) = Taken By, (c) = Cosigned By    Initials Name Provider Type    Patricia Mays, PT Physical Therapist                   OP Exercises     Row Name 10/17/22 0800             Subjective Comments    Subjective Comments Took pain medicine at 7:15, seems to be kicking in now.  -JA         Subjective Pain    Able to rate subjective pain? yes  -JA      Pre-Treatment Pain Level 7  -JA      Post-Treatment Pain Level 6  -JA         Total Minutes    73623 - PT Therapeutic Exercise Minutes 23  -JA         Exercise 1    Exercise Name 1 Discussed POC rehabilitation of spine s/p fusion with education for appropriate progression for gait, strength, balance and importance of increasing strength gradually. Discussed importance of using RWX when fatigued to avoid undue strain on spine and muscles and avoid poor compensatory posture and movement patterns. Discussed glute med weakness in particular affecting gait (Trendelenburg on R).  -JA      Time 1 8 min  -JA         Exercise 2    Exercise Name 2 supine hip abduction  -JA      Cueing 2 Verbal;Tactile;Demo  -JA      Reps 2 10  -JA      Additional Comments toes to ceiling, do not lift or turn leg outward  -JA         Exercise 3    Exercise Name 3 supine glute sets  -JA      Cueing 3 Verbal  -JA      Reps 3 10  -JA      Time 3 3sec  -JA         Exercise 4    Exercise Name 4 supine QS  -JA      Cueing 4 Verbal;Tactile  -JA      Reps 4 10  -JA      Time 4 3 sec  -JA      Additional Comments cued to use moderate intensity and not \"push as hardas possible\"  -JA      "    Exercise 5    Exercise Name 5 Instructed pt to perform HEP given by rehab PT at discharge until we progress it  -MEGHA            User Key  (r) = Recorded By, (t) = Taken By, (c) = Cosigned By    Initials Name Provider Type    Patricia Mays, PT Physical Therapist                              Outcome Measure Options: 30 Second Chair Stand Test, 2 Minute Walk Test, Modified Oswestry  2 Minute Walk Test  Gait, Assistive Device: straight cane  Distance Ambulated in 2 Minutes: 350  30 Second Chair Stand Test  30 Second Chair Stand Test: 9  Modified Oswestry  Modified Oswestry Score/Comments:  (to be completed next visit)      Time Calculation:     Start Time: 0801  Stop Time: 0845  Time Calculation (min): 44 min  Timed Charges  60123 - PT Therapeutic Exercise Minutes: 23  Untimed Charges  PT Eval/Re-eval Minutes: 21  Total Minutes  Timed Charges Total Minutes: 23  Untimed Charges Total Minutes: 21   Total Minutes: 44     Therapy Charges for Today     Code Description Service Date Service Provider Modifiers Qty    22804743902 HC PT THER PROC EA 15 MIN 10/17/2022 Patricia Reyes, PT GP 2    05027662366 HC PT EVAL LOW COMPLEXITY 1 10/17/2022 Patricia Reyes, PT GP 1          PT G-Codes  Outcome Measure Options: 30 Second Chair Stand Test, 2 Minute Walk Test, Modified Oswestry  Modified Oswestry Score/Comments:  (to be completed next visit)         Patricia Reyes PT  10/17/2022

## 2022-10-17 NOTE — OUTREACH NOTE
Call Center TCM Note    Flowsheet Row Responses   [facility] patient discharged from? [facility]   Does the patient have one of the following disease processes/diagnoses(primary or secondary)? General Surgery   TCM attempt successful? Yes   Call start time 1501   Call end time 1503   Discharge diagnosis s/p LUMBAR LAMINECTOMY TRANSFORAMINAL LUMBAR INTERBODY FUSION L2,L3,L4    Person spoke with today (if not patient) and relationship patient   Medication comments Patient denied any medication needs or issues.    Comments PCP Dr Kwong. Hospital follow up call in place for 1pm with Dr Kwong.    Has home health visited the patient within 72 hours of discharge? N/A   Psychosocial issues? No   Psychosocial comments Patient reports that he was discharged home to a friends.    What is the patient's perception of their health status since discharge? Improving   If the patient is a current smoker, are they able to teach back resources for cessation? Not a smoker   Is the patient/caregiver able to teach back the hierarchy of who to call/visit for symptoms/problems? PCP, Specialist, Home health nurse, Urgent Care, ED, 911 Yes   TCM call completed? Yes   Wrap up additional comments Very brief call with patient. Had technical difficulty with echo during call. Patient reports doing OK, denies any immediate needs today prior to PCP appt tomorrow.         [facility] appt with PCP 10/18/22  1pm with Dr Kwong.     Ana M Graf RN    10/17/2022, 15:06 EDT

## 2022-10-18 ENCOUNTER — OFFICE VISIT (OUTPATIENT)
Dept: FAMILY MEDICINE CLINIC | Facility: CLINIC | Age: 77
End: 2022-10-18

## 2022-10-18 ENCOUNTER — LAB (OUTPATIENT)
Dept: FAMILY MEDICINE CLINIC | Facility: CLINIC | Age: 77
End: 2022-10-18

## 2022-10-18 VITALS
DIASTOLIC BLOOD PRESSURE: 89 MMHG | RESPIRATION RATE: 16 BRPM | TEMPERATURE: 96.2 F | HEART RATE: 70 BPM | BODY MASS INDEX: 30.05 KG/M2 | WEIGHT: 187 LBS | OXYGEN SATURATION: 97 % | SYSTOLIC BLOOD PRESSURE: 147 MMHG | HEIGHT: 66 IN

## 2022-10-18 DIAGNOSIS — D64.9 ANEMIA, UNSPECIFIED TYPE: ICD-10-CM

## 2022-10-18 DIAGNOSIS — M48.061 DEGENERATIVE LUMBAR SPINAL STENOSIS: Primary | ICD-10-CM

## 2022-10-18 DIAGNOSIS — I10 PRIMARY HYPERTENSION: ICD-10-CM

## 2022-10-18 DIAGNOSIS — M54.16 LUMBAR RADICULOPATHY, CHRONIC: ICD-10-CM

## 2022-10-18 PROCEDURE — 80048 BASIC METABOLIC PNL TOTAL CA: CPT | Performed by: FAMILY MEDICINE

## 2022-10-18 PROCEDURE — G0008 ADMIN INFLUENZA VIRUS VAC: HCPCS | Performed by: FAMILY MEDICINE

## 2022-10-18 PROCEDURE — 36415 COLL VENOUS BLD VENIPUNCTURE: CPT | Performed by: FAMILY MEDICINE

## 2022-10-18 PROCEDURE — 90662 IIV NO PRSV INCREASED AG IM: CPT | Performed by: FAMILY MEDICINE

## 2022-10-18 PROCEDURE — 85027 COMPLETE CBC AUTOMATED: CPT | Performed by: FAMILY MEDICINE

## 2022-10-18 PROCEDURE — 1111F DSCHRG MED/CURRENT MED MERGE: CPT | Performed by: FAMILY MEDICINE

## 2022-10-18 PROCEDURE — 99496 TRANSJ CARE MGMT HIGH F2F 7D: CPT | Performed by: FAMILY MEDICINE

## 2022-10-18 RX ORDER — HYDROCODONE BITARTRATE AND ACETAMINOPHEN 5; 325 MG/1; MG/1
1 TABLET ORAL EVERY 6 HOURS PRN
Qty: 30 TABLET | Refills: 0 | Status: SHIPPED | OUTPATIENT
Start: 2022-10-19 | End: 2022-10-29

## 2022-10-18 NOTE — PROGRESS NOTES
Transitional Care Follow Up Visit  Subjective     Omar Choudhary is a 77 y.o. male who presents for a transitional care management visit.    Within 48 business hours after discharge our office contacted him via telephone to coordinate his care and needs.      I reviewed and discussed the details of that call along with the discharge summary, hospital problems, inpatient lab results, inpatient diagnostic studies, and consultation reports with Omar.     Current outpatient and discharge medications have been reconciled for the patient.  Reviewed by: Dennis Kwong MD      Date of TCM Phone Call 10/15/2022   [facility] - REHABILITATION UNIT   Date of Admission 10/8/2022   Date of Discharge 10/14/2022   Discharge Disposition Home or Self Care     Risk for Readmission (LACE) Score: 9 (10/14/2022  6:00 AM)    History of Present Illness   Course During Hospital Stay: Hospital records reviewed    Patient was admitted on 10/4/22 for scheduled lumbar decompression and fusion w/ neurosurgery- Hi. He underwent L2-4 lateral interbody fusion followed by posterior fixation and decompression the following day. He worked w/ PT and progressed nicely. Patient was discharged to inpatient on 10/8/22. Patient stayed at [facility] inpatient rehab and was able to discharge on 10/14/22.     Back pain: currently maintained on hydrocodone 5 every 4-5 hours, gabapentin 300/300/600, and baclofen PRN. No issues w/ constipation at this time. Pain is adequately controlled. Reports he is currently 5/10 but may spike to 8-9/10 at times. Incision doing well. Still has dressing in place and has been instructed to leave in place until neurosurgery f/u on 10/20/22. Currently attending outpatient PT 2x/week.    HTN: 147/89 today. Losartan was discontinued in rehab due to hypotension when used in combination w/ pain meds    Anemia: most recent hgb 7.5, down from 14.0 3 weeks ago. He has started an Fe supplement.  Denies blood in stool     The following portions of the patient's history were reviewed and updated as appropriate: allergies, current medications, past family history, past medical history, past social history, past surgical history and problem list.    Review of Systems   Constitutional: Positive for fatigue.   Gastrointestinal: Negative for blood in stool.   Musculoskeletal: Positive for back pain and myalgias.   Skin: Positive for wound.       Objective   Physical Exam  Constitutional:       General: He is not in acute distress.     Appearance: He is obese.   HENT:      Head: Normocephalic and atraumatic.      Right Ear: External ear normal.      Left Ear: External ear normal.      Nose: Nose normal.   Eyes:      General: No scleral icterus.        Right eye: No discharge.         Left eye: No discharge.      Extraocular Movements: Extraocular movements intact.      Conjunctiva/sclera: Conjunctivae normal.   Cardiovascular:      Rate and Rhythm: Normal rate and regular rhythm.      Heart sounds: Normal heart sounds. No murmur heard.  Pulmonary:      Effort: Pulmonary effort is normal. No respiratory distress.      Breath sounds: Normal breath sounds. No rales.   Abdominal:      General: Bowel sounds are normal. There is no distension.      Palpations: Abdomen is soft.      Tenderness: There is no abdominal tenderness.   Musculoskeletal:         General: No deformity.      Cervical back: Normal range of motion.   Skin:     General: Skin is warm and dry.      Findings: Lesion (Bandage in place over lumbar spine) present. No rash.   Neurological:      General: No focal deficit present.      Mental Status: He is alert. Mental status is at baseline.   Psychiatric:         Mood and Affect: Mood normal.         Behavior: Behavior normal.       Assessment & Plan   Diagnoses and all orders for this visit:    1. Degenerative lumbar spinal stenosis (Primary)  2. Lumbar radiculopathy, chronic: Status post decompression and  fusion of L2-L4 on October 4th/5th  -     Continue HYDROcodone-acetaminophen (NORCO) 5-325 MG per tablet; Take 1 tablet by mouth Every 6 (Six) Hours As Needed for Moderate Pain for up to 10 days.  Dispense: 30 tablet; Refill: 0  -     Basic Metabolic Panel  - Continue regular physical therapy  - Continue neurosurgery follow-up    3. Primary hypertension: 147/89 today  -     Basic Metabolic Panel  - Restart losartan 25 mg daily    4. Anemia, unspecified type: Hemoglobin declined to 7.3 from 14.0 surrounding surgery.  Most recent hemoglobin 7.7.  Patient denies further blood loss.  We will repeat CBC today to ensure he has not a level that requires transfusion  -     CBC (No Diff)  -     Basic Metabolic Panel  - Continue home iron supplementation    Other orders  -     Fluzone High-Dose 65+yrs (0408-2044)

## 2022-10-19 ENCOUNTER — HOSPITAL ENCOUNTER (OUTPATIENT)
Dept: PHYSICAL THERAPY | Facility: HOSPITAL | Age: 77
Setting detail: THERAPIES SERIES
Discharge: HOME | End: 2022-10-19

## 2022-10-19 DIAGNOSIS — R26.9 GAIT DIFFICULTY: ICD-10-CM

## 2022-10-19 DIAGNOSIS — R29.898 WEAKNESS OF BOTH LOWER EXTREMITIES: ICD-10-CM

## 2022-10-19 DIAGNOSIS — M25.60 DECREASED RANGE OF MOTION: ICD-10-CM

## 2022-10-19 DIAGNOSIS — Z98.1 S/P LUMBAR FUSION: Primary | ICD-10-CM

## 2022-10-19 LAB
ANION GAP SERPL CALCULATED.3IONS-SCNC: 16.1 MMOL/L (ref 5–15)
BUN SERPL-MCNC: 15 MG/DL (ref 8–23)
BUN/CREAT SERPL: 16 (ref 7–25)
CALCIUM SPEC-SCNC: 9.6 MG/DL (ref 8.6–10.5)
CHLORIDE SERPL-SCNC: 98 MMOL/L (ref 98–107)
CO2 SERPL-SCNC: 24.9 MMOL/L (ref 22–29)
CREAT SERPL-MCNC: 0.94 MG/DL (ref 0.76–1.27)
DEPRECATED RDW RBC AUTO: 42 FL (ref 37–54)
EGFRCR SERPLBLD CKD-EPI 2021: 84.5 ML/MIN/1.73
ERYTHROCYTE [DISTWIDTH] IN BLOOD BY AUTOMATED COUNT: 12.7 % (ref 12.3–15.4)
GLUCOSE SERPL-MCNC: 84 MG/DL (ref 65–99)
HCT VFR BLD AUTO: 28.1 % (ref 37.5–51)
HGB BLD-MCNC: 9.5 G/DL (ref 13–17.7)
MCH RBC QN AUTO: 30.5 PG (ref 26.6–33)
MCHC RBC AUTO-ENTMCNC: 33.8 G/DL (ref 31.5–35.7)
MCV RBC AUTO: 90.4 FL (ref 79–97)
PLATELET # BLD AUTO: 374 10*3/MM3 (ref 140–450)
PMV BLD AUTO: 9.1 FL (ref 6–12)
POTASSIUM SERPL-SCNC: 4.6 MMOL/L (ref 3.5–5.2)
RBC # BLD AUTO: 3.11 10*6/MM3 (ref 4.14–5.8)
SODIUM SERPL-SCNC: 139 MMOL/L (ref 136–145)
WBC NRBC COR # BLD: 6.08 10*3/MM3 (ref 3.4–10.8)

## 2022-10-19 PROCEDURE — 97110 THERAPEUTIC EXERCISES: CPT

## 2022-10-19 NOTE — THERAPY TREATMENT NOTE
Outpatient Physical Therapy Ortho Treatment Note  [facility]     Patient Name: Omar Choudhary  : 1945  MRN: [id number]  Today's Date: 10/19/2022      Visit Date: 10/19/2022    Visit Dx:    ICD-10-CM ICD-9-CM   1. S/P lumbar fusion  Z98.1 V45.4   2. Decreased range of motion  M25.60 719.50   3. Weakness of both lower extremities  R29.898 729.89   4. Gait difficulty  R26.9 781.2       Patient Active Problem List   Diagnosis   • Benign prostatic hyperplasia   • Bursitis   • Depression   • Diverticulosis   • Family history of malignant neoplasm of breast   • Hyperlipidemia   • Hypertension   • Internal derangement of right knee   • Knee effusion   • Asthma   • Obstructive sleep apnea syndrome   • Osteoarthritis   • Osteopenia   • Pain in knee   • Postnasal drip   • Prepatellar bursitis   • Sciatica of right side   • Diaphragm paralysis   • Spinal stenosis of lumbar region with neurogenic claudication   • Degenerative lumbar spinal stenosis   • Anxiety   • Pinguecula of right eye   • Lumbar radiculopathy, chronic        Past Medical History:   Diagnosis Date   • Allergic    • Arthritis    • Asthma    • Carpal tunnel syndrome     no pain   • Cataract    • Depression    • Family history of malignant neoplasm of breast 2019   • Hemidiaphragm paralysis     Left   • Hyperlipidemia 2019   • Hypertension 2012   • Leg pain, right    • Low back pain    • Neuropathy     feet   • Osteopenia    • Poor balance    • Reactive airway disease 01/15/2016   • Shortness of breath    • Sleep apnea, obstructive     CPAP- to bring dos        Past Surgical History:   Procedure Laterality Date   • KNEE ARTHROSCOPY W/ ACL RECONSTRUCTION Right 2019   • LUMBAR FUSION Bilateral 10/5/2022    Procedure: DAY 2 LUMBAR LAMINECTOMY TRANSFORAMINAL LUMBAR INTERBODY FUSION L2,L3,L4 WITH NEURO ROBOT;  Surgeon: John Do MD;  Location: [facility] MAIN OR;  Service: Robotics - Neuro;  Laterality: Bilateral;   • MOUTH  SURGERY                          PT Assessment/Plan     Row Name 10/19/22 0900          PT Assessment    Assessment Comments Mr. Choudhary arrives for his first follow up visit since his initial evaluation. Continues to demo Trendelenburg gait, focused on glute med and core strengthening for improved gait pattern. Pt demo's good body mechanics throughout session, although noteable weakness with standing hip abd. Pt continues to benefit from skilled PT intervention.  -DB        PT Plan    PT Plan Comments Next session: MS with band, hip dips, fwd/lateral step ups  -DB           User Key  (r) = Recorded By, (t) = Taken By, (c) = Cosigned By    Initials Name Provider Type    DB Zenaida English, PT Physical Therapist                   OP Exercises     Row Name 10/19/22 0800             Subjective Comments    Subjective Comments Pt arrives to PT with SPC and demo's Trendelenburg gait. States he has been walking a lot recently d/t multiple appointments. Walking about 2,000 steps a day.  -DB         Subjective Pain    Able to rate subjective pain? yes  -DB      Pre-Treatment Pain Level 5  -DB         Total Minutes    65274 - PT Therapeutic Exercise Minutes 45  -DB         Exercise 1    Exercise Name 1 Nu Step  -DB      Cueing 1 Verbal  -DB      Time 1 5 min lvl 5  -DB         Exercise 2    Exercise Name 2 bridges  -DB      Cueing 2 Verbal;Tactile  -DB      Sets 2 2  -DB      Reps 2 10  -DB      Time 2 RTB  -DB         Exercise 3    Exercise Name 3 clamshells  -DB      Cueing 3 Verbal;Tactile  -DB      Sets 3 2  -DB      Reps 3 10  -DB      Time 3 RTB  -DB         Exercise 4    Exercise Name 4 standing hip abd  -DB      Cueing 4 Verbal;Demo  -DB      Sets 4 2  -DB      Reps 4 10  -DB            User Key  (r) = Recorded By, (t) = Taken By, (c) = Cosigned By    Initials Name Provider Type    DB Zenaida English, PT Physical Therapist                                                Time Calculation:   Start Time: 0830  Stop Time:  0915  Time Calculation (min): 45 min  Total Timed Code Minutes- PT: 45 minute(s)  Timed Charges  00591 - PT Therapeutic Exercise Minutes: 45  Total Minutes  Timed Charges Total Minutes: 45   Total Minutes: 45  Therapy Charges for Today     Code Description Service Date Service Provider Modifiers Qty    43048820557 HC PT THER PROC EA 15 MIN 10/19/2022 Zenaida English, PT GP 3                    Zenaida English, PT  10/19/2022

## 2022-10-19 NOTE — PROGRESS NOTES
Neurosurgical Consultation      Omar Choudhary is a 77 y.o. male is here today for follow-up. In the office today patient reports mild discomfort that is localized in his back. Incisions look clean and dry with no drainage and no redness.     No chief complaint on file.       Previous treatment:    HPI: This is a 77-year-old gentleman who underwent a lateral L2-L4 interbody cage placement with subsequent posterior decompression and posterior fusion.  This was approximately 2 weeks ago.  The stage surgery went well and there were no immediate postop complications.  He was discharged to inpatient rehab.  He has subsequently been discharged from inpatient rehab.  He is doing very well.  He is up and active.  He notes significant improvement in his leg pain and weakness when walking.  He does have expected incisional pain and spasm pain.  His incisions are well-healing without any signs of breakdown or infection.  There is no indication of a hernia on the lateral incision.  He is working with outpatient physical therapy.  He is walking independently at his house and using a cane when outside the home.  General happy with his progress.    Past Medical History:   Diagnosis Date   • Allergic    • Arthritis    • Asthma    • Carpal tunnel syndrome     no pain   • Cataract    • Depression    • Family history of malignant neoplasm of breast 09/26/2019   • Hemidiaphragm paralysis     Left   • Hyperlipidemia 09/26/2019   • Hypertension 03/01/2012   • Leg pain, right    • Low back pain    • Neuropathy     feet   • Osteopenia    • Poor balance    • Reactive airway disease 01/15/2016   • Shortness of breath    • Sleep apnea, obstructive     CPAP- to bring dos        Past Surgical History:   Procedure Laterality Date   • KNEE ARTHROSCOPY W/ ACL RECONSTRUCTION Right 2019   • LUMBAR FUSION Bilateral 10/5/2022    Procedure: DAY 2 LUMBAR LAMINECTOMY TRANSFORAMINAL LUMBAR INTERBODY FUSION L2,L3,L4 WITH NEURO ROBOT;  Surgeon: Hi  John GUO MD;  Location: [facility] MAIN OR;  Service: Robotics - Neuro;  Laterality: Bilateral;   • MOUTH SURGERY          Current Outpatient Medications on File Prior to Visit   Medication Sig Dispense Refill   • acetaminophen (TYLENOL) 500 MG tablet Take 500 mg by mouth Every 6 (Six) Hours As Needed for Mild Pain .     • albuterol sulfate  (90 Base) MCG/ACT inhaler Inhale 2 puffs Every 4 (Four) Hours As Needed for Wheezing or Shortness of Air. 18 g 3   • B Complex Vitamins (VITAMIN B COMPLEX) capsule capsule Take 1 capsule by mouth Daily. LD 9-27     • baclofen (LIORESAL) 10 MG tablet Take 1 tablet by mouth 3 (Three) Times a Day As Needed for Muscle Spasms.     • budesonide-formoterol (SYMBICORT) 160-4.5 MCG/ACT inhaler Inhale 2 puffs 2 (Two) Times a Day.     • Calcium Carb-Cholecalciferol (calcium-vitamin D) 500-200 MG-UNIT per tablet Take 2 tablets by mouth Daily. 180 tablet 3   • carboxymethylcellulose (REFRESH PLUS) 0.5 % solution Administer 1 drop to both eyes 3 (Three) Times a Day As Needed for Dry Eyes. Bring to hospital     • clonazePAM (KlonoPIN) 0.5 MG tablet Take 1 tablet by mouth 2 (Two) Times a Day As Needed for Anxiety.     • ferrous sulfate 325 (65 FE) MG tablet Take 1 tablet by mouth Daily With Lunch for 90 doses. 30 tablet 2   • gabapentin (NEURONTIN) 300 MG capsule take 1 capsule by mouth every morning, 1 capsule in afternoon and 2 capsules at night 120 capsule 5   • HYDROcodone-acetaminophen (NORCO) 5-325 MG per tablet Take 1 tablet by mouth Every 6 (Six) Hours As Needed for Moderate Pain for up to 10 days. 30 tablet 0   • lidocaine (LIDODERM) 5 % Place 1 patch on the skin as directed by provider Daily. Remove & Discard patch within 12 hours or as directed by MD 30 each 1   • losartan (COZAAR) 25 MG tablet Take 1 tablet by mouth Daily. HOLD / SKIP dose as directed if systolic blood pressure is lower than 120 30 tablet 0   • montelukast (SINGULAIR) 10 MG tablet TAKE 1 TABLET BY MOUTH DAILY 90  tablet 0   • Multiple Vitamins-Minerals (EMERGEN-C BLUE PO) Take 1 tablet by mouth Daily. LD      • Omega-3 Fatty Acids (FISH OIL) 1000 MG capsule capsule Take 1,000 mg by mouth Daily With Breakfast. LD      • pantoprazole (PROTONIX) 40 MG EC tablet Take 1 tablet by mouth Every Morning For two weeks, then stop. 15 tablet 0   • polyethylene glycol 17 g packet Take 17 g by mouth Daily for 25 doses. 25 packet 0   • Red Yeast Rice 600 MG capsule Take 1 capsule by mouth Daily. LD      • sennosides-docusate (PERICOLACE) 8.6-50 MG per tablet Take 1 tablet by mouth 2 (Two) Times a Day As Needed for Constipation.     • sildenafil (REVATIO) 20 MG tablet take 1 to 2 tablets by mouth as needed for erectile dysfunction. 50 tablet 0   • theophylline (THEODUR) 300 MG 12 hr tablet Take 300 mg by mouth Daily.     • triamcinolone (KENALOG) 0.025 % cream Apply 1 application topically to the appropriate area as directed 2 (Two) Times a Day.     • Turmeric 400 MG capsule Take 1 capsule by mouth Daily.       No current facility-administered medications on file prior to visit.        Allergies   Allergen Reactions   • Statins Myalgia        Social History     Socioeconomic History   • Marital status:    Tobacco Use   • Smoking status: Former     Packs/day: 0.50     Years: 10.00     Pack years: 5.00     Types: Cigarettes     Start date:      Quit date:      Years since quittin.8   • Smokeless tobacco: Never   Vaping Use   • Vaping Use: Never used   Substance and Sexual Activity   • Alcohol use: Yes     Alcohol/week: 12.0 standard drinks     Types: 10 Glasses of wine, 2 Cans of beer per week   • Drug use: Never     Types: Marijuana, Mescaline, Psilocybin     Comment: CBD gummies- stop now for surgery   • Sexual activity: Yes          Review of Systems   Constitutional: Positive for activity change.   HENT: Negative.    Eyes: Negative.    Respiratory: Negative.    Cardiovascular: Negative.   "  Gastrointestinal: Negative.    Endocrine: Negative.    Genitourinary: Negative.    Musculoskeletal: Positive for back pain (mild discomfort).   Skin: Negative.    Allergic/Immunologic: Negative.    Neurological: Negative.    Hematological: Negative.    Psychiatric/Behavioral: Negative.         Physical Examination:     Vitals:    10/20/22 1424   BP: 133/77   Pulse: 65   SpO2: 98%   Weight: 82.8 kg (182 lb 9.6 oz)   Height: 167.6 cm (65.98\")        Physical Exam     Neurological Exam   Patient's neurological exam is stable compared to my evaluation in the hospital without any new red flag signs.  Incisions are well-healing without any indication of hernia with the lateral incision.  Drain stitch is still in place.    Result Review  The following data was reviewed by: John Do MD on 10/20/2022:    Data reviewed: Radiologic studies Intraoperative x-rays were reviewed and confirm hardware placement being adequate.     Assessment/plan:  This is a 77-year-old gentleman with lumbar lateral listhesis and severe spinal canal stenosis resulting in severe neurogenic claudication that was significantly affecting his quality of life.  He underwent an L2-L4 lateral interbody fusion with posterior decompression and pedicle fixation and arthrodesis.  He is doing well.  He will continue outpatient physical therapy.  His incisions are well-healing and not concerning at this time.  I have discussed with him his limitations and he expresses understanding.  He will follow-up with me in 4 weeks and undergo x-rays of the lumbar spine at that juncture.  I have encouraged him to call with any questions or concerns.  If in the future he needs refills of pain medicine or spasm medication he will contact my clinic.    Diagnoses and all orders for this visit:    1. Spinal stenosis of lumbar region with neurogenic claudication (Primary)  -     XR Spine Lumbar 2 or 3 View; Future         Return in about 4 weeks (around " 11/17/2022).            John Do MD

## 2022-10-20 ENCOUNTER — OFFICE VISIT (OUTPATIENT)
Dept: NEUROSURGERY | Facility: CLINIC | Age: 77
End: 2022-10-20

## 2022-10-20 VITALS
BODY MASS INDEX: 29.35 KG/M2 | DIASTOLIC BLOOD PRESSURE: 77 MMHG | HEIGHT: 66 IN | HEART RATE: 65 BPM | OXYGEN SATURATION: 98 % | SYSTOLIC BLOOD PRESSURE: 133 MMHG | WEIGHT: 182.6 LBS

## 2022-10-20 DIAGNOSIS — M48.062 SPINAL STENOSIS OF LUMBAR REGION WITH NEUROGENIC CLAUDICATION: Primary | ICD-10-CM

## 2022-10-20 PROCEDURE — 99024 POSTOP FOLLOW-UP VISIT: CPT | Performed by: NEUROLOGICAL SURGERY

## 2022-10-20 NOTE — PROGRESS NOTES
[facility] CMG Coordinator  Inpatient Rehabilitation Discharge    Mode of Locomotion: Walking.    Discharge Against Medical Advice:  No.  Discharge Information  Patient Discharged Alive:  Yes  Discharge Destination/Living Setting: Home.  At discharge, the patient was discharged to live (with) (02)  Family / Relatives    Diagnosis for Interruption/Death: ICD    Impairment Group: 08.9 Other Orthopedic    Comorbidities: ICD    Complications: ICD    QUALITY INDICATORS  Section A. Medication List  Medication List to Subsequent Provider:  Not applicable.  Patient was not  discharged to a subsequent provider.  Discharge Location:  01 - Home  Medication List to Patient at Discharge:  Yes - Current reconciled medication  list provided to the patient, family and/or caregiver  Route(s) of Medication List Transmission to Patient:  Electronic Health Record,  Verbal (e.g., in-person, telephone, video conferencing), Paper-based (e.g., fax,  copies, printouts)    Section J Health Conditions: Fall(s) Since Admission:  No    Section K. Swallowing/Nutritional Status  Nutritional Approaches Past 7 Days:   None  Nutritional Approaches at Discharge:  None    Section M. Skin Conditions Discharge:  Unhealed Pressure Ulcer(s) at Stage 1 or  Higher:  No    . Current Number of Unhealed Pressure Ulcers  Branch    Section N. Medication:  Medication Intervention: Not applicable - There were no potential clinically  significant medication issues identified since admission or patient is not  taking any medications.  Section . High-Risk Drug Classes: Use and Indication                       Is Taking                    Indication noted  High-RiskDrug Class  H. Opioid            Yes                          Yes  Z. None of the above No                           -    Section O. Special Treatments, Procedures, and Programs  Non-Invasive Mechanical Ventilator: CPAP    Signed by: Susan Willard RN

## 2022-10-25 ENCOUNTER — READMISSION MANAGEMENT (OUTPATIENT)
Dept: CALL CENTER | Facility: HOSPITAL | Age: 77
End: 2022-10-25

## 2022-10-25 NOTE — OUTREACH NOTE
General Surgery Week 2 Survey    Flowsheet Row Responses   [facility] patient discharged from? [facility]   Does the patient have one of the following disease processes/diagnoses(primary or secondary)? General Surgery   Week 2 attempt successful? No   Call start time 1413   Unsuccessful attempts Attempt 1  [Pt/Friend did not answer - daughter answered but stated she does not have any recent updates]          BRENDA SHAIKH - Registered Nurse

## 2022-10-27 ENCOUNTER — HOSPITAL ENCOUNTER (OUTPATIENT)
Dept: PHYSICAL THERAPY | Facility: HOSPITAL | Age: 77
Setting detail: THERAPIES SERIES
Discharge: HOME | End: 2022-10-27

## 2022-10-27 DIAGNOSIS — R26.9 GAIT DIFFICULTY: ICD-10-CM

## 2022-10-27 DIAGNOSIS — Z98.1 S/P LUMBAR FUSION: Primary | ICD-10-CM

## 2022-10-27 DIAGNOSIS — M25.60 DECREASED RANGE OF MOTION: ICD-10-CM

## 2022-10-27 DIAGNOSIS — R29.898 WEAKNESS OF BOTH LOWER EXTREMITIES: ICD-10-CM

## 2022-10-27 PROCEDURE — 97110 THERAPEUTIC EXERCISES: CPT

## 2022-10-27 NOTE — THERAPY TREATMENT NOTE
Outpatient Physical Therapy Ortho Treatment Note  [facility]     Patient Name: Omar Choudhary  : 1945  MRN: [id number]  Today's Date: 10/27/2022      Visit Date: 10/27/2022    Visit Dx:    ICD-10-CM ICD-9-CM   1. S/P lumbar fusion  Z98.1 V45.4   2. Decreased range of motion  M25.60 719.50   3. Weakness of both lower extremities  R29.898 729.89   4. Gait difficulty  R26.9 781.2       Patient Active Problem List   Diagnosis   • Benign prostatic hyperplasia   • Bursitis   • Depression   • Diverticulosis   • Family history of malignant neoplasm of breast   • Hyperlipidemia   • Hypertension   • Internal derangement of right knee   • Knee effusion   • Asthma   • Obstructive sleep apnea syndrome   • Osteoarthritis   • Osteopenia   • Pain in knee   • Postnasal drip   • Prepatellar bursitis   • Sciatica of right side   • Diaphragm paralysis   • Spinal stenosis of lumbar region with neurogenic claudication   • Degenerative lumbar spinal stenosis   • Anxiety   • Pinguecula of right eye   • Lumbar radiculopathy, chronic        Past Medical History:   Diagnosis Date   • Allergic    • Arthritis    • Asthma    • Carpal tunnel syndrome     no pain   • Cataract    • Depression    • Family history of malignant neoplasm of breast 2019   • Hemidiaphragm paralysis     Left   • Hyperlipidemia 2019   • Hypertension 2012   • Leg pain, right    • Low back pain    • Neuropathy     feet   • Osteopenia    • Poor balance    • Reactive airway disease 01/15/2016   • Shortness of breath    • Sleep apnea, obstructive     CPAP- to bring dos        Past Surgical History:   Procedure Laterality Date   • KNEE ARTHROSCOPY W/ ACL RECONSTRUCTION Right 2019   • LUMBAR FUSION Bilateral 10/5/2022    Procedure: DAY 2 LUMBAR LAMINECTOMY TRANSFORAMINAL LUMBAR INTERBODY FUSION L2,L3,L4 WITH NEURO ROBOT;  Surgeon: John Do MD;  Location: [facility] MAIN OR;  Service: Robotics - Neuro;  Laterality: Bilateral;   • LUMBAR  FUSION N/A 10/4/2022    Procedure: DAY 1 LUMBAR LATERAL INTERBODY FUSION WITH NEURO ROBOT L2, L3.L4;  Surgeon: John Do MD;  Location: [facility] MAIN OR;  Service: Robotics - Neuro;  Laterality: N/A;  left side approach   • MOUTH SURGERY          PT Ortho     Row Name 10/27/22 1100       MMT (Manual Muscle Testing)    Rt Lower Ext Rt Hip Internal (Medial) Rotation;Rt Hip External (Lateral) Rotation;Rt Hip ABduction  -JA    Lt Lower Ext Lt Hip ABduction;Lt Hip Internal (Medial) Rotation;Lt Hip External (Lateral) Rotation  -JA       MMT Right Lower Ext    Rt Hip ABduction MMT, Gross Movement (2+/5) poor plus  -JA    Rt Hip Internal (Medial) Rotation MMT, Gross Movement (3/5) fair  -JA    Rt Hip External (Lateral) Rotation MMT, Gross Movement (3/5) fair  -JA       MMT Left Lower Ext    Lt Hip ABduction MMT, Gross Movement (3+/5) fair plus  -JA    Lt Hip Internal (Medial) Rotation MMT, Gross Movement (3+/5) fair plus  -JA    Lt Hip External (Lateral) Rotation MMT, Gross Movement (3+/5) fair plus  -JA       Gait/Stairs (Locomotion)    Comment, (Gait/Stairs) w/SPC pt hold wall and amb with nigelellenberg gait  -JA          User Key  (r) = Recorded By, (t) = Taken By, (c) = Cosigned By    Initials Name Provider Type    Patricia Mays, PT Physical Therapist                             PT Assessment/Plan     Row Name 10/27/22 1000          PT Assessment    Assessment Comments Mr Choudhary presents to clinic with SPC in L hand and holds wall with R. Without SPC gait is quite poor.  We had a tomas discussion regarding his weakness and his continuing to push muscle too far that are not able to tolerate that yet. Tested hip abd in SL and R was 2+/5 (glute med); we discussed how this correlates to poor gait and that continuing to amb with SPC v. RWX was unwise. He is reluctant/resistant to using RWX one reason beng discomfort in wrist due to OA. Suggested trekking poles to him and he has some he will use jarvis. Revised  "his HEP and emphasized strengthening the deeper hip/core muscles before pushing himself further. Recommended shorter walks but more of them instead of long. he demosntrated understanding of HEP revision with print out given. Worked on core activation with SAQ - noted some discomfort that decresaed when activated and maintaining PPT. He will benefit from continuing skilled therapy services.  -MEGHA        PT Plan    PT Plan Comments assess compliance with revised HEP, is form and ease improved with supine hip abd?, response to last visit  -MEGHA           User Key  (r) = Recorded By, (t) = Taken By, (c) = Cosigned By    Initials Name Provider Type    Patricia Mays, PT Physical Therapist                   OP Exercises     Row Name 10/27/22 1100             Subjective Comments    Subjective Comments Tired today. I'm walking 1/2 mile every day, 20 minutes outside.  -MEGHA         Subjective Pain    Able to rate subjective pain? yes  -MEGHA      Pre-Treatment Pain Level 5  -JA      Subjective Pain Comment --  -JA         Total Minutes    51507 - PT Therapeutic Exercise Minutes 40  -JA         Exercise 1    Exercise Name 1 Nu Step  -JA      Cueing 1 Verbal  -JA      Time 1 5 min lvl 5  -JA      Additional Comments seat 9  -JA         Exercise 2    Exercise Name 2 supine hip abd \"windshield wiper  -JA      Cueing 2 Verbal;Tactile  -JA      Sets 2 --  -JA      Reps 2 10  -JA      Time 2 1 sec pause  -JA      Additional Comments assessed glute med in SL hip abd he was unable to perform (2+/5), changed to supine for strengthening and noted he has great difficulty using glute med even in this gravity-lessesned position  -JA         Exercise 3    Exercise Name 3 controlled heel slides in HL w/TA  -JA      Cueing 3 Verbal;Tactile  -JA      Sets 3 --  -JA      Reps 3 10  -JA      Time 3 --  -JA      Additional Comments difficult R v. L  -JA         Exercise 4    Exercise Name 4 low hip bridges w/TA  -JA      Cueing 4 Verbal;Demo  " -      Sets 4 --  -JA      Reps 4 10  -      Time 4 cued tilt to neutral spine, engage abs, use hips/legs to lift into small bridge  -         Exercise 5    Exercise Name 5 HL clamshell w/TA and light resistance  -      Cueing 5 Verbal;Tactile  -MEGHA      Additional Comments assessed hip stability in HL with very light manual resistance at med/lat knee and noted poor ability to resist/stabilize  -            User Key  (r) = Recorded By, (t) = Taken By, (c) = Cosigned By    Initials Name Provider Type    Patricia Mays, PT Physical Therapist                              PT OP Goals     Row Name 10/27/22 1100          PT Short Term Goals    STG Date to Achieve 11/16/22  -MEGHA     STG 1 Pt will be independent with initial HEP.  -     STG 2 Pt will demonstrate correct sitting and standing posture to reduce strain on lumbar spine.  -     STG 2 Progress New  -     STG 3 Pt will demonstrate good body mechanics with bending, lifting and reaching ADLs  to reduce strain on spine.  -     STG 3 Progress New  -        Long Term Goals    LTG Date to Achieve 12/16/22  -MEGHA     LTG 1 Pt will be independent with advanced HEP for spinal stabilization for improved self-management of symptoms.  -     LTG 1 Progress New  -     LTG 2 Pt will report 50% or> decrease in pain with ADLs.  -     LTG 2 Progress New  -     LTG 3 Pt will score 50% or less on BENEDICT indicating decrease in perceived functional disability.  -     LTG 3 Progress New  -     LTG 4 Pt will demonstrate increased R LE strength to 4/5 or better.  -     LTG 4 Progress New  -           User Key  (r) = Recorded By, (t) = Taken By, (c) = Cosigned By    Initials Name Provider Type    Patricia Mays, PT Physical Therapist                Therapy Education  Education Details: recommended he use his jarvis [place] poles bc he reports pain with weight bearing on L hand/wrist with RWX and since he touches the wall whenever he can during amb  with SPC; instead of SPC  Given: Symptoms/condition management, Pain management, Posture/body mechanics, Mobility training  Program: Modified  How Provided: Verbal, Demonstration, Written  Provided to: Patient  Level of Understanding: Verbalized, Demonstrated              Time Calculation:   Start Time: 1103  Stop Time: 1145  Time Calculation (min): 42 min  Timed Charges  06122 - PT Therapeutic Exercise Minutes: 40  Total Minutes  Timed Charges Total Minutes: 40   Total Minutes: 40  Therapy Charges for Today     Code Description Service Date Service Provider Modifiers Qty    [id number]  PT THER PROC EA 15 MIN 10/27/2022 Patricai Reyes, PT GP 3                    Patricia Reyes PT  10/27/2022

## 2022-10-28 ENCOUNTER — READMISSION MANAGEMENT (OUTPATIENT)
Dept: CALL CENTER | Facility: HOSPITAL | Age: 77
End: 2022-10-28

## 2022-10-28 NOTE — OUTREACH NOTE
General Surgery Week 2 Survey    Flowsheet Row Responses   [facility] patient discharged from? [facility]   Does the patient have one of the following disease processes/diagnoses(primary or secondary)? General Surgery   Week 2 attempt successful? Yes   Call start time 1228   Call end time 1229   Discharge diagnosis s/p LUMBAR LAMINECTOMY TRANSFORAMINAL LUMBAR INTERBODY FUSION L2,L3,L4    Is the patient taking all medications as directed (includes completed medication regime)? Yes   Does the patient have a follow up appointment scheduled with their surgeon? Yes   Has the patient kept scheduled appointments due by today? Yes   Has home health visited the patient within 72 hours of discharge? N/A   Psychosocial issues? No   What is the patient's perception of their health status since discharge? Improving   Is the patient/caregiver able to teach back the hierarchy of who to call/visit for symptoms/problems? PCP, Specialist, Home health nurse, Urgent Care, ED, 911 Yes   Week 2 call completed? Yes   Wrap up additional comments Quick call, doing well, no further calls needed.          OLGA PERRIN - Registered Nurse

## 2022-11-02 ENCOUNTER — HOSPITAL ENCOUNTER (OUTPATIENT)
Dept: PHYSICAL THERAPY | Facility: HOSPITAL | Age: 77
Setting detail: THERAPIES SERIES
Discharge: HOME | End: 2022-11-02

## 2022-11-02 DIAGNOSIS — Z98.1 S/P LUMBAR FUSION: Primary | ICD-10-CM

## 2022-11-02 DIAGNOSIS — M25.60 DECREASED RANGE OF MOTION: ICD-10-CM

## 2022-11-02 DIAGNOSIS — R26.9 GAIT DIFFICULTY: ICD-10-CM

## 2022-11-02 DIAGNOSIS — R29.898 WEAKNESS OF BOTH LOWER EXTREMITIES: ICD-10-CM

## 2022-11-02 PROCEDURE — 97110 THERAPEUTIC EXERCISES: CPT

## 2022-11-02 NOTE — THERAPY TREATMENT NOTE
Outpatient Physical Therapy Ortho Treatment Note  [facility]     Patient Name: Omar Choudhary  : 1945  MRN: [id number]  Today's Date: 2022      Visit Date: 2022    Visit Dx:    ICD-10-CM ICD-9-CM   1. S/P lumbar fusion  Z98.1 V45.4   2. Decreased range of motion  M25.60 719.50   3. Weakness of both lower extremities  R29.898 729.89   4. Gait difficulty  R26.9 781.2       Patient Active Problem List   Diagnosis   • Benign prostatic hyperplasia   • Bursitis   • Depression   • Diverticulosis   • Family history of malignant neoplasm of breast   • Hyperlipidemia   • Hypertension   • Internal derangement of right knee   • Knee effusion   • Asthma   • Obstructive sleep apnea syndrome   • Osteoarthritis   • Osteopenia   • Pain in knee   • Postnasal drip   • Prepatellar bursitis   • Sciatica of right side   • Diaphragm paralysis   • Spinal stenosis of lumbar region with neurogenic claudication   • Degenerative lumbar spinal stenosis   • Anxiety   • Pinguecula of right eye   • Lumbar radiculopathy, chronic        Past Medical History:   Diagnosis Date   • Allergic    • Arthritis    • Asthma    • Carpal tunnel syndrome     no pain   • Cataract    • Depression    • Family history of malignant neoplasm of breast 2019   • Hemidiaphragm paralysis     Left   • Hyperlipidemia 2019   • Hypertension 2012   • Leg pain, right    • Low back pain    • Neuropathy     feet   • Osteopenia    • Poor balance    • Reactive airway disease 01/15/2016   • Shortness of breath    • Sleep apnea, obstructive     CPAP- to bring dos        Past Surgical History:   Procedure Laterality Date   • KNEE ARTHROSCOPY W/ ACL RECONSTRUCTION Right 2019   • LUMBAR FUSION Bilateral 10/5/2022    Procedure: DAY 2 LUMBAR LAMINECTOMY TRANSFORAMINAL LUMBAR INTERBODY FUSION L2,L3,L4 WITH NEURO ROBOT;  Surgeon: John Do MD;  Location: [facility] MAIN OR;  Service: Robotics - Neuro;  Laterality: Bilateral;   • LUMBAR FUSION  N/A 10/4/2022    Procedure: DAY 1 LUMBAR LATERAL INTERBODY FUSION WITH NEURO ROBOT L2, L3.L4;  Surgeon: John Do MD;  Location: [facility] MAIN OR;  Service: Robotics - Neuro;  Laterality: N/A;  left side approach   • MOUTH SURGERY                          PT Assessment/Plan     Row Name 11/02/22 1000          PT Assessment    Assessment Comments Pt returns with trekking pole today to trial use during ambulation. Pt was fitted for comfortable use, although still demo's Trendelenburg gait with poles. Continued to focus on TA + glute med strengthening. Initaited hip hiking and mini squats, pt tolerates well. Difficulty with performing hip hiking R>L, moderate tactile cues provided. Pt continues to benefit from skilled PT.  -DB        PT Plan    PT Plan Comments Assess response to last visit. standing hip abd.  -DB           User Key  (r) = Recorded By, (t) = Taken By, (c) = Cosigned By    Initials Name Provider Type    DB Zenaida English, PT Physical Therapist                   OP Exercises     Row Name 11/02/22 1000             Subjective Comments    Subjective Comments Pt brought trekking poles today to learn how to use them. Has been trying to wean down off of pain medication.  -DB         Subjective Pain    Able to rate subjective pain? yes  -DB      Pre-Treatment Pain Level 5  -DB         Total Minutes    11042 - PT Therapeutic Exercise Minutes 42  -DB         Exercise 1    Exercise Name 1 Nu Step  -DB      Cueing 1 Verbal  -DB      Time 1 5 min lvl 5  -DB         Exercise 4    Exercise Name 4 low hip bridges w/TA  -DB      Cueing 4 Verbal;Demo  -DB      Sets 4 3  -DB      Reps 4 10  -DB      Time 4 RTB  -DB         Exercise 5    Exercise Name 5 HL clamshell w/TA  -DB      Cueing 5 Verbal;Tactile  -DB      Sets 5 2  -DB      Reps 5 10  -DB      Time 5 RTB  -DB         Exercise 6    Exercise Name 6 hip dips  -DB      Cueing 6 Verbal;Demo  -DB      Sets 6 2  -DB      Reps 6 10  -DB      Additional Comments on  step  -DB         Exercise 7    Exercise Name 7 gait with trekking poles  -DB      Cueing 7 Verbal;Demo;Tactile  -DB      Time 7 walking in clinic  -DB         Exercise 8    Exercise Name 8 MS  -DB      Cueing 8 Verbal;Demo  -DB      Sets 8 2  -DB      Reps 8 10  -DB      Time 8 RTB  -DB            User Key  (r) = Recorded By, (t) = Taken By, (c) = Cosigned By    Initials Name Provider Type    DB Zenaida English, PT Physical Therapist                                                Time Calculation:   Start Time: 1000  Stop Time: 1042  Time Calculation (min): 42 min  Total Timed Code Minutes- PT: 42 minute(s)  Timed Charges  14036 - PT Therapeutic Exercise Minutes: 42  Total Minutes  Timed Charges Total Minutes: 42   Total Minutes: 42  Therapy Charges for Today     Code Description Service Date Service Provider Modifiers Qty    [id number]  PT THER PROC EA 15 MIN 11/2/2022 Zenaida English, PT GP 3                    Zenaida English, PT  11/2/2022

## 2022-11-09 ENCOUNTER — OFFICE VISIT (OUTPATIENT)
Dept: PAIN MEDICINE | Facility: CLINIC | Age: 77
End: 2022-11-09

## 2022-11-09 VITALS
RESPIRATION RATE: 16 BRPM | SYSTOLIC BLOOD PRESSURE: 140 MMHG | DIASTOLIC BLOOD PRESSURE: 77 MMHG | OXYGEN SATURATION: 97 % | HEART RATE: 61 BPM

## 2022-11-09 DIAGNOSIS — Z79.899 HIGH RISK MEDICATION USE: Primary | ICD-10-CM

## 2022-11-09 DIAGNOSIS — M48.061 SPINAL STENOSIS OF LUMBAR REGION WITHOUT NEUROGENIC CLAUDICATION: Primary | ICD-10-CM

## 2022-11-09 DIAGNOSIS — G89.4 CHRONIC PAIN SYNDROME: ICD-10-CM

## 2022-11-09 PROCEDURE — 99214 OFFICE O/P EST MOD 30 MIN: CPT | Performed by: STUDENT IN AN ORGANIZED HEALTH CARE EDUCATION/TRAINING PROGRAM

## 2022-11-09 RX ORDER — HYDROCODONE BITARTRATE AND ACETAMINOPHEN 5; 325 MG/1; MG/1
1 TABLET ORAL EVERY 6 HOURS PRN
COMMUNITY
End: 2022-11-09 | Stop reason: SDUPTHER

## 2022-11-09 RX ORDER — HYDROCODONE BITARTRATE AND ACETAMINOPHEN 5; 325 MG/1; MG/1
1 TABLET ORAL EVERY 6 HOURS PRN
Qty: 120 TABLET | Refills: 0 | Status: SHIPPED | OUTPATIENT
Start: 2022-11-09 | End: 2022-12-07 | Stop reason: SDUPTHER

## 2022-11-09 NOTE — PROGRESS NOTES
CHIEF COMPLAINT  Chief Complaint   Patient presents with   • Back Pain     L3 thru 6-- Triple laminectomy  with  decompression with titanium cages placed 10/4/22 and then 10/5/ 22 screws placed-- Pain 5  Hydrocodone acet 5-325 ld 11/9 at  830am       Primary Care  RosendokerDennis knapp MD    Subjective   Omar Choudhary is a 77 y.o. male  who presents for chronic low back pain with both axial and radicular features.  He states that he has had intermittent pain for many years however he has not sought any treatment.  Recently, his pain began to impact his activities of daily living and he sought treatment from his primary care in the form of an MRI.  It showed substantial degenerative changes including severe stenosis at multiple levels as well as foraminal narrowing.  He also has severe scoliosis and severe degenerative disc disease with facet arthropathy.  He received 1 epidural steroid injection from an outside pain provider which did provide him significant benefit however he was discharged to do a failed drug screen.  He admits that he had taken a oxycodone that he had leftover from surgery and forgot to inform the previous provider of this.  He denies any significant numbness or tingling or weakness but does complain of axial type back pain with radiation in the buttock bilaterally.    Back Pain  Pertinent negatives include no numbness or weakness.   Pain  Associated symptoms include arthralgias and myalgias. Pertinent negatives include no numbness or weakness.        Location: Low back with radiation in the buttock bilaterally  Onset: Years ago  Duration: Progressively worsening  Timing: Constant throughout the day  Quality: Sharp, stabbing, aching  Severity: Today: 5       Last Week: 6       Worst: 7  Modifying Factors: The pain is worse with physical activity and movement and exercise and slightly improved with rest and stretching    Physical Therapy: no    Interval Update 11/09/2022: Recently underwent  fairly extensive decompression and fusion with interbody spacers.  Overall, he feels like he is doing well.  He has been taking hydrocodone 4 times daily.  Admitting to slight constipation but no sedation    The following portions of the patient's history were reviewed and updated as appropriate: allergies, current medications, past family history, past medical history, past social history, past surgical history and problem list.      Current Outpatient Medications:   •  acetaminophen (TYLENOL) 500 MG tablet, Take 500 mg by mouth Every 6 (Six) Hours As Needed for Mild Pain ., Disp: , Rfl:   •  albuterol sulfate  (90 Base) MCG/ACT inhaler, Inhale 2 puffs Every 4 (Four) Hours As Needed for Wheezing or Shortness of Air., Disp: 18 g, Rfl: 3  •  B Complex Vitamins (VITAMIN B COMPLEX) capsule capsule, Take 1 capsule by mouth Daily. LD 9-27, Disp: , Rfl:   •  baclofen (LIORESAL) 10 MG tablet, Take 1 tablet by mouth 3 (Three) Times a Day As Needed for Muscle Spasms., Disp: , Rfl:   •  budesonide-formoterol (SYMBICORT) 160-4.5 MCG/ACT inhaler, Inhale 2 puffs 2 (Two) Times a Day., Disp: , Rfl:   •  Calcium Carb-Cholecalciferol (calcium-vitamin D) 500-200 MG-UNIT per tablet, Take 2 tablets by mouth Daily., Disp: 180 tablet, Rfl: 3  •  carboxymethylcellulose (REFRESH PLUS) 0.5 % solution, Administer 1 drop to both eyes 3 (Three) Times a Day As Needed for Dry Eyes. Bring to hospital, Disp: , Rfl:   •  clonazePAM (KlonoPIN) 0.5 MG tablet, Take 1 tablet by mouth 2 (Two) Times a Day As Needed for Anxiety., Disp: , Rfl:   •  ferrous sulfate 325 (65 FE) MG tablet, Take 1 tablet by mouth Daily With Lunch for 90 doses., Disp: 30 tablet, Rfl: 2  •  gabapentin (NEURONTIN) 300 MG capsule, take 1 capsule by mouth every morning, 1 capsule in afternoon and 2 capsules at night, Disp: 120 capsule, Rfl: 5  •  HYDROcodone-acetaminophen (NORCO) 5-325 MG per tablet, Take 1 tablet by mouth Every 6 (Six) Hours As Needed for Severe Pain.,  Disp: 120 tablet, Rfl: 0  •  lidocaine (LIDODERM) 5 %, Place 1 patch on the skin as directed by provider Daily. Remove & Discard patch within 12 hours or as directed by MD, Disp: 30 each, Rfl: 1  •  losartan (COZAAR) 25 MG tablet, Take 1 tablet by mouth Daily. HOLD / SKIP dose as directed if systolic blood pressure is lower than 120, Disp: 30 tablet, Rfl: 0  •  montelukast (SINGULAIR) 10 MG tablet, TAKE 1 TABLET BY MOUTH DAILY, Disp: 90 tablet, Rfl: 0  •  Multiple Vitamins-Minerals (EMERGEN-C BLUE PO), Take 1 tablet by mouth Daily. LD 9-27, Disp: , Rfl:   •  Omega-3 Fatty Acids (FISH OIL) 1000 MG capsule capsule, Take 1,000 mg by mouth Daily With Breakfast. LD 9-27, Disp: , Rfl:   •  polyethylene glycol 17 g packet, Take 17 g by mouth Daily for 25 doses., Disp: 25 packet, Rfl: 0  •  Red Yeast Rice 600 MG capsule, Take 1 capsule by mouth Daily. LD 9-27, Disp: , Rfl:   •  sildenafil (REVATIO) 20 MG tablet, take 1 to 2 tablets by mouth as needed for erectile dysfunction., Disp: 50 tablet, Rfl: 0  •  theophylline (THEODUR) 300 MG 12 hr tablet, Take 300 mg by mouth Daily., Disp: , Rfl:   •  triamcinolone (KENALOG) 0.025 % cream, Apply 1 application topically to the appropriate area as directed 2 (Two) Times a Day., Disp: , Rfl:   •  Turmeric 400 MG capsule, Take 1 capsule by mouth Daily., Disp: , Rfl:     Review of Systems   Musculoskeletal: Positive for arthralgias, back pain, gait problem and myalgias.   Neurological: Negative for weakness and numbness.       Vitals:    11/09/22 1321   BP: 140/77   Pulse: 61   Resp: 16   SpO2: 97%   PainSc:   5         Objective   Physical Exam  Vitals and nursing note reviewed.   Constitutional:       General: He is not in acute distress.     Appearance: Normal appearance. He is well-developed and normal weight.   Neck:      Trachea: No tracheal deviation.   Musculoskeletal:      Comments: Lumbar Spine Exam:  Tender to palpation over the lumbar paraspinal musculature Yes  Limited  range of motion secondary to pain Yes  Facet loading positive: bilateral  Facets tender to palpation: bilateral  Straight leg raise test positive: Equivocal       Neurological:      General: No focal deficit present.      Mental Status: He is alert.      Sensory: No sensory deficit.      Motor: No weakness.           Assessment & Plan   Problems Addressed this Visit    None  Visit Diagnoses     Spinal stenosis of lumbar region without neurogenic claudication    -  Primary    Relevant Medications    HYDROcodone-acetaminophen (NORCO) 5-325 MG per tablet    Chronic pain syndrome          Diagnoses       Codes Comments    Spinal stenosis of lumbar region without neurogenic claudication    -  Primary ICD-10-CM: M48.061  ICD-9-CM: 724.02     Chronic pain syndrome     ICD-10-CM: G89.4  ICD-9-CM: 338.4           Plan:  1. Refill hydrocodone 5 mg 4 times daily  2. Repeat UDS today.  Inspect appropriate  3. Continue working with physical therapy  --- Follow-up 1 month           INSPECT REPORT    As part of the patient's treatment plan, I may be prescribing controlled substances. The patient has been made aware of appropriate use of such medications, including potential risk of somnolence, limited ability to drive and/or work safely, and the potential for dependence or overdose. It has also bee made clear that these medications are for use by this patient only, without concomitant use of alcohol or other substances unless prescribed.     Patient has completed prescribing agreement detailing terms of continued prescribing of controlled substances, including monitoring PRASANTH reports, urine drug screening, and pill counts if necessary. The patient is aware that inappropriate use will results in cessation of prescribing such medications.    INSPECT report has been reviewed and scanned into the patient's chart.    As the clinician, I personally reviewed the INSPECT from 11/8/2022.    History and physical exam exhibit continued  safe and appropriate use of controlled substances.      EMR Dragon/Transcription disclaimer:   Much of this encounter note is an electronic transcription/translation of spoken language to printed text. The electronic translation of spoken language may permit erroneous, or at times, nonsensical words or phrases to be inadvertently transcribed; Although I have reviewed the note for such errors, some may still exist.

## 2022-11-10 ENCOUNTER — HOSPITAL ENCOUNTER (OUTPATIENT)
Dept: PHYSICAL THERAPY | Facility: HOSPITAL | Age: 77
Setting detail: THERAPIES SERIES
Discharge: HOME | End: 2022-11-10

## 2022-11-10 DIAGNOSIS — M25.60 DECREASED RANGE OF MOTION: ICD-10-CM

## 2022-11-10 DIAGNOSIS — Z98.1 S/P LUMBAR FUSION: Primary | ICD-10-CM

## 2022-11-10 DIAGNOSIS — R29.898 WEAKNESS OF BOTH LOWER EXTREMITIES: ICD-10-CM

## 2022-11-10 DIAGNOSIS — R26.9 GAIT DIFFICULTY: ICD-10-CM

## 2022-11-10 PROCEDURE — 97110 THERAPEUTIC EXERCISES: CPT

## 2022-11-10 NOTE — THERAPY TREATMENT NOTE
Outpatient Physical Therapy Ortho Treatment Note  [facility]     Patient Name: Omar Choudhary  : 1945  MRN: [id number]  Today's Date: 11/10/2022      Visit Date: 11/10/2022    Visit Dx:    ICD-10-CM ICD-9-CM   1. S/P lumbar fusion  Z98.1 V45.4   2. Decreased range of motion  M25.60 719.50   3. Weakness of both lower extremities  R29.898 729.89   4. Gait difficulty  R26.9 781.2       Patient Active Problem List   Diagnosis   • Benign prostatic hyperplasia   • Bursitis   • Depression   • Diverticulosis   • Family history of malignant neoplasm of breast   • Hyperlipidemia   • Hypertension   • Internal derangement of right knee   • Knee effusion   • Asthma   • Obstructive sleep apnea syndrome   • Osteoarthritis   • Osteopenia   • Pain in knee   • Postnasal drip   • Prepatellar bursitis   • Sciatica of right side   • Diaphragm paralysis   • Spinal stenosis of lumbar region with neurogenic claudication   • Degenerative lumbar spinal stenosis   • Anxiety   • Pinguecula of right eye   • Lumbar radiculopathy, chronic        Past Medical History:   Diagnosis Date   • Allergic    • Arthritis    • Asthma    • Carpal tunnel syndrome     no pain   • Cataract    • Depression    • Family history of malignant neoplasm of breast 2019   • Hemidiaphragm paralysis     Left   • Hyperlipidemia 2019   • Hypertension 2012   • Leg pain, right    • Low back pain    • Neuropathy     feet   • Osteopenia    • Poor balance    • Reactive airway disease 01/15/2016   • Shortness of breath    • Sleep apnea, obstructive     CPAP- to bring dos        Past Surgical History:   Procedure Laterality Date   • KNEE ARTHROSCOPY W/ ACL RECONSTRUCTION Right 2019   • LUMBAR FUSION Bilateral 10/5/2022    Procedure: DAY 2 LUMBAR LAMINECTOMY TRANSFORAMINAL LUMBAR INTERBODY FUSION L2,L3,L4 WITH NEURO ROBOT;  Surgeon: John Do MD;  Location: [facility] MAIN OR;  Service: Robotics - Neuro;  Laterality: Bilateral;   • LUMBAR  "FUSION N/A 10/4/2022    Procedure: DAY 1 LUMBAR LATERAL INTERBODY FUSION WITH NEURO ROBOT L2, L3.L4;  Surgeon: John Do MD;  Location: [facility] MAIN OR;  Service: Robotics - Neuro;  Laterality: N/A;  left side approach   • MOUTH SURGERY                          PT Assessment/Plan     Row Name 11/10/22 1600          PT Assessment    Assessment Comments Mendez reports feeling exhausted-we discussed that healing is still taking place from surgery and it will take time for strength to increase and improve his tolerance to activity. He demonstrates improving gait with decreased severity of trendelenburg gait when amb with jarvis trekking poles.  Retested SL hip abd and he was able to lift against gravity however not full range and with poor control (3-/5); he verbalized understanding of importance of continuing gravity-lessened strengthening for glute med at home until R is closer to L in strength. Worked on quality of movement v. quantity.  -JA        PT Plan    PT Plan Comments assess response to last visit; cont strengthening hip girdle/core in supported and unsupported positions  -MEGHA           User Key  (r) = Recorded By, (t) = Taken By, (c) = Cosigned By    Initials Name Provider Type    Patricia Mays, PT Physical Therapist                   OP Exercises     Row Name 11/10/22 1000             Subjective Comments    Subjective Comments i am exhausted. I had a doctor's appt and then hung out with my daughter yesterday.  -MEGHA         Subjective Pain    Able to rate subjective pain? yes  -JA      Pre-Treatment Pain Level 5  -JA         Total Minutes    58365 - PT Therapeutic Exercise Minutes 42  -JA         Exercise 1    Exercise Name 1 Nu Step  -JA      Cueing 1 Verbal  -JA      Time 1 5 min lvl 5  -JA         Exercise 2    Exercise Name 2 supine hip abd \"windshield wiper  -JA      Sets 2 1ea L/R  -JA      Reps 2 10  -JA      Time 2 to reinforce difference in strength between L and R; required correction for " "good form and to decrease \"cheating\"/compensation for weakness  -JA         Exercise 4    Exercise Name 4 low hip bridges w/TA  -JA      Cueing 4 Verbal;Demo  -JA      Sets 4 3  -JA      Reps 4 10  -JA      Time 4 RTB  -JA         Exercise 5    Exercise Name 5 HL clamshell w/TA  -JA      Cueing 5 Verbal;Tactile  -JA      Sets 5 2 R, 1 L  -JA      Reps 5 10  -JA      Time 5 RTB  -JA         Exercise 6    Exercise Name 6 hip dips  -JA      Cueing 6 Verbal;Demo  -JA      Sets 6 --  -JA      Reps 6 held for today  -JA      Time 6 tested SL hip abd 3-/5  -JA         Exercise 7    Exercise Name 7 gait with trekking poles  -JA      Cueing 7 Verbal;Demo;Tactile  -JA      Time 7 75'  -JA      Additional Comments raised to height of bend in elbow  -JA         Exercise 8    Exercise Name 8 MS/STS  -JA      Cueing 8 Verbal;Demo  -JA      Sets 8 2  -JA      Reps 8 10  -JA      Time 8 RTB  -JA      Additional Comments cued to increase use of R side, push through R leg/foot more equal to L  -JA         Exercise 9    Exercise Name 9 resisted side step small range  -JA      Reps 9 3 5' laps due to poor tolerance  -JA      Time 9 GTB  -JA            User Key  (r) = Recorded By, (t) = Taken By, (c) = Cosigned By    Initials Name Provider Type    Patricia Mays, PT Physical Therapist                              PT OP Goals     Row Name 11/10/22 1800          PT Short Term Goals    STG Date to Achieve 11/16/22  -JA     STG 1 Pt will be independent with initial HEP.  -JA     STG 1 Progress Met  -JA     STG 2 Pt will demonstrate correct sitting and standing posture to reduce strain on lumbar spine.  -JA     STG 2 Progress Partially Met  -JA     STG 3 Pt will demonstrate good body mechanics with bending, lifting and reaching ADLs  to reduce strain on spine.  -MEGHA     STG 3 Progress Progressing  -        Long Term Goals    LTG Date to Achieve 12/16/22  -JA     LTG 1 Pt will be independent with advanced HEP for spinal stabilization " for improved self-management of symptoms.  -MEGHA     LTG 1 Progress Ongoing  -MEGHA     LTG 2 Pt will report 50% or> decrease in pain with ADLs.  -MEGHA     LTG 2 Progress Ongoing  -MEGHA     LTG 3 Pt will score 50% or less on BENEDICT indicating decrease in perceived functional disability.  -MEGHA     LTG 3 Progress Ongoing  -MEGHA     LTG 4 Pt will demonstrate increased R LE strength to 4/5 or better.  -MEGHA     LTG 4 Progress Ongoing  -MEGHA     LTG 4 Progress Comments R glute med 3-  -MEGHA           User Key  (r) = Recorded By, (t) = Taken By, (c) = Cosigned By    Initials Name Provider Type    Patricia Mays, PT Physical Therapist                Therapy Education  Education Details: Reinforced importance of supine hip abd since glute med is 3-/5. Adjusted trekking pole height to bend in elbow.  Given: Symptoms/condition management, Pain management, Posture/body mechanics  Program: Reinforced  How Provided: Verbal, Demonstration  Provided to: Patient  Level of Understanding: Verbalized, Demonstrated              Time Calculation:   Start Time: 1015  Stop Time: 1100  Time Calculation (min): 45 min  Timed Charges  44254 - PT Therapeutic Exercise Minutes: 42  Total Minutes  Timed Charges Total Minutes: 42   Total Minutes: 42  Therapy Charges for Today     Code Description Service Date Service Provider Modifiers Qty    01780777650 HC PT THER PROC EA 15 MIN 11/10/2022 Patricia Reyes, PT GP 3                    Patricia Reyes PT  11/10/2022

## 2022-11-14 ENCOUNTER — HOSPITAL ENCOUNTER (OUTPATIENT)
Dept: GENERAL RADIOLOGY | Facility: HOSPITAL | Age: 77
Discharge: HOME | End: 2022-11-14
Admitting: NEUROLOGICAL SURGERY

## 2022-11-14 ENCOUNTER — TELEPHONE (OUTPATIENT)
Dept: NEUROSURGERY | Facility: CLINIC | Age: 77
End: 2022-11-14

## 2022-11-14 DIAGNOSIS — M48.062 SPINAL STENOSIS OF LUMBAR REGION WITH NEUROGENIC CLAUDICATION: ICD-10-CM

## 2022-11-14 PROCEDURE — 72100 X-RAY EXAM L-S SPINE 2/3 VWS: CPT

## 2022-11-14 NOTE — PROGRESS NOTES
Neurosurgical Consultation      Omar Choudhary is a 77 y.o. male is here today for post-op follow-up with a new lumbar XR. Today patient reports continued moderate pain at the incision site.    Chief Complaint   Patient presents with   • Post-op     Follow up Sx on 10/4/22 and 10/5/22.        Previous treatment: Left sided lateral approach for a Transpsoas L2-L4 lateral interbody fusion, utilization of intraoperative neuro monitoring, utilization of structural allograft, utilization of biological allograft, utilization of intraoperative neuro navigation and robotic guidance done on 10/4/2022 and L2-L4 pedicle fixation and posterior lateral arthrodesis, L2-L4 bilateral laminectomy and bilateral facetectomies, utilization of intraoperative neuro monitoring, utilization of intraoperative neuro navigation and robotics, utilization of biological allograft, utilization of autograft done on 10/5/2022.    HPI: This is a 77-year-old gentleman who underwent a lateral L2-L4 interbody cage placement with subsequent posterior decompression and posterior fusion.  He was last seen in my clinic on October 20, 2022 for a 2-week follow-up appointment.  At this juncture he is doing relatively well.  He has had significant improvement in his radiculopathy and neurogenic claudication.  He does have some continued incisional back and muscle pain.  This is being managed with pain medicine which he is weaning off of slowly.  His incisions are all well-healing.  He is working with physical therapy and making progress.  He is ambulating with a cane.    Past Medical History:   Diagnosis Date   • Allergic    • Arthritis    • Asthma    • Carpal tunnel syndrome     no pain   • Cataract    • Depression    • Family history of malignant neoplasm of breast 09/26/2019   • Hemidiaphragm paralysis     Left   • Hyperlipidemia 09/26/2019   • Hypertension 03/01/2012   • Leg pain, right    • Low back pain    • Neuropathy     feet   • Osteopenia    • Poor  balance    • Reactive airway disease 01/15/2016   • Shortness of breath    • Sleep apnea, obstructive     CPAP- to bring dos        Past Surgical History:   Procedure Laterality Date   • KNEE ARTHROSCOPY W/ ACL RECONSTRUCTION Right 2019   • LUMBAR FUSION Bilateral 10/5/2022    Procedure: DAY 2 LUMBAR LAMINECTOMY TRANSFORAMINAL LUMBAR INTERBODY FUSION L2,L3,L4 WITH NEURO ROBOT;  Surgeon: John Do MD;  Location: [facility] MAIN OR;  Service: Robotics - Neuro;  Laterality: Bilateral;   • LUMBAR FUSION N/A 10/4/2022    Procedure: DAY 1 LUMBAR LATERAL INTERBODY FUSION WITH NEURO ROBOT L2, L3.L4;  Surgeon: John Do MD;  Location: [facility] MAIN OR;  Service: Robotics - Neuro;  Laterality: N/A;  left side approach   • MOUTH SURGERY          Current Outpatient Medications on File Prior to Visit   Medication Sig Dispense Refill   • acetaminophen (TYLENOL) 500 MG tablet Take 500 mg by mouth Every 6 (Six) Hours As Needed for Mild Pain .     • albuterol sulfate  (90 Base) MCG/ACT inhaler Inhale 2 puffs Every 4 (Four) Hours As Needed for Wheezing or Shortness of Air. 18 g 3   • B Complex Vitamins (VITAMIN B COMPLEX) capsule capsule Take 1 capsule by mouth Daily. LD 9-27     • baclofen (LIORESAL) 10 MG tablet Take 1 tablet by mouth 3 (Three) Times a Day As Needed for Muscle Spasms.     • budesonide-formoterol (SYMBICORT) 160-4.5 MCG/ACT inhaler Inhale 2 puffs 2 (Two) Times a Day.     • Calcium Carb-Cholecalciferol (calcium-vitamin D) 500-200 MG-UNIT per tablet Take 2 tablets by mouth Daily. 180 tablet 3   • carboxymethylcellulose (REFRESH PLUS) 0.5 % solution Administer 1 drop to both eyes 3 (Three) Times a Day As Needed for Dry Eyes. Bring to hospital     • clonazePAM (KlonoPIN) 0.5 MG tablet Take 1 tablet by mouth 2 (Two) Times a Day As Needed for Anxiety.     • ferrous sulfate 325 (65 FE) MG tablet Take 1 tablet by mouth Daily With Lunch for 90 doses. 30 tablet 2   • gabapentin (NEURONTIN) 300 MG capsule take 1  capsule by mouth every morning, 1 capsule in afternoon and 2 capsules at night 120 capsule 5   • HYDROcodone-acetaminophen (NORCO) 5-325 MG per tablet Take 1 tablet by mouth Every 6 (Six) Hours As Needed for Severe Pain. 120 tablet 0   • lidocaine (LIDODERM) 5 % Place 1 patch on the skin as directed by provider Daily. Remove & Discard patch within 12 hours or as directed by MD 30 each 1   • losartan (COZAAR) 25 MG tablet Take 1 tablet by mouth Daily. HOLD / SKIP dose as directed if systolic blood pressure is lower than 120 30 tablet 0   • montelukast (SINGULAIR) 10 MG tablet TAKE 1 TABLET BY MOUTH DAILY 90 tablet 0   • Multiple Vitamins-Minerals (EMERGEN-C BLUE PO) Take 1 tablet by mouth Daily. LD      • Omega-3 Fatty Acids (FISH OIL) 1000 MG capsule capsule Take 1,000 mg by mouth Daily With Breakfast. LD      • Red Yeast Rice 600 MG capsule Take 1 capsule by mouth Daily. LD      • sildenafil (REVATIO) 20 MG tablet take 1 to 2 tablets by mouth as needed for erectile dysfunction. 50 tablet 0   • theophylline (THEODUR) 300 MG 12 hr tablet Take 300 mg by mouth Daily.     • triamcinolone (KENALOG) 0.025 % cream Apply 1 application topically to the appropriate area as directed 2 (Two) Times a Day.     • Turmeric 400 MG capsule Take 1 capsule by mouth Daily.       No current facility-administered medications on file prior to visit.        Allergies   Allergen Reactions   • Statins Myalgia        Social History     Socioeconomic History   • Marital status:    Tobacco Use   • Smoking status: Former     Packs/day: 0.50     Years: 10.00     Pack years: 5.00     Types: Cigarettes     Start date:      Quit date:      Years since quittin.8   • Smokeless tobacco: Never   Vaping Use   • Vaping Use: Never used   Substance and Sexual Activity   • Alcohol use: Yes     Alcohol/week: 12.0 standard drinks     Types: 10 Glasses of wine, 2 Cans of beer per week   • Drug use: Never     Types: Marijuana,  "M[place]caline, Psilocybin     Comment: CBD gummies- stop now for surgery   • Sexual activity: Yes          Review of Systems   Constitutional: Positive for activity change.   HENT: Negative.    Eyes: Negative.    Respiratory: Negative.    Cardiovascular: Negative.    Gastrointestinal: Negative.    Endocrine: Negative.    Genitourinary: Negative.    Musculoskeletal: Positive for back pain.        At the incision site   Allergic/Immunologic: Negative.    Neurological: Negative for numbness.   Hematological: Negative.    Psychiatric/Behavioral: Negative for sleep disturbance.        Physical Examination:     Vitals:    11/15/22 1030   BP: 165/93   Pulse: 66   SpO2: 99%   Weight: 84.6 kg (186 lb 9.6 oz)   Height: 167.6 cm (65.98\")   PainSc:   6        Physical Exam     Neurological Exam   Neurological examination is stable compared to my last evaluation without any new red flag signs.  Incisions are well-healing without any signs of breakdown or infection.    Result Review  The following data was reviewed by: John Do MD on 11/15/2022:    Data reviewed: Radiologic studies X-rays of the lumbar spine show stable hardware placement without any signs of hardware complication or failure.     Assessment/plan:  This is a 77-year-old gentleman who is approximately 6 weeks removed from an L2-L4 lateral interbody fusion with posterior fixation and decompression.  His neurogenic claudication and radiculopathy have significantly improved.  He has expected postoperative persistent back pain.  He is working with physical therapy and I continue to encourage this.  I have discussed with him limitations.  He expresses understanding.  He did receive the bone growth stimulator and will begin using this on a regular basis.  I will have him return to see me in 6 weeks with x-rays of the lumbar spine at that time.  I have encouraged him to call with any questions or concerns.    Diagnoses and all orders for this visit:    1. Lumbar " radiculopathy, chronic (Primary)  Overview:  Added automatically from request for surgery [id number]    Orders:  -     XR Spine Lumbar 2 or 3 View; Future    2. Spinal stenosis of lumbar region with neurogenic claudication  -     XR Spine Lumbar 2 or 3 View; Future       Return in about 6 weeks (around 12/27/2022).            John Do MD

## 2022-11-14 NOTE — TELEPHONE ENCOUNTER
Called patient to remind him to get his X-rays done that were ordered on 10/20/22 before his Appointment on 11/15/22. He stated he would get these done today.

## 2022-11-15 ENCOUNTER — OFFICE VISIT (OUTPATIENT)
Dept: NEUROSURGERY | Facility: CLINIC | Age: 77
End: 2022-11-15

## 2022-11-15 VITALS
OXYGEN SATURATION: 99 % | DIASTOLIC BLOOD PRESSURE: 93 MMHG | WEIGHT: 186.6 LBS | SYSTOLIC BLOOD PRESSURE: 165 MMHG | HEIGHT: 66 IN | BODY MASS INDEX: 29.99 KG/M2 | HEART RATE: 66 BPM

## 2022-11-15 DIAGNOSIS — M54.16 LUMBAR RADICULOPATHY, CHRONIC: Primary | ICD-10-CM

## 2022-11-15 DIAGNOSIS — M48.062 SPINAL STENOSIS OF LUMBAR REGION WITH NEUROGENIC CLAUDICATION: ICD-10-CM

## 2022-11-15 DIAGNOSIS — F41.9 ANXIETY: Primary | ICD-10-CM

## 2022-11-15 PROCEDURE — 99024 POSTOP FOLLOW-UP VISIT: CPT | Performed by: NEUROLOGICAL SURGERY

## 2022-11-15 RX ORDER — SILDENAFIL CITRATE 20 MG/1
TABLET ORAL
Qty: 50 TABLET | Refills: 0 | Status: SHIPPED | OUTPATIENT
Start: 2022-11-15 | End: 2023-02-23

## 2022-11-16 RX ORDER — CLONAZEPAM 0.5 MG/1
TABLET ORAL
Qty: 30 TABLET | Refills: 0 | Status: SHIPPED | OUTPATIENT
Start: 2022-11-16 | End: 2023-01-31

## 2022-11-17 ENCOUNTER — HOSPITAL ENCOUNTER (OUTPATIENT)
Dept: PHYSICAL THERAPY | Facility: HOSPITAL | Age: 77
Setting detail: THERAPIES SERIES
Discharge: HOME | End: 2022-11-17

## 2022-11-17 DIAGNOSIS — R29.898 WEAKNESS OF BOTH LOWER EXTREMITIES: ICD-10-CM

## 2022-11-17 DIAGNOSIS — R26.9 GAIT DIFFICULTY: ICD-10-CM

## 2022-11-17 DIAGNOSIS — Z98.1 S/P LUMBAR FUSION: Primary | ICD-10-CM

## 2022-11-17 DIAGNOSIS — M25.60 DECREASED RANGE OF MOTION: ICD-10-CM

## 2022-11-17 PROCEDURE — 97110 THERAPEUTIC EXERCISES: CPT

## 2022-11-17 NOTE — THERAPY PROGRESS REPORT/RE-CERT
Outpatient Physical Therapy Ortho Progress Note  [facility]     Patient Name: Omar Choudhary  : 1945  MRN: [id number]  Today's Date: 2022      Visit Date: 2022    Visit Dx:    ICD-10-CM ICD-9-CM   1. S/P lumbar fusion  Z98.1 V45.4   2. Decreased range of motion  M25.60 719.50   3. Weakness of both lower extremities  R29.898 729.89   4. Gait difficulty  R26.9 781.2       Patient Active Problem List   Diagnosis   • Benign prostatic hyperplasia   • Bursitis   • Depression   • Diverticulosis   • Family history of malignant neoplasm of breast   • Hyperlipidemia   • Hypertension   • Internal derangement of right knee   • Knee effusion   • Asthma   • Obstructive sleep apnea syndrome   • Osteoarthritis   • Osteopenia   • Pain in knee   • Postnasal drip   • Prepatellar bursitis   • Sciatica of right side   • Diaphragm paralysis   • Spinal stenosis of lumbar region with neurogenic claudication   • Degenerative lumbar spinal stenosis   • Anxiety   • Pinguecula of right eye   • Lumbar radiculopathy, chronic        Past Medical History:   Diagnosis Date   • Allergic    • Arthritis    • Asthma    • Carpal tunnel syndrome     no pain   • Cataract    • Depression    • Family history of malignant neoplasm of breast 2019   • Hemidiaphragm paralysis     Left   • Hyperlipidemia 2019   • Hypertension 2012   • Leg pain, right    • Low back pain    • Neuropathy     feet   • Osteopenia    • Poor balance    • Reactive airway disease 01/15/2016   • Shortness of breath    • Sleep apnea, obstructive     CPAP- to bring dos        Past Surgical History:   Procedure Laterality Date   • KNEE ARTHROSCOPY W/ ACL RECONSTRUCTION Right 2019   • LUMBAR FUSION Bilateral 10/5/2022    Procedure: DAY 2 LUMBAR LAMINECTOMY TRANSFORAMINAL LUMBAR INTERBODY FUSION L2,L3,L4 WITH NEURO ROBOT;  Surgeon: John Do MD;  Location: [facility] MAIN OR;  Service: Robotics - Neuro;  Laterality: Bilateral;   • LUMBAR FUSION  N/A 10/4/2022    Procedure: DAY 1 LUMBAR LATERAL INTERBODY FUSION WITH NEURO ROBOT L2, L3.L4;  Surgeon: John Do MD;  Location: [facility] MAIN OR;  Service: Robotics - Neuro;  Laterality: N/A;  left side approach   • MOUTH SURGERY                          PT Assessment/Plan     Row Name 11/17/22 1300          PT Assessment    Assessment Comments Omar Choudhary has been seen for 6 physical therapy sessions for s/p lumbar laminectomy transforaminal lumbar interbody fusion on 10/4 and 10/5. Treatment has included therapeutic exercise, manual therapy, therapeutic activity, neuro-muscular retraining , gait training and patient education with home exercise program . Progress to physical therapy goals is fair. Pt has met 1/3 STG and 0/4 LTG, although he has made good progress towards all of his goals. Pt has seen a significant decrease in pain and is weaning off of pain medication and demo's improvement in gait mechanics, ambulates into clinic with trekking poles. Focused this session on glute med strengthening to improve [place] gait pattern. He will benefit from continued skilled physical therapy to address remaining impairments and functional limitations.  -DB        PT Plan    PT Plan Comments cont strengthening hip girdle/core in supported and unsupported positions  -DB           User Key  (r) = Recorded By, (t) = Taken By, (c) = Cosigned By    Initials Name Provider Type    Zenaida Romero PT Physical Therapist                   OP Exercises     Row Name 11/17/22 1200             Subjective Comments    Subjective Comments Pt took his medicine before he came in. States he is still taking Hydrocodone 3x/daily, although decreasing frequency and mG. Pt saw neuro surgeon yesterday, is pleased with outcome. Pt has been set up with magnetic stimulator (OrthoStim) to use 2 hours a day to stimulate bone/cell growth. Will be using for 4 months.  -DB         Subjective Pain    Able to rate subjective pain? yes   "-DB      Pre-Treatment Pain Level 0  -DB         Total Minutes    21687 - PT Therapeutic Exercise Minutes 45  -DB         Exercise 1    Exercise Name 1 Nu Step  -DB      Cueing 1 Verbal  -DB      Time 1 5 min lvl 5  -DB         Exercise 2    Exercise Name 2 supine hip abd \"windshield wiper  -DB      Sets 2 1ea L/R  -DB      Reps 2 15  -DB      Time 2 to reinforce difference in strength between L and R; required correction for good form and to decrease \"cheating\"/compensation for weakness  -DB         Exercise 4    Exercise Name 4 low hip bridges w/TA  -DB      Cueing 4 Verbal;Demo  -DB      Sets 4 3  -DB      Reps 4 10  -DB      Time 4 RTB  -DB         Exercise 5    Exercise Name 5 HL clamshell w/TA  -DB      Cueing 5 Verbal;Tactile  -DB      Sets 5 1  -DB      Reps 5 10  -DB      Time 5 RTB  -DB         Exercise 7    Exercise Name 7 gait with trekking poles  -DB      Cueing 7 Verbal;Demo;Tactile  -DB      Time 7 75'  -DB         Exercise 8    Exercise Name 8 --  -DB      Cueing 8 --  -DB      Sets 8 --  -DB      Reps 8 --  -DB      Time 8 --  -DB         Exercise 9    Exercise Name 9 lateral stepping  -DB      Reps 9 3 laps at barre  -DB      Time 9 RTB  -DB            User Key  (r) = Recorded By, (t) = Taken By, (c) = Cosigned By    Initials Name Provider Type    DB Zenaida English, PT Physical Therapist                              PT OP Goals     Row Name 11/17/22 1200          PT Short Term Goals    STG Date to Achieve 11/16/22  -DB     STG 1 Pt will be independent with initial HEP.  -DB     STG 1 Progress Met  -DB     STG 2 Pt will demonstrate correct sitting and standing posture to reduce strain on lumbar spine.  -DB     STG 2 Progress Partially Met  -DB     STG 3 Pt will demonstrate good body mechanics with bending, lifting and reaching ADLs  to reduce strain on spine.  -DB     STG 3 Progress Progressing  -DB        Long Term Goals    LTG Date to Achieve 12/16/22  -DB     LTG 1 Pt will be independent with " advanced HEP for spinal stabilization for improved self-management of symptoms.  -DB     LTG 1 Progress Ongoing  -DB     LTG 2 Pt will report 50% or> decrease in pain with ADLs.  -DB     LTG 2 Progress Ongoing;Progressing  -DB     LTG 2 Progress Comments Pt still taking pain meds, but weaning down. Not having sciatic pain.  -DB     LTG 3 Pt will score 50% or less on BENEDICT indicating decrease in perceived functional disability.  -DB     LTG 3 Progress Ongoing  -DB     LTG 3 Progress Comments 60%  -DB     LTG 4 Pt will demonstrate increased R LE strength to 4/5 or better.  -DB     LTG 4 Progress Ongoing  -DB     LTG 4 Progress Comments R glute med 3-  -DB           User Key  (r) = Recorded By, (t) = Taken By, (c) = Cosigned By    Initials Name Provider Type    Zenaida Romero, PT Physical Therapist                     Outcome Measure Options: Modified Oswestry  Modified Oswestry  Modified Oswestry Score/Comments: 60%      Time Calculation:   Start Time: 1210  Stop Time: 1255  Time Calculation (min): 45 min  Total Timed Code Minutes- PT: 45 minute(s)  Timed Charges  83909 - PT Therapeutic Exercise Minutes: 45  Total Minutes  Timed Charges Total Minutes: 45   Total Minutes: 45  Therapy Charges for Today     Code Description Service Date Service Provider Modifiers Qty    52888674252 HC PT THER PROC EA 15 MIN 11/17/2022 Zenaida English, PT GP 3          PT G-Codes  Outcome Measure Options: Modified Oswestry  Modified Oswestry Score/Comments: 60%         Zenaida English PT  11/17/2022

## 2022-11-21 ENCOUNTER — HOSPITAL ENCOUNTER (OUTPATIENT)
Dept: PHYSICAL THERAPY | Facility: HOSPITAL | Age: 77
Setting detail: THERAPIES SERIES
Discharge: HOME | End: 2022-11-21

## 2022-11-21 DIAGNOSIS — Z98.1 S/P LUMBAR FUSION: Primary | ICD-10-CM

## 2022-11-21 DIAGNOSIS — R29.898 WEAKNESS OF BOTH LOWER EXTREMITIES: ICD-10-CM

## 2022-11-21 DIAGNOSIS — M25.60 DECREASED RANGE OF MOTION: ICD-10-CM

## 2022-11-21 DIAGNOSIS — R26.9 GAIT DIFFICULTY: ICD-10-CM

## 2022-11-21 PROCEDURE — 97110 THERAPEUTIC EXERCISES: CPT

## 2022-11-21 NOTE — THERAPY TREATMENT NOTE
Outpatient Physical Therapy Ortho Treatment Note  [facility]     Patient Name: Omar Choudhary  : 1945  MRN: [id number]  Today's Date: 2022      Visit Date: 2022    Visit Dx:    ICD-10-CM ICD-9-CM   1. S/P lumbar fusion  Z98.1 V45.4   2. Decreased range of motion  M25.60 719.50   3. Weakness of both lower extremities  R29.898 729.89   4. Gait difficulty  R26.9 781.2       Patient Active Problem List   Diagnosis   • Benign prostatic hyperplasia   • Bursitis   • Depression   • Diverticulosis   • Family history of malignant neoplasm of breast   • Hyperlipidemia   • Hypertension   • Internal derangement of right knee   • Knee effusion   • Asthma   • Obstructive sleep apnea syndrome   • Osteoarthritis   • Osteopenia   • Pain in knee   • Postnasal drip   • Prepatellar bursitis   • Sciatica of right side   • Diaphragm paralysis   • Spinal stenosis of lumbar region with neurogenic claudication   • Degenerative lumbar spinal stenosis   • Anxiety   • Pinguecula of right eye   • Lumbar radiculopathy, chronic        Past Medical History:   Diagnosis Date   • Allergic    • Arthritis    • Asthma    • Carpal tunnel syndrome     no pain   • Cataract    • Depression    • Family history of malignant neoplasm of breast 2019   • Hemidiaphragm paralysis     Left   • Hyperlipidemia 2019   • Hypertension 2012   • Leg pain, right    • Low back pain    • Neuropathy     feet   • Osteopenia    • Poor balance    • Reactive airway disease 01/15/2016   • Shortness of breath    • Sleep apnea, obstructive     CPAP- to bring dos        Past Surgical History:   Procedure Laterality Date   • KNEE ARTHROSCOPY W/ ACL RECONSTRUCTION Right 2019   • LUMBAR FUSION Bilateral 10/5/2022    Procedure: DAY 2 LUMBAR LAMINECTOMY TRANSFORAMINAL LUMBAR INTERBODY FUSION L2,L3,L4 WITH NEURO ROBOT;  Surgeon: John Do MD;  Location: [facility] MAIN OR;  Service: Robotics - Neuro;  Laterality: Bilateral;   • LUMBAR  "FUSION N/A 10/4/2022    Procedure: DAY 1 LUMBAR LATERAL INTERBODY FUSION WITH NEURO ROBOT L2, L3.L4;  Surgeon: John Do MD;  Location: [facility] MAIN OR;  Service: Robotics - Neuro;  Laterality: N/A;  left side approach   • MOUTH SURGERY                          PT Assessment/Plan     Row Name 11/21/22 1100          PT Assessment    Assessment Comments Due to inc in pain today, started with low back and hip stretches to address. Pt tolerates well. Reviewed current exercises to address hip abd strength. Pt continues to benefit from skilled PT.  -DB        PT Plan    PT Plan Comments cont strengthening hip girdle/core in supported and unsupported positions  -DB           User Key  (r) = Recorded By, (t) = Taken By, (c) = Cosigned By    Initials Name Provider Type    DB Zenaida English PT Physical Therapist                   OP Exercises     Row Name 11/21/22 1100             Subjective Comments    Subjective Comments Pt states he was sitting on a chair this week and twisting to read lyrics for music and has been having pain since.  -DB         Total Minutes    68483 - PT Therapeutic Exercise Minutes 45  -DB         Exercise 1    Exercise Name 1 Nu Step  -DB      Cueing 1 Verbal  -DB      Time 1 5 min lvl 5  -DB         Exercise 2    Exercise Name 2 supine hip abd \"windshield wiper  -DB      Sets 2 1ea L/R  -DB      Reps 2 15  -DB      Time 2 to reinforce difference in strength between L and R; required correction for good form and to decrease \"cheating\"/compensation for weakness  -DB         Exercise 4    Exercise Name 4 low hip bridges w/TA  -DB      Cueing 4 Verbal;Demo  -DB      Sets 4 3  -DB      Reps 4 10  -DB      Time 4 RTB  -DB         Exercise 10    Exercise Name 10 HS stretch at step  -DB      Cueing 10 Verbal;Demo  -DB      Reps 10 3  -DB      Time 10 20 sec  -DB         Exercise 11    Exercise Name 11 LTR  -DB      Cueing 11 Verbal  -DB      Reps 11 15  -DB         Exercise 12    Exercise Name 12 " SKTC  -DB      Cueing 12 Verbal  -DB      Reps 12 3  -DB      Time 12 20 sec  -DB            User Key  (r) = Recorded By, (t) = Taken By, (c) = Cosigned By    Initials Name Provider Type    DB Zenaida English, PT Physical Therapist                                                Time Calculation:   Start Time: 1045  Stop Time: 1130  Time Calculation (min): 45 min  Total Timed Code Minutes- PT: 45 minute(s)  Timed Charges  46448 - PT Therapeutic Exercise Minutes: 45  Total Minutes  Timed Charges Total Minutes: 45   Total Minutes: 45  Therapy Charges for Today     Code Description Service Date Service Provider Modifiers Qty    16624217254 HC PT THER PROC EA 15 MIN 11/21/2022 Zenaida English, PT GP 3                    Zenaida English PT  11/21/2022

## 2022-11-28 ENCOUNTER — HOSPITAL ENCOUNTER (OUTPATIENT)
Dept: PHYSICAL THERAPY | Facility: HOSPITAL | Age: 77
Setting detail: THERAPIES SERIES
Discharge: HOME | End: 2022-11-28

## 2022-11-28 DIAGNOSIS — M25.60 DECREASED RANGE OF MOTION: ICD-10-CM

## 2022-11-28 DIAGNOSIS — R29.898 WEAKNESS OF BOTH LOWER EXTREMITIES: ICD-10-CM

## 2022-11-28 DIAGNOSIS — R26.9 GAIT DIFFICULTY: ICD-10-CM

## 2022-11-28 DIAGNOSIS — Z98.1 S/P LUMBAR FUSION: Primary | ICD-10-CM

## 2022-11-28 PROCEDURE — 97110 THERAPEUTIC EXERCISES: CPT

## 2022-11-28 NOTE — THERAPY TREATMENT NOTE
Outpatient Physical Therapy Ortho Treatment Note  [facility]     Patient Name: Omar Choudhary  : 1945  MRN: [id number]  Today's Date: 2022      Visit Date: 2022    Visit Dx:    ICD-10-CM ICD-9-CM   1. S/P lumbar fusion  Z98.1 V45.4   2. Decreased range of motion  M25.60 719.50   3. Weakness of both lower extremities  R29.898 729.89   4. Gait difficulty  R26.9 781.2       Patient Active Problem List   Diagnosis   • Benign prostatic hyperplasia   • Bursitis   • Depression   • Diverticulosis   • Family history of malignant neoplasm of breast   • Hyperlipidemia   • Hypertension   • Internal derangement of right knee   • Knee effusion   • Asthma   • Obstructive sleep apnea syndrome   • Osteoarthritis   • Osteopenia   • Pain in knee   • Postnasal drip   • Prepatellar bursitis   • Sciatica of right side   • Diaphragm paralysis   • Spinal stenosis of lumbar region with neurogenic claudication   • Degenerative lumbar spinal stenosis   • Anxiety   • Pinguecula of right eye   • Lumbar radiculopathy, chronic        Past Medical History:   Diagnosis Date   • Allergic    • Arthritis    • Asthma    • Carpal tunnel syndrome     no pain   • Cataract    • Depression    • Family history of malignant neoplasm of breast 2019   • Hemidiaphragm paralysis     Left   • Hyperlipidemia 2019   • Hypertension 2012   • Leg pain, right    • Low back pain    • Neuropathy     feet   • Osteopenia    • Poor balance    • Reactive airway disease 01/15/2016   • Shortness of breath    • Sleep apnea, obstructive     CPAP- to bring dos        Past Surgical History:   Procedure Laterality Date   • KNEE ARTHROSCOPY W/ ACL RECONSTRUCTION Right 2019   • LUMBAR FUSION Bilateral 10/5/2022    Procedure: DAY 2 LUMBAR LAMINECTOMY TRANSFORAMINAL LUMBAR INTERBODY FUSION L2,L3,L4 WITH NEURO ROBOT;  Surgeon: John Do MD;  Location: [facility] MAIN OR;  Service: Robotics - Neuro;  Laterality: Bilateral;   • LUMBAR  "FUSION N/A 10/4/2022    Procedure: DAY 1 LUMBAR LATERAL INTERBODY FUSION WITH NEURO ROBOT L2, L3.L4;  Surgeon: John Do MD;  Location: [facility] MAIN OR;  Service: Robotics - Neuro;  Laterality: N/A;  left side approach   • MOUTH SURGERY                          PT Assessment/Plan     Row Name 11/28/22 1100          PT Assessment    Assessment Comments Pt apologized for forgetting appt last Wed.  He demonstrates L MMT SL glute med 3/5, R 3-/5 and continues to compensate with supine hip abd on R. Gait is improving with use of trekking poles, worked on intermittent periods of gait without poles with fairly good control until fatigue after a couple minutes. He remains good candidate for skilled therapy services.  -JA        PT Plan    PT Plan Comments assess response to last visit (small side lunges, gait w and w/o poles), cont mix supported and unsupported strengthening/functional mobility  -MEGHA           User Key  (r) = Recorded By, (t) = Taken By, (c) = Cosigned By    Initials Name Provider Type    Patricia Mays, PT Physical Therapist                   OP Exercises     Row Name 11/28/22 1000             Total Minutes    68193 - PT Therapeutic Exercise Minutes 40  -JA         Exercise 1    Exercise Name 1 Nu Step  -JA      Cueing 1 Verbal  -JA      Time 1 5 min lvl 5  -JA         Exercise 2    Exercise Name 2 supine hip abd \"windshield wiper  -JA      Sets 2 2-3  -JA      Reps 2 10  -JA      Time 2 R continues to compensate with knee flexion and hip external rotation -  glute med weakness continues to be noticed  -JA         Exercise 3    Exercise Name 3 controlled heel slides in HL w/TA  -JA      Time 3 reviewed to do at home  -JA         Exercise 4    Exercise Name 4 low hip bridges w/TA  -JA      Cueing 4 Verbal;Demo  -JA      Sets 4 3  -JA      Reps 4 10  -JA      Time 4 RTB  -JA         Exercise 7    Exercise Name 7 gait with trekking poles  -JA      Reps 7 gait for 30-50' at a time with ples then " without  -JA      Time 7 175'  -JA      Additional Comments able to minimize trendelenburg when focused however fatigue increases with distance  -JA         Exercise 9    Exercise Name 9 added small side lunge with same side reach at barre  -JA         Exercise 10    Exercise Name 10 HS stretch at step  -JA      Cueing 10 Verbal;Demo  -JA      Reps 10 3  -JA      Time 10 20 sec  -JA      Additional Comments cued to tip from hips, not spine  -JA         Exercise 11    Exercise Name 11 LTR  -JA      Cueing 11 Verbal  -JA      Reps 11 15  -JA         Exercise 12    Exercise Name 12 SKTC  -JA      Cueing 12 Verbal  -JA      Reps 12 3  -JA      Time 12 20 sec  -JA            User Key  (r) = Recorded By, (t) = Taken By, (c) = Cosigned By    Initials Name Provider Type    Patricia Mays, PT Physical Therapist                                                Time Calculation:   Start Time: 1015  Stop Time: 1055  Time Calculation (min): 40 min  Timed Charges  36461 - PT Therapeutic Exercise Minutes: 40  Total Minutes  Timed Charges Total Minutes: 40   Total Minutes: 40  Therapy Charges for Today     Code Description Service Date Service Provider Modifiers Qty    68343042110 HC PT THER PROC EA 15 MIN 11/28/2022 Patricia Reyes, PT GP 3                    Patricia Reyes PT  11/28/2022

## 2022-11-29 ENCOUNTER — TELEPHONE (OUTPATIENT)
Dept: PAIN MEDICINE | Facility: CLINIC | Age: 77
End: 2022-11-29

## 2022-11-29 DIAGNOSIS — Z79.899 HIGH RISK MEDICATION USE: Primary | ICD-10-CM

## 2022-12-05 ENCOUNTER — HOSPITAL ENCOUNTER (OUTPATIENT)
Dept: PHYSICAL THERAPY | Facility: HOSPITAL | Age: 77
Setting detail: THERAPIES SERIES
Discharge: HOME | End: 2022-12-05

## 2022-12-05 DIAGNOSIS — M25.60 DECREASED RANGE OF MOTION: ICD-10-CM

## 2022-12-05 DIAGNOSIS — Z98.1 S/P LUMBAR FUSION: Primary | ICD-10-CM

## 2022-12-05 DIAGNOSIS — R29.898 WEAKNESS OF BOTH LOWER EXTREMITIES: ICD-10-CM

## 2022-12-05 DIAGNOSIS — R26.9 GAIT DIFFICULTY: ICD-10-CM

## 2022-12-05 PROCEDURE — 97110 THERAPEUTIC EXERCISES: CPT

## 2022-12-05 NOTE — THERAPY TREATMENT NOTE
Outpatient Physical Therapy Ortho Treatment Note  [facility]     Patient Name: Omar Choudhary  : 1945  MRN: [id number]  Today's Date: 2022      Visit Date: 2022    Visit Dx:    ICD-10-CM ICD-9-CM   1. S/P lumbar fusion  Z98.1 V45.4   2. Decreased range of motion  M25.60 719.50   3. Weakness of both lower extremities  R29.898 729.89   4. Gait difficulty  R26.9 781.2       Patient Active Problem List   Diagnosis   • Benign prostatic hyperplasia   • Bursitis   • Depression   • Diverticulosis   • Family history of malignant neoplasm of breast   • Hyperlipidemia   • Hypertension   • Internal derangement of right knee   • Knee effusion   • Asthma   • Obstructive sleep apnea syndrome   • Osteoarthritis   • Osteopenia   • Pain in knee   • Postnasal drip   • Prepatellar bursitis   • Sciatica of right side   • Diaphragm paralysis   • Spinal stenosis of lumbar region with neurogenic claudication   • Degenerative lumbar spinal stenosis   • Anxiety   • Pinguecula of right eye   • Lumbar radiculopathy, chronic        Past Medical History:   Diagnosis Date   • Allergic    • Arthritis    • Asthma    • Carpal tunnel syndrome     no pain   • Cataract    • Depression    • Family history of malignant neoplasm of breast 2019   • Hemidiaphragm paralysis     Left   • Hyperlipidemia 2019   • Hypertension 2012   • Leg pain, right    • Low back pain    • Neuropathy     feet   • Osteopenia    • Poor balance    • Reactive airway disease 01/15/2016   • Shortness of breath    • Sleep apnea, obstructive     CPAP- to bring dos        Past Surgical History:   Procedure Laterality Date   • KNEE ARTHROSCOPY W/ ACL RECONSTRUCTION Right 2019   • LUMBAR FUSION Bilateral 10/5/2022    Procedure: DAY 2 LUMBAR LAMINECTOMY TRANSFORAMINAL LUMBAR INTERBODY FUSION L2,L3,L4 WITH NEURO ROBOT;  Surgeon: John Do MD;  Location: [facility] MAIN OR;  Service: Robotics - Neuro;  Laterality: Bilateral;   • LUMBAR FUSION  "N/A 10/4/2022    Procedure: DAY 1 LUMBAR LATERAL INTERBODY FUSION WITH NEURO ROBOT L2, L3.L4;  Surgeon: John Do MD;  Location: [facility] MAIN OR;  Service: Robotics - Neuro;  Laterality: N/A;  left side approach   • MOUTH SURGERY                          PT Assessment/Plan     Row Name 12/05/22 1300          PT Assessment    Assessment Comments Pt presents with inc'd fatigue today, had a busy weekend and morning shopping and is very tired. Pt participates well in therapy session despite this and we continued to focus on glute med strengthening. Attempted SLS today, pt had extreme difficulty with this exercise, modified to perform tandem stance instead to address balance. Pt continues to benefit from skilled PT.  -DB        PT Plan    PT Plan Comments Next session: small step up fwd and lateral  -DB           User Key  (r) = Recorded By, (t) = Taken By, (c) = Cosigned By    Initials Name Provider Type    DB Zenaida English, PT Physical Therapist                   OP Exercises     Row Name 12/05/22 1100             Subjective Comments    Subjective Comments Pt arrives at 7:52 for appt. Feeling very fatigued, has been running a lot of errands. Had inc'd LBP after lifting 20# bag of bird seed.  -DB         Subjective Pain    Able to rate subjective pain? yes  -DB      Pre-Treatment Pain Level 0  -DB         Total Minutes    53463 - PT Therapeutic Exercise Minutes 38  -DB         Exercise 1    Exercise Name 1 Nu Step  -DB      Cueing 1 Verbal  -DB      Time 1 5 min lvl 5  -DB         Exercise 2    Exercise Name 2 supine hip abd \"[facility] wiper  -DB      Sets 2 2  -DB      Reps 2 10  -DB      Time 2 R continues to compensate with knee flexion and hip external rotation -  glute med weakness continues to be noticed  -DB         Exercise 3    Exercise Name 3 clamshells  -DB      Cueing 3 Verbal;Tactile  -DB      Sets 3 2  -DB      Reps 3 10  -DB      Time 3 RTB  -DB         Exercise 8    Exercise Name 8 MS  -DB      " Cueing 8 Verbal;Demo  -DB      Sets 8 2  -DB      Reps 8 10  -DB      Time 8 RTB  -DB         Exercise 10    Exercise Name 10 HS stretch at step  -DB      Cueing 10 Verbal;Demo  -DB      Reps 10 3  -DB      Time 10 20 sec  -DB         Exercise 13    Exercise Name 13 tandem stance  -DB      Cueing 13 Verbal;Demo  -DB      Reps 13 3 B  -DB      Time 13 20 sec  -DB            User Key  (r) = Recorded By, (t) = Taken By, (c) = Cosigned By    Initials Name Provider Type    DB Zenaida English, PT Physical Therapist                                                Time Calculation:   Start Time: 1052  Stop Time: 1130  Time Calculation (min): 38 min  Total Timed Code Minutes- PT: 38 minute(s)  Timed Charges  14797 - PT Therapeutic Exercise Minutes: 38  Total Minutes  Timed Charges Total Minutes: 38   Total Minutes: 38  Therapy Charges for Today     Code Description Service Date Service Provider Modifiers Qty    42119434505 HC PT THER PROC EA 15 MIN 12/5/2022 Zenaida English, PT GP 3                    Zenaida English PT  12/5/2022

## 2022-12-07 ENCOUNTER — OFFICE VISIT (OUTPATIENT)
Dept: PAIN MEDICINE | Facility: CLINIC | Age: 77
End: 2022-12-07

## 2022-12-07 VITALS
RESPIRATION RATE: 16 BRPM | OXYGEN SATURATION: 98 % | DIASTOLIC BLOOD PRESSURE: 82 MMHG | SYSTOLIC BLOOD PRESSURE: 183 MMHG | HEART RATE: 66 BPM

## 2022-12-07 DIAGNOSIS — G89.4 CHRONIC PAIN SYNDROME: ICD-10-CM

## 2022-12-07 DIAGNOSIS — M48.061 SPINAL STENOSIS OF LUMBAR REGION WITHOUT NEUROGENIC CLAUDICATION: Primary | ICD-10-CM

## 2022-12-07 PROCEDURE — 99214 OFFICE O/P EST MOD 30 MIN: CPT | Performed by: STUDENT IN AN ORGANIZED HEALTH CARE EDUCATION/TRAINING PROGRAM

## 2022-12-07 RX ORDER — HYDROCODONE BITARTRATE AND ACETAMINOPHEN 5; 325 MG/1; MG/1
1 TABLET ORAL EVERY 6 HOURS PRN
Qty: 120 TABLET | Refills: 0 | Status: SHIPPED | OUTPATIENT
Start: 2022-12-07 | End: 2023-04-06

## 2022-12-07 NOTE — PROGRESS NOTES
CHIEF COMPLAINT  Chief Complaint   Patient presents with   • Back Pain     LD Hydrocodone 12/007/22 10am       Primary Care  RosendokerDennis knapp MD    Subjective   Omar Choudhary is a 77 y.o. male  who presents for chronic low back pain with both axial and radicular features.  He states that he has had intermittent pain for many years however he has not sought any treatment.  Recently, his pain began to impact his activities of daily living and he sought treatment from his primary care in the form of an MRI.  It showed substantial degenerative changes including severe stenosis at multiple levels as well as foraminal narrowing.  He also has severe scoliosis and severe degenerative disc disease with facet arthropathy.  He received 1 epidural steroid injection from an outside pain provider which did provide him significant benefit however he was discharged to do a failed drug screen.  He admits that he had taken a oxycodone that he had leftover from surgery and forgot to inform the previous provider of this.  He denies any significant numbness or tingling or weakness but does complain of axial type back pain with radiation in the buttock bilaterally.    Back Pain  Pertinent negatives include no numbness or weakness.   Pain  Associated symptoms include arthralgias and myalgias. Pertinent negatives include no numbness or weakness.        Location: Low back with radiation in the buttock bilaterally  Onset: Years ago  Duration: Progressively worsening  Timing: Constant throughout the day  Quality: Sharp, stabbing, aching  Severity: Today: 5       Last Week: 6       Worst: 7  Modifying Factors: The pain is worse with physical activity and movement and exercise and slightly improved with rest and stretching    Physical Therapy: no    Interval Update 12/07/2022: He continues to improve.  He states he typically takes hydrocodone 1-2 times daily now.  He is now requiring minimal assistance with walking and his physical  activity is much improved.    The following portions of the patient's history were reviewed and updated as appropriate: allergies, current medications, past family history, past medical history, past social history, past surgical history and problem list.      Current Outpatient Medications:   •  acetaminophen (TYLENOL) 500 MG tablet, Take 500 mg by mouth Every 6 (Six) Hours As Needed for Mild Pain ., Disp: , Rfl:   •  albuterol sulfate  (90 Base) MCG/ACT inhaler, Inhale 2 puffs Every 4 (Four) Hours As Needed for Wheezing or Shortness of Air., Disp: 18 g, Rfl: 3  •  B Complex Vitamins (VITAMIN B COMPLEX) capsule capsule, Take 1 capsule by mouth Daily. LD 9-27, Disp: , Rfl:   •  baclofen (LIORESAL) 10 MG tablet, Take 1 tablet by mouth 3 (Three) Times a Day As Needed for Muscle Spasms., Disp: , Rfl:   •  budesonide-formoterol (SYMBICORT) 160-4.5 MCG/ACT inhaler, Inhale 2 puffs 2 (Two) Times a Day., Disp: , Rfl:   •  Calcium Carb-Cholecalciferol (Oyster Shell Calcium w/D) 500-5 MG-MCG tablet, TAKE TWO TABLETS BY MOUTH DAILY, Disp: 180 tablet, Rfl: 0  •  carboxymethylcellulose (REFRESH PLUS) 0.5 % solution, Administer 1 drop to both eyes 3 (Three) Times a Day As Needed for Dry Eyes. Bring to hospital, Disp: , Rfl:   •  clonazePAM (KlonoPIN) 0.5 MG tablet, TAKE ONE TABLET BY MOUTH TWICE DAILY AS NEEDED for anxiety, Disp: 30 tablet, Rfl: 0  •  ferrous sulfate 325 (65 FE) MG tablet, Take 1 tablet by mouth Daily With Lunch for 90 doses., Disp: 30 tablet, Rfl: 2  •  gabapentin (NEURONTIN) 300 MG capsule, take 1 capsule by mouth every morning, 1 capsule in afternoon and 2 capsules at night, Disp: 120 capsule, Rfl: 5  •  HYDROcodone-acetaminophen (NORCO) 5-325 MG per tablet, Take 1 tablet by mouth Every 6 (Six) Hours As Needed for Severe Pain., Disp: 120 tablet, Rfl: 0  •  lidocaine (LIDODERM) 5 %, Place 1 patch on the skin as directed by provider Daily. Remove & Discard patch within 12 hours or as directed by MD, Disp:  30 each, Rfl: 1  •  losartan (COZAAR) 25 MG tablet, Take 1 tablet by mouth Daily. HOLD / SKIP dose as directed if systolic blood pressure is lower than 120, Disp: 30 tablet, Rfl: 0  •  montelukast (SINGULAIR) 10 MG tablet, TAKE 1 TABLET BY MOUTH DAILY, Disp: 90 tablet, Rfl: 0  •  Multiple Vitamins-Minerals (EMERGEN-C BLUE PO), Take 1 tablet by mouth Daily. LD 9-27, Disp: , Rfl:   •  Omega-3 Fatty Acids (FISH OIL) 1000 MG capsule capsule, Take 1,000 mg by mouth Daily With Breakfast. LD 9-27, Disp: , Rfl:   •  Red Yeast Rice 600 MG capsule, Take 1 capsule by mouth Daily. LD 9-27, Disp: , Rfl:   •  sildenafil (REVATIO) 20 MG tablet, take 1 to 2 tablets by mouth as needed for erectile dysfunction., Disp: 50 tablet, Rfl: 0  •  theophylline (THEODUR) 300 MG 12 hr tablet, Take 300 mg by mouth Daily., Disp: , Rfl:   •  triamcinolone (KENALOG) 0.025 % cream, Apply 1 application topically to the appropriate area as directed 2 (Two) Times a Day., Disp: , Rfl:   •  Turmeric 400 MG capsule, Take 1 capsule by mouth Daily., Disp: , Rfl:     Review of Systems   Musculoskeletal: Positive for arthralgias, back pain, gait problem and myalgias.   Neurological: Negative for weakness and numbness.       Vitals:    12/07/22 1527   BP: (!) 183/82   Pulse: 66   Resp: 16   SpO2: 98%   PainSc:   5         Objective   Physical Exam  Vitals and nursing note reviewed.   Constitutional:       General: He is not in acute distress.     Appearance: Normal appearance. He is well-developed and normal weight.   Neck:      Trachea: No tracheal deviation.   Musculoskeletal:      Comments: Lumbar Spine Exam:  Tender to palpation over the lumbar paraspinal musculature Yes  Limited range of motion secondary to pain Yes  Facet loading positive: bilateral  Facets tender to palpation: bilateral  Straight leg raise test positive: Equivocal       Neurological:      General: No focal deficit present.      Mental Status: He is alert.      Sensory: No sensory  deficit.      Motor: No weakness.           Assessment & Plan   Problems Addressed this Visit    None  Visit Diagnoses     Spinal stenosis of lumbar region without neurogenic claudication    -  Primary    Relevant Medications    HYDROcodone-acetaminophen (NORCO) 5-325 MG per tablet    Chronic pain syndrome          Diagnoses       Codes Comments    Spinal stenosis of lumbar region without neurogenic claudication    -  Primary ICD-10-CM: M48.061  ICD-9-CM: 724.02     Chronic pain syndrome     ICD-10-CM: G89.4  ICD-9-CM: 338.4           Plan:  1. Refill hydrocodone 5 mg 4 times daily  2. UDS showing random morphine metabolites but otherwise appropriate.  Inspect appropriate  3. Continue working with physical therapy  --- Follow-up 3 months           INSPECT REPORT    As part of the patient's treatment plan, I may be prescribing controlled substances. The patient has been made aware of appropriate use of such medications, including potential risk of somnolence, limited ability to drive and/or work safely, and the potential for dependence or overdose. It has also bee made clear that these medications are for use by this patient only, without concomitant use of alcohol or other substances unless prescribed.     Patient has completed prescribing agreement detailing terms of continued prescribing of controlled substances, including monitoring PRASANTH reports, urine drug screening, and pill counts if necessary. The patient is aware that inappropriate use will results in cessation of prescribing such medications.    INSPECT report has been reviewed and scanned into the patient's chart.    As the clinician, I personally reviewed the INSPECT from 12/6/2022.    History and physical exam exhibit continued safe and appropriate use of controlled substances.      EMR Dragon/Transcription disclaimer:   Much of this encounter note is an electronic transcription/translation of spoken language to printed text. The electronic translation  of spoken language may permit erroneous, or at times, nonsensical words or phrases to be inadvertently transcribed; Although I have reviewed the note for such errors, some may still exist.

## 2022-12-13 ENCOUNTER — APPOINTMENT (OUTPATIENT)
Dept: RESPIRATORY THERAPY | Facility: HOSPITAL | Age: 77
End: 2022-12-13

## 2022-12-13 ENCOUNTER — OFFICE VISIT (OUTPATIENT)
Dept: PULMONOLOGY | Facility: HOSPITAL | Age: 77
End: 2022-12-13

## 2022-12-13 ENCOUNTER — HOSPITAL ENCOUNTER (OUTPATIENT)
Dept: RESPIRATORY THERAPY | Facility: HOSPITAL | Age: 77
Discharge: HOME | End: 2022-12-13

## 2022-12-13 VITALS
WEIGHT: 185 LBS | HEART RATE: 72 BPM | DIASTOLIC BLOOD PRESSURE: 76 MMHG | HEIGHT: 66 IN | SYSTOLIC BLOOD PRESSURE: 136 MMHG | OXYGEN SATURATION: 96 % | RESPIRATION RATE: 16 BRPM | BODY MASS INDEX: 29.73 KG/M2

## 2022-12-13 DIAGNOSIS — J45.909 ASTHMA, UNSPECIFIED ASTHMA SEVERITY, UNSPECIFIED WHETHER COMPLICATED, UNSPECIFIED WHETHER PERSISTENT: Primary | ICD-10-CM

## 2022-12-13 PROCEDURE — G0463 HOSPITAL OUTPT CLINIC VISIT: HCPCS

## 2022-12-13 PROCEDURE — 94618 PULMONARY STRESS TESTING: CPT

## 2022-12-13 NOTE — NURSING NOTE
Exercise Oximetry    Patient Name:Omar Choudhary   MRN: [id number]   Date: 12/13/22             ROOM AIR BASELINE   SpO2% 97   Heart Rate 72   Blood Pressure      EXERCISE ON ROOM AIR SpO2% EXERCISE ON O2 @  LPM SpO2%   1 MINUTE 97 1 MINUTE    2 MINUTES 95 2 MINUTES    3 MINUTES 95 3 MINUTES    4 MINUTES 95 4 MINUTES    5 MINUTES 94 5 MINUTES    6 MINUTES 94 6 MINUTES               Distance Walked   Distance Walked   Dyspnea (Dana Scale)   Dyspnea (Dana Scale)   Fatigue (Dana Scale)   Fatigue (Dana Scale)   SpO2% Post Exercise  98 SpO2% Post Exercise   HR Post Exercise  72 HR Post Exercise   Time to Recovery  1-2 MINUTES Time to Recovery     Comments: PATIENT WALKED ON ROOM AIR, O2 SATS REMAINED 94% AND ABOVE THRU-OUT THE WALK.  HR INCREASED  WHILE WALKING

## 2022-12-13 NOTE — PROGRESS NOTES
PULMONARY/ CRITICAL CARE/ SLEEP MEDICINE OUTPATIENT CONSULT/ FOLLOW UP NOTE        Patient Name:  Omar Choudhary    :  1945    Medical Record:  [id number]    PRIMARY CARE PHYSICIAN     Dennis Kwong MD    REASON FOR CONSULTATION    Omar Choudhary is a 77 y.o. male who is referred for consultation for asthma  REVIEW OF SYSTEMS    Constitutional:  Denies fever or chills   Eyes:  Denies change in visual acuity   HENT:  Denies nasal congestion or sore throat   Respiratory:  Denies cough or shortness of breath   Cardiovascular:  Denies chest pain or edema   GI:  Denies abdominal pain, nausea, vomiting, bloody stools or diarrhea   :  Denies dysuria   Musculoskeletal:  Denies back pain or joint pain   Integument:  Denies rash   Neurologic:  Denies headache, focal weakness or sensory changes   Endocrine:  Denies polyuria or polydipsia   Lymphatic:  Denies swollen glands   Psychiatric:  Denies depression or anxiety     MEDICAL HISTORY    Past Medical History:   Diagnosis Date   • Allergic    • Arthritis    • Asthma    • Carpal tunnel syndrome     no pain   • Cataract    • Depression    • Family history of malignant neoplasm of breast 2019   • Hyperlipidemia 2019   • Hypertension 2012   • Leg pain, right    • Low back pain    • Neuropathy     feet   • Osteopenia    • Poor balance    • Reactive airway disease 01/15/2016   • Shortness of breath         SURGICAL HISTORY    Past Surgical History:   Procedure Laterality Date   • KNEE ARTHROSCOPY W/ ACL RECONSTRUCTION Right 2019   • LUMBAR FUSION Bilateral 10/5/2022    Procedure: DAY 2 LUMBAR LAMINECTOMY TRANSFORAMINAL LUMBAR INTERBODY FUSION L2,L3,L4 WITH NEURO ROBOT;  Surgeon: John Do MD;  Location: [facility] OR;  Service: Robotics - Neuro;  Laterality: Bilateral;   • LUMBAR FUSION N/A 10/4/2022    Procedure: DAY 1 LUMBAR LATERAL INTERBODY FUSION WITH NEURO ROBOT L2, L3.L4;  Surgeon: John Do MD;  Location: [facility]  OR;  Service: Robotics - Neuro;  Laterality: N/A;  left side approach   • MOUTH SURGERY          FAMILY HISTORY    Family History   Problem Relation Age of Onset   • Heart disease Mother    • Hypertension Mother    • Breast cancer Mother    • Stroke Mother    • Aortic aneurysm Father    • Heart attack Father    • Liver cancer Father        SOCIAL HISTORY    Social History     Tobacco Use   • Smoking status: Former     Packs/day: 0.50     Years: 10.00     Pack years: 5.00     Types: Cigarettes     Start date:      Quit date:      Years since quittin.9   • Smokeless tobacco: Never   Substance Use Topics   • Alcohol use: Yes     Alcohol/week: 12.0 standard drinks     Types: 10 Glasses of wine, 2 Cans of beer per week        ALLERGIES    Allergies   Allergen Reactions   • Statins Myalgia         MEDICATIONS    Current Outpatient Medications on File Prior to Visit   Medication Sig Dispense Refill   • acetaminophen (TYLENOL) 500 MG tablet Take 500 mg by mouth Every 6 (Six) Hours As Needed for Mild Pain .     • albuterol sulfate  (90 Base) MCG/ACT inhaler Inhale 2 puffs Every 4 (Four) Hours As Needed for Wheezing or Shortness of Air. 18 g 3   • B Complex Vitamins (VITAMIN B COMPLEX) capsule capsule Take 1 capsule by mouth Daily. LD 9-27     • baclofen (LIORESAL) 10 MG tablet Take 1 tablet by mouth 3 (Three) Times a Day As Needed for Muscle Spasms.     • budesonide-formoterol (SYMBICORT) 160-4.5 MCG/ACT inhaler Inhale 2 puffs 2 (Two) Times a Day.     • Calcium Carb-Cholecalciferol (Oyster Shell Calcium w/D) 500-5 MG-MCG tablet TAKE TWO TABLETS BY MOUTH DAILY 180 tablet 0   • carboxymethylcellulose (REFRESH PLUS) 0.5 % solution Administer 1 drop to both eyes 3 (Three) Times a Day As Needed for Dry Eyes. Bring to hospital     • clonazePAM (KlonoPIN) 0.5 MG tablet TAKE ONE TABLET BY MOUTH TWICE DAILY AS NEEDED for anxiety 30 tablet 0   • ferrous sulfate 325 (65 FE) MG tablet Take 1 tablet by mouth Daily  "With Lunch for 90 doses. 30 tablet 2   • gabapentin (NEURONTIN) 300 MG capsule take 1 capsule by mouth every morning, 1 capsule in afternoon and 2 capsules at night 120 capsule 5   • HYDROcodone-acetaminophen (NORCO) 5-325 MG per tablet Take 1 tablet by mouth Every 6 (Six) Hours As Needed for Severe Pain. 120 tablet 0   • lidocaine (LIDODERM) 5 % Place 1 patch on the skin as directed by provider Daily. Remove & Discard patch within 12 hours or as directed by MD 30 each 1   • losartan (COZAAR) 25 MG tablet Take 1 tablet by mouth Daily. HOLD / SKIP dose as directed if systolic blood pressure is lower than 120 30 tablet 0   • montelukast (SINGULAIR) 10 MG tablet TAKE 1 TABLET BY MOUTH DAILY 90 tablet 0   • Multiple Vitamins-Minerals (EMERGEN-C BLUE PO) Take 1 tablet by mouth Daily. LD 9-27     • Omega-3 Fatty Acids (FISH OIL) 1000 MG capsule capsule Take 1,000 mg by mouth Daily With Breakfast. LD 9-27     • Red Yeast Rice 600 MG capsule Take 1 capsule by mouth Daily. LD 9-27     • sildenafil (REVATIO) 20 MG tablet take 1 to 2 tablets by mouth as needed for erectile dysfunction. 50 tablet 0   • theophylline (THEODUR) 300 MG 12 hr tablet Take 300 mg by mouth Daily.     • triamcinolone (KENALOG) 0.025 % cream Apply 1 application topically to the appropriate area as directed 2 (Two) Times a Day.     • Turmeric 400 MG capsule Take 1 capsule by mouth Daily.       No current facility-administered medications on file prior to visit.       PHYSICAL EXAM    Vitals:    12/13/22 0931   BP: 136/76   BP Location: Left arm   Patient Position: Sitting   Cuff Size: Adult   Pulse: 72   Resp: 16   SpO2: 96%   Weight: 83.9 kg (185 lb)   Height: 167.6 cm (65.98\")        Constitutional:  Well developed, well nourished, no acute distress, non-toxic appearance   Eyes:  PERRL, conjunctiva normal   HENT:  Atraumatic, external ears normal, nose normal, oropharynx moist, no pharyngeal exudates. mallampatti   Neck- normal range of motion, no " tenderness, supple   Respiratory:  No respiratory distress, normal breath sounds, no rales, no wheezing   Cardiovascular:  Normal rate, normal rhythm, no murmurs, no gallops, no rubs   GI:  Soft, nondistended, normal bowel sounds, nontender, no organomegaly, no mass, no rebound, no guarding   :  No costovertebral angle tenderness   Musculoskeletal:  No edema, no tenderness, no deformities. Back- no tenderness  Integument:  Well hydrated, no rash   Lymphatic:  No lymphadenopathy noted   Neurologic:  Alert & oriented x 3, CN 2-12 normal, normal motor function, normal sensory function, no focal deficits noted   Psychiatric:  Speech and behavior appropriate     XR Spine Lumbar 2 or 3 View    Result Date: 11/14/2022  Interval L2-L4 fusion with no evidence of hardware complication. Stable prominent degenerative disease of the remainder of the lumbar spine  Electronically Signed By-Matias Holm On:11/14/2022 1:16 PM This report was finalized on [id number] by  Matias Holm, .    CT Lumbar Spine Without Contrast    Result Date: 10/4/2022  1. Interval placement of intervertebral spacers at the L2-L3 and L3-L4 levels in expected position. 2. Soft tissue gas along the left aspect of the psoas muscle and extraperitoneal fat likely related to recent surgery. 3. Imaging was performed per [facility] Protocol for subsequent surgical guidance. If full detailed level by level analysis is desired, please see recently performed CT lumbar spine from 9/29/2022.  Electronically Signed By-Meño Yoder MD On:10/4/2022 2:39 PM This report was finalized on [id number] by  Meño Yoder MD.    CT Lumbar Spine Without Contrast    Result Date: 9/29/2022  1.     Levoscoliosis with multilevel degenerative change.  Electronically Signed By-Jeramy Wasserman MD On:9/29/2022 4:26 PM This report was finalized on [id number] by  Jeramy Wasserman MD.    XR Chest PA & Lateral    Result Date: 9/27/2022   1. Chronic left basilar scarring. No acute  chest finding.  Electronically Signed By-Pamella Moraes MD On:9/27/2022 12:45 PM This report was finalized on [id number] by  Pamella Mroaes MD.         ASSESSMENT & PLAN:      Asthma (J45.909)  paralysed Left hemidiapghram     Impression: CXR 1/19: Elevated L Hemidiaphragm with bibasilar atelectasis  IgE 5/18: 765  RAST Panel 5/18: +ve  PFts 4/18:  (FEV1 78%, TLC 82%, DLCO 76, DL/%  Repeat Lab 3/19: WBC 5.3, Eosinophil count normal. Mold Panel:negative.       IgE: 515  Xolair weekly injections vs allergy shots  Feels better allergy injections     Tests at [facility]; PFts 8/19: Non specific ventilatory defect. FEV1 70%, + D response, %, DLCO 82%   Methacholine chalenge test 8/19: negative  HRCT : No ILD  VQ: Negative except decrease in LLL related to paralyzed diaphragm  Metabolic cardiopulmonary stress test: negative  Echo: EF 60%, RVSP 31 min  Tried Breo and Bevespi with no improvement in symptoms  Remains on Singulair,   symbicort  expensive and albuterol.   -Also on allergy shots now     Obstructive sleep apnea (G47.33)  Has new ResMed machine,  CPAP at 8-20  Average use 8hrs 27 min. Average AHI 4.2. Patient is on AutoSet 8-20 pressure   Patient is compliant with using CPAP/BiPAP therapy and is benefitting from PAP therapy.     Hypertension (I10)     PLAN:     6-minute walk    Currently on Symbicort and albuterol  Montelukast and theophylline    Sample of Breztri    Discussed with patient utilizing Xolair for elevated IgE as well as either Fasenra or Nucala for elevated Eosinophills        This document has been electronically signed by  Jerrod Lagunas MD  09:36 EST

## 2022-12-14 DIAGNOSIS — J45.909 ASTHMA, UNSPECIFIED ASTHMA SEVERITY, UNSPECIFIED WHETHER COMPLICATED, UNSPECIFIED WHETHER PERSISTENT: ICD-10-CM

## 2022-12-14 RX ORDER — ALBUTEROL SULFATE 90 UG/1
2 AEROSOL, METERED RESPIRATORY (INHALATION) EVERY 4 HOURS PRN
Qty: 18 G | Refills: 3 | Status: SHIPPED | OUTPATIENT
Start: 2022-12-14

## 2022-12-20 NOTE — PROGRESS NOTES
Neurosurgical Consultation      Omar Choudhary is a 77 y.o. male is here today for follow-up for moderate pain at the incision site. In the office today patient reports Lower back pain that moves left to right around the incision site.      Chief Complaint   Patient presents with   • Post-op     Follow up        Previous treatment:  Left sided lateral approach for a Transpsoas L2-L4 lateral interbody fusion on 10/4/22.    HPI: This is a 77-year-old gentleman who underwent a lateral L2-L4 interbody cage placement with subsequent posterior decompression and posterior fusion.  He was last seen in my clinic on November 15, 2022 for a 6-week follow-up appointment.  At that juncture he was doing well.  He continues to do relatively well.  He does note that he had some increased physical activity related to the holidays and this has caused some increased daily pain requiring slight increase in pain medicine.  He is continuing to work with physical therapy and making progress.  His incision is well-healing without any signs of infection or breakdown.  He is ambulating long distances with walking sticks.    Past Medical History:   Diagnosis Date   • Allergic    • Arthritis    • Asthma    • Carpal tunnel syndrome     no pain   • Cataract    • Depression    • Family history of malignant neoplasm of breast 09/26/2019   • Hyperlipidemia 09/26/2019   • Hypertension 03/01/2012   • Leg pain, right    • Low back pain    • Neuropathy     feet   • Osteopenia    • Poor balance    • Reactive airway disease 01/15/2016   • Shortness of breath         Past Surgical History:   Procedure Laterality Date   • KNEE ARTHROSCOPY W/ ACL RECONSTRUCTION Right 2019   • LUMBAR FUSION Bilateral 10/5/2022    Procedure: DAY 2 LUMBAR LAMINECTOMY TRANSFORAMINAL LUMBAR INTERBODY FUSION L2,L3,L4 WITH NEURO ROBOT;  Surgeon: John Do MD;  Location: [facility] OR;  Service: Robotics - Neuro;  Laterality: Bilateral;   • LUMBAR FUSION N/A 10/4/2022     Procedure: DAY 1 LUMBAR LATERAL INTERBODY FUSION WITH NEURO ROBOT L2, L3.L4;  Surgeon: John Do MD;  Location: [facility] MAIN OR;  Service: Robotics - Neuro;  Laterality: N/A;  left side approach   • MOUTH SURGERY          Current Outpatient Medications on File Prior to Visit   Medication Sig Dispense Refill   • acetaminophen (TYLENOL) 500 MG tablet Take 500 mg by mouth Every 6 (Six) Hours As Needed for Mild Pain .     • albuterol sulfate  (90 Base) MCG/ACT inhaler Inhale 2 puffs Every 4 (Four) Hours As Needed for Wheezing or Shortness of Air. 18 g 3   • B Complex Vitamins (VITAMIN B COMPLEX) capsule capsule Take 1 capsule by mouth Daily. LD 9-27     • baclofen (LIORESAL) 10 MG tablet Take 1 tablet by mouth 3 (Three) Times a Day As Needed for Muscle Spasms.     • budesonide-formoterol (SYMBICORT) 160-4.5 MCG/ACT inhaler Inhale 2 puffs 2 (Two) Times a Day.     • Calcium Carb-Cholecalciferol (Oyster Shell Calcium w/D) 500-5 MG-MCG tablet TAKE TWO TABLETS BY MOUTH DAILY 180 tablet 0   • carboxymethylcellulose (REFRESH PLUS) 0.5 % solution Administer 1 drop to both eyes 3 (Three) Times a Day As Needed for Dry Eyes. Bring to hospital     • clonazePAM (KlonoPIN) 0.5 MG tablet TAKE ONE TABLET BY MOUTH TWICE DAILY AS NEEDED for anxiety 30 tablet 0   • ferrous sulfate 325 (65 FE) MG tablet Take 1 tablet by mouth Daily With Lunch for 90 doses. 30 tablet 2   • gabapentin (NEURONTIN) 300 MG capsule take 1 capsule by mouth every morning, 1 capsule in afternoon and 2 capsules at night 120 capsule 5   • HYDROcodone-acetaminophen (NORCO) 5-325 MG per tablet Take 1 tablet by mouth Every 6 (Six) Hours As Needed for Severe Pain. 120 tablet 0   • lidocaine (LIDODERM) 5 % Place 1 patch on the skin as directed by provider Daily. Remove & Discard patch within 12 hours or as directed by MD 30 each 1   • losartan (COZAAR) 25 MG tablet Take 1 tablet by mouth Daily. HOLD / SKIP dose as directed if systolic blood pressure is lower  than 120 30 tablet 0   • montelukast (SINGULAIR) 10 MG tablet TAKE 1 TABLET BY MOUTH DAILY 90 tablet 0   • Multiple Vitamins-Minerals (EMERGEN-C BLUE PO) Take 1 tablet by mouth Daily. LD      • Omega-3 Fatty Acids (FISH OIL) 1000 MG capsule capsule Take 1,000 mg by mouth Daily With Breakfast. LD      • Red Yeast Rice 600 MG capsule Take 1 capsule by mouth Daily. LD      • sildenafil (REVATIO) 20 MG tablet take 1 to 2 tablets by mouth as needed for erectile dysfunction. 50 tablet 0   • theophylline (THEODUR) 300 MG 12 hr tablet Take 300 mg by mouth Daily.     • triamcinolone (KENALOG) 0.025 % cream Apply 1 application topically to the appropriate area as directed 2 (Two) Times a Day.     • Turmeric 400 MG capsule Take 1 capsule by mouth Daily.       No current facility-administered medications on file prior to visit.        Allergies   Allergen Reactions   • Statins Myalgia        Social History     Socioeconomic History   • Marital status:    Tobacco Use   • Smoking status: Former     Packs/day: 0.50     Years: 10.00     Pack years: 5.00     Types: Cigarettes     Start date:      Quit date:      Years since quittin.0   • Smokeless tobacco: Never   Vaping Use   • Vaping Use: Never used   Substance and Sexual Activity   • Alcohol use: Yes     Alcohol/week: 12.0 standard drinks     Types: 10 Glasses of wine, 2 Cans of beer per week   • Drug use: Never     Types: Marijuana, Mescaline, Psilocybin     Comment: CBD gummies- stop now for surgery   • Sexual activity: Yes     Partners: Female          Review of Systems   Constitutional: Positive for activity change.   HENT: Negative.    Eyes: Negative.    Respiratory: Negative.    Cardiovascular: Negative.    Gastrointestinal: Negative.    Endocrine: Negative.    Genitourinary: Negative.    Musculoskeletal: Positive for back pain (moves left to right).   Skin: Negative.    Allergic/Immunologic: Negative.    Neurological: Positive for weakness.  Negative for numbness.   Psychiatric/Behavioral: Negative.         Physical Examination:     Vitals:    12/27/22 1022   BP: 145/88   Pulse: 73   SpO2: 95%   Weight: 83.9 kg (185 lb)   PainSc:   6        Physical Exam     Neurological Exam   Neurological examination is stable compared to my last evaluation without any new red flag signs.  Incision is well-healing without any signs of breakdown or infection.    Result Review  The following data was reviewed by: John Do MD on 12/27/2022:    Data reviewed: Radiologic studies X-rays of the lumbar spine shows stability of the hardware without any signs of hardware breakdown or complications.     Assessment/plan:  This is a 77-year-old gentleman who is approximately 3 months removed from an L2-L4 lateral lumbar interbody fusion with posterior fixation and decompression.  His neurogenic claudication and radiculopathy have significantly improved.  His pain is improved and well managed with medication.  He is working with physical therapy and making progress from their perspective.  He has been utilizing the bone growth stimulator on a regular basis as prescribed.  His incision is well-healed without any signs of infection or breakdown.  His x-rays are unremarkable today.  I will have him return to see me in 3 months with a CT of the lumbar spine to evaluate for bone formation.  I have encouraged him to call with any questions or concerns.    Diagnoses and all orders for this visit:    1. Spinal stenosis of lumbar region with neurogenic claudication (Primary)  -     CT Lumbar Spine Without Contrast; Future    2. Lumbar radiculopathy, chronic  Overview:  Added automatically from request for surgery [id number]    Orders:  -     CT Lumbar Spine Without Contrast; Future    3. Degenerative lumbar spinal stenosis  -     CT Lumbar Spine Without Contrast; Future       Return in about 3 months (around 3/27/2023).            John Do MD

## 2022-12-21 ENCOUNTER — HOSPITAL ENCOUNTER (OUTPATIENT)
Dept: GENERAL RADIOLOGY | Facility: HOSPITAL | Age: 77
Discharge: HOME | End: 2022-12-21
Admitting: NEUROLOGICAL SURGERY

## 2022-12-21 ENCOUNTER — TELEPHONE (OUTPATIENT)
Dept: NEUROSURGERY | Facility: CLINIC | Age: 77
End: 2022-12-21

## 2022-12-21 DIAGNOSIS — M48.062 SPINAL STENOSIS OF LUMBAR REGION WITH NEUROGENIC CLAUDICATION: ICD-10-CM

## 2022-12-21 DIAGNOSIS — M54.16 LUMBAR RADICULOPATHY, CHRONIC: ICD-10-CM

## 2022-12-21 PROCEDURE — 72100 X-RAY EXAM L-S SPINE 2/3 VWS: CPT

## 2022-12-21 NOTE — TELEPHONE ENCOUNTER
Called and left voicemail for patient to please call the office regarding his appointment on Tuesday 12/27/22.  He needs to get his x-rays done before his appointment.     Patient called back and said he did get his X-rays done yesterday 12/21/22.

## 2022-12-27 ENCOUNTER — OFFICE VISIT (OUTPATIENT)
Dept: NEUROSURGERY | Facility: CLINIC | Age: 77
End: 2022-12-27

## 2022-12-27 VITALS
BODY MASS INDEX: 29.88 KG/M2 | HEART RATE: 73 BPM | OXYGEN SATURATION: 95 % | SYSTOLIC BLOOD PRESSURE: 145 MMHG | WEIGHT: 185 LBS | DIASTOLIC BLOOD PRESSURE: 88 MMHG

## 2022-12-27 DIAGNOSIS — M48.062 SPINAL STENOSIS OF LUMBAR REGION WITH NEUROGENIC CLAUDICATION: Primary | ICD-10-CM

## 2022-12-27 DIAGNOSIS — M48.061 DEGENERATIVE LUMBAR SPINAL STENOSIS: ICD-10-CM

## 2022-12-27 DIAGNOSIS — M54.16 LUMBAR RADICULOPATHY, CHRONIC: ICD-10-CM

## 2022-12-27 PROCEDURE — 99024 POSTOP FOLLOW-UP VISIT: CPT | Performed by: NEUROLOGICAL SURGERY

## 2022-12-28 ENCOUNTER — HOSPITAL ENCOUNTER (OUTPATIENT)
Dept: PHYSICAL THERAPY | Facility: HOSPITAL | Age: 77
Setting detail: THERAPIES SERIES
Discharge: HOME | End: 2022-12-28

## 2022-12-28 DIAGNOSIS — M48.062 SPINAL STENOSIS OF LUMBAR REGION WITH NEUROGENIC CLAUDICATION: ICD-10-CM

## 2022-12-28 DIAGNOSIS — R29.898 WEAKNESS OF BOTH LOWER EXTREMITIES: ICD-10-CM

## 2022-12-28 DIAGNOSIS — Z98.1 S/P LUMBAR FUSION: Primary | ICD-10-CM

## 2022-12-28 DIAGNOSIS — M25.60 DECREASED RANGE OF MOTION: ICD-10-CM

## 2022-12-28 DIAGNOSIS — R26.9 GAIT DIFFICULTY: ICD-10-CM

## 2022-12-28 PROCEDURE — 97110 THERAPEUTIC EXERCISES: CPT

## 2022-12-28 NOTE — THERAPY PROGRESS REPORT/RE-CERT
Outpatient Physical Therapy Ortho Progress Note  [facility]     Patient Name: Omar Choudhary  : 1945  MRN: [id number]  Today's Date: 2022      Visit Date: 2022    Patient Active Problem List   Diagnosis   • Benign prostatic hyperplasia   • Bursitis   • Depression   • Diverticulosis   • Family history of malignant neoplasm of breast   • Hyperlipidemia   • Hypertension   • Internal derangement of right knee   • Knee effusion   • Asthma   • Obstructive sleep apnea syndrome   • Osteoarthritis   • Osteopenia   • Pain in knee   • Postnasal drip   • Prepatellar bursitis   • Sciatica of right side   • Diaphragm paralysis   • Spinal stenosis of lumbar region with neurogenic claudication   • Degenerative lumbar spinal stenosis   • Anxiety   • Pinguecula of right eye   • Lumbar radiculopathy, chronic        Past Medical History:   Diagnosis Date   • Allergic    • Arthritis    • Asthma    • Carpal tunnel syndrome     no pain   • Cataract    • Depression    • Family history of malignant neoplasm of breast 2019   • Hyperlipidemia 2019   • Hypertension 2012   • Leg pain, right    • Low back pain    • Neuropathy     feet   • Osteopenia    • Poor balance    • Reactive airway disease 01/15/2016   • Shortness of breath         Past Surgical History:   Procedure Laterality Date   • KNEE ARTHROSCOPY W/ ACL RECONSTRUCTION Right 2019   • LUMBAR FUSION Bilateral 10/5/2022    Procedure: DAY 2 LUMBAR LAMINECTOMY TRANSFORAMINAL LUMBAR INTERBODY FUSION L2,L3,L4 WITH NEURO ROBOT;  Surgeon: John Do MD;  Location: [facility] [place];  Service: Robotics - Neuro;  Laterality: Bilateral;   • LUMBAR FUSION N/A 10/4/2022    Procedure: DAY 1 LUMBAR LATERAL INTERBODY FUSION WITH NEURO ROBOT L2, L3.L4;  Surgeon: John Do MD;  Location: [facility] [place];  Service: Robotics - Neuro;  Laterality: N/A;  left side approach   • MOUTH SURGERY         Visit Dx:     ICD-10-CM ICD-9-CM   1. S/P lumbar  fusion  Z98.1 V45.4   2. Decreased range of motion  M25.60 719.50   3. Weakness of both lower extremities  R29.898 729.89   4. Gait difficulty  R26.9 781.2   5. Spinal stenosis of lumbar region with neurogenic claudication  M48.062 724.03                             Therapy Education  Education Details: Worked on improving gait quality with moving into R hip neutral toward extension to avoid flexion. Worked on increased pelvic stabilization during heel slides with PPT and TA.  Given: Symptoms/condition management, Posture/body mechanics, Mobility training      PT OP Goals     Row Name 12/28/22 1500          PT Short Term Goals    STG Date to Achieve 11/16/22  -MEGHA     STG 1 Pt will be independent with initial HEP.  -MEGHA     STG 1 Progress Met  -MEGHA     STG 2 Pt will demonstrate correct sitting and standing posture to reduce strain on lumbar spine.  -MEGHA     STG 2 Progress Partially Met  -MEGHA     STG 2 Progress Comments cont to stand with weight shift, slight foward flexed on R hip  -MEGHA     STG 3 Pt will demonstrate good body mechanics with bending, lifting and reaching ADLs  to reduce strain on spine.  -MEGHA     STG 3 Progress Progressing  -MEGHA        Long Term Goals    LTG Date to Achieve 12/16/22  -MEGHA     LTG 1 Pt will be independent with advanced HEP for spinal stabilization for improved self-management of symptoms.  -MEGHA     LTG 1 Progress Ongoing  -MEGHA     LTG 2 Pt will report 50% or> decrease in pain with ADLs.  -MEGHA     LTG 2 Progress Ongoing;Progressing  -MEGHA     LTG 2 Progress Comments pain fluctuates due to pt activity level and demands of tasks  -MEGHA     LTG 3 Pt will score 50% or less on BENEDICT indicating decrease in perceived functional disability.  -MEGHA     LTG 3 Progress Ongoing  -MEGHA     LTG 4 Pt will demonstrate increased R LE strength to 4/5 or better.  -MEGHA     LTG 4 Progress Ongoing  -MEGHA           User Key  (r) = Recorded By, (t) = Taken By, (c) = Cosigned By    Initials Name Provider Type    Patricia Mays  "N, PT Physical Therapist                 PT Assessment/Plan     Row Name 12/28/22 1500          PT Assessment    Assessment Comments Mr Choudhary has been seen for 10 visits post op post-op lumbar lateral interbody fusion w/neuro robot L2, L3, L4, and lumbar laminectomy transforaminal lumbar interbody fusion L2, L3, L4 (on 10/4/22 & 10/5/22).  Mendez reports increased pain today, 6-7/10 this morning but after meloxicam it is now 5/10. We did not attempt small step up today but will try next visit if pain is more controlled. He demonstrates gait deviation, with and without SPC, of hip flexion during stance however after focused work he was able to move toward neutral-extension and gait quality improved with decreased pistoning/lateral lurch. Recommended he use SPC for now to retrain for correct gait mechanics and avoid allowing compenstory substitutions. He verbalizes and demonstrates increased awareness of recruiting glute med without substitution. Changed to short range sidestepping due to quick fatigue, he tolerated with less compensation and lateral lurch. He has met/partially 2-3 STGs, 4/4 LTGs ongoing. He will benefit from continuing skilled therapy services.  -MEGHA        PT Plan    PT Plan Comments consider small step up (2-3\" ream of paper? with cue to move hip to neutral and not flexed.  -MEGHA           User Key  (r) = Recorded By, (t) = Taken By, (c) = Cosigned By    Initials Name Provider Type    Patricia Mays N, PT Physical Therapist                   OP Exercises     Row Name 12/28/22 1400             Subjective Comments    Subjective Comments I overdid it, cleaned house and cooked for 8 people and then cooked again on Sunday. The pain moves from R to L different times.  -MEGHA         Subjective Pain    Able to rate subjective pain? yes  -MEGHA      Pre-Treatment Pain Level 5  -MEGHA         Total Minutes    28607 - PT Therapeutic Exercise Minutes 44  -MEGHA         Exercise 1    Exercise Name 1 Nu Step  -MEGHA      " "Cueing 1 Verbal  -JA      Time 1 5 min lvl 5  -JA         Exercise 2    Exercise Name 2 supine hip abd \"windshield wiper  -JA      Sets 2 2  -JA      Reps 2 10  -JA      Time 2 Cued position, form, opposite knee flexed and PPT with TA to reduce L-sided pain  -JA         Exercise 3    Exercise Name 3 clamshells  -JA      Cueing 3 Verbal;Tactile  -JA      Sets 3 2  -JA      Reps 3 10  -JA      Time 3 RTB  -JA         Exercise 4    Exercise Name 4 low hip bridges w/TA  -JA      Sets 4 4  -JA      Reps 4 5  -JA      Time 4 RTB  -JA         Exercise 5    Exercise Name 5 PNF rhythmic stab HL at jarvis knees and then unilateral singles  -JA      Sets 5 2  -JA      Reps 5 10 ea  -JA         Exercise 6    Exercise Name 6 STS w/tband  -JA      Reps 6 10  -JA      Time 6 RTB  -JA      Additional Comments worked on control with tip from hips  -JA         Exercise 8    Exercise Name 8 MS  -JA      Cueing 8 --  -JA      Sets 8 --  -JA      Reps 8 --  -JA      Time 8 --  -JA      Additional Comments performed STS instead today  -JA         Exercise 9    Exercise Name 9 short-distance side step  -JA      Sets 9 4  -JA      Reps 9 5-6' laps only  -JA      Additional Comments kept distance short to reduce fatigue/strain  -JA         Exercise 10    Exercise Name 10 gait training without and with SPC  -JA      Cueing 10 --  -JA      Sets 10 2  -JA      Reps 10 25'  -JA      Time 10 worked on moving to hip neutral in stance with \"up tall\" cue to stop the R hip flex during stance-gait improved in both w/o and further improved w/SPC  -JA         Exercise 13    Exercise Name 13 tandem stance  -JA      Cueing 13 Verbal;Demo  -JA      Reps 13 3 B  -JA      Time 13 20 sec  -JA            User Key  (r) = Recorded By, (t) = Taken By, (c) = Cosigned By    Initials Name Provider Type    Patricia Mays, PT Physical Therapist                                        Time Calculation:     Start Time: 1415  Stop Time: 1500  Time Calculation (min): " 45 min  Timed Charges  70661 - PT Therapeutic Exercise Minutes: 44  Total Minutes  Timed Charges Total Minutes: 44   Total Minutes: 44     Therapy Charges for Today     Code Description Service Date Service Provider Modifiers Qty    47028755953  PT THER PROC EA 15 MIN 12/28/2022 Patricia Reyes, PT GP 3                    Patricia Reyes, PT  12/28/2022

## 2023-01-03 ENCOUNTER — HOSPITAL ENCOUNTER (OUTPATIENT)
Dept: PHYSICAL THERAPY | Facility: HOSPITAL | Age: 78
Setting detail: THERAPIES SERIES
Discharge: HOME | End: 2023-01-03
Payer: MEDICARE

## 2023-01-03 DIAGNOSIS — Z98.1 S/P LUMBAR FUSION: Primary | ICD-10-CM

## 2023-01-03 DIAGNOSIS — R29.898 WEAKNESS OF BOTH LOWER EXTREMITIES: ICD-10-CM

## 2023-01-03 DIAGNOSIS — M48.062 SPINAL STENOSIS OF LUMBAR REGION WITH NEUROGENIC CLAUDICATION: ICD-10-CM

## 2023-01-03 DIAGNOSIS — R26.9 GAIT DIFFICULTY: ICD-10-CM

## 2023-01-03 DIAGNOSIS — M25.60 DECREASED RANGE OF MOTION: ICD-10-CM

## 2023-01-03 PROCEDURE — 97110 THERAPEUTIC EXERCISES: CPT

## 2023-01-03 NOTE — THERAPY TREATMENT NOTE
Outpatient Physical Therapy Ortho Treatment Note  [facility]     Patient Name: Omar Choudhary  : 1945  MRN: [id number]  Today's Date: 1/3/2023      Visit Date: 2023    Visit Dx:    ICD-10-CM ICD-9-CM   1. S/P lumbar fusion  Z98.1 V45.4   2. Decreased range of motion  M25.60 719.50   3. Weakness of both lower extremities  R29.898 729.89   4. Gait difficulty  R26.9 781.2   5. Spinal stenosis of lumbar region with neurogenic claudication  M48.062 724.03       Patient Active Problem List   Diagnosis   • Benign prostatic hyperplasia   • Bursitis   • Depression   • Diverticulosis   • Family history of malignant neoplasm of breast   • Hyperlipidemia   • Hypertension   • Internal derangement of right knee   • Knee effusion   • Asthma   • Obstructive sleep apnea syndrome   • Osteoarthritis   • Osteopenia   • Pain in knee   • Postnasal drip   • Prepatellar bursitis   • Sciatica of right side   • Diaphragm paralysis   • Spinal stenosis of lumbar region with neurogenic claudication   • Degenerative lumbar spinal stenosis   • Anxiety   • Pinguecula of right eye   • Lumbar radiculopathy, chronic        Past Medical History:   Diagnosis Date   • Allergic    • Arthritis    • Asthma    • Carpal tunnel syndrome     no pain   • Cataract    • Depression    • Family history of malignant neoplasm of breast 2019   • Hyperlipidemia 2019   • Hypertension 2012   • Leg pain, right    • Low back pain    • Neuropathy     feet   • Osteopenia    • Poor balance    • Reactive airway disease 01/15/2016   • Shortness of breath         Past Surgical History:   Procedure Laterality Date   • KNEE ARTHROSCOPY W/ ACL RECONSTRUCTION Right 2019   • LUMBAR FUSION Bilateral 10/5/2022    Procedure: DAY 2 LUMBAR LAMINECTOMY TRANSFORAMINAL LUMBAR INTERBODY FUSION L2,L3,L4 WITH NEURO ROBOT;  Surgeon: John Do MD;  Location: [facility] MAIN OR;  Service: Robotics - Neuro;  Laterality: Bilateral;   • LUMBAR FUSION N/A  10/4/2022    Procedure: DAY 1 LUMBAR LATERAL INTERBODY FUSION WITH NEURO ROBOT L2, L3.L4;  Surgeon: John Do MD;  Location: [facility] MAIN OR;  Service: Robotics - Neuro;  Laterality: N/A;  left side approach   • MOUTH SURGERY                          PT Assessment/Plan     Row Name 01/03/23 1300          PT Assessment    Assessment Comments Pt arrives today using SPC, c/o inc'd LBP from yoga pose performed over the weekend. Initiated forward flexion with ball to stretch lumbar paraspinals. Continued with proximal hip strengthening and added small step up this date to HEP. Performed with hip  d/t pt's c/o of difficulty with lifting his leg to get onto NuStep. Pt has inc'd difficulty with L hip flexion, cues provided to avoid compensatory mechanism of lumbar extension. Pt fatigued by the end of the session but continues to benefit from skilled PT.  -DB        PT Plan    PT Plan Comments Next session: consider SLR  -DB           User Key  (r) = Recorded By, (t) = Taken By, (c) = Cosigned By    Initials Name Provider Type    DB Zenaida English, PT Physical Therapist                   OP Exercises     Row Name 01/03/23 1100             Subjective Comments    Subjective Comments \"I feel like I pulled a muscle in my back on the R side.\"  -DB         Subjective Pain    Able to rate subjective pain? yes  -DB      Pre-Treatment Pain Level 7  -DB         Total Minutes    54084 - PT Therapeutic Exercise Minutes 40  -DB         Exercise 1    Exercise Name 1 Nu Step  -DB      Cueing 1 Verbal  -DB      Time 1 5 min lvl 5  -DB         Exercise 3    Exercise Name 3 clamshells  -DB      Cueing 3 Verbal;Tactile  -DB      Sets 3 2  -DB      Reps 3 10  -DB      Time 3 RTB  -DB         Exercise 4    Exercise Name 4 low hip bridges w/TA  -DB      Sets 4 2  -DB      Reps 4 10  -DB      Time 4 RTB  -DB         Exercise 9    Exercise Name 9 lateral stepping  -DB      Reps 9 3 laps at barre  -DB      Time 9 RTB  -DB          Exercise 10    Exercise Name 10 small step up  -DB      Cueing 10 Verbal;Demo  -DB      Sets 10 2  -DB      Reps 10 5  -DB      Additional Comments w hip , 1 UE support  -DB            User Key  (r) = Recorded By, (t) = Taken By, (c) = Cosigned By    Initials Name Provider Type    DB Zenaida English, PT Physical Therapist                                                Time Calculation:   Start Time: 1131  Stop Time: 1211  Time Calculation (min): 40 min  Total Timed Code Minutes- PT: 40 minute(s)  Timed Charges  09001 - PT Therapeutic Exercise Minutes: 40  Total Minutes  Timed Charges Total Minutes: 40   Total Minutes: 40  Therapy Charges for Today     Code Description Service Date Service Provider Modifiers Qty    16901339079 HC PT THER PROC EA 15 MIN 1/3/2023 Zenaida English, PT GP 3                    Zenaida English PT  1/3/2023

## 2023-01-12 ENCOUNTER — HOSPITAL ENCOUNTER (OUTPATIENT)
Dept: PHYSICAL THERAPY | Facility: HOSPITAL | Age: 78
Setting detail: THERAPIES SERIES
Discharge: HOME | End: 2023-01-12
Payer: MEDICARE

## 2023-01-12 DIAGNOSIS — Z98.1 S/P LUMBAR FUSION: Primary | ICD-10-CM

## 2023-01-12 DIAGNOSIS — R29.898 WEAKNESS OF BOTH LOWER EXTREMITIES: ICD-10-CM

## 2023-01-12 DIAGNOSIS — M25.60 DECREASED RANGE OF MOTION: ICD-10-CM

## 2023-01-12 DIAGNOSIS — R26.9 GAIT DIFFICULTY: ICD-10-CM

## 2023-01-12 PROCEDURE — 97110 THERAPEUTIC EXERCISES: CPT

## 2023-01-18 ENCOUNTER — HOSPITAL ENCOUNTER (OUTPATIENT)
Dept: PHYSICAL THERAPY | Facility: HOSPITAL | Age: 78
Setting detail: THERAPIES SERIES
Discharge: HOME | End: 2023-01-18
Payer: MEDICARE

## 2023-01-18 DIAGNOSIS — R29.898 WEAKNESS OF BOTH LOWER EXTREMITIES: ICD-10-CM

## 2023-01-18 DIAGNOSIS — Z98.1 S/P LUMBAR FUSION: Primary | ICD-10-CM

## 2023-01-18 DIAGNOSIS — R29.898 WEAKNESS OF RIGHT LOWER EXTREMITY: ICD-10-CM

## 2023-01-18 DIAGNOSIS — M48.062 SPINAL STENOSIS OF LUMBAR REGION WITH NEUROGENIC CLAUDICATION: ICD-10-CM

## 2023-01-18 DIAGNOSIS — R26.9 GAIT DIFFICULTY: ICD-10-CM

## 2023-01-18 DIAGNOSIS — M25.60 DECREASED RANGE OF MOTION: ICD-10-CM

## 2023-01-18 PROCEDURE — 97110 THERAPEUTIC EXERCISES: CPT

## 2023-01-18 NOTE — THERAPY TREATMENT NOTE
Outpatient Physical Therapy Ortho Treatment Note  [facility]     Patient Name: Omar Choudhary  : 1945  MRN: [id number]  Today's Date: 2023      Visit Date: 2023    Visit Dx:    ICD-10-CM ICD-9-CM   1. S/P lumbar fusion  Z98.1 V45.4   2. Decreased range of motion  M25.60 719.50   3. Weakness of both lower extremities  R29.898 729.89   4. Gait difficulty  R26.9 781.2       Patient Active Problem List   Diagnosis   • Benign prostatic hyperplasia   • Bursitis   • Depression   • Diverticulosis   • Family history of malignant neoplasm of breast   • Hyperlipidemia   • Hypertension   • Internal derangement of right knee   • Knee effusion   • Asthma   • Obstructive sleep apnea syndrome   • Osteoarthritis   • Osteopenia   • Pain in knee   • Postnasal drip   • Prepatellar bursitis   • Sciatica of right side   • Diaphragm paralysis   • Spinal stenosis of lumbar region with neurogenic claudication   • Degenerative lumbar spinal stenosis   • Anxiety   • Pinguecula of right eye   • Lumbar radiculopathy, chronic        Past Medical History:   Diagnosis Date   • Allergic    • Arthritis    • Asthma    • Carpal tunnel syndrome     no pain   • Cataract    • Depression    • Family history of malignant neoplasm of breast 2019   • Hyperlipidemia 2019   • Hypertension 2012   • Leg pain, right    • Low back pain    • Neuropathy     feet   • Osteopenia    • Poor balance    • Reactive airway disease 01/15/2016   • Shortness of breath         Past Surgical History:   Procedure Laterality Date   • KNEE ARTHROSCOPY W/ ACL RECONSTRUCTION Right 2019   • LUMBAR FUSION Bilateral 10/5/2022    Procedure: DAY 2 LUMBAR LAMINECTOMY TRANSFORAMINAL LUMBAR INTERBODY FUSION L2,L3,L4 WITH NEURO ROBOT;  Surgeon: John Do MD;  Location: [facility] OR;  Service: Robotics - Neuro;  Laterality: Bilateral;   • LUMBAR FUSION N/A 10/4/2022    Procedure: DAY 1 LUMBAR LATERAL INTERBODY FUSION WITH NEURO ROBOT L2,  L3.L4;  Surgeon: John Do MD;  Location: [facility] MAIN OR;  Service: Robotics - Neuro;  Laterality: N/A;  left side approach   • MOUTH SURGERY               01/12/23 1600   PT Assessment   Assessment Comments Pt reports he had increased pain after deciding to increasing his exercises at home by adding planks and push ups. He states had to stop exercises for a few days due to increaed pain. We discussed that we prefer they he bring up exercises he wants to attempt at home so we can strategize how best to do that--we disucssed that planks and push ups on the wall would have been better option and more appropriate for his situation. He wants to begin swimming and we discussed first working on walking FW/BW/side in the water before progressing to acutal swimming.   PT Plan   PT Plan Comments recommended FW/BW/side step v. swimming or using kick board or float between knees since they would increase his lumbar lordosis.             01/12/23 1000   Subjective Comments   Subjective Comments I stayed home on Tuesday because I rested my back, I think I had pulled a muscle. I was trying planks and the exercise lying on my stomach lifting an arm and opposite leg.   Subjective Pain   Able to rate subjective pain? yes   Pre-Treatment Pain Level 5   Total Minutes   55215 - PT Therapeutic Exercise Minutes 40   Exercise 1   Exercise Name 1 Nu Step   Cueing 1 Verbal   Time 1 5 min lvl 5   Exercise 2   Exercise Name 2 HL march w/TA   Sets 2 2   Reps 2 10 ea   Exercise 3   Exercise Name 3 clamshells   Cueing 3 Verbal;Tactile   Sets 3 2   Reps 3 10   Time 3 RTB   Exercise 4   Exercise Name 4 low hip bridges w/TA   Sets 4 2   Reps 4 10   Time 4 RTB   Exercise 5   Exercise Name 5 HL AROM moving into Fig 4 position to improve strength/control of bringing foot up to don socks and shoes   Cueing 5 Verbal;Demo   Reps 5 10   Exercise 6   Exercise Name 6 Education: demonstrated wall push up and discussed progression to counter, then  chair seat and then floor; same approach with a plank   Time 6 7 min   Exercise 9   Exercise Name 9 lateral stepping   Reps 9 3 laps at barre   Time 9 RTB   Exercise 10   Exercise Name 10 small step up   Cueing 10 Verbal;Demo   Sets 10 2   Reps 10 5   Additional Comments w/knee                                                      Time Calculation:   Start Time: 1018  Stop Time: 1100  Time Calculation (min): 42 min  Timed Charges  86509 - PT Therapeutic Exercise Minutes: 40  Total Minutes  Timed Charges Total Minutes: 40   Total Minutes: 40                Patricia Reyes, PT  1/18/2023

## 2023-01-18 NOTE — THERAPY PROGRESS REPORT/RE-CERT
Outpatient Physical Therapy Ortho Re-Assessment  [facility]     Patient Name: Omar Choudhary  : 1945  MRN: [id number]  Today's Date: 2023      Visit Date: 2023    Patient Active Problem List   Diagnosis   • Benign prostatic hyperplasia   • Bursitis   • Depression   • Diverticulosis   • Family history of malignant neoplasm of breast   • Hyperlipidemia   • Hypertension   • Internal derangement of right knee   • Knee effusion   • Asthma   • Obstructive sleep apnea syndrome   • Osteoarthritis   • Osteopenia   • Pain in knee   • Postnasal drip   • Prepatellar bursitis   • Sciatica of right side   • Diaphragm paralysis   • Spinal stenosis of lumbar region with neurogenic claudication   • Degenerative lumbar spinal stenosis   • Anxiety   • Pinguecula of right eye   • Lumbar radiculopathy, chronic        Past Medical History:   Diagnosis Date   • Allergic    • Arthritis    • Asthma    • Carpal tunnel syndrome     no pain   • Cataract    • Depression    • Family history of malignant neoplasm of breast 2019   • Hyperlipidemia 2019   • Hypertension 2012   • Leg pain, right    • Low back pain    • Neuropathy     feet   • Osteopenia    • Poor balance    • Reactive airway disease 01/15/2016   • Shortness of breath         Past Surgical History:   Procedure Laterality Date   • KNEE ARTHROSCOPY W/ ACL RECONSTRUCTION Right 2019   • LUMBAR FUSION Bilateral 10/5/2022    Procedure: DAY 2 LUMBAR LAMINECTOMY TRANSFORAMINAL LUMBAR INTERBODY FUSION L2,L3,L4 WITH NEURO ROBOT;  Surgeon: John Do MD;  Location: [facility];  Service: Robotics - Neuro;  Laterality: Bilateral;   • LUMBAR FUSION N/A 10/4/2022    Procedure: DAY 1 LUMBAR LATERAL INTERBODY FUSION WITH NEURO ROBOT L2, L3.L4;  Surgeon: John Do MD;  Location: [facility];  Service: Robotics - Neuro;  Laterality: N/A;  left side approach   • MOUTH SURGERY         Visit Dx:     ICD-10-CM ICD-9-CM   1. S/P lumbar fusion   Z98.1 V45.4   2. Decreased range of motion  M25.60 719.50   3. Weakness of both lower extremities  R29.898 729.89   4. Gait difficulty  R26.9 781.2   5. Spinal stenosis of lumbar region with neurogenic claudication  M48.062 724.03   6. Weakness of right lower extremity  R29.898 729.89                             Therapy Education  Given: Symptoms/condition management, Posture/body mechanics, Mobility training  Program: Reinforced  How Provided: Verbal, Demonstration  Provided to: Patient  Level of Understanding: Verbalized, Demonstrated      PT OP Goals     Row Name 01/18/23 0900          PT Short Term Goals    STG Date to Achieve 11/16/22  -CN     STG 1 Pt will be independent with initial HEP.  -CN     STG 1 Progress Met  -CN     STG 2 Pt will demonstrate correct sitting and standing posture to reduce strain on lumbar spine.  -CN     STG 2 Progress Met  -CN     STG 3 Pt will demonstrate good body mechanics with bending, lifting and reaching ADLs  to reduce strain on spine.  -CN     STG 3 Progress Partially Met  -CN     STG 3 Progress Comments Pt able to perform ADLs with significant fatigue, attempting good body mechanics.  -CN        Long Term Goals    LTG Date to Achieve 12/16/22  -CN     LTG 1 Pt will be independent with advanced HEP for spinal stabilization for improved self-management of symptoms.  -CN     LTG 1 Progress Ongoing  -CN     LTG 1 Progress Comments Progressing as tolerated.  -CN     LTG 2 Pt will report 50% or> decrease in pain with ADLs.  -CN     LTG 2 Progress Met  -CN     LTG 2 Progress Comments Pt reports 50% improvement in pain levels with ADls.  -CN     LTG 3 Pt will score 50% or less on BENEDICT indicating decrease in perceived functional disability.  -CN     LTG 3 Progress Ongoing  -CN     LTG 4 Pt will demonstrate increased R LE strength to 4/5 or better.  -CN     LTG 4 Progress Ongoing  -CN           User Key  (r) = Recorded By, (t) = Taken By, (c) = Cosigned By    Initials Name Provider  Type    CN Tere Granger, PT Physical Therapist                 PT Assessment/Plan     Row Name 01/18/23 1002 01/18/23 0947       PT Assessment    Functional Limitations -- Impaired gait;Limitation in home management;Limitations in community activities;Limitations in functional capacity and performance;Performance in leisure activities;Performance in self-care ADL;Performance in sport activities  -CN    Impairments -- Pain;Muscle strength;Posture;Range of motion;Poor body mechanics;Gait;Endurance;Balance  -CN    Assessment Comments -- Omar Choudhary has been seen for 13 physical therapy sessions s/p lumbar surgery on 10/4 and 10/5 for lumbar lateral interbody fusion wtih neuro robot L2, L3, L4, and lumbar laminectomy transforaminal lumbar interbody fusion L2, L3, L4.  Treatment has included therapeutic exercise and patient education with home exercise program . Progress to physical therapy goals is good. Pt has met or partially met 3/3 STG and 1/5 LTG. Pt reporting no pain today and 50% improvement in pain levels overall since evaluation. Pt wearing bone stimulator 2x/day and has not returned to normalized recreational tasks including biking, swimming. Pt highly motivated and tends to overdo activity. Beginning to discuss return to water walking, use of RCB, dec length of time to test/increase endurance. Pt limited by fatigue with home tasks, using good form as able. However form likely diminishing with fatigue onset. Pt resting intermittently as needed. Pt also with difficulty donning shoes at this time per pain and flexibility. . He will benefit from continued skilled physical therapy to address remaining impairments and functional limitations.  -CN    Please refer to paper survey for additional self-reported information -- Yes  -CN    Rehab Potential -- Good  -CN    Patient/caregiver participated in establishment of treatment plan and goals -- Yes  -CN    Patient would benefit from skilled therapy  intervention -- Yes  -CN       PT Plan    PT Frequency -- 1x/week;2x/week  -CN    Predicted Duration of Therapy Intervention (PT) -- 6-10 visits  -CN    Planned CPT's? -- PT RE-EVAL: 30790;PT THER PROC EA 15 MIN: 44013;PT THER ACT EA 15 MIN: 67029;PT MANUAL THERAPY EA 15 MIN: 27073;PT NEUROMUSC RE-EDUCATION EA 15 MIN: 45345;PT GAIT TRAINING EA 15 MIN: 64669;PT AQUATIC THERAPY EA 15 MIN: 78307;PT SELF CARE/HOME MGMT/TRAIN EA 15: 57240;PT HOT OR COLD PACK TREAT MCARE;PT ELECTRICAL STIM UNATTEND: ;PT HOT/COLD PACK WC NONMCARE: 81163  -CN    PT Plan Comments --  -CN Consider transition to circuit training next visit. Continue to discuss return to aquatic environment (water walking, HEP in water vs laps with kickboard), RCB.  -CN          User Key  (r) = Recorded By, (t) = Taken By, (c) = Cosigned By    Initials Name Provider Type    Tere Murray, PT Physical Therapist                   OP Exercises     Row Name 01/18/23 0900             Subjective Comments    Subjective Comments I am feeling good today. Pt arrived at 9:23 for 9:15 appointment.  -CN         Subjective Pain    Able to rate subjective pain? yes  -CN      Pre-Treatment Pain Level 0  -CN         Total Minutes    99902 - PT Therapeutic Exercise Minutes 38  -CN         Exercise 1    Exercise Name 1 Nu Step  -CN      Cueing 1 Verbal  -CN      Time 1 5 min lvl 5  -CN         Exercise 2    Exercise Name 2 HL march w/TA  -CN      Sets 2 2  -CN      Reps 2 10 ea  -CN         Exercise 3    Exercise Name 3 clamshells  -CN      Cueing 3 Verbal;Tactile  -CN      Sets 3 2  -CN      Reps 3 10  -CN      Time 3 GTB  -CN         Exercise 4    Exercise Name 4 low hip bridges w/TA  -CN      Sets 4 2  -CN      Reps 4 10  -CN      Time 4 GTB  -CN         Exercise 5    Exercise Name 5 HL AROM moving into Fig 4 position to improve strength/control of bringing foot up to don socks and shoes  -CN      Cueing 5 Verbal;Demo  -CN      Reps 5 10  -CN         Exercise  10    Exercise Name 10 small step up  -CN      Cueing 10 Verbal;Demo  -CN      Sets 10 2  -CN      Reps 10 10  -CN      Additional Comments w/knee   -CN            User Key  (r) = Recorded By, (t) = Taken By, (c) = Cosigned By    Initials Name Provider Type    Tere Murray, PT Physical Therapist                                        Time Calculation:     Start Time: 0923  Stop Time: 1001  Time Calculation (min): 38 min  Timed Charges  16390 - PT Therapeutic Exercise Minutes: 38  Total Minutes  Timed Charges Total Minutes: 38   Total Minutes: 38     Therapy Charges for Today     Code Description Service Date Service Provider Modifiers Qty    [id number]  PT THER PROC EA 15 MIN 1/18/2023 Tere Granger, PT GP 3                    Tere Granger PT  1/18/2023

## 2023-01-19 RX ORDER — THEOPHYLLINE 300 MG/1
300 TABLET, EXTENDED RELEASE ORAL DAILY
Qty: 90 TABLET | Refills: 1 | Status: SHIPPED | OUTPATIENT
Start: 2023-01-19

## 2023-01-25 ENCOUNTER — HOSPITAL ENCOUNTER (OUTPATIENT)
Dept: PHYSICAL THERAPY | Facility: HOSPITAL | Age: 78
Discharge: HOME | End: 2023-01-25
Admitting: PHYSICAL MEDICINE & REHABILITATION
Payer: MEDICARE

## 2023-01-25 DIAGNOSIS — Z98.1 S/P LUMBAR FUSION: Primary | ICD-10-CM

## 2023-01-25 DIAGNOSIS — R26.9 GAIT DIFFICULTY: ICD-10-CM

## 2023-01-25 DIAGNOSIS — R29.898 WEAKNESS OF BOTH LOWER EXTREMITIES: ICD-10-CM

## 2023-01-25 DIAGNOSIS — M25.60 DECREASED RANGE OF MOTION: ICD-10-CM

## 2023-01-25 PROCEDURE — 97110 THERAPEUTIC EXERCISES: CPT

## 2023-01-25 NOTE — THERAPY TREATMENT NOTE
Outpatient Physical Therapy Ortho Treatment Note  [facility]     Patient Name: Omar Choudhary  : 1945  MRN: [id number]  Today's Date: 2023      Visit Date: 2023    Visit Dx:    ICD-10-CM ICD-9-CM   1. S/P lumbar fusion  Z98.1 V45.4   2. Decreased range of motion  M25.60 719.50   3. Weakness of both lower extremities  R29.898 729.89   4. Gait difficulty  R26.9 781.2       Patient Active Problem List   Diagnosis   • Benign prostatic hyperplasia   • Bursitis   • Depression   • Diverticulosis   • Family history of malignant neoplasm of breast   • Hyperlipidemia   • Hypertension   • Internal derangement of right knee   • Knee effusion   • Asthma   • Obstructive sleep apnea syndrome   • Osteoarthritis   • Osteopenia   • Pain in knee   • Postnasal drip   • Prepatellar bursitis   • Sciatica of right side   • Diaphragm paralysis   • Spinal stenosis of lumbar region with neurogenic claudication   • Degenerative lumbar spinal stenosis   • Anxiety   • Pinguecula of right eye   • Lumbar radiculopathy, chronic        Past Medical History:   Diagnosis Date   • Allergic    • Arthritis    • Asthma    • Carpal tunnel syndrome     no pain   • Cataract    • Depression    • Family history of malignant neoplasm of breast 2019   • Hyperlipidemia 2019   • Hypertension 2012   • Leg pain, right    • Low back pain    • Neuropathy     feet   • Osteopenia    • Poor balance    • Reactive airway disease 01/15/2016   • Shortness of breath         Past Surgical History:   Procedure Laterality Date   • KNEE ARTHROSCOPY W/ ACL RECONSTRUCTION Right 2019   • LUMBAR FUSION Bilateral 10/5/2022    Procedure: DAY 2 LUMBAR LAMINECTOMY TRANSFORAMINAL LUMBAR INTERBODY FUSION L2,L3,L4 WITH NEURO ROBOT;  Surgeon: John Do MD;  Location: [facility] OR;  Service: Robotics - Neuro;  Laterality: Bilateral;   • LUMBAR FUSION N/A 10/4/2022    Procedure: DAY 1 LUMBAR LATERAL INTERBODY FUSION WITH NEURO ROBOT L2,  L3.L4;  Surgeon: John Do MD;  Location: [facility] MAIN OR;  Service: Robotics - Neuro;  Laterality: N/A;  left side approach   • MOUTH SURGERY                          PT Assessment/Plan     Row Name 01/25/23 1023          PT Assessment    Assessment Comments Pt reporting increased pain levels per bending to work on paperwork for 2.5 hours yesterday as well as carrying 3 gallon gas tank. Discussed protection of affected tissues and avoiding heavy lifting and excessive bending at this time. Pt frequently exacerbates symptoms with home tasks which takes several days to recover and minor setbacks likely delaying recovery. Progressed to circuit training today with fatigue at end of session. Pt reports forgetting inhaler for asthma this am and O2 sats monitored throughout per SOA at times. O2 ranging from % and educated on PLB.  -CN        PT Plan    PT Plan Comments Assess response to circuit training and continue to progress functional strengthening as tolerated. May consider SLS next visit.  -CN           User Key  (r) = Recorded By, (t) = Taken By, (c) = Cosigned By    Initials Name Provider Type    CN Tere Granger, PT Physical Therapist                   OP Exercises     Row Name 01/25/23 0900             Subjective Comments    Subjective Comments I had a bad day yesterday. 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 I also lifted a 3 gallon gas tank which was probably 30-40#. I took pain meds this morning. Pt arrived at 9:23 for 9:15 appointment, able to accomodate full treatment per PT schedule.  -CN         Subjective Pain    Able to rate subjective pain? yes  -CN      Pre-Treatment Pain Level 5  -CN         Total Minutes    53496 - PT Therapeutic Exercise Minutes 40  -CN         Exercise 1    Exercise Name 1 RCB  -CN      Cueing 1 Verbal  -CN      Time 1 5 min  -CN         Exercise 2    Exercise Name 2 CIRCUIT 1: STS from mat table with L2 ball press  -CN      Additional Comments  cues for TA  -CN         Exercise 3    Exercise Name 3 Resisted sidestepping  -CN      Cueing 3 Verbal;Demo  -CN      Reps 3 3 laps  -CN      Time 3 RTB  -CN         Exercise 4    Exercise Name 4 Stagger stance L2 ball rotation  -CN      Cueing 4 Verbal;Demo  -CN      Reps 4 10 B  -CN      Additional Comments Repeat circuit 1 x2  -CN         Exercise 5    Exercise Name 5 CIRCUIT 2: High plank on wall with marches  -CN      Cueing 5 Verbal;Demo  -CN      Reps 5 10  -CN         Exercise 6    Exercise Name 6 Step up with knee  and bicep curl  -CN      Cueing 6 Verbal;Demo  -CN      Reps 6 10 B  -CN      Additional Comments unilateral bicep curl with ipsilateral UE  -CN         Exercise 7    Exercise Name 7 SLS with kickstand shoulder ext  -CN      Cueing 7 Verbal;Demo  -CN      Reps 7 10 B  -CN      Time 7 RTB  -CN      Additional Comments repeat x1  -CN            User Key  (r) = Recorded By, (t) = Taken By, (c) = Cosigned By    Initials Name Provider Type    Tere Murray, PT Physical Therapist                              PT OP Goals     Row Name 01/25/23 1000          PT Short Term Goals    STG Date to Achieve 11/16/22  -CN     STG 1 Pt will be independent with initial HEP.  -CN     STG 1 Progress Met  -CN     STG 2 Pt will demonstrate correct sitting and standing posture to reduce strain on lumbar spine.  -CN     STG 2 Progress Met  -CN     STG 3 Pt will demonstrate good body mechanics with bending, lifting and reaching ADLs  to reduce strain on spine.  -CN     STG 3 Progress Partially Met  -CN        Long Term Goals    LTG Date to Achieve 12/16/22  -CN     LTG 1 Pt will be independent with advanced HEP for spinal stabilization for improved self-management of symptoms.  -CN     LTG 1 Progress Ongoing  -CN     LTG 1 Progress Comments Progressed to circuit training today.  -CN     LTG 2 Pt will report 50% or> decrease in pain with ADLs.  -CN     LTG 2 Progress Met  -CN     LTG 3 Pt will score 50%  or less on BENEDICT indicating decrease in perceived functional disability.  -CN     LTG 3 Progress Ongoing  -CN     LTG 4 Pt will demonstrate increased R LE strength to 4/5 or better.  -CN     LTG 4 Progress Ongoing  -CN           User Key  (r) = Recorded By, (t) = Taken By, (c) = Cosigned By    Initials Name Provider Type    Tere Murray, PT Physical Therapist                Therapy Education  Education Details: Educated on safety with home tasks, avoiding lifting and prolonged bending  Given: Symptoms/condition management, Posture/body mechanics, Mobility training  Program: Progressed  How Provided: Verbal, Demonstration  Provided to: Patient  Level of Understanding: Verbalized, Demonstrated              Time Calculation:   Start Time: 0923  Stop Time: 1007  Time Calculation (min): 44 min  Timed Charges  86893 - PT Therapeutic Exercise Minutes: 40  Total Minutes  Timed Charges Total Minutes: 40   Total Minutes: 40  Therapy Charges for Today     Code Description Service Date Service Provider Modifiers Qty    [id number] HC PT THER PROC EA 15 MIN 1/25/2023 Tere Granger, PT GP 3                    Tere Granger PT  1/25/2023

## 2023-01-30 DIAGNOSIS — F41.9 ANXIETY: ICD-10-CM

## 2023-01-31 DIAGNOSIS — J45.20 MILD INTERMITTENT ASTHMA WITHOUT COMPLICATION: ICD-10-CM

## 2023-01-31 RX ORDER — CLONAZEPAM 0.5 MG/1
TABLET ORAL
Qty: 30 TABLET | Refills: 0 | Status: SHIPPED | OUTPATIENT
Start: 2023-01-31 | End: 2023-03-08

## 2023-01-31 RX ORDER — MONTELUKAST SODIUM 10 MG/1
10 TABLET ORAL DAILY
Qty: 90 TABLET | Refills: 0 | Status: SHIPPED | OUTPATIENT
Start: 2023-01-31 | End: 2023-04-04

## 2023-01-31 RX ORDER — BACLOFEN 10 MG/1
TABLET ORAL
Qty: 45 TABLET | Refills: 0 | Status: SHIPPED | OUTPATIENT
Start: 2023-01-31

## 2023-01-31 NOTE — TELEPHONE ENCOUNTER
Rx Refill Note  Requested Prescriptions     Pending Prescriptions Disp Refills   • baclofen (LIORESAL) 10 MG tablet [Pharmacy Med Name: Baclofen Oral Tablet 10 MG] 45 tablet 0     Sig: TAKE ONE TABLET BY MOUTH THREE TIMES DAILY      Last office visit with prescribing clinician: 12/27/2022   Last telemedicine visit with prescribing clinician: 3/28/2023   Next office visit with prescribing clinician: 3/28/2023                         Would you like a call back once the refill request has been completed: [] Yes [] No    If the office needs to give you a call back, can they leave a voicemail: [] Yes [] No    Isaura Bentley MA  01/31/23, 08:34 EST

## 2023-01-31 NOTE — TELEPHONE ENCOUNTER
Requested Prescriptions:   Requested Prescriptions     Pending Prescriptions Disp Refills   • montelukast (SINGULAIR) 10 MG tablet [Pharmacy Med Name: Montelukast Sodium Oral Tablet 10 MG] 90 tablet 0     Sig: TAKE 1 TABLET BY MOUTH DAILY        Pharmacy where request should be sent: [id number] [place], [place] - [address] - [phone]  - [phone] FX     Does the patient have less than a 3 day supply:  [x] Yes  [] No    Heavenly David, Zia Rep   01/31/23 12:30 EST

## 2023-02-01 ENCOUNTER — HOSPITAL ENCOUNTER (OUTPATIENT)
Dept: PHYSICAL THERAPY | Facility: HOSPITAL | Age: 78
Setting detail: THERAPIES SERIES
Discharge: HOME | End: 2023-02-01
Payer: MEDICARE

## 2023-02-01 DIAGNOSIS — Z98.1 S/P LUMBAR FUSION: Primary | ICD-10-CM

## 2023-02-01 DIAGNOSIS — R29.898 WEAKNESS OF BOTH LOWER EXTREMITIES: ICD-10-CM

## 2023-02-01 DIAGNOSIS — M25.60 DECREASED RANGE OF MOTION: ICD-10-CM

## 2023-02-01 DIAGNOSIS — R26.9 GAIT DIFFICULTY: ICD-10-CM

## 2023-02-01 PROCEDURE — 97110 THERAPEUTIC EXERCISES: CPT

## 2023-02-01 NOTE — THERAPY TREATMENT NOTE
Outpatient Physical Therapy Ortho Treatment Note  [facility]     Patient Name: Omar Choudhary  : 1945  MRN: [id number]  Today's Date: 2023      Visit Date: 2023    Visit Dx:    ICD-10-CM ICD-9-CM   1. S/P lumbar fusion  Z98.1 V45.4   2. Decreased range of motion  M25.60 719.50   3. Weakness of both lower extremities  R29.898 729.89   4. Gait difficulty  R26.9 781.2       Patient Active Problem List   Diagnosis   • Benign prostatic hyperplasia   • Bursitis   • Depression   • Diverticulosis   • Family history of malignant neoplasm of breast   • Hyperlipidemia   • Hypertension   • Internal derangement of right knee   • Knee effusion   • Asthma   • Obstructive sleep apnea syndrome   • Osteoarthritis   • Osteopenia   • Pain in knee   • Postnasal drip   • Prepatellar bursitis   • Sciatica of right side   • Diaphragm paralysis   • Spinal stenosis of lumbar region with neurogenic claudication   • Degenerative lumbar spinal stenosis   • Anxiety   • Pinguecula of right eye   • Lumbar radiculopathy, chronic        Past Medical History:   Diagnosis Date   • Allergic    • Arthritis    • Asthma    • Carpal tunnel syndrome     no pain   • Cataract    • Depression    • Family history of malignant neoplasm of breast 2019   • Hyperlipidemia 2019   • Hypertension 2012   • Leg pain, right    • Low back pain    • Neuropathy     feet   • Osteopenia    • Poor balance    • Reactive airway disease 01/15/2016   • Shortness of breath         Past Surgical History:   Procedure Laterality Date   • KNEE ARTHROSCOPY W/ ACL RECONSTRUCTION Right 2019   • LUMBAR FUSION Bilateral 10/5/2022    Procedure: DAY 2 LUMBAR LAMINECTOMY TRANSFORAMINAL LUMBAR INTERBODY FUSION L2,L3,L4 WITH NEURO ROBOT;  Surgeon: John Do MD;  Location: [facility] OR;  Service: Robotics - Neuro;  Laterality: Bilateral;   • LUMBAR FUSION N/A 10/4/2022    Procedure: DAY 1 LUMBAR LATERAL INTERBODY FUSION WITH NEURO ROBOT L2,  L3.L4;  Surgeon: John Do MD;  Location: [facility] MAIN OR;  Service: Robotics - Neuro;  Laterality: N/A;  left side approach   • MOUTH SURGERY                          PT Assessment/Plan     Row Name 02/01/23 1400          PT Assessment    Assessment Comments Mendez presents with c/o feeling exhausted and flagging enthusiasm. He was very active for several days following his last visit and was disappointed with his level of fatigue and exhaustion. We discussed importance of rest and building tolerance to activity. We did have to reduce some reps today due to fatigue but overall tolerance to circuit-style strengthening was okay. Recommended continuing his supported position strenghtening at home with resistance and we will cont with circuit another visit or two before adding to HEP.  -JA        PT Plan    PT Plan Comments assess response to last visit  -MEGHA           User Key  (r) = Recorded By, (t) = Taken By, (c) = Cosigned By    Initials Name Provider Type    Patricia Mays, PT Physical Therapist                   OP Exercises     Row Name 02/01/23 1300             Subjective Comments    Subjective Comments I feel like my enthusiasm is flagging.  -JA         Subjective Pain    Able to rate subjective pain? yes  -JA      Pre-Treatment Pain Level 5  -JA         Total Minutes    27026 - PT Therapeutic Exercise Minutes 40  -JA         Exercise 1    Exercise Name 1 RCB  -JA      Cueing 1 Verbal  -JA      Time 1 5 min  -JA         Exercise 2    Exercise Name 2 CIRCUIT 1: STS from mat table with L2 ball press  -JA      Reps 2 8  -JA      Additional Comments L2 ball  -JA         Exercise 3    Exercise Name 3 Resisted sidestepping  -JA      Cueing 3 Verbal;Demo  -JA      Reps 3 2 laps  -JA      Time 3 RTB  -JA         Exercise 4    Exercise Name 4 Stagger stance L2 ball rotation  -JA      Cueing 4 Verbal;Demo  -JA      Reps 4 10 B  -JA      Additional Comments Repeat circuit 1 x2  -JA         Exercise 5     Exercise Name 5 CIRCUIT 2: High plank on wall with marches  -JA      Cueing 5 Verbal;Demo  -JA      Reps 5 8  -JA         Exercise 6    Exercise Name 6 Step up with knee  and bicep curl  -JA      Cueing 6 Verbal;Demo  -JA      Reps 6 8 B  -JA      Additional Comments unilateral bicep curl with ipsilateral UE on handrail  -JA         Exercise 7    Exercise Name 7 SLS with kickstand shoulder ext  -JA      Cueing 7 Verbal;Demo  -JA      Reps 7 10 B  -JA      Time 7 RTB  -JA            User Key  (r) = Recorded By, (t) = Taken By, (c) = Cosigned By    Initials Name Provider Type    Patricia Mays, PT Physical Therapist                                 Therapy Education  Education Details: discussed importance of rest for healing and pacing activity. Allow a day or two between busier days for better recovery.              Time Calculation:   Start Time: 1330  Stop Time: 1415  Time Calculation (min): 45 min  Timed Charges  15760 - PT Therapeutic Exercise Minutes: 40  Total Minutes  Timed Charges Total Minutes: 40   Total Minutes: 40  Therapy Charges for Today     Code Description Service Date Service Provider Modifiers Qty    [id number]  PT THER PROC EA 15 MIN 2/1/2023 Patricia Reyes, PT GP 3                    Patricia Reyes PT  2/1/2023

## 2023-02-09 ENCOUNTER — HOSPITAL ENCOUNTER (OUTPATIENT)
Dept: PHYSICAL THERAPY | Facility: HOSPITAL | Age: 78
Setting detail: THERAPIES SERIES
Discharge: HOME | End: 2023-02-09
Payer: MEDICARE

## 2023-02-09 DIAGNOSIS — M25.60 DECREASED RANGE OF MOTION: ICD-10-CM

## 2023-02-09 DIAGNOSIS — M48.062 SPINAL STENOSIS OF LUMBAR REGION WITH NEUROGENIC CLAUDICATION: ICD-10-CM

## 2023-02-09 DIAGNOSIS — Z98.1 S/P LUMBAR FUSION: Primary | ICD-10-CM

## 2023-02-09 DIAGNOSIS — R29.898 WEAKNESS OF BOTH LOWER EXTREMITIES: ICD-10-CM

## 2023-02-09 DIAGNOSIS — R26.9 GAIT DIFFICULTY: ICD-10-CM

## 2023-02-09 PROCEDURE — 97110 THERAPEUTIC EXERCISES: CPT

## 2023-02-09 NOTE — THERAPY TREATMENT NOTE
Outpatient Physical Therapy Ortho Treatment Note  [facility]     Patient Name: Omar Choudhary  : 1945  MRN: [id number]  Today's Date: 2023      Visit Date: 2023    Visit Dx:    ICD-10-CM ICD-9-CM   1. S/P lumbar fusion  Z98.1 V45.4   2. Decreased range of motion  M25.60 719.50   3. Weakness of both lower extremities  R29.898 729.89   4. Gait difficulty  R26.9 781.2   5. Spinal stenosis of lumbar region with neurogenic claudication  M48.062 724.03       Patient Active Problem List   Diagnosis   • Benign prostatic hyperplasia   • Bursitis   • Depression   • Diverticulosis   • Family history of malignant neoplasm of breast   • Hyperlipidemia   • Hypertension   • Internal derangement of right knee   • Knee effusion   • Asthma   • Obstructive sleep apnea syndrome   • Osteoarthritis   • Osteopenia   • Pain in knee   • Postnasal drip   • Prepatellar bursitis   • Sciatica of right side   • Diaphragm paralysis   • Spinal stenosis of lumbar region with neurogenic claudication   • Degenerative lumbar spinal stenosis   • Anxiety   • Pinguecula of right eye   • Lumbar radiculopathy, chronic        Past Medical History:   Diagnosis Date   • Allergic    • Arthritis    • Asthma    • Carpal tunnel syndrome     no pain   • Cataract    • Depression    • Family history of malignant neoplasm of breast 2019   • Hyperlipidemia 2019   • Hypertension 2012   • Leg pain, right    • Low back pain    • Neuropathy     feet   • Osteopenia    • Poor balance    • Reactive airway disease 01/15/2016   • Shortness of breath         Past Surgical History:   Procedure Laterality Date   • KNEE ARTHROSCOPY W/ ACL RECONSTRUCTION Right 2019   • LUMBAR FUSION Bilateral 10/5/2022    Procedure: DAY 2 LUMBAR LAMINECTOMY TRANSFORAMINAL LUMBAR INTERBODY FUSION L2,L3,L4 WITH NEURO ROBOT;  Surgeon: John Do MD;  Location: [facility] MAIN OR;  Service: Robotics - Neuro;  Laterality: Bilateral;   • LUMBAR FUSION N/A  10/4/2022    Procedure: DAY 1 LUMBAR LATERAL INTERBODY FUSION WITH NEURO ROBOT L2, L3.L4;  Surgeon: John Do MD;  Location: [facility] MAIN OR;  Service: Robotics - Neuro;  Laterality: N/A;  left side approach   • MOUTH SURGERY                          PT Assessment/Plan     Row Name 02/09/23 1331          PT Assessment    Assessment Comments Pt reporting improved pain and energy this date and able to return to previous exercise repetitions this date. Pt with increased fatigue noted during/following step up with R LE and increased gait impairments with ambulation to chair for seated rest. Pt eager to return to recreational activities and discussed possibly returning to RCB or aquatic activity (water walking, sidestepping; no kickboard) for improved activity endurance as pt requires 2 seated rest breaks during current circuit program. Pt with R hip weakness/instability which increases with fatigue and discussed importance of improving strength in these tissues for optimal movement patterns.  -CN        PT Plan    PT Plan Comments Continue to progress core/hip strengthening as tolerated. May consider Qped alt UE and Qped alt toe slide next visit (if able to tolerate qped position).  -CN           User Key  (r) = Recorded By, (t) = Taken By, (c) = Cosigned By    Initials Name Provider Type    CN Tere Granger, PT Physical Therapist                   OP Exercises     Row Name 02/09/23 1000             Subjective Comments    Subjective Comments I am feeling better this week. I was depressed last week. I had my friend over for dinner last night though so i knew I was feeling better.  -CN         Subjective Pain    Able to rate subjective pain? yes  -CN      Pre-Treatment Pain Level 0  -CN         Total Minutes    45366 - PT Therapeutic Exercise Minutes 40  -CN         Exercise 1    Exercise Name 1 Nustep (RCB busy)  -CN      Cueing 1 Verbal  -CN      Time 1 5 min  -CN         Exercise 2    Exercise Name 2  CIRCUIT 1: STS from mat table with L2 ball press  -CN      Reps 2 10  -CN      Additional Comments cue for TA  -CN         Exercise 3    Exercise Name 3 Resisted sidestepping  -CN      Cueing 3 Verbal;Demo  -CN      Reps 3 3 laps  -CN      Time 3 RTB  -CN         Exercise 4    Exercise Name 4 Stagger stance L2 ball rotation  -CN      Cueing 4 Verbal;Demo  -CN      Reps 4 10 B  -CN      Additional Comments Repeat circuit 1 x2  -CN         Exercise 5    Exercise Name 5 CIRCUIT 2: High plank on wall with marches  -CN      Cueing 5 Verbal;Demo  -CN      Reps 5 10  -CN         Exercise 6    Exercise Name 6 Step up with knee  and bicep curl  -CN      Cueing 6 Verbal;Demo  -CN      Reps 6 10 L, 8 R  -CN      Time 6 4#  -CN      Additional Comments unilateral bicep curl with ipsilateral UE on handrail  -CN         Exercise 7    Exercise Name 7 SLS with kickstand shoulder ext  -CN      Cueing 7 Verbal;Demo  -CN      Reps 7 10 B  -CN      Time 7 GTB  -CN            User Key  (r) = Recorded By, (t) = Taken By, (c) = Cosigned By    Initials Name Provider Type    Tere Murray, PT Physical Therapist                              PT OP Goals     Row Name 02/09/23 1300          PT Short Term Goals    STG Date to Achieve 11/16/22  -CN     STG 1 Pt will be independent with initial HEP.  -CN     STG 1 Progress Met  -CN     STG 2 Pt will demonstrate correct sitting and standing posture to reduce strain on lumbar spine.  -CN     STG 2 Progress Met  -CN     STG 3 Pt will demonstrate good body mechanics with bending, lifting and reaching ADLs  to reduce strain on spine.  -CN     STG 3 Progress Partially Met  -CN        Long Term Goals    LTG Date to Achieve 12/16/22  -CN     LTG 1 Pt will be independent with advanced HEP for spinal stabilization for improved self-management of symptoms.  -CN     LTG 1 Progress Ongoing  -CN     LTG 1 Progress Comments Progressing strengthening as tolerated.  -CN     LTG 2 Pt will report  50% or> decrease in pain with ADLs.  -CN     LTG 2 Progress Met  -CN     LTG 3 Pt will score 50% or less on BENEDICT indicating decrease in perceived functional disability.  -CN     LTG 3 Progress Ongoing  -CN     LTG 4 Pt will demonstrate increased R LE strength to 4/5 or better.  -CN     LTG 4 Progress Ongoing  -CN           User Key  (r) = Recorded By, (t) = Taken By, (c) = Cosigned By    Initials Name Provider Type    Tere Murray, PT Physical Therapist                Therapy Education  Given: Symptoms/condition management, Posture/body mechanics, Mobility training  Program: Reinforced  How Provided: Verbal, Demonstration  Provided to: Patient  Level of Understanding: Verbalized, Demonstrated              Time Calculation:   Start Time: 1002  Stop Time: 1045  Time Calculation (min): 43 min  Timed Charges  62273 - PT Therapeutic Exercise Minutes: 40  Total Minutes  Timed Charges Total Minutes: 40   Total Minutes: 40  Therapy Charges for Today     Code Description Service Date Service Provider Modifiers Qty    12980759158 HC PT THER PROC EA 15 MIN 2/9/2023 Tere Granger, PT GP 3                    Tere Granger PT  2/9/2023

## 2023-02-16 ENCOUNTER — APPOINTMENT (OUTPATIENT)
Dept: PHYSICAL THERAPY | Facility: HOSPITAL | Age: 78
End: 2023-02-16
Payer: MEDICARE

## 2023-02-23 ENCOUNTER — HOSPITAL ENCOUNTER (OUTPATIENT)
Dept: PHYSICAL THERAPY | Facility: HOSPITAL | Age: 78
Setting detail: THERAPIES SERIES
Discharge: HOME | End: 2023-02-23
Payer: MEDICARE

## 2023-02-23 DIAGNOSIS — Z98.1 S/P LUMBAR FUSION: Primary | ICD-10-CM

## 2023-02-23 DIAGNOSIS — M25.60 DECREASED RANGE OF MOTION: ICD-10-CM

## 2023-02-23 DIAGNOSIS — R26.9 GAIT DIFFICULTY: ICD-10-CM

## 2023-02-23 DIAGNOSIS — R29.898 WEAKNESS OF BOTH LOWER EXTREMITIES: ICD-10-CM

## 2023-02-23 PROCEDURE — 97110 THERAPEUTIC EXERCISES: CPT

## 2023-02-23 RX ORDER — SILDENAFIL CITRATE 20 MG/1
TABLET ORAL
Qty: 50 TABLET | Refills: 0 | Status: SHIPPED | OUTPATIENT
Start: 2023-02-23 | End: 2023-04-04

## 2023-02-23 NOTE — THERAPY TREATMENT NOTE
Outpatient Physical Therapy Ortho Treatment Note  [facility]     Patient Name: Omar Choudhary  : 1945  MRN: [id number]  Today's Date: 2023      Visit Date: 2023    Visit Dx:    ICD-10-CM ICD-9-CM   1. S/P lumbar fusion  Z98.1 V45.4   2. Decreased range of motion  M25.60 719.50   3. Weakness of both lower extremities  R29.898 729.89   4. Gait difficulty  R26.9 781.2       Patient Active Problem List   Diagnosis   • Benign prostatic hyperplasia   • Bursitis   • Depression   • Diverticulosis   • Family history of malignant neoplasm of breast   • Hyperlipidemia   • Hypertension   • Internal derangement of right knee   • Knee effusion   • Asthma   • Obstructive sleep apnea syndrome   • Osteoarthritis   • Osteopenia   • Pain in knee   • Postnasal drip   • Prepatellar bursitis   • Sciatica of right side   • Diaphragm paralysis   • Spinal stenosis of lumbar region with neurogenic claudication   • Degenerative lumbar spinal stenosis   • Anxiety   • Pinguecula of right eye   • Lumbar radiculopathy, chronic        Past Medical History:   Diagnosis Date   • Allergic    • Arthritis    • Asthma    • Carpal tunnel syndrome     no pain   • Cataract    • Depression    • Family history of malignant neoplasm of breast 2019   • Hyperlipidemia 2019   • Hypertension 2012   • Leg pain, right    • Low back pain    • Neuropathy     feet   • Osteopenia    • Poor balance    • Reactive airway disease 01/15/2016   • Shortness of breath         Past Surgical History:   Procedure Laterality Date   • KNEE ARTHROSCOPY W/ ACL RECONSTRUCTION Right 2019   • LUMBAR FUSION Bilateral 10/5/2022    Procedure: DAY 2 LUMBAR LAMINECTOMY TRANSFORAMINAL LUMBAR INTERBODY FUSION L2,L3,L4 WITH NEURO ROBOT;  Surgeon: John Do MD;  Location: [facility] OR;  Service: Robotics - Neuro;  Laterality: Bilateral;   • LUMBAR FUSION N/A 10/4/2022    Procedure: DAY 1 LUMBAR LATERAL INTERBODY FUSION WITH NEURO ROBOT L2,  L3.L4;  Surgeon: John Do MD;  Location: [facility] MAIN OR;  Service: Robotics - Neuro;  Laterality: N/A;  left side approach   • MOUTH SURGERY          PT Ortho     Row Name 02/23/23 1000       Subjective Comments    Subjective Comments I am doing better. I woke up this morning and I felt  like taking a walk. I went about 1/4 mile and did okay. I have a question about the height of the cane.  -MEGHA          User Key  (r) = Recorded By, (t) = Taken By, (c) = Cosigned By    Initials Name Provider Type    Patricia Mays, PT Physical Therapist                             PT Assessment/Plan     Row Name 02/23/23 1300          PT Assessment    Assessment Comments Pt presents to therapy noting improved outlook on his rehab but still disappointed in slow nature of rehab. He was able to perform progression of resisted stabilization exercise however he did fatigue more quickly today and was unable to perform Circuit 2. Will introduce Qped next visit and if he can maintain neutral spine add alt UEs. As building strength is slow (typical to his situation) he is nearing point to transition to HEP for now. He will benefit from continuing skilled therapy services 2--3 more appointments to work on transitioning to circuit-type HEP and a gradual self-progression with a f/u in a month or so.  -MEGHA        PT Plan    PT Plan Comments introduce Qped alt UE and alt toe slide with education to do separately at first while maintaining neutral spine.  -MEGHA           User Key  (r) = Recorded By, (t) = Taken By, (c) = Cosigned By    Initials Name Provider Type    Patricia Mays, PT Physical Therapist                   OP Exercises     Row Name 02/23/23 1000             Subjective Comments    Subjective Comments I am doing better. I woke up this morning and I felt  like taking a walk. I went about 1/4 mile and did okay. I have a question about the height of the cane.  -MEGHA         Subjective Pain    Able to rate subjective  pain? yes  -JA      Pre-Treatment Pain Level 0  -JA         Total Minutes    70445 - PT Therapeutic Exercise Minutes 39  -JA         Exercise 1    Exercise Name 1 Nustep (RCB busy)  -JA      Cueing 1 Verbal  -JA      Time 1 5 min  -JA         Exercise 2    Exercise Name 2 CIRCUIT 1: STS from mat table with L2 ball press  -JA      Reps 2 10  -JA      Additional Comments cue for TA  -JA         Exercise 3    Exercise Name 3 Resisted sidestepping  -JA      Cueing 3 Verbal;Demo  -JA      Reps 3 3 laps  -JA      Time 3 RTB  -JA      Additional Comments (first time held red tb and sidestepped 1 step out/back)  -JA         Exercise 4    Exercise Name 4 Stagger stance L2 ball rotation  -JA      Cueing 4 Verbal;Demo  -JA      Reps 4 10 B  -JA      Additional Comments Repeat circuit 1 x2  -JA         Exercise 5    Exercise Name 5 CIRCUIT 2: High plank on wall with marches  -JA      Cueing 5 Verbal;Demo  -JA      Reps 5 pt fatigued, did not do  -JA         Exercise 6    Exercise Name 6 Step up with knee  and bicep curl  -JA      Cueing 6 Verbal;Demo  -JA      Reps 6 pt fatigued, did not do  -JA      Time 6 --  -JA         Exercise 7    Exercise Name 7 SLS with kickstand shoulder ext  -JA      Cueing 7 Verbal;Demo  -JA      Reps 7 pt fatigued, did not do  -JA      Time 7 --  -JA            User Key  (r) = Recorded By, (t) = Taken By, (c) = Cosigned By    Initials Name Provider Type    Patricia Mays, PT Physical Therapist                                 Therapy Education  Education Details: adjusted the height of his cane near gr trochanter and pt noted good response and improved stability during gait. Discussed sensible approach to progressing exercise for building strength.              Time Calculation:   Start Time: 1015  Stop Time: 1054  Time Calculation (min): 39 min  Timed Charges  45681 - PT Therapeutic Exercise Minutes: 39  Total Minutes  Timed Charges Total Minutes: 39   Total Minutes: 39  Therapy  Charges for Today     Code Description Service Date Service Provider Modifiers Qty    84048039768 HC PT THER PROC EA 15 MIN 2/23/2023 Patricia Reyes, PT GP 3                    Patricia Reyes, PT  2/23/2023

## 2023-03-01 ENCOUNTER — OFFICE VISIT (OUTPATIENT)
Dept: PAIN MEDICINE | Facility: CLINIC | Age: 78
End: 2023-03-01
Payer: MEDICARE

## 2023-03-01 VITALS
HEART RATE: 62 BPM | OXYGEN SATURATION: 97 % | DIASTOLIC BLOOD PRESSURE: 79 MMHG | SYSTOLIC BLOOD PRESSURE: 123 MMHG | RESPIRATION RATE: 16 BRPM

## 2023-03-01 DIAGNOSIS — G89.4 CHRONIC PAIN SYNDROME: ICD-10-CM

## 2023-03-01 DIAGNOSIS — M48.061 SPINAL STENOSIS OF LUMBAR REGION WITHOUT NEUROGENIC CLAUDICATION: Primary | ICD-10-CM

## 2023-03-01 PROCEDURE — 99214 OFFICE O/P EST MOD 30 MIN: CPT | Performed by: STUDENT IN AN ORGANIZED HEALTH CARE EDUCATION/TRAINING PROGRAM

## 2023-03-01 NOTE — PROGRESS NOTES
CHIEF COMPLAINT  Chief Complaint   Patient presents with   • Back Pain     3 month f/u  CC  Spinal stenosis of lumbar region treated w/Hydrocodone takes prn last taken was last week        Primary Care  Dennis Kwong MD    Subjective   Omar Choudhary is a 77 y.o. male  who presents for chronic low back pain with both axial and radicular features.  He states that he has had intermittent pain for many years however he has not sought any treatment.  Recently, his pain began to impact his activities of daily living and he sought treatment from his primary care in the form of an MRI.  It showed substantial degenerative changes including severe stenosis at multiple levels as well as foraminal narrowing.  He also has severe scoliosis and severe degenerative disc disease with facet arthropathy.  He received 1 epidural steroid injection from an outside pain provider which did provide him significant benefit however he was discharged to do a failed drug screen.  He admits that he had taken a oxycodone that he had leftover from surgery and forgot to inform the previous provider of this.  He denies any significant numbness or tingling or weakness but does complain of axial type back pain with radiation in the buttock bilaterally.    Back Pain  Pertinent negatives include no numbness or weakness.   Pain  Associated symptoms include arthralgias and myalgias. Pertinent negatives include no numbness or weakness.        Location: Low back with radiation in the buttock bilaterally  Onset: Years ago  Duration: Progressively worsening  Timing: Constant throughout the day  Quality: Sharp, stabbing, aching  Severity: Today: 4       Last Week: 6       Worst: 7  Modifying Factors: The pain is worse with physical activity and movement and exercise and slightly improved with rest and stretching    Physical Therapy: no    Interval Update 03/01/2023: Taking minimal hydrocodone.  He primarily relies on meloxicam or acetaminophen.   Continues with physical therapy and increasing his physical activity    The following portions of the patient's history were reviewed and updated as appropriate: allergies, current medications, past family history, past medical history, past social history, past surgical history and problem list.      Current Outpatient Medications:   •  acetaminophen (TYLENOL) 500 MG tablet, Take 1 tablet by mouth Every 6 (Six) Hours As Needed for Mild Pain., Disp: , Rfl:   •  albuterol sulfate  (90 Base) MCG/ACT inhaler, Inhale 2 puffs Every 4 (Four) Hours As Needed for Wheezing or Shortness of Air., Disp: 18 g, Rfl: 3  •  B Complex Vitamins (VITAMIN B COMPLEX) capsule capsule, Take 1 capsule by mouth Daily. LD 9-27, Disp: , Rfl:   •  baclofen (LIORESAL) 10 MG tablet, TAKE ONE TABLET BY MOUTH THREE TIMES DAILY, Disp: 45 tablet, Rfl: 0  •  budesonide-formoterol (SYMBICORT) 160-4.5 MCG/ACT inhaler, Inhale 2 puffs 2 (Two) Times a Day., Disp: , Rfl:   •  Calcium Carb-Cholecalciferol (Oyster Shell Calcium w/D) 500-5 MG-MCG tablet, TAKE TWO TABLETS BY MOUTH DAILY, Disp: 180 tablet, Rfl: 0  •  carboxymethylcellulose (REFRESH PLUS) 0.5 % solution, Administer 1 drop to both eyes 3 (Three) Times a Day As Needed for Dry Eyes. Bring to hospital, Disp: , Rfl:   •  clonazePAM (KlonoPIN) 0.5 MG tablet, TAKE ONE TABLET BY MOUTH TWICE DAILY AS NEEDED FOR ANXIETY, Disp: 30 tablet, Rfl: 0  •  gabapentin (NEURONTIN) 300 MG capsule, take 1 capsule by mouth every morning, 1 capsule in afternoon and 2 capsules at night, Disp: 120 capsule, Rfl: 5  •  HYDROcodone-acetaminophen (NORCO) 5-325 MG per tablet, Take 1 tablet by mouth Every 6 (Six) Hours As Needed for Severe Pain., Disp: 120 tablet, Rfl: 0  •  lidocaine (LIDODERM) 5 %, Place 1 patch on the skin as directed by provider Daily. Remove & Discard patch within 12 hours or as directed by MD, Disp: 30 each, Rfl: 1  •  losartan (COZAAR) 25 MG tablet, Take 1 tablet by mouth Daily. HOLD / SKIP dose  as directed if systolic blood pressure is lower than 120, Disp: 30 tablet, Rfl: 0  •  montelukast (SINGULAIR) 10 MG tablet, TAKE 1 TABLET BY MOUTH DAILY, Disp: 90 tablet, Rfl: 0  •  Multiple Vitamins-Minerals (EMERGEN-C BLUE PO), Take 1 tablet by mouth Daily. LD 9-27, Disp: , Rfl:   •  Omega-3 Fatty Acids (FISH OIL) 1000 MG capsule capsule, Take 1 capsule by mouth Daily With Breakfast. LD 9-27, Disp: , Rfl:   •  Red Yeast Rice 600 MG capsule, Take 1 capsule by mouth Daily. LD 9-27, Disp: , Rfl:   •  sildenafil (REVATIO) 20 MG tablet, TAKE 1 TO 2 TABLETS BY MOUTH AS NEEDED FOR ERECTILE DYSFUNCTION, Disp: 50 tablet, Rfl: 0  •  theophylline (THEODUR) 300 MG 12 hr tablet, Take 1 tablet by mouth Daily., Disp: 90 tablet, Rfl: 1  •  triamcinolone (KENALOG) 0.025 % cream, Apply 1 application topically to the appropriate area as directed 2 (Two) Times a Day., Disp: , Rfl:   •  Turmeric 400 MG capsule, Take 1 capsule by mouth Daily., Disp: , Rfl:     Review of Systems   Musculoskeletal: Positive for arthralgias, back pain, gait problem and myalgias.   Neurological: Negative for weakness and numbness.       Vitals:    03/01/23 1356   BP: 123/79   Pulse: 62   Resp: 16   SpO2: 97%   PainSc:   4         Objective   Physical Exam  Vitals and nursing note reviewed.   Constitutional:       General: He is not in acute distress.     Appearance: Normal appearance. He is well-developed and normal weight.   Neck:      Trachea: No tracheal deviation.   Musculoskeletal:      Comments: Lumbar Spine Exam:  Tender to palpation over the lumbar paraspinal musculature Yes  Limited range of motion secondary to pain Yes  Facet loading positive: bilateral  Facets tender to palpation: bilateral  Straight leg raise test positive: Equivocal       Neurological:      General: No focal deficit present.      Mental Status: He is alert.      Sensory: No sensory deficit.      Motor: No weakness.           Assessment & Plan   Problems Addressed this Visit     None  Visit Diagnoses     Spinal stenosis of lumbar region without neurogenic claudication    -  Primary    Chronic pain syndrome          Diagnoses       Codes Comments    Spinal stenosis of lumbar region without neurogenic claudication    -  Primary ICD-10-CM: M48.061  ICD-9-CM: 724.02     Chronic pain syndrome     ICD-10-CM: G89.4  ICD-9-CM: 338.4           Plan:  1. He has plenty of hydrocodone left  2. UDS and inspect appropriate  3. Continue working with physical therapy  --- Follow-up as needed           INSPECT REPORT    As part of the patient's treatment plan, I may be prescribing controlled substances. The patient has been made aware of appropriate use of such medications, including potential risk of somnolence, limited ability to drive and/or work safely, and the potential for dependence or overdose. It has also bee made clear that these medications are for use by this patient only, without concomitant use of alcohol or other substances unless prescribed.     Patient has completed prescribing agreement detailing terms of continued prescribing of controlled substances, including monitoring PRASANTH reports, urine drug screening, and pill counts if necessary. The patient is aware that inappropriate use will results in cessation of prescribing such medications.    INSPECT report has been reviewed and scanned into the patient's chart.    As the clinician, I personally reviewed the INSPECT from 2/28/2023.    History and physical exam exhibit continued safe and appropriate use of controlled substances.      EMR Dragon/Transcription disclaimer:   Much of this encounter note is an electronic transcription/translation of spoken language to printed text. The electronic translation of spoken language may permit erroneous, or at times, nonsensical words or phrases to be inadvertently transcribed; Although I have reviewed the note for such errors, some may still exist.

## 2023-03-02 ENCOUNTER — APPOINTMENT (OUTPATIENT)
Dept: PHYSICAL THERAPY | Facility: HOSPITAL | Age: 78
End: 2023-03-02
Payer: MEDICARE

## 2023-03-07 ENCOUNTER — HOSPITAL ENCOUNTER (OUTPATIENT)
Dept: PHYSICAL THERAPY | Facility: HOSPITAL | Age: 78
Setting detail: THERAPIES SERIES
Discharge: HOME | End: 2023-03-07
Payer: MEDICARE

## 2023-03-07 ENCOUNTER — OFFICE VISIT (OUTPATIENT)
Dept: PULMONOLOGY | Facility: HOSPITAL | Age: 78
End: 2023-03-07
Payer: MEDICARE

## 2023-03-07 VITALS
WEIGHT: 185 LBS | SYSTOLIC BLOOD PRESSURE: 118 MMHG | DIASTOLIC BLOOD PRESSURE: 72 MMHG | RESPIRATION RATE: 14 BRPM | OXYGEN SATURATION: 96 % | HEIGHT: 66 IN | HEART RATE: 64 BPM | BODY MASS INDEX: 29.73 KG/M2

## 2023-03-07 DIAGNOSIS — J45.909 ASTHMA, UNSPECIFIED ASTHMA SEVERITY, UNSPECIFIED WHETHER COMPLICATED, UNSPECIFIED WHETHER PERSISTENT: Primary | ICD-10-CM

## 2023-03-07 DIAGNOSIS — R29.898 WEAKNESS OF BOTH LOWER EXTREMITIES: ICD-10-CM

## 2023-03-07 DIAGNOSIS — Z98.1 S/P LUMBAR FUSION: Primary | ICD-10-CM

## 2023-03-07 DIAGNOSIS — R26.9 GAIT DIFFICULTY: ICD-10-CM

## 2023-03-07 DIAGNOSIS — F41.9 ANXIETY: ICD-10-CM

## 2023-03-07 DIAGNOSIS — M25.60 DECREASED RANGE OF MOTION: ICD-10-CM

## 2023-03-07 PROCEDURE — G0463 HOSPITAL OUTPT CLINIC VISIT: HCPCS

## 2023-03-07 PROCEDURE — 97110 THERAPEUTIC EXERCISES: CPT

## 2023-03-07 RX ORDER — FLUTICASONE FUROATE, UMECLIDINIUM BROMIDE AND VILANTEROL TRIFENATATE 200; 62.5; 25 UG/1; UG/1; UG/1
1 POWDER RESPIRATORY (INHALATION) DAILY
Qty: 2 EACH | Refills: 0 | COMMUNITY
Start: 2023-03-07

## 2023-03-07 NOTE — PROGRESS NOTES
PULMONARY/ CRITICAL CARE/ SLEEP MEDICINE OUTPATIENT CONSULT/ FOLLOW UP NOTE        Patient Name:  Omar Choudhary    :  1945    Medical Record:  [id number]    PRIMARY CARE PHYSICIAN     Dennis Kwong MD    REASON FOR CONSULTATION    Omar Choudhary is a 77 y.o. male who is referred for consultation for Asthma  REVIEW OF SYSTEMS    Constitutional:  Denies fever or chills   Eyes:  Denies change in visual acuity   HENT:  Denies nasal congestion or sore throat   Respiratory:  Denies cough or shortness of breath   Cardiovascular:  Denies chest pain or edema   GI:  Denies abdominal pain, nausea, vomiting, bloody stools or diarrhea   :  Denies dysuria   Musculoskeletal:  Denies back pain or joint pain   Integument:  Denies rash   Neurologic:  Denies headache, focal weakness or sensory changes   Endocrine:  Denies polyuria or polydipsia   Lymphatic:  Denies swollen glands   Psychiatric:  Denies depression or anxiety     MEDICAL HISTORY    Past Medical History:   Diagnosis Date   • Allergic    • Arthritis    • Asthma    • Carpal tunnel syndrome     no pain   • Cataract    • Depression    • Family history of malignant neoplasm of breast 2019   • Hyperlipidemia 2019   • Hypertension 2012   • Leg pain, right    • Low back pain    • Neuropathy     feet   • Osteopenia    • Poor balance    • Reactive airway disease 01/15/2016   • Shortness of breath         SURGICAL HISTORY    Past Surgical History:   Procedure Laterality Date   • KNEE ARTHROSCOPY W/ ACL RECONSTRUCTION Right 2019   • LUMBAR FUSION Bilateral 10/5/2022    Procedure: DAY 2 LUMBAR LAMINECTOMY TRANSFORAMINAL LUMBAR INTERBODY FUSION L2,L3,L4 WITH NEURO ROBOT;  Surgeon: John Do MD;  Location: [facility] OR;  Service: Robotics - Neuro;  Laterality: Bilateral;   • LUMBAR FUSION N/A 10/4/2022    Procedure: DAY 1 LUMBAR LATERAL INTERBODY FUSION WITH NEURO ROBOT L2, L3.L4;  Surgeon: John Do MD;  Location: [facility]  OR;  Service: Robotics - Neuro;  Laterality: N/A;  left side approach   • MOUTH SURGERY          FAMILY HISTORY    Family History   Problem Relation Age of Onset   • Heart disease Mother    • Hypertension Mother    • Breast cancer Mother    • Stroke Mother    • Aortic aneurysm Father    • Heart attack Father    • Liver cancer Father        SOCIAL HISTORY    Social History     Tobacco Use   • Smoking status: Former     Packs/day: 0.50     Years: 10.00     Pack years: 5.00     Types: Cigarettes     Start date:      Quit date:      Years since quittin.2     Passive exposure: Past   • Smokeless tobacco: Never   Substance Use Topics   • Alcohol use: Yes     Alcohol/week: 12.0 standard drinks     Types: 10 Glasses of wine, 2 Cans of beer per week        ALLERGIES    Allergies   Allergen Reactions   • Statins Myalgia         MEDICATIONS    Current Outpatient Medications on File Prior to Visit   Medication Sig Dispense Refill   • acetaminophen (TYLENOL) 500 MG tablet Take 1 tablet by mouth Every 6 (Six) Hours As Needed for Mild Pain.     • albuterol sulfate  (90 Base) MCG/ACT inhaler Inhale 2 puffs Every 4 (Four) Hours As Needed for Wheezing or Shortness of Air. 18 g 3   • B Complex Vitamins (VITAMIN B COMPLEX) capsule capsule Take 1 capsule by mouth Daily. LD 9-27     • baclofen (LIORESAL) 10 MG tablet TAKE ONE TABLET BY MOUTH THREE TIMES DAILY 45 tablet 0   • budesonide-formoterol (SYMBICORT) 160-4.5 MCG/ACT inhaler Inhale 2 puffs 2 (Two) Times a Day.     • Calcium Carb-Cholecalciferol (Oyster Shell Calcium w/D) 500-5 MG-MCG tablet TAKE TWO TABLETS BY MOUTH DAILY 180 tablet 0   • carboxymethylcellulose (REFRESH PLUS) 0.5 % solution Administer 1 drop to both eyes 3 (Three) Times a Day As Needed for Dry Eyes. Bring to hospital     • clonazePAM (KlonoPIN) 0.5 MG tablet TAKE ONE TABLET BY MOUTH TWICE DAILY AS NEEDED FOR ANXIETY 30 tablet 0   • gabapentin (NEURONTIN) 300 MG capsule take 1 capsule by mouth  "every morning, 1 capsule in afternoon and 2 capsules at night 120 capsule 5   • HYDROcodone-acetaminophen (NORCO) 5-325 MG per tablet Take 1 tablet by mouth Every 6 (Six) Hours As Needed for Severe Pain. 120 tablet 0   • lidocaine (LIDODERM) 5 % Place 1 patch on the skin as directed by provider Daily. Remove & Discard patch within 12 hours or as directed by MD 30 each 1   • losartan (COZAAR) 25 MG tablet Take 1 tablet by mouth Daily. HOLD / SKIP dose as directed if systolic blood pressure is lower than 120 30 tablet 0   • montelukast (SINGULAIR) 10 MG tablet TAKE 1 TABLET BY MOUTH DAILY 90 tablet 0   • Multiple Vitamins-Minerals (EMERGEN-C BLUE PO) Take 1 tablet by mouth Daily. LD 9-27     • Omega-3 Fatty Acids (FISH OIL) 1000 MG capsule capsule Take 1 capsule by mouth Daily With Breakfast. LD 9-27     • Red Yeast Rice 600 MG capsule Take 1 capsule by mouth Daily. LD 9-27     • sildenafil (REVATIO) 20 MG tablet TAKE 1 TO 2 TABLETS BY MOUTH AS NEEDED FOR ERECTILE DYSFUNCTION 50 tablet 0   • theophylline (THEODUR) 300 MG 12 hr tablet Take 1 tablet by mouth Daily. 90 tablet 1   • triamcinolone (KENALOG) 0.025 % cream Apply 1 application topically to the appropriate area as directed 2 (Two) Times a Day.     • Turmeric 400 MG capsule Take 1 capsule by mouth Daily.       No current facility-administered medications on file prior to visit.       PHYSICAL EXAM    Vitals:    03/07/23 1104   BP: 118/72   BP Location: Left arm   Patient Position: Sitting   Cuff Size: Adult   Pulse: 64   Resp: 14   SpO2: 96%   Weight: 83.9 kg (185 lb)   Height: 167.6 cm (65.98\")        Constitutional:  Well developed, well nourished, no acute distress, non-toxic appearance   Eyes:  PERRL, conjunctiva normal   HENT:  Atraumatic, external ears normal, nose normal, oropharynx moist, no pharyngeal exudates. mallampatti   Neck- normal range of motion, no tenderness, supple   Respiratory:  No respiratory distress, normal breath sounds, no rales, no " wheezing   Cardiovascular:  Normal rate, normal rhythm, no murmurs, no gallops, no rubs   GI:  Soft, nondistended, normal bowel sounds, nontender, no organomegaly, no mass, no rebound, no guarding   :  No costovertebral angle tenderness   Musculoskeletal:  No edema, no tenderness, no deformities. Back- no tenderness  Integument:  Well hydrated, no rash   Lymphatic:  No lymphadenopathy noted   Neurologic:  Alert & oriented x 3, CN 2-12 normal, normal motor function, normal sensory function, no focal deficits noted   Psychiatric:  Speech and behavior appropriate     XR Spine Lumbar 2 or 3 View    Result Date: 12/21/2022  1. Intact posterior lumbar spine fixation hardware at L2-L4 with interbody cage placement at L2-3 and L3-4. 2. Convex left lumbar levocurvature with multilevel advanced disc disease. 3. Negative for acute fracture.  Electronically Signed By-Javier Bourgeois MD On:12/21/2022 12:00 PM This report was finalized on [id number] by  Javier Bourgeois MD.         ASSESSMENT & PLAN:      Asthma (J45.909)  paralysed Left hemidiapghram     Impression: CXR 1/19: Elevated L Hemidiaphragm with bibasilar atelectasis  IgE 5/18: 765  RAST Panel 5/18: +ve  PFts 4/18:  (FEV1 78%, TLC 82%, DLCO 76, DL/%  Repeat Lab 3/19: WBC 5.3, Eosinophil count normal. Mold Panel:negative.       IgE: 515  Xolair weekly injections vs allergy shots  Feels better allergy injections     Tests at [facility]; PFts 8/19: Non specific ventilatory defect. FEV1 70%, + D response, %, DLCO 82%   Methacholine chalenge test 8/19: negative  HRCT : No ILD  VQ: Negative except decrease in LLL related to paralyzed diaphragm  Metabolic cardiopulmonary stress test: negative  Echo: EF 60%, RVSP 31 min  Tried Breo and Bevespi with no improvement in symptoms  Remains on Singulair,   symbicort  expensive and albuterol.   -Also on allergy shots now     Obstructive sleep apnea (G47.33)  Has new ResMed machine,  CPAP at 8-20  Average use  8hrs 27 min. Average AHI 4.2. Patient is on AutoSet 8-20 pressure   Patient is compliant with using CPAP/BiPAP therapy and is benefitting from PAP therapy.     Hypertension (I10)     PLAN:        Currently on Symbicort and albuterol  Montelukast and theophylline     Sample of Breztri no different response to Symbicort  Sample of Trelegy     Discussed with patient utilizing Xolair for elevated IgE as well as either Fasenra or Nucala for elevated Eosinophills          This document has been electronically signed by  Jerrod Lagunas MD  11:07 EST

## 2023-03-07 NOTE — THERAPY PROGRESS REPORT/RE-CERT
Outpatient Physical Therapy Ortho Progress Note  [facility]     Patient Name: Omar Choudhary  : 1945  MRN: [id number]  Today's Date: 3/7/2023      Visit Date: 2023    Patient Active Problem List   Diagnosis   • Benign prostatic hyperplasia   • Bursitis   • Depression   • Diverticulosis   • Family history of malignant neoplasm of breast   • Hyperlipidemia   • Hypertension   • Internal derangement of right knee   • Knee effusion   • Asthma   • Obstructive sleep apnea syndrome   • Osteoarthritis   • Osteopenia   • Pain in knee   • Postnasal drip   • Prepatellar bursitis   • Sciatica of right side   • Diaphragm paralysis   • Spinal stenosis of lumbar region with neurogenic claudication   • Degenerative lumbar spinal stenosis   • Anxiety   • Pinguecula of right eye   • Lumbar radiculopathy, chronic        Past Medical History:   Diagnosis Date   • Allergic    • Arthritis    • Asthma    • Carpal tunnel syndrome     no pain   • Cataract    • Depression    • Family history of malignant neoplasm of breast 2019   • Hyperlipidemia 2019   • Hypertension 2012   • Leg pain, right    • Low back pain    • Neuropathy     feet   • Osteopenia    • Poor balance    • Reactive airway disease 01/15/2016   • Shortness of breath         Past Surgical History:   Procedure Laterality Date   • KNEE ARTHROSCOPY W/ ACL RECONSTRUCTION Right 2019   • LUMBAR FUSION Bilateral 10/5/2022    Procedure: DAY 2 LUMBAR LAMINECTOMY TRANSFORAMINAL LUMBAR INTERBODY FUSION L2,L3,L4 WITH NEURO ROBOT;  Surgeon: John Do MD;  Location: [facility];  Service: Robotics - Neuro;  Laterality: Bilateral;   • LUMBAR FUSION N/A 10/4/2022    Procedure: DAY 1 LUMBAR LATERAL INTERBODY FUSION WITH NEURO ROBOT L2, L3.L4;  Surgeon: John Do MD;  Location: [facility];  Service: Robotics - Neuro;  Laterality: N/A;  left side approach   • MOUTH SURGERY         Visit Dx:     ICD-10-CM ICD-9-CM   1. S/P lumbar fusion   Z98.1 V45.4   2. Decreased range of motion  M25.60 719.50   3. Weakness of both lower extremities  R29.898 729.89   4. Gait difficulty  R26.9 781.2              PT Ortho     Row Name 03/07/23 0900       Myotomal Screen- Lower Quarter Clearing    Hip flexion (L2) Right:;3+ (Fair +);4- (Good -);Left:;4 (Good)  -JA    Knee extension (L3) Right:;3+ (Fair +);4- (Good -);Left:;4 (Good)  -JA    Ankle DF (L4) Bilateral:;4 (Good)  -JA    Ankle PF (S1) Bilateral:;4 (Good);4+ (Good +)  -JA    Knee flexion (S2) Right:;4 (Good);Left:;4+ (Good +)  -JA       Transfers    Comment, (Transfers) no UE, weight not equally distributed secondary to weakness on R  -JA       Gait/Stairs (Locomotion)    Right Sided Gait Deviations Trendelenburg sign;lateral trunk flexion  -MEGHA    Comment, (Gait/Stairs) gait is improving however continues to have trendelenburg and compensatory lateral weight shift, mild forward flexed posture  -MEGHA          User Key  (r) = Recorded By, (t) = Taken By, (c) = Cosigned By    Initials Name Provider Type    Patricia Mays, PT Physical Therapist                                   PT OP Goals     Row Name 03/07/23 1600          PT Short Term Goals    STG Date to Achieve 11/16/22  -MEGHA     STG 1 Pt will be independent with initial HEP.  -MEGHA     STG 1 Progress Met  -MEGHA     STG 2 Pt will demonstrate correct sitting and standing posture to reduce strain on lumbar spine.  -MEGHA     STG 2 Progress Met  -MEGHA     STG 3 Pt will demonstrate good body mechanics with bending, lifting and reaching ADLs  to reduce strain on spine.  -MEGHA     STG 3 Progress Met  -MEGHA        Long Term Goals    LTG Date to Achieve 12/16/22  -MEGHA     LTG 1 Pt will be independent with advanced HEP for spinal stabilization for improved self-management of symptoms.  -MEGHA     LTG 1 Progress Progressing  -MEGHA     LTG 2 Pt will report 50% or> decrease in pain with ADLs.  -MEGHA     LTG 2 Progress Met  -MEGHA     LTG 3 Pt will score 50% or less on BENEDICT indicating  decrease in perceived functional disability.  -MEGHA     LTG 3 Progress Ongoing  -MEGHA     LTG 4 Pt will demonstrate increased R LE strength to 4/5 or better.  -MEGHA     LTG 4 Progress Ongoing;Progressing  -MEGHA     LTG 4 Progress Comments R trendelenburg, hip flexion 4-/5 - 4/5, knee ext 3+/5, hip abd 3+/5  -MEGHA           User Key  (r) = Recorded By, (t) = Taken By, (c) = Cosigned By    Initials Name Provider Type    Patricia Mays, PT Physical Therapist                 PT Assessment/Plan     Row Name 03/07/23 1600          PT Assessment    Assessment Comments Omar Choudhary has been seen for 18 visits post-op lumbar lateral interbody fusion w/neuro robot L2, L3, L4, and lumbar laminectomy transforaminal lumbar interbody fusion L2, L3, L4 (on 10/4/22 & 10/5/22). His progress has been somewhat slow due to disuse atrophy prior to surgery and post-op weakness fatigue due to extent of surgical interventions. He does demonstrate good improvement in gait wthout assistive device, however he fatigues fairly quickly and is observed with trendelenburg gait and increased lateral weight shift. Progress toward goals is good with 3/3 STGs now met and 1/4 LTGs met. He will benefit from continuing skilled therapy services at decreased frequency 1x every other week to allow for tissue adaptation and improved mobility/ability with current ther ex assignment.  -MEGHA        PT Plan    PT Plan Comments assess response to Qped alt UE and if able  begin qped alt LE but not combined with UE, progress and update HEP, how is mobility going, trendelenbuerg with gait for distance 175'?  -MEGHA           User Key  (r) = Recorded By, (t) = Taken By, (c) = Cosigned By    Initials Name Provider Type    Patricia Mays, PT Physical Therapist                   OP Exercises     Row Name 03/07/23 0900             Subjective Comments    Subjective Comments I needed this today.  -MEGHA         Subjective Pain    Able to rate subjective pain? yes  -MEGHA       Pre-Treatment Pain Level 1  -JA         Total Minutes    19133 - PT Therapeutic Exercise Minutes 40  -JA         Exercise 1    Exercise Name 1 Nustep (RCB busy)  -JA      Cueing 1 Verbal  -JA      Time 1 5 min  -JA         Exercise 2    Exercise Name 2 CIRCUIT 1: STS from mat table with stagger stance w/biceps curls  -JA      Reps 2 10  -JA         Exercise 3    Exercise Name 3 Resisted sidestepping  -JA      Cueing 3 Verbal;Demo  -JA      Reps 3 3 laps  -JA      Time 3 RTB  -JA         Exercise 4    Exercise Name 4 Balance for R/L each: DLS, 1/2 tandem, tandem  -JA      Cueing 4 Verbal;Demo  -JA      Reps 4 10 HT ea (except with tandem stance)  -JA      Time 4 20-30sec ea  -JA         Exercise 5    Exercise Name 5 seated HzA w/RTB  -JA      Cueing 5 Verbal;Demo  -JA      Sets 5 2  -JA      Reps 5 8  -JA         Exercise 6    Exercise Name 6 Step up with knee  and bicep curl  -JA      Cueing 6 Verbal;Demo  -JA      Reps 6 resume next visit  -JA      Time 6 --  -JA         Exercise 7    Exercise Name 7 Sideways step and reach  -JA      Cueing 7 Verbal;Demo  -JA      Reps 7 10 ea R/L  -JA         Exercise 8    Exercise Name 8 Clock FW/Side/BW  -JA      Sets 8 3 sets  -JA      Reps 8 3 each side  -JA      Time 8 small steps  -JA         Exercise 9    Exercise Name 9 Quadruped w/TA alt UE  -JA      Reps 9 10  -JA            User Key  (r) = Recorded By, (t) = Taken By, (c) = Cosigned By    Initials Name Provider Type    Patricia Mays, PT Physical Therapist                                        Time Calculation:     Start Time: 0932  Stop Time: 1015  Time Calculation (min): 43 min  Timed Charges  47071 - PT Therapeutic Exercise Minutes: 40  Total Minutes  Timed Charges Total Minutes: 40   Total Minutes: 40     Therapy Charges for Today     Code Description Service Date Service Provider Modifiers Qty    77566718774 HC PT THER PROC EA 15 MIN 3/7/2023 Patricia Reyes PT GP 3                    Patricia  TA Reyes, PT  3/7/2023

## 2023-03-08 RX ORDER — CLONAZEPAM 0.5 MG/1
TABLET ORAL
Qty: 30 TABLET | Refills: 1 | Status: SHIPPED | OUTPATIENT
Start: 2023-03-08

## 2023-03-13 ENCOUNTER — APPOINTMENT (OUTPATIENT)
Dept: PHYSICAL THERAPY | Facility: HOSPITAL | Age: 78
End: 2023-03-13
Payer: MEDICARE

## 2023-03-20 ENCOUNTER — TELEPHONE (OUTPATIENT)
Dept: FAMILY MEDICINE CLINIC | Facility: CLINIC | Age: 78
End: 2023-03-20
Payer: MEDICARE

## 2023-03-20 ENCOUNTER — HOSPITAL ENCOUNTER (OUTPATIENT)
Dept: PHYSICAL THERAPY | Facility: HOSPITAL | Age: 78
Setting detail: THERAPIES SERIES
Discharge: HOME | End: 2023-03-20
Payer: MEDICARE

## 2023-03-20 DIAGNOSIS — Z98.1 S/P LUMBAR FUSION: Primary | ICD-10-CM

## 2023-03-20 DIAGNOSIS — M25.60 DECREASED RANGE OF MOTION: ICD-10-CM

## 2023-03-20 DIAGNOSIS — R26.9 GAIT DIFFICULTY: ICD-10-CM

## 2023-03-20 DIAGNOSIS — R29.898 WEAKNESS OF BOTH LOWER EXTREMITIES: ICD-10-CM

## 2023-03-20 PROCEDURE — 97110 THERAPEUTIC EXERCISES: CPT

## 2023-03-20 NOTE — TELEPHONE ENCOUNTER
"  Hub staff attempted to follow warm transfer process and was unsuccessful     Caller: Omar Choudhary \"Mendez\"    Relationship to patient: Self    Best call back number: *  Omar Choudhary \"Mendez\" (Self) [phone] (Mobile)         Patient is needing: SCHEDULE LAB APPT PRIOR TO MW[facility]   "

## 2023-03-20 NOTE — THERAPY TREATMENT NOTE
Outpatient Physical Therapy Ortho Treatment Note  [facility]     Patient Name: Omar Choudhary  : 1945  MRN: [id number]  Today's Date: 3/20/2023      Visit Date: 2023    Visit Dx:    ICD-10-CM ICD-9-CM   1. S/P lumbar fusion  Z98.1 V45.4   2. Decreased range of motion  M25.60 719.50   3. Weakness of both lower extremities  R29.898 729.89   4. Gait difficulty  R26.9 781.2       Patient Active Problem List   Diagnosis   • Benign prostatic hyperplasia   • Bursitis   • Depression   • Diverticulosis   • Family history of malignant neoplasm of breast   • Hyperlipidemia   • Hypertension   • Internal derangement of right knee   • Knee effusion   • Asthma   • Obstructive sleep apnea syndrome   • Osteoarthritis   • Osteopenia   • Pain in knee   • Postnasal drip   • Prepatellar bursitis   • Sciatica of right side   • Diaphragm paralysis   • Spinal stenosis of lumbar region with neurogenic claudication   • Degenerative lumbar spinal stenosis   • Anxiety   • Pinguecula of right eye   • Lumbar radiculopathy, chronic        Past Medical History:   Diagnosis Date   • Allergic    • Arthritis    • Asthma    • Carpal tunnel syndrome     no pain   • Cataract    • Depression    • Family history of malignant neoplasm of breast 2019   • Hyperlipidemia 2019   • Hypertension 2012   • Leg pain, right    • Low back pain    • Neuropathy     feet   • Osteopenia    • Poor balance    • Reactive airway disease 01/15/2016   • Shortness of breath         Past Surgical History:   Procedure Laterality Date   • KNEE ARTHROSCOPY W/ ACL RECONSTRUCTION Right 2019   • LUMBAR FUSION Bilateral 10/5/2022    Procedure: DAY 2 LUMBAR LAMINECTOMY TRANSFORAMINAL LUMBAR INTERBODY FUSION L2,L3,L4 WITH NEURO ROBOT;  Surgeon: John Do MD;  Location: [facility] OR;  Service: Robotics - Neuro;  Laterality: Bilateral;   • LUMBAR FUSION N/A 10/4/2022    Procedure: DAY 1 LUMBAR LATERAL INTERBODY FUSION WITH NEURO ROBOT L2,  "L3.L4;  Surgeon: John Do MD;  Location: [facility];  Service: Robotics - Neuro;  Laterality: N/A;  left side approach   • MOUTH SURGERY                          PT Assessment/Plan     Row Name 03/20/23 1500          PT Assessment    Assessment Comments Mendez demonstrates decreasing lateral weight shift with gait when not using STC, an improvement. He presents with report of straining LB while moving 20# of birdseed into trunk of car. We discussed appropriate activity and expectations of return to activity and also of weighing benefits of \"wants\" regarding activity and what is sensible to preserve his improved function since surgery. He has been compliant with the updated HEP. All STGs met and 2/4 LTGs met. Recommend continuing therapy at every other week to progress or modify HEP to facilitate improvement in functional mobility.  -JA        PT Plan    PT Plan Comments assess gait 175' for onset or incidences of Trendelenberg, try birddog again if ready and add if appropriate  -MEGHA           User Key  (r) = Recorded By, (t) = Taken By, (c) = Cosigned By    Initials Name Provider Type    Patricia Mays, PT Physical Therapist                   OP Exercises     Row Name 03/20/23 1400             Subjective Comments    Subjective Comments Had to wrangle a 20# bag of birdseed into the trunk and I can feel the hardware in my back. I see the surgeon on Friday. I have questions for him and want to ask about walking the [place].  -MEGHA         Subjective Pain    Able to rate subjective pain? yes  -MEGHA      Pre-Treatment Pain Level 4  -JA         Total Minutes    42857 - PT Therapeutic Exercise Minutes 40  -JA         Exercise 1    Exercise Name 1 Nustep (RCB busy)  -MEGHA      Cueing 1 Verbal  -MEGHA      Time 1 5 min  -JA         Exercise 2    Exercise Name 2 STS from mat table with stagger stance w/biceps curls  -MEGHA      Sets 2 2 ea  -JA      Reps 2 10  -JA         Exercise 3    Exercise Name 3 Resisted " "sidestepping  -JA      Cueing 3 Verbal;Demo  -JA      Reps 3 held today  -JA      Time 3 --  -JA         Exercise 4    Exercise Name 4 Balance for R/L each: DLS, 1/2 tandem, tandem  -JA      Cueing 4 Verbal;Demo  -JA      Reps 4 10 HT ea (except with tandem stance)  -JA      Time 4 20-30sec ea  -JA         Exercise 5    Exercise Name 5 seated HzA w/RTB  -JA      Cueing 5 Verbal;Demo  -JA      Sets 5 2  -JA      Reps 5 10  -JA      Time 5 Hooklying today due to strain from lifting 20# bird seed  -JA         Exercise 6    Exercise Name 6 Step up with knee  and bicep curl  -JA      Cueing 6 Verbal;Demo  -JA      Sets 6 3\" foam pad  -JA      Reps 6 15 alt  -JA         Exercise 7    Exercise Name 7 Sideways step and reach  -JA      Cueing 7 Verbal;Demo  -JA      Reps 7 10 ea R/L  -JA         Exercise 8    Exercise Name 8 Clock FW/Side/BW  -JA      Sets 8 3 sets  -JA      Reps 8 3 each side  -JA      Time 8 small steps  -JA         Exercise 9    Exercise Name 9 Quadruped w/TA alt UE  -JA      Reps 9 10  -JA      Additional Comments trialed but he is not ready for birddog  -            User Key  (r) = Recorded By, (t) = Taken By, (c) = Cosigned By    Initials Name Provider Type    Patricia Mays, PT Physical Therapist                              PT OP Goals     Row Name 03/20/23 1500          PT Short Term Goals    STG Date to Achieve 11/16/22  -MEGHA     STG 1 Pt will be independent with initial HEP.  -MEGHA     STG 1 Progress Met  -     STG 2 Pt will demonstrate correct sitting and standing posture to reduce strain on lumbar spine.  -MEGHA     STG 2 Progress Met  -MEGHA     STG 3 Pt will demonstrate good body mechanics with bending, lifting and reaching ADLs  to reduce strain on spine.  -MEGHA     STG 3 Progress Met  -        Long Term Goals    LTG Date to Achieve 12/16/22  -MEGHA     LTG 1 Pt will be independent with advanced HEP for spinal stabilization for improved self-management of symptoms.  -MEGHA     LTG 1 " Progress Progressing  -MEGHA     LTG 2 Pt will report 50% or> decrease in pain with ADLs.  -MEGHA     LTG 2 Progress Met  -MEGHA     LTG 3 Pt will score 50% or less on BENEDICT indicating decrease in perceived functional disability.  -MEGHA     LTG 3 Progress Ongoing  -MEGHA     LTG 4 Pt will demonstrate increased R LE strength to 4/5 or better.  -MEGHA     LTG 4 Progress Ongoing;Progressing  -MEGHA           User Key  (r) = Recorded By, (t) = Taken By, (c) = Cosigned By    Initials Name Provider Type    Patricia Mays, PT Physical Therapist                               Time Calculation:   Start Time: 1417  Stop Time: 1500  Time Calculation (min): 43 min  Timed Charges  88661 - PT Therapeutic Exercise Minutes: 40  Total Minutes  Timed Charges Total Minutes: 40   Total Minutes: 40  Therapy Charges for Today     Code Description Service Date Service Provider Modifiers Qty    [id number]  PT THER PROC EA 15 MIN 3/20/2023 Patricia Reyes PT GP 3                    Patricia Reyes PT  3/20/2023

## 2023-03-21 ENCOUNTER — HOSPITAL ENCOUNTER (OUTPATIENT)
Dept: CT IMAGING | Facility: HOSPITAL | Age: 78
Discharge: HOME | End: 2023-03-21
Admitting: NEUROLOGICAL SURGERY
Payer: MEDICARE

## 2023-03-21 DIAGNOSIS — M48.062 SPINAL STENOSIS OF LUMBAR REGION WITH NEUROGENIC CLAUDICATION: ICD-10-CM

## 2023-03-21 DIAGNOSIS — M48.061 DEGENERATIVE LUMBAR SPINAL STENOSIS: ICD-10-CM

## 2023-03-21 DIAGNOSIS — M54.16 LUMBAR RADICULOPATHY, CHRONIC: ICD-10-CM

## 2023-03-21 PROCEDURE — 72131 CT LUMBAR SPINE W/O DYE: CPT

## 2023-03-22 ENCOUNTER — TELEPHONE (OUTPATIENT)
Dept: FAMILY MEDICINE CLINIC | Facility: CLINIC | Age: 78
End: 2023-03-22
Payer: MEDICARE

## 2023-03-22 DIAGNOSIS — D64.9 ANEMIA, UNSPECIFIED TYPE: ICD-10-CM

## 2023-03-22 DIAGNOSIS — E03.9 HYPOTHYROIDISM, UNSPECIFIED TYPE: ICD-10-CM

## 2023-03-22 DIAGNOSIS — R73.09 ELEVATED HEMOGLOBIN A1C: ICD-10-CM

## 2023-03-22 DIAGNOSIS — E78.2 MIXED HYPERLIPIDEMIA: ICD-10-CM

## 2023-03-22 DIAGNOSIS — E53.8 B12 DEFICIENCY: ICD-10-CM

## 2023-03-22 DIAGNOSIS — I10 PRIMARY HYPERTENSION: Primary | ICD-10-CM

## 2023-03-22 DIAGNOSIS — Z12.5 SCREENING FOR PROSTATE CANCER: ICD-10-CM

## 2023-03-22 DIAGNOSIS — E55.9 VITAMIN D DEFICIENCY: ICD-10-CM

## 2023-03-23 ENCOUNTER — LAB (OUTPATIENT)
Dept: FAMILY MEDICINE CLINIC | Facility: CLINIC | Age: 78
End: 2023-03-23
Payer: MEDICARE

## 2023-03-23 DIAGNOSIS — E55.9 VITAMIN D DEFICIENCY: ICD-10-CM

## 2023-03-23 DIAGNOSIS — E03.9 HYPOTHYROIDISM, UNSPECIFIED TYPE: ICD-10-CM

## 2023-03-23 DIAGNOSIS — R73.09 ELEVATED HEMOGLOBIN A1C: ICD-10-CM

## 2023-03-23 DIAGNOSIS — E53.8 B12 DEFICIENCY: ICD-10-CM

## 2023-03-23 DIAGNOSIS — E78.2 MIXED HYPERLIPIDEMIA: ICD-10-CM

## 2023-03-23 DIAGNOSIS — Z12.5 SCREENING FOR PROSTATE CANCER: ICD-10-CM

## 2023-03-23 DIAGNOSIS — I10 PRIMARY HYPERTENSION: ICD-10-CM

## 2023-03-23 LAB
25(OH)D3 SERPL-MCNC: 55.8 NG/ML (ref 30–100)
ALBUMIN SERPL-MCNC: 4.6 G/DL (ref 3.5–5.2)
ALBUMIN/GLOB SERPL: 2 G/DL
ALP SERPL-CCNC: 68 U/L (ref 39–117)
ALT SERPL W P-5'-P-CCNC: 37 U/L (ref 1–41)
ANION GAP SERPL CALCULATED.3IONS-SCNC: 21.1 MMOL/L (ref 5–15)
AST SERPL-CCNC: 36 U/L (ref 1–40)
BASOPHILS # BLD AUTO: 0.03 10*3/MM3 (ref 0–0.2)
BASOPHILS NFR BLD AUTO: 0.6 % (ref 0–1.5)
BILIRUB SERPL-MCNC: 1.2 MG/DL (ref 0–1.2)
BUN SERPL-MCNC: 26 MG/DL (ref 8–23)
BUN/CREAT SERPL: 20.3 (ref 7–25)
CALCIUM SPEC-SCNC: 10 MG/DL (ref 8.6–10.5)
CHLORIDE SERPL-SCNC: 95 MMOL/L (ref 98–107)
CHOLEST SERPL-MCNC: 205 MG/DL (ref 0–200)
CO2 SERPL-SCNC: 23.9 MMOL/L (ref 22–29)
CREAT SERPL-MCNC: 1.28 MG/DL (ref 0.76–1.27)
DEPRECATED RDW RBC AUTO: 47.7 FL (ref 37–54)
EGFRCR SERPLBLD CKD-EPI 2021: 58.4 ML/MIN/1.73
EOSINOPHIL # BLD AUTO: 0.07 10*3/MM3 (ref 0–0.4)
EOSINOPHIL NFR BLD AUTO: 1.5 % (ref 0.3–6.2)
ERYTHROCYTE [DISTWIDTH] IN BLOOD BY AUTOMATED COUNT: 14.6 % (ref 12.3–15.4)
GLOBULIN UR ELPH-MCNC: 2.3 GM/DL
GLUCOSE SERPL-MCNC: 95 MG/DL (ref 65–99)
HBA1C MFR BLD: 5.8 % (ref 4.8–5.6)
HCT VFR BLD AUTO: 41.2 % (ref 37.5–51)
HDLC SERPL-MCNC: 61 MG/DL (ref 40–60)
HGB BLD-MCNC: 14.2 G/DL (ref 13–17.7)
IMM GRANULOCYTES # BLD AUTO: 0.01 10*3/MM3 (ref 0–0.05)
IMM GRANULOCYTES NFR BLD AUTO: 0.2 % (ref 0–0.5)
LDLC SERPL CALC-MCNC: 119 MG/DL (ref 0–100)
LDLC/HDLC SERPL: 1.89 {RATIO}
LYMPHOCYTES # BLD AUTO: 1.26 10*3/MM3 (ref 0.7–3.1)
LYMPHOCYTES NFR BLD AUTO: 26.4 % (ref 19.6–45.3)
MCH RBC QN AUTO: 30.9 PG (ref 26.6–33)
MCHC RBC AUTO-ENTMCNC: 34.5 G/DL (ref 31.5–35.7)
MCV RBC AUTO: 89.8 FL (ref 79–97)
MONOCYTES # BLD AUTO: 0.69 10*3/MM3 (ref 0.1–0.9)
MONOCYTES NFR BLD AUTO: 14.5 % (ref 5–12)
NEUTROPHILS NFR BLD AUTO: 2.71 10*3/MM3 (ref 1.7–7)
NEUTROPHILS NFR BLD AUTO: 56.8 % (ref 42.7–76)
NRBC BLD AUTO-RTO: 0 /100 WBC (ref 0–0.2)
PLATELET # BLD AUTO: 200 10*3/MM3 (ref 140–450)
PMV BLD AUTO: 10.1 FL (ref 6–12)
POTASSIUM SERPL-SCNC: 3.4 MMOL/L (ref 3.5–5.2)
PROT SERPL-MCNC: 6.9 G/DL (ref 6–8.5)
PSA SERPL-MCNC: 0.42 NG/ML (ref 0–4)
RBC # BLD AUTO: 4.59 10*6/MM3 (ref 4.14–5.8)
SODIUM SERPL-SCNC: 140 MMOL/L (ref 136–145)
TRIGL SERPL-MCNC: 145 MG/DL (ref 0–150)
TSH SERPL DL<=0.05 MIU/L-ACNC: 1.12 UIU/ML (ref 0.27–4.2)
VIT B12 BLD-MCNC: 762 PG/ML (ref 211–946)
VLDLC SERPL-MCNC: 25 MG/DL (ref 5–40)
WBC NRBC COR # BLD: 4.77 10*3/MM3 (ref 3.4–10.8)

## 2023-03-23 PROCEDURE — 36415 COLL VENOUS BLD VENIPUNCTURE: CPT

## 2023-03-23 PROCEDURE — 82306 VITAMIN D 25 HYDROXY: CPT | Performed by: FAMILY MEDICINE

## 2023-03-23 PROCEDURE — 80061 LIPID PANEL: CPT | Performed by: FAMILY MEDICINE

## 2023-03-23 PROCEDURE — 85025 COMPLETE CBC W/AUTO DIFF WBC: CPT | Performed by: FAMILY MEDICINE

## 2023-03-23 PROCEDURE — 83036 HEMOGLOBIN GLYCOSYLATED A1C: CPT | Performed by: FAMILY MEDICINE

## 2023-03-23 PROCEDURE — 80053 COMPREHEN METABOLIC PANEL: CPT | Performed by: FAMILY MEDICINE

## 2023-03-23 PROCEDURE — G0103 PSA SCREENING: HCPCS | Performed by: FAMILY MEDICINE

## 2023-03-23 PROCEDURE — 82607 VITAMIN B-12: CPT | Performed by: FAMILY MEDICINE

## 2023-03-23 PROCEDURE — 84443 ASSAY THYROID STIM HORMONE: CPT | Performed by: FAMILY MEDICINE

## 2023-03-24 NOTE — PROGRESS NOTES
Neurosurgical Consultation      Omar Choudhary is a 77 y.o. male is here today for follow-up for lower back pain with a new lumbar CT scan. Today patient reports    Chief Complaint   Patient presents with   • Follow-up     Surgery on 10/4/22        Previous treatment:  Left sided lateral approach for a Transpsoas L2-L4 lateral interbody fusion on 10/4/22.   Physical Therapy/19 visits    HPI: This is a 77-year-old gentleman who underwent a lateral L2-L4 interbody cage placement with subsequent posterior decompression and posterior fusion.  He was last seen in my clinic on December 27, 2022.  He is now approximately 6 months removed from his surgical procedures.  He is doing quite well.  He has had complete resolution of his neurogenic claudication and radiculopathy.  He continues to work to strengthen his back and is actively engaging with physical therapy.  He has weaned himself almost completely off of opioid pain medicine.  He utilizes Tylenol as needed.  His incision is well-healing without any signs of infection or breakdown.  He is ambulating without an assistive device.    Past Medical History:   Diagnosis Date   • Allergic    • Arthritis    • Asthma    • Carpal tunnel syndrome     no pain   • Cataract    • Depression    • Family history of malignant neoplasm of breast 09/26/2019   • Hyperlipidemia 09/26/2019   • Hypertension 03/01/2012   • Leg pain, right    • Low back pain    • Neuropathy     feet   • Osteopenia    • Poor balance    • Reactive airway disease 01/15/2016   • Shortness of breath         Past Surgical History:   Procedure Laterality Date   • KNEE ARTHROSCOPY W/ ACL RECONSTRUCTION Right 2019   • LUMBAR FUSION Bilateral 10/5/2022    Procedure: DAY 2 LUMBAR LAMINECTOMY TRANSFORAMINAL LUMBAR INTERBODY FUSION L2,L3,L4 WITH NEURO ROBOT;  Surgeon: John Do MD;  Location: [facility];  Service: Robotics - Neuro;  Laterality: Bilateral;   • LUMBAR FUSION N/A 10/4/2022    Procedure: DAY 1  LUMBAR LATERAL INTERBODY FUSION WITH NEURO ROBOT L2, L3.L4;  Surgeon: John Do MD;  Location: [facility] MAIN OR;  Service: Robotics - Neuro;  Laterality: N/A;  left side approach   • MOUTH SURGERY          Current Outpatient Medications on File Prior to Visit   Medication Sig Dispense Refill   • acetaminophen (TYLENOL) 500 MG tablet Take 1 tablet by mouth Every 6 (Six) Hours As Needed for Mild Pain.     • albuterol sulfate  (90 Base) MCG/ACT inhaler Inhale 2 puffs Every 4 (Four) Hours As Needed for Wheezing or Shortness of Air. 18 g 3   • B Complex Vitamins (VITAMIN B COMPLEX) capsule capsule Take 1 capsule by mouth Daily. LD 9-27     • baclofen (LIORESAL) 10 MG tablet TAKE ONE TABLET BY MOUTH THREE TIMES DAILY 45 tablet 0   • Calcium Carb-Cholecalciferol (Oyster Shell Calcium w/D) 500-5 MG-MCG tablet TAKE TWO TABLETS BY MOUTH DAILY 180 tablet 0   • carboxymethylcellulose (REFRESH PLUS) 0.5 % solution Administer 1 drop to both eyes 3 (Three) Times a Day As Needed for Dry Eyes. Bring to hospital     • clonazePAM (KlonoPIN) 0.5 MG tablet TAKE ONE TABLET BY MOUTH TWICE DAILY AS NEEDED FOR ANXIETY 30 tablet 1   • Fluticasone-Umeclidin-Vilant (Trelegy Ellipta) 200-62.5-25 MCG/ACT aerosol powder  Inhale 1 puff Daily. 2 each 0   • gabapentin (NEURONTIN) 300 MG capsule take 1 capsule by mouth every morning, 1 capsule in afternoon and 2 capsules at night 120 capsule 5   • HYDROcodone-acetaminophen (NORCO) 5-325 MG per tablet Take 1 tablet by mouth Every 6 (Six) Hours As Needed for Severe Pain. 120 tablet 0   • lidocaine (LIDODERM) 5 % Place 1 patch on the skin as directed by provider Daily. Remove & Discard patch within 12 hours or as directed by MD 30 each 1   • losartan (COZAAR) 25 MG tablet Take 1 tablet by mouth Daily. HOLD / SKIP dose as directed if systolic blood pressure is lower than 120 30 tablet 0   • montelukast (SINGULAIR) 10 MG tablet TAKE 1 TABLET BY MOUTH DAILY 90 tablet 0   • Multiple  Vitamins-Minerals (EMERGEN-C BLUE PO) Take 1 tablet by mouth Daily. LD      • Omega-3 Fatty Acids (FISH OIL) 1000 MG capsule capsule Take 1 capsule by mouth Daily With Breakfast. LD      • Red Yeast Rice 600 MG capsule Take 1 capsule by mouth Daily. LD      • sildenafil (REVATIO) 20 MG tablet TAKE 1 TO 2 TABLETS BY MOUTH AS NEEDED FOR ERECTILE DYSFUNCTION 50 tablet 0   • theophylline (THEODUR) 300 MG 12 hr tablet Take 1 tablet by mouth Daily. 90 tablet 1   • triamcinolone (KENALOG) 0.025 % cream Apply 1 application topically to the appropriate area as directed 2 (Two) Times a Day.     • Turmeric 400 MG capsule Take 1 capsule by mouth Daily.       No current facility-administered medications on file prior to visit.        Allergies   Allergen Reactions   • Statins Myalgia        Social History     Socioeconomic History   • Marital status:    Tobacco Use   • Smoking status: Former     Packs/day: 0.50     Years: 10.00     Pack years: 5.00     Types: Cigarettes     Start date:      Quit date:      Years since quittin.2     Passive exposure: Past   • Smokeless tobacco: Never   Vaping Use   • Vaping Use: Never used   Substance and Sexual Activity   • Alcohol use: Yes     Alcohol/week: 12.0 standard drinks     Types: 10 Glasses of wine, 2 Cans of beer per week   • Drug use: Never     Types: Marijuana, Mescaline, Psilocybin     Comment: CBD gummies- stop now for surgery   • Sexual activity: Yes     Partners: Female          Review of Systems   Constitutional: Positive for activity change.   HENT: Negative.    Eyes: Negative.    Respiratory: Negative.    Cardiovascular: Negative.    Gastrointestinal: Negative.    Endocrine: Negative.    Genitourinary: Negative.    Musculoskeletal: Positive for arthralgias, back pain and myalgias.   Skin: Negative.    Allergic/Immunologic: Negative.    Neurological: Positive for weakness (legs and arms). Negative for numbness.   Hematological: Negative.   "  Psychiatric/Behavioral: Negative.         Physical Examination:     Vitals:    03/28/23 1019   BP: 112/76   Pulse: 64   SpO2: 100%   Weight: 81.5 kg (179 lb 9.6 oz)   Height: 167.6 cm (65.98\")   PainSc:   4        Physical Exam     Neurological Exam   Neurological examination is stable compared to my last evaluation without any new red flag signs.  Incisions are well-healing.    Result Review  The following data was reviewed by: John Do MD on 03/28/2023:    Data reviewed: Radiologic studies CT of the lumbar spine shows stable hardware placement without any signs of hardware complications.  There is indication of osseous bone growth across the disc space.  Decompression posteriorly is evident.     Assessment/plan:  This is a 77-year-old gentleman with history of neurogenic claudication and radiculopathy status post L2-L4 lateral lumbar interbody fusion with posterior pedicle fixation and decompression.  He has had significant relief in his neurologic symptoms.  He is continuing to strengthen his back and making progress with overall activity.  He continues to utilize the bone growth stimulator.  He is working with physical therapy.  His incisions are well-healing.  His CT scan today is indicative of stable hardware without any signs of hardware complications.  There is indication of bone growth.  I recommend continuing the care he has been undergoing.  I have answered extensive questions today and he expresses understanding.  He will return to see me in 6 months with an x-ray of the lumbar spine.    Diagnoses and all orders for this visit:    1. Spinal stenosis of lumbar region with neurogenic claudication (Primary)  -     XR Spine Lumbar Complete With Flex & Ext; Future    2. Lumbar radiculopathy, chronic  Overview:  Added automatically from request for surgery [id number]    Orders:  -     XR Spine Lumbar Complete With Flex & Ext; Future       Return in about 6 months (around 9/28/2023).            John" MD Hi

## 2023-03-27 ENCOUNTER — TELEPHONE (OUTPATIENT)
Dept: FAMILY MEDICINE CLINIC | Facility: CLINIC | Age: 78
End: 2023-03-27
Payer: MEDICARE

## 2023-03-27 NOTE — TELEPHONE ENCOUNTER
HUB to Share the following:  ----- Message from Dennis Kwong MD sent at 3/27/2023  6:22 AM EDT -----  Labs reveal worsened kidney function. I recommend discontinuing any NSAIDs and drinking plenty of fluids. Will repeat this lab and discuss other labs at upcoming visit

## 2023-03-28 ENCOUNTER — OFFICE VISIT (OUTPATIENT)
Dept: NEUROSURGERY | Facility: CLINIC | Age: 78
End: 2023-03-28
Payer: MEDICARE

## 2023-03-28 VITALS
OXYGEN SATURATION: 100 % | HEART RATE: 64 BPM | DIASTOLIC BLOOD PRESSURE: 76 MMHG | WEIGHT: 179.6 LBS | SYSTOLIC BLOOD PRESSURE: 112 MMHG | BODY MASS INDEX: 28.87 KG/M2 | HEIGHT: 66 IN

## 2023-03-28 DIAGNOSIS — M48.062 SPINAL STENOSIS OF LUMBAR REGION WITH NEUROGENIC CLAUDICATION: Primary | ICD-10-CM

## 2023-03-28 DIAGNOSIS — M54.16 LUMBAR RADICULOPATHY, CHRONIC: ICD-10-CM

## 2023-03-28 PROCEDURE — 3078F DIAST BP <80 MM HG: CPT | Performed by: NEUROLOGICAL SURGERY

## 2023-03-28 PROCEDURE — 1160F RVW MEDS BY RX/DR IN RCRD: CPT | Performed by: NEUROLOGICAL SURGERY

## 2023-03-28 PROCEDURE — 1159F MED LIST DOCD IN RCRD: CPT | Performed by: NEUROLOGICAL SURGERY

## 2023-03-28 PROCEDURE — 99214 OFFICE O/P EST MOD 30 MIN: CPT | Performed by: NEUROLOGICAL SURGERY

## 2023-03-28 PROCEDURE — 3074F SYST BP LT 130 MM HG: CPT | Performed by: NEUROLOGICAL SURGERY

## 2023-04-03 DIAGNOSIS — J45.20 MILD INTERMITTENT ASTHMA WITHOUT COMPLICATION: ICD-10-CM

## 2023-04-03 DIAGNOSIS — M48.061 DEGENERATIVE LUMBAR SPINAL STENOSIS: ICD-10-CM

## 2023-04-04 ENCOUNTER — HOSPITAL ENCOUNTER (OUTPATIENT)
Dept: PHYSICAL THERAPY | Facility: HOSPITAL | Age: 78
Setting detail: THERAPIES SERIES
Discharge: HOME | End: 2023-04-04
Payer: MEDICARE

## 2023-04-04 DIAGNOSIS — M48.061 DEGENERATIVE LUMBAR SPINAL STENOSIS: ICD-10-CM

## 2023-04-04 DIAGNOSIS — R26.9 GAIT DIFFICULTY: ICD-10-CM

## 2023-04-04 DIAGNOSIS — M25.60 DECREASED RANGE OF MOTION: ICD-10-CM

## 2023-04-04 DIAGNOSIS — Z98.1 S/P LUMBAR FUSION: Primary | ICD-10-CM

## 2023-04-04 DIAGNOSIS — R29.898 WEAKNESS OF BOTH LOWER EXTREMITIES: ICD-10-CM

## 2023-04-04 PROCEDURE — 97112 NEUROMUSCULAR REEDUCATION: CPT

## 2023-04-04 PROCEDURE — 97110 THERAPEUTIC EXERCISES: CPT

## 2023-04-04 RX ORDER — SILDENAFIL CITRATE 20 MG/1
TABLET ORAL
Qty: 50 TABLET | Refills: 0 | Status: SHIPPED | OUTPATIENT
Start: 2023-04-04

## 2023-04-04 RX ORDER — GABAPENTIN 300 MG/1
CAPSULE ORAL
Qty: 120 CAPSULE | Refills: 2 | Status: SHIPPED | OUTPATIENT
Start: 2023-04-04 | End: 2023-04-06

## 2023-04-04 RX ORDER — GABAPENTIN 300 MG/1
CAPSULE ORAL
Qty: 120 CAPSULE | Refills: 0 | Status: SHIPPED | OUTPATIENT
Start: 2023-04-04 | End: 2023-04-04 | Stop reason: SDUPTHER

## 2023-04-04 RX ORDER — MONTELUKAST SODIUM 10 MG/1
TABLET ORAL
Qty: 90 TABLET | Refills: 0 | Status: SHIPPED | OUTPATIENT
Start: 2023-04-04

## 2023-04-04 NOTE — THERAPY TREATMENT NOTE
Outpatient Physical Therapy Ortho Treatment Note  [facility]     Patient Name: Omar Choudhary  : 1945  MRN: [id number]  Today's Date: 2023      Visit Date: 2023    Visit Dx:    ICD-10-CM ICD-9-CM   1. S/P lumbar fusion  Z98.1 V45.4   2. Decreased range of motion  M25.60 719.50   3. Weakness of both lower extremities  R29.898 729.89   4. Gait difficulty  R26.9 781.2       Patient Active Problem List   Diagnosis   • Benign prostatic hyperplasia   • Bursitis   • Depression   • Diverticulosis   • Family history of malignant neoplasm of breast   • Hyperlipidemia   • Hypertension   • Internal derangement of right knee   • Knee effusion   • Asthma   • Obstructive sleep apnea syndrome   • Osteoarthritis   • Osteopenia   • Pain in knee   • Postnasal drip   • Prepatellar bursitis   • Sciatica of right side   • Diaphragm paralysis   • Spinal stenosis of lumbar region with neurogenic claudication   • Degenerative lumbar spinal stenosis   • Anxiety   • Pinguecula of right eye   • Lumbar radiculopathy, chronic        Past Medical History:   Diagnosis Date   • Allergic    • Arthritis    • Asthma    • Carpal tunnel syndrome     no pain   • Cataract    • Depression    • Family history of malignant neoplasm of breast 2019   • Hyperlipidemia 2019   • Hypertension 2012   • Leg pain, right    • Low back pain    • Neuropathy     feet   • Osteopenia    • Poor balance    • Reactive airway disease 01/15/2016   • Shortness of breath         Past Surgical History:   Procedure Laterality Date   • KNEE ARTHROSCOPY W/ ACL RECONSTRUCTION Right 2019   • LUMBAR FUSION Bilateral 10/5/2022    Procedure: DAY 2 LUMBAR LAMINECTOMY TRANSFORAMINAL LUMBAR INTERBODY FUSION L2,L3,L4 WITH NEURO ROBOT;  Surgeon: John Do MD;  Location: [facility] OR;  Service: Robotics - Neuro;  Laterality: Bilateral;   • LUMBAR FUSION N/A 10/4/2022    Procedure: DAY 1 LUMBAR LATERAL INTERBODY FUSION WITH NEURO ROBOT L2,  L3.L4;  Surgeon: John Do MD;  Location: [facility] MAIN OR;  Service: Robotics - Neuro;  Laterality: N/A;  left side approach   • MOUTH SURGERY                          PT Assessment/Plan     Row Name 04/04/23 1500          PT Assessment    Assessment Comments Mendez presents to therapy with mild trendelenburg on R that worsened with fatigue during 175' walk. During balance work observed SLS R  has more lateral weight shift and glute med tested in SL was 3+/5 compared with 4/5 on L. Added supine hip abd for some remedial strengthening to improve balance/gait. Will progress to SL hip abd if ready next visit. Able to add modified birddog to HEP.  -JA        PT Plan    PT Plan Comments assess response to last visit, assess SL hip abd for R glute med  -JA           User Key  (r) = Recorded By, (t) = Taken By, (c) = Cosigned By    Initials Name Provider Type    Patricia Mays, PT Physical Therapist                   OP Exercises     Row Name 04/04/23 1200             Subjective Comments    Subjective Comments I got a good report from the doctor. There is evidence of bone growth. I'm pretty much just taking Tylenol now.  -JA         Subjective Pain    Able to rate subjective pain? yes  -JA      Pre-Treatment Pain Level 3  -JA         Total Minutes    50669 - PT Therapeutic Exercise Minutes 30  -JA      35777 -  PT Neuromuscular Reeducation Minutes 11  -JA         Exercise 1    Exercise Name 1 Nustep  -JA      Cueing 1 Verbal  -JA      Time 1 5 min  -JA      Additional Comments L6  -JA         Exercise 2    Exercise Name 2 STS from mat table with stagger stance w/biceps curls  -JA      Sets 2 2 ea  -JA      Reps 2 10  -JA         Exercise 3    Exercise Name 3 Resisted sidestepping  -JA      Cueing 3 Verbal;Demo  -JA      Reps 3 held today  -JA         Exercise 4    Exercise Name 4 Balance for R/L each: DLS, 1/2 tandem, tandem, and then DLS on foam  -JA      Cueing 4 Verbal;Demo  -JA      Sets 4 2 sets (once with  "shoes, second time barefoot)  -JA      Reps 4 performed 2 ea with EC and then EC with HTs  -JA      Time 4 10 sec ea  -JA      Additional Comments 11 min total time  -JA         Exercise 5    Exercise Name 5 SLS  -JA      Cueing 5 Verbal;Demo  -JA      Sets 5 2  -JA      Reps 5 2 floor, foam  -JA      Time 5 10sec  -JA      Additional Comments R SLS has more lateral weight shift than L  -JA         Exercise 6    Exercise Name 6 Step up with knee  and bicep curl  -JA      Cueing 6 Verbal;Demo  -JA      Sets 6 2  -JA      Reps 6 10 ea  -JA      Time 6 4\" step  -JA      Additional Comments fatigued, short rest between sets, decreased hip flexion; some LOB, R trendelenberg  -JA         Exercise 7    Exercise Name 7 Sideways step and reach  -JA      Cueing 7 Verbal;Demo  -JA      Reps 7 10 ea R/L  -JA         Exercise 8    Exercise Name 8 Clock FW/Side/BW  -JA      Sets 8 3 sets  -JA      Reps 8 3 each side  -JA      Time 8 small steps  -JA      Additional Comments R SLS has more lateral weight shift  -JA         Exercise 9    Exercise Name 9 Quadruped w/TA progressed modified to birddog  -JA      Sets 9 3  -JA      Reps 9 3 reps (reps repeated on one side then the other, not alternated)  -JA      Time 9 drag toe - not ready to lift leg yet  -JA         Exercise 10    Exercise Name 10 MMT SL hip abd  -JA      Sets 10 noted R glute med weaker than R  -JA      Time 10 --  -JA         Exercise 11    Exercise Name 11 supine R hip abd  -JA      Cueing 11 Verbal;Tactile  -JA      Sets 11 remediation for R glute med  -JA      Reps 11 10  -JA            User Key  (r) = Recorded By, (t) = Taken By, (c) = Cosigned By    Initials Name Provider Type    Patricia Mays N, PT Physical Therapist                              PT OP Goals     Row Name 04/04/23 1500          PT Short Term Goals    STG Date to Achieve 11/16/22  -JA     STG 1 Pt will be independent with initial HEP.  -JA     STG 1 Progress Met  -JA     STG 2 Pt " "will demonstrate correct sitting and standing posture to reduce strain on lumbar spine.  -     STG 2 Progress Met  -     STG 3 Pt will demonstrate good body mechanics with bending, lifting and reaching ADLs  to reduce strain on spine.  -     STG 3 Progress Met  -        Long Term Goals    LTG Date to Achieve 12/16/22  -     LTG 1 Pt will be independent with advanced HEP for spinal stabilization for improved self-management of symptoms.  -     LTG 1 Progress Progressing  -     LTG 1 Progress Comments continued to progress, needed \"remedial\" strengthening for R glute med due to poor engagement in SLS  -     LTG 2 Pt will report 50% or> decrease in pain with ADLs.  -     LTG 2 Progress Met  -     LTG 3 Pt will score 50% or less on BENEDICT indicating decrease in perceived functional disability.  -     LTG 3 Progress Ongoing  -     LTG 4 Pt will demonstrate increased R LE strength to 4/5 or better.  -     LTG 4 Progress Ongoing;Progressing  -     LTG 4 Progress Comments R glute med 3+  -           User Key  (r) = Recorded By, (t) = Taken By, (c) = Cosigned By    Initials Name Provider Type    Patricia Mays, PT Physical Therapist                Therapy Education  Education Details: add hip abd in supine back into HEP but for R LE; cont to disucss sensible activity and healing times              Time Calculation:   Start Time: 1151  Stop Time: 1235  Time Calculation (min): 44 min  Timed Charges  71728 - PT Therapeutic Exercise Minutes: 30  45523 -  PT Neuromuscular Reeducation Minutes: 11  Total Minutes  Timed Charges Total Minutes: 41   Total Minutes: 41  Therapy Charges for Today     Code Description Service Date Service Provider Modifiers Qty    [id number]  PT NEUROMUSC RE EDUCATION EA 15 MIN 4/4/2023 Patricia Reyes, PT GP 1    [id number] HC PT THER PROC EA 15 MIN 4/4/2023 Patricia Reyes PT GP 2                    Patricia Reyes PT  4/4/2023     "

## 2023-04-06 ENCOUNTER — OFFICE VISIT (OUTPATIENT)
Dept: FAMILY MEDICINE CLINIC | Facility: CLINIC | Age: 78
End: 2023-04-06
Payer: MEDICARE

## 2023-04-06 ENCOUNTER — LAB (OUTPATIENT)
Dept: FAMILY MEDICINE CLINIC | Facility: CLINIC | Age: 78
End: 2023-04-06
Payer: MEDICARE

## 2023-04-06 VITALS
HEIGHT: 66 IN | HEART RATE: 73 BPM | SYSTOLIC BLOOD PRESSURE: 80 MMHG | WEIGHT: 177.4 LBS | OXYGEN SATURATION: 96 % | DIASTOLIC BLOOD PRESSURE: 58 MMHG | BODY MASS INDEX: 28.51 KG/M2

## 2023-04-06 DIAGNOSIS — J45.20 MILD INTERMITTENT ASTHMA WITHOUT COMPLICATION: ICD-10-CM

## 2023-04-06 DIAGNOSIS — E78.2 MIXED HYPERLIPIDEMIA: ICD-10-CM

## 2023-04-06 DIAGNOSIS — M85.80 OSTEOPENIA, UNSPECIFIED LOCATION: ICD-10-CM

## 2023-04-06 DIAGNOSIS — I71.40 ABDOMINAL AORTIC ANEURYSM (AAA) WITHOUT RUPTURE, UNSPECIFIED PART: ICD-10-CM

## 2023-04-06 DIAGNOSIS — M85.88 OTHER SPECIFIED DISORDERS OF BONE DENSITY AND STRUCTURE, OTHER SITE: ICD-10-CM

## 2023-04-06 DIAGNOSIS — I10 PRIMARY HYPERTENSION: ICD-10-CM

## 2023-04-06 DIAGNOSIS — Z00.00 MEDICARE ANNUAL WELLNESS VISIT, SUBSEQUENT: Primary | ICD-10-CM

## 2023-04-06 DIAGNOSIS — G47.33 OBSTRUCTIVE SLEEP APNEA SYNDROME: ICD-10-CM

## 2023-04-06 DIAGNOSIS — M48.062 SPINAL STENOSIS OF LUMBAR REGION WITH NEUROGENIC CLAUDICATION: ICD-10-CM

## 2023-04-06 PROCEDURE — 36415 COLL VENOUS BLD VENIPUNCTURE: CPT | Performed by: FAMILY MEDICINE

## 2023-04-06 PROCEDURE — 80048 BASIC METABOLIC PNL TOTAL CA: CPT | Performed by: FAMILY MEDICINE

## 2023-04-06 RX ORDER — UBIDECARENONE 100 MG
100 CAPSULE ORAL DAILY
COMMUNITY

## 2023-04-06 RX ORDER — ARGININE 500 MG
750 TABLET ORAL DAILY
COMMUNITY

## 2023-04-06 NOTE — PROGRESS NOTES
The ABCs of the Annual Wellness Visit  Subsequent Medicare Wellness Visit    Subjective    Omar Choudhary is a 77 y.o. male who presents for a Subsequent Medicare Wellness Visit.    The following portions of the patient's history were reviewed and   updated as appropriate: allergies, current medications, past family history, past medical history, past social history, past surgical history and problem list.    Compared to one year ago, the patient feels his physical   health is better.    Compared to one year ago, the patient feels his mental   health is better.    Recent Hospitalizations:  This patient has had a [facility] admission record on file within the last 365 days.  Back surgery    Current Medical Providers:  Patient Care Team:  Dennis Kwong MD as PCP - General (Internal Medicine)  Arslan Carlson PA-C as Physician Assistant (Physician Assistant)  Ang Peña APRN as Nurse Practitioner (Family Medicine)    Outpatient Medications Prior to Visit   Medication Sig Dispense Refill   • acetaminophen (TYLENOL) 500 MG tablet Take 1 tablet by mouth Every 6 (Six) Hours As Needed for Mild Pain.     • albuterol sulfate  (90 Base) MCG/ACT inhaler Inhale 2 puffs Every 4 (Four) Hours As Needed for Wheezing or Shortness of Air. 18 g 3   • Arginine (L-Arginine-500) 500 MG capsule Take 750 mg by mouth Daily.     • B Complex Vitamins (VITAMIN B COMPLEX) capsule capsule Take 1 capsule by mouth Daily. LD 9-27     • baclofen (LIORESAL) 10 MG tablet TAKE ONE TABLET BY MOUTH THREE TIMES DAILY (Patient taking differently: 1 (One) Time As Needed. Very rarely) 45 tablet 0   • Calcium Carb-Cholecalciferol (Oyster Shell Calcium w/D) 500-5 MG-MCG tablet TAKE TWO TABLETS BY MOUTH DAILY 180 tablet 0   • clonazePAM (KlonoPIN) 0.5 MG tablet TAKE ONE TABLET BY MOUTH TWICE DAILY AS NEEDED FOR ANXIETY (Patient taking differently: Every Night. It helps him sleep) 30 tablet 1   • coenzyme Q10 100 MG capsule  Take 1 capsule by mouth Daily.     • Fluticasone-Umeclidin-Vilant (Trelegy Ellipta) 200-62.5-25 MCG/ACT aerosol powder  Inhale 1 puff Daily. 2 each 0   • losartan (COZAAR) 25 MG tablet Take 1 tablet by mouth Daily. HOLD / SKIP dose as directed if systolic blood pressure is lower than 120 30 tablet 0   • montelukast (SINGULAIR) 10 MG tablet TAKE 1 TABLET BY MOUTH ONCE DAILY 90 tablet 0   • Multiple Vitamins-Minerals (EMERGEN-C BLUE PO) Take 1 tablet by mouth Daily. LD 9-27     • Omega-3 Fatty Acids (FISH OIL) 1000 MG capsule capsule Take 1 capsule by mouth Daily With Breakfast. LD 9-27     • Red Yeast Rice 600 MG capsule Take 1 capsule by mouth Daily. LD 9-27     • sildenafil (REVATIO) 20 MG tablet TAKE 1-2 TABLETS BY MOUTH AS NEEDED FOR ERECTILE DYSFUNCTION 50 tablet 0   • theophylline (THEODUR) 300 MG 12 hr tablet Take 1 tablet by mouth Daily. 90 tablet 1   • triamcinolone (KENALOG) 0.025 % cream Apply 1 application topically to the appropriate area as directed 2 (Two) Times a Day.     • Turmeric 400 MG capsule Take 1 capsule by mouth Daily.     • carboxymethylcellulose (REFRESH PLUS) 0.5 % solution Administer 1 drop to both eyes 3 (Three) Times a Day As Needed for Dry Eyes. Bring to hospital     • gabapentin (NEURONTIN) 300 MG capsule TAKE ONE CAPSULE BY MOUTH IN THE MORNING , 1 CAPSULE IN THE AFTERNOON & 2 CAPSULES AT NIGHT (Patient not taking: Reported on 4/6/2023) 120 capsule 2   • HYDROcodone-acetaminophen (NORCO) 5-325 MG per tablet Take 1 tablet by mouth Every 6 (Six) Hours As Needed for Severe Pain. 120 tablet 0   • lidocaine (LIDODERM) 5 % Place 1 patch on the skin as directed by provider Daily. Remove & Discard patch within 12 hours or as directed by MD 30 each 1     No facility-administered medications prior to visit.     No opioid medication identified on active medication list. I have reviewed chart for other potential  high risk medication/s and harmful drug interactions in the elderly.        Aspirin  "is not on active medication list.  Aspirin use is not indicated based on review of current medical condition/s. Risk of harm outweighs potential benefits.  .    Patient Active Problem List   Diagnosis   • Benign prostatic hyperplasia   • Bursitis   • Depression   • Diverticulosis   • Family history of malignant neoplasm of breast   • Hyperlipidemia   • Hypertension   • Internal derangement of right knee   • Knee effusion   • Asthma   • Obstructive sleep apnea syndrome   • Osteoarthritis   • Osteopenia   • Pain in knee   • Postnasal drip   • Prepatellar bursitis   • Sciatica of right side   • Diaphragm paralysis   • Spinal stenosis of lumbar region with neurogenic claudication   • Degenerative lumbar spinal stenosis   • Anxiety   • Pinguecula of right eye   • Lumbar radiculopathy, chronic     Advance Care Planning   Advance Care Planning     Advance Directive is not on file.  ACP discussion was held with the patient during this visit. Patient has an advance directive (not in EMR), copy requested.     Objective    Vitals:    04/06/23 1339   BP: (!) 80/58   Pulse: 73   SpO2: 96%   Weight: 80.5 kg (177 lb 6.4 oz)   Height: 167.6 cm (65.98\")     Estimated body mass index is 28.65 kg/m² as calculated from the following:    Height as of this encounter: 167.6 cm (65.98\").    Weight as of this encounter: 80.5 kg (177 lb 6.4 oz).    BMI is >= 25 and <30. (Overweight) The following options were offered after discussion;: exercise counseling/recommendations and nutrition counseling/recommendations    Does the patient have evidence of cognitive impairment? No    Lab Results   Component Value Date    TRIG 145 03/23/2023    HDL 61 (H) 03/23/2023     (H) 03/23/2023    VLDL 25 03/23/2023    HGBA1C 5.80 (H) 03/23/2023        HEALTH RISK ASSESSMENT    Smoking Status:  Social History     Tobacco Use   Smoking Status Former   • Packs/day: 0.50   • Years: 15.00   • Pack years: 7.50   • Types: Cigarettes   • Start date: 1/1/1966 "   • Quit date: 1992   • Years since quittin.2   • Passive exposure: Past   Smokeless Tobacco Never     Alcohol Consumption:  Social History     Substance and Sexual Activity   Alcohol Use Not Currently   • Alcohol/week: 1.0 standard drink   • Types: 1 Glasses of wine per week     Fall Risk Screen:  ADIA Fall Risk Assessment was completed, and patient is at LOW risk for falls.Assessment completed on:2023    Depression Screening:  PHQ-2/PHQ-9 Depression Screening 2023   Little Interest or Pleasure in Doing Things 0-->not at all   Feeling Down, Depressed or Hopeless 0-->not at all   PHQ-9: Brief Depression Severity Measure Score 0     Health Habits and Functional and Cognitive Screening:  Functional & Cognitive Status 2023   Do you have difficulty preparing food and eating? No   Do you have difficulty bathing yourself, getting dressed or grooming yourself? No   Do you have difficulty using the toilet? No   Do you have difficulty moving around from place to place? No   Do you have trouble with steps or getting out of a bed or a chair? No   Current Diet Well Balanced Diet   Dental Exam Up to date   Eye Exam Up to date   Exercise (times per week) 7 times per week        Exercise Frequency Comment -   Current Exercises Include Walking;House Cleaning;Yard Work   Current Exercise Activities Include -   Do you need help using the phone?  No   Are you deaf or do you have serious difficulty hearing?  No   Do you need help with transportation? No   Do you need help shopping? No   Do you need help preparing meals?  No   Do you need help with housework?  No   Do you need help with laundry? No   Do you need help taking your medications? No   Do you need help managing money? No   Do you ever drive or ride in a car without wearing a seat belt? No   Have you felt unusual stress, anger or loneliness in the last month? No   Who do you live with? -   If you need help, do you have trouble finding someone available  to you? No   Have you been bothered in the last four weeks by sexual problems? -   Do you have difficulty concentrating, remembering or making decisions? -     Age-appropriate Screening Schedule:  Refer to the list below for future screening recommendations based on patient's age, sex and/or medical conditions. Orders for these recommended tests are listed in the plan section. The patient has been provided with a written plan.    Health Maintenance   Topic Date Due   • TDAP/TD VACCINES (1 - Tdap) Never done   • ZOSTER VACCINE (1 of 2) Never done   • DXA SCAN  12/10/2022   • COVID-19 Vaccine (5 - Booster for Pfizer series) 04/07/2023 (Originally 5/31/2022)   • INFLUENZA VACCINE  08/01/2023   • COLORECTAL CANCER SCREENING  08/20/2023   • LIPID PANEL  03/23/2024   • ANNUAL WELLNESS VISIT  04/06/2024   • HEPATITIS C SCREENING  Completed   • Pneumococcal Vaccine 65+  Completed   • AAA SCREEN (ONE-TIME)  Completed        CMS Preventative Services Quick Reference  Risk Factors Identified During Encounter  Immunizations Discussed/Encouraged: Tdap and Influenza  The above risks/problems have been discussed with the patient.  Pertinent information has been shared with the patient in the After Visit Summary.  An After Visit Summary and PPPS were made available to the patient.    Preventative  Colonoscopy: 8/2013, due 2023. Ordered today  PSA: 0.416 (3/2023)  AAA: 12/2020- 3.6cm, due 12/2022 for repeat  DEXA: 12/2020- osteopenia  Shingles: reportedly completed Shingrix 2020  Pneumonia: Prevnar 11/2019, Pneumovax 8/2017  Tdap: Recommended  Influenza: 10/2022  COVID: Completed 4 shots Pfizer (4/2022)    Follow Up:   Next Medicare Wellness visit to be scheduled in 1 year.       Additional E&M Note during same encounter follows:  Patient has multiple medical problems which are significant and separately identifiable that require additional work above and beyond the Medicare Wellness Visit.      Chief Complaint  Medicare  Wellness-subsequent (Labs completed 3/23/23/)    Subjective        HPI  Omar Choudhary is also being seen today for multiple medical problems    Chronic low back pain: s/p lateral L2-4 interbody fusion w/ cage placement and posterior decompression and posterior fusion in Oct 2022. Still completing PT but almost completed. Happy w/ results. Getting back to his activities. No further sciatica. Following w/ neurosurgery- Do and pain mgmt- [facility]. Only needing 500mg Tylenol PRN     HTN: 80/58 today. He reports Maintained on losartan 12.5 BID. Reports some LH/dizziness, particularly w/ standing. No CP or SOB.     HLD: Last lipid panel w/ . Unable to tolerate statin due to myalgias and Zetia resulted in diarrhea. Doing some regular exercise     Asthma: hx L hemidiaphragm paralysis and asthma. Maintained on Trelegy daily (using PRN), theophylline and montelukast as well as albuterol. Chronic SOB but stable/well controlled at this time. No nighttime awakening w/ cough. Follows w/ pulm- Draw     ELINOR: maintained on CPAP. No issues at this time. Working well for him and well controlled. Follows w/ Draw     Allergic rhinitis: [facility]     Review of Systems   Constitutional: Negative for chills and fever.   HENT: Positive for rhinorrhea. Negative for congestion.    Eyes: Negative for visual disturbance.   Respiratory: Positive for shortness of breath. Negative for cough.    Cardiovascular: Negative for chest pain and palpitations.   Gastrointestinal: Negative for abdominal pain and vomiting.   Genitourinary: Negative for difficulty urinating and dysuria.   Musculoskeletal: Positive for back pain (improved). Negative for arthralgias.   Skin: Negative for rash.   Neurological: Positive for dizziness and light-headedness. Negative for numbness.   Psychiatric/Behavioral: Negative for dysphoric mood and sleep disturbance. The patient is not nervous/anxious.      Objective   Vital  "Signs:  BP (!) 80/58   Pulse 73   Ht 167.6 cm (65.98\")   Wt 80.5 kg (177 lb 6.4 oz)   SpO2 96%   BMI 28.65 kg/m²     Physical Exam  Constitutional:       General: He is not in acute distress.     Appearance: He is well-developed.   HENT:      Head: Normocephalic and atraumatic.      Right Ear: Tympanic membrane and external ear normal. There is no impacted cerumen.      Left Ear: Tympanic membrane and external ear normal. There is no impacted cerumen.      Nose: Nose normal.      Mouth/Throat:      Mouth: Mucous membranes are moist.      Pharynx: No oropharyngeal exudate or posterior oropharyngeal erythema.   Eyes:      General: No scleral icterus.        Right eye: No discharge.         Left eye: No discharge.      Extraocular Movements: Extraocular movements intact.      Conjunctiva/sclera: Conjunctivae normal.      Pupils: Pupils are equal, round, and reactive to light.   Neck:      Thyroid: No thyromegaly.      Vascular: No carotid bruit.   Cardiovascular:      Rate and Rhythm: Normal rate and regular rhythm.      Heart sounds: Normal heart sounds. No murmur heard.  Pulmonary:      Effort: Pulmonary effort is normal. No respiratory distress.      Breath sounds: Normal breath sounds. No wheezing or rales.   Abdominal:      General: Bowel sounds are normal. There is no distension.      Palpations: Abdomen is soft.      Tenderness: There is no abdominal tenderness.   Musculoskeletal:         General: No deformity. Normal range of motion.      Cervical back: Normal range of motion and neck supple.   Lymphadenopathy:      Cervical: No cervical adenopathy.   Skin:     General: Skin is warm and dry.      Findings: No rash.   Neurological:      General: No focal deficit present.      Mental Status: He is alert and oriented to person, place, and time. Mental status is at baseline.      Cranial Nerves: No cranial nerve deficit.      Sensory: No sensory deficit.   Psychiatric:         Behavior: Behavior normal.        "  Thought Content: Thought content normal.             Assessment and Plan   Diagnoses and all orders for this visit:    1. Medicare annual wellness visit, subsequent (Primary)  -     Basic Metabolic Panel  -     Ambulatory Referral For Screening Colonoscopy  -  Recent labs reviewed. Will repeat kidney function as above  -  Counseled regarding diet, exercise, weight loss, and preventative health maintenance items/immunizations below    2. Spinal stenosis of lumbar region with neurogenic claudication: s/p lateral L2-4 interbody fusion w/ cage placement and posterior decompression and posterior fusion in Oct 2022. Sciatica now resolved, back pain markedly improved   - Cont PT and home exercises   - Cont Tylenol PRN   - Cont neurosurgery f/u    3. Primary hypertension: reported as 80/58 today but asymptomatic   - D/C losartan 25 daily   - Monitor BP at home. Plan to restart losartan 12.5 daily for BP > 150/90    4. Mixed hyperlipidemia:  today w/ lifestyle modifications. Unable to tolerate statin, Zetia   - Cont lifestyle modifications   - Monitor    5. Mild intermittent asthma without complication: complicated by L hemidiaphragm paralysis   - Cont home Trelegy daily, Singulair 10 daily, and theophyline 300 daily    - Cont albuterol PRN   - Cont pulm f/u    6. Obstructive sleep apnea syndrome   - Cont home CPAP    7. Abdominal aortic aneurysm (AAA) without rupture, unspecified part: 3.6cm AAA seen on 12/2020 US. Repeat today  -     US Aorta Limited; Future    8. Osteopenia, unspecified location: noted on 2020 DEXA. Repeat today  -     DEXA Bone Density Axial; Future  - Cont home Ca/VitD daily and regular exercise    9. Other specified disorders of bone density and structure, other site  -     DEXA Bone Density Axial; Future     I spent 48 minutes caring for Omar on this date of service. This time includes time spent by me in the following activities:reviewing tests, performing a medically appropriate  examination and/or evaluation , counseling and educating the patient/family/caregiver, ordering medications, tests, or procedures and documenting information in the medical record  Follow Up   Return in about 6 months (around 10/6/2023) for Recheck- 30min- HTN, prediabetes.  Patient was given instructions and counseling regarding his condition or for health maintenance advice. Please see specific information pulled into the AVS if appropriate.

## 2023-04-07 ENCOUNTER — TELEPHONE (OUTPATIENT)
Dept: FAMILY MEDICINE CLINIC | Facility: CLINIC | Age: 78
End: 2023-04-07
Payer: MEDICARE

## 2023-04-07 LAB
ANION GAP SERPL CALCULATED.3IONS-SCNC: 12 MMOL/L (ref 5–15)
BUN SERPL-MCNC: 13 MG/DL (ref 8–23)
BUN/CREAT SERPL: 10.5 (ref 7–25)
CALCIUM SPEC-SCNC: 9.2 MG/DL (ref 8.6–10.5)
CHLORIDE SERPL-SCNC: 97 MMOL/L (ref 98–107)
CO2 SERPL-SCNC: 29 MMOL/L (ref 22–29)
CREAT SERPL-MCNC: 1.24 MG/DL (ref 0.76–1.27)
EGFRCR SERPLBLD CKD-EPI 2021: 60.6 ML/MIN/1.73
GLUCOSE SERPL-MCNC: 101 MG/DL (ref 65–99)
POTASSIUM SERPL-SCNC: 3.5 MMOL/L (ref 3.5–5.2)
SODIUM SERPL-SCNC: 138 MMOL/L (ref 136–145)

## 2023-04-07 NOTE — TELEPHONE ENCOUNTER
Spoke to patient, notified him of lab results and recommendations.  He stated understanding and had no further questions.

## 2023-04-07 NOTE — TELEPHONE ENCOUNTER
----- Message from Dennis Kwong MD sent at 4/7/2023  9:50 AM EDT -----  Kidney function has stabilized and no longer dehydrated. I would simply make sure to stay well hydrated and avoid antiinflammatory medicines. No other changes at this time. We will simply repeat labs at your next appt

## 2023-04-18 ENCOUNTER — TELEPHONE (OUTPATIENT)
Dept: FAMILY MEDICINE CLINIC | Facility: CLINIC | Age: 78
End: 2023-04-18
Payer: MEDICARE

## 2023-04-18 ENCOUNTER — APPOINTMENT (OUTPATIENT)
Dept: PHYSICAL THERAPY | Facility: HOSPITAL | Age: 78
End: 2023-04-18
Payer: MEDICARE

## 2023-04-18 ENCOUNTER — HOSPITAL ENCOUNTER (OUTPATIENT)
Dept: BONE DENSITY | Facility: HOSPITAL | Age: 78
Discharge: HOME | End: 2023-04-18
Payer: MEDICARE

## 2023-04-18 ENCOUNTER — HOSPITAL ENCOUNTER (OUTPATIENT)
Dept: ULTRASOUND IMAGING | Facility: HOSPITAL | Age: 78
Discharge: HOME | End: 2023-04-18
Payer: MEDICARE

## 2023-04-18 DIAGNOSIS — M85.88 OTHER SPECIFIED DISORDERS OF BONE DENSITY AND STRUCTURE, OTHER SITE: ICD-10-CM

## 2023-04-18 DIAGNOSIS — I71.40 ABDOMINAL AORTIC ANEURYSM (AAA) WITHOUT RUPTURE, UNSPECIFIED PART: ICD-10-CM

## 2023-04-18 DIAGNOSIS — M85.80 OSTEOPENIA, UNSPECIFIED LOCATION: ICD-10-CM

## 2023-04-18 PROCEDURE — 76775 US EXAM ABDO BACK WALL LIM: CPT

## 2023-04-18 PROCEDURE — 77080 DXA BONE DENSITY AXIAL: CPT

## 2023-04-18 NOTE — TELEPHONE ENCOUNTER
----- Message from Dennis Kwong MD sent at 4/18/2023  1:03 PM EDT -----  DEXA scan reveals osteopenia. Daily calcium/VitD supplementation and weight bearing exercise are recommend to prevent progression to osteoporosis.

## 2023-04-25 ENCOUNTER — HOSPITAL ENCOUNTER (OUTPATIENT)
Dept: PHYSICAL THERAPY | Facility: HOSPITAL | Age: 78
Setting detail: THERAPIES SERIES
Discharge: HOME | End: 2023-04-25
Payer: MEDICARE

## 2023-04-25 DIAGNOSIS — R26.9 GAIT DIFFICULTY: ICD-10-CM

## 2023-04-25 DIAGNOSIS — M25.60 DECREASED RANGE OF MOTION: ICD-10-CM

## 2023-04-25 DIAGNOSIS — Z98.1 S/P LUMBAR FUSION: Primary | ICD-10-CM

## 2023-04-25 DIAGNOSIS — R29.898 WEAKNESS OF BOTH LOWER EXTREMITIES: ICD-10-CM

## 2023-04-25 PROCEDURE — 97110 THERAPEUTIC EXERCISES: CPT

## 2023-04-25 PROCEDURE — 97112 NEUROMUSCULAR REEDUCATION: CPT

## 2023-04-25 NOTE — THERAPY PROGRESS REPORT/RE-CERT
Outpatient Physical Therapy Ortho Progress Note  [facility]     Patient Name: Omar Choudhary  : 1945  MRN: [id number]  Today's Date: 2023      Visit Date: 2023    Patient Active Problem List   Diagnosis   • Benign prostatic hyperplasia   • Bursitis   • Depression   • Diverticulosis   • Family history of malignant neoplasm of breast   • Hyperlipidemia   • Hypertension   • Internal derangement of right knee   • Knee effusion   • Asthma   • Obstructive sleep apnea syndrome   • Osteoarthritis   • Osteopenia   • Pain in knee   • Postnasal drip   • Prepatellar bursitis   • Sciatica of right side   • Diaphragm paralysis   • Spinal stenosis of lumbar region with neurogenic claudication   • Degenerative lumbar spinal stenosis   • Anxiety   • Pinguecula of right eye   • Lumbar radiculopathy, chronic        Past Medical History:   Diagnosis Date   • ADHD (attention deficit hyperactivity disorder)     Hard to focus on tasks and follow through.   • Allergic    • Arthritis    • Asthma    • Carpal tunnel syndrome     no pain   • Cataract    • Depression    • Erectile dysfunction     2018 at 73 years old   • Family history of malignant neoplasm of breast 2019   • Hyperlipidemia 2019   • Hypertension 2012   • Leg pain, right    • Low back pain    • Neuropathy     feet   • Osteopenia    • Poor balance    • Reactive airway disease 01/15/2016   • Scoliosis 1960   • Shortness of breath    • Substance abuse 1966    Alcoholic        Past Surgical History:   Procedure Laterality Date   • CARDIAC CATHETERIZATION     • COLONOSCOPY      No polyps, slight diverticulitis   • EYE SURGERY  2016   • KNEE ARTHROSCOPY W/ ACL RECONSTRUCTION Right 2019   • LUMBAR FUSION Bilateral 10/05/2022    Procedure: DAY 2 LUMBAR LAMINECTOMY TRANSFORAMINAL LUMBAR INTERBODY FUSION L2,L3,L4 WITH NEURO ROBOT;  Surgeon: John Do MD;  Location: [facility] OR;  Service: Robotics - Neuro;  Laterality:  Bilateral;   • LUMBAR FUSION N/A 10/04/2022    Procedure: DAY 1 LUMBAR LATERAL INTERBODY FUSION WITH NEURO ROBOT L2, L3.L4;  Surgeon: John Do MD;  Location: [facility] MAIN OR;  Service: Robotics - Neuro;  Laterality: N/A;  left side approach   • MOUTH SURGERY     • SPINE SURGERY  10/4&5/2022    Dr. John Do, [facility] Neurosurgery group       Visit Dx:     ICD-10-CM ICD-9-CM   1. S/P lumbar fusion  Z98.1 V45.4   2. Decreased range of motion  M25.60 719.50   3. Weakness of both lower extremities  R29.898 729.89   4. Gait difficulty  R26.9 781.2              PT Ortho     Row Name 04/25/23 1600       Myotomal Screen- Lower Quarter Clearing    Hip flexion (L2) Right:;4- (Good -);Left:;4 (Good)  -JA    Knee extension (L3) Right:;4- (Good -);Left:;4 (Good)  -JA       MMT Right Lower Ext    Rt Hip ABduction MMT, Gross Movement (3-/5) fair minus  -JA    Rt Hip Internal (Medial) Rotation MMT, Gross Movement (3+/5) fair plus  -JA       MMT Left Lower Ext    Lt Hip ABduction MMT, Gross Movement (3+/5) fair plus  -JA    Lt Hip Internal (Medial) Rotation MMT, Gross Movement (3+/5) fair plus  -JA          User Key  (r) = Recorded By, (t) = Taken By, (c) = Cosigned By    Initials Name Provider Type    Patricia Mays, PT Physical Therapist                            Therapy Education  Education Details: Discussed sensible goals for activities to return to or begin to reduce chance of exacerbating pain or causing pain/instability at other segments. Pt initiated conversation after finding out he has osteopenia and aafter conversing with a doctor friend. Discussed weight-bearing ex;s. Discussed that weight on machines at the gym do not translate 1:1 for weight at joint.  Given: Symptoms/condition management      PT OP Goals     Row Name 04/25/23 1600          PT Short Term Goals    STG Date to Achieve 11/16/22  -MEGHA     STG 1 Pt will be independent with initial HEP.  -MEGHA     STG 1 Progress Met  -MEGHA     STG 2 Pt will  demonstrate correct sitting and standing posture to reduce strain on lumbar spine.  -MEGHA     STG 2 Progress Met  -MEGHA     STG 3 Pt will demonstrate good body mechanics with bending, lifting and reaching ADLs  to reduce strain on spine.  -MEGHA     STG 3 Progress Met  -MEGHA        Long Term Goals    LTG Date to Achieve 12/16/22  -MEGHA     LTG 1 Pt will be independent with advanced HEP for spinal stabilization for improved self-management of symptoms.  -MEGHA     LTG 1 Progress Progressing  -MEGHA     LTG 2 Pt will report 50% or> decrease in pain with ADLs.  -MEGHA     LTG 2 Progress Met  -MEGHA     LTG 3 Pt will score 50% or less on BENEDICT indicating decrease in perceived functional disability.  -MEGHA     LTG 3 Progress Ongoing  -     LTG 4 Pt will demonstrate increased R LE strength to 4/5 or better.  -MEGHA     LTG 4 Progress Ongoing;Progressing  -MEGHA     LTG 4 Progress Comments increasing strength noted in MMT (see Ortho tab)  -MEGHA           User Key  (r) = Recorded By, (t) = Taken By, (c) = Cosigned By    Initials Name Provider Type    Patricia Mays, PT Physical Therapist                 PT Assessment/Plan     Row Name 04/25/23 1600          PT Assessment    Assessment Comments Omar Choudhary has been seen for 21 visits post-op lumbar lateral interbody fusion w/neuro robot L2, L3, L4, and lumbar laminectomy transforaminal lumbar interbody fusion L2, L3, L4 (on 10/4/22 & 10/5/22). He demonstrates improving gait without device however trendelenburg gait worsens with activity/gait and intermittent use of SPC is still recommended. We are focusing on balance and gait for stability during functional mobility and physical activity such as yard work. Planning to transition to self-management in 3-4 visits.  -MEGHA        PT Plan    PT Plan Comments pt had to change appt to next wk due to going out of town the following week or the gap would have been extra long  -MEGHA           User Key  (r) = Recorded By, (t) = Taken By, (c) = Cosigned By     "Initials Name Provider Type    Patricia Mays, PT Physical Therapist                   OP Exercises     Row Name 04/25/23 1000             Subjective Comments    Subjective Comments I have osteopenia.  -JA         Subjective Pain    Able to rate subjective pain? yes  -JA      Pre-Treatment Pain Level 3  -JA         Total Minutes    47034 - PT Therapeutic Exercise Minutes 29  -JA      81248 -  PT Neuromuscular Reeducation Minutes 13  -JA         Exercise 1    Exercise Name 1 Nustep  -JA      Cueing 1 Verbal  -JA      Time 1 5 min  -JA      Additional Comments L6  -JA         Exercise 2    Exercise Name 2 STS from chairwith stagger stance w/biceps curls  -JA      Sets 2 2 ea  -JA      Reps 2 10  -JA         Exercise 3    Exercise Name 3 Resisted sidestepping  -JA      Cueing 3 Verbal;Demo  -JA      Reps 3 held today  -JA         Exercise 4    Exercise Name 4 Balance for R/L each: DLS, 1/2 tandem, tandem, and then DLS on foam  -JA      Cueing 4 Verbal;Demo  -JA      Sets 4 2 sets (once with shoes, second time barefoot)  -JA      Reps 4 performed 2 ea with EC and then EC with HTs  -JA      Time 4 10 sec ea  -JA         Exercise 5    Exercise Name 5 SLS  -JA      Cueing 5 Verbal;Demo  -JA      Sets 5 2  -JA      Reps 5 2 floor, foam  -JA      Time 5 10sec  -JA      Additional Comments R SLS has more lateral weight shift than L however he was able to mitigate that some  -JA         Exercise 6    Exercise Name 6 Step up  and bicep curl  -JA      Cueing 6 Verbal;Demo  -JA      Sets 6 2  -JA      Reps 6 10 ea  -JA      Time 6 6\" step  -JA         Exercise 7    Exercise Name 7 Sideways step and reach  -JA      Cueing 7 Verbal;Demo  -JA      Reps 7 10 ea R/L  -JA         Exercise 8    Exercise Name 8 Clock FW/Side/BW  -JA      Sets 8 3 sets  -JA      Reps 8 3 each side  -JA      Time 8 small steps  -JA         Exercise 9    Exercise Name 9 Quadruped w/TA progressed modified to birddog  -JA      Sets 9 3  -JA      Reps " 9 3 reps (reps repeated on one side then the other, not alternated)  -MEGHA      Time 9 drag toe - not ready to lift leg yet  -MEGHA         Exercise 10    Exercise Name 10 --  -JA         Exercise 11    Exercise Name 11 supine R hip abd  -MEGHA      Cueing 11 Verbal;Tactile  -JA      Sets 11 2  -JA      Reps 11 10  -JA            User Key  (r) = Recorded By, (t) = Taken By, (c) = Cosigned By    Initials Name Provider Type    Patricia Mays, PT Physical Therapist                                        Time Calculation:     Start Time: 0930  Stop Time: 1015  Time Calculation (min): 45 min  Timed Charges  57841 - PT Therapeutic Exercise Minutes: 29  58932 -  PT Neuromuscular Reeducation Minutes: 13  Total Minutes  Timed Charges Total Minutes: 42   Total Minutes: 42     Therapy Charges for Today     Code Description Service Date Service Provider Modifiers Qty    81805103594  PT NEUROMUSC RE EDUCATION EA 15 MIN 4/25/2023 Patricia Reyes, PT GP 1    [id number]  PT THER PROC EA 15 MIN 4/25/2023 Patricia Reyes, PT GP 2                    Patricia Reyes PT  4/25/2023

## 2023-05-15 RX ORDER — BACLOFEN 10 MG/1
TABLET ORAL
Qty: 45 TABLET | Refills: 0 | Status: SHIPPED | OUTPATIENT
Start: 2023-05-15

## 2023-05-15 NOTE — TELEPHONE ENCOUNTER
Rx Refill Note  Requested Prescriptions     Pending Prescriptions Disp Refills   • baclofen (LIORESAL) 10 MG tablet [Pharmacy Med Name: Baclofen Oral Tablet 10 MG] 45 tablet 0     Sig: TAKE 1 TABLET BY MOUTH THREE TIMES DAILY      Last office visit with prescribing clinician: 3/28/2023   Last telemedicine visit with prescribing clinician: 3/28/2023   Next office visit with prescribing clinician: 9/26/2023                         Would you like a call back once the refill request has been completed: [] Yes [] No    If the office needs to give you a call back, can they leave a voicemail: [] Yes [] No    Chandlyer Behr, MA  05/15/23, 08:32 EDT

## 2023-05-22 RX ORDER — SILDENAFIL CITRATE 20 MG/1
TABLET ORAL
Qty: 50 TABLET | Refills: 0 | Status: SHIPPED | OUTPATIENT
Start: 2023-05-22

## 2023-07-26 DIAGNOSIS — J45.20 MILD INTERMITTENT ASTHMA WITHOUT COMPLICATION: Primary | ICD-10-CM

## 2023-07-26 DIAGNOSIS — F41.9 ANXIETY: ICD-10-CM

## 2023-07-26 RX ORDER — CLONAZEPAM 0.5 MG/1
TABLET ORAL
Qty: 30 TABLET | Refills: 0 | Status: SHIPPED | OUTPATIENT
Start: 2023-07-26

## 2023-07-26 RX ORDER — LOSARTAN POTASSIUM AND HYDROCHLOROTHIAZIDE 25; 100 MG/1; MG/1
1 TABLET ORAL DAILY
Qty: 90 TABLET | Refills: 0 | OUTPATIENT
Start: 2023-07-26

## 2023-07-26 RX ORDER — LOSARTAN POTASSIUM 25 MG/1
25 TABLET ORAL
Qty: 30 TABLET | Refills: 0 | Status: SHIPPED | OUTPATIENT
Start: 2023-07-26

## 2023-07-26 RX ORDER — SILDENAFIL CITRATE 20 MG/1
TABLET ORAL
Qty: 50 TABLET | Refills: 0 | Status: SHIPPED | OUTPATIENT
Start: 2023-07-26

## 2023-07-26 RX ORDER — SILDENAFIL CITRATE 20 MG/1
TABLET ORAL
Qty: 50 TABLET | Refills: 0 | Status: SHIPPED | OUTPATIENT
Start: 2023-07-26 | End: 2023-07-26 | Stop reason: SDUPTHER

## 2023-07-26 RX ORDER — THEOPHYLLINE 300 MG/1
300 TABLET, EXTENDED RELEASE ORAL DAILY
Qty: 90 TABLET | Refills: 1 | Status: SHIPPED | OUTPATIENT
Start: 2023-07-26

## 2023-07-26 NOTE — TELEPHONE ENCOUNTER
"Caller: Omar Choudhary \"Mendez\"    Relationship: Self    Best call back number: [phone]    Requested Prescriptions:   Requested Prescriptions     Pending Prescriptions Disp Refills    clonazePAM (KlonoPIN) 0.5 MG tablet [Pharmacy Med Name: clonazePAM Oral Tablet 0.5 MG] 30 tablet 0     Sig: TAKE ONE TABLET BY MOUTH TWICE DAILY AS NEEDED for anxiety.    losartan (COZAAR) 25 MG tablet 30 tablet 0     Sig: Take 1 tablet by mouth Daily. HOLD / SKIP dose as directed if systolic blood pressure is lower than 120    sildenafil (REVATIO) 20 MG tablet 50 tablet 0     Sig: TAKE ONE TO TWO TABLETS BY MOUTH AS NEEDED FOR ERICTILE DYSFUNCTION.     Refused Prescriptions Disp Refills    losartan-hydrochlorothiazide (HYZAAR) 100-25 MG per tablet [Pharmacy Med Name: Losartan Potassium-HCTZ Oral Tablet 100-25 MG] 90 tablet 0     Sig: TAKE 1 TABLET BY MOUTH DAILY     Refused By: CATRACHO MARTÍNEZ     Reason for Refusal: Refill not appropriate        Pharmacy where request should be sent: [id number] [address] - [phone] [facility] [phone] FX     Last office visit with prescribing clinician: 4/6/2023   Last telemedicine visit with prescribing clinician: Visit date not found   Next office visit with prescribing clinician: 10/18/2023     Additional details provided by patient: PATIENT IS LEAVING [facility] NEXT WEEK AND WOULD LIKE A CALL IF THERE ARE ANY ISSUES    Does the patient have less than a 3 day supply:  [] Yes  [x] No    Zia Vieira   07/26/23 11:26 EDT           "

## 2023-08-01 ENCOUNTER — TELEPHONE (OUTPATIENT)
Dept: PULMONOLOGY | Facility: HOSPITAL | Age: 78
End: 2023-08-01
Payer: MEDICARE

## 2023-08-01 DIAGNOSIS — J45.909 ASTHMA, UNSPECIFIED ASTHMA SEVERITY, UNSPECIFIED WHETHER COMPLICATED, UNSPECIFIED WHETHER PERSISTENT: Primary | ICD-10-CM

## 2023-08-01 RX ORDER — BUDESONIDE, GLYCOPYRROLATE, AND FORMOTEROL FUMARATE 160; 9; 4.8 UG/1; UG/1; UG/1
2 AEROSOL, METERED RESPIRATORY (INHALATION) 2 TIMES DAILY
Qty: 1 EACH | Refills: 0 | COMMUNITY
Start: 2023-08-01

## 2023-08-15 ENCOUNTER — TELEPHONE (OUTPATIENT)
Dept: FAMILY MEDICINE CLINIC | Facility: CLINIC | Age: 78
End: 2023-08-15
Payer: MEDICARE

## 2023-08-15 NOTE — TELEPHONE ENCOUNTER
PATIENT IS SCHEDULED TO SEE A CARDIOLOGIST IN [facility] AND THEY WOULD LIKE US TO SEND THE MOST RECENT LABS AND OFFICE NOTE.    FAX: [phone]

## 2023-08-16 ENCOUNTER — PATIENT MESSAGE (OUTPATIENT)
Dept: PULMONOLOGY | Facility: HOSPITAL | Age: 78
End: 2023-08-16
Payer: MEDICARE

## 2023-09-06 DIAGNOSIS — F41.9 ANXIETY: ICD-10-CM

## 2023-09-07 RX ORDER — CLONAZEPAM 0.5 MG/1
TABLET ORAL
Qty: 30 TABLET | Refills: 1 | Status: SHIPPED | OUTPATIENT
Start: 2023-09-07

## 2023-09-11 ENCOUNTER — HOSPITAL ENCOUNTER (OUTPATIENT)
Dept: GENERAL RADIOLOGY | Facility: HOSPITAL | Age: 78
Discharge: HOME | End: 2023-09-11
Admitting: NEUROLOGICAL SURGERY
Payer: MEDICARE

## 2023-09-11 DIAGNOSIS — M54.16 LUMBAR RADICULOPATHY, CHRONIC: ICD-10-CM

## 2023-09-11 DIAGNOSIS — M48.062 SPINAL STENOSIS OF LUMBAR REGION WITH NEUROGENIC CLAUDICATION: ICD-10-CM

## 2023-09-11 PROCEDURE — 72114 X-RAY EXAM L-S SPINE BENDING: CPT

## 2023-09-12 ENCOUNTER — OFFICE VISIT (OUTPATIENT)
Dept: PULMONOLOGY | Facility: HOSPITAL | Age: 78
End: 2023-09-12
Payer: MEDICARE

## 2023-09-12 VITALS
RESPIRATION RATE: 14 BRPM | WEIGHT: 190 LBS | HEIGHT: 66 IN | SYSTOLIC BLOOD PRESSURE: 150 MMHG | HEART RATE: 53 BPM | BODY MASS INDEX: 30.53 KG/M2 | OXYGEN SATURATION: 99 % | DIASTOLIC BLOOD PRESSURE: 87 MMHG

## 2023-09-12 DIAGNOSIS — J45.909 ASTHMA, UNSPECIFIED ASTHMA SEVERITY, UNSPECIFIED WHETHER COMPLICATED, UNSPECIFIED WHETHER PERSISTENT: Primary | ICD-10-CM

## 2023-09-12 PROCEDURE — G0463 HOSPITAL OUTPT CLINIC VISIT: HCPCS

## 2023-09-12 NOTE — PROGRESS NOTES
PULMONARY/ CRITICAL CARE/ SLEEP MEDICINE OUTPATIENT CONSULT/ FOLLOW UP NOTE        Patient Name:  Omar Choudhary    :  1945    Medical Record:  [id number]    PRIMARY CARE PHYSICIAN     Dennis Kwong MD    REASON FOR CONSULTATION    Omar Choudhary is a 78 y.o. male who is referred for consultation for  Asthma  REVIEW OF SYSTEMS   Constitutional:  Denies fever or chills   Eyes:  Denies change in visual acuity   HENT:  Denies nasal congestion or sore throat   Respiratory:  Denies cough or shortness of breath   Cardiovascular:  Denies chest pain or edema   GI:  Denies abdominal pain, nausea, vomiting, bloody stools or diarrhea   :  Denies dysuria   Musculoskeletal:  Denies back pain or joint pain   Integument:  Denies rash   Neurologic:  Denies headache, focal weakness or sensory changes   Endocrine:  Denies polyuria or polydipsia   Lymphatic:  Denies swollen glands   Psychiatric:  Denies depression or anxiety     MEDICAL HISTORY    Past Medical History:   Diagnosis Date    ADHD (attention deficit hyperactivity disorder)     Hard to focus on tasks and follow through.    Allergic     Arthritis     Asthma     Carpal tunnel syndrome     no pain    Cataract     Depression     Erectile dysfunction     2018 at 73 years old    Family history of malignant neoplasm of breast 2019    Hyperlipidemia 2019    Hypertension 2012    Leg pain, right     Low back pain     Neuropathy     feet    Osteopenia     Poor balance     Reactive airway disease 01/15/2016    Scoliosis 1960    Shortness of breath     Substance abuse 1966    Alcoholic        SURGICAL HISTORY    Past Surgical History:   Procedure Laterality Date    CARDIAC CATHETERIZATION  1992    COLONOSCOPY      No polyps, slight diverticulitis    EYE SURGERY  2016    KNEE ARTHROSCOPY W/ ACL RECONSTRUCTION Right 2019    LUMBAR FUSION Bilateral 10/05/2022    Procedure: DAY 2 LUMBAR LAMINECTOMY TRANSFORAMINAL LUMBAR INTERBODY FUSION  L2,L3,L4 WITH NEURO ROBOT;  Surgeon: John Do MD;  Location: [facility] MAIN OR;  Service: Robotics - Neuro;  Laterality: Bilateral;    LUMBAR FUSION N/A 10/04/2022    Procedure: DAY 1 LUMBAR LATERAL INTERBODY FUSION WITH NEURO ROBOT L2, L3.L4;  Surgeon: John Do MD;  Location: [facility] MAIN OR;  Service: Robotics - Neuro;  Laterality: N/A;  left side approach    MOUTH SURGERY      SPINE SURGERY  10/4&2022    Dr. John Do, [facility] Neurosurgery group        FAMILY HISTORY    Family History   Problem Relation Age of Onset    Heart disease Mother     Hypertension Mother     Breast cancer Mother     Stroke Mother         Several TIAs    Alcohol abuse Mother     Thyroid disease Mother     Aortic aneurysm Father     Heart attack Father     Liver cancer Father     Early death Brother          coronary infarction at 46 years old       SOCIAL HISTORY    Social History     Tobacco Use    Smoking status: Former     Packs/day: 0.50     Years: 15.00     Pack years: 7.50     Types: Cigarettes     Start date: 1966     Quit date: 1992     Years since quittin.7     Passive exposure: Past    Smokeless tobacco: Never   Substance Use Topics    Alcohol use: Not Currently     Alcohol/week: 1.0 standard drink     Types: 1 Glasses of wine per week        ALLERGIES    Allergies   Allergen Reactions    Statins Myalgia     Other reaction(s): muscle soreness         MEDICATIONS    Current Outpatient Medications on File Prior to Visit   Medication Sig Dispense Refill    acetaminophen (TYLENOL) 500 MG tablet Take 1 tablet by mouth Every 6 (Six) Hours As Needed for Mild Pain.      albuterol sulfate  (90 Base) MCG/ACT inhaler Inhale 2 puffs Every 4 (Four) Hours As Needed for Wheezing or Shortness of Air. 18 g 3    Arginine (L-Arginine-500) 500 MG capsule Take 750 mg by mouth Daily.      B Complex Vitamins (VITAMIN B COMPLEX) capsule capsule Take 1 capsule by mouth Daily. LD 9-27      baclofen (LIORESAL) 10  "MG tablet TAKE 1 TABLET BY MOUTH THREE TIMES DAILY 45 tablet 0    Budeson-Glycopyrrol-Formoterol (Breztri Aerosphere) 160-9-4.8 MCG/ACT aerosol inhaler Inhale 2 puffs 2 (Two) Times a Day. 1 each 0    Calcium Carb-Cholecalciferol (Oyster Shell Calcium w/D) 500-5 MG-MCG tablet TAKE TWO TABLETS BY MOUTH DAILY 180 tablet 0    clonazePAM (KlonoPIN) 0.5 MG tablet TAKE ONE TABLET BY MOUTH TWICE DAILY AS NEEDED FOR ANXIETY 30 tablet 1    coenzyme Q10 100 MG capsule Take 1 capsule by mouth Daily.      cyclobenzaprine (FLEXERIL) 10 MG tablet TAKE ONE TABLET BY MOUTH THREE TIMES DAILY AS NEEDED FOR MUSCLE SPASMS 30 tablet 2    Fluticasone-Umeclidin-Vilant (Trelegy Ellipta) 200-62.5-25 MCG/ACT aerosol powder  Inhale 1 puff Daily. 2 each 0    losartan (COZAAR) 25 MG tablet Take 1 tablet by mouth Daily. HOLD / SKIP dose as directed if systolic blood pressure is lower than 120 30 tablet 0    montelukast (SINGULAIR) 10 MG tablet TAKE 1 TABLET BY MOUTH DAILY 90 tablet 0    Multiple Vitamins-Minerals (EMERGEN-C BLUE PO) Take 1 tablet by mouth Daily. LD 9-27      Omega-3 Fatty Acids (FISH OIL) 1000 MG capsule capsule Take 1 capsule by mouth Daily With Breakfast. LD 9-27      Red Yeast Rice 600 MG capsule Take 1 capsule by mouth Daily. LD 9-27      sildenafil (REVATIO) 20 MG tablet TAKE ONE TO TWO TABLETS BY MOUTH AS NEEDED FOR ERICTILE DYSFUNCTION. 50 tablet 0    theophylline (THEODUR) 300 MG 12 hr tablet Take 1 tablet by mouth Daily. 90 tablet 1    triamcinolone (KENALOG) 0.025 % cream Apply 1 application  topically to the appropriate area as directed 2 (Two) Times a Day.      Turmeric 400 MG capsule Take 1 capsule by mouth Daily.       No current facility-administered medications on file prior to visit.       PHYSICAL EXAM    Vitals:    09/12/23 1136   BP: 150/87   BP Location: Left arm   Patient Position: Sitting   Cuff Size: Adult   Pulse: 53   Resp: 14   SpO2: 99%   Weight: 86.2 kg (190 lb)   Height: 167.6 cm (65.98\")    "     Constitutional:  Well developed, well nourished, no acute distress, non-toxic appearance   Eyes:  PERRL, conjunctiva normal   HENT:  Atraumatic, external ears normal, nose normal, oropharynx moist, no pharyngeal exudates. mallampatti   Neck- normal range of motion, no tenderness, supple   Respiratory:  No respiratory distress, normal breath sounds, no rales, no wheezing   Cardiovascular:  Normal rate, normal rhythm, no murmurs, no gallops, no rubs   GI:  Soft, nondistended, normal bowel sounds, nontender, no organomegaly, no mass, no rebound, no guarding   :  No costovertebral angle tenderness   Musculoskeletal:  No edema, no tenderness, no deformities. Back- no tenderness  Integument:  Well hydrated, no rash   Lymphatic:  No lymphadenopathy noted   Neurologic:  Alert & oriented x 3, CN 2-12 normal, normal motor function, normal sensory function, no focal deficits noted   Psychiatric:  Speech and behavior appropriate     XR Spine Lumbar Complete With Flex & Ext    Result Date: 9/11/2023  Impression: 1. No acute osseous abnormality. 2. Intact lumbar fixation hardware at L2-L4 with interbody cage placement at L2-3 and L3-4. 3. No abnormal movement with flexion or extension. 3. Convex left lumbar levoscoliosis with multilevel degenerative findings above. Electronically Signed: Javier Bourgeois MD  9/11/2023 2:20 PM EDT  Workstation ID: [id number]        ASSESSMENT & PLAN:      Asthma (J45.909)  paralysed Left hemidiapghram     Impression: CXR 1/19: Elevated L Hemidiaphragm with bibasilar atelectasis  IgE 5/18: 765  RAST Panel 5/18: +ve  PFts 4/18:  (FEV1 78%, TLC 82%, DLCO 76, DL/%  Repeat Lab 3/19: WBC 5.3, Eosinophil count normal. Mold Panel:negative.       IgE: 515  Xolair weekly injections vs allergy shots  Feels better allergy injections     Tests at [facility]; PFts 8/19: Non specific ventilatory defect. FEV1 70%, + D response, %, DLCO 82%   Methacholine chalenge test 8/19: negative  HRCT  : No ILD  VQ: Negative except decrease in LLL related to paralyzed diaphragm  Metabolic cardiopulmonary stress test: negative  Echo: EF 60%, RVSP 31 min  Tried Breo and Bevespi with no improvement in symptoms  Remains on Singulair,   symbicort  expensive and albuterol.   -Also on allergy shots now     Obstructive sleep apnea (G47.33)  Has new ResMed machine,  CPAP at 8-20  Average use 8hrs 27 min. Average AHI 4.8. Patient is on AutoSet 8-20 pressure   Patient is compliant with using CPAP/BiPAP therapy and is benefitting from PAP therapy.     Hypertension (I10)     PLAN:     Order Tezpire     Currently on Symbicort and albuterol  Montelukast and theophylline     Discussed with patient utilizing Xolair for elevated IgE as well as either Fasenra or Nucala for elevated Eosinophills             This document has been electronically signed by  Jerrod Lagunas MD  11:54 EDT

## 2023-09-25 ENCOUNTER — TELEPHONE (OUTPATIENT)
Dept: FAMILY MEDICINE CLINIC | Facility: CLINIC | Age: 78
End: 2023-09-25
Payer: MEDICARE

## 2023-09-25 PROBLEM — I10 HYPERTENSION: Status: ACTIVE | Noted: 2023-09-25

## 2023-09-25 PROBLEM — M19.90 ARTHRITIS: Status: ACTIVE | Noted: 2023-09-25

## 2023-09-25 PROBLEM — E78.00 HYPERCHOLESTEROLEMIA: Status: ACTIVE | Noted: 2023-09-25

## 2023-09-25 PROBLEM — M47.816 LUMBAR SPONDYLOSIS: Status: ACTIVE | Noted: 2023-09-25

## 2023-09-25 PROBLEM — M51.369 LUMBAR DEGENERATIVE DISC DISEASE: Status: ACTIVE | Noted: 2023-09-25

## 2023-09-25 PROBLEM — Z96.1 BILATERAL PSEUDOPHAKIA: Status: ACTIVE | Noted: 2022-06-14

## 2023-09-25 PROBLEM — M41.57 SCOLIOSIS OF LUMBOSACRAL REGION DUE TO DEGENERATIVE DISEASE OF SPINE IN ADULT: Status: ACTIVE | Noted: 2023-09-25

## 2023-09-25 PROBLEM — M51.36 LUMBAR DEGENERATIVE DISC DISEASE: Status: ACTIVE | Noted: 2023-09-25

## 2023-09-25 RX ORDER — LOSARTAN POTASSIUM 50 MG/1
50 TABLET ORAL
Qty: 30 TABLET | Refills: 0 | Status: SHIPPED | OUTPATIENT
Start: 2023-09-25

## 2023-09-25 NOTE — TELEPHONE ENCOUNTER
Patient recently traveled to [place] and before he left he requested his bp med refilled.  We sent in Losartan 25mg once daily #30.  Patient states he usually gets 90 days worth.  He took that while in [place] and his bp stayed high - 150/110.  He was unable to donate plasma because of it.  He is home now and has no more meds and still has high bp.  He remembers being on Losartan HCTZ 100/25mg once daily and it worked much better.  Patient sees you in two weeks.  He is hoping you will call in the Losartan HCTZ 100/25mg for him to take and then you all can discuss it when he sees you.  Please send in.

## 2023-09-25 NOTE — PROGRESS NOTES
Neurosurgical Consultation      Omar Choudhary is a 78 y.o. male is here today for follow-up for lumbar spinal stenosis. In the office today patient reports of back pain that is localized on the left side.     Chief Complaint   Patient presents with    Back Pain     Follow up         Previous treatment: Left sided lateral approach for a Transpsoas L2-L4 lateral interbody fusion on 10/4/22.   Physical Therapy/19 visits    HPI: This is a 78-year-old gentleman who underwent a lateral Hewitt Solus L2-L4 interbody cage placement with subsequent posterior decompression and fusion occurring on October 4, 2022.  He is now 1 year removed from surgical intervention.  He continues to do quite well.  He bluntly states during the evaluation today that he believes that the surgery was a success.  He has significant improvement compared to his preoperative status with respect to his leg neurogenic claudication symptoms.  He does continue to have left more than right flank pain that is activity dependent.  He does comment that he has recently gained some weight.  He would like to work to lose weight and be more physically active.  He is considering going to the gym on a more regular basis.  He does comment that he has noticed rare intermittent left hand numbness and tingling as well as difficulty opening and closing his hand.  This is not life altering at this juncture.  Previously I have worked him up for cervical myelopathy.    Past Medical History:   Diagnosis Date    ADHD (attention deficit hyperactivity disorder) 2021    Hard to focus on tasks and follow through.    Allergic     Arthritis     Asthma     Carpal tunnel syndrome     no pain    Cataract     Depression     Erectile dysfunction     2018 at 73 years old    Family history of malignant neoplasm of breast 09/26/2019    Hyperlipidemia 09/26/2019    Hypertension 03/01/2012    Leg pain, right     Low back pain     Neuropathy     feet    Osteopenia     Poor balance      Reactive airway disease 01/15/2016    Scoliosis 1960    Shortness of breath     Substance abuse 1966    Alcoholic        Past Surgical History:   Procedure Laterality Date    CARDIAC CATHETERIZATION  1992    COLONOSCOPY  2013    No polyps, slight diverticulitis    EYE SURGERY  2016    KNEE ARTHROSCOPY W/ ACL RECONSTRUCTION Right 2019    LUMBAR FUSION Bilateral 10/05/2022    Procedure: DAY 2 LUMBAR LAMINECTOMY TRANSFORAMINAL LUMBAR INTERBODY FUSION L2,L3,L4 WITH NEURO ROBOT;  Surgeon: John Do MD;  Location: [facility] MAIN OR;  Service: Robotics - Neuro;  Laterality: Bilateral;    LUMBAR FUSION N/A 10/04/2022    Procedure: DAY 1 LUMBAR LATERAL INTERBODY FUSION WITH NEURO ROBOT L2, L3.L4;  Surgeon: John Do MD;  Location: [facility] MAIN OR;  Service: Robotics - Neuro;  Laterality: N/A;  left side approach    MOUTH SURGERY      SPINE SURGERY  10/4&5/2022    Dr. John Do, [facility]        Current Outpatient Medications on File Prior to Visit   Medication Sig Dispense Refill    acetaminophen (TYLENOL) 500 MG tablet Take 1 tablet by mouth Every 6 (Six) Hours As Needed for Mild Pain.      albuterol sulfate  (90 Base) MCG/ACT inhaler Inhale 2 puffs Every 4 (Four) Hours As Needed for Wheezing or Shortness of Air. 18 g 3    Arginine (L-Arginine-500) 500 MG capsule Take 750 mg by mouth Daily.      B Complex Vitamins (VITAMIN B COMPLEX) capsule capsule Take 1 capsule by mouth Daily. LD 9-27      baclofen (LIORESAL) 10 MG tablet TAKE 1 TABLET BY MOUTH THREE TIMES DAILY 45 tablet 0    Budeson-Glycopyrrol-Formoterol (Breztri Aerosphere) 160-9-4.8 MCG/ACT aerosol inhaler Inhale 2 puffs 2 (Two) Times a Day. 1 each 0    Calcium Carb-Cholecalciferol (Oyster Shell Calcium w/D) 500-5 MG-MCG tablet TAKE TWO TABLETS BY MOUTH DAILY 180 tablet 0    clonazePAM (KlonoPIN) 0.5 MG tablet TAKE ONE TABLET BY MOUTH TWICE DAILY AS NEEDED FOR ANXIETY 30 tablet 1    coenzyme Q10 100 MG capsule Take 1 capsule by  mouth Daily.      cyclobenzaprine (FLEXERIL) 10 MG tablet TAKE ONE TABLET BY MOUTH THREE TIMES DAILY AS NEEDED FOR MUSCLE SPASMS 30 tablet 2    Fluticasone-Umeclidin-Vilant (Trelegy Ellipta) 200-62.5-25 MCG/ACT aerosol powder  Inhale 1 puff Daily. 2 each 0    losartan (COZAAR) 50 MG tablet Take 1 tablet by mouth Daily. 30 tablet 0    montelukast (SINGULAIR) 10 MG tablet TAKE 1 TABLET BY MOUTH DAILY 90 tablet 0    Multiple Vitamins-Minerals (EMERGEN-C BLUE PO) Take 1 tablet by mouth Daily. LD 9-27      Omega-3 Fatty Acids (FISH OIL) 1000 MG capsule capsule Take 1 capsule by mouth Daily With Breakfast. LD 9-27      Red Yeast Rice 600 MG capsule Take 1 capsule by mouth Daily. LD 9-27      sildenafil (REVATIO) 20 MG tablet TAKE ONE TO TWO TABLETS BY MOUTH AS NEEDED FOR ERICTILE DYSFUNCTION. 50 tablet 0    theophylline (THEODUR) 300 MG 12 hr tablet Take 1 tablet by mouth Daily. 90 tablet 1    triamcinolone (KENALOG) 0.025 % cream Apply 1 application  topically to the appropriate area as directed 2 (Two) Times a Day.      Turmeric 400 MG capsule Take 1 capsule by mouth Daily.       Current Facility-Administered Medications on File Prior to Visit   Medication Dose Route Frequency Provider Last Rate Last Admin    Tezepelumab-ekko solution auto-injector 210 mg  210 mg Subcutaneous Q30 Days Jerrod Lagunas MD            Allergies   Allergen Reactions    Statins Myalgia     Other reaction(s): muscle soreness        Social History     Socioeconomic History    Marital status: Significant Other   Tobacco Use    Smoking status: Former     Packs/day: 0.50     Years: 15.00     Pack years: 7.50     Types: Cigarettes     Start date: 1966     Quit date: 1992     Years since quittin.7     Passive exposure: Past    Smokeless tobacco: Never   Vaping Use    Vaping Use: Never used   Substance and Sexual Activity    Alcohol use: Not Currently     Alcohol/week: 1.0 standard drink     Types: 1 Glasses of wine per week    Drug use: Not  "Currently     Types: Marijuana     Comment: CBD gummies- stop now for surgery    Sexual activity: Yes     Partners: Female     Comment: We're both >70 years old          Review of Systems   Constitutional:  Positive for activity change.   HENT: Negative.     Eyes: Negative.    Respiratory: Negative.     Cardiovascular: Negative.    Gastrointestinal: Negative.    Endocrine: Negative.    Genitourinary: Negative.    Musculoskeletal:  Positive for back pain and myalgias.   Skin: Negative.    Allergic/Immunologic: Negative.    Neurological:  Negative for weakness and numbness.   Hematological: Negative.    Psychiatric/Behavioral:  Negative for sleep disturbance.       Physical Examination:     Vitals:    09/26/23 1028   BP: (!) 185/112  Comment: Patient out of BP meds   BP Location: Right arm   Patient Position: Sitting   Cuff Size: Adult   Pulse: 51   Resp: 18   Weight: 88.7 kg (195 lb 9.6 oz)   Height: 167.6 cm (65.98\")   PainSc:   4   PainLoc: Back        Physical Exam        Vitals:    09/26/23 1028   PainSc:   4   PainLoc: Back            Neurological Exam   Neurological examination appears stable compared to my evaluation approximately 6 months ago.  He does have right more than left-sided Regina's response.  His incision is well-healing.    Result Review  The following data was reviewed by: John Do MD on 09/26/2023:    Data reviewed : Radiologic studies x-rays of the lumbar spine show stability of hardware without any indications of hardware complications.  There is no other acute pathology.  No significant changes in the known spondylolisthesis at other levels.    Assessment/plan:  This is a 78-year-old gentleman who underwent L lateral L2-L4 interbody cage placement and posterior decompression and fusion approximately 1 year ago.  This was predominantly for neurogenic claudication.  He is doing well from a surgical perspective and has had overall improvement in his neurogenic claudication.  He is no " longer utilizing an assistive device for ambulation.  His leg symptoms are significantly improved.  I encouraged him to remain active including working out at the gym.  With respect to his subjective cervical myelopathy he would like to delay repeat MRI of the cervical spine at this juncture as his symptoms are quite tolerable.  I will have him return to see me in 6 months with x-rays of the lumbar spine at that juncture.  He will call me prior to this appointment if his cervical myelopathy symptoms have progressed and he would like to proceed with MRI of the cervical spine.  If so this order can be placed.  I have encouraged him to call with any questions or concerns.      Diagnoses and all orders for this visit:    1. Spinal stenosis of lumbar region with neurogenic claudication (Primary)  -     XR Spine Lumbar Complete With Flex & Ext; Future         Return in about 6 months (around 3/26/2024).            John Do MD

## 2023-09-26 ENCOUNTER — OFFICE VISIT (OUTPATIENT)
Dept: NEUROSURGERY | Facility: CLINIC | Age: 78
End: 2023-09-26
Payer: MEDICARE

## 2023-09-26 VITALS
DIASTOLIC BLOOD PRESSURE: 112 MMHG | HEART RATE: 51 BPM | RESPIRATION RATE: 18 BRPM | HEIGHT: 66 IN | BODY MASS INDEX: 31.43 KG/M2 | WEIGHT: 195.6 LBS | SYSTOLIC BLOOD PRESSURE: 185 MMHG

## 2023-09-26 DIAGNOSIS — M48.062 SPINAL STENOSIS OF LUMBAR REGION WITH NEUROGENIC CLAUDICATION: Primary | ICD-10-CM

## 2023-09-26 NOTE — TELEPHONE ENCOUNTER
I'd rather increase losartan to 50mg daily for the next 4 weeks until his appt. Will see if we need higher dose at that time. Updated script sent to [facility]     Spoke with Mendez, he expressed understanding.

## 2023-10-04 ENCOUNTER — TELEPHONE (OUTPATIENT)
Dept: FAMILY MEDICINE CLINIC | Facility: CLINIC | Age: 78
End: 2023-10-04

## 2023-10-04 ENCOUNTER — OFFICE (AMBULATORY)
Dept: URBAN - METROPOLITAN AREA PATHOLOGY 4 | Facility: PATHOLOGY | Age: 78
End: 2023-10-04
Payer: COMMERCIAL

## 2023-10-04 ENCOUNTER — ON CAMPUS - OUTPATIENT (AMBULATORY)
Dept: URBAN - METROPOLITAN AREA HOSPITAL 2 | Facility: HOSPITAL | Age: 78
End: 2023-10-04
Payer: COMMERCIAL

## 2023-10-04 VITALS
DIASTOLIC BLOOD PRESSURE: 59 MMHG | HEIGHT: 66 IN | SYSTOLIC BLOOD PRESSURE: 108 MMHG | HEART RATE: 56 BPM | DIASTOLIC BLOOD PRESSURE: 73 MMHG | SYSTOLIC BLOOD PRESSURE: 126 MMHG | DIASTOLIC BLOOD PRESSURE: 75 MMHG | DIASTOLIC BLOOD PRESSURE: 69 MMHG | SYSTOLIC BLOOD PRESSURE: 158 MMHG | DIASTOLIC BLOOD PRESSURE: 88 MMHG | DIASTOLIC BLOOD PRESSURE: 103 MMHG | SYSTOLIC BLOOD PRESSURE: 111 MMHG | OXYGEN SATURATION: 98 % | HEART RATE: 112 BPM | TEMPERATURE: 96.2 F | SYSTOLIC BLOOD PRESSURE: 178 MMHG | HEART RATE: 58 BPM | OXYGEN SATURATION: 99 % | SYSTOLIC BLOOD PRESSURE: 118 MMHG | WEIGHT: 187 LBS | OXYGEN SATURATION: 100 % | DIASTOLIC BLOOD PRESSURE: 78 MMHG | SYSTOLIC BLOOD PRESSURE: 110 MMHG | HEART RATE: 55 BPM | HEART RATE: 57 BPM | SYSTOLIC BLOOD PRESSURE: 176 MMHG | OXYGEN SATURATION: 95 % | HEART RATE: 66 BPM | DIASTOLIC BLOOD PRESSURE: 100 MMHG | RESPIRATION RATE: 16 BRPM

## 2023-10-04 DIAGNOSIS — K57.30 DIVERTICULOSIS OF LARGE INTESTINE WITHOUT PERFORATION OR ABS: ICD-10-CM

## 2023-10-04 DIAGNOSIS — D12.2 BENIGN NEOPLASM OF ASCENDING COLON: ICD-10-CM

## 2023-10-04 DIAGNOSIS — K64.1 SECOND DEGREE HEMORRHOIDS: ICD-10-CM

## 2023-10-04 DIAGNOSIS — Z12.11 ENCOUNTER FOR SCREENING FOR MALIGNANT NEOPLASM OF COLON: ICD-10-CM

## 2023-10-04 PROBLEM — K63.5 POLYP OF COLON: Status: ACTIVE | Noted: 2023-10-04

## 2023-10-04 LAB
GI HISTOLOGY: A. UNSPECIFIED: (no result)
GI HISTOLOGY: PDF REPORT: (no result)

## 2023-10-04 PROCEDURE — 45385 COLONOSCOPY W/LESION REMOVAL: CPT | Mod: PT | Performed by: INTERNAL MEDICINE

## 2023-10-04 PROCEDURE — 88305 TISSUE EXAM BY PATHOLOGIST: CPT | Mod: 26 | Performed by: INTERNAL MEDICINE

## 2023-10-04 NOTE — TELEPHONE ENCOUNTER
Please Relay:  Left detailed voicemail for Omar Choudhary as per verbal release. Advised Omar Choudhary to call the office with any additional questions.     DEXA scan reveals osteopenia. Daily calcium/VitD supplementation and weight bearing exercise are recommend to prevent progression to osteoporosis.

## 2023-10-09 ENCOUNTER — OFFICE VISIT (OUTPATIENT)
Dept: FAMILY MEDICINE CLINIC | Facility: CLINIC | Age: 78
End: 2023-10-09
Payer: MEDICARE

## 2023-10-09 ENCOUNTER — LAB (OUTPATIENT)
Dept: FAMILY MEDICINE CLINIC | Facility: CLINIC | Age: 78
End: 2023-10-09
Payer: MEDICARE

## 2023-10-09 VITALS
DIASTOLIC BLOOD PRESSURE: 78 MMHG | HEART RATE: 62 BPM | HEIGHT: 66 IN | BODY MASS INDEX: 30.86 KG/M2 | RESPIRATION RATE: 16 BRPM | OXYGEN SATURATION: 97 % | SYSTOLIC BLOOD PRESSURE: 132 MMHG | WEIGHT: 192 LBS

## 2023-10-09 DIAGNOSIS — I10 PRIMARY HYPERTENSION: Primary | ICD-10-CM

## 2023-10-09 DIAGNOSIS — M48.061 DEGENERATIVE LUMBAR SPINAL STENOSIS: ICD-10-CM

## 2023-10-09 DIAGNOSIS — J45.20 MILD INTERMITTENT ASTHMA WITHOUT COMPLICATION: ICD-10-CM

## 2023-10-09 PROBLEM — E78.00 HYPERCHOLESTEROLEMIA: Status: RESOLVED | Noted: 2023-09-25 | Resolved: 2023-10-09

## 2023-10-09 PROCEDURE — 3078F DIAST BP <80 MM HG: CPT | Performed by: FAMILY MEDICINE

## 2023-10-09 PROCEDURE — 80048 BASIC METABOLIC PNL TOTAL CA: CPT | Performed by: FAMILY MEDICINE

## 2023-10-09 PROCEDURE — 1160F RVW MEDS BY RX/DR IN RCRD: CPT | Performed by: FAMILY MEDICINE

## 2023-10-09 PROCEDURE — 90662 IIV NO PRSV INCREASED AG IM: CPT | Performed by: FAMILY MEDICINE

## 2023-10-09 PROCEDURE — 3075F SYST BP GE 130 - 139MM HG: CPT | Performed by: FAMILY MEDICINE

## 2023-10-09 PROCEDURE — 99214 OFFICE O/P EST MOD 30 MIN: CPT | Performed by: FAMILY MEDICINE

## 2023-10-09 PROCEDURE — G0008 ADMIN INFLUENZA VIRUS VAC: HCPCS | Performed by: FAMILY MEDICINE

## 2023-10-09 PROCEDURE — 1159F MED LIST DOCD IN RCRD: CPT | Performed by: FAMILY MEDICINE

## 2023-10-09 PROCEDURE — 36415 COLL VENOUS BLD VENIPUNCTURE: CPT | Performed by: FAMILY MEDICINE

## 2023-10-09 RX ORDER — LOSARTAN POTASSIUM 50 MG/1
50 TABLET ORAL
Qty: 90 TABLET | Refills: 3 | Status: SHIPPED | OUTPATIENT
Start: 2023-10-09

## 2023-10-09 NOTE — PROGRESS NOTES
Chief Complaint   Patient presents with    Hypertension     HPI  Omar Choudhary is a 78 y.o. male that presents for   Chief Complaint   Patient presents with    Hypertension     HTN: 132/78 today. Maintained on losartan 50 daily. No LH/dizziness, or CP. Reports SOB w/ exertion    SOB: hx mild intermittent asthma and ELINOR. Patient reports worsened SOB upon exertion. Noticed this while in [place] recently. He reports that he usually adjusts but didn't adjust as well during his recent 4 week stent. Using Breztri from pulmonologist (Draw)    Spinal stenosis: s/p lateral L2-4 interbody fusion w/ cage placement and posterior decompression and posterior fusion in Oct 2022. He reports being nearly pain free but activity can flare pain. Adequately controlled w/ Tylenol and Flexeril PRN. Still w/ mild gait instability and interested in handicap placard through the winter    Review of Systems   Eyes:  Negative for blurred vision.   Respiratory:  Positive for shortness of breath.    Cardiovascular:  Negative for chest pain and palpitations.   Musculoskeletal:  Positive for back pain and neck pain.   Neurological:  Negative for dizziness and light-headedness.     The following portions of the patient's history were reviewed and updated as appropriate: problem list, past medical history, past surgical history, allergies, current medication    Problem List Tab  Patient History Tab  Immunizations Tab  Medications Tab  Chart Review Tab  Care Everywhere Tab  Synopsis Tab    PE  Vitals:    10/09/23 1258   BP: 132/78   Pulse: 62   Resp: 16   SpO2: 97%     Body mass index is 31 kg/mý.  General: Obese, NAD  Head: AT/NC  Eyes: EOMI, anicteric sclera  Resp: CTAB, SCR, BS equal  CV: RRR w/o m/r/g; 2+ pulses  GI: Soft, NT/ND, +BS  MSK: FROM, no deformity, no edema  Skin: Warm, dry, intact  Neuro: Alert and oriented. No focal deficits  Psych: Appropriate mood and affect    Imaging  XR Spine Lumbar Complete With Flex & Ext    Result Date:  9/11/2023  Impression: 1. No acute osseous abnormality. 2. Intact lumbar fixation hardware at L2-L4 with interbody cage placement at L2-3 and L3-4. 3. No abnormal movement with flexion or extension. 3. Convex left lumbar levoscoliosis with multilevel degenerative findings above. Electronically Signed: Javier Bourgeois MD  9/11/2023 2:20 PM EDT  Workstation ID: [id number]      Assessment & Plan   Omar Choudhary is a 78 y.o. male that presents for   Chief Complaint   Patient presents with    Hypertension     Diagnoses and all orders for this visit:    1. Primary hypertension (Primary): 132/78 today  -     Cont losartan (COZAAR) 50 MG tablet; Take 1 tablet by mouth Daily.  Dispense: 90 tablet; Refill: 3    2. Mild intermittent asthma without complication   - Cont home Breztri BID w/ albuterol PRN   - Cont pulm f/u- Draw    3. Degenerative lumbar spinal stenosis: s/p lateral L2-4 interbody fusion w/ cage placement and posterior decompression and posterior fusion in Oct 2022. Now nearly pain free  -     Basic Metabolic Panel  - Cont home Tylenol and Flexeril PRN    Other orders  -     Fluzone High-Dose 65+yrs    Ok for 6 month handicap placard     Return in about 6 months (around 4/9/2024) for Medicare Wellness.

## 2023-10-10 LAB
ANION GAP SERPL CALCULATED.3IONS-SCNC: 14.6 MMOL/L (ref 5–15)
BUN SERPL-MCNC: 12 MG/DL (ref 8–23)
BUN/CREAT SERPL: 12.1 (ref 7–25)
CALCIUM SPEC-SCNC: 9.5 MG/DL (ref 8.6–10.5)
CHLORIDE SERPL-SCNC: 104 MMOL/L (ref 98–107)
CO2 SERPL-SCNC: 24.4 MMOL/L (ref 22–29)
CREAT SERPL-MCNC: 0.99 MG/DL (ref 0.76–1.27)
EGFRCR SERPLBLD CKD-EPI 2021: 78.9 ML/MIN/1.73
GLUCOSE SERPL-MCNC: 92 MG/DL (ref 65–99)
POTASSIUM SERPL-SCNC: 4.2 MMOL/L (ref 3.5–5.2)
SODIUM SERPL-SCNC: 143 MMOL/L (ref 136–145)

## 2023-12-05 DIAGNOSIS — F41.9 ANXIETY: ICD-10-CM

## 2023-12-05 RX ORDER — CLONAZEPAM 0.5 MG/1
TABLET ORAL
Qty: 30 TABLET | Refills: 2 | Status: SHIPPED | OUTPATIENT
Start: 2023-12-05

## 2023-12-06 RX ORDER — CYCLOBENZAPRINE HCL 10 MG
TABLET ORAL
Qty: 30 TABLET | Refills: 0 | Status: SHIPPED | OUTPATIENT
Start: 2023-12-06

## 2023-12-15 RX ORDER — CYCLOBENZAPRINE HCL 10 MG
TABLET ORAL
Qty: 60 TABLET | Refills: 2 | Status: SHIPPED | OUTPATIENT
Start: 2023-12-15

## 2023-12-21 DIAGNOSIS — M48.061 DEGENERATIVE LUMBAR SPINAL STENOSIS: Primary | ICD-10-CM

## 2023-12-27 ENCOUNTER — HOSPITAL ENCOUNTER (OUTPATIENT)
Dept: GENERAL RADIOLOGY | Facility: HOSPITAL | Age: 78
Discharge: HOME | End: 2023-12-27
Admitting: NEUROLOGICAL SURGERY
Payer: MEDICARE

## 2023-12-27 DIAGNOSIS — M48.061 DEGENERATIVE LUMBAR SPINAL STENOSIS: ICD-10-CM

## 2023-12-27 PROCEDURE — 72114 X-RAY EXAM L-S SPINE BENDING: CPT

## 2024-01-04 RX ORDER — SILDENAFIL CITRATE 20 MG/1
TABLET ORAL
Qty: 50 TABLET | Refills: 0 | Status: SHIPPED | OUTPATIENT
Start: 2024-01-04

## 2024-01-04 NOTE — TELEPHONE ENCOUNTER
"Caller: Omar Choudhary \"Mendez\"    Relationship: Self    Best call back number: [phone]      Requested Prescriptions:   Requested Prescriptions     Pending Prescriptions Disp Refills    sildenafil (REVATIO) 20 MG tablet 50 tablet 0     Sig: TAKE ONE TO TWO TABLETS BY MOUTH AS NEEDED FOR ERICTILE DYSFUNCTION.        Pharmacy where request should be sent: [id number] - [place], [place] - [address] - [phone] [facility] [phone] FX     Last office visit with prescribing clinician: 10/9/2023   Last telemedicine visit with prescribing clinician: Visit date not found   Next office visit with prescribing clinician: 4/24/2024     Additional details provided by patient:     Does the patient have less than a 3 day supply:  [] Yes  [x] No    Would you like a call back once the refill request has been completed: [] Yes [] No    If the office needs to give you a call back, can they leave a voicemail: [] Yes [] No    PLEASE ADVISE.    Zia Ferrari Rep   01/04/24 09:55 EST         "

## 2024-01-19 DIAGNOSIS — J45.20 MILD INTERMITTENT ASTHMA WITHOUT COMPLICATION: ICD-10-CM

## 2024-01-19 RX ORDER — MONTELUKAST SODIUM 10 MG/1
TABLET ORAL
Qty: 90 TABLET | Refills: 0 | Status: SHIPPED | OUTPATIENT
Start: 2024-01-19

## 2024-02-09 DIAGNOSIS — J45.909 ASTHMA, UNSPECIFIED ASTHMA SEVERITY, UNSPECIFIED WHETHER COMPLICATED, UNSPECIFIED WHETHER PERSISTENT: Primary | ICD-10-CM

## 2024-02-09 RX ORDER — BUDESONIDE AND FORMOTEROL FUMARATE DIHYDRATE 160; 4.5 UG/1; UG/1
2 AEROSOL RESPIRATORY (INHALATION) 2 TIMES DAILY
Qty: 30.6 G | Refills: 1 | Status: SHIPPED | OUTPATIENT
Start: 2024-02-09

## 2024-03-11 ENCOUNTER — OFFICE VISIT (OUTPATIENT)
Dept: PULMONOLOGY | Facility: HOSPITAL | Age: 79
End: 2024-03-11
Payer: MEDICARE

## 2024-03-11 VITALS
WEIGHT: 187 LBS | OXYGEN SATURATION: 94 % | SYSTOLIC BLOOD PRESSURE: 145 MMHG | DIASTOLIC BLOOD PRESSURE: 80 MMHG | HEART RATE: 61 BPM | BODY MASS INDEX: 30.05 KG/M2 | HEIGHT: 66 IN | RESPIRATION RATE: 14 BRPM

## 2024-03-11 DIAGNOSIS — J45.20 MILD INTERMITTENT ASTHMA WITHOUT COMPLICATION: ICD-10-CM

## 2024-03-11 DIAGNOSIS — G47.33 OBSTRUCTIVE SLEEP APNEA SYNDROME: Primary | ICD-10-CM

## 2024-03-11 DIAGNOSIS — J98.6 DIAPHRAGM PARALYSIS: ICD-10-CM

## 2024-03-11 DIAGNOSIS — J45.909 ASTHMA, UNSPECIFIED ASTHMA SEVERITY, UNSPECIFIED WHETHER COMPLICATED, UNSPECIFIED WHETHER PERSISTENT: ICD-10-CM

## 2024-03-11 PROCEDURE — G0463 HOSPITAL OUTPT CLINIC VISIT: HCPCS

## 2024-03-11 RX ORDER — BUDESONIDE AND FORMOTEROL FUMARATE DIHYDRATE 160; 4.5 UG/1; UG/1
2 AEROSOL RESPIRATORY (INHALATION) 2 TIMES DAILY
Qty: 30.6 G | Refills: 11 | Status: SHIPPED | OUTPATIENT
Start: 2024-03-11

## 2024-03-11 RX ORDER — THEOPHYLLINE 300 MG/1
300 TABLET, EXTENDED RELEASE ORAL DAILY
Qty: 90 TABLET | Refills: 3 | Status: SHIPPED | OUTPATIENT
Start: 2024-03-11

## 2024-03-11 RX ORDER — THEOPHYLLINE 300 MG/1
300 TABLET, EXTENDED RELEASE ORAL DAILY
Qty: 90 TABLET | Refills: 3 | Status: SHIPPED | OUTPATIENT
Start: 2024-03-11 | End: 2024-03-11 | Stop reason: SDUPTHER

## 2024-03-11 NOTE — PROGRESS NOTES
HPI:  Here for f/u on Asthma, reports mild SANCHEZ.      Past Medical History:   Diagnosis Date    ADHD (attention deficit hyperactivity disorder) 2021    Hard to focus on tasks and follow through.    Allergic     Arthritis     Asthma     Carpal tunnel syndrome     no pain    Cataract     Depression     Erectile dysfunction     2018 at 73 years old    Family history of malignant neoplasm of breast 09/26/2019    Hyperlipidemia 09/26/2019    Hypertension 03/01/2012    Leg pain, right     Low back pain     Neuropathy     feet    Osteopenia     Poor balance     Reactive airway disease 01/15/2016    Scoliosis 1960    Shortness of breath     Substance abuse 1966    Alcoholic        Current Outpatient Medications on File Prior to Visit   Medication Sig Dispense Refill    acetaminophen (TYLENOL) 500 MG tablet Take 1 tablet by mouth Every 6 (Six) Hours As Needed for Mild Pain.      albuterol sulfate  (90 Base) MCG/ACT inhaler Inhale 2 puffs Every 4 (Four) Hours As Needed for Wheezing or Shortness of Air. 18 g 3    Arginine (L-Arginine-500) 500 MG capsule Take 750 mg by mouth Daily.      B Complex Vitamins (VITAMIN B COMPLEX) capsule capsule Take 1 capsule by mouth Daily. LD 9-27      budesonide-formoterol (SYMBICORT) 160-4.5 MCG/ACT inhaler Inhale 2 puffs 2 (Two) Times a Day. 30.6 g 1    Calcium Carb-Cholecalciferol (Oyster Shell Calcium w/D) 500-5 MG-MCG tablet TAKE TWO TABLETS BY MOUTH DAILY 180 tablet 0    clonazePAM (KlonoPIN) 0.5 MG tablet TAKE ONE TABLET BY MOUTH TWICE DAILY AS NEEDED FOR ANXIETY 30 tablet 2    coenzyme Q10 100 MG capsule Take 1 capsule by mouth Daily.      cyclobenzaprine (FLEXERIL) 10 MG tablet TAKE ONE TABLET BY MOUTH THREE TIMES DAILY AS NEEDED FOR MUSCLE SPASMS 60 tablet 2    losartan (COZAAR) 50 MG tablet Take 1 tablet by mouth Daily. 90 tablet 3    montelukast (SINGULAIR) 10 MG tablet TAKE 1 TABLET BY MOUTH DAILY 90 tablet 0    Multiple Vitamins-Minerals (EMERGEN-C BLUE PO) Take 1 tablet by  mouth Daily. LD 9-27      Omega-3 Fatty Acids (FISH OIL) 1000 MG capsule capsule Take 1 capsule by mouth Daily With Breakfast. LD 9-27      Red Yeast Rice 600 MG capsule Take 1 capsule by mouth Daily. LD 9-27      sildenafil (REVATIO) 20 MG tablet TAKE ONE TO TWO TABLETS BY MOUTH AS NEEDED FOR ERICTILE DYSFUNCTION. 50 tablet 0    triamcinolone (KENALOG) 0.025 % cream Apply 1 Application topically to the appropriate area as directed 2 (Two) Times a Day.      Turmeric 400 MG capsule Take 1 capsule by mouth Daily.      [DISCONTINUED] theophylline (THEODUR) 300 MG 12 hr tablet Take 1 tablet by mouth Daily. 90 tablet 1     Current Facility-Administered Medications on File Prior to Visit   Medication Dose Route Frequency Provider Last Rate Last Admin    Tezepelumab-ekko solution auto-injector 210 mg  210 mg Subcutaneous Q30 Days Jerrod Lagunas MD            Social History     Tobacco Use    Smoking status: Former     Current packs/day: 0.00     Average packs/day: 0.5 packs/day for 26.0 years (13.0 ttl pk-yrs)     Types: Cigarettes     Start date: 1966     Quit date: 1992     Years since quittin.2     Passive exposure: Past    Smokeless tobacco: Never   Vaping Use    Vaping status: Never Used   Substance Use Topics    Alcohol use: Yes     Alcohol/week: 12.0 standard drinks of alcohol     Types: 10 Glasses of wine, 2 Cans of beer per week    Drug use: Not Currently     Types: Marijuana     Comment: CBD gummies- stop now for surgery        Family History   Problem Relation Age of Onset    Heart disease Mother     Hypertension Mother     Breast cancer Mother     Stroke Mother         Several TIAs    Alcohol abuse Mother     Thyroid disease Mother     Arthritis Mother     Cancer Mother         Breast cancer & double mastectomy ~    Aortic aneurysm Father     Heart attack Father     Liver cancer Father     Cancer Father         Late diagnosis liver cancer    Heart disease Father         Heart attack    Early death  "Brother         Coronary thrombosis @ 48 years while mountain climbing.        Review of system:  Constitutional: Negative for chills, fever and malaise/fatigue.   HENT: Negative.    Eyes: Negative.    Cardiovascular: Negative.    Respiratory: negative for cough and shortness of breath.    Skin: Negative.    Musculoskeletal: Negative.    Gastrointestinal: Negative.    Genitourinary: Negative.    Neurological: Negative.    Psychiatric/Behavioral: Negative.    Physical exam:  Blood pressure 145/80, pulse 61, resp. rate 14, height 167.6 cm (65.98\"), weight 84.8 kg (187 lb), SpO2 94%.    General Appearance:  Alert   HEENT:  Normocephalic, without obvious abnormality, Conjunctiva/corneas clear,.   Nares normal, no drainage     Neck:  Supple, symmetrical, trachea midline. No JVD.  Lungs /Chest wall:   good air entry Bilaterlly, respirations unlabored, symmetrical wall movement.     Heart:  Regular rate and rhythm, S1 S2 normal  Abdomen: Soft, non-tender, no masses, no organomegaly.    Extremities: No edema, no clubbing or cyanosis    XR Spine Lumbar Complete With Flex & Ext    Result Date: 12/29/2023  Impression: 1. Postop changes of prior anterior and posterior fusion from L2-L4. The hardware appears intact and the appearance appears stable compared with previous radiographs. 2. Advanced multilevel degenerative disc disease. 3. Stable lumbar levoscoliosis. 4. Advanced facet disease at L4-5 and L5-S1 with grade 1 anterolisthesis of L5 on S1. This does not change significantly with flexion and extension. Electronically Signed: Adam Peña MD  12/29/2023 10:13 AM EST  Workstation ID: [id number]          Assessment and plan:  Asthma (J45.909)  paralysed Left hemidiapghram     Impression: CXR 1/19: Elevated L Hemidiaphragm with bibasilar atelectasis  IgE 5/18: 765  RAST Panel 5/18: +ve  PFts 4/18:  (FEV1 78%, TLC 82%, DLCO 76, DL/%  Repeat Lab 3/19: WBC 5.3, Eosinophil count normal. Mold Panel:negative.       IgE: " 515  Xolair weekly injections vs allergy shots  Feels better allergy injections     Tests at [facility]; PFts 8/19: Non specific ventilatory defect. FEV1 70%, + D response, %, DLCO 82%   Methacholine chalenge test 8/19: negative  HRCT : No ILD  VQ: Negative except decrease in LLL related to paralyzed diaphragm  Metabolic cardiopulmonary stress test: negative  Echo: EF 60%, RVSP 31 min  Tried Breo and Bevespi with no improvement in symptoms  Remains on Singulair,   symbicort  expensive and albuterol.   -Also on allergy shots now     Obstructive sleep apnea (G47.33)  Has new ResMed machine,  CPAP at 8-20  Average use 8hrs 27 min. Average AHI 1.1. compliance 97% > 4 hrs,   Patient is on AutoSet 8-20 pressure   Patient is compliant with using CPAP/BiPAP therapy and is benefitting from PAP therapy.     Hypertension (I10)     PLAN:        continue on Symbicort and albuterol  Montelukast and theophylline     Discussed with patient utilizing Xolair for elevated IgE as well as either Fasenra or Nucala for elevated Eosinophills but he deferred due to lack of medication insurance coverage    Continue AutoSet 8-20 pressure     SAFETY DRIVING  ADEQUATE SLEEP HYGEINE  DISCUSSED CARDIOVASCULAR & METABOLIC SIDE EFFECTS OF UNTREATED ELINOR  PATIENT IS COMPLIANT USING MACHINE AND BENEFITS FROM THERAPY, , PATIENT HAS NASAL DRYNESS AND WILL NEED HEATED HUMIDITY WITH PAP           I personally reviewed the latest radiological study  Patient is advised to stay up-to-date on immunizations

## 2024-03-15 ENCOUNTER — TELEPHONE (OUTPATIENT)
Dept: NEUROSURGERY | Facility: CLINIC | Age: 79
End: 2024-03-15
Payer: MEDICARE

## 2024-03-15 NOTE — TELEPHONE ENCOUNTER
CALLED PATIENT TO REMIND HIM TO PLEASE GET HIS X-RAYS COMPLETED BEFORE HIS FOLLOW UP ON 3/21/24. PATIENT STATED THAT HE WOULD GET THOSE DONE.

## 2024-03-15 NOTE — PROGRESS NOTES
"Neurosurgical Consultation      Omar Choudhary is a 78 y.o. male is here today for follow-up from surgery on 10/4/22 and 10/5/22 with new X-Rays. Today patient reports     Chief Complaint   Patient presents with    Follow-up        Previous treatment:DAY 1 LUMBAR LATERAL INTERBODY FUSION WITH NEURO ROBOT L2, L3.L4   DAY 2 LUMBAR LAMINECTOMY TRANSFORAMINAL LUMBAR INTERBODY FUSION L2,L3,L4 WITH NEURO ROBOT     HPI: This is a 78-year-old gentleman who underwent a lateral Hewitt psoas L2-L4 interbody cage placement with subsequent posterior decompression and fusion in October 2022.  He is now 18 months removed from surgical intervention.  He continues to do well from a surgical perspective.  He has had profound improvement in his neurogenic claudication.  He works to remain active.  He does have intermittent \"aches and pains\".  He does have some intermittent balance issues which he feels may have subtly progressed.  In general he expresses happiness with surgical intervention.    Past Medical History:   Diagnosis Date    ADHD (attention deficit hyperactivity disorder) 2021    Hard to focus on tasks and follow through.    Allergic     Arthritis     Asthma     Carpal tunnel syndrome     no pain    Cataract     Depression     Erectile dysfunction     2018 at 73 years old    Family history of malignant neoplasm of breast 09/26/2019    Hyperlipidemia 09/26/2019    Hypertension 03/01/2012    Leg pain, right     Low back pain     Neuropathy     feet    Osteopenia     Poor balance     Reactive airway disease 01/15/2016    Scoliosis 1960    Shortness of breath     Substance abuse 1966    Alcoholic        Past Surgical History:   Procedure Laterality Date    CARDIAC CATHETERIZATION  1992    COLONOSCOPY  2013    No polyps, slight diverticulitis    EYE SURGERY  2016    KNEE ARTHROSCOPY W/ ACL RECONSTRUCTION Right 2019    LUMBAR FUSION Bilateral 10/05/2022    Procedure: DAY 2 LUMBAR LAMINECTOMY TRANSFORAMINAL LUMBAR INTERBODY FUSION " L2,L3,L4 WITH NEURO ROBOT;  Surgeon: John Do MD;  Location: [facility] MAIN OR;  Service: Robotics - Neuro;  Laterality: Bilateral;    LUMBAR FUSION N/A 10/04/2022    Procedure: DAY 1 LUMBAR LATERAL INTERBODY FUSION WITH NEURO ROBOT L2, L3.L4;  Surgeon: John Do MD;  Location: [facility] MAIN OR;  Service: Robotics - Neuro;  Laterality: N/A;  left side approach    MOUTH SURGERY      SPINE SURGERY  10/4&5/2022    Dr. John Do, [facility]        Current Outpatient Medications on File Prior to Visit   Medication Sig Dispense Refill    acetaminophen (TYLENOL) 500 MG tablet Take 1 tablet by mouth Every 6 (Six) Hours As Needed for Mild Pain.      albuterol sulfate  (90 Base) MCG/ACT inhaler Inhale 2 puffs Every 4 (Four) Hours As Needed for Wheezing or Shortness of Air. 18 g 3    Arginine (L-Arginine-500) 500 MG capsule Take 750 mg by mouth Daily.      B Complex Vitamins (VITAMIN B COMPLEX) capsule capsule Take 1 capsule by mouth Daily. LD 9-27      budesonide-formoterol (SYMBICORT) 160-4.5 MCG/ACT inhaler Inhale 2 puffs 2 (Two) Times a Day. 30.6 g 11    Calcium Carb-Cholecalciferol (Oyster Shell Calcium w/D) 500-5 MG-MCG tablet TAKE TWO TABLETS BY MOUTH DAILY 180 tablet 0    clonazePAM (KlonoPIN) 0.5 MG tablet TAKE ONE TABLET BY MOUTH TWICE DAILY AS NEEDED FOR ANXIETY 30 tablet 2    coenzyme Q10 100 MG capsule Take 1 capsule by mouth Daily.      cyclobenzaprine (FLEXERIL) 10 MG tablet TAKE ONE TABLET BY MOUTH THREE TIMES DAILY AS NEEDED FOR MUSCLE SPASMS 60 tablet 2    losartan (COZAAR) 50 MG tablet Take 1 tablet by mouth Daily. 90 tablet 3    montelukast (SINGULAIR) 10 MG tablet TAKE 1 TABLET BY MOUTH DAILY 90 tablet 0    Multiple Vitamins-Minerals (EMERGEN-C BLUE PO) Take 1 tablet by mouth Daily. LD 9-27      Omega-3 Fatty Acids (FISH OIL) 1000 MG capsule capsule Take 1 capsule by mouth Daily With Breakfast. LD 9-27      Red Yeast Rice 600 MG capsule Take 1 capsule by mouth Daily. LD        sildenafil (REVATIO) 20 MG tablet TAKE ONE TO TWO TABLETS BY MOUTH AS NEEDED FOR ERICTILE DYSFUNCTION. 50 tablet 0    theophylline (THEODUR) 300 MG 12 hr tablet Take 1 tablet by mouth Daily. 90 tablet 3    triamcinolone (KENALOG) 0.025 % cream Apply 1 Application topically to the appropriate area as directed 2 (Two) Times a Day.      Turmeric 400 MG capsule Take 1 capsule by mouth Daily.       Current Facility-Administered Medications on File Prior to Visit   Medication Dose Route Frequency Provider Last Rate Last Admin    Tezepelumab-ekko solution auto-injector 210 mg  210 mg Subcutaneous Q30 Days Jerrod Lagunas MD            Allergies   Allergen Reactions    Statins Myalgia     Other reaction(s): muscle soreness        Social History     Socioeconomic History    Marital status: Significant Other   Tobacco Use    Smoking status: Former     Current packs/day: 0.00     Average packs/day: 0.5 packs/day for 26.0 years (13.0 ttl pk-yrs)     Types: Cigarettes     Start date: 1966     Quit date: 1992     Years since quittin.2     Passive exposure: Past    Smokeless tobacco: Never   Vaping Use    Vaping status: Never Used   Substance and Sexual Activity    Alcohol use: Yes     Alcohol/week: 12.0 standard drinks of alcohol     Types: 10 Glasses of wine, 2 Cans of beer per week    Drug use: Not Currently     Types: Marijuana     Comment: CBD gummies- stop now for surgery    Sexual activity: Yes     Partners: Female     Birth control/protection: Post-menopausal     Comment: We're both >70 years old          Review of Systems   Constitutional:  Positive for activity change.   HENT: Negative.     Eyes: Negative.    Respiratory: Negative.     Cardiovascular: Negative.    Gastrointestinal: Negative.    Endocrine: Negative.    Genitourinary: Negative.    Musculoskeletal: Negative.    Skin: Negative.    Allergic/Immunologic: Negative.    Neurological:  Positive for weakness (back).   Hematological: Negative.   "  Psychiatric/Behavioral:  Positive for sleep disturbance.         Physical Examination:     Vitals:    03/21/24 1030   BP: 170/97   Pulse: 63   Weight: 86.1 kg (189 lb 14.4 oz)   Height: 167.6 cm (65.98\")   PainSc: 0-No pain        Physical Exam     Neurological Exam   Neurological examination appears stable compared to my last evaluation.  I do not appreciate any new red flag signs.    Result Review  The following data was reviewed by: John Do MD on 03/21/2024:    Data reviewed : Radiologic studies flexion and extension x-rays of the lumbar spine show stability of the hardware.  The radiologist comments on some possible L5-S1 dynamic spondylolisthesis.  I am not overly convinced that this is present.  There is no progression of his lumbar degenerative scoliosis.  The hardware appears fully stable.       Assessment/plan:  This is a 78-year-old gentleman who underwent L2-L4 Hewitt psoas lateral interbody fusion with posterior pedicle fixation and decompression.  This was for neurogenic claudication.  He has had significant improvement in his neurogenic claudication.  I am not concerned with his x-rays today or any indication of hardware malfunction.  He does have a history of spinal canal stenosis in his neck and does have some intermittent balance issues.  If this becomes a major component of his symptoms workup for cervical myelopathy can be reinitiated.  This would include need for cervical MRI.  At this juncture I do not have any specific restrictions physically for him.  I would like him to follow-up with me on an as-needed basis.  I have encouraged him to call with any questions or concerns.    Diagnoses and all orders for this visit:    1. Spinal stenosis of lumbar region with neurogenic claudication (Primary)    2. Degenerative lumbar spinal stenosis         Return if symptoms worsen or fail to improve.            John Do MD  "

## 2024-03-19 ENCOUNTER — HOSPITAL ENCOUNTER (OUTPATIENT)
Dept: GENERAL RADIOLOGY | Facility: HOSPITAL | Age: 79
Discharge: HOME | End: 2024-03-19
Admitting: NEUROLOGICAL SURGERY
Payer: MEDICARE

## 2024-03-19 DIAGNOSIS — M48.062 SPINAL STENOSIS OF LUMBAR REGION WITH NEUROGENIC CLAUDICATION: ICD-10-CM

## 2024-03-19 PROCEDURE — 72114 X-RAY EXAM L-S SPINE BENDING: CPT

## 2024-03-21 ENCOUNTER — OFFICE VISIT (OUTPATIENT)
Dept: NEUROSURGERY | Facility: CLINIC | Age: 79
End: 2024-03-21
Payer: MEDICARE

## 2024-03-21 VITALS
HEART RATE: 63 BPM | BODY MASS INDEX: 30.52 KG/M2 | HEIGHT: 66 IN | WEIGHT: 189.9 LBS | DIASTOLIC BLOOD PRESSURE: 97 MMHG | SYSTOLIC BLOOD PRESSURE: 170 MMHG

## 2024-03-21 DIAGNOSIS — M48.061 DEGENERATIVE LUMBAR SPINAL STENOSIS: ICD-10-CM

## 2024-03-21 DIAGNOSIS — M48.062 SPINAL STENOSIS OF LUMBAR REGION WITH NEUROGENIC CLAUDICATION: Primary | ICD-10-CM

## 2024-03-28 RX ORDER — SILDENAFIL CITRATE 20 MG/1
TABLET ORAL
Qty: 50 TABLET | Refills: 0 | Status: SHIPPED | OUTPATIENT
Start: 2024-03-28

## 2024-03-29 DIAGNOSIS — J45.909 ASTHMA, UNSPECIFIED ASTHMA SEVERITY, UNSPECIFIED WHETHER COMPLICATED, UNSPECIFIED WHETHER PERSISTENT: Primary | ICD-10-CM

## 2024-03-29 RX ORDER — ALBUTEROL SULFATE 90 UG/1
2 AEROSOL, METERED RESPIRATORY (INHALATION) EVERY 4 HOURS PRN
Qty: 18 G | Refills: 3 | Status: SHIPPED | OUTPATIENT
Start: 2024-03-29

## 2024-03-29 NOTE — TELEPHONE ENCOUNTER
Incoming Refill Request    Medication requested (name and dose): albuterol sulfate    Pharmacy where request should be sent: [address] OR [facility] [address]    Additional details provided by patient: Has requested refill from pharmacy but hasn't heard anything from them.  Unsure where they are sending request for refill.    Best call back number: [phone]    Does the patient have less than a 3 day supply:  [] Yes  [x] No    Dori Nelson  03/29/24, 13:13 EDT

## 2024-04-16 ENCOUNTER — TRANSCRIBE ORDERS (OUTPATIENT)
Dept: ADMINISTRATIVE | Facility: HOSPITAL | Age: 79
End: 2024-04-16
Payer: MEDICARE

## 2024-04-16 DIAGNOSIS — Z13.6 ENCOUNTER FOR SCREENING FOR VASCULAR DISEASE: Primary | ICD-10-CM

## 2024-04-17 ENCOUNTER — TELEPHONE (OUTPATIENT)
Dept: FAMILY MEDICINE CLINIC | Facility: CLINIC | Age: 79
End: 2024-04-17
Payer: MEDICARE

## 2024-04-17 DIAGNOSIS — R73.09 ELEVATED GLUCOSE: ICD-10-CM

## 2024-04-17 DIAGNOSIS — E53.8 VITAMIN B12 DEFICIENCY: ICD-10-CM

## 2024-04-17 DIAGNOSIS — E55.9 VITAMIN D DEFICIENCY: ICD-10-CM

## 2024-04-17 DIAGNOSIS — E78.2 MIXED HYPERLIPIDEMIA: Primary | ICD-10-CM

## 2024-04-17 DIAGNOSIS — Z12.5 SCREENING FOR PROSTATE CANCER: ICD-10-CM

## 2024-04-17 DIAGNOSIS — I10 PRIMARY HYPERTENSION: ICD-10-CM

## 2024-04-17 NOTE — TELEPHONE ENCOUNTER
"    Caller: Omar Choudhary \"Mendez\"    Relationship: Self    Best call back number: [phone]     What orders are you requesting (i.e. lab or imaging): LABS FOR [facility]    In what timeframe would the patient need to come in: ASAP    Where will you receive your lab/imaging services: IN OFFICE    Additional notes: PLEASE CALL PATIENT ONCE ORDERS HAVE BEEN ENTERED.            "

## 2024-04-18 ENCOUNTER — LAB (OUTPATIENT)
Dept: FAMILY MEDICINE CLINIC | Facility: CLINIC | Age: 79
End: 2024-04-18
Payer: MEDICARE

## 2024-04-18 DIAGNOSIS — Z12.5 SCREENING FOR PROSTATE CANCER: ICD-10-CM

## 2024-04-18 DIAGNOSIS — R73.09 ELEVATED GLUCOSE: ICD-10-CM

## 2024-04-18 DIAGNOSIS — E55.9 VITAMIN D DEFICIENCY: ICD-10-CM

## 2024-04-18 DIAGNOSIS — E78.2 MIXED HYPERLIPIDEMIA: ICD-10-CM

## 2024-04-18 DIAGNOSIS — I10 PRIMARY HYPERTENSION: ICD-10-CM

## 2024-04-18 DIAGNOSIS — E53.8 VITAMIN B12 DEFICIENCY: ICD-10-CM

## 2024-04-18 LAB
BASOPHILS # BLD AUTO: 0.03 10*3/MM3 (ref 0–0.2)
BASOPHILS NFR BLD AUTO: 0.6 % (ref 0–1.5)
DEPRECATED RDW RBC AUTO: 44.3 FL (ref 37–54)
EOSINOPHIL # BLD AUTO: 0.09 10*3/MM3 (ref 0–0.4)
EOSINOPHIL NFR BLD AUTO: 1.8 % (ref 0.3–6.2)
ERYTHROCYTE [DISTWIDTH] IN BLOOD BY AUTOMATED COUNT: 12.7 % (ref 12.3–15.4)
HCT VFR BLD AUTO: 44 % (ref 37.5–51)
HGB BLD-MCNC: 14.8 G/DL (ref 13–17.7)
IMM GRANULOCYTES # BLD AUTO: 0.01 10*3/MM3 (ref 0–0.05)
IMM GRANULOCYTES NFR BLD AUTO: 0.2 % (ref 0–0.5)
LYMPHOCYTES # BLD AUTO: 1.41 10*3/MM3 (ref 0.7–3.1)
LYMPHOCYTES NFR BLD AUTO: 28 % (ref 19.6–45.3)
MCH RBC QN AUTO: 31.9 PG (ref 26.6–33)
MCHC RBC AUTO-ENTMCNC: 33.6 G/DL (ref 31.5–35.7)
MCV RBC AUTO: 94.8 FL (ref 79–97)
MONOCYTES # BLD AUTO: 0.7 10*3/MM3 (ref 0.1–0.9)
MONOCYTES NFR BLD AUTO: 13.9 % (ref 5–12)
NEUTROPHILS NFR BLD AUTO: 2.8 10*3/MM3 (ref 1.7–7)
NEUTROPHILS NFR BLD AUTO: 55.5 % (ref 42.7–76)
NRBC BLD AUTO-RTO: 0 /100 WBC (ref 0–0.2)
PLATELET # BLD AUTO: 164 10*3/MM3 (ref 140–450)
PMV BLD AUTO: 10.5 FL (ref 6–12)
RBC # BLD AUTO: 4.64 10*6/MM3 (ref 4.14–5.8)
TSH SERPL DL<=0.05 MIU/L-ACNC: 1.29 UIU/ML (ref 0.27–4.2)
WBC NRBC COR # BLD AUTO: 5.04 10*3/MM3 (ref 3.4–10.8)

## 2024-04-18 PROCEDURE — G0103 PSA SCREENING: HCPCS | Performed by: FAMILY MEDICINE

## 2024-04-18 PROCEDURE — 36415 COLL VENOUS BLD VENIPUNCTURE: CPT

## 2024-04-18 PROCEDURE — 82607 VITAMIN B-12: CPT | Performed by: FAMILY MEDICINE

## 2024-04-18 PROCEDURE — 80053 COMPREHEN METABOLIC PANEL: CPT | Performed by: FAMILY MEDICINE

## 2024-04-18 PROCEDURE — 82306 VITAMIN D 25 HYDROXY: CPT | Performed by: FAMILY MEDICINE

## 2024-04-18 PROCEDURE — 84443 ASSAY THYROID STIM HORMONE: CPT | Performed by: FAMILY MEDICINE

## 2024-04-18 PROCEDURE — 85025 COMPLETE CBC W/AUTO DIFF WBC: CPT | Performed by: FAMILY MEDICINE

## 2024-04-18 PROCEDURE — 80061 LIPID PANEL: CPT | Performed by: FAMILY MEDICINE

## 2024-04-18 PROCEDURE — 83036 HEMOGLOBIN GLYCOSYLATED A1C: CPT | Performed by: FAMILY MEDICINE

## 2024-04-19 LAB
25(OH)D3 SERPL-MCNC: 47.9 NG/ML (ref 30–100)
ALBUMIN SERPL-MCNC: 4.5 G/DL (ref 3.5–5.2)
ALBUMIN/GLOB SERPL: 2.3 G/DL
ALP SERPL-CCNC: 59 U/L (ref 39–117)
ALT SERPL W P-5'-P-CCNC: 22 U/L (ref 1–41)
ANION GAP SERPL CALCULATED.3IONS-SCNC: 15 MMOL/L (ref 5–15)
AST SERPL-CCNC: 24 U/L (ref 1–40)
BILIRUB SERPL-MCNC: 0.9 MG/DL (ref 0–1.2)
BUN SERPL-MCNC: 14 MG/DL (ref 8–23)
BUN/CREAT SERPL: 15.9 (ref 7–25)
CALCIUM SPEC-SCNC: 9.2 MG/DL (ref 8.6–10.5)
CHLORIDE SERPL-SCNC: 104 MMOL/L (ref 98–107)
CHOLEST SERPL-MCNC: 216 MG/DL (ref 0–200)
CO2 SERPL-SCNC: 22 MMOL/L (ref 22–29)
CREAT SERPL-MCNC: 0.88 MG/DL (ref 0.76–1.27)
EGFRCR SERPLBLD CKD-EPI 2021: 89.1 ML/MIN/1.73
GLOBULIN UR ELPH-MCNC: 2 GM/DL
GLUCOSE SERPL-MCNC: 86 MG/DL (ref 65–99)
HBA1C MFR BLD: 5.6 % (ref 4.8–5.6)
HDLC SERPL-MCNC: 84 MG/DL (ref 40–60)
LDLC SERPL CALC-MCNC: 108 MG/DL (ref 0–100)
LDLC/HDLC SERPL: 1.23 {RATIO}
POTASSIUM SERPL-SCNC: 4 MMOL/L (ref 3.5–5.2)
PROT SERPL-MCNC: 6.5 G/DL (ref 6–8.5)
PSA SERPL-MCNC: 0.5 NG/ML (ref 0–4)
SODIUM SERPL-SCNC: 141 MMOL/L (ref 136–145)
TRIGL SERPL-MCNC: 142 MG/DL (ref 0–150)
VIT B12 BLD-MCNC: 394 PG/ML (ref 211–946)
VLDLC SERPL-MCNC: 24 MG/DL (ref 5–40)

## 2024-04-22 DIAGNOSIS — J45.20 MILD INTERMITTENT ASTHMA WITHOUT COMPLICATION: ICD-10-CM

## 2024-04-22 RX ORDER — MONTELUKAST SODIUM 10 MG/1
10 TABLET ORAL DAILY
Qty: 90 TABLET | Refills: 0 | Status: SHIPPED | OUTPATIENT
Start: 2024-04-22 | End: 2024-04-26

## 2024-04-24 ENCOUNTER — OFFICE VISIT (OUTPATIENT)
Dept: FAMILY MEDICINE CLINIC | Facility: CLINIC | Age: 79
End: 2024-04-24
Payer: MEDICARE

## 2024-04-24 VITALS
BODY MASS INDEX: 30.7 KG/M2 | DIASTOLIC BLOOD PRESSURE: 78 MMHG | SYSTOLIC BLOOD PRESSURE: 122 MMHG | WEIGHT: 191 LBS | HEART RATE: 73 BPM | RESPIRATION RATE: 18 BRPM | HEIGHT: 66 IN | OXYGEN SATURATION: 97 %

## 2024-04-24 DIAGNOSIS — E78.2 MIXED HYPERLIPIDEMIA: ICD-10-CM

## 2024-04-24 DIAGNOSIS — M51.36 LUMBAR DEGENERATIVE DISC DISEASE: ICD-10-CM

## 2024-04-24 DIAGNOSIS — G47.33 OBSTRUCTIVE SLEEP APNEA SYNDROME: ICD-10-CM

## 2024-04-24 DIAGNOSIS — G47.00 INSOMNIA, UNSPECIFIED TYPE: ICD-10-CM

## 2024-04-24 DIAGNOSIS — I10 PRIMARY HYPERTENSION: ICD-10-CM

## 2024-04-24 DIAGNOSIS — Z00.00 MEDICARE ANNUAL WELLNESS VISIT, SUBSEQUENT: Primary | ICD-10-CM

## 2024-04-24 DIAGNOSIS — J45.20 MILD INTERMITTENT ASTHMA WITHOUT COMPLICATION: ICD-10-CM

## 2024-04-24 PROCEDURE — G0439 PPPS, SUBSEQ VISIT: HCPCS | Performed by: FAMILY MEDICINE

## 2024-04-24 PROCEDURE — 1159F MED LIST DOCD IN RCRD: CPT | Performed by: FAMILY MEDICINE

## 2024-04-24 PROCEDURE — 99214 OFFICE O/P EST MOD 30 MIN: CPT | Performed by: FAMILY MEDICINE

## 2024-04-24 PROCEDURE — 1160F RVW MEDS BY RX/DR IN RCRD: CPT | Performed by: FAMILY MEDICINE

## 2024-04-24 PROCEDURE — 3074F SYST BP LT 130 MM HG: CPT | Performed by: FAMILY MEDICINE

## 2024-04-24 PROCEDURE — 3078F DIAST BP <80 MM HG: CPT | Performed by: FAMILY MEDICINE

## 2024-04-24 RX ORDER — TRAZODONE HYDROCHLORIDE 50 MG/1
50-100 TABLET ORAL NIGHTLY
Qty: 180 TABLET | Refills: 3 | Status: SHIPPED | OUTPATIENT
Start: 2024-04-24

## 2024-04-24 RX ORDER — SILICONE ADHESIVE 1.5" X 3"
400 SHEET (EA) TOPICAL DAILY
COMMUNITY

## 2024-04-24 NOTE — PROGRESS NOTES
The ABCs of the Annual Wellness Visit  Subsequent Medicare Wellness Visit    Subjective    Omar Choudhary is a 78 y.o. male who presents for a Subsequent Medicare Wellness Visit.    The following portions of the patient's history were reviewed and   updated as appropriate: allergies, current medications, past family history, past medical history, past social history, past surgical history, and problem list.    Compared to one year ago, the patient feels his physical   health is better.    Compared to one year ago, the patient feels his mental   health is the same.    Recent Hospitalizations:  He was not admitted to the hospital during the last year.     Current Medical Providers:  Patient Care Team:  Dennis Kwong MD as PCP - General (Internal Medicine)  Arslan Carlson PA-C as Physician Assistant (Physician Assistant)  Ang Peña APRN as Nurse Practitioner (Family Medicine)    Outpatient Medications Prior to Visit   Medication Sig Dispense Refill    acetaminophen (TYLENOL) 500 MG tablet Take 1 tablet by mouth Every 6 (Six) Hours As Needed for Mild Pain.      albuterol sulfate  (90 Base) MCG/ACT inhaler Inhale 2 puffs Every 4 (Four) Hours As Needed for Wheezing or Shortness of Air. 18 g 3    B Complex Vitamins (VITAMIN B COMPLEX) capsule capsule Take 1 capsule by mouth Daily. LD 9-27      budesonide-formoterol (SYMBICORT) 160-4.5 MCG/ACT inhaler Inhale 2 puffs 2 (Two) Times a Day. 30.6 g 11    Calcium Carb-Cholecalciferol (Oyster Shell Calcium w/D) 500-5 MG-MCG tablet TAKE TWO TABLETS BY MOUTH DAILY 180 tablet 0    clonazePAM (KlonoPIN) 0.5 MG tablet TAKE ONE TABLET BY MOUTH TWICE DAILY AS NEEDED FOR ANXIETY 30 tablet 2    coenzyme Q10 100 MG capsule Take 1 capsule by mouth Daily.      cyclobenzaprine (FLEXERIL) 10 MG tablet TAKE ONE TABLET BY MOUTH THREE TIMES DAILY AS NEEDED FOR MUSCLE SPASMS 60 tablet 2    losartan (COZAAR) 50 MG tablet Take 1 tablet by mouth Daily. 90 tablet 3     Multiple Vitamins-Minerals (EMERGEN-C BLUE PO) Take 1 tablet by mouth Daily. LD 9-27      Omega-3 Fatty Acids (FISH OIL) 1000 MG capsule capsule Take 1 capsule by mouth Daily With Breakfast. LD 9-27      S-Adenosylmethionine (YURY-e) 400 MG tablet Take 400 mg by mouth Daily.      sildenafil (REVATIO) 20 MG tablet take 1 to 2 tablets by mouth as needed for erictile dysfunction 50 tablet 0    theophylline (THEODUR) 300 MG 12 hr tablet Take 1 tablet by mouth Daily. 90 tablet 3    triamcinolone (KENALOG) 0.025 % cream Apply 1 Application topically to the appropriate area as directed 2 (Two) Times a Day.      Turmeric 400 MG capsule Take 1 capsule by mouth Daily.      montelukast (SINGULAIR) 10 MG tablet Take 1 tablet by mouth Daily. 90 tablet 0    Arginine (L-Arginine-500) 500 MG capsule Take 750 mg by mouth Daily. (Patient not taking: Reported on 4/24/2024)      Red Yeast Rice 600 MG capsule Take 1 capsule by mouth Daily. LD 9-27 (Patient not taking: Reported on 4/24/2024)       Facility-Administered Medications Prior to Visit   Medication Dose Route Frequency Provider Last Rate Last Admin    Tezepelumab-ekko solution auto-injector 210 mg  210 mg Subcutaneous Q30 Days Jerrod Lagunas MD         No opioid medication identified on active medication list. I have reviewed chart for other potential  high risk medication/s and harmful drug interactions in the elderly.      Aspirin is not on active medication list.  Aspirin use is not indicated based on review of current medical condition/s. Risk of harm outweighs potential benefits.  .    Patient Active Problem List   Diagnosis    Benign prostatic hyperplasia    Bursitis    Depression    Diverticulosis    Family history of malignant neoplasm of breast    Hyperlipidemia    Hypertension    Internal derangement of right knee    Knee effusion    Asthma    Obstructive sleep apnea syndrome    Osteoarthritis    Osteopenia    Pain in knee    Postnasal drip    Prepatellar bursitis     "Sciatica of right side    Diaphragm paralysis    Spinal stenosis of lumbar region with neurogenic claudication    Degenerative lumbar spinal stenosis    Anxiety    Pinguecula of right eye    Lumbar radiculopathy, chronic    Bilateral pseudophakia    Diverticulosis of colon    Hemorrhoids without complication    Lumbar degenerative disc disease    Lumbar spondylosis    Other fecal abnormalities    Rectal bleeding    Scoliosis of lumbosacral region due to degenerative disease of spine in adult    Arthritis     Advance Care Planning   Advance Care Planning     Advance Directive is not on file.  ACP discussion was held with the patient during this visit. Patient has an advance directive (not in EMR), copy requested.     Objective    Vitals:    24 1325   BP: 122/78   Pulse: 73   Resp: 18   SpO2: 97%   Weight: 86.6 kg (191 lb)   Height: 167.6 cm (65.98\")     Estimated body mass index is 30.84 kg/m² as calculated from the following:    Height as of this encounter: 167.6 cm (65.98\").    Weight as of this encounter: 86.6 kg (191 lb).    BMI is >= 30 and <35. (Class 1 Obesity). The following options were offered after discussion;: weight loss educational material (shared in after visit summary)    Does the patient have evidence of cognitive impairment? No    Lab Results   Component Value Date    TRIG 142 2024    HDL 84 (H) 2024     (H) 2024    VLDL 24 2024    HGBA1C 5.60 2024        HEALTH RISK ASSESSMENT    Smoking Status:  Social History     Tobacco Use   Smoking Status Former    Current packs/day: 0.00    Average packs/day: 0.5 packs/day for 26.0 years (13.0 ttl pk-yrs)    Types: Cigarettes    Start date: 1966    Quit date: 1992    Years since quittin.3    Passive exposure: Past   Smokeless Tobacco Never     Alcohol Consumption:  Social History     Substance and Sexual Activity   Alcohol Use Yes    Alcohol/week: 17.0 standard drinks of alcohol    Types: 10 Glasses of " wine, 2 Cans of beer, 5 Shots of liquor per week   2-3 drinks/day    Fall Risk Screen:  ADIA Fall Risk Assessment was completed, and patient is at LOW risk for falls.Assessment completed on:2024    Depression Screenin/24/2024     1:20 PM   PHQ-2/PHQ-9 Depression Screening   Little Interest or Pleasure in Doing Things 0-->not at all   Feeling Down, Depressed or Hopeless 0-->not at all   PHQ-9: Brief Depression Severity Measure Score 0   Reports feeling down/depressed related to physical health. Reports difficulty w/ balance. Previously did some PT after his back surgery. Still has those resources. No SI/HI    Health Habits and Functional and Cognitive Screenin/24/2024     1:20 PM   Functional & Cognitive Status   Do you have difficulty preparing food and eating? No   Do you have difficulty bathing yourself, getting dressed or grooming yourself? No   Do you have difficulty using the toilet? No   Do you have difficulty moving around from place to place? No   Do you have trouble with steps or getting out of a bed or a chair? No   Current Diet Well Balanced Diet   Dental Exam Not up to date   Eye Exam Up to date   Exercise (times per week) 5 times per week   Current Exercises Include Walking   Do you need help using the phone?  No   Are you deaf or do you have serious difficulty hearing?  No   Do you need help to go to places out of walking distance? No   Do you need help shopping? No   Do you need help preparing meals?  No   Do you need help with housework?  No   Do you need help with laundry? No   Do you need help taking your medications? No   Do you need help managing money? No   Do you ever drive or ride in a car without wearing a seat belt? No   Have you felt unusual stress, anger or loneliness in the last month? Yes   Who do you live with? Alone   If you need help, do you have trouble finding someone available to you? No   Have you been bothered in the last four weeks by sexual problems? No    Do you have difficulty concentrating, remembering or making decisions? Yes     Age-appropriate Screening Schedule:  Refer to the list below for future screening recommendations based on patient's age, sex and/or medical conditions. Orders for these recommended tests are listed in the plan section. The patient has been provided with a written plan.    Health Maintenance   Topic Date Due    ZOSTER VACCINE (1 of 2) Never done    RSV Vaccine - Adults (1 - 1-dose 60+ series) Never done    COVID-19 Vaccine (7 - 2023-24 season) 07/14/2024 (Originally 4/5/2024)    INFLUENZA VACCINE  08/01/2024    LIPID PANEL  04/18/2025    DXA SCAN  04/18/2025    ANNUAL WELLNESS VISIT  04/24/2025    BMI FOLLOWUP  04/24/2025    TDAP/TD VACCINES (2 - Td or Tdap) 12/05/2033    HEPATITIS C SCREENING  Completed    Pneumococcal Vaccine 65+  Completed    COLORECTAL CANCER SCREENING  Discontinued        CMS Preventative Services Quick Reference  Risk Factors Identified During Encounter  Immunizations Discussed/Encouraged: Influenza  The above risks/problems have been discussed with the patient.  Pertinent information has been shared with the patient in the After Visit Summary.  An After Visit Summary and PPPS were made available to the patient.    Preventative  Colonoscopy: 10/2023, no recall  PSA: 0.499 (4/2024)  AAA: 12/2020- 3.6cm but 4/2023 US negative  DEXA: 4/2023- osteopenia  Shingles: reportedly completed Shingrix 2020  Pneumonia: Prevnar 11/2019, Pneumovax 8/2017  Tdap: 12/2023  Influenza: 10/2023, recommended  COVID: Completed 5 shots Pfizer (12/2023)    Follow Up:   Next Medicare Wellness visit to be scheduled in 1 year.       Additional E&M Note during same encounter follows:  Patient has multiple medical problems which are significant and separately identifiable that require additional work above and beyond the Medicare Wellness Visit.      Chief Complaint  Medicare Wellness-subsequent    Subjective        HPI  Omar Choudhary is  "also being seen today for multiple medical problems    Chronic low back pain: s/p lateral L2-4 interbody fusion w/ cage placement and posterior decompression and posterior fusion in Oct 2022. PT completed. Happy w/ results but now feels that his age is catching up to him. No further sciatica. Following w/ neurosurgery- Do and pain mgmt- [facility]. Only needing 500mg Tylenol PRN, which provides good relief.      HTN: 122/78 today. He reports Maintained on losartan 50 daily. No LH/dizziness, CP, Reports SOB on exertion.     HLD: Last lipid panel w/  and total cholesterol 216. Unable to tolerate statin due to myalgias and Zetia resulted in diarrhea. Doing some regular exercise     Asthma: hx L hemidiaphragm paralysis, asthma, and allergies. Maintained on Symbicort BID (often only one daily), theophylline 300 daily and montelukast 10mg daily as well as albuterol. Chronic SOB on exertion but stable. No nighttime awakening w/ cough. Follows w/ pulm- Draw     ELINOR: maintained on CPAP. No issues at this time. Working well for him and well controlled. Follows w/ Draw     Insomnia: patient reports some difficulty falling asleep. Often has a glass of wine to help go to sleep. Sometimes uses     Allergic rhinitis: [facility]         Objective   Vital Signs:  /78   Pulse 73   Resp 18   Ht 167.6 cm (65.98\")   Wt 86.6 kg (191 lb)   SpO2 97%   BMI 30.84 kg/m²     Physical Exam  Constitutional:       General: He is not in acute distress.     Appearance: He is well-developed.   HENT:      Head: Normocephalic and atraumatic.      Right Ear: Tympanic membrane and external ear normal. There is no impacted cerumen.      Left Ear: Tympanic membrane and external ear normal. There is no impacted cerumen.      Nose: Nose normal.      Mouth/Throat:      Mouth: Mucous membranes are moist.      Pharynx: No oropharyngeal exudate or posterior oropharyngeal erythema.   Eyes:      General: No " scleral icterus.        Right eye: No discharge.         Left eye: No discharge.      Extraocular Movements: Extraocular movements intact.      Conjunctiva/sclera: Conjunctivae normal.      Pupils: Pupils are equal, round, and reactive to light.   Neck:      Thyroid: No thyromegaly.      Vascular: No carotid bruit.   Cardiovascular:      Rate and Rhythm: Normal rate and regular rhythm.      Heart sounds: Normal heart sounds. No murmur heard.  Pulmonary:      Effort: Pulmonary effort is normal. No respiratory distress.      Breath sounds: Normal breath sounds. No wheezing or rales.   Abdominal:      General: Bowel sounds are normal. There is no distension.      Palpations: Abdomen is soft.      Tenderness: There is no abdominal tenderness.   Musculoskeletal:         General: No deformity. Normal range of motion.      Cervical back: Normal range of motion and neck supple.   Lymphadenopathy:      Cervical: No cervical adenopathy.   Skin:     General: Skin is warm and dry.      Findings: No rash.   Neurological:      General: No focal deficit present.      Mental Status: He is alert and oriented to person, place, and time. Mental status is at baseline.      Cranial Nerves: No cranial nerve deficit.      Sensory: No sensory deficit.   Psychiatric:         Behavior: Behavior normal.         Thought Content: Thought content normal.             Assessment and Plan   Diagnoses and all orders for this visit:    1. Medicare annual wellness visit, subsequent (Primary)   - Recent labs reviewed. No need to repeat today   - Counseled regarding diet, exercise, weight loss, and preventative health maintenance items/immunizations below    2. Lumbar degenerative disc disease: s/p lateral L2-4 interbody fusion w/ cage placement and posterior decompression and posterior fusion in Oct 2022. Pain now well controlled   - Cont Tylenol 500mg PRN    3. Primary hypertension:  122/78 today   - Cont home losartan 50mg daily    4. Mixed  hyperlipidemia: recent lipid panel improved w/o medication   - Monitor    5. Mild intermittent asthma without complication   - Cont home Symbicort 160, Singulair 10 daily and theophylline 300 daily   - Cont pulm f/u- DRaw    6. Obstructive sleep apnea syndrome   - Cont home CPAP   - Cont pulm f/u- Draw    7. Insomnia, unspecified type  -     Start traZODone (DESYREL) 50 MG tablet; Take 1-2 tablets by mouth Every Night.  Dispense: 180 tablet; Refill: 3  - Counseled regarding alcohol use       Follow Up   Return in about 1 year (around 4/24/2025) for Medicare Wellness.  Patient was given instructions and counseling regarding his condition or for health maintenance advice. Please see specific information pulled into the AVS if appropriate.

## 2024-04-25 ENCOUNTER — HOSPITAL ENCOUNTER (OUTPATIENT)
Dept: ULTRASOUND IMAGING | Facility: HOSPITAL | Age: 79
Discharge: HOME | End: 2024-04-25

## 2024-04-25 DIAGNOSIS — Z13.6 ENCOUNTER FOR SCREENING FOR VASCULAR DISEASE: ICD-10-CM

## 2024-04-25 LAB
BH CV VAS SCREENING CAROTID CCA LEFT: 65.5 CM/SEC
BH CV VAS SCREENING CAROTID CCA RIGHT: 52.3 CM/SEC
BH CV VAS SCREENING CAROTID ICA LEFT: 65.5 CM/SEC
BH CV VAS SCREENING CAROTID ICA RIGHT: 94 CM/SEC
BH CV XLRA MEAS - MID AO DIAM: 1.6 CM
BH CV XLRA MEAS - PAD LEFT ABI PT: 1.1
BH CV XLRA MEAS - PAD LEFT ARM: 136 MMHG
BH CV XLRA MEAS - PAD LEFT LEG PT: 150 MMHG
BH CV XLRA MEAS - PAD RIGHT ABI PT: 1.1
BH CV XLRA MEAS - PAD RIGHT ARM: 140 MMHG
BH CV XLRA MEAS - PAD RIGHT LEG PT: 151 MMHG
BH CV XLRA MEAS LEFT DIST CCA EDV: 13.2 CM/SEC
BH CV XLRA MEAS LEFT DIST CCA PSV: 65.5 CM/SEC
BH CV XLRA MEAS LEFT ICA/CCA RATIO: 1
BH CV XLRA MEAS LEFT PROX ICA EDV: 12.8 CM/SEC
BH CV XLRA MEAS LEFT PROX ICA PSV: 64.7 CM/SEC
BH CV XLRA MEAS RIGHT DIST CCA EDV: 9.9 CM/SEC
BH CV XLRA MEAS RIGHT DIST CCA PSV: 52.3 CM/SEC
BH CV XLRA MEAS RIGHT ICA/CCA RATIO: 1.8
BH CV XLRA MEAS RIGHT PROX ICA EDV: 7.4 CM/SEC
BH CV XLRA MEAS RIGHT PROX ICA PSV: 94 CM/SEC

## 2024-04-25 PROCEDURE — 93799 UNLISTED CV SVC/PROCEDURE: CPT

## 2024-04-26 DIAGNOSIS — J45.20 MILD INTERMITTENT ASTHMA WITHOUT COMPLICATION: ICD-10-CM

## 2024-04-26 RX ORDER — MONTELUKAST SODIUM 10 MG/1
10 TABLET ORAL DAILY
Qty: 90 TABLET | Refills: 0 | Status: SHIPPED | OUTPATIENT
Start: 2024-04-26

## 2024-05-02 RX ORDER — BACLOFEN 10 MG/1
10 TABLET ORAL ONCE AS NEEDED
Qty: 45 TABLET | Refills: 0 | Status: SHIPPED | OUTPATIENT
Start: 2024-05-02

## 2024-05-02 NOTE — TELEPHONE ENCOUNTER
Rx Refill Note  Requested Prescriptions     Pending Prescriptions Disp Refills    baclofen (LIORESAL) 10 MG tablet [Pharmacy Med Name: Baclofen Oral Tablet 10 MG] 45 tablet 0     Sig: TAKE 1 TABLET BY MOUTH THREE TIMES DAILY      Last office visit with prescribing clinician: 3/21/2024   Last telemedicine visit with prescribing clinician: Visit date not found   Next office visit with prescribing clinician: Visit date not found                         Would you like a call back once the refill request has been completed: [] Yes [] No    If the office needs to give you a call back, can they leave a voicemail: [] Yes [] No    Isaura Bentley MA  05/02/24, 10:33 EDT

## 2024-05-13 ENCOUNTER — TELEPHONE (OUTPATIENT)
Dept: URGENT CARE | Facility: CLINIC | Age: 79
End: 2024-05-13
Payer: MEDICARE

## 2024-05-13 ENCOUNTER — TELEPHONE (OUTPATIENT)
Dept: FAMILY MEDICINE CLINIC | Facility: CLINIC | Age: 79
End: 2024-05-13

## 2024-05-13 DIAGNOSIS — T14.90XA TRAUMA: Primary | ICD-10-CM

## 2024-05-13 RX ORDER — HYDROCODONE BITARTRATE AND ACETAMINOPHEN 5; 325 MG/1; MG/1
1 TABLET ORAL EVERY 6 HOURS PRN
Qty: 28 TABLET | Refills: 0 | Status: SHIPPED | OUTPATIENT
Start: 2024-05-13 | End: 2024-05-20
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
## 2024-05-13 NOTE — TELEPHONE ENCOUNTER
Received call from radiology.  Lt shoulder film indicated degenerative changes, bone spurs but also indicates 7th rib fracture, pleural effusion, elevated hemidiaphragm.    Called patient - explained radiology reading.  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   Provided options for patient to return here for rib series and CXR or may follow up with PCP for further evaluation and treatment.  Seeing PCP would be able to follow him through this injury, he will follow up with PCP.    Called PCP, Dennis Kwong JR MD, delivered radiologist dictation of imaging.,  Dr. Kwong stated his office will reach out to patient from here and dictate care.

## 2024-05-20 ENCOUNTER — TELEPHONE (OUTPATIENT)
Dept: FAMILY MEDICINE CLINIC | Facility: CLINIC | Age: 79
End: 2024-05-20

## 2024-05-20 ENCOUNTER — OFFICE VISIT (OUTPATIENT)
Dept: FAMILY MEDICINE CLINIC | Facility: CLINIC | Age: 79
End: 2024-05-20
Payer: MEDICARE

## 2024-05-20 ENCOUNTER — HOSPITAL ENCOUNTER (OUTPATIENT)
Dept: GENERAL RADIOLOGY | Facility: HOSPITAL | Age: 79
Discharge: HOME | End: 2024-05-20
Admitting: FAMILY MEDICINE
Payer: MEDICARE

## 2024-05-20 VITALS
RESPIRATION RATE: 18 BRPM | DIASTOLIC BLOOD PRESSURE: 82 MMHG | WEIGHT: 188.8 LBS | OXYGEN SATURATION: 99 % | SYSTOLIC BLOOD PRESSURE: 134 MMHG | HEART RATE: 61 BPM | BODY MASS INDEX: 30.34 KG/M2 | HEIGHT: 66 IN

## 2024-05-20 DIAGNOSIS — S22.42XD CLOSED FRACTURE OF MULTIPLE RIBS OF LEFT SIDE WITH ROUTINE HEALING, SUBSEQUENT ENCOUNTER: Primary | ICD-10-CM

## 2024-05-20 DIAGNOSIS — S49.92XA: ICD-10-CM

## 2024-05-20 PROCEDURE — 1126F AMNT PAIN NOTED NONE PRSNT: CPT | Performed by: FAMILY MEDICINE

## 2024-05-20 PROCEDURE — 3079F DIAST BP 80-89 MM HG: CPT | Performed by: FAMILY MEDICINE

## 2024-05-20 PROCEDURE — 1160F RVW MEDS BY RX/DR IN RCRD: CPT | Performed by: FAMILY MEDICINE

## 2024-05-20 PROCEDURE — 3075F SYST BP GE 130 - 139MM HG: CPT | Performed by: FAMILY MEDICINE

## 2024-05-20 PROCEDURE — 99214 OFFICE O/P EST MOD 30 MIN: CPT | Performed by: FAMILY MEDICINE

## 2024-05-20 PROCEDURE — 71046 X-RAY EXAM CHEST 2 VIEWS: CPT

## 2024-05-20 PROCEDURE — 1159F MED LIST DOCD IN RCRD: CPT | Performed by: FAMILY MEDICINE

## 2024-05-20 RX ORDER — CELECOXIB 200 MG/1
200 CAPSULE ORAL DAILY
Qty: 30 CAPSULE | Refills: 1 | Status: SHIPPED | OUTPATIENT
Start: 2024-05-20

## 2024-05-20 RX ORDER — CYCLOBENZAPRINE HCL 10 MG
10 TABLET ORAL 3 TIMES DAILY PRN
Qty: 60 TABLET | Refills: 2 | Status: SHIPPED | OUTPATIENT
Start: 2024-05-20

## 2024-05-20 RX ORDER — HYDROCODONE BITARTRATE AND ACETAMINOPHEN 7.5; 325 MG/1; MG/1
1 TABLET ORAL EVERY 4 HOURS PRN
Qty: 42 TABLET | Refills: 0 | Status: SHIPPED | OUTPATIENT
Start: 2024-05-20
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
## 2024-06-21 ENCOUNTER — TELEPHONE (OUTPATIENT)
Dept: FAMILY MEDICINE CLINIC | Facility: CLINIC | Age: 79
End: 2024-06-21
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
## 2024-06-25 ENCOUNTER — OFFICE VISIT (OUTPATIENT)
Dept: FAMILY MEDICINE CLINIC | Facility: CLINIC | Age: 79
End: 2024-06-25
Payer: MEDICARE

## 2024-06-25 ENCOUNTER — HOSPITAL ENCOUNTER (OUTPATIENT)
Dept: GENERAL RADIOLOGY | Facility: HOSPITAL | Age: 79
Discharge: HOME | End: 2024-06-25
Admitting: FAMILY MEDICINE
Payer: MEDICARE

## 2024-06-25 VITALS
HEART RATE: 63 BPM | DIASTOLIC BLOOD PRESSURE: 84 MMHG | WEIGHT: 187.2 LBS | HEIGHT: 66 IN | BODY MASS INDEX: 30.08 KG/M2 | OXYGEN SATURATION: 98 % | SYSTOLIC BLOOD PRESSURE: 132 MMHG | RESPIRATION RATE: 16 BRPM

## 2024-06-25 DIAGNOSIS — M23.52 CHRONIC INSTABILITY OF LEFT KNEE: ICD-10-CM

## 2024-06-25 DIAGNOSIS — J18.9 PNEUMONIA DUE TO INFECTIOUS ORGANISM, UNSPECIFIED LATERALITY, UNSPECIFIED PART OF LUNG: Primary | ICD-10-CM

## 2024-06-25 PROCEDURE — 3075F SYST BP GE 130 - 139MM HG: CPT | Performed by: FAMILY MEDICINE

## 2024-06-25 PROCEDURE — 73562 X-RAY EXAM OF KNEE 3: CPT

## 2024-06-25 PROCEDURE — 1160F RVW MEDS BY RX/DR IN RCRD: CPT | Performed by: FAMILY MEDICINE

## 2024-06-25 PROCEDURE — 1126F AMNT PAIN NOTED NONE PRSNT: CPT | Performed by: FAMILY MEDICINE

## 2024-06-25 PROCEDURE — 99214 OFFICE O/P EST MOD 30 MIN: CPT | Performed by: FAMILY MEDICINE

## 2024-06-25 PROCEDURE — 3079F DIAST BP 80-89 MM HG: CPT | Performed by: FAMILY MEDICINE

## 2024-06-25 PROCEDURE — 1159F MED LIST DOCD IN RCRD: CPT | Performed by: FAMILY MEDICINE

## 2024-06-25 NOTE — PROGRESS NOTES
Chief Complaint   Patient presents with    URI     X2 weeks    Knee Pain     Left, pops out of place    Leg Pain     Left     HPI  Omar Choudhary is a 78 y.o. male that presents for   Chief Complaint   Patient presents with    URI     X2 weeks    Knee Pain     Left, pops out of place    Leg Pain     Left     URI: started w/ congestion and cough 10-14 days ago. He was seen by doctor in [place] who was concerned about possible infection surrounding where he had had pleural effusion related to recent rib fracture. Patient was given abx for possible PNA. Since abx (Augmentin), patient reports productive cough has improved. No fever, increased SOB.     Knee instability: patient reports sensation that L knee will buckle/give out. Patient reports mild pain but mostly instability. Feels that it has gotten better after going on bike riding trip for the last 3-4 weeks.     Review of Systems  Pertinent positives of ROS documented in HPI    The following portions of the patient's history were reviewed and updated as appropriate: problem list, past medical history, past surgical history, allergies, current medication    Problem List Tab  Patient History Tab  Immunizations Tab  Medications Tab  Chart Review Tab  Care Everywhere Tab  Synopsis Tab    PE  Vitals:    06/25/24 0909   BP: 132/84   Pulse: 63   Resp: 16   SpO2: 98%     Body mass index is 30.21 kg/m².  General: Well nourished, NAD  Head: AT/NC  Eyes: EOMI, anicteric sclera  Resp: CTAB, SCR. Diminished BS to LLL  CV: RRR w/o m/r/g; 2+ pulses  GI: Soft, NT/ND, +BS  MSK: FROM, no deformity, no edema. Unremarkable L knee exam  Skin: Warm, dry, intact  Neuro: Alert and oriented. No focal deficits  Psych: Appropriate mood and affect    Imaging  XR Chest PA & Lateral    Result Date: 5/20/2024  Impression: 1. Small left pleural effusion with overlying left basilar consolidation likely due to atelectasis appears similar to prior study. 2. Displaced fracture of what appears to be  the anterolateral left seventh rib. Electronically Signed: Dominguez Villa MD  5/20/2024 1:41 PM EDT  Workstation ID: [id number]    XR Spine Lumbar Complete With Flex & Ext    Result Date: 3/20/2024  Impression: 1.Postsurgical changes L2-3 and L3-4. 2.Levoscoliosis with multilevel degenerative change. 3.Suggested dynamic listhesis at L5-S1. Electronically Signed: Jeramy Wasserman MD  3/20/2024 11:46 AM EDT  Workstation ID: [id number]    Assessment & Plan   Omar Choudhary is a 78 y.o. male that presents for   Chief Complaint   Patient presents with    URI     X2 weeks    Knee Pain     Left, pops out of place    Leg Pain     Left     Diagnoses and all orders for this visit:    1. Pneumonia due to infectious organism, unspecified laterality, unspecified part of lung (Primary): completed abx and clinically improving. No need to repeat CXR based on lung exam and history today   - Monitor    2. Chronic instability of left knee: improved w/ bike riding. Feel that instability would improve w/ strengthening. Will order PT as below and get baseline XR  -     XR Knee 3 View Left; Future  -     Ambulatory Referral to Physical Therapy       Return if symptoms worsen or fail to improve.

## 2024-07-09 ENCOUNTER — TREATMENT (OUTPATIENT)
Dept: PHYSICAL THERAPY | Facility: CLINIC | Age: 79
End: 2024-07-09
Payer: MEDICARE

## 2024-07-09 DIAGNOSIS — G89.29 CHRONIC PAIN OF LEFT KNEE: Primary | ICD-10-CM

## 2024-07-09 DIAGNOSIS — M25.562 CHRONIC PAIN OF LEFT KNEE: Primary | ICD-10-CM
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
## 2024-07-12 ENCOUNTER — TREATMENT (OUTPATIENT)
Dept: PHYSICAL THERAPY | Facility: CLINIC | Age: 79
End: 2024-07-12
Payer: MEDICARE

## 2024-07-12 DIAGNOSIS — G89.29 CHRONIC PAIN OF LEFT KNEE: Primary | ICD-10-CM

## 2024-07-12 DIAGNOSIS — M25.562 CHRONIC PAIN OF LEFT KNEE: Primary | ICD-10-CM

## 2024-07-12 NOTE — PROGRESS NOTES
Physical Therapy Daily Treatment Note  Visit: 2    Omar Choudhary reports: moderate soreness in his left knee with walking the day of initial PT visit but back to baseline the next day.   YOB: 1945  Referring practitioner : Dennis Kwong MD  Date of Initial: Type: THERAPY  Noted: 7/9/2024  Today's date: 7/12/2024  Patient seen for 2 sessions    Visit Diagnoses:    ICD-10-CM ICD-9-CM   1. Chronic pain of left knee  M25.562 719.46    G89.29 338.29       Subjective       Objective   See Exercise, Manual, and Modality Logs for complete treatment.       Assessment/Plan    Improved ability for correct HEP performance with review and cueing.  Improved L knee ROM and good tolerance to manual therapy and light resistive exercise.     Plan:    Continue            Timed:         Manual Therapy:    14     mins  11273;     Therapeutic Exercise:    25     mins  90729;         Timed Treatment:   39   mins   Total Treatment:     39   mins         Carlos Sanchez PT, DPT  Physical Therapist  [place] Lic #[id number]

## 2024-07-16 ENCOUNTER — TREATMENT (OUTPATIENT)
Dept: PHYSICAL THERAPY | Facility: CLINIC | Age: 79
End: 2024-07-16
Payer: MEDICARE

## 2024-07-16 DIAGNOSIS — G89.29 CHRONIC PAIN OF LEFT KNEE: Primary | ICD-10-CM

## 2024-07-16 DIAGNOSIS — M25.562 CHRONIC PAIN OF LEFT KNEE: Primary | ICD-10-CM

## 2024-07-16 NOTE — PROGRESS NOTES
Physical Therapy Daily Treatment Note  Visit: 3    Omar Choudhary reports: still sore in his left knee immediately after standing and with prolonged walking.   YOB: 1945  Referring practitioner : Dennis Kwong MD  Date of Initial: Type: THERAPY  Noted: 7/9/2024  Today's date: 7/16/2024  Patient seen for 3 sessions    Visit Diagnoses:    ICD-10-CM ICD-9-CM   1. Chronic pain of left knee  M25.562 719.46    G89.29 338.29       Subjective       Objective   See Exercise, Manual, and Modality Logs for complete treatment.       Assessment/Plan    Improved L knee AROM and gait mechanics post treatment.   NMES well toleated to L quad with decreased L knee pain with standing afterwards.     Plan:    Continue             Timed:         Manual Therapy:    13     mins  83353;     Therapeutic Exercise:    17     mins  05829;     Neuromuscular Nayeli:    8    mins  65846;          Timed Treatment:   38   mins   Total Treatment:     38   mins         Carlos Sanchez PT, DPT  Physical Therapist  [place] Lic #[id number]

## 2024-07-19 ENCOUNTER — TREATMENT (OUTPATIENT)
Dept: PHYSICAL THERAPY | Facility: CLINIC | Age: 79
End: 2024-07-19
Payer: MEDICARE

## 2024-07-19 DIAGNOSIS — G89.29 CHRONIC PAIN OF LEFT KNEE: Primary | ICD-10-CM

## 2024-07-19 DIAGNOSIS — M25.562 CHRONIC PAIN OF LEFT KNEE: Primary | ICD-10-CM

## 2024-07-19 PROCEDURE — 97140 MANUAL THERAPY 1/> REGIONS: CPT | Performed by: PHYSICAL THERAPIST

## 2024-07-19 PROCEDURE — 97112 NEUROMUSCULAR REEDUCATION: CPT | Performed by: PHYSICAL THERAPIST

## 2024-07-19 PROCEDURE — 97110 THERAPEUTIC EXERCISES: CPT | Performed by: PHYSICAL THERAPIST

## 2024-07-20 NOTE — PROGRESS NOTES
Physical Therapy Daily Treatment Note  Visit: 4    Omar Choudhary reports: better after treatments for a period of time but still significant pain and limits with walking and especially when he first stands.   YOB: 1945  Referring practitioner : Dennis Kwong MD  Date of Initial: Type: THERAPY  Noted: 7/9/2024  Today's date: 7/22/2024  Patient seen for 4 sessions    Visit Diagnoses:    ICD-10-CM ICD-9-CM   1. Chronic pain of left knee  M25.562 719.46    G89.29 338.29       Subjective       Objective   See Exercise, Manual, and Modality Logs for complete treatment.       Assessment/Plan    NMES to L quad well tolerated with some improvement in active contraction post and tolerance to limited WB and ROM squatting on [employer] gym.  Educated on need to limited extended walking in short term to manage symptoms between visits.     Plan:    Continue              Timed:         Manual Therapy:    18     mins  45228;     Therapeutic Exercise:    12     mins  32764;     Neuromuscular Nayeli:    9    mins  58392;        Timed Treatment:   39   mins   Total Treatment:     39   mins         Carlos Sanchez PT, DPT  Physical Therapist  [place] Lic #[id number]

## 2024-07-22 ENCOUNTER — TREATMENT (OUTPATIENT)
Dept: PHYSICAL THERAPY | Facility: CLINIC | Age: 79
End: 2024-07-22
Payer: MEDICARE

## 2024-07-22 DIAGNOSIS — M25.562 CHRONIC PAIN OF LEFT KNEE: Primary | ICD-10-CM

## 2024-07-22 DIAGNOSIS — G89.29 CHRONIC PAIN OF LEFT KNEE: Primary | ICD-10-CM

## 2024-07-22 PROCEDURE — 97110 THERAPEUTIC EXERCISES: CPT | Performed by: PHYSICAL THERAPIST

## 2024-07-22 PROCEDURE — 97112 NEUROMUSCULAR REEDUCATION: CPT | Performed by: PHYSICAL THERAPIST

## 2024-07-22 PROCEDURE — 97140 MANUAL THERAPY 1/> REGIONS: CPT | Performed by: PHYSICAL THERAPIST

## 2024-07-22 NOTE — PROGRESS NOTES
Physical Therapy Daily Treatment Note  Visit: 5    Omar Choudhary reports: some improvement in left knee pain last visit.  Report he has used his cane some and his has helped as well with his ability to walk.   YOB: 1945  Referring practitioner : Dennis Kwong MD  Date of Initial: Type: THERAPY  Noted: 7/9/2024  Today's date: 7/22/2024  Patient seen for 5 sessions    Visit Diagnoses:    ICD-10-CM ICD-9-CM   1. Chronic pain of left knee  M25.562 719.46    G89.29 338.29       Subjective       Objective   See Exercise, Manual, and Modality Logs for complete treatment.       Assessment/Plan    Good tolerance to progression of loading L knee EXT/quadriceps.  Better gait mechanics with use of straight cane.     Plan:    Continue            Timed:         Manual Therapy:    15     mins  75357;     Therapeutic Exercise:    15     mins  70794;     Neuromuscular Nayeli:    10    mins  38303;          Timed Treatment:   40   mins   Total Treatment:     40   mins         Carlos Sanchez PT, DPT  Physical Therapist  [place] Lic #[id number]

## 2024-07-24 ENCOUNTER — TELEPHONE (OUTPATIENT)
Dept: FAMILY MEDICINE CLINIC | Facility: CLINIC | Age: 79
End: 2024-07-24
Payer: MEDICARE

## 2024-07-24 ENCOUNTER — TREATMENT (OUTPATIENT)
Dept: PHYSICAL THERAPY | Facility: CLINIC | Age: 79
End: 2024-07-24
Payer: MEDICARE

## 2024-07-24 DIAGNOSIS — G89.29 CHRONIC PAIN OF LEFT KNEE: Primary | ICD-10-CM

## 2024-07-24 DIAGNOSIS — M25.562 CHRONIC PAIN OF LEFT KNEE: Primary | ICD-10-CM

## 2024-07-24 NOTE — TELEPHONE ENCOUNTER
"Caller: Omar Choudhary \"Mendez\"     Relationship: SELF    Best call back number: [phone]     What is your medical concern? PATIENT CALLING STATING THAT THE PAIN IN HIS LEFT KNEE IS STILL THERE HE STATED THAT HE HAS COMPLETED 3 WEEKS OF PHYSICAL THERAPY AND THE PAIN IS STILL PRESENT. PATIENT STATES THAT HIS PAIN LEVEL IS AN 8, HIS PHYSICAL THERAPIST RECOMMENDED THAT HE GET AN MRI OF THE KNEE. PATIENT WOULD LIKE TO GET  OPINION.    "

## 2024-07-24 NOTE — PROGRESS NOTES
Physical Therapy Daily Treatment Note  Visit: 6    Omar Choudhary reports: is beginning to feel some progress with his left knee.  Reports small decrease in pain with walking and first standing.   YOB: 1945  Referring practitioner : Dennis Kwong MD  Date of Initial: Type: THERAPY  Noted: 7/9/2024  Today's date: 7/24/2024  Patient seen for 6 sessions    Visit Diagnoses:    ICD-10-CM ICD-9-CM   1. Chronic pain of left knee  M25.562 719.46    G89.29 338.29       Subjective       Objective   See Exercise, Manual, and Modality Logs for complete treatment.       Assessment/Plan    Improving L knee ROM, quad strength/contraction, gait mechanics.     Plan:    Contineu           Timed:         Manual Therapy:    15     mins  45397;     Therapeutic Exercise:    20     mins  10008;     Neuromuscular Nayeli:    9    mins  21915;          Timed Treatment:   44   mins   Total Treatment:     44   mins         Carlos Sanchez PT, DPT  Physical Therapist  [place] Lic #[id number]

## 2024-07-25 NOTE — TELEPHONE ENCOUNTER
I called and spoke to patient.  I scheduled patient for 07/31/24 @ 830 for a 30 min appt.    Patient states he is scheduled for a PT appointment at 1030 on 07/31/24.   Patient is asking if he should keep appt with PT pending eval with our office or cancel appt since we may do a steroid injection at his appointment.    Please advise.

## 2024-07-26 NOTE — TELEPHONE ENCOUNTER
Please have him keep his appt with PT for now, since seeing Dr. Kwong early the same day, can decide if he needs to keep or cancel with Dr. Kwong.

## 2024-07-29 ENCOUNTER — TREATMENT (OUTPATIENT)
Dept: PHYSICAL THERAPY | Facility: CLINIC | Age: 79
End: 2024-07-29
Payer: MEDICARE

## 2024-07-29 DIAGNOSIS — G89.29 CHRONIC PAIN OF LEFT KNEE: Primary | ICD-10-CM

## 2024-07-29 DIAGNOSIS — M25.562 CHRONIC PAIN OF LEFT KNEE: Primary | ICD-10-CM

## 2024-07-29 DIAGNOSIS — J45.20 MILD INTERMITTENT ASTHMA WITHOUT COMPLICATION: ICD-10-CM

## 2024-07-29 PROCEDURE — 97110 THERAPEUTIC EXERCISES: CPT | Performed by: PHYSICAL THERAPIST

## 2024-07-29 PROCEDURE — 97112 NEUROMUSCULAR REEDUCATION: CPT | Performed by: PHYSICAL THERAPIST

## 2024-07-29 PROCEDURE — 97140 MANUAL THERAPY 1/> REGIONS: CPT | Performed by: PHYSICAL THERAPIST

## 2024-07-29 RX ORDER — MONTELUKAST SODIUM 10 MG/1
10 TABLET ORAL DAILY
Qty: 90 TABLET | Refills: 0 | Status: SHIPPED | OUTPATIENT
Start: 2024-07-29

## 2024-07-29 NOTE — PROGRESS NOTES
Physical Therapy Daily Treatment Note  Visit: 7    Omar Choudhary reports: can tell improvement in his left knee pain with walking/standing.   YOB: 1945  Referring practitioner : Dennis Kwong MD  Date of Initial: Type: THERAPY  Noted: 7/9/2024  Today's date: 7/29/2024  Patient seen for 7 sessions    Visit Diagnoses:    ICD-10-CM ICD-9-CM   1. Chronic pain of left knee  M25.562 719.46    G89.29 338.29       Subjective       Objective   See Exercise, Manual, and Modality Logs for complete treatment.       Assessment/Plan      Improving L knee ROM, gait mechanics and tolerance to resistive activity per decreased pain.       Plan:    Continue           Timed:         Manual Therapy:    10     mins  66243;     Therapeutic Exercise:    20     mins  84304;     Neuromuscular Nayeli:    9    mins  49857;          Timed Treatment:   39   mins   Total Treatment:     39   mins         Carlos Sanchez PT, DPT  Physical Therapist  [place] Lic #[id number]

## 2024-07-31 ENCOUNTER — TREATMENT (OUTPATIENT)
Dept: PHYSICAL THERAPY | Facility: CLINIC | Age: 79
End: 2024-07-31
Payer: MEDICARE

## 2024-07-31 ENCOUNTER — PROCEDURE VISIT (OUTPATIENT)
Dept: FAMILY MEDICINE CLINIC | Facility: CLINIC | Age: 79
End: 2024-07-31
Payer: MEDICARE

## 2024-07-31 VITALS
SYSTOLIC BLOOD PRESSURE: 132 MMHG | OXYGEN SATURATION: 98 % | HEIGHT: 66 IN | HEART RATE: 68 BPM | DIASTOLIC BLOOD PRESSURE: 74 MMHG | WEIGHT: 189.8 LBS | BODY MASS INDEX: 30.5 KG/M2 | RESPIRATION RATE: 16 BRPM

## 2024-07-31 DIAGNOSIS — G89.29 CHRONIC PAIN OF LEFT KNEE: Primary | ICD-10-CM

## 2024-07-31 DIAGNOSIS — M25.562 CHRONIC PAIN OF LEFT KNEE: Primary | ICD-10-CM

## 2024-07-31 DIAGNOSIS — M25.562 LATERAL KNEE PAIN, LEFT: Primary | ICD-10-CM

## 2024-07-31 PROCEDURE — 99214 OFFICE O/P EST MOD 30 MIN: CPT | Performed by: FAMILY MEDICINE

## 2024-07-31 RX ORDER — GABAPENTIN 100 MG/1
3 CAPSULE ORAL EVERY 24 HOURS
COMMUNITY

## 2024-07-31 RX ORDER — PREDNISONE 20 MG/1
TABLET ORAL
Qty: 15 TABLET | Refills: 0 | Status: SHIPPED | OUTPATIENT
Start: 2024-07-31

## 2024-07-31 NOTE — PROGRESS NOTES
Chief Complaint   Patient presents with    Knee Pain     Left     HPI  Omar Choudhary is a 79 y.o. male that presents for   Chief Complaint   Patient presents with    Knee Pain     Left     L knee pain: patient reports that he has completed 6-7 sessions of PT w/ marginal benefit. 6/2024 XR w/ mild to moderate degenerative change but preserved lateral compartment space. Patient reports most of his pain is actually below the knee and to the lateral, superior calf as well as the distal hamstring. Exacerbated by getting up to stand/walk as well as going up/down stairs. He continues to report instability of the L knee. Slowly improving- reports pain has fallen from 8/10 to 6-7/10    Review of Systems  Pertinent positives of ROS documented in HPI    The following portions of the patient's history were reviewed and updated as appropriate: problem list, past medical history, past surgical history, allergies, current medication    Problem List Tab  Patient History Tab  Immunizations Tab  Medications Tab  Chart Review Tab  Care Everywhere Tab  Synopsis Tab    PE  Vitals:    07/31/24 0825   BP: 132/74   Pulse: 68   Resp: 16   SpO2: 98%     Body mass index is 30.63 kg/m².  General: Well nourished, NAD  Head: AT/NC  Eyes: EOMI, anicteric sclera  Resp: CTAB, SCR, BS equal  CV: RRR w/o m/r/g; 2+ pulses  GI: Soft, NT/ND, +BS  MSK: FROM, no deformity, no edema.  Skin: Warm, dry, intact  Neuro: Alert and oriented. No focal deficits  Psych: Appropriate mood and affect    Imaging  XR Knee 3 View Left    Result Date: 6/26/2024  Impression: Mild to moderate degenerative change of the left knee. No acute left knee findings Electronically Signed: Pamella Moraes MD  6/26/2024 4:50 PM EDT  Workstation ID: [id number]    XR Chest PA & Lateral    Result Date: 5/20/2024  Impression: 1. Small left pleural effusion with overlying left basilar consolidation likely due to atelectasis appears similar to prior study. 2. Displaced fracture of what  appears to be the anterolateral left seventh rib. Electronically Signed: Dominguez Villa MD  5/20/2024 1:41 PM EDT  Workstation ID: [id number]    Assessment & Plan   Omar Choudhary is a 79 y.o. male that presents for   Chief Complaint   Patient presents with    Knee Pain     Left     Diagnoses and all orders for this visit:    1. Lateral knee pain, left (Primary): unclear etiology. XR w/ mild findings that don't explain persistent complaints/discomfort  -     MRI Knee Left Without Contrast; Future  -     Start predniSONE (DELTASONE) 20 MG tablet; Take 2 tablets daily for 5 days and then 1 tablet daily for 5 days  Dispense: 15 tablet; Refill: 0  -     Ambulatory Referral to Physical Therapy    Interested in pursuing PT in [place] when he goes in 2 weeks- additional referral ordered today       No follow-ups on file.

## 2024-07-31 NOTE — PROGRESS NOTES
Physical Therapy Daily Treatment Note  Visit: 8    Omar Choudhary reports: is noticing some improvement in L knee pain.  Reports has order for MRI on his left knee as well as prescription for course of oral steroids.   YOB: 1945  Referring practitioner : Dennis Kwong MD  Date of Initial: Type: THERAPY  Noted: 7/9/2024  Today's date: 7/31/2024  Patient seen for 8 sessions    Visit Diagnoses:    ICD-10-CM ICD-9-CM   1. Chronic pain of left knee  M25.562 719.46    G89.29 338.29       Subjective       Objective   See Exercise, Manual, and Modality Logs for complete treatment.       Assessment/Plan    Improving L knee ROM, gait mechanics and ability for standing/walking with less pain.     Plan:    Continue            Timed:         Manual Therapy:    12     mins  91960;     Therapeutic Exercise:    19     mins  03201;     Neuromuscular Nayeli:    10    mins  17793;        Timed Treatment:   41   mins   Total Treatment:     41   mins         Carlos Sanchez PT, DPT  Physical Therapist  [place] Lic #[id number]

## 2024-08-02 DIAGNOSIS — M48.061 DEGENERATIVE LUMBAR SPINAL STENOSIS: ICD-10-CM

## 2024-08-02 RX ORDER — SILDENAFIL CITRATE 20 MG/1
TABLET ORAL
Qty: 50 TABLET | Refills: 0 | Status: SHIPPED | OUTPATIENT
Start: 2024-08-02

## 2024-08-02 RX ORDER — GABAPENTIN 300 MG/1
CAPSULE ORAL
Qty: 120 CAPSULE | Refills: 2 | Status: SHIPPED | OUTPATIENT
Start: 2024-08-02

## 2024-08-06 ENCOUNTER — TREATMENT (OUTPATIENT)
Dept: PHYSICAL THERAPY | Facility: CLINIC | Age: 79
End: 2024-08-06
Payer: MEDICARE

## 2024-08-06 ENCOUNTER — HOSPITAL ENCOUNTER (OUTPATIENT)
Dept: MRI IMAGING | Facility: HOSPITAL | Age: 79
Discharge: HOME | End: 2024-08-06
Admitting: FAMILY MEDICINE
Payer: MEDICARE

## 2024-08-06 DIAGNOSIS — M25.562 LATERAL KNEE PAIN, LEFT: ICD-10-CM

## 2024-08-06 DIAGNOSIS — G89.29 CHRONIC PAIN OF LEFT KNEE: Primary | ICD-10-CM

## 2024-08-06 DIAGNOSIS — M25.562 CHRONIC PAIN OF LEFT KNEE: Primary | ICD-10-CM

## 2024-08-06 PROCEDURE — 73721 MRI JNT OF LWR EXTRE W/O DYE: CPT

## 2024-08-06 NOTE — PROGRESS NOTES
Physical Therapy Daily Progress Note      Patient: Omar Choudhary   : 1945  Diagnosis/ICD-10 Code:  Chronic pain of left knee [M25.562, G89.29]  Referring practitioner: Dennis Kwong MD  Date of Initial Visit: Type: THERAPY  Noted: 2024  Today's Date: 2024  Patient seen for 9 sessions             Subjective Prednisone has really helped reduce his knee pain.    Objective   See Exercise, Manual, and Modality Logs for complete treatment.       Assessment/Plan  Responded well to manual techniques and exercises.    Progress per Plan of Care           Timed:         Manual Therapy:    15     mins  56313;     Therapeutic Exercise:    5     mins  95037;     Neuromuscular Nayeli:    10    mins  02887;        Timed Treatment:   30   mins   Total Treatment:     30   mins        Gil Perry PTA  Physical Therapist Assistant

## 2024-08-08 DIAGNOSIS — S83.207D ACUTE MENISCAL TEAR OF KNEE, LEFT, SUBSEQUENT ENCOUNTER: Primary | ICD-10-CM

## 2024-08-09 DIAGNOSIS — S49.92XA: ICD-10-CM

## 2024-08-09 DIAGNOSIS — S22.42XD CLOSED FRACTURE OF MULTIPLE RIBS OF LEFT SIDE WITH ROUTINE HEALING, SUBSEQUENT ENCOUNTER: ICD-10-CM

## 2024-08-09 NOTE — TELEPHONE ENCOUNTER
"  Caller: Omar Choudhary \"Mendez\"    Relationship: Self    Best call back number: [phone]     Requested Prescriptions:   Requested Prescriptions     Pending Prescriptions Disp Refills    HYDROcodone-acetaminophen (Norco) 7.5-325 MG per tablet 42 tablet 0     Sig: Take 1 tablet by mouth Every 4 (Four) Hours As Needed for Severe Pain.        Pharmacy where request should be sent: [id number] - [place], [place] - [address] - [phone] [facility] [phone] FX     Last office visit with prescribing clinician: 6/25/2024   Last telemedicine visit with prescribing clinician: Visit date not found   Next office visit with prescribing clinician: 4/29/2025     Additional details provided by patient: PATIENT IS GOING TO BE LEAVING FOR 6 WEEKS AND IS NEEDING PRESCRIPTION CALLED IN TO HELP WITH THE PAIN UNTIL THE ORTHO IN OCTOBER. PATIENT STATES PT IS NOT WORKING, AND IS IN PAIN. PLEASE CALL IN PRESCRIPTION ASAP    Does the patient have less than a 3 day supply:  [x] Yes  [] No    Would you like a call back once the refill request has been completed: [] Yes [x] No    If the office needs to give you a call back, can they leave a voicemail: [x] Yes [] No    Zia Loomis   08/09/24 12:57 EDT         "

## 2024-08-12 ENCOUNTER — TELEPHONE (OUTPATIENT)
Dept: FAMILY MEDICINE CLINIC | Facility: CLINIC | Age: 79
End: 2024-08-12
Payer: MEDICARE

## 2024-08-12 DIAGNOSIS — S22.42XD CLOSED FRACTURE OF MULTIPLE RIBS OF LEFT SIDE WITH ROUTINE HEALING, SUBSEQUENT ENCOUNTER: ICD-10-CM

## 2024-08-12 DIAGNOSIS — S49.92XA: ICD-10-CM

## 2024-08-12 RX ORDER — HYDROCODONE BITARTRATE AND ACETAMINOPHEN 7.5; 325 MG/1; MG/1
1 TABLET ORAL EVERY 4 HOURS PRN
Qty: 42 TABLET | Refills: 0 | Status: SHIPPED | OUTPATIENT
Start: 2024-08-12 | End: 2024-08-12 | Stop reason: SDUPTHER

## 2024-08-12 RX ORDER — HYDROCODONE BITARTRATE AND ACETAMINOPHEN 7.5; 325 MG/1; MG/1
1 TABLET ORAL EVERY 4 HOURS PRN
Qty: 42 TABLET | Refills: 0 | Status: SHIPPED | OUTPATIENT
Start: 2024-08-12

## 2024-08-12 NOTE — TELEPHONE ENCOUNTER
"  Caller: Omar Choudhary \"Mendez\"    Relationship: Self    Best call back number: [phone]    What medication are you requesting:     HYDROcodone-acetaminophen (Norco) 7.5-325 MG per tablet       If a prescription is needed, what is your preferred pharmacy and phone number:    [id number] - [place], [place] - [address]  - [phone]  - [phone] FX     Additional notes:  PATIENT IS REQUESTING THIS MEDICATION AGAIN BECAUSE HE WAS UNABLE TO PICK IT UP BEFORE HIS FLIGHT WAS SCHEDULED. HE WOULD LIKE IT SENT TO THE PHARMACY LISTED ABOVE.       "

## 2024-08-13 ENCOUNTER — TELEPHONE (OUTPATIENT)
Dept: FAMILY MEDICINE CLINIC | Facility: CLINIC | Age: 79
End: 2024-08-13

## 2024-08-13 NOTE — TELEPHONE ENCOUNTER
[facility] IN [place]       Pharmacy Name:        [id number] - [place], [place] - [address] - [phone] PH - [phone] FX (Pharmacy) [phone] (Work)       What medication are you calling in regards to: HYDROCODONE         What question does the pharmacy have: WANTED TO VERIFY THESE RXS FROM Dennis Kwong MD     Who is the provider that prescribed the medication:

## 2024-10-08 DIAGNOSIS — J45.20 MILD INTERMITTENT ASTHMA WITHOUT COMPLICATION: ICD-10-CM

## 2024-10-08 RX ORDER — MONTELUKAST SODIUM 10 MG/1
10 TABLET ORAL DAILY
Qty: 90 TABLET | Refills: 0 | Status: SHIPPED | OUTPATIENT
Start: 2024-10-08

## 2024-10-25 DIAGNOSIS — J45.909 ASTHMA, UNSPECIFIED ASTHMA SEVERITY, UNSPECIFIED WHETHER COMPLICATED, UNSPECIFIED WHETHER PERSISTENT: ICD-10-CM

## 2024-10-25 RX ORDER — BUDESONIDE AND FORMOTEROL FUMARATE DIHYDRATE 160; 4.5 UG/1; UG/1
2 AEROSOL RESPIRATORY (INHALATION) 2 TIMES DAILY
Qty: 10.2 G | Refills: 0 | Status: SHIPPED | OUTPATIENT
Start: 2024-10-25

## 2024-10-25 RX ORDER — ALBUTEROL SULFATE 90 UG/1
2 INHALANT RESPIRATORY (INHALATION) EVERY 4 HOURS PRN
Qty: 18 G | Refills: 0 | Status: SHIPPED | OUTPATIENT
Start: 2024-10-25

## 2024-10-28 ENCOUNTER — TELEPHONE (OUTPATIENT)
Dept: FAMILY MEDICINE CLINIC | Facility: CLINIC | Age: 79
End: 2024-10-28

## 2024-10-28 NOTE — TELEPHONE ENCOUNTER
"  Caller: Omar Choudhary \"Mendez\"    Relationship: Self    Best call back number: [phone]        What was the call regarding: PATIENT STATES HE HAS AN APPOINTMENT 11-7-24 WITH DR OMEGA HAGEN, ORTHOPEDIC SURGERY. PATIENT ASKS FOR MRI RESULTS FROM 8-6-24 BE SENT TO DR HAGEN TO REVIEW BEFORE APPOINTMENT     "

## 2024-11-04 ENCOUNTER — OFFICE VISIT (OUTPATIENT)
Dept: PULMONOLOGY | Facility: HOSPITAL | Age: 79
End: 2024-11-04
Payer: MEDICARE

## 2024-11-04 VITALS
WEIGHT: 179 LBS | SYSTOLIC BLOOD PRESSURE: 150 MMHG | DIASTOLIC BLOOD PRESSURE: 92 MMHG | RESPIRATION RATE: 14 BRPM | BODY MASS INDEX: 28.77 KG/M2 | HEART RATE: 47 BPM | HEIGHT: 66 IN | OXYGEN SATURATION: 96 %

## 2024-11-04 DIAGNOSIS — G47.33 OBSTRUCTIVE SLEEP APNEA SYNDROME: Primary | ICD-10-CM

## 2024-11-04 DIAGNOSIS — J45.909 ASTHMA, UNSPECIFIED ASTHMA SEVERITY, UNSPECIFIED WHETHER COMPLICATED, UNSPECIFIED WHETHER PERSISTENT: ICD-10-CM

## 2024-11-04 PROCEDURE — G0463 HOSPITAL OUTPT CLINIC VISIT: HCPCS

## 2024-11-04 NOTE — PROGRESS NOTES
PULMONARY/ CRITICAL CARE/ SLEEP MEDICINE OUTPATIENT CONSULT/ FOLLOW UP NOTE        Patient Name:  Omar Choudhary    :  1945    Medical Record:  [id number]    PRIMARY CARE PHYSICIAN     Dennis Kwong MD    REASON FOR CONSULTATION    Omar Choudhary is a 79 y.o. male who is referred for consultation for Asthma  REVIEW OF SYSTEMS    Constitutional:  Denies fever or chills   Eyes:  Denies change in visual acuity   HENT:  Denies nasal congestion or sore throat   Respiratory:  Denies cough or shortness of breath   Cardiovascular:  Denies chest pain or edema   GI:  Denies abdominal pain, nausea, vomiting, bloody stools or diarrhea   :  Denies dysuria   Musculoskeletal:  Denies back pain or joint pain   Integument:  Denies rash   Neurologic:  Denies headache, focal weakness or sensory changes   Endocrine:  Denies polyuria or polydipsia   Lymphatic:  Denies swollen glands   Psychiatric:  Denies depression or anxiety     MEDICAL HISTORY    Past Medical History:   Diagnosis Date    ADHD (attention deficit hyperactivity disorder)     Hard to focus on tasks and follow through.    Allergic     Arthritis     Asthma     Carpal tunnel syndrome     no pain    Cataract     Depression     Erectile dysfunction     2018 at 73 years old    Family history of malignant neoplasm of breast 2019    Hyperlipidemia 2019    Hypertension 2012    Leg pain, right     Low back pain     Neuropathy     feet    Osteopenia     Poor balance     Reactive airway disease 01/15/2016    Scoliosis 1960    Shortness of breath     Substance abuse 1966    Alcoholic        SURGICAL HISTORY    Past Surgical History:   Procedure Laterality Date    CARDIAC CATHETERIZATION  1992    COLONOSCOPY      No polyps, slight diverticulitis    EYE SURGERY  2016    KNEE ARTHROSCOPY W/ ACL RECONSTRUCTION Right 2019    LUMBAR FUSION Bilateral 10/05/2022    Procedure: DAY 2 LUMBAR LAMINECTOMY TRANSFORAMINAL LUMBAR INTERBODY FUSION  L2,L3,L4 WITH NEURO ROBOT;  Surgeon: John Do MD;  Location: [facility] MAIN OR;  Service: Robotics - Neuro;  Laterality: Bilateral;    LUMBAR FUSION N/A 10/04/2022    Procedure: DAY 1 LUMBAR LATERAL INTERBODY FUSION WITH NEURO ROBOT L2, L3.L4;  Surgeon: John Do MD;  Location: [facility] MAIN OR;  Service: Robotics - Neuro;  Laterality: N/A;  left side approach    MOUTH SURGERY      SPINE SURGERY  10/4&2022    Dr. John Do, [facility]        FAMILY HISTORY    Family History   Problem Relation Age of Onset    Heart disease Mother     Hypertension Mother     Breast cancer Mother     Stroke Mother         Several TIAs    Alcohol abuse Mother     Thyroid disease Mother     Arthritis Mother     Cancer Mother         Breast    Aortic aneurysm Father     Heart attack Father     Liver cancer Father     Cancer Father         Liver    Heart disease Father         Heart attack    Early death Brother         Coronary thrombosis @ 48 years while mountain climbing.       SOCIAL HISTORY    Social History     Tobacco Use    Smoking status: Former     Current packs/day: 0.00     Average packs/day: 0.5 packs/day for 26.0 years (13.0 ttl pk-yrs)     Types: Cigarettes     Start date: 1966     Quit date: 1992     Years since quittin.8     Passive exposure: Past    Smokeless tobacco: Never   Substance Use Topics    Alcohol use: Yes     Alcohol/week: 17.0 standard drinks of alcohol     Types: 10 Glasses of wine, 2 Cans of beer, 5 Shots of liquor per week        ALLERGIES    Allergies   Allergen Reactions    Statins Myalgia     Other reaction(s): muscle soreness         MEDICATIONS    Current Outpatient Medications on File Prior to Visit   Medication Sig Dispense Refill    acetaminophen (TYLENOL) 500 MG tablet Take 1 tablet by mouth Every 6 (Six) Hours As Needed for Mild Pain.      albuterol sulfate  (90 Base) MCG/ACT inhaler Inhale 2 puffs Every 4 (Four) Hours As Needed for Wheezing or  Shortness of Air. 18 g 0    B Complex Vitamins (VITAMIN B COMPLEX) capsule capsule Take 1 capsule by mouth Daily. LD 9-27      budesonide-formoterol (SYMBICORT) 160-4.5 MCG/ACT inhaler Inhale 2 puffs 2 (Two) Times a Day. 10.2 g 0    Calcium Carb-Cholecalciferol (Oyster Shell Calcium w/D) 500-5 MG-MCG tablet TAKE TWO TABLETS BY MOUTH DAILY 180 tablet 0    clonazePAM (KlonoPIN) 0.5 MG tablet TAKE ONE TABLET BY MOUTH TWICE DAILY AS NEEDED FOR ANXIETY 30 tablet 2    coenzyme Q10 100 MG capsule Take 1 capsule by mouth Daily.      cyclobenzaprine (FLEXERIL) 10 MG tablet Take 1 tablet by mouth 3 (Three) Times a Day As Needed for Muscle Spasms. 60 tablet 2    gabapentin (NEURONTIN) 100 MG capsule Take 3 capsules by mouth Daily.      gabapentin (NEURONTIN) 300 MG capsule TAKE 1 CAPSULE BY MOUTH IN THE MORING, 1 CAPSULE IN THE AFTERNOON AND 2 CAPSULES AT NIGHT 120 capsule 2    HYDROcodone-acetaminophen (Norco) 7.5-325 MG per tablet Take 1 tablet by mouth Every 4 (Four) Hours As Needed for Severe Pain. 42 tablet 0    losartan (COZAAR) 50 MG tablet Take 1 tablet by mouth Daily. 90 tablet 3    montelukast (SINGULAIR) 10 MG tablet Take 1 tablet by mouth Daily. 90 tablet 0    Multiple Vitamins-Minerals (EMERGEN-C BLUE PO) Take 1 tablet by mouth Daily. LD 9-27      Omega-3 Fatty Acids (FISH OIL) 1000 MG capsule capsule Take 1 capsule by mouth Daily With Breakfast. LD 9-27      S-Adenosylmethionine (YURY-e) 400 MG tablet Take 400 mg by mouth Daily.      sildenafil (REVATIO) 20 MG tablet take 1 to 2 tablets by mouth as needed for erictile dysfunction 50 tablet 0    theophylline (THEODUR) 300 MG 12 hr tablet Take 1 tablet by mouth Daily. 90 tablet 3    traZODone (DESYREL) 50 MG tablet Take 1-2 tablets by mouth Every Night. 180 tablet 3    triamcinolone (KENALOG) 0.025 % cream Apply 1 Application topically to the appropriate area as directed 2 (Two) Times a Day.      Turmeric 400 MG capsule Take 1 capsule by mouth Daily.       "[DISCONTINUED] predniSONE (DELTASONE) 20 MG tablet Take 2 tablets daily for 5 days and then 1 tablet daily for 5 days 15 tablet 0     Current Facility-Administered Medications on File Prior to Visit   Medication Dose Route Frequency Provider Last Rate Last Admin    Tezepelumab-ekko solution auto-injector 210 mg  210 mg Subcutaneous Q30 Days Jerrod Lagunas MD           PHYSICAL EXAM    Vitals:    11/04/24 1150   BP: 150/92   BP Location: Left arm   Patient Position: Sitting   Cuff Size: Adult   Pulse: (!) 47   Resp: 14   SpO2: 96%   Weight: 81.2 kg (179 lb)   Height: 167.6 cm (66\")        Constitutional:  Well developed, well nourished, no acute distress, non-toxic appearance   Eyes:  PERRL, conjunctiva normal   HENT:  Atraumatic, external ears normal, nose normal, oropharynx moist, no pharyngeal exudates. mallampatti   Neck- normal range of motion, no tenderness, supple   Respiratory:  No respiratory distress, normal breath sounds, no rales, no wheezing   Cardiovascular:  Normal rate, normal rhythm, no murmurs, no gallops, no rubs   GI:  Soft, nondistended, normal bowel sounds, nontender, no organomegaly, no mass, no rebound, no guarding   :  No costovertebral angle tenderness   Musculoskeletal:  No edema, no tenderness, no deformities. Back- no tenderness  Integument:  Well hydrated, no rash   Lymphatic:  No lymphadenopathy noted   Neurologic:  Alert & oriented x 3, CN 2-12 normal, normal motor function, normal sensory function, no focal deficits noted   Psychiatric:  Speech and behavior appropriate     MRI Knee Left Without Contrast    Result Date: 8/7/2024  Impression: Complex degenerative tearing of the medial meniscus including displaced meniscal body tissue overlying the posterior horn. The roots appear relatively intact. There is surrounding severe medial compartment degenerative change with full-thickness weightbearing articular cartilage loss. Mild patellofemoral compartment degenerative change. Horizontal " longitudinal tear of the body and posterior body-horn junction of the lateral meniscus. Mucoid degeneration of the anterior cruciate ligament. Small joint effusion. Small Baker's cyst with 5 mm osteochondral body. Electronically Signed: Harsha Koch MD  8/7/2024 2:08 PM EDT  Workstation ID: [id number]        ASSESSMENT & PLAN:      Asthma (J45.909)  paralysed Left hemidiapghram     Impression: CXR 1/19: Elevated L Hemidiaphragm with bibasilar atelectasis  IgE 5/18: 765  RAST Panel 5/18: +ve  PFts 4/18:  (FEV1 78%, TLC 82%, DLCO 76, DL/%  Repeat Lab 3/19: WBC 5.3, Eosinophil count normal. Mold Panel:negative.       IgE: 515  Xolair weekly injections vs allergy shots  Feels better allergy injections     Tests at [facility]; PFts 8/19: Non specific ventilatory defect. FEV1 70%, + D response, %, DLCO 82%   Methacholine chalenge test 8/19: negative  HRCT : No ILD  VQ: Negative except decrease in LLL related to paralyzed diaphragm  Metabolic cardiopulmonary stress test: negative  Echo: EF 60%, RVSP 31 min  Tried Breo and Bevespi with no improvement in symptoms  Remains on Singulair,   symbicort  expensive and albuterol.   -Also on allergy shots now     Obstructive sleep apnea (G47.33)  Has new ResMed machine,  CPAP at 8-20  Average use 8hrs 27 min. Average AHI 1.1. compliance 97% > 4 hrs,   Patient is on AutoSet 8-20 pressure   Patient is compliant with using CPAP/BiPAP therapy and is benefitting from PAP therapy.     Hypertension (I10)         PLAN:        continue on Symbicort and albuterol  Montelukast and theophylline     Discussed with patient utilizing Xolair for elevated IgE as well as either Fasenra or Nucala for elevated Eosinophills but he deferred due to lack of medication insurance coverage     Continue AutoSet 8-20 pressure      SAFETY DRIVING  ADEQUATE SLEEP HYGEINE  DISCUSSED CARDIOVASCULAR & METABOLIC SIDE EFFECTS OF UNTREATED ELINOR  PATIENT IS COMPLIANT USING MACHINE AND BENEFITS FROM  THERAPY, , PATIENT HAS NASAL DRYNESS AND WILL NEED HEATED HUMIDITY WITH PAP                 I personally reviewed the latest radiological study  Patient is advised to stay up-to-date on immunizations        This document has been electronically signed by  Jerrod Lagunas MD  12:01 EST

## 2024-11-06 ENCOUNTER — TRANSCRIBE ORDERS (OUTPATIENT)
Dept: CARDIAC REHAB | Facility: HOSPITAL | Age: 79
End: 2024-11-06
Payer: MEDICARE

## 2024-11-06 DIAGNOSIS — J45.909 ASTHMA, UNSPECIFIED ASTHMA SEVERITY, UNSPECIFIED WHETHER COMPLICATED, UNSPECIFIED WHETHER PERSISTENT: Primary | ICD-10-CM

## 2024-11-11 DIAGNOSIS — J98.6 DIAPHRAGM PARALYSIS: Primary | ICD-10-CM

## 2024-11-11 DIAGNOSIS — G47.33 OBSTRUCTIVE SLEEP APNEA SYNDROME: ICD-10-CM

## 2024-11-15 ENCOUNTER — TELEPHONE (OUTPATIENT)
Dept: CARDIAC REHAB | Facility: HOSPITAL | Age: 79
End: 2024-11-15
Payer: MEDICARE

## 2024-11-15 NOTE — TELEPHONE ENCOUNTER
Spoke w/patient and informed him that we have a referral for him to participate in pulmonary rehab. Asked if he was interested and gave him scheduling's contact to call and schedule PFT to see if he qualifies for program. Encouraged pt to call us back and let us know when he gets his PFT scheduled    Amena Singh, RRT

## 2024-11-17 DIAGNOSIS — I10 PRIMARY HYPERTENSION: ICD-10-CM

## 2024-11-18 RX ORDER — LOSARTAN POTASSIUM 50 MG/1
50 TABLET ORAL
Qty: 90 TABLET | Refills: 0 | Status: SHIPPED | OUTPATIENT
Start: 2024-11-18

## 2024-11-19 ENCOUNTER — HOSPITAL ENCOUNTER (OUTPATIENT)
Dept: RESPIRATORY THERAPY | Facility: HOSPITAL | Age: 79
Discharge: HOME | End: 2024-11-19
Admitting: INTERNAL MEDICINE
Payer: MEDICARE

## 2024-11-19 VITALS — RESPIRATION RATE: 16 BRPM | OXYGEN SATURATION: 97 % | HEART RATE: 60 BPM

## 2024-11-19 DIAGNOSIS — J45.909 ASTHMA, UNSPECIFIED ASTHMA SEVERITY, UNSPECIFIED WHETHER COMPLICATED, UNSPECIFIED WHETHER PERSISTENT: ICD-10-CM

## 2024-11-19 PROCEDURE — 94729 DIFFUSING CAPACITY: CPT

## 2024-11-19 PROCEDURE — 63710000001 ALBUTEROL SULFATE HFA 108 (90 BASE) MCG/ACT AEROSOL SOLUTION 6.7 G INHALER: Performed by: INTERNAL MEDICINE

## 2024-11-19 PROCEDURE — 94664 DEMO&/EVAL PT USE INHALER: CPT

## 2024-11-19 PROCEDURE — 94760 N-INVAS EAR/PLS OXIMETRY 1: CPT

## 2024-11-19 PROCEDURE — 94060 EVALUATION OF WHEEZING: CPT

## 2024-11-19 PROCEDURE — 94799 UNLISTED PULMONARY SVC/PX: CPT

## 2024-11-19 PROCEDURE — 94726 PLETHYSMOGRAPHY LUNG VOLUMES: CPT

## 2024-11-19 PROCEDURE — A9270 NON-COVERED ITEM OR SERVICE: HCPCS | Performed by: INTERNAL MEDICINE

## 2024-11-19 PROCEDURE — 94640 AIRWAY INHALATION TREATMENT: CPT

## 2024-11-19 RX ORDER — ALBUTEROL SULFATE 90 UG/1
2 INHALANT RESPIRATORY (INHALATION) ONCE
Status: COMPLETED | OUTPATIENT
Start: 2024-11-19 | End: 2024-11-19

## 2024-11-19 RX ADMIN — ALBUTEROL SULFATE 2 PUFF: 108 AEROSOL, METERED RESPIRATORY (INHALATION) at 13:56

## 2024-11-25 ENCOUNTER — OFFICE VISIT (OUTPATIENT)
Dept: CARDIAC REHAB | Facility: HOSPITAL | Age: 79
End: 2024-11-25
Payer: MEDICARE

## 2024-11-25 DIAGNOSIS — J45.909 ASTHMA, UNSPECIFIED ASTHMA SEVERITY, UNSPECIFIED WHETHER COMPLICATED, UNSPECIFIED WHETHER PERSISTENT: Primary | ICD-10-CM

## 2024-11-25 PROCEDURE — G0237 THERAPEUTIC PROCD STRG ENDUR: HCPCS

## 2024-11-25 PROCEDURE — G0238 OTH RESP PROC, INDIV: HCPCS

## 2024-11-25 NOTE — PROGRESS NOTES
Initial pulmonary rehab appt. Patient completed 6min walk on room air w/o stopping. Somewhat severe SOB rate by pt per MICHAEL scale. Pt sent home w/pre-tests and instructed to complete & return @next appt. Please see session report    Amena Singh, RRT

## 2024-12-02 ENCOUNTER — TELEPHONE (OUTPATIENT)
Dept: FAMILY MEDICINE CLINIC | Facility: CLINIC | Age: 79
End: 2024-12-02
Payer: MEDICARE

## 2024-12-02 NOTE — TELEPHONE ENCOUNTER
PT VISITED [facility] AND HAD XR PERFORMED. UC RECOMMENDED CT. FILMS AVAILABLE FROM [facility] [phone]

## 2024-12-04 ENCOUNTER — OFFICE VISIT (OUTPATIENT)
Dept: FAMILY MEDICINE CLINIC | Facility: CLINIC | Age: 79
End: 2024-12-04
Payer: MEDICARE

## 2024-12-04 ENCOUNTER — TELEPHONE (OUTPATIENT)
Dept: CARDIAC REHAB | Facility: HOSPITAL | Age: 79
End: 2024-12-04
Payer: MEDICARE

## 2024-12-04 VITALS
HEIGHT: 66 IN | HEART RATE: 80 BPM | WEIGHT: 182 LBS | SYSTOLIC BLOOD PRESSURE: 106 MMHG | OXYGEN SATURATION: 98 % | RESPIRATION RATE: 19 BRPM | BODY MASS INDEX: 29.25 KG/M2 | DIASTOLIC BLOOD PRESSURE: 72 MMHG

## 2024-12-04 DIAGNOSIS — S42.291A HILL SACHS DEFORMITY, RIGHT: Primary | ICD-10-CM

## 2024-12-04 PROCEDURE — 1125F AMNT PAIN NOTED PAIN PRSNT: CPT | Performed by: FAMILY MEDICINE

## 2024-12-04 PROCEDURE — 3078F DIAST BP <80 MM HG: CPT | Performed by: FAMILY MEDICINE

## 2024-12-04 PROCEDURE — 99214 OFFICE O/P EST MOD 30 MIN: CPT | Performed by: FAMILY MEDICINE

## 2024-12-04 PROCEDURE — 3074F SYST BP LT 130 MM HG: CPT | Performed by: FAMILY MEDICINE

## 2024-12-04 PROCEDURE — 1160F RVW MEDS BY RX/DR IN RCRD: CPT | Performed by: FAMILY MEDICINE

## 2024-12-04 PROCEDURE — 1159F MED LIST DOCD IN RCRD: CPT | Performed by: FAMILY MEDICINE

## 2024-12-04 RX ORDER — HYDROCODONE BITARTRATE AND ACETAMINOPHEN 7.5; 325 MG/1; MG/1
1 TABLET ORAL EVERY 6 HOURS PRN
Qty: 30 TABLET | Refills: 0 | Status: SHIPPED | OUTPATIENT
Start: 2024-12-04

## 2024-12-04 RX ORDER — MELOXICAM 7.5 MG/1
7.5 TABLET ORAL DAILY
Qty: 30 TABLET | Refills: 0 | Status: SHIPPED | OUTPATIENT
Start: 2024-12-04

## 2024-12-04 NOTE — PROGRESS NOTES
Chief Complaint   Patient presents with    Shoulder Injury     Rt side pain for week    No sign of fracture on collar bone    Urgent care recommended seeing PCP about an MRI or CT     HPI  Omar Choudhary is a 79 y.o. male that presents for   Chief Complaint   Patient presents with    Shoulder Injury     Rt side pain for week    No sign of fracture on collar bone    Urgent care recommended seeing PCP about an MRI or CT     Shoulder injury: patient fell on R shoulder arm while stepping off his deck. Injury occurred 1 week ago but pain persisted, leading him to  for evaluation. XR revealed R humeral Hill-Sachs lesion. CT vs MRI was recommended to evaluate for great tuberosity fracture. He reports tremendous pain w/ internal rotation.     Review of Systems  Pertinent positives of ROS documented in HPI    The following portions of the patient's history were reviewed and updated as appropriate: problem list, past medical history, past surgical history, allergies, current medication    Problem List Tab  Patient History Tab  Immunizations Tab  Medications Tab  Chart Review Tab  Care Everywhere Tab  Synopsis Tab    PE  Vitals:    12/04/24 1605   BP: 106/72   Pulse: 80   Resp: 19   SpO2: 98%     Body mass index is 29.38 kg/m².  General: Well nourished, NAD  Head: AT/NC  Eyes: EOMI, anicteric sclera  Resp: CTAB, SCR, BS equal  CV: RRR w/o m/r/g; 2+ pulses  GI: Soft, NT/ND, +BS  MSK: No deformity, no edema. Full flexion, abduction limited to 90 degrees, unable to internally rotate 2/2 pain  Skin: Warm, dry, intact  Neuro: Alert and oriented. No focal deficits  Psych: Appropriate mood and affect    Imaging  MRI Knee Left Without Contrast    Result Date: 8/7/2024  Impression: Complex degenerative tearing of the medial meniscus including displaced meniscal body tissue overlying the posterior horn. The roots appear relatively intact. There is surrounding severe medial compartment degenerative change with full-thickness  weightbearing articular cartilage loss. Mild patellofemoral compartment degenerative change. Horizontal longitudinal tear of the body and posterior body-horn junction of the lateral meniscus. Mucoid degeneration of the anterior cruciate ligament. Small joint effusion. Small Baker's cyst with 5 mm osteochondral body. Electronically Signed: Harsha Koch MD  8/7/2024 2:08 PM EDT  Workstation ID: [id number]    Assessment & Plan   Omar Choudhary is a 79 y.o. male that presents for   Chief Complaint   Patient presents with    Shoulder Injury     Rt side pain for week    No sign of fracture on collar bone    Urgent care recommended seeing PCP about an MRI or CT     Diagnoses and all orders for this visit:    1. Hill Sachs deformity, right (Primary): per radiology report, radiology recommends r/o greater tuberosity fracture. Will further evaluate w/ MRI and refer to ortho for further recommendations. Pain control as below  -     Ambulatory Referral to Orthopedic Surgery  -     MRI Shoulder Right Without Contrast; Future  -     HYDROcodone-acetaminophen (Norco) 7.5-325 MG per tablet; Take 1 tablet by mouth Every 6 (Six) Hours As Needed for Severe Pain.  Dispense: 30 tablet; Refill: 0  -     meloxicam (Mobic) 7.5 MG tablet; Take 1 tablet by mouth Daily.  Dispense: 30 tablet; Refill: 0         Return if symptoms worsen or fail to improve.

## 2024-12-04 NOTE — TELEPHONE ENCOUNTER
"Patient states, \"I fell on thanksgiving and broke my collar bone.  I need to cancel my appointments until further notice.\" Patient to see Dr. Kwong today, and will notify us of plan.   "

## 2024-12-05 ENCOUNTER — TELEPHONE (OUTPATIENT)
Dept: FAMILY MEDICINE CLINIC | Facility: CLINIC | Age: 79
End: 2024-12-05

## 2024-12-05 DIAGNOSIS — J45.20 MILD INTERMITTENT ASTHMA WITHOUT COMPLICATION: ICD-10-CM

## 2024-12-05 RX ORDER — THEOPHYLLINE 300 MG/1
300 TABLET, EXTENDED RELEASE ORAL DAILY
Qty: 90 TABLET | Refills: 3 | Status: SHIPPED | OUTPATIENT
Start: 2024-12-05

## 2024-12-05 NOTE — TELEPHONE ENCOUNTER
Spoke to patient- he can get in to [facility] real soon. Sent Order to [facility]. Patient informed to call [facility] to schedule and cancel at [facility]

## 2024-12-05 NOTE — TELEPHONE ENCOUNTER
"  Caller: Omar Choudhary \"Mendez\"    Relationship: Self    Best call back number: [phone]    What orders are you requesting (i.e. lab or imaging): MRI ORDER TO [facility]     In what timeframe would the patient need to come in: ASAP     Where will you receive your lab/imaging services: [facility]     Additional notes:     CURRENTLY HAS AN ORDER TO [facility] BUT WANTS TO CHANGE LOCATION FOR EARLIER AVAILABILITY   "

## 2024-12-10 DIAGNOSIS — S42.291A HILL SACHS DEFORMITY, RIGHT: ICD-10-CM

## 2024-12-16 ENCOUNTER — TELEPHONE (OUTPATIENT)
Dept: CARDIAC REHAB | Facility: HOSPITAL | Age: 79
End: 2024-12-16
Payer: MEDICARE

## 2024-12-30 DIAGNOSIS — S42.291A HILL SACHS DEFORMITY, RIGHT: ICD-10-CM

## 2024-12-30 RX ORDER — MELOXICAM 7.5 MG/1
7.5 TABLET ORAL DAILY
Qty: 30 TABLET | Refills: 0 | Status: SHIPPED | OUTPATIENT
Start: 2024-12-30

## 2024-12-30 NOTE — PROGRESS NOTES
"Subjective   History of Present Illness: Omar Choudhary is a 79 y.o. male is here today for follow-up.  Patient is status post lateral Hewitt psoas L2 L4 interbody cage placement with subsequent posterior decompression and fusion in October 2022 with Dr. Do.  He was last seen in March with Dr. Do 18 months removed from his surgery and was doing well with significant improvement in his neurogenic claudication.  His imaging on flexion-extension showed stability of the hardware.   Patient also has history of cervical spinal stenosis with intermittent balance issues but was not significantly symptomatic.  He was recommended to follow-up if the symptoms worsened for further workup.   Patient has continued to do well until he took a fall off of the sidewalk around [facility].  He fell and rolled on his right shoulder suffering a right shoulder rotator cuff tear and dislocation of his clavicle.  He also began to notice a focal right-sided low back buttock and hip pain with no leg radiation or paresthesias.  Pain seems to be worse on movement and he has noticed some muscle spasms.  He also has noted some worsening \"grinding\" of his low back when he walks.  He has significant stiffness in the morning that better is as the day goes on he gets moving.  Pain seems to be worse when he is up walking and better when he sits.  He describes no worsening balance but just misjudged the distance from the sidewalk curb onto the ground.      History of Present Illness    The following portions of the patient's history were reviewed and updated as appropriate: allergies, current medications, past family history, past medical history, past social history, past surgical history, and problem list.    Review of Systems   Constitutional:  Positive for activity change.   HENT: Negative.     Eyes: Negative.    Respiratory: Negative.     Cardiovascular: Negative.    Gastrointestinal: Negative.    Endocrine: Negative.    Genitourinary: " "Negative.    Musculoskeletal:  Positive for arthralgias, back pain (right sided lower) and myalgias (muscle spasms).   Skin: Negative.    Allergic/Immunologic: Negative.    Neurological:         Tingling /lower right side/buttock   Hematological: Negative.    Psychiatric/Behavioral:  Positive for sleep disturbance.    All other systems reviewed and are negative.      Objective     ./97 (BP Location: Left arm, Patient Position: Sitting)   Pulse 92   Ht 167.6 cm (66\")   Wt 80.3 kg (177 lb)   SpO2 90%   BMI 28.57 kg/m²    Body mass index is 28.57 kg/m².    There were no vitals filed for this visit.       Physical Exam  Neurological Exam  Lower extremity motor strength 5/5  Right sided muscle tenderness  Negative Carlos finger    Assessment & Plan   Independent Review of Radiographic Studies:      I personally reviewed the images from the following studies.    Lumbar x-rays with flexion-extension 3019 2024    Postop changes noted with anterior and posterior fusion from L2-L4 with no overt hardware loosening or failure.  No subluxation on dynamic maneuvers along these levels.  There is a degenerative grade 1 anterolisthesis of L5-S1 that increases with flexion and reduces with extension.    Medical Decision Making:      Omar PERRIN Darnellgabriel a 79 y.o. male with history of prior lateral L2 L4 interbody fusion with posterior pedicle fixation and decompression with Dr. Do in 2022 being seen today acute back and right buttock pain after fall.  Patient has no acute radicular pain.  Patient has no focal motor deficits.  Patient has no progressive myelopathy complaints/symptoms.  I do feel patient suffering from some musculoskeletal pain/muscle strain.  I have ordered x-rays with flexion-extension to review his hardware and to look for any worsening spondylolisthesis.  We will continue to treat this conservatively to help with his inflammation and I placed him on a course of steroids and would like him to undergo a " course of physical therapy.  If his symptoms continue and/or worsen despite these recommendations, encouraged to call the office for further recommendations.  Patient agrees to plan of care and wishes to proceed.  I encouraged him call the office with any questions or concerns.    I explained to patient that the normal course of acute back pain is resolution within 8 to 12 weeks and that a conservative approach to management is usually recommended.  These conservative approaches include maximizing pharmacologic therapy and a formal course of physical therapy  to help reduce inflammation.    Lifestyle modifications such as decreased activity and avoidance of stress on the spine from bearing weight or twisting will also give the injured area an opportunity to heal and prevent further injury.   If in 6-8 weeks, patient has had no significant betterment of their symptoms despite these recommended conservative measures, they should call the office or send my chart message for further recommendations that may include additional imaging.  Patient verbalizes understanding of plan of care and agrees to recommendations.        Diagnoses and all orders for this visit:    1. Acute right-sided low back pain without sciatica (Primary)  -     XR Spine Lumbar Complete With Flex & Ext; Future  -     Ambulatory Referral to Physical Therapy for Evaluation & Treatment    2. Sacroiliitis  -     XR Spine Lumbar Complete With Flex & Ext; Future  -     Ambulatory Referral to Physical Therapy for Evaluation & Treatment    3. Acquired spondylolisthesis  -     XR Spine Lumbar Complete With Flex & Ext; Future  -     Ambulatory Referral to Physical Therapy for Evaluation & Treatment    Other orders  -     dexAMETHasone (DECADRON) 1.5 MG tablet; Take 1 tablet by mouth Take As Directed. Day 1 take 3AM,3PM ,Day 2 take 3 AM,2 PM,Day 3 take 3 AM,2 PM,Day 4 take 2 AM,2 PM  ,Day 5 take 2 AM,2 PM,Day 6 take 2 AM,1 PM,Day 7 take 2 AM,1 PM, Day 8 take 1  AM,1 PM,Day 9 take 1 AM,1 PM,Day 10 take 1 AM  Dispense: 35 tablet; Refill: 0      Return if symptoms worsen or fail to improve.    This patient was examined wearing appropriate personal protective equipment.     Omar Choudhary  reports that he quit smoking about 33 years ago. His smoking use included cigarettes. He started smoking about 59 years ago. He has a 13 pack-year smoking history. He has been exposed to tobacco smoke. He has never used smokeless tobacco.      Body mass index is 28.57 kg/m².     BMI is >= 25 and <30. (Overweight) Recommendations for exercise counseling/recommendations and nutrition counseling/recommendations     Patient's blood pressure was reviewed.  Recommendations for  a low-salt diet and exercise to maintain/improve BP in addition to taking any presribed medications.    Advance Care Planning   ACP discussion was held with the patient during this visit. Patient has an advance directive (not in EMR), copy requested.         Gianna Chavira DNP, APRN  01/02/25  11:05 EST

## 2025-01-02 ENCOUNTER — HOSPITAL ENCOUNTER (OUTPATIENT)
Dept: GENERAL RADIOLOGY | Facility: HOSPITAL | Age: 80
Discharge: HOME | End: 2025-01-02
Admitting: NURSE PRACTITIONER
Payer: MEDICARE

## 2025-01-02 ENCOUNTER — OFFICE VISIT (OUTPATIENT)
Dept: NEUROSURGERY | Facility: CLINIC | Age: 80
End: 2025-01-02
Payer: MEDICARE

## 2025-01-02 ENCOUNTER — OFFICE VISIT (OUTPATIENT)
Dept: ORTHOPEDIC SURGERY | Facility: CLINIC | Age: 80
End: 2025-01-02
Payer: MEDICARE

## 2025-01-02 ENCOUNTER — TELEPHONE (OUTPATIENT)
Dept: NEUROSURGERY | Facility: CLINIC | Age: 80
End: 2025-01-02

## 2025-01-02 VITALS
BODY MASS INDEX: 28.45 KG/M2 | HEIGHT: 66 IN | DIASTOLIC BLOOD PRESSURE: 97 MMHG | WEIGHT: 177 LBS | OXYGEN SATURATION: 90 % | SYSTOLIC BLOOD PRESSURE: 140 MMHG | HEART RATE: 92 BPM

## 2025-01-02 VITALS — WEIGHT: 177 LBS | BODY MASS INDEX: 28.45 KG/M2 | HEIGHT: 66 IN | HEART RATE: 98 BPM

## 2025-01-02 DIAGNOSIS — M19.011 ARTHRITIS OF RIGHT GLENOHUMERAL JOINT: ICD-10-CM

## 2025-01-02 DIAGNOSIS — M46.1 SACROILIITIS: ICD-10-CM

## 2025-01-02 DIAGNOSIS — M54.50 ACUTE RIGHT-SIDED LOW BACK PAIN WITHOUT SCIATICA: Primary | ICD-10-CM

## 2025-01-02 DIAGNOSIS — G89.29 CHRONIC RIGHT SHOULDER PAIN: Primary | ICD-10-CM

## 2025-01-02 DIAGNOSIS — M25.511 CHRONIC RIGHT SHOULDER PAIN: Primary | ICD-10-CM

## 2025-01-02 DIAGNOSIS — M43.10 ACQUIRED SPONDYLOLISTHESIS: ICD-10-CM

## 2025-01-02 DIAGNOSIS — M54.50 ACUTE RIGHT-SIDED LOW BACK PAIN WITHOUT SCIATICA: ICD-10-CM

## 2025-01-02 PROCEDURE — 72114 X-RAY EXAM L-S SPINE BENDING: CPT

## 2025-01-02 RX ORDER — AZITHROMYCIN 250 MG/1
TABLET, FILM COATED ORAL
COMMUNITY
Start: 2024-12-30

## 2025-01-02 RX ORDER — DEXAMETHASONE 1.5 MG/1
1.5 TABLET ORAL TAKE AS DIRECTED
Qty: 35 TABLET | Refills: 0 | Status: SHIPPED | OUTPATIENT
Start: 2025-01-02

## 2025-01-02 RX ORDER — DEXTROMETHORPHAN HYDROBROMIDE AND PROMETHAZINE HYDROCHLORIDE 15; 6.25 MG/5ML; MG/5ML
5 SYRUP ORAL
COMMUNITY
Start: 2024-12-30

## 2025-01-02 NOTE — TELEPHONE ENCOUNTER
Patient called asking if we could order oxygen that he can use at night. I let him know I could put in a message asking. He stated he understood.     Someone in office heard me and informed me that he needs to reach out to his PCP as they have to run test for it to get approved through insurance.  HUB OKAY TO RELAY MESSAGE.

## 2025-01-02 NOTE — TELEPHONE ENCOUNTER
Patient called in and I gave him the message about how he would need to reach out to his PCP as she would probably need to run to help get the oxygen approved so he wouldn't have to pay for it and he hung up on me.

## 2025-01-02 NOTE — PROGRESS NOTES
"Omar is a 79 y.o. year old male presents to [facility]    Chief Complaint   Patient presents with    Right Shoulder - Initial Evaluation     Fell on 11/28/2024       History of Present Illness  Omar Choudhary is a 79 y.o. male retiree presents to clinic for evaluation of right shoulder pain since Thanksgiving when he fell laterally landing on lateral aspect of the shoulder. Received a MRI that demonstrated torn rotator cuff thinning and tearing, as well as, a AC joint separation, but no fractures. Also, reports radiating pain up into his right cervical region. States initially he was unable to reach shoulder height or internal rotation, but no his movement has improved. Provocative: overhead reaching especially if weighted. Treatments: flexeril, tylenol, meloxicam, physical therapy ordered to [facility] by Dr Herrera orth[facility] office [place], rest and activity modifications.  Of note, reports history of Lumbar fusion and has been experiencing spasms in lumbar spine musculature. He was evaluated by Gianna DIAZ and prescribed oral steroids.      I have reviewed the patient's medical, family, and social history in detail and updated the computerized patient record.    Objective:  Pulse 98   Ht 167.6 cm (66\")   Wt 80.3 kg (177 lb)   BMI 28.57 kg/m²      Physical Exam    Vital signs reviewed.   General: No acute distress.  Eyes: conjunctiva clear  ENT: external ears atraumatic  CV: no peripheral edema  Resp: normal respiratory effort  Skin: no rashes or wounds; normal turgor  Psych: mood and affect appropriate; recent and remote memory intact  Neuro: sensation to light touch intact    MSK Exam      Right shoulder:  Intact skin. No effusion. Trace atrophy    Passive fwd flexion 140 with mild pain, abduction 120, ER 40  Active IR L2  Pain and weakness with o'romero & supraspinatus/karsten   Belly press 5/5; lift off 4/5 with pain  ROM at elbow, wrist and digits grossly intact  Radial pulse palpable " and capillary refill less than 2 seconds to all digits   strength 5/5 bilaterally      Imaging:  XR Outside Films (11/30/2024 00:05)   XR Outside Films (11/30/2024 00:10)     MRI right shoulder Priority radiology 12/9/2024:    Findings:  Rotator cuff:  Supraspinatus and infraspinatus tendons: Severe thinning of the tendons. There is high-grade partial-thickness tearing of the supraspinatus tendon distally measuring 1.3 cm mediolateral by 1.2 cm anterior posterior. There is severe muscle atrophy.  Subscapularis tendon: No tendon abnormality. No significant muscle atrophy.  Teres minor tendon: No tendon abnormality. No significant muscle atrophy.    Glenohumeral joint:  Humeral head is superiorly located within the glenoid. Physiologic joint fluid is present. There is moderate joint space narrowing with small osteophytes and cartilage loss along the articular surface. The inferior joint capsule is normal thickness and normal signal intensity.    Labrum:  Diffuse tearing of the labrum. No paralabral cysts.    Biceps tendon:  Long head of the biceps tendon is appropriately located within the bicipital groove. It has normal signal intensity and morphology. Small low signal intensity focus within the biceps tendon sheath proximally likely due to focus of calcific tendinitis. It measures 4 mm maximally. It inserts appropriately on the labrum.    Acromioclavicular Joint:  Normal for age. No abnormal bone marrow edema. No os acromiale. No significant lateral downsloping of the acromion.    Bone:  No fractures or aggressive osseous lesions. Bone marrow signal intensity is normal.    Miscellaneous:  Moderate amount subacromial subdeltoid fluid. No pathologically enlarged axillary lymph nodes. There is thinning of the deltoid muscle. There is focal atrophy of the superior lateral aspect measuring up to 11 mm anterior to posterior.    IMPRESSION:  1. No fractures or osseous contusions.  2. Moderate arthritis glenohumeral  joint.  3. Severe thinning of the supraspinatus infraspinatus tendons with a moderate sized full-thickness tear of the distal supraspinatus tendon. Severe muscle atrophy.  4. Moderate amount subacromial-subdeltoid bursal fluid.  5. Diffuse thinning deltoid muscle with focal atrophy of the superolateral aspect.    Assessment:  Diagnoses and all orders for this visit:    Chronic right shoulder pain    Arthritis of right glenohumeral joint    Plan: Recommend continuing [facility] PT to rehab to his baseline function of right shoulder. If unsatisfied with results may return for consultation with Dr Crawford for evaluation for rTSA.  All questions answered.         Follow Up   No follow-ups on file.  Patient was given instructions and counseling regarding his condition or for health maintenance advice. Please see specific information pulled into the AVS if appropriate.     EMR Dragon/Transcription disclaimer:    Much of this encounter note is an electronic transcription/translation of spoken language to printed text.  The electronic translation of spoken language may permit erroneous, or at times, nonsensical words or phrases to be inadvertently transcribed.  Although I have reviewed the note for such errors some may still exist.

## 2025-01-03 ENCOUNTER — PATIENT ROUNDING (BHMG ONLY) (OUTPATIENT)
Dept: ORTHOPEDIC SURGERY | Facility: CLINIC | Age: 80
End: 2025-01-03
Payer: MEDICARE

## 2025-01-10 ENCOUNTER — TELEPHONE (OUTPATIENT)
Dept: PULMONOLOGY | Facility: HOSPITAL | Age: 80
End: 2025-01-10
Payer: MEDICARE

## 2025-01-10 ENCOUNTER — APPOINTMENT (OUTPATIENT)
Dept: CT IMAGING | Facility: HOSPITAL | Age: 80
DRG: 163 | End: 2025-01-10
Payer: MEDICARE

## 2025-01-10 ENCOUNTER — HOSPITAL ENCOUNTER (INPATIENT)
Facility: HOSPITAL | Age: 80
LOS: 9 days | Discharge: HOME | DRG: 163 | End: 2025-01-19
Attending: EMERGENCY MEDICINE | Admitting: STUDENT IN AN ORGANIZED HEALTH CARE EDUCATION/TRAINING PROGRAM
Payer: MEDICARE

## 2025-01-10 ENCOUNTER — APPOINTMENT (OUTPATIENT)
Dept: GENERAL RADIOLOGY | Facility: HOSPITAL | Age: 80
DRG: 163 | End: 2025-01-10
Payer: MEDICARE

## 2025-01-10 ENCOUNTER — APPOINTMENT (OUTPATIENT)
Dept: CARDIOLOGY | Facility: HOSPITAL | Age: 80
DRG: 163 | End: 2025-01-10
Payer: MEDICARE

## 2025-01-10 DIAGNOSIS — I26.92 ACUTE SADDLE PULMONARY EMBOLISM, UNSPECIFIED WHETHER ACUTE COR PULMONALE PRESENT: Primary | ICD-10-CM

## 2025-01-10 DIAGNOSIS — I10 PRIMARY HYPERTENSION: ICD-10-CM

## 2025-01-10 DIAGNOSIS — I26.02 ACUTE SADDLE PULMONARY EMBOLISM WITH ACUTE COR PULMONALE: ICD-10-CM

## 2025-01-10 DIAGNOSIS — Z87.09 HISTORY OF ASTHMA: ICD-10-CM

## 2025-01-10 DIAGNOSIS — J96.01 ACUTE HYPOXIC RESPIRATORY FAILURE: ICD-10-CM

## 2025-01-10 LAB
ANION GAP SERPL CALCULATED.3IONS-SCNC: 15.5 MMOL/L (ref 5–15)
AORTIC DIMENSIONLESS INDEX: 0.5 (DI)
APTT PPP: 29.8 SECONDS (ref 22.7–35.4)
ARTERIAL PATENCY WRIST A: POSITIVE
ARTERIAL PATENCY WRIST A: POSITIVE
ASCENDING AORTA: 3.7 CM
ATMOSPHERIC PRESS: 744.9 MMHG
ATMOSPHERIC PRESS: 745.7 MMHG
AV MEAN PRESS GRAD SYS DOP V1V2: 6.1 MMHG
AV VMAX SYS DOP: 168.3 CM/SEC
B PARAPERT DNA SPEC QL NAA+PROBE: NOT DETECTED
B PERT DNA SPEC QL NAA+PROBE: NOT DETECTED
BASE EXCESS BLDA CALC-SCNC: -0.1 MMOL/L (ref 0–2)
BASE EXCESS BLDA CALC-SCNC: -0.8 MMOL/L (ref 0–2)
BASOPHILS # BLD AUTO: 0.02 10*3/MM3 (ref 0–0.2)
BASOPHILS NFR BLD AUTO: 0.1 % (ref 0–1.5)
BDY SITE: ABNORMAL
BDY SITE: ABNORMAL
BH CV ECHO MEAS - ACS: 2.03 CM
BH CV ECHO MEAS - AO MAX PG: 11.3 MMHG
BH CV ECHO MEAS - AO ROOT DIAM: 4.2 CM
BH CV ECHO MEAS - AO V2 VTI: 24.3 CM
BH CV ECHO MEAS - AVA(I,D): 1.9 CM2
BH CV ECHO MEAS - EDV(CUBED): 111.8 ML
BH CV ECHO MEAS - EDV(MOD-SP4): 34 ML
BH CV ECHO MEAS - EF(MOD-SP4): 64.7 %
BH CV ECHO MEAS - ESV(CUBED): 33.4 ML
BH CV ECHO MEAS - ESV(MOD-SP4): 12 ML
BH CV ECHO MEAS - FS: 33.1 %
BH CV ECHO MEAS - IVS/LVPW: 0.68 CM
BH CV ECHO MEAS - IVSD: 0.94 CM
BH CV ECHO MEAS - LA DIMENSION: 3.3 CM
BH CV ECHO MEAS - LAT PEAK E' VEL: 13.8 CM/SEC
BH CV ECHO MEAS - LV DIASTOLIC VOL/BSA (35-75): 17.9 CM2
BH CV ECHO MEAS - LV MASS(C)D: 212.9 GRAMS
BH CV ECHO MEAS - LV MAX PG: 3.1 MMHG
BH CV ECHO MEAS - LV MEAN PG: 1.63 MMHG
BH CV ECHO MEAS - LV SYSTOLIC VOL/BSA (12-30): 6.3 CM2
BH CV ECHO MEAS - LV V1 MAX: 88 CM/SEC
BH CV ECHO MEAS - LV V1 VTI: 14 CM
BH CV ECHO MEAS - LVIDD: 4.8 CM
BH CV ECHO MEAS - LVIDS: 3.2 CM
BH CV ECHO MEAS - LVOT AREA: 3.3 CM2
BH CV ECHO MEAS - LVOT DIAM: 2.05 CM
BH CV ECHO MEAS - LVPWD: 1.4 CM
BH CV ECHO MEAS - MED PEAK E' VEL: 8.3 CM/SEC
BH CV ECHO MEAS - MV A DUR: 0.14 SEC
BH CV ECHO MEAS - MV A MAX VEL: 127.3 CM/SEC
BH CV ECHO MEAS - MV DEC SLOPE: 160.9 CM/SEC2
BH CV ECHO MEAS - MV DEC TIME: 0.35 SEC
BH CV ECHO MEAS - MV E MAX VEL: 56.1 CM/SEC
BH CV ECHO MEAS - MV E/A: 0.44
BH CV ECHO MEAS - MV MAX PG: 7.3 MMHG
BH CV ECHO MEAS - MV MEAN PG: 3.9 MMHG
BH CV ECHO MEAS - MV P1/2T: 220.7 MSEC
BH CV ECHO MEAS - MV V2 VTI: 26.5 CM
BH CV ECHO MEAS - MVA(P1/2T): 1 CM2
BH CV ECHO MEAS - MVA(VTI): 1.74 CM2
BH CV ECHO MEAS - PA V2 MAX: 85.6 CM/SEC
BH CV ECHO MEAS - RAP SYSTOLE: 3 MMHG
BH CV ECHO MEAS - RVOT DIAM: 2.06 CM
BH CV ECHO MEAS - RVSP: 28 MMHG
BH CV ECHO MEAS - SV(LVOT): 46.3 ML
BH CV ECHO MEAS - SV(MOD-SP4): 22 ML
BH CV ECHO MEAS - SVI(LVOT): 24.4 ML/M2
BH CV ECHO MEAS - SVI(MOD-SP4): 11.6 ML/M2
BH CV ECHO MEAS - TR MAX PG: 24.9 MMHG
BH CV ECHO MEAS - TR MAX VEL: 249.5 CM/SEC
BH CV ECHO MEAS RV FREE WALL STRAIN: -9.6 %
BH CV ECHO MEASUREMENTS AVERAGE E/E' RATIO: 5.08
BH CV XLRA - RV BASE: 5.1 CM
BH CV XLRA - RV LENGTH: 7.9 CM
BH CV XLRA - RV MID: 4.7 CM
BUN SERPL-MCNC: 26 MG/DL (ref 8–23)
BUN/CREAT SERPL: 21.5 (ref 7–25)
C PNEUM DNA NPH QL NAA+NON-PROBE: NOT DETECTED
CALCIUM SPEC-SCNC: 9.1 MG/DL (ref 8.6–10.5)
CHLORIDE SERPL-SCNC: 103 MMOL/L (ref 98–107)
CO2 BLDA-SCNC: 22 MMOL/L (ref 23–27)
CO2 BLDA-SCNC: 22.2 MMOL/L (ref 23–27)
CO2 SERPL-SCNC: 21.5 MMOL/L (ref 22–29)
CREAT SERPL-MCNC: 1.21 MG/DL (ref 0.76–1.27)
D-LACTATE SERPL-SCNC: 2.6 MMOL/L (ref 0.5–2)
D-LACTATE SERPL-SCNC: 3.6 MMOL/L (ref 0.5–2)
D-LACTATE SERPL-SCNC: 3.6 MMOL/L (ref 0.5–2)
DEPRECATED RDW RBC AUTO: 45.2 FL (ref 37–54)
DEVICE COMMENT: ABNORMAL
DEVICE COMMENT: ABNORMAL
EGFRCR SERPLBLD CKD-EPI 2021: 61.7 ML/MIN/1.73
EOSINOPHIL # BLD AUTO: 0 10*3/MM3 (ref 0–0.4)
EOSINOPHIL NFR BLD AUTO: 0 % (ref 0.3–6.2)
ERYTHROCYTE [DISTWIDTH] IN BLOOD BY AUTOMATED COUNT: 13.7 % (ref 12.3–15.4)
FLUAV SUBTYP SPEC NAA+PROBE: NOT DETECTED
FLUBV RNA ISLT QL NAA+PROBE: NOT DETECTED
GEN 5 1HR TROPONIN T REFLEX: 118 NG/L
GLUCOSE BLDC GLUCOMTR-MCNC: 120 MG/DL (ref 70–130)
GLUCOSE SERPL-MCNC: 135 MG/DL (ref 65–99)
HADV DNA SPEC NAA+PROBE: NOT DETECTED
HCO3 BLDA-SCNC: 21.2 MMOL/L (ref 22–28)
HCO3 BLDA-SCNC: 21.4 MMOL/L (ref 22–28)
HCOV 229E RNA SPEC QL NAA+PROBE: NOT DETECTED
HCOV HKU1 RNA SPEC QL NAA+PROBE: NOT DETECTED
HCOV NL63 RNA SPEC QL NAA+PROBE: NOT DETECTED
HCOV OC43 RNA SPEC QL NAA+PROBE: NOT DETECTED
HCT VFR BLD AUTO: 49.9 % (ref 37.5–51)
HEMODILUTION: NO
HEMODILUTION: NO
HGB BLD-MCNC: 17.3 G/DL (ref 13–17.7)
HMPV RNA NPH QL NAA+NON-PROBE: NOT DETECTED
HPIV1 RNA ISLT QL NAA+PROBE: NOT DETECTED
HPIV2 RNA SPEC QL NAA+PROBE: NOT DETECTED
HPIV3 RNA NPH QL NAA+PROBE: NOT DETECTED
HPIV4 P GENE NPH QL NAA+PROBE: NOT DETECTED
IMM GRANULOCYTES # BLD AUTO: 0.2 10*3/MM3 (ref 0–0.05)
IMM GRANULOCYTES NFR BLD AUTO: 1.2 % (ref 0–0.5)
INHALED O2 CONCENTRATION: 100 %
INHALED O2 CONCENTRATION: 100 %
INR PPP: 1.04 (ref 0.9–1.1)
LEFT ATRIUM VOLUME INDEX: 18.5 ML/M2
LV EF BIPLANE MOD: 64 %
LYMPHOCYTES # BLD AUTO: 1.26 10*3/MM3 (ref 0.7–3.1)
LYMPHOCYTES NFR BLD AUTO: 7.5 % (ref 19.6–45.3)
Lab: ABNORMAL
Lab: ABNORMAL
M PNEUMO IGG SER IA-ACNC: NOT DETECTED
MAGNESIUM SERPL-MCNC: 2.7 MG/DL (ref 1.6–2.4)
MCH RBC QN AUTO: 31.7 PG (ref 26.6–33)
MCHC RBC AUTO-ENTMCNC: 34.7 G/DL (ref 31.5–35.7)
MCV RBC AUTO: 91.6 FL (ref 79–97)
MODALITY: ABNORMAL
MODALITY: ABNORMAL
MONOCYTES # BLD AUTO: 1.34 10*3/MM3 (ref 0.1–0.9)
MONOCYTES NFR BLD AUTO: 7.9 % (ref 5–12)
NEUTROPHILS NFR BLD AUTO: 14.05 10*3/MM3 (ref 1.7–7)
NEUTROPHILS NFR BLD AUTO: 83.3 % (ref 42.7–76)
NOTIFIED WHO: ABNORMAL
NOTIFIED WHO: ABNORMAL
NRBC BLD AUTO-RTO: 0 /100 WBC (ref 0–0.2)
NT-PROBNP SERPL-MCNC: 3412 PG/ML (ref 0–1800)
O2 A-A PPRESDIFF RESPIRATORY: 0.1 MMHG
O2 A-A PPRESDIFF RESPIRATORY: 0.1 MMHG
PCO2 BLDA: 26.8 MM HG (ref 35–45)
PCO2 BLDA: 28.3 MM HG (ref 35–45)
PH BLDA: 7.48 PH UNITS (ref 7.35–7.45)
PH BLDA: 7.51 PH UNITS (ref 7.35–7.45)
PLATELET # BLD AUTO: 223 10*3/MM3 (ref 140–450)
PMV BLD AUTO: 9.4 FL (ref 6–12)
PO2 BLD: 53 MM[HG] (ref 0–500)
PO2 BLD: 54 MM[HG] (ref 0–500)
PO2 BLDA: 53.3 MM HG (ref 80–100)
PO2 BLDA: 53.8 MM HG (ref 80–100)
POTASSIUM SERPL-SCNC: 4.2 MMOL/L (ref 3.5–5.2)
PROCALCITONIN SERPL-MCNC: 0.07 NG/ML (ref 0–0.25)
PROTHROMBIN TIME: 13.8 SECONDS (ref 11.7–14.2)
RBC # BLD AUTO: 5.45 10*6/MM3 (ref 4.14–5.8)
READ BACK: YES
READ BACK: YES
RHINOVIRUS RNA SPEC NAA+PROBE: NOT DETECTED
RSV RNA NPH QL NAA+NON-PROBE: NOT DETECTED
SAO2 % BLDCOA: 90.1 % (ref 92–98.5)
SAO2 % BLDCOA: 91.1 % (ref 92–98.5)
SARS-COV-2 RNA NPH QL NAA+NON-PROBE: NOT DETECTED
SINUS: 4 CM
SODIUM SERPL-SCNC: 140 MMOL/L (ref 136–145)
STJ: 3.6 CM
TOTAL RATE: 22 BREATHS/MINUTE
TOTAL RATE: 22 BREATHS/MINUTE
TROPONIN T % DELTA: 3 %
TROPONIN T NUMERIC DELTA: 3 NG/L
TROPONIN T SERPL HS-MCNC: 115 NG/L
WBC NRBC COR # BLD AUTO: 16.87 10*3/MM3 (ref 3.4–10.8)

## 2025-01-10 PROCEDURE — 82803 BLOOD GASES ANY COMBINATION: CPT | Performed by: EMERGENCY MEDICINE

## 2025-01-10 PROCEDURE — 36600 WITHDRAWAL OF ARTERIAL BLOOD: CPT | Performed by: EMERGENCY MEDICINE

## 2025-01-10 PROCEDURE — 25010000002 HEPARIN (PORCINE) 25000-0.45 UT/250ML-% SOLUTION: Performed by: EMERGENCY MEDICINE

## 2025-01-10 PROCEDURE — 84484 ASSAY OF TROPONIN QUANT: CPT | Performed by: EMERGENCY MEDICINE

## 2025-01-10 PROCEDURE — 94799 UNLISTED PULMONARY SVC/PX: CPT

## 2025-01-10 PROCEDURE — 93010 ELECTROCARDIOGRAM REPORT: CPT | Performed by: INTERNAL MEDICINE

## 2025-01-10 PROCEDURE — 94640 AIRWAY INHALATION TREATMENT: CPT

## 2025-01-10 PROCEDURE — 71045 X-RAY EXAM CHEST 1 VIEW: CPT

## 2025-01-10 PROCEDURE — 93306 TTE W/DOPPLER COMPLETE: CPT

## 2025-01-10 PROCEDURE — 94761 N-INVAS EAR/PLS OXIMETRY MLT: CPT

## 2025-01-10 PROCEDURE — 82948 REAGENT STRIP/BLOOD GLUCOSE: CPT

## 2025-01-10 PROCEDURE — 36415 COLL VENOUS BLD VENIPUNCTURE: CPT | Performed by: EMERGENCY MEDICINE

## 2025-01-10 PROCEDURE — 93005 ELECTROCARDIOGRAM TRACING: CPT | Performed by: EMERGENCY MEDICINE

## 2025-01-10 PROCEDURE — 93005 ELECTROCARDIOGRAM TRACING: CPT | Performed by: STUDENT IN AN ORGANIZED HEALTH CARE EDUCATION/TRAINING PROGRAM

## 2025-01-10 PROCEDURE — 80048 BASIC METABOLIC PNL TOTAL CA: CPT | Performed by: EMERGENCY MEDICINE

## 2025-01-10 PROCEDURE — 93306 TTE W/DOPPLER COMPLETE: CPT | Performed by: INTERNAL MEDICINE

## 2025-01-10 PROCEDURE — 94660 CPAP INITIATION&MGMT: CPT

## 2025-01-10 PROCEDURE — 93356 MYOCRD STRAIN IMG SPCKL TRCK: CPT | Performed by: INTERNAL MEDICINE

## 2025-01-10 PROCEDURE — 36600 WITHDRAWAL OF ARTERIAL BLOOD: CPT | Performed by: STUDENT IN AN ORGANIZED HEALTH CARE EDUCATION/TRAINING PROGRAM

## 2025-01-10 PROCEDURE — 93356 MYOCRD STRAIN IMG SPCKL TRCK: CPT

## 2025-01-10 PROCEDURE — 83880 ASSAY OF NATRIURETIC PEPTIDE: CPT | Performed by: EMERGENCY MEDICINE

## 2025-01-10 PROCEDURE — 85610 PROTHROMBIN TIME: CPT | Performed by: EMERGENCY MEDICINE

## 2025-01-10 PROCEDURE — 0202U NFCT DS 22 TRGT SARS-COV-2: CPT | Performed by: EMERGENCY MEDICINE

## 2025-01-10 PROCEDURE — 71275 CT ANGIOGRAPHY CHEST: CPT

## 2025-01-10 PROCEDURE — 83735 ASSAY OF MAGNESIUM: CPT | Performed by: EMERGENCY MEDICINE

## 2025-01-10 PROCEDURE — 99291 CRITICAL CARE FIRST HOUR: CPT

## 2025-01-10 PROCEDURE — 25510000001 PERFLUTREN 6.52 MG/ML SUSPENSION 2 ML VIAL: Performed by: EMERGENCY MEDICINE

## 2025-01-10 PROCEDURE — 84145 PROCALCITONIN (PCT): CPT | Performed by: EMERGENCY MEDICINE

## 2025-01-10 PROCEDURE — 83605 ASSAY OF LACTIC ACID: CPT | Performed by: EMERGENCY MEDICINE

## 2025-01-10 PROCEDURE — 82803 BLOOD GASES ANY COMBINATION: CPT | Performed by: STUDENT IN AN ORGANIZED HEALTH CARE EDUCATION/TRAINING PROGRAM

## 2025-01-10 PROCEDURE — 85025 COMPLETE CBC W/AUTO DIFF WBC: CPT | Performed by: EMERGENCY MEDICINE

## 2025-01-10 PROCEDURE — 25010000002 HEPARIN (PORCINE) PER 1000 UNITS: Performed by: EMERGENCY MEDICINE

## 2025-01-10 PROCEDURE — 25510000001 IOPAMIDOL PER 1 ML: Performed by: EMERGENCY MEDICINE

## 2025-01-10 PROCEDURE — 85730 THROMBOPLASTIN TIME PARTIAL: CPT | Performed by: EMERGENCY MEDICINE

## 2025-01-10 RX ORDER — SODIUM CHLORIDE 0.9 % (FLUSH) 0.9 %
10 SYRINGE (ML) INJECTION AS NEEDED
Status: DISCONTINUED | OUTPATIENT
Start: 2025-01-10 | End: 2025-01-19 | Stop reason: HOSPADM

## 2025-01-10 RX ORDER — HEPARIN SODIUM 10000 [USP'U]/100ML
18 INJECTION, SOLUTION INTRAVENOUS
Status: DISPENSED | OUTPATIENT
Start: 2025-01-10 | End: 2025-01-12

## 2025-01-10 RX ORDER — BISACODYL 10 MG
10 SUPPOSITORY, RECTAL RECTAL DAILY PRN
Status: DISCONTINUED | OUTPATIENT
Start: 2025-01-10 | End: 2025-01-19 | Stop reason: HOSPADM

## 2025-01-10 RX ORDER — IOPAMIDOL 755 MG/ML
100 INJECTION, SOLUTION INTRAVASCULAR
Status: COMPLETED | OUTPATIENT
Start: 2025-01-10 | End: 2025-01-10

## 2025-01-10 RX ORDER — HEPARIN SODIUM 5000 [USP'U]/ML
80 INJECTION, SOLUTION INTRAVENOUS; SUBCUTANEOUS ONCE
Status: COMPLETED | OUTPATIENT
Start: 2025-01-10 | End: 2025-01-10

## 2025-01-10 RX ORDER — AMOXICILLIN 250 MG
2 CAPSULE ORAL 2 TIMES DAILY
Status: DISCONTINUED | OUTPATIENT
Start: 2025-01-10 | End: 2025-01-19 | Stop reason: HOSPADM

## 2025-01-10 RX ORDER — IPRATROPIUM BROMIDE AND ALBUTEROL SULFATE 2.5; .5 MG/3ML; MG/3ML
3 SOLUTION RESPIRATORY (INHALATION)
Status: DISCONTINUED | OUTPATIENT
Start: 2025-01-10 | End: 2025-01-19 | Stop reason: HOSPADM

## 2025-01-10 RX ORDER — BISACODYL 5 MG/1
5 TABLET, DELAYED RELEASE ORAL DAILY PRN
Status: DISCONTINUED | OUTPATIENT
Start: 2025-01-10 | End: 2025-01-19 | Stop reason: HOSPADM

## 2025-01-10 RX ORDER — HEPARIN SODIUM 5000 [USP'U]/ML
40-80 INJECTION, SOLUTION INTRAVENOUS; SUBCUTANEOUS EVERY 6 HOURS PRN
Status: DISCONTINUED | OUTPATIENT
Start: 2025-01-10 | End: 2025-01-13 | Stop reason: ALTCHOICE

## 2025-01-10 RX ORDER — SODIUM CHLORIDE 9 MG/ML
40 INJECTION, SOLUTION INTRAVENOUS AS NEEDED
Status: DISCONTINUED | OUTPATIENT
Start: 2025-01-10 | End: 2025-01-19 | Stop reason: HOSPADM

## 2025-01-10 RX ORDER — NITROGLYCERIN 0.4 MG/1
0.4 TABLET SUBLINGUAL
Status: DISCONTINUED | OUTPATIENT
Start: 2025-01-10 | End: 2025-01-19 | Stop reason: HOSPADM

## 2025-01-10 RX ORDER — POLYETHYLENE GLYCOL 3350 17 G/17G
17 POWDER, FOR SOLUTION ORAL DAILY PRN
Status: DISCONTINUED | OUTPATIENT
Start: 2025-01-10 | End: 2025-01-19 | Stop reason: HOSPADM

## 2025-01-10 RX ORDER — SODIUM CHLORIDE 0.9 % (FLUSH) 0.9 %
10 SYRINGE (ML) INJECTION EVERY 12 HOURS SCHEDULED
Status: DISCONTINUED | OUTPATIENT
Start: 2025-01-10 | End: 2025-01-19 | Stop reason: HOSPADM

## 2025-01-10 RX ORDER — IPRATROPIUM BROMIDE AND ALBUTEROL SULFATE 2.5; .5 MG/3ML; MG/3ML
3 SOLUTION RESPIRATORY (INHALATION) ONCE
Status: COMPLETED | OUTPATIENT
Start: 2025-01-10 | End: 2025-01-10

## 2025-01-10 RX ADMIN — IOPAMIDOL 95 ML: 755 INJECTION, SOLUTION INTRAVENOUS at 18:16

## 2025-01-10 RX ADMIN — PERFLUTREN 2 ML: 6.52 INJECTION, SUSPENSION INTRAVENOUS at 19:29

## 2025-01-10 RX ADMIN — IPRATROPIUM BROMIDE AND ALBUTEROL SULFATE 3 ML: .5; 3 SOLUTION RESPIRATORY (INHALATION) at 16:46

## 2025-01-10 RX ADMIN — Medication 10 ML: at 20:58

## 2025-01-10 RX ADMIN — HEPARIN SODIUM 6400 UNITS: 5000 INJECTION INTRAVENOUS; SUBCUTANEOUS at 18:30

## 2025-01-10 RX ADMIN — HEPARIN SODIUM 18 UNITS/KG/HR: 10000 INJECTION, SOLUTION INTRAVENOUS at 18:29

## 2025-01-10 RX ADMIN — IPRATROPIUM BROMIDE AND ALBUTEROL SULFATE 3 ML: 2.5; .5 SOLUTION RESPIRATORY (INHALATION) at 20:44

## 2025-01-10 NOTE — TELEPHONE ENCOUNTER
"Pt called stating that he had went to [facility] and was Dx w/bronchitis, was given a abx/steroids. C/O not being able to take deep breaths or clear his \" upper bronchial \" and having difficulty sleeping. O2 sats were 88% when at the . I advised pt to go to the  [facility] in [place] and he verbalized that he would.  "

## 2025-01-10 NOTE — Clinical Note
Hemostasis started on the right femoral vein. Purse string suturing was used in achieving hemostasis. Closure device deployed in the vessel. Hemostasis achieved successfully.

## 2025-01-10 NOTE — ED PROVIDER NOTES
EMERGENCY DEPARTMENT ENCOUNTER    Room Number:  32/32  PCP: Dennis Kwong MD  Historian: Patient, EMS      HPI:  Chief Complaint: Shortness of breath  A complete HPI/ROS/PMH/PSH/SH/FH are unobtainable due to: None    Context: Omar Choudhary is a 79 y.o. male who presents to the ED via [facility] from home c/o acute progressive shortness of breath over the last 3 days.  Has had a bronchitis-like illness for the last 2 weeks.  History of asthma.  Denies any chest pain.  No fevers.  Has been coughing a lot but is nonproductive.  Has been on steroids and antibiotics, has been completed his course of antibiotics to this point but is still taking steroids.  Does follow-up with pulmonology outpatient.  Compliant with home breathing treatments and inhalers. Was 54% on RA on Fire/EMS arrival to the home.       MEDICAL RECORD REVIEW    External (non-ED) record review: Myocardial perfusion reviewed in epic from April 5, 2018 showing negative nuclear stress test with preserved ejection fraction of 56%              PAST MEDICAL HISTORY  Active Ambulatory Problems     Diagnosis Date Noted   • Benign prostatic hyperplasia 09/26/2019   • Bursitis 11/11/2016   • Depression 10/12/2012   • Diverticulosis 09/26/2019   • Family history of malignant neoplasm of breast 09/26/2019   • Hyperlipidemia 09/26/2019   • Hypertension 03/01/2012   • Internal derangement of right knee 05/14/2019   • Knee effusion 12/13/2012   • Asthma 02/14/2019   • Obstructive sleep apnea syndrome 09/18/2012   • Osteoarthritis 09/26/2019   • Osteopenia 09/26/2019   • Pain in knee 11/08/2012   • Postnasal drip 01/16/2018   • Prepatellar bursitis 11/12/2012   • Sciatica of right side 08/29/2017   • 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   • Spinal stenosis of lumbar region with neurogenic claudication 08/12/2021   • Degenerative lumbar spinal stenosis 08/12/2021   • Anxiety 08/12/2021   • Pinguecula of right eye 05/06/2021   • Lumbar radiculopathy, chronic  09/26/2022   • Bilateral pseudophakia 06/14/2022   • Diverticulosis of colon 02/29/2008   • Hemorrhoids without complication 02/29/2008   • Lumbar degenerative disc disease 09/25/2023   • Lumbar spondylosis 09/25/2023   • Other fecal abnormalities 08/20/2013   • Rectal bleeding 08/20/2013   • Scoliosis of lumbosacral region due to degenerative disease of spine in adult 09/25/2023   • Arthritis 09/25/2023   • Acute right-sided low back pain without sciatica 01/02/2025   • Sacroiliitis 01/02/2025   • Acquired spondylolisthesis 01/02/2025     Resolved Ambulatory Problems     Diagnosis Date Noted   • Acute suppurative otitis media 09/24/2016   • Otitis externa 01/13/2015   • Otitis media 01/13/2015   • Chronic cough 02/14/2019   • Reactive airway disease 01/15/2016   • Other specified dorsopathies, site unspecified 09/26/2019   • Shortness of breath 07/24/2014   • Weight loss 03/15/2013   • Wheezing 03/01/2012   • Asthma 11/16/2021   • Obstructive sleep apnea syndrome 11/16/2021   • Hypercholesterolemia 09/25/2023   • Hypertension 09/25/2023     Past Medical History:   Diagnosis Date   • ADHD (attention deficit hyperactivity disorder) 2021   • Allergic    • Carpal tunnel syndrome    • Cataract    • Erectile dysfunction    • Leg pain, right    • Low back pain    • Neuropathy    • Poor balance    • Scoliosis 1960   • Substance abuse 1966         PAST SURGICAL HISTORY  Past Surgical History:   Procedure Laterality Date   • CARDIAC CATHETERIZATION  1992   • COLONOSCOPY  2013    No polyps, slight diverticulitis   • EYE SURGERY  2016   • KNEE ARTHROSCOPY W/ ACL RECONSTRUCTION Right 2019   • LUMBAR FUSION Bilateral 10/05/2022    Procedure: DAY 2 LUMBAR LAMINECTOMY TRANSFORAMINAL LUMBAR INTERBODY FUSION L2,L3,L4 WITH NEURO ROBOT;  Surgeon: John Do MD;  Location: [facility] MAIN OR;  Service: Robotics - Neuro;  Laterality: Bilateral;   • LUMBAR FUSION N/A 10/04/2022    Procedure: DAY 1 LUMBAR LATERAL INTERBODY FUSION WITH  NEURO ROBOT L2, L3.L4;  Surgeon: John Do MD;  Location: [facility] MAIN OR;  Service: Robotics - Neuro;  Laterality: N/A;  left side approach   • MOUTH SURGERY     • SPINE SURGERY  10/4&2022    Dr. John Do, [facility]         FAMILY HISTORY  Family History   Problem Relation Age of Onset   • Heart disease Mother    • Hypertension Mother    • Breast cancer Mother    • Stroke Mother         Several TIAs   • Alcohol abuse Mother    • Thyroid disease Mother    • Arthritis Mother    • Cancer Mother         Breast   • Aortic aneurysm Father    • Heart attack Father    • Liver cancer Father    • Cancer Father         Liver   • Heart disease Father         Heart attack   • Early death Brother         Coronary thrombosis @ 48 years while mountain climbing.         SOCIAL HISTORY  Social History     Socioeconomic History   • Marital status: Significant Other   Tobacco Use   • Smoking status: Former     Current packs/day: 0.00     Average packs/day: 0.5 packs/day for 26.0 years (13.0 ttl pk-yrs)     Types: Cigarettes     Start date: 1966     Quit date: 1992     Years since quittin.0     Passive exposure: Past   • Smokeless tobacco: Never   Vaping Use   • Vaping status: Never Used   Substance and Sexual Activity   • Alcohol use: Not Currently     Alcohol/week: 17.0 standard drinks of alcohol   • Drug use: Never     Types: Marijuana     Comment: CBD gummies- stop now for surgery   • Sexual activity: Yes     Partners: Female     Birth control/protection: Post-menopausal     Comment: We're both >70 years old         ALLERGIES  Statins        REVIEW OF SYSTEMS  Review of Systems     All systems reviewed and negative except for those discussed in HPI.       PHYSICAL EXAM    I have reviewed the triage vital signs and nursing notes.    ED Triage Vitals [01/10/25 1610]   Temp Heart Rate Resp BP SpO2   96.9 °F (36.1 °C) 92 26 140/90 90 %      Temp src Heart Rate Source Patient Position BP  Location FiO2 (%)   -- -- -- -- --       Physical Exam  General: Ill-appearing, nondiaphoretic  HEENT: Mucous membranes moist, atraumatic, EOMI  Neck: Full ROM  Pulm: Symmetric chest rise, tachypneic, lungs CTAB without wheezing although possibly some slight rales in the right lower lung base  Cardiovascular: Borderline mild regular rhythm tachycardia, intact distal pulses, no peripheral edema  GI: Soft, nontender, nondistended, no rebound, no guarding, bowel sounds present  MSK: Full ROM, no deformity  Skin: Warm, dry  Neuro: Awake, alert, oriented x 4, GCS 15, moving all extremities, no focal deficits  Psych: Calm, cooperative      I wore an N95 mask, eye protection, and gloves used during this encounter.       LAB RESULTS  Recent Results (from the past 24 hours)   ECG 12 Lead Dyspnea    Collection Time: 01/10/25  4:30 PM   Result Value Ref Range    QT Interval 365 ms    QTC Interval 462 ms   Basic Metabolic Panel    Collection Time: 01/10/25  4:38 PM    Specimen: Blood   Result Value Ref Range    Glucose 135 (H) 65 - 99 mg/dL    BUN 26 (H) 8 - 23 mg/dL    Creatinine 1.21 0.76 - 1.27 mg/dL    Sodium 140 136 - 145 mmol/L    Potassium 4.2 3.5 - 5.2 mmol/L    Chloride 103 98 - 107 mmol/L    CO2 21.5 (L) 22.0 - 29.0 mmol/L    Calcium 9.1 8.6 - 10.5 mg/dL    BUN/Creatinine Ratio 21.5 7.0 - 25.0    Anion Gap 15.5 (H) 5.0 - 15.0 mmol/L    eGFR 61.7 >60.0 mL/min/1.73   BNP    Collection Time: 01/10/25  4:38 PM    Specimen: Blood   Result Value Ref Range    proBNP 3,412.0 (H) 0.0 - 1,800.0 pg/mL   Lactic Acid, Plasma    Collection Time: 01/10/25  4:38 PM    Specimen: Blood   Result Value Ref Range    Lactate 3.6 (C) 0.5 - 2.0 mmol/L   Procalcitonin    Collection Time: 01/10/25  4:38 PM    Specimen: Blood   Result Value Ref Range    Procalcitonin 0.07 0.00 - 0.25 ng/mL   High Sensitivity Troponin T    Collection Time: 01/10/25  4:38 PM    Specimen: Blood   Result Value Ref Range    HS Troponin T 115 (C) <22 ng/L   Magnesium     Collection Time: 01/10/25  4:38 PM    Specimen: Blood   Result Value Ref Range    Magnesium 2.7 (H) 1.6 - 2.4 mg/dL   CBC Auto Differential    Collection Time: 01/10/25  4:38 PM    Specimen: Blood   Result Value Ref Range    WBC 16.87 (H) 3.40 - 10.80 10*3/mm3    RBC 5.45 4.14 - 5.80 10*6/mm3    Hemoglobin 17.3 13.0 - 17.7 g/dL    Hematocrit 49.9 37.5 - 51.0 %    MCV 91.6 79.0 - 97.0 fL    MCH 31.7 26.6 - 33.0 pg    MCHC 34.7 31.5 - 35.7 g/dL    RDW 13.7 12.3 - 15.4 %    RDW-SD 45.2 37.0 - 54.0 fl    MPV 9.4 6.0 - 12.0 fL    Platelets 223 140 - 450 10*3/mm3    Neutrophil % 83.3 (H) 42.7 - 76.0 %    Lymphocyte % 7.5 (L) 19.6 - 45.3 %    Monocyte % 7.9 5.0 - 12.0 %    Eosinophil % 0.0 (L) 0.3 - 6.2 %    Basophil % 0.1 0.0 - 1.5 %    Immature Grans % 1.2 (H) 0.0 - 0.5 %    Neutrophils, Absolute 14.05 (H) 1.70 - 7.00 10*3/mm3    Lymphocytes, Absolute 1.26 0.70 - 3.10 10*3/mm3    Monocytes, Absolute 1.34 (H) 0.10 - 0.90 10*3/mm3    Eosinophils, Absolute 0.00 0.00 - 0.40 10*3/mm3    Basophils, Absolute 0.02 0.00 - 0.20 10*3/mm3    Immature Grans, Absolute 0.20 (H) 0.00 - 0.05 10*3/mm3    nRBC 0.0 0.0 - 0.2 /100 WBC   Respiratory Panel PCR w/COVID-19(SARS-CoV-2) ROHINI/RYAN/FOREIGN/PAD/COR/SHADY In-House, NP Swab in [facility], 2 HR TAT - Swab, Nasopharynx    Collection Time: 01/10/25  4:40 PM    Specimen: Nasopharynx; Swab   Result Value Ref Range    ADENOVIRUS, PCR Not Detected Not Detected    Coronavirus 229E Not Detected Not Detected    Coronavirus HKU1 Not Detected Not Detected    Coronavirus NL63 Not Detected Not Detected    Coronavirus OC43 Not Detected Not Detected    COVID19 Not Detected Not Detected - Ref. Range    Human Metapneumovirus Not Detected Not Detected    Human Rhinovirus/Enterovirus Not Detected Not Detected    Influenza A PCR Not Detected Not Detected    Influenza B PCR Not Detected Not Detected    Parainfluenza Virus 1 Not Detected Not Detected    Parainfluenza Virus 2 Not Detected Not Detected     Parainfluenza Virus 3 Not Detected Not Detected    Parainfluenza Virus 4 Not Detected Not Detected    RSV, PCR Not Detected Not Detected    Bordetella pertussis pcr Not Detected Not Detected    Bordetella parapertussis PCR Not Detected Not Detected    Chlamydophila pneumoniae PCR Not Detected Not Detected    Mycoplasma pneumo by PCR Not Detected Not Detected   Blood Gas, Arterial -    Collection Time: 01/10/25  5:45 PM    Specimen: Arterial Blood   Result Value Ref Range    Site Left Radial     Ketan's Test Positive     pH, Arterial 7.481 (H) 7.350 - 7.450 pH units    pCO2, Arterial 28.3 (L) 35.0 - 45.0 mm Hg    pO2, Arterial 53.3 (C) 80.0 - 100.0 mm Hg    HCO3, Arterial 21.2 (L) 22.0 - 28.0 mmol/L    Base Excess, Arterial -0.8 (L) 0.0 - 2.0 mmol/L    O2 Saturation, Arterial 90.1 (L) 92.0 - 98.5 %    A-a DO2 0.1 mmHg    CO2 Content 22.0 (L) 23 - 27 mmol/L    Barometric Pressure for Blood Gas 745.7000 mmHg    Modality HFNC     FIO2 100 %    Rate 22 Breaths/minute    Notified Who Dr Salinas     Read Back Yes     Notified Time      Hemodilution No     Device Comment 95%     PO2/FIO2 53 0 - 500   High Sensitivity Troponin T 1Hr    Collection Time: 01/10/25  5:48 PM    Specimen: Blood   Result Value Ref Range    HS Troponin T 118 (C) <22 ng/L    Troponin T Numeric Delta 3 ng/L    Troponin T % Delta 3 Abnormal if >/= 20% %       Ordered the above labs and independently interpreted results. My findings will be discussed in the medical decision making section below        RADIOLOGY  XR Chest 1 View    Result Date: 1/10/2025  XR CHEST 1 VW-  HISTORY: Male who is 79 years-old, cough, hypoxia  TECHNIQUE: Frontal views of the chest  COMPARISON: 5/20/2024  FINDINGS: Heart, mediastinum and pulmonary vasculature are unremarkable. Mild left basilar atelectasis or infiltrate, follow-up suggested. There may be small left pleural effusion. No pneumothorax. Left hemidiaphragm remains elevated. No acute osseous process.      As described.   This report was finalized on 1/10/2025 5:07 PM by Dr. Beka Bowden M.D on Workstation: [id number]       Ordered the above noted radiological studies.  Independently interpreted by me and my independent review of findings can be found in the ED Course.  See dictation for official radiology interpretation.      PROCEDURES    Critical Care    Performed by: Harsha Salinas MD  Authorized by: Harsha Salinas MD    Critical care provider statement:     Critical care time (minutes):  40    Critical care time was exclusive of:  Separately billable procedures and treating other patients and teaching time    Critical care was necessary to treat or prevent imminent or life-threatening deterioration of the following conditions:  Respiratory failure and circulatory failure    Critical care was time spent personally by me on the following activities:  Development of treatment plan with patient or surrogate, discussions with consultants, evaluation of patient's response to treatment, examination of patient, obtaining history from patient or surrogate, ordering and performing treatments and interventions, ordering and review of laboratory studies, ordering and review of radiographic studies, pulse oximetry, re-evaluation of patient's condition and review of old charts    Care discussed with: admitting provider      Care discussed with comment:  Dr. Hdez, Interventional Cardiology; Dr. Mcdaniels, ICU, admitting; Dr. Bowden, Radiology        EKG - Per my independent interpretation at 1632:    EKG Time: 1630  Rhythm/Rate: Sinus rhythm with rate 96  Normal axis  Normal intervals  Nonspecific T wave abnormalities  No STEMI       Increased rate as compared to September 27, 2022      MEDICATIONS GIVEN IN ER    Medications   heparin 80716 units/250 mL (100 units/mL) in 0.45 % NaCl infusion (18 Units/kg/hr × 80.3 kg Intravenous New Bag 1/10/25 1829)   heparin (porcine) 5000 UNIT/ML injection 3,200-6,400 Units (has no administration in  time range)   ipratropium-albuterol (DUO-NEB) nebulizer solution 3 mL (3 mL Nebulization Given 1/10/25 1646)   iopamidol (ISOVUE-370) 76 % injection 100 mL (95 mL Intravenous Given 1/10/25 1816)   heparin (porcine) 5000 UNIT/ML injection 6,400 Units (6,400 Units Intravenous Given 1/10/25 1830)         PROGRESS, DATA ANALYSIS, CONSULTS, AND MEDICAL DECISION MAKING    Please note that this section constitutes my independent interpretation of clinical data including lab results, radiology, EKG's.  This constitutes my independent professional opinion regarding differential diagnosis and management of this patient.  It may include any factors such as history from outside sources, review of external records, social determinants of health, management of medications, response to those treatments, and discussions with other providers.    Differential Diagnosis and Plan: Initial concern for viral process, commune acquired pneumonia, acute heart failure, arrhythmia, PE, ACS, electrolyte abnormality, anemia, among others.  Plan for labs, chest x-ray, EKG, CT angiogram of the chest, supportive care, respiratory therapy consult, and reevaluation    Additional sources:  - Discussed/ obtained information from independent historians: EMS reported giving an albuterol treatment prior to arrival     - (Social Determinants of Health): None     - Shared decision making:  Patient fully updated on and in agreement with the course and plan moving forward    ED Course as of 01/10/25 1954   Fri Jaret 10, 2025   1654 WBC(!): 16.87 [DC]   1654 Hemoglobin: 17.3 [DC]   1654 Platelets: 223 [DC]   1658 XR Chest 1 View  Per my independent interpretation of the chest x-ray, no evidence of pneumothorax, left lower lung effusion versus pneumonia versus elevated hemidiaphragm [DC]   1713 Lactate(!!): 3.6 [DC]   1713 Magnesium(!): 2.7 [DC]   1713 Glucose(!): 135 [DC]   1713 BUN(!): 26 [DC]   1713 Creatinine: 1.21 [DC]   1713 Sodium: 140 [DC]   1713  Potassium: 4.2 [DC]   1713 Anion Gap(!): 15.5 [DC]   1714 XR Chest 1 View  Radiology report reviewed, mild left basilar atelectasis or infiltrate possible left small pleural effusion, left hemidiaphragm remains elevated [DC]   1718 HS Troponin T(!!): 115 [DC]   1718 proBNP(!): 3,412.0 [DC]   1719 Procalcitonin: 0.07 [DC]   1749 Respiratory Panel PCR w/COVID-19(SARS-CoV-2) ROHINI/RYAN/FOREIGN/PAD/COR/SHADY In-House, NP Swab in UTM/VTM, 2 HR TAT - Swab, Nasopharynx  Pan-negative [DC]   1749 pH, Arterial(!): 7.481 [DC]   1749 pO2, Arterial(!!): 53.3 [DC]   1749 O2 Saturation, Arterial(!): 90.1 [DC]   1749 pCO2, Arterial(!): 28.3 [DC]   1817 CT Angiogram Chest Pulmonary Embolism  Per my independent interpretation of the CT angiogram of the chest, no evidence of pneumothorax, no overtly evident definitive pneumonia patient with saddle PE with bilateral burning [DC]   1819 Heparin ordered, Interventional Cardiology paged [DC]   1825 Discussed with Dr. Hdez, Interventional Cardiology, discussed patient's clinical course and findings today, treatment modalities, she recommends stat echo and wants to see how he does on the heparin given his stability of blood pressure. [DC]   1830 Patient remains with blood pressure 150/97 on recheck after CT scan, he has been fully updated on the findings today and need for hospital stay in the ICU for IV heparin and aggressive oxygenation support.  All questions or concerns addressed [DC]   1831 Patient adds that he did go on a 5-hour car trip in early December on top of his trip out to [place] this fall from which he returned in November. Denies any leg pain or swelling or prior DVT/PE [DC]   1834 Discussed with Dr. Mcdaniels, ICU, discussed patient's, course and findings today, treatment modalities, discussions with interventional cardiology and recommendations, and need for ICU stay. [DC]   1837 Discussed with Dr. Bowden, Radiologist, RV:LV on the CTA is 1.2, possible areas of atelectasis vs  infarct on CTA [DC]      ED Course User Index  [DC] Harsha Salinas MD       Hospitalization Considered?: yes    Orders Placed During This Visit:  Orders Placed This Encounter   Procedures   • Critical Care   • Respiratory Panel PCR w/COVID-19(SARS-CoV-2) ROHINI/RYAN/FOREIGN/PAD/COR/SHADY In-House, NP Swab in UTM/VTM, 2 HR TAT - Swab, Nasopharynx   • XR Chest 1 View   • CT Angiogram Chest Pulmonary Embolism   • Basic Metabolic Panel   • BNP   • Lactic Acid, Plasma   • Procalcitonin   • High Sensitivity Troponin T   • Magnesium   • CBC Auto Differential   • Blood Gas, Arterial -   • STAT Lactic Acid, Reflex   • High Sensitivity Troponin T 1Hr   • Protime-INR   • aPTT   • aPTT   • aPTT   • Adjust Heparin Rate Based on aPTT Using Nomogram   • Verify All Anticoagulant Orders Are Discontinued Upon Initiation of Heparin Protocol (eg Enoxaparin, Fondaparinux, Apixaban, Dabigatran, Edoxaban, or Rivaroxaban)   • RN To Release aPTT Order 6 Hours After Heparin Infusion & 6 Hours After Each Infusion Change Until  2 Consecutive Therapeutic aPTTs Are Achieved   • Interventional Cardiology (on-call MD unless specified)   • Pulmonology (on-call MD unless specified)   • Oxygen Therapy- Heated High Flow Nasal Cannula; Titrate 30-60 LPM Per SpO2; 90 - 95%   • ECG 12 Lead Dyspnea   • ADULT TRANSTHORACIC ECHO COMPLETE W/ CONT IF NECESSARY PER PROTOCOL   • Inpatient Admission   • CBC & Differential   • CBC & Differential       Additional orders considered but not placed:      Independent interpretation of labs, radiology studies, and discussions with consultants: See ED Course        AS OF 18:37 EST VITALS:    BP - 131/92  HR - 95  TEMP - 96.9 °F (36.1 °C)  02 SATS - 93%          DIAGNOSIS  Final diagnoses:   Acute saddle pulmonary embolism, unspecified whether acute cor pulmonale present   Acute hypoxic respiratory failure   History of asthma         DISPOSITION  ED Disposition       ED Disposition   Decision to Admit    Condition   --    Comment    Level of Care: Critical Care [6]   Diagnosis: Acute saddle pulmonary embolism [[id number]]   Admitting Physician: JANICE STACK [[id number]]   Attending Physician: JANICE STACK [[id number]]   Certification: I Certify That Inpatient Hospital Services Are Medically Necessary For Greater Than 2 Midnights                  Please note that portions of this document were completed with a voice recognition program.    Note Disclaimer: At [facility], we believe that sharing information builds trust and better relationships. You are receiving this note because you recently visited [facility]. It is possible you will see health information before a provider has talked with you about it. This kind of information can be easy to misunderstand. To help you fully understand what it means for your health, we urge you to discuss this note with your provider.                       Harsha Salinas MD  01/10/25 1954

## 2025-01-10 NOTE — Clinical Note
A 24 fr sheath was successfully inserted using micropuncture technique with ultrasound guidance into the right femoral vein. 8 fr sheath, 22 fr dilator, then sheath

## 2025-01-11 ENCOUNTER — APPOINTMENT (OUTPATIENT)
Dept: CARDIOLOGY | Facility: HOSPITAL | Age: 80
DRG: 163 | End: 2025-01-11
Payer: MEDICARE

## 2025-01-11 LAB
ANION GAP SERPL CALCULATED.3IONS-SCNC: 12.8 MMOL/L (ref 5–15)
APTT PPP: 104.4 SECONDS (ref 22.7–35.4)
APTT PPP: 111.6 SECONDS (ref 22.7–35.4)
APTT PPP: 90.8 SECONDS (ref 22.7–35.4)
APTT PPP: >200 SECONDS (ref 22.7–35.4)
APTT PPP: >200 SECONDS (ref 22.7–35.4)
BASOPHILS # BLD AUTO: 0.01 10*3/MM3 (ref 0–0.2)
BASOPHILS NFR BLD AUTO: 0.1 % (ref 0–1.5)
BH CV LOW VAS RIGHT DISTAL FEMORAL SPONT: 1
BH CV LOW VAS RIGHT GREATER SAPH BK VESSEL: 1
BH CV LOW VAS RIGHT PERONEAL VESSEL: 1
BH CV LOW VAS RIGHT POPLITEAL SPONT: 1
BH CV LOWER VASCULAR LEFT COMMON FEMORAL AUGMENT: NORMAL
BH CV LOWER VASCULAR LEFT COMMON FEMORAL COMPETENT: NORMAL
BH CV LOWER VASCULAR LEFT COMMON FEMORAL COMPRESS: NORMAL
BH CV LOWER VASCULAR LEFT COMMON FEMORAL PHASIC: NORMAL
BH CV LOWER VASCULAR LEFT COMMON FEMORAL SPONT: NORMAL
BH CV LOWER VASCULAR LEFT DISTAL FEMORAL COMPRESS: NORMAL
BH CV LOWER VASCULAR LEFT GASTRONEMIUS COMPRESS: NORMAL
BH CV LOWER VASCULAR LEFT GREATER SAPH AK COMPRESS: NORMAL
BH CV LOWER VASCULAR LEFT GREATER SAPH BK COMPRESS: NORMAL
BH CV LOWER VASCULAR LEFT LESSER SAPH COMPRESS: NORMAL
BH CV LOWER VASCULAR LEFT MID FEMORAL AUGMENT: NORMAL
BH CV LOWER VASCULAR LEFT MID FEMORAL COMPETENT: NORMAL
BH CV LOWER VASCULAR LEFT MID FEMORAL COMPRESS: NORMAL
BH CV LOWER VASCULAR LEFT MID FEMORAL PHASIC: NORMAL
BH CV LOWER VASCULAR LEFT MID FEMORAL SPONT: NORMAL
BH CV LOWER VASCULAR LEFT PERONEAL COMPRESS: NORMAL
BH CV LOWER VASCULAR LEFT POPLITEAL AUGMENT: NORMAL
BH CV LOWER VASCULAR LEFT POPLITEAL COMPETENT: NORMAL
BH CV LOWER VASCULAR LEFT POPLITEAL COMPRESS: NORMAL
BH CV LOWER VASCULAR LEFT POPLITEAL PHASIC: NORMAL
BH CV LOWER VASCULAR LEFT POPLITEAL SPONT: NORMAL
BH CV LOWER VASCULAR LEFT POSTERIOR TIBIAL COMPRESS: NORMAL
BH CV LOWER VASCULAR LEFT PROFUNDA FEMORAL COMPRESS: NORMAL
BH CV LOWER VASCULAR LEFT PROXIMAL FEMORAL COMPRESS: NORMAL
BH CV LOWER VASCULAR LEFT SAPHENOFEMORAL JUNCTION COMPRESS: NORMAL
BH CV LOWER VASCULAR RIGHT COMMON FEMORAL AUGMENT: NORMAL
BH CV LOWER VASCULAR RIGHT COMMON FEMORAL COMPETENT: NORMAL
BH CV LOWER VASCULAR RIGHT COMMON FEMORAL COMPRESS: NORMAL
BH CV LOWER VASCULAR RIGHT COMMON FEMORAL PHASIC: NORMAL
BH CV LOWER VASCULAR RIGHT COMMON FEMORAL SPONT: NORMAL
BH CV LOWER VASCULAR RIGHT DISTAL FEMORAL COMPRESS: NORMAL
BH CV LOWER VASCULAR RIGHT DISTAL FEMORAL PHASIC: NORMAL
BH CV LOWER VASCULAR RIGHT DISTAL FEMORAL SPONT: NORMAL
BH CV LOWER VASCULAR RIGHT DISTAL FEMORAL THROMBUS: NORMAL
BH CV LOWER VASCULAR RIGHT GASTRONEMIUS COMPRESS: NORMAL
BH CV LOWER VASCULAR RIGHT GREATER SAPH AK COMPRESS: NORMAL
BH CV LOWER VASCULAR RIGHT GREATER SAPH BK COMPRESS: NORMAL
BH CV LOWER VASCULAR RIGHT GREATER SAPH BK THROMBUS: NORMAL
BH CV LOWER VASCULAR RIGHT LESSER SAPH COMPRESS: NORMAL
BH CV LOWER VASCULAR RIGHT MID FEMORAL AUGMENT: NORMAL
BH CV LOWER VASCULAR RIGHT MID FEMORAL COMPETENT: NORMAL
BH CV LOWER VASCULAR RIGHT MID FEMORAL COMPRESS: NORMAL
BH CV LOWER VASCULAR RIGHT MID FEMORAL PHASIC: NORMAL
BH CV LOWER VASCULAR RIGHT MID FEMORAL SPONT: NORMAL
BH CV LOWER VASCULAR RIGHT PERONEAL COMPRESS: NORMAL
BH CV LOWER VASCULAR RIGHT PERONEAL THROMBUS: NORMAL
BH CV LOWER VASCULAR RIGHT POPLITEAL COMPRESS: NORMAL
BH CV LOWER VASCULAR RIGHT POPLITEAL PHASIC: NORMAL
BH CV LOWER VASCULAR RIGHT POPLITEAL SPONT: NORMAL
BH CV LOWER VASCULAR RIGHT POPLITEAL THROMBUS: NORMAL
BH CV LOWER VASCULAR RIGHT POSTERIOR TIBIAL COMPRESS: NORMAL
BH CV LOWER VASCULAR RIGHT PROFUNDA FEMORAL COMPRESS: NORMAL
BH CV LOWER VASCULAR RIGHT PROXIMAL FEMORAL COMPRESS: NORMAL
BH CV LOWER VASCULAR RIGHT SAPHENOFEMORAL JUNCTION COMPRESS: NORMAL
BH CV VAS PRELIMINARY FINDINGS SCRIPTING: 1
BUN SERPL-MCNC: 23 MG/DL (ref 8–23)
BUN/CREAT SERPL: 25.6 (ref 7–25)
CALCIUM SPEC-SCNC: 8.2 MG/DL (ref 8.6–10.5)
CHLORIDE SERPL-SCNC: 108 MMOL/L (ref 98–107)
CO2 SERPL-SCNC: 19.2 MMOL/L (ref 22–29)
CREAT SERPL-MCNC: 0.9 MG/DL (ref 0.76–1.27)
D-LACTATE SERPL-SCNC: 1.6 MMOL/L (ref 0.5–2)
DEPRECATED RDW RBC AUTO: 45.9 FL (ref 37–54)
EGFRCR SERPLBLD CKD-EPI 2021: 88 ML/MIN/1.73
EOSINOPHIL # BLD AUTO: 0.01 10*3/MM3 (ref 0–0.4)
EOSINOPHIL NFR BLD AUTO: 0.1 % (ref 0.3–6.2)
ERYTHROCYTE [DISTWIDTH] IN BLOOD BY AUTOMATED COUNT: 13.7 % (ref 12.3–15.4)
GLUCOSE BLDC GLUCOMTR-MCNC: 110 MG/DL (ref 70–130)
GLUCOSE BLDC GLUCOMTR-MCNC: 115 MG/DL (ref 70–130)
GLUCOSE BLDC GLUCOMTR-MCNC: 119 MG/DL (ref 70–130)
GLUCOSE BLDC GLUCOMTR-MCNC: 90 MG/DL (ref 70–130)
GLUCOSE BLDC GLUCOMTR-MCNC: 91 MG/DL (ref 70–130)
GLUCOSE SERPL-MCNC: 116 MG/DL (ref 65–99)
HCT VFR BLD AUTO: 44.6 % (ref 37.5–51)
HGB BLD-MCNC: 15.3 G/DL (ref 13–17.7)
IMM GRANULOCYTES # BLD AUTO: 0.12 10*3/MM3 (ref 0–0.05)
IMM GRANULOCYTES NFR BLD AUTO: 1 % (ref 0–0.5)
LYMPHOCYTES # BLD AUTO: 1.47 10*3/MM3 (ref 0.7–3.1)
LYMPHOCYTES NFR BLD AUTO: 12.4 % (ref 19.6–45.3)
MAGNESIUM SERPL-MCNC: 2.6 MG/DL (ref 1.6–2.4)
MCH RBC QN AUTO: 31.8 PG (ref 26.6–33)
MCHC RBC AUTO-ENTMCNC: 34.3 G/DL (ref 31.5–35.7)
MCV RBC AUTO: 92.7 FL (ref 79–97)
MONOCYTES # BLD AUTO: 1.01 10*3/MM3 (ref 0.1–0.9)
MONOCYTES NFR BLD AUTO: 8.5 % (ref 5–12)
NEUTROPHILS NFR BLD AUTO: 77.9 % (ref 42.7–76)
NEUTROPHILS NFR BLD AUTO: 9.22 10*3/MM3 (ref 1.7–7)
NRBC BLD AUTO-RTO: 0 /100 WBC (ref 0–0.2)
PHOSPHATE SERPL-MCNC: 3.9 MG/DL (ref 2.5–4.5)
PLATELET # BLD AUTO: 185 10*3/MM3 (ref 140–450)
PMV BLD AUTO: 9.8 FL (ref 6–12)
POTASSIUM SERPL-SCNC: 4.5 MMOL/L (ref 3.5–5.2)
RBC # BLD AUTO: 4.81 10*6/MM3 (ref 4.14–5.8)
SODIUM SERPL-SCNC: 140 MMOL/L (ref 136–145)
WBC NRBC COR # BLD AUTO: 11.84 10*3/MM3 (ref 3.4–10.8)

## 2025-01-11 PROCEDURE — 83605 ASSAY OF LACTIC ACID: CPT | Performed by: EMERGENCY MEDICINE

## 2025-01-11 PROCEDURE — C1894 INTRO/SHEATH, NON-LASER: HCPCS | Performed by: INTERNAL MEDICINE

## 2025-01-11 PROCEDURE — 85014 HEMATOCRIT: CPT

## 2025-01-11 PROCEDURE — C1757 CATH, THROMBECTOMY/EMBOLECT: HCPCS | Performed by: INTERNAL MEDICINE

## 2025-01-11 PROCEDURE — 94761 N-INVAS EAR/PLS OXIMETRY MLT: CPT

## 2025-01-11 PROCEDURE — 25010000002 FENTANYL CITRATE (PF) 50 MCG/ML SOLUTION: Performed by: INTERNAL MEDICINE

## 2025-01-11 PROCEDURE — B31U1ZZ FLUOROSCOPY OF PULMONARY TRUNK USING LOW OSMOLAR CONTRAST: ICD-10-PCS | Performed by: INTERNAL MEDICINE

## 2025-01-11 PROCEDURE — C1769 GUIDE WIRE: HCPCS | Performed by: INTERNAL MEDICINE

## 2025-01-11 PROCEDURE — 99222 1ST HOSP IP/OBS MODERATE 55: CPT | Performed by: INTERNAL MEDICINE

## 2025-01-11 PROCEDURE — 94799 UNLISTED PULMONARY SVC/PX: CPT

## 2025-01-11 PROCEDURE — 85730 THROMBOPLASTIN TIME PARTIAL: CPT | Performed by: EMERGENCY MEDICINE

## 2025-01-11 PROCEDURE — 37185 PRIM ART M-THRMBC SBSQ VSL: CPT | Performed by: INTERNAL MEDICINE

## 2025-01-11 PROCEDURE — 02CR3ZZ EXTIRPATION OF MATTER FROM LEFT PULMONARY ARTERY, PERCUTANEOUS APPROACH: ICD-10-PCS | Performed by: INTERNAL MEDICINE

## 2025-01-11 PROCEDURE — 94660 CPAP INITIATION&MGMT: CPT

## 2025-01-11 PROCEDURE — 25010000002 MIDAZOLAM PER 1 MG: Performed by: INTERNAL MEDICINE

## 2025-01-11 PROCEDURE — 93970 EXTREMITY STUDY: CPT

## 2025-01-11 PROCEDURE — 36014 PLACE CATHETER IN ARTERY: CPT | Performed by: INTERNAL MEDICINE

## 2025-01-11 PROCEDURE — 25010000002 HEPARIN (PORCINE) 25000-0.45 UT/250ML-% SOLUTION: Performed by: HOSPITALIST

## 2025-01-11 PROCEDURE — 85730 THROMBOPLASTIN TIME PARTIAL: CPT | Performed by: INTERNAL MEDICINE

## 2025-01-11 PROCEDURE — 85018 HEMOGLOBIN: CPT

## 2025-01-11 PROCEDURE — 82948 REAGENT STRIP/BLOOD GLUCOSE: CPT

## 2025-01-11 PROCEDURE — 25010000002 LIDOCAINE 2% SOLUTION: Performed by: INTERNAL MEDICINE

## 2025-01-11 PROCEDURE — 37184 PRIM ART M-THRMBC 1ST VSL: CPT | Performed by: INTERNAL MEDICINE

## 2025-01-11 PROCEDURE — 02CQ3ZZ EXTIRPATION OF MATTER FROM RIGHT PULMONARY ARTERY, PERCUTANEOUS APPROACH: ICD-10-PCS | Performed by: INTERNAL MEDICINE

## 2025-01-11 PROCEDURE — 94664 DEMO&/EVAL PT USE INHALER: CPT

## 2025-01-11 PROCEDURE — 25510000001 IOPAMIDOL PER 1 ML: Performed by: INTERNAL MEDICINE

## 2025-01-11 PROCEDURE — 85730 THROMBOPLASTIN TIME PARTIAL: CPT | Performed by: STUDENT IN AN ORGANIZED HEALTH CARE EDUCATION/TRAINING PROGRAM

## 2025-01-11 PROCEDURE — 83735 ASSAY OF MAGNESIUM: CPT | Performed by: STUDENT IN AN ORGANIZED HEALTH CARE EDUCATION/TRAINING PROGRAM

## 2025-01-11 PROCEDURE — 02CP3ZZ EXTIRPATION OF MATTER FROM PULMONARY TRUNK, PERCUTANEOUS APPROACH: ICD-10-PCS | Performed by: INTERNAL MEDICINE

## 2025-01-11 PROCEDURE — 25010000002 HEPARIN (PORCINE) PER 1000 UNITS: Performed by: INTERNAL MEDICINE

## 2025-01-11 PROCEDURE — 4A023N6 MEASUREMENT OF CARDIAC SAMPLING AND PRESSURE, RIGHT HEART, PERCUTANEOUS APPROACH: ICD-10-PCS | Performed by: INTERNAL MEDICINE

## 2025-01-11 PROCEDURE — 36015 PLACE CATHETER IN ARTERY: CPT | Performed by: INTERNAL MEDICINE

## 2025-01-11 PROCEDURE — 85025 COMPLETE CBC W/AUTO DIFF WBC: CPT | Performed by: EMERGENCY MEDICINE

## 2025-01-11 PROCEDURE — 85347 COAGULATION TIME ACTIVATED: CPT

## 2025-01-11 PROCEDURE — B31T1ZZ FLUOROSCOPY OF LEFT PULMONARY ARTERY USING LOW OSMOLAR CONTRAST: ICD-10-PCS | Performed by: INTERNAL MEDICINE

## 2025-01-11 PROCEDURE — 85730 THROMBOPLASTIN TIME PARTIAL: CPT | Performed by: HOSPITALIST

## 2025-01-11 PROCEDURE — 82810 BLOOD GASES O2 SAT ONLY: CPT

## 2025-01-11 PROCEDURE — 93970 EXTREMITY STUDY: CPT | Performed by: SURGERY

## 2025-01-11 PROCEDURE — 80048 BASIC METABOLIC PNL TOTAL CA: CPT | Performed by: STUDENT IN AN ORGANIZED HEALTH CARE EDUCATION/TRAINING PROGRAM

## 2025-01-11 PROCEDURE — B31S1ZZ FLUOROSCOPY OF RIGHT PULMONARY ARTERY USING LOW OSMOLAR CONTRAST: ICD-10-PCS | Performed by: INTERNAL MEDICINE

## 2025-01-11 PROCEDURE — 84100 ASSAY OF PHOSPHORUS: CPT | Performed by: STUDENT IN AN ORGANIZED HEALTH CARE EDUCATION/TRAINING PROGRAM

## 2025-01-11 RX ORDER — MIDAZOLAM HYDROCHLORIDE 1 MG/ML
INJECTION, SOLUTION INTRAMUSCULAR; INTRAVENOUS
Status: DISCONTINUED | OUTPATIENT
Start: 2025-01-11 | End: 2025-01-11 | Stop reason: HOSPADM

## 2025-01-11 RX ORDER — NALOXONE HCL 0.4 MG/ML
0.4 VIAL (ML) INJECTION
Status: DISCONTINUED | OUTPATIENT
Start: 2025-01-11 | End: 2025-01-19 | Stop reason: HOSPADM

## 2025-01-11 RX ORDER — MORPHINE SULFATE 2 MG/ML
1 INJECTION, SOLUTION INTRAMUSCULAR; INTRAVENOUS EVERY 4 HOURS PRN
Status: ACTIVE | OUTPATIENT
Start: 2025-01-11 | End: 2025-01-16

## 2025-01-11 RX ORDER — LIDOCAINE HYDROCHLORIDE 20 MG/ML
INJECTION, SOLUTION INFILTRATION; PERINEURAL
Status: DISCONTINUED | OUTPATIENT
Start: 2025-01-11 | End: 2025-01-11 | Stop reason: HOSPADM

## 2025-01-11 RX ORDER — IOPAMIDOL 755 MG/ML
INJECTION, SOLUTION INTRAVASCULAR
Status: DISCONTINUED | OUTPATIENT
Start: 2025-01-11 | End: 2025-01-11 | Stop reason: HOSPADM

## 2025-01-11 RX ORDER — HYDROCODONE BITARTRATE AND ACETAMINOPHEN 5; 325 MG/1; MG/1
1 TABLET ORAL EVERY 4 HOURS PRN
Status: DISCONTINUED | OUTPATIENT
Start: 2025-01-11 | End: 2025-01-19 | Stop reason: HOSPADM

## 2025-01-11 RX ORDER — MONTELUKAST SODIUM 10 MG/1
10 TABLET ORAL DAILY
Status: DISCONTINUED | OUTPATIENT
Start: 2025-01-11 | End: 2025-01-13

## 2025-01-11 RX ORDER — FENTANYL CITRATE 50 UG/ML
INJECTION, SOLUTION INTRAMUSCULAR; INTRAVENOUS
Status: DISCONTINUED | OUTPATIENT
Start: 2025-01-11 | End: 2025-01-11 | Stop reason: HOSPADM

## 2025-01-11 RX ORDER — HEPARIN SODIUM 1000 [USP'U]/ML
INJECTION, SOLUTION INTRAVENOUS; SUBCUTANEOUS
Status: DISCONTINUED | OUTPATIENT
Start: 2025-01-11 | End: 2025-01-11 | Stop reason: HOSPADM

## 2025-01-11 RX ORDER — ONDANSETRON 4 MG/1
4 TABLET, ORALLY DISINTEGRATING ORAL EVERY 6 HOURS PRN
Status: DISCONTINUED | OUTPATIENT
Start: 2025-01-11 | End: 2025-01-19 | Stop reason: HOSPADM

## 2025-01-11 RX ORDER — ONDANSETRON 2 MG/ML
4 INJECTION INTRAMUSCULAR; INTRAVENOUS EVERY 6 HOURS PRN
Status: DISCONTINUED | OUTPATIENT
Start: 2025-01-11 | End: 2025-01-19 | Stop reason: HOSPADM

## 2025-01-11 RX ORDER — ACETAMINOPHEN 325 MG/1
650 TABLET ORAL EVERY 4 HOURS PRN
Status: DISCONTINUED | OUTPATIENT
Start: 2025-01-11 | End: 2025-01-19 | Stop reason: HOSPADM

## 2025-01-11 RX ADMIN — Medication 10 ML: at 21:00

## 2025-01-11 RX ADMIN — IPRATROPIUM BROMIDE AND ALBUTEROL SULFATE 3 ML: 2.5; .5 SOLUTION RESPIRATORY (INHALATION) at 12:36

## 2025-01-11 RX ADMIN — MONTELUKAST 10 MG: 10 TABLET, FILM COATED ORAL at 13:34

## 2025-01-11 RX ADMIN — MUPIROCIN 1 APPLICATION: 20 OINTMENT TOPICAL at 10:46

## 2025-01-11 RX ADMIN — IPRATROPIUM BROMIDE AND ALBUTEROL SULFATE 3 ML: 2.5; .5 SOLUTION RESPIRATORY (INHALATION) at 14:47

## 2025-01-11 RX ADMIN — IPRATROPIUM BROMIDE AND ALBUTEROL SULFATE 3 ML: 2.5; .5 SOLUTION RESPIRATORY (INHALATION) at 07:23

## 2025-01-11 RX ADMIN — HEPARIN SODIUM 13 UNITS/KG/HR: 10000 INJECTION, SOLUTION INTRAVENOUS at 16:51

## 2025-01-11 RX ADMIN — Medication 10 ML: at 10:47

## 2025-01-11 NOTE — PROGRESS NOTES
Called by ER earlier this evening regarding patient.  Patient with significant O2 requirement to keep saturations in low 90s.  However hemodynamically stable.  Heparin had not started at that time.  I have since reviewed CT scan showing saddle pulmonary embolus and RV enlargement.  Echocardiogram performed tonight also reviewed showing right ventricular enlargement and dysfunction and no evidence of thrombus in transit.  Would recommend continuing anticoagulation for now.  If remains relatively stable overnight will evaluate and discuss possibility of intervention tomorrow.  Obviously will reconsider if clinical status declines in the interim.

## 2025-01-11 NOTE — PROGRESS NOTES
"  Intensive Care Unit Daily Progress Note.   [facility] INTENSIVE CARE  1/11/2025    Patient Name:  Omar Choudhary  MRN:  [id number]  YOB: 1945  Age: 79 y.o.  Sex: male         Reason for Admission / Chief Complaint:  Saddle pulmonary embolism, hypoxic respiratory failure    Hospital Course:   79-year-old male who presented to the hospital for shortness of breath and found to have saddle pulmonary embolism and hypoxic respiratory failure necessitating Optiflow.    Interval History:  Admitted overnight for hypoxic respiratory failure and saddle PE  ABG with respiratory alkalosis  BNP elevated to 3400  Optiflow 60 L, 100%  States that shortness of breath developed on Wednesday when he was out cleaning the snow from his car  This past year has been eventful with trauma.  Initially had a compression fracture in his tibia that was found in September.  Subsequently had a fall and rotator cuff tear and clavicle fracture in November.  In [place] time he had a long car ride to [place] with 10 hours in the car over the course of a day and a half.  Breathing is improved in the hospitalization  Was saturating in the 50s at home    Physical Exam:  /83   Pulse 88   Temp 98.2 °F (36.8 °C) (Axillary)   Resp 22   Ht 167.6 cm (66\")   Wt 78.8 kg (173 lb 11.6 oz)   SpO2 92%   BMI 28.04 kg/m²   Body mass index is 28.04 kg/m².    Intake/Output    Intake/Output Summary (Last 24 hours) at 1/11/2025 1203  Last data filed at 1/11/2025 0600  Gross per 24 hour   Intake 240 ml   Output 700 ml   Net -460 ml     General: Alert, nontoxic, NAD, mild distress  HEENT: NC/AT, EOMI, MMM  Neck: Supple, trachea midline  Cardiac: RRR, no murmur, gallops, rubs  Pulmonary: Clear to auscultation bilaterally, no adventitious breath sounds, normal respiratory effort  GI: Soft, non-tender, non-distended, normal bowel sounds  Extremities: Warm, well perfused, no LE edema  Skin: no visible rash  Neuro: CN II - XII " grossly intact  Psychiatry: Normal mood and affect    Data Review:  Notable Labs:  Results from last 7 days   Lab Units 01/11/25  0244 01/10/25  1638   WBC 10*3/mm3 11.84* 16.87*   HEMOGLOBIN g/dL 15.3 17.3   PLATELETS 10*3/mm3 185 223     Results from last 7 days   Lab Units 01/11/25  0244 01/10/25  1638   SODIUM mmol/L 140 140   POTASSIUM mmol/L 4.5 4.2   CHLORIDE mmol/L 108* 103   CO2 mmol/L 19.2* 21.5*   BUN mg/dL 23 26*   CREATININE mg/dL 0.90 1.21   GLUCOSE mg/dL 116* 135*   CALCIUM mg/dL 8.2* 9.1   MAGNESIUM mg/dL 2.6* 2.7*   PHOSPHORUS mg/dL 3.9  --    Estimated Creatinine Clearance: 67.9 mL/min (by C-G formula based on SCr of 0.9 mg/dL).    Results from last 7 days   Lab Units 01/11/25  0244 01/11/25  0019 01/10/25  2226 01/10/25  1931 01/10/25  1638   PROCALCITONIN ng/mL  --   --   --   --  0.07   LACTATE mmol/L  --  1.6 2.6* 3.6* 3.6*   PLATELETS 10*3/mm3 185  --   --   --  223       Results from last 7 days   Lab Units 01/10/25  2044 01/10/25  1745   PH, ARTERIAL pH units 7.510* 7.481*   PCO2, ARTERIAL mm Hg 26.8* 28.3*   PO2 ART mm Hg 53.8* 53.3*   HCO3 ART mmol/L 21.4* 21.2*       Result Review:  I have personally reviewed the results from the time of this admission to 1/11/2025 12:03 EST and agree with these findings:  [x]  Laboratory list / accordion  [x]  Microbiology  [x]  Radiology  [x]  EKG/Telemetry   [x]  Cardiology/Vascular   []  Pathology  [x]  Old records  []  Other:    Imaging:  Reviewed chest images personally from past 3 days    ASSESSMENT  /  PLAN:    Acute hypoxic respiratory failure  Acute saddle pulmonary embolism  Right ventricular failure  Lactic acidosis  Leukocytosis  Hypertension  Chronic low back pain  Former smoker, quit 1992  Scoliosis  Hyperlipidemia  ADHD  Asthma -follows with Dr. Azmi Draw  Paralyzed left hemidiaphragm  Obstructive sleep apnea    -Patient presented to the hospital with shortness of breath and found to have saddle PE and hypoxic respiratory failure.  -CT  angiogram demonstrated large saddle PE with RV/LV ratio of 1.2 indicative of right heart strain  -Echocardiogram with severe RV dilatation and RV failure with decreased function.  -Lower extremity duplex with acute right lower extremity DVT in the distal femoral, popliteal, peroneal.  Chronic right lower extremity superficial thrombophlebitis.  -On heparin drip for anticoagulation  -Interventional cardiology following, discussed with patient regarding study randomizing patients to embolectomy versus heparin.  Patient is electing for intervention  -Hold home antihypertensives at this time  -Supplemental oxygen, on Optiflow 60 L, 100%.  Wean for SpO2 goal greater than 90%  -CPAP at night  -Patient is in critical condition due to large pulmonary embolism, hypoxic respiratory failure, and RV failure.  High risk for mortality with guarded prognosis.    All issues new to me today.  Prior hospital course, labs and imaging reviewed.    GI prophylaxis: Not indicated  DVT prophylaxis: Heparin drip  Archer catheter: No  Bowel regimen: Ordered  Diet: N.p.o.    Discharge: Continue to monitor in the ICU due to critical status    Critical Care Time: 36 minutes    CODE STATUS: Full code    Juan Gomez MD  [facility]  Pulmonary and Critical Care Medicine, Interventional Pulmonology    Parts of this note may be an electronic transcription/translation of spoken language to printed text using the Dragon dictation system.

## 2025-01-11 NOTE — CONSULTS
[facility] Consult    Patient Name: Omar Choudhary  Age/Sex: 79 y.o. male  : 1945  MRN: [id number]    Date of Admission: 1/10/2025  Date of Encounter Visit: 25  Encounter Provider: Nancy Hdez MD  Referring Provider: Desean Mcdaniels MD  Place of Service: [facility] CARDIOLOGY  Patient Care Team:  Dennis Kwong MD as PCP - General (Internal Medicine)  Arslan Carlson PA-C as Physician Assistant (Physician Assistant)  Ang Peña APRN as Nurse Practitioner (Family Medicine)  Benny Ordaz MD as Surgeon (Orthopedic Surgery)    Subjective:     Consulted for: Pulmonary embolism    Chief Complaint: Shortness of breath    History of Present Illness:  Omar Choudhary is a 79 y.o. male with asthma, paralyzed hemidiaphragm, osteoarthritis, hypertension, who was brought to the emergency room for severely worsening shortness of breath.    The patient reports that he began experiencing issues with shortness of breath about 3 days prior to his admission.  He first noted it when he was out attempting to clean the snow off of his car.  He noted that he became more out of breath than expected and that he did not really fully to recover after that.  He continue experience shortness of breath with routine activities after that but nothing this severe until the day of admission.  On the day he reports that he walked down the stairs and became so severely out of breath that he opted to call EMS.  He otherwise denies any chest pain, near-syncope or syncope, or lower extremity swelling.    He reports that he drove 5 hours and back to [place] last month.  He otherwise is limited by various joint issues but this has not severely impaired his activity.    On arrival EMS noted that his oxygen saturations were down in the 50s on room air.  Following his arrival to the hospital he was placed on high flow nasal cannula and then eventually  placed on BiPAP when he was admitted to the ICU.  Otherwise heart rates were noted to be in the 90s.  Blood pressures were elevated.      His blood work showed a white blood count of 16.87, troponin of 115, lactic acid of 3.6, normal procalcitonin, proBNP of 3412, normal BNP, normal respiratory viral panel.  Continue to trend the chest was performed showing bilateral pulmonary embolism including a saddle embolism with evidence of elevated RV to LV ratio of 1.2.  An echocardiogram was performed while he was in the ER which did show evidence of severe right ventricular enlargement and dysfunction but no significant evidence of pulmonary hypertension although it was felt that this may be underestimated.  Left ventricular function was normal and there was no evidence of thrombus in transit.  He was started on heparin infusion and admitted to the ICU.    The patient reports that he was feeling better since he was started on BiPAP.  Saturating in the high 90s on BiPAP during our interview.  He continues to deny any chest pain, lightheadedness or palpitations.  He is not able to specifically tell me of any family history of clotting issues but does report a family history of heart disease.    Past Medical History:  Past Medical History:   Diagnosis Date    ADHD (attention deficit hyperactivity disorder) 2021    Hard to focus on tasks and follow through.    Allergic     Arthritis     Asthma     Carpal tunnel syndrome     no pain    Cataract     Depression     Erectile dysfunction     2018 at 73 years old    Family history of malignant neoplasm of breast 09/26/2019    Hyperlipidemia 09/26/2019    Hypertension 03/01/2012    Leg pain, right     Low back pain     Neuropathy     feet    Osteopenia     Poor balance     Reactive airway disease 01/15/2016    Scoliosis 1960    Shortness of breath     Sleep apnea     Substance abuse 1966    Alcoholic       Past Surgical History:   Procedure Laterality Date    CARDIAC CATHETERIZATION   1992    COLONOSCOPY  2013    No polyps, slight diverticulitis    EYE SURGERY  2016    KNEE ARTHROSCOPY W/ ACL RECONSTRUCTION Right 2019    LUMBAR FUSION Bilateral 10/05/2022    Procedure: DAY 2 LUMBAR LAMINECTOMY TRANSFORAMINAL LUMBAR INTERBODY FUSION L2,L3,L4 WITH NEURO ROBOT;  Surgeon: John Do MD;  Location: [facility] MAIN OR;  Service: Robotics - Neuro;  Laterality: Bilateral;    LUMBAR FUSION N/A 10/04/2022    Procedure: DAY 1 LUMBAR LATERAL INTERBODY FUSION WITH NEURO ROBOT L2, L3.L4;  Surgeon: John Do MD;  Location: [facility] MAIN OR;  Service: Robotics - Neuro;  Laterality: N/A;  left side approach    MOUTH SURGERY      SPINE SURGERY  10/4&5/2022    Dr. Jonh Do, [facility]       Home Medications:   Facility-Administered Medications Prior to Admission   Medication Dose Route Frequency Provider Last Rate Last Admin    Tezepelumab-ekko solution auto-injector 210 mg  210 mg Subcutaneous Q30 Days Jerrod Lagunas MD         Medications Prior to Admission   Medication Sig Dispense Refill Last Dose/Taking    acetaminophen (TYLENOL) 500 MG tablet Take 1 tablet by mouth Every 6 (Six) Hours As Needed for Mild Pain.       albuterol sulfate  (90 Base) MCG/ACT inhaler Inhale 2 puffs Every 4 (Four) Hours As Needed for Wheezing or Shortness of Air. 18 g 0     azithromycin (ZITHROMAX) 250 MG tablet TAKE 2 TABLETS BY MOUTH TODAY, THEN TAKE 1 TABLET DAILY FOR 4 DAYS AS DIRECTED       B Complex Vitamins (VITAMIN B COMPLEX) capsule capsule Take 1 capsule by mouth Daily. LD 9-27       budesonide-formoterol (SYMBICORT) 160-4.5 MCG/ACT inhaler Inhale 2 puffs 2 (Two) Times a Day. 10.2 g 0     Calcium Carb-Cholecalciferol (Oyster Shell Calcium w/D) 500-5 MG-MCG tablet TAKE TWO TABLETS BY MOUTH DAILY 180 tablet 0     clonazePAM (KlonoPIN) 0.5 MG tablet TAKE ONE TABLET BY MOUTH TWICE DAILY AS NEEDED FOR ANXIETY 30 tablet 2     coenzyme Q10 100 MG capsule Take 1 capsule by mouth Daily.        cyclobenzaprine (FLEXERIL) 10 MG tablet Take 1 tablet by mouth 3 (Three) Times a Day As Needed for Muscle Spasms. 60 tablet 2     dexAMETHasone (DECADRON) 1.5 MG tablet Take 1 tablet by mouth Take As Directed. Day 1 take 3AM,3PM ,Day 2 take 3 AM,2 PM,Day 3 take 3 AM,2 PM,Day 4 take 2 AM,2 PM  ,Day 5 take 2 AM,2 PM,Day 6 take 2 AM,1 PM,Day 7 take 2 AM,1 PM, Day 8 take 1 AM,1 PM,Day 9 take 1 AM,1 PM,Day 10 take 1 AM 35 tablet 0     gabapentin (NEURONTIN) 100 MG capsule Take 3 capsules by mouth Daily.       gabapentin (NEURONTIN) 300 MG capsule TAKE 1 CAPSULE BY MOUTH IN THE MORING, 1 CAPSULE IN THE AFTERNOON AND 2 CAPSULES AT NIGHT 120 capsule 2     losartan (COZAAR) 50 MG tablet TAKE 1 TABLET BY MOUTH DAILY 90 tablet 0     meloxicam (MOBIC) 7.5 MG tablet TAKE 1 TABLET BY MOUTH ONCE DAILY 30 tablet 0     montelukast (SINGULAIR) 10 MG tablet Take 1 tablet by mouth Daily. 90 tablet 0     Multiple Vitamins-Minerals (EMERGEN-C BLUE PO) Take 1 tablet by mouth Daily. LD 9-27       Omega-3 Fatty Acids (FISH OIL) 1000 MG capsule capsule Take 1 capsule by mouth Daily With Breakfast. LD 9-27       promethazine-dextromethorphan (PROMETHAZINE-DM) 6.25-15 MG/5ML syrup Take 5 mL by mouth.       S-Adenosylmethionine (YURY-e) 400 MG tablet Take 400 mg by mouth Daily.       sildenafil (REVATIO) 20 MG tablet take 1 to 2 tablets by mouth as needed for erictile dysfunction 50 tablet 0     theophylline (THEODUR) 300 MG 12 hr tablet Take 1 tablet by mouth Daily. 90 tablet 3     traZODone (DESYREL) 50 MG tablet Take 1-2 tablets by mouth Every Night. 180 tablet 3     triamcinolone (KENALOG) 0.025 % cream Apply 1 Application topically to the appropriate area as directed 2 (Two) Times a Day.       Turmeric 400 MG capsule Take 1 capsule by mouth Daily.          Allergies:  Allergies   Allergen Reactions    Statins Myalgia     Other reaction(s): muscle soreness       Past Social History:  Social History     Socioeconomic History    Marital  status: Significant Other   Tobacco Use    Smoking status: Former     Current packs/day: 0.00     Average packs/day: 0.5 packs/day for 26.0 years (13.0 ttl pk-yrs)     Types: Cigarettes     Start date: 1966     Quit date: 1992     Years since quittin.0     Passive exposure: Past    Smokeless tobacco: Never   Vaping Use    Vaping status: Never Used   Substance and Sexual Activity    Alcohol use: Not Currently     Alcohol/week: 1.0 standard drink of alcohol     Types: 1 Glasses of wine per week    Drug use: Never     Types: Marijuana     Comment: CBD gummies- stop now for surgery    Sexual activity: Yes     Partners: Female     Birth control/protection: Post-menopausal     Comment: We're both >70 years old       Past Family History:  Family History   Problem Relation Age of Onset    Heart disease Mother     Hypertension Mother     Breast cancer Mother     Stroke Mother         Several TIAs    Alcohol abuse Mother     Thyroid disease Mother     Arthritis Mother     Cancer Mother         Breast    Aortic aneurysm Father     Heart attack Father     Liver cancer Father     Cancer Father         Liver    Heart disease Father         Heart attack    Early death Brother         Coronary thrombosis @ 48 years while mountain climbing.       Review of Systems:   All systems reviewed. Pertinent positives identified in HPI. All other systems are negative.    Objective:   Temp:  [96.9 °F (36.1 °C)-98.2 °F (36.8 °C)] 98.2 °F (36.8 °C)  Heart Rate:  [] 79  Resp:  [18-26] 22  BP: (117-159)/() 149/92     Intake/Output Summary (Last 24 hours) at 2025 0736  Last data filed at 2025 0600  Gross per 24 hour   Intake 240 ml   Output 700 ml   Net -460 ml     Body mass index is 28.04 kg/m².      01/10/25  1931 25  0000   Weight: 80.3 kg (177 lb) 78.8 kg (173 lb 11.6 oz)     Weight change:     Physical Exam:   General Appearance:    Alert, cooperative, in no acute distress   Head:    Normocephalic,  "without obvious abnormality, atraumatic   Eyes:            Lids and lashes normal, conjunctivae and sclerae normal, no   icterus, no pallor, corneas clear, PERRLA   Ears:    Ears appear intact with no abnormalities noted   Neck:   No adenopathy, supple, trachea midline, no thyromegaly, no   carotid bruit, no JVD   Lungs:     Clear to auscultation,respirations regular, even and unlabored    Heart:    Regular rhythm and normal rate, normal S1 and S2, no murmur, no gallop, no rub, no click   Chest Wall:    No abnormalities observed   Abdomen:     Normal bowel sounds, no masses, no organomegaly, soft        non-tender, non-distended, no guarding, no rebound  tenderness   Extremities:   Moves all extremities well, no edema, no cyanosis, no redness   Pulses:   Pulses palpable and equal bilaterally. Normal radial, carotid, femoral, dorsalis pedis and posterior tibial pulses bilaterally. Normal abdominal aorta   Skin:  Psychiatric:   No bleeding, bruising or rash    Alert and oriented x 3, normal mood and affect       Lab Review:   Results from last 7 days   Lab Units 01/11/25  0244 01/10/25  1638   SODIUM mmol/L 140 140   POTASSIUM mmol/L 4.5 4.2   CHLORIDE mmol/L 108* 103   CO2 mmol/L 19.2* 21.5*   BUN mg/dL 23 26*   CREATININE mg/dL 0.90 1.21   GLUCOSE mg/dL 116* 135*   CALCIUM mg/dL 8.2* 9.1     Results from last 7 days   Lab Units 01/10/25  1748 01/10/25  1638   HSTROP T ng/L 118* 115*     Results from last 7 days   Lab Units 01/11/25  0244 01/10/25  1638   WBC 10*3/mm3 11.84* 16.87*   HEMOGLOBIN g/dL 15.3 17.3   HEMATOCRIT % 44.6 49.9   PLATELETS 10*3/mm3 185 223     Results from last 7 days   Lab Units 01/11/25  0244 01/11/25  0019 01/10/25  1819   INR   --   --  1.04   APTT seconds 111.6* >200.0* 29.8     Results from last 7 days   Lab Units 01/11/25  0244 01/10/25  1638   MAGNESIUM mg/dL 2.6* 2.7*           Invalid input(s): \"LDLCALC\"  Results from last 7 days   Lab Units 01/10/25  1638   PROBNP pg/mL 3,412.0* "       Imaging:  Imaging Results (Most Recent)       Procedure Component Value Units Date/Time    CT Angiogram Chest Pulmonary Embolism [[id number]] Collected: 01/10/25 1833     Updated: 01/10/25 1843    Narrative:      CT ANGIOGRAM CHEST PULMONARY EMBOLISM-     INDICATIONS: Dyspnea     TECHNIQUE: Radiation dose reduction techniques were utilized, including  automated exposure control and exposure modulation based on body size.  CT angiography of the chest. Three-dimensional reconstructions     COMPARISON: 7/23/2015     FINDINGS:     Critical test result. Extensive bilateral pulmonary embolic disease is  present, beginning in the pulmonary arterial trunk and extending  bilaterally (saddle pulmonary embolism). RV LV ratio of 1.2 suggests  right heart strain.     No aortic dissection. Ascending aorta is dilated, 3.9 cm.     The heart size is borderline, without pericardial effusion. Coronary  arterial calcification is present. A few small subcentimeter short axis  mediastinal lymph nodes are seen that are not significant by size  criteria. Bilateral gynecomastia is noted.     The airways appear clear.     No pleural effusion or pneumothorax.     The lungs show small areas of opacity posteriorly in the upper lobes,  for example axial image 36 that could be atelectasis or in the setting  of pulmonary embolic disease, could represent infarct. Another focal  opacity is seen posterior in the right lower lobe on axial image 107.  Follow-up is advised.     Upper abdominal structures showed no acute findings.     Degenerative changes are seen in the spine. Old left rib deformities are  noted. A nondisplaced fracture at the medial right clavicle is noted,  age-indeterminate, but showing healing response, correlate clinically.       Impression:         Critical test result. Extensive pulmonary embolism/saddle embolus.  Increased RV LV ratio 1.2 suggesting right heart strain.     Discussed by telephone with Dr. Salinas at time of  interpretation, 1835,  1/10/2025.     This report was finalized on 1/10/2025 6:40 PM by Dr. Beka Bowden M.D on Workstation: [employer]       XR Chest 1 View [[id number]] Collected: 01/10/25 1705     Updated: 01/10/25 1710    Narrative:      XR CHEST 1 VW-     HISTORY: Male who is 79 years-old, cough, hypoxia     TECHNIQUE: Frontal views of the chest     COMPARISON: 5/20/2024     FINDINGS: Heart, mediastinum and pulmonary vasculature are unremarkable.  Mild left basilar atelectasis or infiltrate, follow-up suggested. There  may be small left pleural effusion. No pneumothorax. Left hemidiaphragm  remains elevated. No acute osseous process.       Impression:      As described.     This report was finalized on 1/10/2025 5:07 PM by Dr. Beka Bowden M.D on Workstation: [employer]               I personally viewed and interpreted the patient's EKG    Assessment/Plan:     1.  Acute bilateral pulmonary embolism with saddle embolism.  No evidence of cor pulmonale as evidenced by troponin, BNP, and evidence of right ventricular enlargement by CT and echocardiogram.  Fortunately hemodynamically stable.  He is requiring a significant mount of oxygen.  Currently on heparin infusion.  2.  Acute right lower extremity DVT.  Noted the peroneal, popliteal and distal femoral vein.  3.  Acute hypoxic respiratory failure.  Alternating between high flow nasal cannula and BiPAP.  4.  Hypertension.  Pressures have been mildly elevated since his arrival.  5.  Paralyzed left hemidiaphragm  6.  Of asthma  7.  Obstructive sleep apnea    - Discussed options for treatment at length with the patient.  Ultimately opted to proceed with intervention.  Discussed thrombectomy procedure, risk and benefitswith him and he agrees to proceed.  - Continue heparin infusion for now.    Thank you for allowing me to participate in the care of Omar Choudhary. Feel free to contact me directly with any further questions or concerns.    Nancy Hdez,  MD [facility]  01/11/25  07:36 EST

## 2025-01-11 NOTE — PLAN OF CARE
Goal Outcome Evaluation:      Pt A&Ox4. VSS. currently on Bipap at 100% Fio2- o2 sat 96-99%. Heparin gtt running per protocol. AM labs completed, see results.

## 2025-01-11 NOTE — H&P
[facility]  [phone]  Dr. Desean Mcdaniels      Subjective   LOS: 0 days     79-year-old male with a history of chronic right shoulder pain, osteoarthritis, and chronic back pain s/p L2-4 fusion who presents today after having 2 days of some shortness of breath and not feeling very well.  Earlier today, he was thinking about calling an [employer] to come to the hospital, but he felt too bad, so he called 911.  When EMS arrived, the patient reports his O2 sats were in the mid 50's.  He was brought into the ER and workup revealed a saddle PE.  Currently on high flow nasal cannula, 60 L at 100%, and sats are borderline at 87 to 88%.  ABG shows a pO2 of 53.  proBNP is elevated at 3,412 and troponin 115 -> 118.  Bryne drip was started in the ER. Interventional cardiology was called from the ER, at this time they are not planning on thrombectomy per my discussion with the ER physician. An echo is being obtained in the ER, read pending.  His blood pressure has been doing well, SBP has been 130-150.     He has no history of DVT or PE in the past.  He had a brother who had a DVT and  in his 40s when he was climbing a mountain in [place].  His mother had breast cancer, his father had liver cancer.  No other significant family history.  He reports having a heart cath in , was unremarkable and no evidence of CAD at that time. He does have significant allergies to statins.  He is currently retired.  He is trained as a  and taught at  as a professor.  He is pretty active at baseline.  Approximately 1 to 2 months ago, he got back from [place] after helping take care of a friend for a few months. He is a former smoker, quit smoking in .          Past Hx:  has a past medical history of ADHD (attention deficit hyperactivity disorder) (), Allergic, Arthritis, Asthma, Carpal tunnel syndrome, Cataract, Depression, Erectile dysfunction, Family history of malignant neoplasm of breast (2019),  Hyperlipidemia (2019), Hypertension (2012), Leg pain, right, Low back pain, Neuropathy, Osteopenia, Poor balance, Reactive airway disease (01/15/2016), Scoliosis (1960), Shortness of breath, and Substance abuse ().  Surg Hx:  has a past surgical history that includes Mouth surgery; Knee arthroscopy w/ ACL reconstruction (Right, ); Lumbar fusion (Bilateral, 10/05/2022); Lumbar fusion (N/A, 10/04/2022); Cardiac catheterization (); Eye surgery (); Spine surgery (10/4&2022); and Colonoscopy ().  FH: family history includes Alcohol abuse in his mother; Aortic aneurysm in his father; Arthritis in his mother; Breast cancer in his mother; Cancer in his father and mother; Early death in his brother; Heart attack in his father; Heart disease in his father and mother; Hypertension in his mother; Liver cancer in his father; Stroke in his mother; Thyroid disease in his mother.  SH:  reports that he quit smoking about 33 years ago. His smoking use included cigarettes. He started smoking about 59 years ago. He has a 13 pack-year smoking history. He has been exposed to tobacco smoke. He has never used smokeless tobacco. He reports that he does not currently use alcohol after a past usage of about 17.0 standard drinks of alcohol per week. He reports that he does not use drugs.    (Not in a hospital admission)    Allergies   Allergen Reactions    Statins Myalgia     Other reaction(s): muscle soreness       Review of Systems   Respiratory:  Positive for chest tightness and shortness of breath.    All other systems reviewed and are negative.    Vital Signs past 24hrs  BP range: BP: (131-141)/(90-93) 141/93  Pulse range: Heart Rate:  [91-95] 95  Resp rate range: Resp:  [20-26] 20  Temp range: Temp (24hrs), Av.9 °F (36.1 °C), Min:96.9 °F (36.1 °C), Max:96.9 °F (36.1 °C)    Oxygen range: SpO2:  [84 %-94 %] 88 %; Flow (L/min) (Oxygen Therapy):  [60] 60;       ; There is no height or weight on file to  calculate BMI.  Intake & Output (last day)       None           Vent Settings                                                  Results Review:    I have reviewed the laboratory and imaging data from current admission. My annotations are as noted in assessment and plan.    Medication Review:  I have reviewed the current MAR.  Antibiotics  azithromycin - 250 MG     Scheduled Medications  Tezepelumab-ekko, 210 mg, Subcutaneous, Q30 Days      ICU Drips  heparin, 18 Units/kg/hr, Last Rate: 18 Units/kg/hr (01/10/25 1829)      PRN Medications    heparin (porcine)    Lines, Drains & Airways       Active LDAs       Name Placement date Placement time Site Days    Peripheral IV 01/10/25 1639 Left Antecubital 01/10/25  1639  Antecubital  less than 1    Peripheral IV 01/10/25 1820 Right Antecubital 01/10/25  1820  Antecubital  less than 1                    Diet Orders (active) (From admission, onward)      None            Assessment:  Acute saddle PE  Right ventricular failure  Acute hypoxic respiratory failure  Lactic acidosis  Leukocytosis  Hypertension  Chronic low back pain  Former smoker, quit 1992  Scoliosis  Hyperlipidemia  ADHD  Asthma  Paralyzed left hemidiaphragm  ELINOR      Plan of Treatment:  - CTA chest shows saddle PE with RV/LV ratio 1.2  - Continue heparin drip  - Interventional cardiology aware of patient.   - Discussed with patient, he is full code  - Echo shows severe RV dilation and RV failure with decreased function  - PESI score 107 (age 79, male, O2 sats <90%).  This places him in class 4, which is considered high risk with a predicted 30 day mortality of 4-11.4%.  - NPO in case he needs procedure  - Restart home meds tomorrow after see how he does tonight  - Patient has known ELINOR, compliant with CPAP at home open.  Currently, he is on max settings of the high flow nasal cannula.  Will trial him on CPAP tonight with 100% FiO2.  If oxygenation is worsening on CPAP though, okay to continue on high flow [facility]    Critical care time 45 minutes        Desean Mcdaniels MD  [facility], [facility]  Pulmonary and Critical Care Medicine    Electronically signed by Desean Mcdaniels MD, 01/10/25, 7:27 PM EST.  Part of this note may be an electronic transcription/translation of spoken language to printed text using the Dragon Dictation System.

## 2025-01-12 LAB
ANION GAP SERPL CALCULATED.3IONS-SCNC: 10 MMOL/L (ref 5–15)
ANION GAP SERPL CALCULATED.3IONS-SCNC: 8.9 MMOL/L (ref 5–15)
APTT PPP: 153.3 SECONDS (ref 22.7–35.4)
APTT PPP: 53.4 SECONDS (ref 22.7–35.4)
APTT PPP: 76.8 SECONDS (ref 22.7–35.4)
BASOPHILS # BLD AUTO: 0 10*3/MM3 (ref 0–0.2)
BASOPHILS NFR BLD AUTO: 0 % (ref 0–1.5)
BUN SERPL-MCNC: 21 MG/DL (ref 8–23)
BUN SERPL-MCNC: 23 MG/DL (ref 8–23)
BUN/CREAT SERPL: 23.6 (ref 7–25)
BUN/CREAT SERPL: 25.3 (ref 7–25)
CALCIUM SPEC-SCNC: 7.9 MG/DL (ref 8.6–10.5)
CALCIUM SPEC-SCNC: 8.1 MG/DL (ref 8.6–10.5)
CHLORIDE SERPL-SCNC: 109 MMOL/L (ref 98–107)
CHLORIDE SERPL-SCNC: 109 MMOL/L (ref 98–107)
CO2 SERPL-SCNC: 22 MMOL/L (ref 22–29)
CO2 SERPL-SCNC: 25.1 MMOL/L (ref 22–29)
CREAT SERPL-MCNC: 0.89 MG/DL (ref 0.76–1.27)
CREAT SERPL-MCNC: 0.91 MG/DL (ref 0.76–1.27)
DEPRECATED RDW RBC AUTO: 45.6 FL (ref 37–54)
EGFRCR SERPLBLD CKD-EPI 2021: 86.8 ML/MIN/1.73
EGFRCR SERPLBLD CKD-EPI 2021: 88.3 ML/MIN/1.73
EOSINOPHIL # BLD AUTO: 0.07 10*3/MM3 (ref 0–0.4)
EOSINOPHIL NFR BLD AUTO: 0.8 % (ref 0.3–6.2)
ERYTHROCYTE [DISTWIDTH] IN BLOOD BY AUTOMATED COUNT: 13.4 % (ref 12.3–15.4)
GLUCOSE BLDC GLUCOMTR-MCNC: 120 MG/DL (ref 70–130)
GLUCOSE BLDC GLUCOMTR-MCNC: 142 MG/DL (ref 70–130)
GLUCOSE SERPL-MCNC: 112 MG/DL (ref 65–99)
GLUCOSE SERPL-MCNC: 144 MG/DL (ref 65–99)
HCT VFR BLD AUTO: 41 % (ref 37.5–51)
HGB BLD-MCNC: 13.1 G/DL (ref 13–17.7)
IMM GRANULOCYTES # BLD AUTO: 0.09 10*3/MM3 (ref 0–0.05)
IMM GRANULOCYTES NFR BLD AUTO: 1 % (ref 0–0.5)
LYMPHOCYTES # BLD AUTO: 1.2 10*3/MM3 (ref 0.7–3.1)
LYMPHOCYTES NFR BLD AUTO: 13.8 % (ref 19.6–45.3)
MAGNESIUM SERPL-MCNC: 2.4 MG/DL (ref 1.6–2.4)
MAGNESIUM SERPL-MCNC: 2.5 MG/DL (ref 1.6–2.4)
MCH RBC QN AUTO: 30 PG (ref 26.6–33)
MCHC RBC AUTO-ENTMCNC: 32 G/DL (ref 31.5–35.7)
MCV RBC AUTO: 94 FL (ref 79–97)
MONOCYTES # BLD AUTO: 0.81 10*3/MM3 (ref 0.1–0.9)
MONOCYTES NFR BLD AUTO: 9.3 % (ref 5–12)
NEUTROPHILS NFR BLD AUTO: 6.55 10*3/MM3 (ref 1.7–7)
NEUTROPHILS NFR BLD AUTO: 75.1 % (ref 42.7–76)
NRBC BLD AUTO-RTO: 0 /100 WBC (ref 0–0.2)
PHOSPHATE SERPL-MCNC: 4 MG/DL (ref 2.5–4.5)
PHOSPHATE SERPL-MCNC: 4 MG/DL (ref 2.5–4.5)
PLATELET # BLD AUTO: 148 10*3/MM3 (ref 140–450)
PMV BLD AUTO: 9.7 FL (ref 6–12)
POTASSIUM SERPL-SCNC: 4 MMOL/L (ref 3.5–5.2)
POTASSIUM SERPL-SCNC: 4 MMOL/L (ref 3.5–5.2)
RBC # BLD AUTO: 4.36 10*6/MM3 (ref 4.14–5.8)
SODIUM SERPL-SCNC: 141 MMOL/L (ref 136–145)
SODIUM SERPL-SCNC: 143 MMOL/L (ref 136–145)
WBC NRBC COR # BLD AUTO: 8.72 10*3/MM3 (ref 3.4–10.8)

## 2025-01-12 PROCEDURE — 85730 THROMBOPLASTIN TIME PARTIAL: CPT | Performed by: INTERNAL MEDICINE

## 2025-01-12 PROCEDURE — 94664 DEMO&/EVAL PT USE INHALER: CPT

## 2025-01-12 PROCEDURE — 94799 UNLISTED PULMONARY SVC/PX: CPT

## 2025-01-12 PROCEDURE — 94660 CPAP INITIATION&MGMT: CPT

## 2025-01-12 PROCEDURE — 85025 COMPLETE CBC W/AUTO DIFF WBC: CPT | Performed by: EMERGENCY MEDICINE

## 2025-01-12 PROCEDURE — 93010 ELECTROCARDIOGRAM REPORT: CPT | Performed by: INTERNAL MEDICINE

## 2025-01-12 PROCEDURE — 83735 ASSAY OF MAGNESIUM: CPT | Performed by: STUDENT IN AN ORGANIZED HEALTH CARE EDUCATION/TRAINING PROGRAM

## 2025-01-12 PROCEDURE — 80048 BASIC METABOLIC PNL TOTAL CA: CPT | Performed by: INTERNAL MEDICINE

## 2025-01-12 PROCEDURE — 94761 N-INVAS EAR/PLS OXIMETRY MLT: CPT

## 2025-01-12 PROCEDURE — 93005 ELECTROCARDIOGRAM TRACING: CPT | Performed by: STUDENT IN AN ORGANIZED HEALTH CARE EDUCATION/TRAINING PROGRAM

## 2025-01-12 PROCEDURE — 85730 THROMBOPLASTIN TIME PARTIAL: CPT | Performed by: HOSPITALIST

## 2025-01-12 PROCEDURE — 82948 REAGENT STRIP/BLOOD GLUCOSE: CPT

## 2025-01-12 PROCEDURE — 84100 ASSAY OF PHOSPHORUS: CPT | Performed by: STUDENT IN AN ORGANIZED HEALTH CARE EDUCATION/TRAINING PROGRAM

## 2025-01-12 PROCEDURE — 83735 ASSAY OF MAGNESIUM: CPT | Performed by: INTERNAL MEDICINE

## 2025-01-12 PROCEDURE — 84100 ASSAY OF PHOSPHORUS: CPT | Performed by: INTERNAL MEDICINE

## 2025-01-12 PROCEDURE — 99232 SBSQ HOSP IP/OBS MODERATE 35: CPT | Performed by: INTERNAL MEDICINE

## 2025-01-12 PROCEDURE — 25010000002 HEPARIN (PORCINE) PER 1000 UNITS: Performed by: INTERNAL MEDICINE

## 2025-01-12 PROCEDURE — 80048 BASIC METABOLIC PNL TOTAL CA: CPT | Performed by: STUDENT IN AN ORGANIZED HEALTH CARE EDUCATION/TRAINING PROGRAM

## 2025-01-12 RX ADMIN — IPRATROPIUM BROMIDE AND ALBUTEROL SULFATE 3 ML: 2.5; .5 SOLUTION RESPIRATORY (INHALATION) at 19:50

## 2025-01-12 RX ADMIN — MONTELUKAST 10 MG: 10 TABLET, FILM COATED ORAL at 09:52

## 2025-01-12 RX ADMIN — IPRATROPIUM BROMIDE AND ALBUTEROL SULFATE 3 ML: 2.5; .5 SOLUTION RESPIRATORY (INHALATION) at 10:38

## 2025-01-12 RX ADMIN — MUPIROCIN 1 APPLICATION: 20 OINTMENT TOPICAL at 09:52

## 2025-01-12 RX ADMIN — HYDROCODONE BITARTRATE AND ACETAMINOPHEN 1 TABLET: 5; 325 TABLET ORAL at 22:02

## 2025-01-12 RX ADMIN — IPRATROPIUM BROMIDE AND ALBUTEROL SULFATE 3 ML: 2.5; .5 SOLUTION RESPIRATORY (INHALATION) at 14:43

## 2025-01-12 RX ADMIN — HEPARIN SODIUM 6400 UNITS: 5000 INJECTION INTRAVENOUS; SUBCUTANEOUS at 11:20

## 2025-01-12 RX ADMIN — MUPIROCIN 1 APPLICATION: 20 OINTMENT TOPICAL at 20:52

## 2025-01-12 RX ADMIN — IPRATROPIUM BROMIDE AND ALBUTEROL SULFATE 3 ML: 2.5; .5 SOLUTION RESPIRATORY (INHALATION) at 06:56

## 2025-01-12 RX ADMIN — Medication 10 ML: at 09:53

## 2025-01-12 RX ADMIN — APIXABAN 10 MG: 5 TABLET, FILM COATED ORAL at 20:52

## 2025-01-12 RX ADMIN — Medication 10 ML: at 20:53

## 2025-01-12 NOTE — PROGRESS NOTES
"  Intensive Care Unit Daily Progress Note.   [facility] INTENSIVE CARE  1/12/2025    Patient Name:  Omar Choudhary  MRN:  [id number]  YOB: 1945  Age: 79 y.o.  Sex: male         Reason for Admission / Chief Complaint:  Saddle pulmonary embolism, hypoxic respiratory failure    Hospital Course:   79-year-old male who presented to the hospital for shortness of breath and found to have saddle pulmonary embolism and hypoxic respiratory failure necessitating Optiflow.    Interval History:  Underwent thrombectomy yesterday, severe pulmonary hypertension even post thrombectomy  Requiring Optiflow 60 L / 100%  Continues to shortness of breath to be cleared.  Glad he was able to eat  No chest pain or palpitations  No nausea or vomiting  History of asthma and hemidiaphragm elevation  No significant cardiac history    Physical Exam:  /94   Pulse 69   Temp 98.1 °F (36.7 °C) (Oral)   Resp 18   Ht 167.6 cm (66\")   Wt 78.8 kg (173 lb 11.6 oz)   SpO2 99%   BMI 28.04 kg/m²   Body mass index is 28.04 kg/m².    Intake/Output    Intake/Output Summary (Last 24 hours) at 1/12/2025 1007  Last data filed at 1/12/2025 0100  Gross per 24 hour   Intake 800 ml   Output 450 ml   Net 350 ml     General: Alert, nontoxic, NAD, mild distress  HEENT: NC/AT, EOMI, MMM  Neck: Supple, trachea midline  Cardiac: RRR, no murmur, gallops, rubs  Pulmonary: Clear to auscultation bilaterally, no adventitious breath sounds, normal respiratory effort  GI: Soft, non-tender, non-distended, normal bowel sounds  Extremities: Warm, well perfused, no LE edema  Skin: no visible rash  Neuro: CN II - XII grossly intact  Psychiatry: Normal mood and affect    Data Review:  Notable Labs:  Results from last 7 days   Lab Units 01/12/25  0458 01/11/25  0244 01/10/25  1638   WBC 10*3/mm3 8.72 11.84* 16.87*   HEMOGLOBIN g/dL 13.1 15.3 17.3   PLATELETS 10*3/mm3 148 185 223     Results from last 7 days   Lab Units 01/12/25  0458 " 01/12/25  0053 01/11/25  0244 01/10/25  1638   SODIUM mmol/L 143 141 140 140   POTASSIUM mmol/L 4.0 4.0 4.5 4.2   CHLORIDE mmol/L 109* 109* 108* 103   CO2 mmol/L 25.1 22.0 19.2* 21.5*   BUN mg/dL 21 23 23 26*   CREATININE mg/dL 0.89 0.91 0.90 1.21   GLUCOSE mg/dL 112* 144* 116* 135*   CALCIUM mg/dL 8.1* 7.9* 8.2* 9.1   MAGNESIUM mg/dL 2.5* 2.4 2.6* 2.7*   PHOSPHORUS mg/dL 4.0 4.0 3.9  --    Estimated Creatinine Clearance: 68.6 mL/min (by C-G formula based on SCr of 0.89 mg/dL).    Results from last 7 days   Lab Units 01/12/25  0458 01/11/25  0244 01/11/25  0019 01/10/25  2226 01/10/25  1931 01/10/25  1638   PROCALCITONIN ng/mL  --   --   --   --   --  0.07   LACTATE mmol/L  --   --  1.6 2.6* 3.6* 3.6*   PLATELETS 10*3/mm3 148 185  --   --   --  223       Results from last 7 days   Lab Units 01/10/25  2044 01/10/25  1745   PH, ARTERIAL pH units 7.510* 7.481*   PCO2, ARTERIAL mm Hg 26.8* 28.3*   PO2 ART mm Hg 53.8* 53.3*   HCO3 ART mmol/L 21.4* 21.2*       Result Review:  I have personally reviewed the results from the time of this admission to 1/12/2025 10:07 EST and agree with these findings:  [x]  Laboratory list / accordion  [x]  Microbiology  [x]  Radiology  [x]  EKG/Telemetry   [x]  Cardiology/Vascular   []  Pathology  [x]  Old records  []  Other:    Imaging:  Reviewed chest images personally from past 3 days    ASSESSMENT  /  PLAN:    Acute hypoxic respiratory failure requiring heated high flow nasal cannula  Acute saddle pulmonary embolism  Right ventricular failure  Severe pulmonary hypertension  Lactic acidosis  Leukocytosis  Hypertension  Chronic low back pain  Former smoker, quit 1992  Scoliosis  Hyperlipidemia  ADHD  Asthma -follows with Dr. Azmi Draw  Paralyzed left hemidiaphragm  Obstructive sleep apnea    -Patient presented to the hospital with shortness of breath and found to have saddle PE and hypoxic respiratory failure.  -CT angiogram demonstrated large saddle PE with RV/LV ratio of 1.2 indicative  of right heart strain  -Echocardiogram with severe RV dilatation and RV failure with decreased function.  -Lower extremity duplex with acute right lower extremity DVT in the distal femoral, popliteal, peroneal.  Chronic right lower extremity superficial thrombophlebitis.  -On heparin drip for anticoagulation  -Underwent thrombectomy with intervention cardiology.  Severe pulmonary hypertension even post thrombectomy.  PA pressure 83/16, mean 46  -Hold home antihypertensives at this time  -Still requiring significant oxygen of 60 L, 100%.  Wean for SpO2 goal greater than 90%  -CPAP at night  -Patient is in critical condition due to severe pulmonary hypertension, large pulmonary embolism, hypoxic respiratory failure, and RV failure.  High risk for mortality with guarded prognosis.    GI prophylaxis: Not indicated  DVT prophylaxis: Heparin drip  Archer catheter: No  Bowel regimen: Ordered  Diet: Cardiac    Discharge: Continue to monitor in the ICU due to critical status    Critical Care Time: 35 minutes    CODE STATUS: Full code    Juan Gomez MD  [facility]  Pulmonary and Critical Care Medicine, Interventional Pulmonology    Parts of this note may be an electronic transcription/translation of spoken language to printed text using the Dragon dictation system.

## 2025-01-12 NOTE — PROGRESS NOTES
[facility] Follow Up    Chief Complaint: Follow up PE    Interval History: He reports that he feels better this morning.  Will to exhale more easily but still reports issues with inspiration.  He denies any pleuritic chest pain.  No issues with right groin site.  Figure-of-eight suture removed without any issues.  Oxygen requirements have not really changed.    Objective:     Objective:  Temp:  [97.7 °F (36.5 °C)-98.7 °F (37.1 °C)] 98.1 °F (36.7 °C)  Heart Rate:  [67-96] 69  Resp:  [16-25] 18  BP: (110-164)/() 132/94  FiO2 (%):  [100 %] 100 %     Intake/Output Summary (Last 24 hours) at 1/12/2025 0746  Last data filed at 1/12/2025 0100  Gross per 24 hour   Intake 800 ml   Output 450 ml   Net 350 ml     Body mass index is 28.04 kg/m².      01/10/25  1931 01/11/25  0000   Weight: 80.3 kg (177 lb) 78.8 kg (173 lb 11.6 oz)     Weight change:       Physical Exam:   General : Alert, cooperative, in no acute distress.  Neuro: Alert,cooperative and oriented.  Lungs: CTAB. Normal respiratory effort and rate.  CV: Regular rate and rhythm, normal S1 and S2, no murmurs, gallops or rubs.  ABD: Soft, nontender, nondistended. Positive bowel sounds.  Extr: No edema or cyanosis, moves all extremities.    Lab Review:   Results from last 7 days   Lab Units 01/12/25  0458 01/12/25  0053   SODIUM mmol/L 143 141   POTASSIUM mmol/L 4.0 4.0   CHLORIDE mmol/L 109* 109*   CO2 mmol/L 25.1 22.0   BUN mg/dL 21 23   CREATININE mg/dL 0.89 0.91   GLUCOSE mg/dL 112* 144*   CALCIUM mg/dL 8.1* 7.9*     Results from last 7 days   Lab Units 01/10/25  1748 01/10/25  1638   HSTROP T ng/L 118* 115*     Results from last 7 days   Lab Units 01/12/25  0458 01/11/25  0244   WBC 10*3/mm3 8.72 11.84*   HEMOGLOBIN g/dL 13.1 15.3   HEMATOCRIT % 41.0 44.6   PLATELETS 10*3/mm3 148 185     Results from last 7 days   Lab Units 01/12/25  0458 01/11/25 2033 01/11/25  0019 01/10/25  1819   INR   --   --   --  1.04   APTT seconds 76.8*  ">200.0*   < > 29.8    < > = values in this interval not displayed.     Results from last 7 days   Lab Units 01/12/25  0458 01/12/25  0053   MAGNESIUM mg/dL 2.5* 2.4           Invalid input(s): \"LDLCALC\"  Results from last 7 days   Lab Units 01/10/25  1638   PROBNP pg/mL 3,412.0*         I reviewed the patient's new clinical results.  I personally viewed and interpreted the patient's EKG  Current Medications:   Scheduled Meds:ipratropium-albuterol, 3 mL, Nebulization, 4x Daily - RT  montelukast, 10 mg, Oral, Daily  mupirocin, 1 Application, Each Nare, BID  senna-docusate sodium, 2 tablet, Oral, BID  sodium chloride, 10 mL, Intravenous, Q12H      Continuous Infusions:heparin, 18 Units/kg/hr, Last Rate: 10 Units/kg/hr (01/12/25 0624)        Allergies:  Allergies   Allergen Reactions    Statins Myalgia     Other reaction(s): muscle soreness       Assessment/Plan:     1.  Acute bilateral pulmonary embolism with saddle embolism and acute cor pulmonale.  Status post bilateral pulmonary artery thrombectomy on 1/11.  Noted to have only a small to moderate amount of residual thrombus by the time of procedure which was removed.  Remains on heparin.    2.  Acute right lower extremity DVT.  Noted the peroneal, popliteal and distal femoral vein.  3.  Acute hypoxic respiratory failure.  Still with high O2 requirement.  On high flow nasal cannula during the day and BiPAP at night.   4.  Severe pulmonary hypertension.  Systolic pressures in the 70s to 80s and mean PA of 44 to 46 mmHg.  Noted by right heart catheterization both before and following thrombectomy without any improvement.  Suspected this is in part chronic.  5.  Hypertension.  Pressures have been mildly elevated since his arrival.  6.  Paralyzed left hemidiaphragm  7.  Asthma  8.  Obstructive sleep apnea    -He will remain in bed rest for an additional hour after figure-of-eight suture removed.  If no access site issues he can start increasing his activity.  - If no " access site issues after increasing his activity I think we can transition him over to oral anticoagulation later today.  - Continue to work on weaning his O2.    Nancy Hdez MD  01/12/25  07:46 EST

## 2025-01-13 LAB
ANION GAP SERPL CALCULATED.3IONS-SCNC: 12 MMOL/L (ref 5–15)
APTT PPP: 35.9 SECONDS (ref 22.7–35.4)
BASOPHILS # BLD AUTO: 0.01 10*3/MM3 (ref 0–0.2)
BASOPHILS NFR BLD AUTO: 0.1 % (ref 0–1.5)
BUN SERPL-MCNC: 20 MG/DL (ref 8–23)
BUN/CREAT SERPL: 23 (ref 7–25)
CALCIUM SPEC-SCNC: 7.9 MG/DL (ref 8.6–10.5)
CHLORIDE SERPL-SCNC: 107 MMOL/L (ref 98–107)
CO2 SERPL-SCNC: 22 MMOL/L (ref 22–29)
CREAT SERPL-MCNC: 0.87 MG/DL (ref 0.76–1.27)
DEPRECATED RDW RBC AUTO: 44.7 FL (ref 37–54)
EGFRCR SERPLBLD CKD-EPI 2021: 88.9 ML/MIN/1.73
EOSINOPHIL # BLD AUTO: 0.23 10*3/MM3 (ref 0–0.4)
EOSINOPHIL NFR BLD AUTO: 2.7 % (ref 0.3–6.2)
ERYTHROCYTE [DISTWIDTH] IN BLOOD BY AUTOMATED COUNT: 13.4 % (ref 12.3–15.4)
GLUCOSE BLDC GLUCOMTR-MCNC: 111 MG/DL (ref 70–130)
GLUCOSE BLDC GLUCOMTR-MCNC: 111 MG/DL (ref 70–130)
GLUCOSE BLDC GLUCOMTR-MCNC: 114 MG/DL (ref 70–130)
GLUCOSE SERPL-MCNC: 110 MG/DL (ref 65–99)
HCT VFR BLD AUTO: 36 % (ref 37.5–51)
HGB BLD-MCNC: 12.3 G/DL (ref 13–17.7)
IMM GRANULOCYTES # BLD AUTO: 0.08 10*3/MM3 (ref 0–0.05)
IMM GRANULOCYTES NFR BLD AUTO: 0.9 % (ref 0–0.5)
LYMPHOCYTES # BLD AUTO: 1.36 10*3/MM3 (ref 0.7–3.1)
LYMPHOCYTES NFR BLD AUTO: 15.9 % (ref 19.6–45.3)
MAGNESIUM SERPL-MCNC: 2.3 MG/DL (ref 1.6–2.4)
MCH RBC QN AUTO: 31.7 PG (ref 26.6–33)
MCHC RBC AUTO-ENTMCNC: 34.2 G/DL (ref 31.5–35.7)
MCV RBC AUTO: 92.8 FL (ref 79–97)
MONOCYTES # BLD AUTO: 0.8 10*3/MM3 (ref 0.1–0.9)
MONOCYTES NFR BLD AUTO: 9.4 % (ref 5–12)
NEUTROPHILS NFR BLD AUTO: 6.07 10*3/MM3 (ref 1.7–7)
NEUTROPHILS NFR BLD AUTO: 71 % (ref 42.7–76)
PHOSPHATE SERPL-MCNC: 3.3 MG/DL (ref 2.5–4.5)
PLATELET # BLD AUTO: 153 10*3/MM3 (ref 140–450)
PMV BLD AUTO: 10.2 FL (ref 6–12)
POTASSIUM SERPL-SCNC: 4.2 MMOL/L (ref 3.5–5.2)
QT INTERVAL: 365 MS
QT INTERVAL: 388 MS
QT INTERVAL: 411 MS
QT INTERVAL: 423 MS
QTC INTERVAL: 451 MS
QTC INTERVAL: 454 MS
QTC INTERVAL: 462 MS
QTC INTERVAL: 472 MS
RBC # BLD AUTO: 3.88 10*6/MM3 (ref 4.14–5.8)
SODIUM SERPL-SCNC: 141 MMOL/L (ref 136–145)
WBC NRBC COR # BLD AUTO: 8.55 10*3/MM3 (ref 3.4–10.8)

## 2025-01-13 PROCEDURE — 25010000002 ENOXAPARIN PER 10 MG: Performed by: INTERNAL MEDICINE

## 2025-01-13 PROCEDURE — 85730 THROMBOPLASTIN TIME PARTIAL: CPT | Performed by: INTERNAL MEDICINE

## 2025-01-13 PROCEDURE — 82948 REAGENT STRIP/BLOOD GLUCOSE: CPT

## 2025-01-13 PROCEDURE — 94799 UNLISTED PULMONARY SVC/PX: CPT

## 2025-01-13 PROCEDURE — 83735 ASSAY OF MAGNESIUM: CPT | Performed by: INTERNAL MEDICINE

## 2025-01-13 PROCEDURE — 80048 BASIC METABOLIC PNL TOTAL CA: CPT | Performed by: INTERNAL MEDICINE

## 2025-01-13 PROCEDURE — 94660 CPAP INITIATION&MGMT: CPT

## 2025-01-13 PROCEDURE — 94760 N-INVAS EAR/PLS OXIMETRY 1: CPT

## 2025-01-13 PROCEDURE — 93010 ELECTROCARDIOGRAM REPORT: CPT | Performed by: INTERNAL MEDICINE

## 2025-01-13 PROCEDURE — 84100 ASSAY OF PHOSPHORUS: CPT | Performed by: INTERNAL MEDICINE

## 2025-01-13 PROCEDURE — 99232 SBSQ HOSP IP/OBS MODERATE 35: CPT | Performed by: INTERNAL MEDICINE

## 2025-01-13 PROCEDURE — 93005 ELECTROCARDIOGRAM TRACING: CPT | Performed by: STUDENT IN AN ORGANIZED HEALTH CARE EDUCATION/TRAINING PROGRAM

## 2025-01-13 PROCEDURE — 85025 COMPLETE CBC W/AUTO DIFF WBC: CPT | Performed by: INTERNAL MEDICINE

## 2025-01-13 PROCEDURE — 94761 N-INVAS EAR/PLS OXIMETRY MLT: CPT

## 2025-01-13 PROCEDURE — 94664 DEMO&/EVAL PT USE INHALER: CPT

## 2025-01-13 RX ORDER — WARFARIN SODIUM 5 MG/1
5 TABLET ORAL
Status: DISCONTINUED | OUTPATIENT
Start: 2025-01-13 | End: 2025-01-15 | Stop reason: DRUGHIGH

## 2025-01-13 RX ORDER — ENOXAPARIN SODIUM 100 MG/ML
1 INJECTION SUBCUTANEOUS EVERY 12 HOURS
Status: DISCONTINUED | OUTPATIENT
Start: 2025-01-13 | End: 2025-01-18

## 2025-01-13 RX ADMIN — MONTELUKAST 10 MG: 10 TABLET, FILM COATED ORAL at 09:01

## 2025-01-13 RX ADMIN — IPRATROPIUM BROMIDE AND ALBUTEROL SULFATE 3 ML: 2.5; .5 SOLUTION RESPIRATORY (INHALATION) at 11:32

## 2025-01-13 RX ADMIN — SENNOSIDES AND DOCUSATE SODIUM 2 TABLET: 50; 8.6 TABLET ORAL at 09:01

## 2025-01-13 RX ADMIN — HYDROCODONE BITARTRATE AND ACETAMINOPHEN 1 TABLET: 5; 325 TABLET ORAL at 18:52

## 2025-01-13 RX ADMIN — IPRATROPIUM BROMIDE AND ALBUTEROL SULFATE 3 ML: 2.5; .5 SOLUTION RESPIRATORY (INHALATION) at 15:33

## 2025-01-13 RX ADMIN — APIXABAN 10 MG: 5 TABLET, FILM COATED ORAL at 09:01

## 2025-01-13 RX ADMIN — Medication 10 ML: at 21:35

## 2025-01-13 RX ADMIN — HYDROCODONE BITARTRATE AND ACETAMINOPHEN 1 TABLET: 5; 325 TABLET ORAL at 09:01

## 2025-01-13 RX ADMIN — IPRATROPIUM BROMIDE AND ALBUTEROL SULFATE 3 ML: 2.5; .5 SOLUTION RESPIRATORY (INHALATION) at 21:14

## 2025-01-13 RX ADMIN — WARFARIN SODIUM 5 MG: 5 TABLET ORAL at 20:40

## 2025-01-13 RX ADMIN — IPRATROPIUM BROMIDE AND ALBUTEROL SULFATE 3 ML: 2.5; .5 SOLUTION RESPIRATORY (INHALATION) at 06:37

## 2025-01-13 RX ADMIN — MUPIROCIN 1 APPLICATION: 20 OINTMENT TOPICAL at 20:40

## 2025-01-13 RX ADMIN — ENOXAPARIN SODIUM 80 MG: 100 INJECTION SUBCUTANEOUS at 20:40

## 2025-01-13 RX ADMIN — Medication 10 ML: at 09:01

## 2025-01-13 RX ADMIN — MUPIROCIN 1 APPLICATION: 20 OINTMENT TOPICAL at 09:01

## 2025-01-13 NOTE — PROGRESS NOTES
[facility] Clinical Pharmacy Services: Warfarin Dosing/Monitoring Consult    Omar Choudhary is a 79 y.o. male, estimated creatinine clearance is 70.5 mL/min (by C-G formula based on SCr of 0.87 mg/dL). weighing 79.6 kg (175 lb 7.8 oz).    Results from last 7 days   Lab Units 01/13/25  0330 01/12/25  1726 01/12/25  1003 01/12/25  0458 01/11/25  2033 01/11/25  0852 01/11/25  0244 01/11/25  0019 01/10/25  1819 01/10/25  1638   INR   --   --   --   --   --   --   --   --  1.04  --    APTT seconds 35.9* 153.3* 53.4* 76.8* >200.0*   < > 111.6*   < > 29.8  --    HEMOGLOBIN g/dL 12.3*  --   --  13.1  --   --  15.3  --   --  17.3   HEMATOCRIT % 36.0*  --   --  41.0  --   --  44.6  --   --  49.9   PLATELETS 10*3/mm3 153  --   --  148  --   --  185  --   --  223    < > = values in this interval not displayed.     Prior to admission anticoagulation: Newly started on AC this admission. Transitioned from Eliquis to Lovenox/Warfarin on 01/13 due to affordability issues.    Hospital Anticoagulation:  Consulting provider: Nancy Hdez MD   Start date: 01/13  Indication: DVT/PE (active thrombosis)  Target INR: 2 - 3  Expected duration: N/A   Bridge Therapy: Yes  with enoxaparin    Potential food or drug interactions: No significant DDIs at this time.     Diet Order   Procedures    Diet: Cardiac; Healthy Heart (2-3 Na+); Fluid Consistency: Thin (IDDSI 0)     Education complete?/Date: No; plan for follow up TBD    Assessment/Plan:  Begin warfarin 5 mg PO daily today at 2100 along with the first dose of Lovenox. Last Eliquis dose received today at 0901   Monitor for any signs or symptoms of bleeding  Follow up daily INRs and dose adjustments. INR ordered daily with AM labs while inpatient.     Pharmacy will continue to follow until discharge or discontinuation of warfarin.     Gil Aceves, PharmD  Clinical Pharmacist

## 2025-01-13 NOTE — PROGRESS NOTES
[facility] Follow Up    Chief Complaint: Follow up pulmonary embolism    Interval History: Remains on high flow nasal cannula at 60 L/min 80%.  He denies any significant shortness of breath at rest.  He continues to have a cough that is productive.  Denies any chest pain.    Objective:     Objective:  Temp:  [98.1 °F (36.7 °C)-98.4 °F (36.9 °C)] 98.4 °F (36.9 °C)  Heart Rate:  [69-88] 69  Resp:  [17-18] 17  BP: (104-155)/() 153/99     Intake/Output Summary (Last 24 hours) at 1/13/2025 0714  Last data filed at 1/12/2025 1600  Gross per 24 hour   Intake --   Output 350 ml   Net -350 ml     Body mass index is 28.32 kg/m².      01/10/25  1931 01/11/25  0000 01/13/25  0600   Weight: 80.3 kg (177 lb) 78.8 kg (173 lb 11.6 oz) 79.6 kg (175 lb 7.8 oz)     Weight change:       Physical Exam:   General : Alert, cooperative, in no acute distress.  Neuro: Alert,cooperative and oriented.  Lungs: CTAB. Normal respiratory effort and rate.  CV: Regular rate and rhythm, normal S1 and S2, no murmurs, gallops or rubs.  ABD: Soft, nontender, nondistended. Positive bowel sounds.  Extr: No edema or cyanosis, moves all extremities.    Lab Review:   Results from last 7 days   Lab Units 01/13/25  0330 01/12/25  0458   SODIUM mmol/L 141 143   POTASSIUM mmol/L 4.2 4.0   CHLORIDE mmol/L 107 109*   CO2 mmol/L 22.0 25.1   BUN mg/dL 20 21   CREATININE mg/dL 0.87 0.89   GLUCOSE mg/dL 110* 112*   CALCIUM mg/dL 7.9* 8.1*     Results from last 7 days   Lab Units 01/10/25  1748 01/10/25  1638   HSTROP T ng/L 118* 115*     Results from last 7 days   Lab Units 01/13/25  0330 01/12/25  0458   WBC 10*3/mm3 8.55 8.72   HEMOGLOBIN g/dL 12.3* 13.1   HEMATOCRIT % 36.0* 41.0   PLATELETS 10*3/mm3 153 148     Results from last 7 days   Lab Units 01/13/25  0330 01/12/25  1726 01/11/25  0019 01/10/25  1819   INR   --   --   --  1.04   APTT seconds 35.9* 153.3*   < > 29.8    < > = values in this interval not displayed.     Results from  "last 7 days   Lab Units 01/13/25  0330 01/12/25  0458   MAGNESIUM mg/dL 2.3 2.5*           Invalid input(s): \"LDLCALC\"  Results from last 7 days   Lab Units 01/10/25  1638   PROBNP pg/mL 3,412.0*         I reviewed the patient's new clinical results.  I personally viewed and interpreted the patient's EKG  Current Medications:   Scheduled Meds:apixaban, 10 mg, Oral, Q12H   Followed by  [START ON 1/19/2025] apixaban, 5 mg, Oral, Q12H  ipratropium-albuterol, 3 mL, Nebulization, 4x Daily - RT  montelukast, 10 mg, Oral, Daily  mupirocin, 1 Application, Each Nare, BID  senna-docusate sodium, 2 tablet, Oral, BID  sodium chloride, 10 mL, Intravenous, Q12H      Continuous Infusions:     Allergies:  Allergies   Allergen Reactions    Statins Myalgia     Other reaction(s): muscle soreness       Assessment/Plan:     1.  Acute bilateral pulmonary embolism with saddle embolism and acute cor pulmonale.  Status post bilateral pulmonary artery thrombectomy on 1/11.  Noted to have only a small to moderate amount of residual thrombus by the time of procedure which was removed.    2.  Acute right lower extremity DVT.  Noted the peroneal, popliteal and distal femoral vein.  3.  Acute hypoxic respiratory failure.  Still with high O2 requirement.  On high flow nasal cannula during the day and BiPAP at night.   4.  Severe pulmonary hypertension.  Systolic pressures in the 70s to 80s and mean PA of 44 to 46 mmHg.  Noted by right heart catheterization both before and following thrombectomy without any improvement.  Suspected this is in part chronic.  5.  Hypertension.  Pressures have been mildly elevated since his arrival.  6.  Paralyzed left hemidiaphragm  7.  Asthma  8.  Obstructive sleep apnea    -The patient was transition to apixaban last night before I realized he does not have any drug insurance coverage and he will have to pay out-of-pocket.    -Discussed options with the patient and we opted to switch him over to warfarin today with " an enoxaparin bridge.  Will plan to start that tonight since he received a dose of apixaban this morning.  - Continue to wean oxygen as tolerates.    Nancy Hdez MD  01/13/25  07:14 EST

## 2025-01-13 NOTE — PLAN OF CARE
Problem: Noninvasive Ventilation Acute  Goal: Effective Unassisted Ventilation and Oxygenation  Outcome: Progressing     Problem: Adult Inpatient Plan of Care  Goal: Plan of Care Review  Outcome: Progressing  Goal: Patient-Specific Goal (Individualized)  Outcome: Progressing  Goal: Absence of Hospital-Acquired Illness or Injury  Outcome: Progressing  Intervention: Identify and Manage Fall Risk  Recent Flowsheet Documentation  Taken 1/13/2025 1000 by Yamileth Abernathy RN  Safety Promotion/Fall Prevention: safety round/check completed  Taken 1/13/2025 0900 by Yamileth Abernathy RN  Safety Promotion/Fall Prevention: safety round/check completed  Taken 1/13/2025 0800 by Yamileth Abernathy RN  Safety Promotion/Fall Prevention: safety round/check completed  Taken 1/13/2025 0700 by Yamileth Abernathy RN  Safety Promotion/Fall Prevention: safety round/check completed  Intervention: Prevent Skin Injury  Recent Flowsheet Documentation  Taken 1/13/2025 1000 by Yamileth Abernathy RN  Body Position: position changed independently  Taken 1/13/2025 0800 by Yamileth Abernathy RN  Body Position: position changed independently  Skin Protection: transparent dressing maintained  Taken 1/13/2025 0600 by Yamileth Abernathy RN  Body Position: position changed independently  Intervention: Prevent and Manage VTE (Venous Thromboembolism) Risk  Recent Flowsheet Documentation  Taken 1/13/2025 0800 by Yamileth Abernathy RN  VTE Prevention/Management:   bilateral   SCDs (sequential compression devices) on  Intervention: Prevent Infection  Recent Flowsheet Documentation  Taken 1/13/2025 1000 by Yamileth Abernathy RN  Infection Prevention: hand hygiene promoted  Taken 1/13/2025 0900 by Yamileth Abernathy RN  Infection Prevention: hand hygiene promoted  Taken 1/13/2025 0800 by Yamileth Abernathy RN  Infection Prevention: hand hygiene promoted  Taken 1/13/2025 0700 by Yamileth Abernathy RN  Infection Prevention: hand hygiene promoted  Goal: Optimal Comfort and  Wellbeing  Outcome: Progressing  Intervention: Monitor Pain and Promote Comfort  Recent Flowsheet Documentation  Taken 1/13/2025 1000 by Yamileth Abernathy RN  Pain Management Interventions: care clustered  Taken 1/13/2025 0900 by Yamileth Abernathy RN  Pain Management Interventions: care clustered  Taken 1/13/2025 0800 by Yamileth Abernathy RN  Pain Management Interventions: care clustered  Taken 1/13/2025 0700 by Yamileth Abernathy RN  Pain Management Interventions: care clustered  Intervention: Provide Person-Centered Care  Recent Flowsheet Documentation  Taken 1/13/2025 0800 by Yamileth Abernathy RN  Trust Relationship/Rapport: care explained  Goal: Readiness for Transition of Care  Outcome: Progressing     Problem: Comorbidity Management  Goal: Maintenance of Asthma Control  Outcome: Progressing  Intervention: Maintain Asthma Symptom Control  Recent Flowsheet Documentation  Taken 1/13/2025 0800 by Yamileth Abernathy RN  Medication Review/Management: medications reviewed  Goal: Blood Pressure in Desired Range  Outcome: Progressing  Intervention: Maintain Blood Pressure Management  Recent Flowsheet Documentation  Taken 1/13/2025 0800 by Yamileth Abernathy RN  Medication Review/Management: medications reviewed     Problem: Skin Injury Risk Increased  Goal: Skin Health and Integrity  Outcome: Progressing  Intervention: Optimize Skin Protection  Recent Flowsheet Documentation  Taken 1/13/2025 1000 by Yamileth Abernathy RN  Activity Management: bedrest  Head of Bed (HOB) Positioning: HOB at 30-45 degrees  Taken 1/13/2025 0800 by Yamileth Abernathy RN  Activity Management: bedrest  Pressure Reduction Techniques: frequent weight shift encouraged  Head of Bed (HOB) Positioning: HOB at 30-45 degrees  Pressure Reduction Devices: specialty bed utilized  Skin Protection: transparent dressing maintained  Taken 1/13/2025 0600 by Yamileth Abernathy RN  Head of Bed (HOB) Positioning: HOB at 30-45 degrees   Goal Outcome Evaluation: remain HDS,  monitor vs/labs, airvo/bipap management, promote adequate oxygenation, promote rest/relaxation, wean O2 as able.

## 2025-01-13 NOTE — PROGRESS NOTES
Dr. RETA Hansen    [facility]        Patient ID:  Name:  Omar Choudhary  MRN:  [id number]  1945  79 y.o.  male            CC/Reason for visit: Pulmonary embolism    Interval hx: Patient is still requiring 90% high flow oxygen, but rapidly weaning off.  Denies chest  No shortness of breath at rest but has not ambulated yet today      ROS: No hemoptysis, no abdominal pain    I reviewed old medical records.  Past medical history, social history and family history: Unchanged from admission H&P.      Vitals:  Vitals:    01/13/25 1100 01/13/25 1132 01/13/25 1200 01/13/25 1300   BP: 129/81 129/84 (!) 140/102 155/95   BP Location: Left arm  Left arm Left arm   Patient Position: Lying  Lying Lying   Pulse: 78 74 77 90   Resp: 20 18 20 20   Temp:   98.4 °F (36.9 °C)    TempSrc:   Oral    SpO2: 100% 94% 93% 92%   Weight:       Height:         FiO2 (%): 100 %     Body mass index is 28.32 kg/m².    Intake/Output Summary (Last 24 hours) at 1/13/2025 1413  Last data filed at 1/13/2025 1300  Gross per 24 hour   Intake 240 ml   Output 350 ml   Net -110 ml       Exam:  GEN:  No distress  Alert, oriented x 4, moves all 4 extremities without focal deficits throughout  LUNGS: Clear breath sounds bilat, no use of accessory muscles  CV:  Normal S1S2, without murmur, no edema  ABD:  Non tender, no enlarged liver or masses      Scheduled meds:  ipratropium-albuterol, 3 mL, Nebulization, 4x Daily - RT  mupirocin, 1 Application, Each Nare, BID  senna-docusate sodium, 2 tablet, Oral, BID  sodium chloride, 10 mL, Intravenous, Q12H      IV meds:                      Pharmacy to Dose enoxaparin (LOVENOX),   Pharmacy to dose warfarin,         Data Review:   I reviewed the patient's medications and new clinical results.    COVID19   Date Value Ref Range Status   01/10/2025 Not Detected Not Detected - Ref. Range Final         Lab Results   Component Value Date    CALCIUM 7.9 (L) 01/13/2025    PHOS 3.3 01/13/2025     MG 2.3 01/13/2025    MG 2.5 (H) 01/12/2025    MG 2.4 01/12/2025     Results from last 7 days   Lab Units 01/13/25  0330 01/12/25  0458 01/12/25  0053 01/11/25  0244 01/10/25  1819 01/10/25  1638   SODIUM mmol/L 141 143 141 140  --  140   POTASSIUM mmol/L 4.2 4.0 4.0 4.5  --  4.2   CHLORIDE mmol/L 107 109* 109* 108*  --  103   CO2 mmol/L 22.0 25.1 22.0 19.2*  --  21.5*   BUN mg/dL 20 21 23 23  --  26*   CREATININE mg/dL 0.87 0.89 0.91 0.90  --  1.21   CALCIUM mg/dL 7.9* 8.1* 7.9* 8.2*  --  9.1   GLUCOSE mg/dL 110* 112* 144* 116*  --  135*   WBC 10*3/mm3 8.55 8.72  --  11.84*  --  16.87*   HEMOGLOBIN g/dL 12.3* 13.1  --  15.3  --  17.3   PLATELETS 10*3/mm3 153 148  --  185  --  223   INR   --   --   --   --  1.04  --    PROBNP pg/mL  --   --   --   --   --  3,412.0*   PROCALCITONIN ng/mL  --   --   --   --   --  0.07             Results from last 7 days   Lab Units 01/10/25  1748 01/10/25  1638   HSTROP T ng/L 118* 115*     Results from last 7 days   Lab Units 01/10/25  2044 01/10/25  1745   PH, ARTERIAL pH units 7.510* 7.481*   PCO2, ARTERIAL mm Hg 26.8* 28.3*   PO2 ART mm Hg 53.8* 53.3*   O2 SATURATION ART % 91.1* 90.1*   MODALITY  HFNC HFNC          ASSESSMENT:     Acute saddle pulmonary embolism  Acute hypoxic respiratory failure requiring heated high flow nasal cannula  Acute saddle pulmonary embolism, post catheter-based pulmonary artery embolectomy  Right ventricular failure  Severe pulmonary hypertension  Lactic acidosis  Leukocytosis  Hypertension  Chronic low back pain  Former smoker, quit 1992  Scoliosis  Hyperlipidemia  ADHD  Asthma -follows with Dr. Jerrod Lagunas  Paralyzed left hemidiaphragm  Obstructive sleep apnea      PLAN:  Patient and all problems new to me.  He developed large pulmonary embolism with right-sided heart failure and required percutaneous catheter-based pulmonary artery thromboembolectomy by cardiology.  Still requiring significant amount of oxygen, high FiO2 requirements.  Continue  weaning oxygen as tolerated.  Now on Lovenox, bridging to achieve therapeutic INR on Coumadin.  He was started on heparin when he arrived.  He will need repeat echocardiogram in 3 months to check on pulmonary hypertension.  Continue CPAP at night.  Asthma stable.  Continue maintenance inhalers.  He has chronically paralyzed left hemidiaphragm and ELINOR, needs CPAP every night.  Transferred to telemetry floor today  He will have to follow-up with Dr. Jerrod Lagunas, his pulmonologist as outpatient        I reviewed the chart and other providers notes and reviewed labs.  Copied text in this note has been reviewed and is accurate as of today      David Hansen MD  1/13/2025

## 2025-01-13 NOTE — CASE MANAGEMENT/SOCIAL WORK
Discharge Planning Assessment  [facility]     Patient Name: Omar Choudhary  MRN: [id number]  Today's Date: 1/13/2025    Admit Date: 1/10/2025    Plan: Home, family to transport   Discharge Needs Assessment       Row Name 01/13/25 1059       Living Environment    People in Home alone    Current Living Arrangements home    Primary Care Provided by self    Provides Primary Care For no one    Family Caregiver if Needed friend(s)    Able to Return to Prior Arrangements yes       Transition Planning    Patient/Family Anticipates Transition to family members' home    Transportation Anticipated family or friend will provide       Discharge Needs Assessment    Equipment Currently Used at Home cane, straight;walker, rolling;cpap    Concerns to be Addressed discharge planning                   Discharge Plan       Row Name 01/13/25 1100       Plan    Plan Home, family to transport    Plan Comments CCP spoke with patient; explained role, verified facesheet, and discussed dc plan. Patient uses a cpap, cane, and has a walker if needed. He lives alone, his significant other is out of town for 4 more months. He plans on going to his friend Batsheva's house at discharge. Patient has no history of HH or SNF. Patient denies any dc needs at this time and reports plan is home via friend transport. CCP will continue to follow for discharge planning needs pending clinical course. ARELI, NISHANTW                  Continued Care and Services - Admitted Since 1/10/2025    No active coordination exists for this encounter.       Selected Continued Care - Episodes Includes continued care and service providers with selected services from the active episodes listed below      Chronic Care Management Episode start date: 1/13/2025 (Paused)   There are no active outsourced providers for this episode.                    Demographic Summary       Row Name 01/13/25 1059       General Information    Admission Type inpatient    Arrived From home    Referral  Source admission list    Reason for Consult discharge planning    Preferred Language English                   Functional Status       Row Name 01/13/25 1059       Functional Status    Usual Activity Tolerance good    Current Activity Tolerance good       Functional Status, IADL    Medications assistive equipment    Meal Preparation assistive equipment    Housekeeping assistive equipment    Laundry assistive equipment    Shopping assistive equipment       Mental Status    General Appearance WDL WDL                   Psychosocial    No documentation.                  Abuse/Neglect    No documentation.                  Legal    No documentation.                  Substance Abuse    No documentation.                  Patient Forms    No documentation.                     MIKE Fried

## 2025-01-14 PROBLEM — G89.29 CHRONIC LOW BACK PAIN: Status: ACTIVE | Noted: 2025-01-14

## 2025-01-14 PROBLEM — I26.09 ACUTE COR PULMONALE: Status: ACTIVE | Noted: 2025-01-14

## 2025-01-14 PROBLEM — M54.50 CHRONIC LOW BACK PAIN: Status: ACTIVE | Noted: 2025-01-14

## 2025-01-14 LAB
ACT BLD: 141 SECONDS (ref 82–152)
ANION GAP SERPL CALCULATED.3IONS-SCNC: 8.2 MMOL/L (ref 5–15)
BASOPHILS # BLD AUTO: 0.01 10*3/MM3 (ref 0–0.2)
BASOPHILS NFR BLD AUTO: 0.1 % (ref 0–1.5)
BUN SERPL-MCNC: 18 MG/DL (ref 8–23)
BUN/CREAT SERPL: 20.5 (ref 7–25)
CALCIUM SPEC-SCNC: 7.9 MG/DL (ref 8.6–10.5)
CHLORIDE SERPL-SCNC: 106 MMOL/L (ref 98–107)
CO2 SERPL-SCNC: 22.8 MMOL/L (ref 22–29)
CREAT SERPL-MCNC: 0.88 MG/DL (ref 0.76–1.27)
DEPRECATED RDW RBC AUTO: 44.4 FL (ref 37–54)
EGFRCR SERPLBLD CKD-EPI 2021: 88.6 ML/MIN/1.73
EOSINOPHIL # BLD AUTO: 0.26 10*3/MM3 (ref 0–0.4)
EOSINOPHIL NFR BLD AUTO: 3.6 % (ref 0.3–6.2)
ERYTHROCYTE [DISTWIDTH] IN BLOOD BY AUTOMATED COUNT: 13.4 % (ref 12.3–15.4)
GLUCOSE BLDC GLUCOMTR-MCNC: 126 MG/DL (ref 70–130)
GLUCOSE BLDC GLUCOMTR-MCNC: 136 MG/DL (ref 70–130)
GLUCOSE SERPL-MCNC: 104 MG/DL (ref 65–99)
HCT VFR BLD AUTO: 30.8 % (ref 37.5–51)
HGB BLD-MCNC: 10.8 G/DL (ref 13–17.7)
IMM GRANULOCYTES # BLD AUTO: 0.1 10*3/MM3 (ref 0–0.05)
IMM GRANULOCYTES NFR BLD AUTO: 1.4 % (ref 0–0.5)
INR PPP: 1.32 (ref 0.9–1.1)
LYMPHOCYTES # BLD AUTO: 1.29 10*3/MM3 (ref 0.7–3.1)
LYMPHOCYTES NFR BLD AUTO: 17.9 % (ref 19.6–45.3)
MCH RBC QN AUTO: 32.3 PG (ref 26.6–33)
MCHC RBC AUTO-ENTMCNC: 35.1 G/DL (ref 31.5–35.7)
MCV RBC AUTO: 92.2 FL (ref 79–97)
MONOCYTES # BLD AUTO: 0.72 10*3/MM3 (ref 0.1–0.9)
MONOCYTES NFR BLD AUTO: 10 % (ref 5–12)
NEUTROPHILS NFR BLD AUTO: 4.83 10*3/MM3 (ref 1.7–7)
NEUTROPHILS NFR BLD AUTO: 67 % (ref 42.7–76)
PLATELET # BLD AUTO: 130 10*3/MM3 (ref 140–450)
PMV BLD AUTO: 10.7 FL (ref 6–12)
POTASSIUM SERPL-SCNC: 4.4 MMOL/L (ref 3.5–5.2)
PROTHROMBIN TIME: 16.6 SECONDS (ref 11.7–14.2)
RBC # BLD AUTO: 3.34 10*6/MM3 (ref 4.14–5.8)
SODIUM SERPL-SCNC: 137 MMOL/L (ref 136–145)
WBC NRBC COR # BLD AUTO: 7.21 10*3/MM3 (ref 3.4–10.8)

## 2025-01-14 PROCEDURE — 83540 ASSAY OF IRON: CPT | Performed by: INTERNAL MEDICINE

## 2025-01-14 PROCEDURE — 85025 COMPLETE CBC W/AUTO DIFF WBC: CPT | Performed by: INTERNAL MEDICINE

## 2025-01-14 PROCEDURE — 25010000002 ENOXAPARIN PER 10 MG: Performed by: INTERNAL MEDICINE

## 2025-01-14 PROCEDURE — 84466 ASSAY OF TRANSFERRIN: CPT | Performed by: INTERNAL MEDICINE

## 2025-01-14 PROCEDURE — 99232 SBSQ HOSP IP/OBS MODERATE 35: CPT | Performed by: INTERNAL MEDICINE

## 2025-01-14 PROCEDURE — 94660 CPAP INITIATION&MGMT: CPT

## 2025-01-14 PROCEDURE — 94760 N-INVAS EAR/PLS OXIMETRY 1: CPT

## 2025-01-14 PROCEDURE — 85610 PROTHROMBIN TIME: CPT | Performed by: INTERNAL MEDICINE

## 2025-01-14 PROCEDURE — 82948 REAGENT STRIP/BLOOD GLUCOSE: CPT

## 2025-01-14 PROCEDURE — 94664 DEMO&/EVAL PT USE INHALER: CPT

## 2025-01-14 PROCEDURE — 94799 UNLISTED PULMONARY SVC/PX: CPT

## 2025-01-14 PROCEDURE — 94761 N-INVAS EAR/PLS OXIMETRY MLT: CPT

## 2025-01-14 PROCEDURE — 80048 BASIC METABOLIC PNL TOTAL CA: CPT | Performed by: STUDENT IN AN ORGANIZED HEALTH CARE EDUCATION/TRAINING PROGRAM

## 2025-01-14 PROCEDURE — 82728 ASSAY OF FERRITIN: CPT | Performed by: INTERNAL MEDICINE

## 2025-01-14 RX ORDER — BUDESONIDE AND FORMOTEROL FUMARATE DIHYDRATE 160; 4.5 UG/1; UG/1
2 AEROSOL RESPIRATORY (INHALATION)
Status: DISCONTINUED | OUTPATIENT
Start: 2025-01-15 | End: 2025-01-19 | Stop reason: HOSPADM

## 2025-01-14 RX ORDER — LOSARTAN POTASSIUM 50 MG/1
25 TABLET ORAL
Status: DISCONTINUED | OUTPATIENT
Start: 2025-01-14 | End: 2025-01-19 | Stop reason: HOSPADM

## 2025-01-14 RX ORDER — ALBUTEROL SULFATE 0.83 MG/ML
2.5 SOLUTION RESPIRATORY (INHALATION) EVERY 6 HOURS PRN
Status: DISCONTINUED | OUTPATIENT
Start: 2025-01-14 | End: 2025-01-19 | Stop reason: HOSPADM

## 2025-01-14 RX ORDER — MONTELUKAST SODIUM 10 MG/1
10 TABLET ORAL DAILY
Status: DISCONTINUED | OUTPATIENT
Start: 2025-01-15 | End: 2025-01-19 | Stop reason: HOSPADM

## 2025-01-14 RX ORDER — GABAPENTIN 300 MG/1
600 CAPSULE ORAL NIGHTLY
Status: DISCONTINUED | OUTPATIENT
Start: 2025-01-15 | End: 2025-01-19 | Stop reason: HOSPADM

## 2025-01-14 RX ORDER — GABAPENTIN 300 MG/1
300 CAPSULE ORAL 2 TIMES DAILY
Status: DISCONTINUED | OUTPATIENT
Start: 2025-01-15 | End: 2025-01-19 | Stop reason: HOSPADM

## 2025-01-14 RX ADMIN — MUPIROCIN 1 APPLICATION: 20 OINTMENT TOPICAL at 20:24

## 2025-01-14 RX ADMIN — MUPIROCIN 1 APPLICATION: 20 OINTMENT TOPICAL at 08:13

## 2025-01-14 RX ADMIN — IPRATROPIUM BROMIDE AND ALBUTEROL SULFATE 3 ML: 2.5; .5 SOLUTION RESPIRATORY (INHALATION) at 11:40

## 2025-01-14 RX ADMIN — Medication 10 ML: at 20:24

## 2025-01-14 RX ADMIN — WARFARIN SODIUM 5 MG: 5 TABLET ORAL at 18:02

## 2025-01-14 RX ADMIN — HYDROCODONE BITARTRATE AND ACETAMINOPHEN 1 TABLET: 5; 325 TABLET ORAL at 16:34

## 2025-01-14 RX ADMIN — IPRATROPIUM BROMIDE AND ALBUTEROL SULFATE 3 ML: 2.5; .5 SOLUTION RESPIRATORY (INHALATION) at 07:48

## 2025-01-14 RX ADMIN — HYDROCODONE BITARTRATE AND ACETAMINOPHEN 1 TABLET: 5; 325 TABLET ORAL at 06:19

## 2025-01-14 RX ADMIN — ENOXAPARIN SODIUM 80 MG: 100 INJECTION SUBCUTANEOUS at 08:13

## 2025-01-14 RX ADMIN — ENOXAPARIN SODIUM 80 MG: 100 INJECTION SUBCUTANEOUS at 22:33

## 2025-01-14 RX ADMIN — Medication 10 ML: at 08:13

## 2025-01-14 RX ADMIN — SENNOSIDES AND DOCUSATE SODIUM 2 TABLET: 50; 8.6 TABLET ORAL at 20:22

## 2025-01-14 RX ADMIN — IPRATROPIUM BROMIDE AND ALBUTEROL SULFATE 3 ML: 2.5; .5 SOLUTION RESPIRATORY (INHALATION) at 21:32

## 2025-01-14 RX ADMIN — LOSARTAN POTASSIUM 25 MG: 50 TABLET, FILM COATED ORAL at 09:47

## 2025-01-14 RX ADMIN — IPRATROPIUM BROMIDE AND ALBUTEROL SULFATE 3 ML: 2.5; .5 SOLUTION RESPIRATORY (INHALATION) at 16:44

## 2025-01-14 NOTE — PLAN OF CARE
Goal Outcome Evaluation:      VSS. No significant events overnight. Pt kept CPAP machine on throughout the night, tolerated well.

## 2025-01-14 NOTE — CASE MANAGEMENT/SOCIAL WORK
Continued Stay Note  [facility]     Patient Name: Omar Choudhary  MRN: [id number]  Today's Date: 1/14/2025    Admit Date: 1/10/2025    Plan: Pending clinical course & PT/OT evaluations ordered 1/14   Discharge Plan       Row Name 01/14/25 1420       Plan    Plan Pending clinical course & PT/OT evaluations ordered 1/14    Patient/Family in Agreement with Plan yes    Plan Comments Pt discussed in multidisciplinary rounds. Clinicals reviewed. CCP met with pt at bedside, introduced self, role, & DC planning discussed. CCP updated pt has orders to downgrade to telemetry & move out of ICU. Pt has been assigned a telemetry bed on 4E. Pt verbalized understanding. Pt reports he has gotten up to chair with nursing staff to eat & was very winded. Pt asked CCP if he can go to [place]F [place]. CCP explained IRF & pt verbalized understanding. Pt reports Batsheva is out of the country, but he has support from several other local friends & they will transport him. DC plan pending hospital course & PT/OT evaluations ordered 1/14. Pt not yet medically ready for DC; CCP on 4E will now follow. DC barriers: optiflow & PT eval ordered 1/14. RADHA Moon/CCP             Expected Discharge Date and Time       Expected Discharge Date Expected Discharge Time    Jan 17, 2025    Latoya Vides RN

## 2025-01-14 NOTE — PROGRESS NOTES
Dr. RETA Hansen    [facility]        Patient ID:  Name:  Omar Choudhary  MRN:  [id number]  1945  79 y.o.  male            CC/Reason for visit: Pulmonary embolism    Interval hx: No shortness of breath at rest  FiO2 is improving, now on 60% FiO2, 60 L Airvo high flow oxygen system    ROS: No hemoptysis, no abdominal pain    Vitals:  Vitals:    01/14/25 0600 01/14/25 0748 01/14/25 0757 01/14/25 1140   BP:       Pulse:  68 65 68   Resp:  20 20 20   Temp:       TempSrc:       SpO2:  98% 98% 100%   Weight: 79.4 kg (175 lb 0.7 oz)      Height:         FiO2 (%): 100 %     Body mass index is 28.25 kg/m².    Intake/Output Summary (Last 24 hours) at 1/14/2025 1201  Last data filed at 1/13/2025 2100  Gross per 24 hour   Intake --   Output 600 ml   Net -600 ml       Exam:  GEN:  No distress  Alert, oriented x 3.   LUNGS: Clear breath sounds bilat, no use of accessory muscles  CV:  Normal S1S2, without murmur, no edema  ABD:  Non tender, no enlarged liver or masses      Scheduled meds:  enoxaparin, 1 mg/kg, Subcutaneous, Q12H  ipratropium-albuterol, 3 mL, Nebulization, 4x Daily - RT  losartan, 25 mg, Oral, Q24H  mupirocin, 1 Application, Each Nare, BID  senna-docusate sodium, 2 tablet, Oral, BID  sodium chloride, 10 mL, Intravenous, Q12H  warfarin, 5 mg, Oral, Daily      IV meds:                      Pharmacy to Dose enoxaparin (LOVENOX),   Pharmacy to dose warfarin,         Data Review:   I reviewed the patient's medications and new clinical results.            Results from last 7 days   Lab Units 01/14/25  0357 01/13/25  0330 01/12/25  0458 01/11/25  0244 01/10/25  1819 01/10/25  1638   SODIUM mmol/L 137 141 143   < >  --  140   POTASSIUM mmol/L 4.4 4.2 4.0   < >  --  4.2   CHLORIDE mmol/L 106 107 109*   < >  --  103   CO2 mmol/L 22.8 22.0 25.1   < >  --  21.5*   BUN mg/dL 18 20 21   < >  --  26*   CREATININE mg/dL 0.88 0.87 0.89   < >  --  1.21   CALCIUM mg/dL 7.9* 7.9* 8.1*   < >  --  9.1    GLUCOSE mg/dL 104* 110* 112*   < >  --  135*   WBC 10*3/mm3 7.21 8.55 8.72   < >  --  16.87*   HEMOGLOBIN g/dL 10.8* 12.3* 13.1   < >  --  17.3   PLATELETS 10*3/mm3 130* 153 148   < >  --  223   INR  1.32*  --   --   --  1.04  --    PROBNP pg/mL  --   --   --   --   --  3,412.0*   PROCALCITONIN ng/mL  --   --   --   --   --  0.07    < > = values in this interval not displayed.              ASSESSMENT:   Acute saddle pulmonary embolism  Acute hypoxic respiratory failure requiring heated high flow nasal cannula  Acute saddle pulmonary embolism, post catheter-based pulmonary artery embolectomy  Right ventricular failure  Severe pulmonary hypertension  Lactic acidosis  Leukocytosis  Hypertension  Chronic low back pain  Former smoker, quit 1992  Scoliosis  Hyperlipidemia  ADHD  Asthma -follows with Dr. Jerrod Lagunas  Paralyzed left hemidiaphragm  Obstructive sleep apnea      PLAN:  Oxygenation gradually improving.  Continue weaning oxygen as tolerated.  Transferred to telemetry today.  Request hospitalist service to take over care.  Patient will need bridging on Lovenox until INR becomes therapeutic.  Continue Coumadin.  Cardiology performed percutaneous pulmonary artery thrombectomy several days ago.  He will need to follow-up with his usual pulmonologist, Dr. Lagunas across the bridge in [facility].  He will also need cardiology follow-up for his acute cor pulmonale, right ventricular failure and pulmonary hypertension.        David Hansen MD  1/14/2025

## 2025-01-14 NOTE — PROGRESS NOTES
[facility] Follow Up    Chief Complaint: follow up pulmonary embolism    Interval History: Breathing and oxygen requirement slowly improving.  He is down to 60 L at 60% FiO2.  Denies any pain.    Objective:     Objective:  Temp:  [97.6 °F (36.4 °C)-98.6 °F (37 °C)] 97.6 °F (36.4 °C)  Heart Rate:  [61-90] 65  Resp:  [18-20] 20  BP: (117-155)/() 129/90     Intake/Output Summary (Last 24 hours) at 1/14/2025 0747  Last data filed at 1/13/2025 2100  Gross per 24 hour   Intake 240 ml   Output 600 ml   Net -360 ml     Body mass index is 28.25 kg/m².      01/11/25  0000 01/13/25  0600 01/14/25  0600   Weight: 78.8 kg (173 lb 11.6 oz) 79.6 kg (175 lb 7.8 oz) 79.4 kg (175 lb 0.7 oz)     Weight change: -0.2 kg (-7.1 oz)      Physical Exam:   General : Alert, cooperative, in no acute distress.  Neuro: Alert,cooperative and oriented.  Lungs: CTAB. Normal respiratory effort and rate.  CV: Regular rate and rhythm, normal S1 and S2, no murmurs, gallops or rubs.  ABD: Soft, nontender, nondistended. Positive bowel sounds.  Extr: No edema or cyanosis, moves all extremities.    Lab Review:   Results from last 7 days   Lab Units 01/14/25  0357 01/13/25  0330   SODIUM mmol/L 137 141   POTASSIUM mmol/L 4.4 4.2   CHLORIDE mmol/L 106 107   CO2 mmol/L 22.8 22.0   BUN mg/dL 18 20   CREATININE mg/dL 0.88 0.87   GLUCOSE mg/dL 104* 110*   CALCIUM mg/dL 7.9* 7.9*     Results from last 7 days   Lab Units 01/10/25  1748 01/10/25  1638   HSTROP T ng/L 118* 115*     Results from last 7 days   Lab Units 01/14/25  0357 01/13/25  0330   WBC 10*3/mm3 7.21 8.55   HEMOGLOBIN g/dL 10.8* 12.3*   HEMATOCRIT % 30.8* 36.0*   PLATELETS 10*3/mm3 130* 153     Results from last 7 days   Lab Units 01/14/25  0357 01/13/25  0330 01/12/25  1726 01/11/25  0019 01/10/25  1819   INR  1.32*  --   --   --  1.04   APTT seconds  --  35.9* 153.3*   < > 29.8    < > = values in this interval not displayed.     Results from last 7 days   Lab Units  "01/13/25  0330 01/12/25  0458   MAGNESIUM mg/dL 2.3 2.5*           Invalid input(s): \"LDLCALC\"  Results from last 7 days   Lab Units 01/10/25  1638   PROBNP pg/mL 3,412.0*         I reviewed the patient's new clinical results.  I personally viewed and interpreted the patient's EKG  Current Medications:   Scheduled Meds:enoxaparin, 1 mg/kg, Subcutaneous, Q12H  ipratropium-albuterol, 3 mL, Nebulization, 4x Daily - RT  mupirocin, 1 Application, Each Nare, BID  senna-docusate sodium, 2 tablet, Oral, BID  sodium chloride, 10 mL, Intravenous, Q12H  warfarin, 5 mg, Oral, Daily      Continuous Infusions:Pharmacy to Dose enoxaparin (LOVENOX),   Pharmacy to dose warfarin,         Allergies:  Allergies   Allergen Reactions    Statins Myalgia     Other reaction(s): muscle soreness       Assessment/Plan:     1.  Acute bilateral pulmonary embolism with saddle embolism and acute cor pulmonale.  Status post bilateral pulmonary artery thrombectomy on 1/11.  Noted to have only a small to moderate amount of residual thrombus by the time of procedure which was removed.  Now on enoxaparin bridge and warfarin which is being dosed by pharmacy.  2.  Acute right lower extremity DVT.  Noted the peroneal, popliteal and distal femoral vein.  3.  Acute hypoxic respiratory failure.  Still with high O2 requirement.  On high flow nasal cannula during the day and BiPAP at night.   4.  Severe pulmonary hypertension.  Systolic pressures in the 70s to 80s and mean PA of 44 to 46 mmHg.  Noted by right heart catheterization both before and following thrombectomy without any improvement.  Suspected this is in part chronic.  5.  Hypertension.  Pressures have been mildly elevated since his arrival.  He has been off of his usual dose of losartan.  6.  Paralyzed left hemidiaphragm  7.  Asthma  8.  Obstructive sleep apnea  9.  Thrombocytopenia.  Mild but new this morning.     -Resume losartan 25 mg for elevated blood pressures.  - Continue warfarin with " enoxaparin bridge.    -Continue to wean oxygen as tolerates.  -Monitor platelets.      Nancy Hdez MD  01/14/25  07:47 EST

## 2025-01-14 NOTE — PLAN OF CARE
Goal Outcome Evaluation:  Plan of Care Reviewed With: patient        Progress: improving  Outcome Evaluation: Transferred out of the unit to 4E this shift. Remains on optiflow at 60L and 60%. On PO warfarin. Bedrest restrictions lifted and PT/OT to evaluate patient tomorrow. No further issues at this time.

## 2025-01-15 ENCOUNTER — TELEPHONE (OUTPATIENT)
Dept: CASE MANAGEMENT | Facility: CLINIC | Age: 80
End: 2025-01-15
Payer: MEDICARE

## 2025-01-15 PROBLEM — D64.9 ANEMIA: Status: ACTIVE | Noted: 2025-01-15

## 2025-01-15 LAB
ACT BLD: 251 SECONDS (ref 82–152)
DEPRECATED RDW RBC AUTO: 45.1 FL (ref 37–54)
ERYTHROCYTE [DISTWIDTH] IN BLOOD BY AUTOMATED COUNT: 13.2 % (ref 12.3–15.4)
FERRITIN SERPL-MCNC: 225 NG/ML (ref 30–400)
GLUCOSE BLDC GLUCOMTR-MCNC: 100 MG/DL (ref 70–130)
GLUCOSE BLDC GLUCOMTR-MCNC: 101 MG/DL (ref 70–130)
HCT VFR BLD AUTO: 30.7 % (ref 37.5–51)
HCT VFR BLDA CALC: 35 % (ref 38–51)
HCT VFR BLDA CALC: 39 % (ref 38–51)
HCT VFR BLDA CALC: 39 % (ref 38–51)
HGB BLD-MCNC: 10.4 G/DL (ref 13–17.7)
HGB BLDA-MCNC: 11.9 G/DL (ref 12–17)
HGB BLDA-MCNC: 13.3 G/DL (ref 12–17)
HGB BLDA-MCNC: 13.3 G/DL (ref 12–17)
INR PPP: 1.25 (ref 0.9–1.1)
IRON 24H UR-MRATE: 51 MCG/DL (ref 59–158)
IRON SATN MFR SERPL: 19 % (ref 20–50)
MCH RBC QN AUTO: 31.9 PG (ref 26.6–33)
MCHC RBC AUTO-ENTMCNC: 33.9 G/DL (ref 31.5–35.7)
MCV RBC AUTO: 94.2 FL (ref 79–97)
PLATELET # BLD AUTO: 147 10*3/MM3 (ref 140–450)
PMV BLD AUTO: 10.7 FL (ref 6–12)
PROTHROMBIN TIME: 16 SECONDS (ref 11.7–14.2)
RBC # BLD AUTO: 3.26 10*6/MM3 (ref 4.14–5.8)
RETICS # AUTO: 0.1 10*6/MM3 (ref 0.02–0.13)
RETICS/RBC NFR AUTO: 3.1 % (ref 0.7–1.9)
SAO2 % BLDA: 50 % (ref 95–98)
SAO2 % BLDA: 58 % (ref 95–98)
SAO2 % BLDA: 64 % (ref 95–98)
TIBC SERPL-MCNC: 267 MCG/DL (ref 298–536)
TRANSFERRIN SERPL-MCNC: 179 MG/DL (ref 200–360)
WBC NRBC COR # BLD AUTO: 6.75 10*3/MM3 (ref 3.4–10.8)

## 2025-01-15 PROCEDURE — 85610 PROTHROMBIN TIME: CPT | Performed by: INTERNAL MEDICINE

## 2025-01-15 PROCEDURE — 97530 THERAPEUTIC ACTIVITIES: CPT

## 2025-01-15 PROCEDURE — 97162 PT EVAL MOD COMPLEX 30 MIN: CPT

## 2025-01-15 PROCEDURE — 85045 AUTOMATED RETICULOCYTE COUNT: CPT | Performed by: INTERNAL MEDICINE

## 2025-01-15 PROCEDURE — 85027 COMPLETE CBC AUTOMATED: CPT | Performed by: INTERNAL MEDICINE

## 2025-01-15 PROCEDURE — 82948 REAGENT STRIP/BLOOD GLUCOSE: CPT

## 2025-01-15 PROCEDURE — 94799 UNLISTED PULMONARY SVC/PX: CPT

## 2025-01-15 PROCEDURE — 99232 SBSQ HOSP IP/OBS MODERATE 35: CPT | Performed by: NURSE PRACTITIONER

## 2025-01-15 PROCEDURE — 94664 DEMO&/EVAL PT USE INHALER: CPT

## 2025-01-15 PROCEDURE — 25010000002 ENOXAPARIN PER 10 MG: Performed by: INTERNAL MEDICINE

## 2025-01-15 PROCEDURE — 94660 CPAP INITIATION&MGMT: CPT

## 2025-01-15 PROCEDURE — 94761 N-INVAS EAR/PLS OXIMETRY MLT: CPT

## 2025-01-15 PROCEDURE — 97166 OT EVAL MOD COMPLEX 45 MIN: CPT

## 2025-01-15 RX ORDER — ASCORBIC ACID 500 MG
500 TABLET ORAL DAILY
Status: DISCONTINUED | OUTPATIENT
Start: 2025-01-15 | End: 2025-01-19 | Stop reason: HOSPADM

## 2025-01-15 RX ORDER — GUAIFENESIN 600 MG/1
1200 TABLET, EXTENDED RELEASE ORAL EVERY 12 HOURS SCHEDULED
Status: DISCONTINUED | OUTPATIENT
Start: 2025-01-15 | End: 2025-01-19 | Stop reason: HOSPADM

## 2025-01-15 RX ORDER — GUAIFENESIN 600 MG/1
600 TABLET, EXTENDED RELEASE ORAL EVERY 12 HOURS SCHEDULED
Status: DISCONTINUED | OUTPATIENT
Start: 2025-01-15 | End: 2025-01-15

## 2025-01-15 RX ORDER — FERROUS SULFATE 325(65) MG
325 TABLET ORAL
Status: DISCONTINUED | OUTPATIENT
Start: 2025-01-15 | End: 2025-01-19 | Stop reason: HOSPADM

## 2025-01-15 RX ORDER — WARFARIN SODIUM 7.5 MG/1
7.5 TABLET ORAL
Status: COMPLETED | OUTPATIENT
Start: 2025-01-15 | End: 2025-01-15

## 2025-01-15 RX ADMIN — GABAPENTIN 600 MG: 300 CAPSULE ORAL at 20:48

## 2025-01-15 RX ADMIN — IPRATROPIUM BROMIDE AND ALBUTEROL SULFATE 3 ML: 2.5; .5 SOLUTION RESPIRATORY (INHALATION) at 15:57

## 2025-01-15 RX ADMIN — SENNOSIDES AND DOCUSATE SODIUM 2 TABLET: 50; 8.6 TABLET ORAL at 10:00

## 2025-01-15 RX ADMIN — GUAIFENESIN 1200 MG: 600 TABLET, EXTENDED RELEASE ORAL at 20:35

## 2025-01-15 RX ADMIN — GABAPENTIN 300 MG: 300 CAPSULE ORAL at 16:29

## 2025-01-15 RX ADMIN — MUPIROCIN 1 APPLICATION: 20 OINTMENT TOPICAL at 10:00

## 2025-01-15 RX ADMIN — IPRATROPIUM BROMIDE AND ALBUTEROL SULFATE 3 ML: 2.5; .5 SOLUTION RESPIRATORY (INHALATION) at 07:16

## 2025-01-15 RX ADMIN — Medication 10 ML: at 10:55

## 2025-01-15 RX ADMIN — Medication 10 ML: at 20:55

## 2025-01-15 RX ADMIN — GABAPENTIN 600 MG: 300 CAPSULE ORAL at 00:08

## 2025-01-15 RX ADMIN — HYDROCODONE BITARTRATE AND ACETAMINOPHEN 1 TABLET: 5; 325 TABLET ORAL at 01:36

## 2025-01-15 RX ADMIN — LOSARTAN POTASSIUM 25 MG: 50 TABLET, FILM COATED ORAL at 09:59

## 2025-01-15 RX ADMIN — ENOXAPARIN SODIUM 80 MG: 100 INJECTION SUBCUTANEOUS at 11:50

## 2025-01-15 RX ADMIN — IPRATROPIUM BROMIDE AND ALBUTEROL SULFATE 3 ML: 2.5; .5 SOLUTION RESPIRATORY (INHALATION) at 12:04

## 2025-01-15 RX ADMIN — MONTELUKAST 10 MG: 10 TABLET, FILM COATED ORAL at 09:59

## 2025-01-15 RX ADMIN — OXYCODONE HYDROCHLORIDE AND ACETAMINOPHEN 500 MG: 500 TABLET ORAL at 11:50

## 2025-01-15 RX ADMIN — FERROUS SULFATE TAB 325 MG (65 MG ELEMENTAL FE) 325 MG: 325 (65 FE) TAB at 11:50

## 2025-01-15 RX ADMIN — IPRATROPIUM BROMIDE AND ALBUTEROL SULFATE 3 ML: 2.5; .5 SOLUTION RESPIRATORY (INHALATION) at 21:57

## 2025-01-15 RX ADMIN — SENNOSIDES AND DOCUSATE SODIUM 2 TABLET: 50; 8.6 TABLET ORAL at 20:48

## 2025-01-15 RX ADMIN — BUDESONIDE AND FORMOTEROL FUMARATE DIHYDRATE 2 PUFF: 160; 4.5 AEROSOL RESPIRATORY (INHALATION) at 22:07

## 2025-01-15 RX ADMIN — HYDROCODONE BITARTRATE AND ACETAMINOPHEN 1 TABLET: 5; 325 TABLET ORAL at 10:03

## 2025-01-15 RX ADMIN — HYDROCODONE BITARTRATE AND ACETAMINOPHEN 1 TABLET: 5; 325 TABLET ORAL at 20:34

## 2025-01-15 RX ADMIN — GABAPENTIN 300 MG: 300 CAPSULE ORAL at 09:58

## 2025-01-15 RX ADMIN — WARFARIN 7.5 MG: 7.5 TABLET ORAL at 17:27

## 2025-01-15 RX ADMIN — GUAIFENESIN 1200 MG: 600 TABLET, EXTENDED RELEASE ORAL at 09:59

## 2025-01-15 RX ADMIN — ENOXAPARIN SODIUM 80 MG: 100 INJECTION SUBCUTANEOUS at 20:33

## 2025-01-15 NOTE — THERAPY EVALUATION
Patient Name: Omar Choudhary  : 1945    MRN: [id number]                              Today's Date: 1/15/2025       Admit Date: 1/10/2025    Visit Dx:     ICD-10-CM ICD-9-CM   1. Acute saddle pulmonary embolism, unspecified whether acute cor pulmonale present  I26.92 415.13   2. Acute hypoxic respiratory failure  J96.01 518.81   3. History of asthma  Z87.09 V12.69   4. Acute saddle pulmonary embolism with acute cor pulmonale  I26.02 415.13     415.0     Patient Active Problem List   Diagnosis    Benign prostatic hyperplasia    Bursitis    Depression    Diverticulosis    Family history of malignant neoplasm of breast    Hyperlipidemia    Hypertension    Internal derangement of right knee    Knee effusion    Asthma    Obstructive sleep apnea syndrome    Osteoarthritis    Osteopenia    Pain in knee    Postnasal drip    Prepatellar bursitis    Sciatica of right side    Diaphragm paralysis    Spinal stenosis of lumbar region with neurogenic claudication    Degenerative lumbar spinal stenosis    Anxiety    Pinguecula of right eye    Lumbar radiculopathy, chronic    Bilateral pseudophakia    Diverticulosis of colon    Hemorrhoids without complication    Lumbar degenerative disc disease    Lumbar spondylosis    Other fecal abnormalities    Rectal bleeding    Scoliosis of lumbosacral region due to degenerative disease of spine in adult    Arthritis    Acute right-sided low back pain without sciatica    Sacroiliitis    Acquired spondylolisthesis    Acute saddle pulmonary embolism    Acute cor pulmonale    Chronic low back pain    Anemia     Past Medical History:   Diagnosis Date    ADHD (attention deficit hyperactivity disorder)     Hard to focus on tasks and follow through.    Allergic     Arthritis     Asthma     Carpal tunnel syndrome     no pain    Cataract     Depression     Erectile dysfunction     2018 at 73 years old    Family history of malignant neoplasm of breast 2019    Hyperlipidemia 2019     Hypertension 03/01/2012    Leg pain, right     Low back pain     Neuropathy     feet    Osteopenia     Poor balance     Reactive airway disease 01/15/2016    Scoliosis 1960    Shortness of breath     Sleep apnea     Substance abuse 1966    Alcoholic     Past Surgical History:   Procedure Laterality Date    CARDIAC CATHETERIZATION  1992    CARDIAC CATHETERIZATION N/A 1/11/2025    Procedure: Right Heart Cath;  Surgeon: Nancy Hdez MD;  Location:  [facility];  Service: Cardiovascular;  Laterality: N/A;    CARDIAC CATHETERIZATION  1/11/2025    Procedure: Percutaneous Manual Thrombectomy;  Surgeon: Nancy Hdez MD;  Location:  [facility];  Service: Cardiovascular;;    CARDIAC CATHETERIZATION Bilateral 1/11/2025    Procedure: Pulmonary angiography;  Surgeon: Nancy Hdez MD;  Location:  [facility];  Service: Cardiovascular;  Laterality: Bilateral;    COLONOSCOPY  2013    No polyps, slight diverticulitis    EYE SURGERY  2016    KNEE ARTHROSCOPY W/ ACL RECONSTRUCTION Right 2019    LUMBAR FUSION Bilateral 10/05/2022    Procedure: DAY 2 LUMBAR LAMINECTOMY TRANSFORAMINAL LUMBAR INTERBODY FUSION L2,L3,L4 WITH NEURO ROBOT;  Surgeon: John Do MD;  Location: [facility] MAIN OR;  Service: Robotics - Neuro;  Laterality: Bilateral;    LUMBAR FUSION N/A 10/04/2022    Procedure: DAY 1 LUMBAR LATERAL INTERBODY FUSION WITH NEURO ROBOT L2, L3.L4;  Surgeon: John Do MD;  Location: [facility] MAIN OR;  Service: Robotics - Neuro;  Laterality: N/A;  left side approach    MOUTH SURGERY      SPINE SURGERY  10/4&5/2022    Dr. John Do, [facility]      General Information       Row Name 01/15/25 1544          OT Time and Intention    Document Type evaluation  -JR     Mode of Treatment occupational therapy  -JR       Row Name 01/15/25 1544          General Information    Patient Profile Reviewed yes  -JR     Prior Level of Function independent:;ADL's   -JR     Existing Precautions/Restrictions oxygen therapy device and L/min  -     Barriers to Rehab none identified  -       Row Name 01/15/25 1544          Living Environment    People in Home alone  -       Row Name 01/15/25 1544          Home Main Entrance    Number of Stairs, Main Entrance eight  -       Row Name 01/15/25 1544          Stairs Within Home, Primary    Number of Stairs, Within Home, Primary other (see comments)  14 steps to BR  -JR     Stair Railings, Within Home, Primary railings safe and in good condition  -       Row Name 01/15/25 1544          Cognition    Orientation Status (Cognition) oriented x 4  -[facility] Name 01/15/25 1544          Safety Issues/Impairments Affecting Functional Mobility    Impairments Affecting Function (Mobility) balance;endurance/activity tolerance;shortness of breath  -     Comment, Safety Issues/Impairments (Mobility) gait belt and non skid socks  -               User Key  (r) = Recorded By, (t) = Taken By, (c) = Cosigned By      Initials Name Provider Type     Adam Albright OT Occupational Therapist                     Mobility/ADL's       Row Name 01/15/25 1544          Bed Mobility    Bed Mobility bed mobility (all) activities  -     All Activities, [place] (Bed Mobility) modified independence  -[facility] Name 01/15/25 1544          Transfers    Transfers sit-stand transfer  -[facility] Name 01/15/25 1544          Sit-Stand Transfer    Sit-Stand [place] (Transfers) supervision  -     Comment, (Sit-Stand Transfer) No AD  -               User Key  (r) = Recorded By, (t) = Taken By, (c) = Cosigned By      Initials Name Provider Type     Adam Albright OT Occupational Therapist                   Obj/Interventions       [facility] Name 01/15/25 1545          Sensory Assessment (Somatosensory)    Sensory Assessment (Somatosensory) sensation intact  -[facility] Name 01/15/25 1545          Vision Assessment/Intervention    Visual  Impairment/Limitations WFL  -JR       Row Name 01/15/25 1545          Range of Motion Comprehensive    General Range of Motion no range of motion deficits identified  -JR     Comment, General Range of Motion BUE WFL  -JR       Row Name 01/15/25 1545          Strength Comprehensive (MMT)    General Manual Muscle Testing (MMT) Assessment upper extremity strength deficits identified  -JR     Comment, General Manual Muscle Testing (MMT) Assessment BUE 4/5  -JR       Row Name 01/15/25 1545          Balance    Balance Assessment sitting static balance;sitting dynamic balance;standing static balance;standing dynamic balance  -JR     Static Sitting Balance independent  -JR     Dynamic Sitting Balance independent  -JR     Position, Sitting Balance sitting edge of bed  -JR     Static Standing Balance supervision  -JR     Dynamic Standing Balance contact guard  -JR     Position/Device Used, Standing Balance supported  -JR               User Key  (r) = Recorded By, (t) = Taken By, (c) = Cosigned By      Initials Name Provider Type    Adam Ng OT Occupational Therapist                   Goals/Plan       Row Name 01/15/25 1552          Bed Mobility Goal 1 (OT)    Progress/Outcomes (Bed Mobility Goal 1, OT) new goal  -       Row Name 01/15/25 1552          Transfer Goal 1 (OT)    Activity/Assistive Device (Transfer Goal 1, OT) transfers, all  -JR     [place] Level/Cues Needed (Transfer Goal 1, OT) modified independence  -JR     Time Frame (Transfer Goal 1, OT) 2 weeks  -JR     Progress/Outcome (Transfer Goal 1, OT) new goal  -       Row Name 01/15/25 1552          Bathing Goal 1 (OT)    Activity/Device (Bathing Goal 1, OT) bathing skills, all  -JR     [place] Level/Cues Needed (Bathing Goal 1, OT) modified independence  -JR     Time Frame (Bathing Goal 1, OT) 2 weeks  -JR     Progress/Outcomes (Bathing Goal 1, OT) new goal  -       Row Name 01/15/25 1552          Dressing Goal 1 (OT)    Activity/Device  (Dressing Goal 1, OT) dressing skills, all  -JR     [place]/Cues Needed (Dressing Goal 1, OT) modified independence  -JR     Time Frame (Dressing Goal 1, OT) 2 weeks  -JR     Progress/Outcome (Dressing Goal 1, OT) new goal  -       Row Name 01/15/25 1552          Toileting Goal 1 (OT)    Activity/Device (Toileting Goal 1, OT) toileting skills, all  -JR     [place] Level/Cues Needed (Toileting Goal 1, OT) modified independence  -JR     Time Frame (Toileting Goal 1, OT) 2 weeks  -JR     Progress/Outcome (Toileting Goal 1, OT) new goal  -       Row Name 01/15/25 1552          Grooming Goal 1 (OT)    Activity/Device (Grooming Goal 1, OT) grooming skills, all  -JR     [place] (Grooming Goal 1, OT) modified independence  -JR     Time Frame (Grooming Goal 1, OT) 2 weeks  -JR     Progress/Outcome (Grooming Goal 1, OT) new goal  -       Row Name 01/15/25 1552          Strength Goal 1 (OT)    Strength Goal 1 (OT) BUE 5/5  -JR     Time Frame (Strength Goal 1, OT) 2 weeks  -JR     Strategies/Barriers (Strength Goal 1, OT) Current BUE 4/5  -JR     Progress/Outcome (Strength Goal 1, OT) new goal  -       Row Name 01/15/25 1552          Therapy Assessment/Plan (OT)    Planned Therapy Interventions (OT) activity tolerance training;adaptive equipment training;BADL retraining;neuromuscular control/coordination retraining;functional balance retraining;occupation/activity based interventions;passive ROM/stretching;transfer/mobility retraining;strengthening exercise;ROM/therapeutic exercise  -               User Key  (r) = Recorded By, (t) = Taken By, (c) = Cosigned By      Initials Name Provider Type    Adam Ng, OT Occupational Therapist                   Clinical Impression       Row Name 01/15/25 1835          Pain Assessment    Pain Management Interventions exercise or physical activity utilized  -JR     Response to Pain Interventions activity participation with tolerable pain  -       Row Name  01/15/25 1546          Plan of Care Review    Plan of Care Reviewed With patient  -     Progress improving  -     Outcome Evaluation && y.o. male admitted to [facility] with SOA and weakness.  Post work up reveals saddle PE.  Patient is on optiflow at all times.  At baseline, Patient lives alone at home (8 OSCAR) (I) ADLs and functional mobility (cane as needed).  Patient has 14 steps to access Bedroom.  A&Ox4, BUE wFL, 4+/5.  Patient presents to OT with decreased strength, activity tolerance, coordination and balance.  Patient resting inbed upon arrival.  Patient transitioned to EOB with Mod [place].  STS from EOB with Supervison.  Patient able to perform BUE standing stretching and trunk rotations with CGA.  Patient transferred to chair from standing with CGA and no AD.  Patient declined to be reclined and wanted to sit upright in chair with feet on floor.  OT would be beneficial to address underlying deficits and increase ADLs.  Anticipate patient d/c to Acute Rehab vs Home with HH assistance pending progress.  -       Row Name 01/15/25 1546          Therapy Assessment/Plan (OT)    Rehab Potential (OT) good  -     Criteria for Skilled Therapeutic Interventions Met (OT) yes;skilled treatment is necessary  -     Therapy Frequency (OT) 5 times/wk  -       Row Name 01/15/25 1546          Therapy Plan Review/Discharge Plan (OT)    Anticipated Discharge Disposition (OT) home with home health;inpatient rehabilitation facility  -       Row Name 01/15/25 1546          Vital Signs    O2 Delivery Pre Treatment hi-flow  -JR     O2 Delivery Intra Treatment hi-flow  -JR     O2 Delivery Post Treatment hi-flow  -JR     Pre Patient Position Supine  -JR     Intra Patient Position Standing  -JR     Post Patient Position Sitting  -       Row Name 01/15/25 1546          Positioning and Restraints    Pre-Treatment Position in bed  -JR     Post Treatment Position chair  -JR     In Chair notified nsg;sitting;call light  within reach;encouraged to call for assist;exit alarm on  -JR               User Key  (r) = Recorded By, (t) = Taken By, (c) = Cosigned By      Initials Name Provider Type    Adam gN, KOMAL Occupational Therapist                   Outcome Measures       Row Name 01/15/25 1553          How much help from another is currently needed...    Putting on and taking off regular lower body clothing? 3  -JR     Bathing (including washing, rinsing, and drying) 3  -JR     Toileting (which includes using toilet bed pan or urinal) 3  -JR     Putting on and taking off regular upper body clothing 3  -JR     Taking care of personal grooming (such as brushing teeth) 4  -JR     Eating meals 4  -JR     AM-PAC 6 Clicks Score (OT) 20  -JR       Row Name 01/15/25 1402          How much help from another person do you currently need...    Turning from your back to your side while in flat bed without using bedrails? 4  -RD     Moving from lying on back to sitting on the side of a flat bed without bedrails? 4  -RD     Moving to and from a bed to a chair (including a wheelchair)? 4  -RD     Standing up from a chair using your arms (e.g., wheelchair, bedside chair)? 3  -RD     Climbing 3-5 steps with a railing? 2  -RD     To walk in hospital room? 3  -RD     AM-PAC 6 Clicks Score (PT) 20  -RD     Highest Level of Mobility Goal 6 --> Walk 10 steps or more  -RD       Row Name 01/15/25 1553          Modified Kendra Scale    Modified [place] Scale 3 - Moderate disability.  Requiring some help, but able to walk without assistance.  -       Row Name 01/15/25 1553 01/15/25 1402       Functional Assessment    Outcome Measure Options AM-PAC 6 Clicks Daily Activity (OT);Modified [place]  -JR AM-PAC 6 Clicks Basic Mobility (PT)  -RD              User Key  (r) = Recorded By, (t) = Taken By, (c) = Cosigned By      Initials Name Provider Type    Ceci Dale, PT Physical Therapist    Adam Ng, OT Occupational Therapist                     Occupational Therapy Education       Title: PT OT SLP Therapies (Done)       Topic: Occupational Therapy (Done)       Point: ADL training (Done)       Description:   Instruct learner(s) on proper safety adaptation and remediation techniques during self care or transfers.   Instruct in proper use of assistive devices.                  Learning Progress Summary            Patient Eager, E, VU by  at 1/15/2025 1554    Comment: Role of OT    Acceptance, E,TB, VU by ROGERIO at 1/15/2025 1403    Acceptance, E, VU,NR by  at 1/13/2025 1038                      Point: Home exercise program (Done)       Description:   Instruct learner(s) on appropriate technique for monitoring, assisting and/or progressing therapeutic exercises/activities.                  Learning Progress Summary            Patient Eager, E, VU by  at 1/15/2025 1554    Comment: Role of OT    Acceptance, E,TB, VU by ROGERIO at 1/15/2025 1403    Acceptance, E, VU,NR by  at 1/13/2025 1038                      Point: Precautions (Done)       Description:   Instruct learner(s) on prescribed precautions during self-care and functional transfers.                  Learning Progress Summary            Patient Eager, E, VU by  at 1/15/2025 1554    Comment: Role of OT    Acceptance, E,TB, VU by ROGERIO at 1/15/2025 1403    Acceptance, E, VU,NR by  at 1/13/2025 1038                      Point: Body mechanics (Done)       Description:   Instruct learner(s) on proper positioning and spine alignment during self-care, functional mobility activities and/or exercises.                  Learning Progress Summary            Patient Eager, E, VU by  at 1/15/2025 1554    Comment: Role of OT    Acceptance, E,TB, VU by  at 1/15/2025 1403    Acceptance, E, VU,NR by  at 1/13/2025 1038                                      User Key       Initials Effective Dates Name Provider Type Discipline    R[facility] 06/16/21 -  Ceci Brice, PT Physical Therapist PT     07/24/24 -  Jose Ramon  KOMAL Medina Occupational Therapist OT     10/30/24 -  Yamileth Abernathy, RN Registered Nurse Nurse                  OT Recommendation and Plan  Planned Therapy Interventions (OT): activity tolerance training, adaptive equipment training, BADL retraining, neuromuscular control/coordination retraining, functional balance retraining, occupation/activity based interventions, passive ROM/stretching, transfer/mobility retraining, strengthening exercise, ROM/therapeutic exercise  Therapy Frequency (OT): 5 times/wk  Plan of Care Review  Plan of Care Reviewed With: patient  Progress: improving  Outcome Evaluation: && y.o. male admitted to [facility] with SOA and weakness.  Post work up reveals saddle PE.  Patient is on optiflow at all times.  At baseline, Patient lives alone at home (8 OSCAR) (I) ADLs and functional mobility (cane as needed).  Patient has 14 steps to access Bedroom.  A&Ox4, BUE wFL, 4+/5.  Patient presents to OT with decreased strength, activity tolerance, coordination and balance.  Patient resting inbed upon arrival.  Patient transitioned to EOB with Mod [place].  STS from EOB with Supervison.  Patient able to perform BUE standing stretching and trunk rotations with CGA.  Patient transferred to chair from standing with CGA and no AD.  Patient declined to be reclined and wanted to sit upright in chair with feet on floor.  OT would be beneficial to address underlying deficits and increase ADLs.  Anticipate patient d/c to Acute Rehab vs Home with HH assistance pending progress.     Time Calculation:   Evaluation Complexity (OT)  Review Occupational Profile/Medical/Therapy History Complexity: expanded/moderate complexity  Assessment, Occupational Performance/Identification of Deficit Complexity: 3-5 performance deficits  Clinical Decision Making Complexity (OT): detailed assessment/moderate complexity  Overall Complexity of Evaluation (OT): moderate complexity     Time Calculation- OT       Row Name 01/15/25 3747              Time Calculation- OT    OT Start Time 1415  -JR      OT Stop Time 1450  -JR      OT Time Calculation (min) 35 min  -JR      Total Timed Code Minutes- OT 25 minute(s)  -JR      OT Received On 01/15/25  -JR      OT - Next Appointment 01/16/25  -JR      OT Goal Re-Cert Due Date 01/29/25  -JR         Timed Charges    11114 - OT Therapeutic Activity Minutes 25  -JR         Untimed Charges    OT Eval/Re-eval Minutes 10  -JR         Total Minutes    Timed Charges Total Minutes 25  -JR      Untimed Charges Total Minutes 10  -JR       Total Minutes 35  -JR                User Key  (r) = Recorded By, (t) = Taken By, (c) = Cosigned By      Initials Name Provider Type    Adam Ng OT Occupational Therapist                  Therapy Charges for Today       Code Description Service Date Service Provider Modifiers Qty    37752812319 HC OT THERAPEUTIC ACT EA 15 MIN 1/15/2025 Adam Albright OT GO 2    15426762907 HC OT EVAL MOD COMPLEXITY 3 1/15/2025 Adam Albright OT GO 1                 Adam Albright OT  1/15/2025

## 2025-01-15 NOTE — PLAN OF CARE
Goal Outcome Evaluation:              Outcome Evaluation: Pt remains on optiflow. Up with PT. Continue to bridge to warfarin. No other complaints. Needs met at this time.

## 2025-01-15 NOTE — PLAN OF CARE
Goal Outcome Evaluation:  Plan of Care Reviewed With: patient        Progress: improving  Outcome Evaluation: 80 yo admitted with saddle PE.  Pt is currently on optiflow (see levels per nsg notes).  Pt demonstrated good strength, ROM, and bed mobility.  Pt demonstrated dec indep with transfers, gait, and has significant decreased pulmonary tolerance to activity. Prior to admission, pt lived alone and was staying in a home in [place], [place] which has stairs to enter and stairs to bedroom and primary bathroom.  Pt does have another home available which only has stairs to enter but is in a more remote area.  Due to patient's baseline strength, anticipate pt will progress quickly with function but may continue to be limited by pulm status.  PT goals and plans will depend on how quickly pt is able to progress to lower 02 delivery systems.  Pt is interested in [facility] rehab.  Currently, as pt lives alone, recommend dc to inpt facility [place].    Anticipated Discharge Disposition (PT): inpatient rehabilitation facility (due to medical needs and 02 delivery.)

## 2025-01-15 NOTE — DISCHARGE PLACEMENT REQUEST
"Omar Choudhary \"Mendez\" (79 y.o. Male)       Date of Birth   1945    Social Security Number       Address   [address] [place] [address]    Home Phone   [phone]    MRN   [id number]       Mormonism   Mormon    Marital Status   Significant Other                            Admission Date   1/10/25    Admission Type   Emergency    Admitting Provider   Desean Mcdaniels MD    Attending Provider   Matias Kimble MD    Department, Room/Bed   [address], [address]/1       Discharge Date       Discharge Disposition       Discharge Destination                                 Attending Provider: Matias Kimble MD    Allergies: Statins    Isolation: None   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)   Code Status: CPR    Ht: 167.6 cm (66\")   Wt: 80.1 kg (176 lb 9.4 oz)    Admission Cmt: None   Principal Problem: Acute saddle pulmonary embolism [I26.92]                   Active Insurance as of 1/10/2025       Primary Coverage       Payor Plan Insurance Group Employer/Plan Group    MEDICARE MEDICARE A & B        Payor Plan Address Payor Plan Phone Number Payor Plan Fax Number Effective Dates    [address] [phone]  7/1/2012 - None Entered    [facility] [id number]         Subscriber Name Subscriber Birth Date Member ID       OMAR CHOUDHARY 1945 [id number]               Secondary Coverage       Payor Plan Insurance Group Employer/Plan Group    [facility] [employer] SUP  [facility] [employer] HEALTH PLANS  SUP  [id number]       Payor Plan Address Payor Plan Phone Number Payor Plan Fax Number Effective Dates    [address] [address] [phone]  1/1/2021 - None Entered    [facility] [id number]         Subscriber Name Subscriber Birth Date Member ID       OMAR CHOUDHARY 1945 [id number]                     Emergency Contacts        (Rel.) Home Phone Work Phone Mobile Phone    CHATO AYALA (Friend) -- -- [phone]    DAVIANYAYA HOPSON (Daughter) -- -- " [phone]    CHELSEY HARMAN (Daughter) [phone] -- [phone]

## 2025-01-15 NOTE — TELEPHONE ENCOUNTER
CR completed, patient still in hospital.  Lovenox bridge while patient has been started on warfarin.  Is on optiflow at 60L and 60%.  Pending PT/OT evaluation now that strict bedrest has been lifted.  Will continue to follow for final discharge plan.

## 2025-01-15 NOTE — PROGRESS NOTES
[facility] Medicine Services  PROGRESS NOTE    Patient Name: Omar Choudhary  : 1945  MRN: [id number]    Date of Admission: 1/10/2025  Primary Care Physician: Dennis Kwong MD    Subjective   Subjective     CC:  Follow-up pulmonary embolism and medical management    Subjective: Patient is currently on Optiflow.  He feels his breathing is gradually getting better.  He appears to be in a good mood and optimistic about full recovery.  He denies any sign of bruising or bleeding on the blood thinners.  He is pleased with his care.  No other new complaints    Objective   Objective     Vital Signs:   Temp:  [97.3 °F (36.3 °C)-99 °F (37.2 °C)] 97.3 °F (36.3 °C)  Heart Rate:  [68-88] 75  Resp:  [18-20] 18  BP: (104-136)/() 122/76        Physical Exam:  Constitutional: Awake, alert, no acute distress  HENT: NCAT  Respiratory: Occasional cough and coarse sounds, currently requiring Optiflow supplemental oxygen  Cardiovascular: Pulse rate is normal, palpable radial pulses  Gastrointestinal:  Soft, nontender, nondistended  Musculoskeletal: Normal musculature for age, no lower extremity edema, BMI 28  Psychiatric: Appropriate affect, cooperative, conversational  Neurologic: No slurred speech or facial droop, follows commands  Skin: No rashes or jaundice, warm      Results Reviewed:  Results from last 7 days   Lab Units 01/15/25  0843 01/15/25  0249 25  0357 25  0330 25  0244 01/10/25  1819 01/10/25  1638   WBC 10*3/mm3 6.75  --  7.21 8.55   < >  --  16.87*   HEMOGLOBIN g/dL 10.4*  --  10.8* 12.3*   < >  --  17.3   HEMOGLOBIN, POC   --   --   --   --    < >  --   --    HEMATOCRIT % 30.7*  --  30.8* 36.0*   < >  --  49.9   HEMATOCRIT POC   --   --   --   --    < >  --   --    PLATELETS 10*3/mm3 147  --  130* 153   < >  --  223   INR   --  1.25* 1.32*  --   --  1.04  --    PROCALCITONIN ng/mL  --   --   --   --   --   --  0.07    < > = values in this interval not displayed.     Results  from last 7 days   Lab Units 01/14/25  0357 01/13/25  0330 01/12/25  0458 01/11/25  0244 01/10/25  1748 01/10/25  1638   SODIUM mmol/L 137 141 143   < >  --  140   POTASSIUM mmol/L 4.4 4.2 4.0   < >  --  4.2   CHLORIDE mmol/L 106 107 109*   < >  --  103   CO2 mmol/L 22.8 22.0 25.1   < >  --  21.5*   BUN mg/dL 18 20 21   < >  --  26*   CREATININE mg/dL 0.88 0.87 0.89   < >  --  1.21   GLUCOSE mg/dL 104* 110* 112*   < >  --  135*   CALCIUM mg/dL 7.9* 7.9* 8.1*   < >  --  9.1   HSTROP T ng/L  --   --   --   --  118* 115*   PROBNP pg/mL  --   --   --   --   --  3,412.0*    < > = values in this interval not displayed.     Estimated Creatinine Clearance: 69.9 mL/min (by C-G formula based on SCr of 0.88 mg/dL).    Microbiology Results Abnormal       None            Imaging Results (Last 24 Hours)       ** No results found for the last 24 hours. **            Results for orders placed during the hospital encounter of 01/10/25    ADULT TRANSTHORACIC ECHO COMPLETE W/ CONT IF NECESSARY PER PROTOCOL    Interpretation Summary    The right ventricular cavity is severely dilated. Severely reduced right ventricular systolic function noted.    Left ventricular systolic function is normal. Left ventricular ejection fraction appears to be 61 - 65%.    Left ventricular diastolic function is consistent with (grade I) impaired relaxation.    The aortic valve leaflets are moderately calcified (aortic sclerosis)    Mild tricuspid valve regurgitation is present.    Calculated right ventricular systolic pressure from tricuspid regurgitation is 28 mmHg.    There is a trivial pericardial effusion.      I have reviewed the medications:  Scheduled Meds:vitamin C, 500 mg, Oral, Daily  budesonide-formoterol, 2 puff, Inhalation, BID - RT  enoxaparin, 1 mg/kg, Subcutaneous, Q12H  ferrous sulfate, 325 mg, Oral, Daily With Breakfast  gabapentin, 300 mg, Oral, BID   And  gabapentin, 600 mg, Oral, Nightly  guaiFENesin, 1,200 mg, Oral,  Q12H  ipratropium-albuterol, 3 mL, Nebulization, 4x Daily - RT  losartan, 25 mg, Oral, Q24H  montelukast, 10 mg, Oral, Daily  mupirocin, 1 Application, Each Nare, BID  senna-docusate sodium, 2 tablet, Oral, BID  sodium chloride, 10 mL, Intravenous, Q12H  warfarin, 7.5 mg, Oral, Once      Continuous Infusions:Pharmacy to Dose enoxaparin (LOVENOX),   Pharmacy to dose warfarin,       PRN Meds:.  acetaminophen    albuterol    senna-docusate sodium **AND** polyethylene glycol **AND** bisacodyl **AND** bisacodyl    Calcium Replacement - Follow Nurse / BPA Driven Protocol    HYDROcodone-acetaminophen    Magnesium Standard Dose Replacement - Follow Nurse / BPA Driven Protocol    Morphine **AND** naloxone    nitroglycerin    ondansetron ODT **OR** ondansetron    Pharmacy to Dose enoxaparin (LOVENOX)    Pharmacy to dose warfarin    Phosphorus Replacement - Follow Nurse / BPA Driven Protocol    Potassium Replacement - Follow Nurse / BPA Driven Protocol    sodium chloride    sodium chloride    Assessment & Plan   Assessment & Plan     Active Hospital Problems    Diagnosis  POA    **Acute saddle pulmonary embolism [I26.92]  Yes    Anemia [D64.9]  Unknown    Acute cor pulmonale [I26.09]  Yes    Chronic low back pain [M54.50, G89.29]  Yes    Hyperlipidemia [E78.5]  Yes    Diaphragm paralysis [J98.6]  Yes    Asthma [J45.909]  Yes    Obstructive sleep apnea syndrome [G47.33]  Yes    Hypertension [I10]  Yes      Resolved Hospital Problems   No resolved problems to display.        Brief Hospital Course to date:  Omar Choudhary is a 79 y.o. male presents the hospital with a saddle pulmonary embolism with cor pulmonale.    Discussion/plan for today: All medical problems are new recommend management today.  Labs, vitals, and imaging reviewed.  Overall hemodynamically stable aside from oxygen needs.*Last.  It seems to be stabilizing.  Continue to trend.  INR daily.  Adjust warfarin dosing with the assistance of pharmacy.  Check  reticulocyte count and iron studies, preliminary results look consistent with iron deficiency.  Start oral iron supplementation.  Check B12 and folic acid tomorrow if needed.  Continue CPAP for ELINOR.  Continue asthma treatments.  Monitor blood pressure and adjust medicines as needed.  PT evaluation for debility today.  Treatment plan discussed with patient who is in agreement.      Acute saddle PE with acute cor pulmonale s/p catheter-based bilateral pulmonary artery embolectomy   - pt taken for thrombectomy urgently 1/11 with cardiology  - heparin gtt transitioned to Lovenox bridge while patient has been started on warfarin (per pharmacy note- no DOAC due to affordability issues)  - PT to see for discharge planning     Acute hypoxic respiratory failure - related to #1; continue to wean O2; pt's FiO2 requirements decreasing, monitor and adjust as needed     Severe pulmonary HTN - Noted by right heart catheterization both before and following thrombectomy without any improvement- per cardiology- suspect chronic component; will need outpatient cardiology follow up     HTN - home losartan resumed, monitor blood pressure and adjust as needed     Acute RLE DVT- AC as above     Chronic low back pain  - follows w/ Dr. John Do outpatient, reasonably stable     Asthma - follows outpatient w/ Dr. Lagunas, resume home singulair, symbicort, albuterol nebs prn     Paralyzed L hemidiaphragm - known/chronic     ELINOR  - compliant with bipap at home; using here     Thrombocytopenia - mild, monitoring     Peripheral neuropathy  - resume home gabapentin, stable      DVT Prophylaxis: Anticoagulation      Disposition: Home when oxygen level improved    CODE STATUS:   Code Status and Medical Interventions: CPR (Attempt to Resuscitate); Full Support   Ordered at: 01/10/25 1957     Code Status (Patient has no pulse and is not breathing):    CPR (Attempt to Resuscitate)     Medical Interventions (Patient has pulse or is breathing):    Full  Support       Matias Kimble MD  01/15/25

## 2025-01-15 NOTE — CONSULTS
Patient Name:  Omar Choudhary  YOB: 1945  MRN:  [id number]  Date of Admission:  1/10/2025  Date of Consult:  1/14/2025  Patient Care Team:  Dennis Kwong MD as PCP - General (Internal Medicine)  Arslan Carlson PA-C as Physician Assistant (Physician Assistant)  Ang Peña APRN as Nurse Practitioner (Family Medicine)  Benny Ordaz MD as Surgeon (Orthopedic Surgery)  Ebony Hurd RN as Ambulatory  ([facility])    Inpatient Hospitalist Consult  Consult performed by: Brittani Lundy MD  Consult ordered by: David Hansen MD        Evaluate status and make recommendations regarding treatment for acute hypoxic respiratory failure.     Subjective   History of Present Illness  Mr. Choudhary is a 79 y.o. male that has been admitted to [facility] due to acute hypoxic respiratory failure related to saddle pulmonary embolism and acute cor pulmonale.  He has been admitted by the intensive care service and and we were asked to see and assist with his medical problems, specifically relating to his acute hypoxic respiratory failure.  Patient resting in bed on my arrival to his room, he is requiring high flow oxygen 60 L/min at 45%.  Patient is in good spirits, he reports he did not realize how ill he was on arrival to the hospital but he is continuing to feel better.  Looking forward to working with physical therapy tomorrow.  Patient's gabapentin was held on admission, he is noting some neuropathic pain coming back at this time.      Past Medical History:   Diagnosis Date    ADHD (attention deficit hyperactivity disorder) 2021    Hard to focus on tasks and follow through.    Allergic     Arthritis     Asthma     Carpal tunnel syndrome     no pain    Cataract     Depression     Erectile dysfunction     2018 at 73 years old    Family history of malignant neoplasm of breast 09/26/2019    Hyperlipidemia 09/26/2019    Hypertension 03/01/2012     Leg pain, right     Low back pain     Neuropathy     feet    Osteopenia     Poor balance     Reactive airway disease 01/15/2016    Scoliosis 1960    Shortness of breath     Sleep apnea     Substance abuse 1966    Alcoholic     Past Surgical History:   Procedure Laterality Date    CARDIAC CATHETERIZATION  1992    CARDIAC CATHETERIZATION N/A 1/11/2025    Procedure: Right Heart Cath;  Surgeon: Nancy Hdez MD;  Location:  [facility];  Service: Cardiovascular;  Laterality: N/A;    CARDIAC CATHETERIZATION  1/11/2025    Procedure: Percutaneous Manual Thrombectomy;  Surgeon: Nancy Hdez MD;  Location:  [facility];  Service: Cardiovascular;;    CARDIAC CATHETERIZATION Bilateral 1/11/2025    Procedure: Pulmonary angiography;  Surgeon: Nancy Hdez MD;  Location:  [facility];  Service: Cardiovascular;  Laterality: Bilateral;    COLONOSCOPY  2013    No polyps, slight diverticulitis    EYE SURGERY  2016    KNEE ARTHROSCOPY W/ ACL RECONSTRUCTION Right 2019    LUMBAR FUSION Bilateral 10/05/2022    Procedure: DAY 2 LUMBAR LAMINECTOMY TRANSFORAMINAL LUMBAR INTERBODY FUSION L2,L3,L4 WITH NEURO ROBOT;  Surgeon: John Do MD;  Location: [facility] MAIN OR;  Service: Robotics - Neuro;  Laterality: Bilateral;    LUMBAR FUSION N/A 10/04/2022    Procedure: DAY 1 LUMBAR LATERAL INTERBODY FUSION WITH NEURO ROBOT L2, L3.L4;  Surgeon: John Do MD;  Location: [facility] MAIN OR;  Service: Robotics - Neuro;  Laterality: N/A;  left side approach    MOUTH SURGERY      SPINE SURGERY  10/4&5/2022    Dr. John Do, [facility]     Family History   Problem Relation Age of Onset    Heart disease Mother     Hypertension Mother     Breast cancer Mother     Stroke Mother         Several TIAs    Alcohol abuse Mother     Thyroid disease Mother     Arthritis Mother     Cancer Mother         Breast    Aortic aneurysm Father     Heart attack Father     Liver cancer  Father     Cancer Father         Liver    Heart disease Father         Heart attack    Early death Brother         Coronary thrombosis @ 48 years while mountain climbing.     Social History     Tobacco Use    Smoking status: Former     Current packs/day: 0.00     Average packs/day: 0.5 packs/day for 26.0 years (13.0 ttl pk-yrs)     Types: Cigarettes     Start date: 1966     Quit date: 1992     Years since quittin.0     Passive exposure: Past    Smokeless tobacco: Never   Vaping Use    Vaping status: Never Used   Substance Use Topics    Alcohol use: Not Currently     Alcohol/week: 1.0 standard drink of alcohol     Types: 1 Glasses of wine per week    Drug use: Never     Types: Marijuana     Comment: CBD gummies- stop now for surgery     Facility-Administered Medications Prior to Admission   Medication Dose Route Frequency Provider Last Rate Last Admin    Tezepelumab-ekko solution auto-injector 210 mg  210 mg Subcutaneous Q30 Days Jerrod Lagunas MD         Medications Prior to Admission   Medication Sig Dispense Refill Last Dose/Taking    acetaminophen (TYLENOL) 500 MG tablet Take 1 tablet by mouth Every 6 (Six) Hours As Needed for Mild Pain.       albuterol sulfate  (90 Base) MCG/ACT inhaler Inhale 2 puffs Every 4 (Four) Hours As Needed for Wheezing or Shortness of Air. 18 g 0     azithromycin (ZITHROMAX) 250 MG tablet TAKE 2 TABLETS BY MOUTH TODAY, THEN TAKE 1 TABLET DAILY FOR 4 DAYS AS DIRECTED       B Complex Vitamins (VITAMIN B COMPLEX) capsule capsule Take 1 capsule by mouth Daily. LD 9-27       budesonide-formoterol (SYMBICORT) 160-4.5 MCG/ACT inhaler Inhale 2 puffs 2 (Two) Times a Day. 10.2 g 0     Calcium Carb-Cholecalciferol (Oyster Shell Calcium w/D) 500-5 MG-MCG tablet TAKE TWO TABLETS BY MOUTH DAILY 180 tablet 0     clonazePAM (KlonoPIN) 0.5 MG tablet TAKE ONE TABLET BY MOUTH TWICE DAILY AS NEEDED FOR ANXIETY 30 tablet 2     coenzyme Q10 100 MG capsule Take 1 capsule by mouth Daily.        cyclobenzaprine (FLEXERIL) 10 MG tablet Take 1 tablet by mouth 3 (Three) Times a Day As Needed for Muscle Spasms. 60 tablet 2     dexAMETHasone (DECADRON) 1.5 MG tablet Take 1 tablet by mouth Take As Directed. Day 1 take 3AM,3PM ,Day 2 take 3 AM,2 PM,Day 3 take 3 AM,2 PM,Day 4 take 2 AM,2 PM  ,Day 5 take 2 AM,2 PM,Day 6 take 2 AM,1 PM,Day 7 take 2 AM,1 PM, Day 8 take 1 AM,1 PM,Day 9 take 1 AM,1 PM,Day 10 take 1 AM 35 tablet 0     gabapentin (NEURONTIN) 100 MG capsule Take 3 capsules by mouth Daily.       gabapentin (NEURONTIN) 300 MG capsule TAKE 1 CAPSULE BY MOUTH IN THE MORING, 1 CAPSULE IN THE AFTERNOON AND 2 CAPSULES AT NIGHT 120 capsule 2     losartan (COZAAR) 50 MG tablet TAKE 1 TABLET BY MOUTH DAILY 90 tablet 0     meloxicam (MOBIC) 7.5 MG tablet TAKE 1 TABLET BY MOUTH ONCE DAILY 30 tablet 0     montelukast (SINGULAIR) 10 MG tablet Take 1 tablet by mouth Daily. 90 tablet 0     Multiple Vitamins-Minerals (EMERGEN-C BLUE PO) Take 1 tablet by mouth Daily. LD 9-27       Omega-3 Fatty Acids (FISH OIL) 1000 MG capsule capsule Take 1 capsule by mouth Daily With Breakfast. LD 9-27       promethazine-dextromethorphan (PROMETHAZINE-DM) 6.25-15 MG/5ML syrup Take 5 mL by mouth.       S-Adenosylmethionine (YURY-e) 400 MG tablet Take 400 mg by mouth Daily.       sildenafil (REVATIO) 20 MG tablet take 1 to 2 tablets by mouth as needed for erictile dysfunction 50 tablet 0     theophylline (THEODUR) 300 MG 12 hr tablet Take 1 tablet by mouth Daily. 90 tablet 3     traZODone (DESYREL) 50 MG tablet Take 1-2 tablets by mouth Every Night. 180 tablet 3     triamcinolone (KENALOG) 0.025 % cream Apply 1 Application topically to the appropriate area as directed 2 (Two) Times a Day.       Turmeric 400 MG capsule Take 1 capsule by mouth Daily.        Allergies:  Statins    Review of Systems   Constitutional:  Negative for fatigue and fever.   Respiratory:  Positive for cough and shortness of breath.    Cardiovascular:  Negative for  chest pain and leg swelling.   Gastrointestinal:  Negative for abdominal pain, nausea and vomiting.   Neurological:  Negative for weakness.       Objective      Vital Signs  Temp:  [97.6 °F (36.4 °C)-99 °F (37.2 °C)] 99 °F (37.2 °C)  Heart Rate:  [61-88] 74  Resp:  [18-20] 20  BP: (104-144)/() 133/78  Body mass index is 28.25 kg/m².    Physical Exam  Constitutional:       General: He is not in acute distress.     Appearance: Normal appearance. He is not toxic-appearing.      Comments: On high-flow O2/Airvo  Pleasant, conversational   HENT:      Head: Normocephalic and atraumatic.      Mouth/Throat:      Mouth: Mucous membranes are moist.      Pharynx: Oropharynx is clear.   Eyes:      Extraocular Movements: Extraocular movements intact.      Conjunctiva/sclera: Conjunctivae normal.      Pupils: Pupils are equal, round, and reactive to light.   Cardiovascular:      Rate and Rhythm: Normal rate and regular rhythm.   Pulmonary:      Effort: Pulmonary effort is normal. No respiratory distress.      Breath sounds: Normal breath sounds. No wheezing.   Abdominal:      General: Abdomen is flat. There is no distension.      Palpations: Abdomen is soft.      Tenderness: There is no abdominal tenderness.   Musculoskeletal:         General: No tenderness.      Right lower leg: No edema.      Left lower leg: No edema.   Skin:     General: Skin is warm and dry.   Neurological:      General: No focal deficit present.      Mental Status: He is alert and oriented to person, place, and time. Mental status is at baseline.      Motor: No weakness.         Results Review:   I reviewed the patient's new clinical results.  I reviewed the patient's new imaging results and agree with the interpretation.  I reviewed the patient's other test results and agree with the interpretation  I personally viewed and interpreted the patient's EKG/Telemetry data         Assessment/Plan     Active Hospital Problems    Diagnosis  POA    **Acute  saddle pulmonary embolism [I26.92]  Yes    Acute cor pulmonale [I26.09]  Yes    Chronic low back pain [M54.50, G89.29]  Unknown    Hyperlipidemia [E78.5]  Yes    Hypertension [I10]  Yes     Acute saddle PE with acute cor pulmonale s/p catheter-based bilateral pulmonary artery embolectomy   - pt taken for thrombectomy urgently 1/11 with cardiology  - heparin gtt transitioned to Lovenox bridge while patient has been started on warfarin (per pharmacy note- no DOAC due to affordability issues)  - PT to see tomorrow     Acute hypoxic respiratory failure - related to #1; continue to wean O2; pt's FiO2 requirements decreasing    Severe pulmonary HTN - Noted by right heart catheterization both before and following thrombectomy without any improvement- per cardiology- suspect chronic component; will need outpatient cardiology follow up    HTN - home losartan resumed    Acute RLE DVT- AC as above    Chronic low back pain  - follows w/ Dr. John Do outpatient    Asthma - follows outpatient w/ Dr. Lagunas, resume home singulair, symbicort, albuterol nebs prn    Paralyzed L hemidiaphragm - known/chronic    ELINOR  - compliant with bipap; using here    Thrombocytopenia - mild, repeat in am    Peripheral neuropathy  - resume home gabapentin    Thank you very much for asking [facility] to be involved in this patient's care. We will take over as primary.       Brittani Lundy MD  [facility]  01/14/25  23:33 EST

## 2025-01-15 NOTE — PROGRESS NOTES
[facility]: Warfarin Dosing/Monitoring Consult    Omar Choudhary is a 79 y.o. male, estimated creatinine clearance is 69.9 mL/min (by C-G formula based on SCr of 0.88 mg/dL). weighing 80.1 kg (176 lb 9.4 oz).    Results from last 7 days   Lab Units 01/15/25  0249 01/14/25  0357 01/13/25  0330 01/12/25  1726 01/12/25  1003 01/12/25  0458 01/11/25  2033 01/11/25  1942 01/11/25  1857 01/11/25  0852 01/11/25  0244 01/11/25  0019 01/10/25  1819 01/10/25  1638   INR  1.25* 1.32*  --   --   --   --   --   --   --   --   --   --  1.04  --    APTT seconds  --   --  35.9* 153.3* 53.4* 76.8* >200.0*  --   --    < > 111.6*   < > 29.8  --    HEMOGLOBIN g/dL  --  10.8* 12.3*  --   --  13.1  --   --   --   --  15.3  --   --  17.3   HEMOGLOBIN, POC g/dL  --   --   --   --   --   --   --  11.9* 13.3   < >  --   --   --   --    HEMATOCRIT %  --  30.8* 36.0*  --   --  41.0  --   --   --   --  44.6  --   --  49.9   HEMATOCRIT POC %  --   --   --   --   --   --   --  35* 39   < >  --   --   --   --    PLATELETS 10*3/mm3  --  130* 153  --   --  148  --   --   --   --  185  --   --  223    < > = values in this interval not displayed.     Prior to admission anticoagulation: Newly started on AC this admission. Transitioned from apixaban to enoxaparin/warfarin on 01/13 due to affordability issues.     Hospital Anticoagulation:  Consulting provider: Nancy Hdez MD   Start date: 01/13  Indication: DVT/PE (active thrombosis)  Target INR: 2 - 3  Expected duration: N/A   Bridge Therapy: Yes  with enoxaparin    Potential food or drug interactions: No significant DDI's at this time    Education complete?/Date: No; plan for follow up TBD    Assessment/Plan:  INR at 1.25 today. Will increase dose to 7.5 mg x1 today.   Monitor for any signs or symptoms of bleeding; H/H/Plts stable  Follow up daily INRs and dose adjustments    Pharmacy will continue to follow until discharge or discontinuation of warfarin.      Cristhian Granger, PharmD  Clinical Pharmacist

## 2025-01-15 NOTE — PLAN OF CARE
Goal Outcome Evaluation:           Progress: improving  Outcome Evaluation: Pt VSS. O2 maintained with optiflow at 60L and 60%. Wore BIPAP when sleeping. PO warfarin. Lovenox given. Pt safety maintained.

## 2025-01-15 NOTE — PROGRESS NOTES
Hospital Follow Up    LOS:  LOS: 5 days   Patient Name: Omar Choudhary  Age/Sex: 79 y.o. male  : 1945  MRN: [id number]    Day of Service: 01/15/25   Length of Stay: 5  Encounter Provider: EMILY Zepeda  Place of Service: [facility]  Patient Care Team:  Dennis Kwong MD as PCP - General (Internal Medicine)  Arslan Carlson PA-C as Physician Assistant (Physician Assistant)  Ang Peña APRN as Nurse Practitioner (Family Medicine)  Benny Ordaz MD as Surgeon (Orthopedic Surgery)  Ebony Hurd RN as Ambulatory  ([facility])    Subjective:     Chief Complaint: follow up PE    Interval History: Feeling a little better this morning. Breathing improving, currently weaning down O2. Reports non-productive cough and feeling like he has chest congestion.    Objective:     Objective:  Temp:  [97.3 °F (36.3 °C)-99 °F (37.2 °C)] 97.3 °F (36.3 °C)  Heart Rate:  [68-88] 71  Resp:  [18-20] 18  BP: (104-136)/() 126/68     Intake/Output Summary (Last 24 hours) at 1/15/2025 0831  Last data filed at 1/15/2025 0740  Gross per 24 hour   Intake 290 ml   Output 800 ml   Net -510 ml     Body mass index is 28.5 kg/m².      25  0600 25  0600 01/15/25  0603   Weight: 79.6 kg (175 lb 7.8 oz) 79.4 kg (175 lb 0.7 oz) 80.1 kg (176 lb 9.4 oz)     Weight change: 0.7 kg (1 lb 8.7 oz)    Physical Exam:   General Appearance:    Awake alert and oriented in no acute distress.   Color:  Skin:  Neuro:  HEENT:    Lungs:     Pink  Warm and dry  No focal, motor or sensory deficits  Neck supple, pupils equal, round and reactive. No JVD, No Bruit  Clear to auscultation,respirations regular, even and                  unlabored    Heart:    Regular rate and rhythm, S1 and S2, no murmur, no gallop, no rub. No edema, DP/PT pulses are 2+   Chest Wall:    No abnormalities observed   Abdomen:     Normal bowel sounds, no masses, no  "organomegaly, soft        non-tender, non-distended, no guarding, no ascites noted   Extremities:   Moves all extremities well, no edema, no cyanosis, no redness       Lab Review:   Results from last 7 days   Lab Units 01/14/25  0357 01/13/25  0330   SODIUM mmol/L 137 141   POTASSIUM mmol/L 4.4 4.2   CHLORIDE mmol/L 106 107   CO2 mmol/L 22.8 22.0   BUN mg/dL 18 20   CREATININE mg/dL 0.88 0.87   GLUCOSE mg/dL 104* 110*   CALCIUM mg/dL 7.9* 7.9*     Results from last 7 days   Lab Units 01/10/25  1748 01/10/25  1638   HSTROP T ng/L 118* 115*     Results from last 7 days   Lab Units 01/14/25  0357 01/13/25  0330   WBC 10*3/mm3 7.21 8.55   HEMOGLOBIN g/dL 10.8* 12.3*   HEMATOCRIT % 30.8* 36.0*   PLATELETS 10*3/mm3 130* 153     Results from last 7 days   Lab Units 01/15/25  0249 01/14/25  0357 01/13/25  0330 01/12/25  1726   INR  1.25* 1.32*  --   --    APTT seconds  --   --  35.9* 153.3*     Results from last 7 days   Lab Units 01/13/25  0330 01/12/25  0458   MAGNESIUM mg/dL 2.3 2.5*           Invalid input(s): \"LDLCALC\"  Results from last 7 days   Lab Units 01/10/25  1638   PROBNP pg/mL 3,412.0*         I reviewed the patient's new clinical results.  I personally viewed and interpreted the patient's EKG  Current Medications:   Scheduled Meds:budesonide-formoterol, 2 puff, Inhalation, BID - RT  enoxaparin, 1 mg/kg, Subcutaneous, Q12H  gabapentin, 300 mg, Oral, BID   And  gabapentin, 600 mg, Oral, Nightly  ipratropium-albuterol, 3 mL, Nebulization, 4x Daily - RT  losartan, 25 mg, Oral, Q24H  montelukast, 10 mg, Oral, Daily  mupirocin, 1 Application, Each Nare, BID  senna-docusate sodium, 2 tablet, Oral, BID  sodium chloride, 10 mL, Intravenous, Q12H  warfarin, 5 mg, Oral, Daily      Continuous Infusions:Pharmacy to Dose enoxaparin (LOVENOX),   Pharmacy to dose warfarin,         Allergies:  Allergies   Allergen Reactions    Statins Myalgia     Other reaction(s): muscle soreness       Assessment/Plan:    1.  Acute bilateral " pulmonary embolism with saddle embolism and acute cor pulmonale.  Status post bilateral pulmonary artery thrombectomy on 1/11.  Noted to have only a small to moderate amount of residual thrombus by the time of procedure which was removed.  Now on enoxaparin bridge and warfarin which is being dosed by pharmacy.  2.  Acute right lower extremity DVT.  Noted the peroneal, popliteal and distal femoral vein.  3.  Acute hypoxic respiratory failure.  Still with high O2 requirement.  On high flow nasal cannula during the day and BiPAP at night.   4.  Severe pulmonary hypertension.  Systolic pressures in the 70s to 80s and mean PA of 44 to 46 mmHg.  Noted by right heart catheterization both before and following thrombectomy without any improvement.  Likely chronic.  5.  Hypertension.  Losartan resumed yesterday.  6.  Paralyzed left hemidiaphragm  7.  Asthma  8.  Obstructive sleep apnea  9.  Thrombocytopenia.  Mild but new this morning.     -CBC pending. INR 1.25. On warfarin with Lovenox bridge  -BP improved with addition of losartan  -Add expectorant        EMILY Zepeda  01/15/25  08:31 EST  Electronically signed by EMILY Zepeda, 01/15/25, 8:31 AM EST.

## 2025-01-15 NOTE — PLAN OF CARE
Goal Outcome Evaluation:  Plan of Care Reviewed With: patient        Progress: improving  Outcome Evaluation: [age] y.o. male admitted to [facility] with SOA and weakness.  Post work up reveals saddle PE.  Patient is on optiflow at all times.  At baseline, Patient lives alone at home (8 OSCAR) (I) ADLs and functional mobility (cane as needed).  Patient has 14 steps to access Bedroom.  A&Ox4, BUE wFL, 4+/5.  Patient presents to OT with decreased strength, activity tolerance, coordination and balance.  Patient resting inbed upon arrival.  Patient transitioned to EOB with Mod Catron.  STS from EOB with Supervison.  Patient able to perform BUE standing stretching and trunk rotations with CGA.  Patient transferred to chair from standing with CGA and no AD.  Patient declined to be reclined and wanted to sit upright in chair with feet on floor.  OT would be beneficial to address underlying deficits and increase ADLs.  Anticipate patient d/c to Acute Rehab vs Home with HH assistance pending progress.    Anticipated Discharge Disposition (OT): home with home health, inpatient rehabilitation facility

## 2025-01-15 NOTE — THERAPY EVALUATION
Patient Name: Omar Choudhary  : 1945    MRN: [id number]                              Today's Date: 1/15/2025       Admit Date: 1/10/2025    Visit Dx:     ICD-10-CM ICD-9-CM   1. Acute saddle pulmonary embolism, unspecified whether acute cor pulmonale present  I26.92 415.13   2. Acute hypoxic respiratory failure  J96.01 518.81   3. History of asthma  Z87.09 V12.69   4. Acute saddle pulmonary embolism with acute cor pulmonale  I26.02 415.13     415.0     Patient Active Problem List   Diagnosis    Benign prostatic hyperplasia    Bursitis    Depression    Diverticulosis    Family history of malignant neoplasm of breast    Hyperlipidemia    Hypertension    Internal derangement of right knee    Knee effusion    Asthma    Obstructive sleep apnea syndrome    Osteoarthritis    Osteopenia    Pain in knee    Postnasal drip    Prepatellar bursitis    Sciatica of right side    Diaphragm paralysis    Spinal stenosis of lumbar region with neurogenic claudication    Degenerative lumbar spinal stenosis    Anxiety    Pinguecula of right eye    Lumbar radiculopathy, chronic    Bilateral pseudophakia    Diverticulosis of colon    Hemorrhoids without complication    Lumbar degenerative disc disease    Lumbar spondylosis    Other fecal abnormalities    Rectal bleeding    Scoliosis of lumbosacral region due to degenerative disease of spine in adult    Arthritis    Acute right-sided low back pain without sciatica    Sacroiliitis    Acquired spondylolisthesis    Acute saddle pulmonary embolism    Acute cor pulmonale    Chronic low back pain    Anemia     Past Medical History:   Diagnosis Date    ADHD (attention deficit hyperactivity disorder)     Hard to focus on tasks and follow through.    Allergic     Arthritis     Asthma     Carpal tunnel syndrome     no pain    Cataract     Depression     Erectile dysfunction     2018 at 73 years old    Family history of malignant neoplasm of breast 2019    Hyperlipidemia 2019     Hypertension 03/01/2012    Leg pain, right     Low back pain     Neuropathy     feet    Osteopenia     Poor balance     Reactive airway disease 01/15/2016    Scoliosis 1960    Shortness of breath     Sleep apnea     Substance abuse 1966    Alcoholic     Past Surgical History:   Procedure Laterality Date    CARDIAC CATHETERIZATION  1992    CARDIAC CATHETERIZATION N/A 1/11/2025    Procedure: Right Heart Cath;  Surgeon: Nancy Hdez MD;  Location:  [facility];  Service: Cardiovascular;  Laterality: N/A;    CARDIAC CATHETERIZATION  1/11/2025    Procedure: Percutaneous Manual Thrombectomy;  Surgeon: Nancy Hdez MD;  Location:  [facility];  Service: Cardiovascular;;    CARDIAC CATHETERIZATION Bilateral 1/11/2025    Procedure: Pulmonary angiography;  Surgeon: Nancy Hdez MD;  Location:  [facility];  Service: Cardiovascular;  Laterality: Bilateral;    COLONOSCOPY  2013    No polyps, slight diverticulitis    EYE SURGERY  2016    KNEE ARTHROSCOPY W/ ACL RECONSTRUCTION Right 2019    LUMBAR FUSION Bilateral 10/05/2022    Procedure: DAY 2 LUMBAR LAMINECTOMY TRANSFORAMINAL LUMBAR INTERBODY FUSION L2,L3,L4 WITH NEURO ROBOT;  Surgeon: John Do MD;  Location: [facility] OR;  Service: Robotics - Neuro;  Laterality: Bilateral;    LUMBAR FUSION N/A 10/04/2022    Procedure: DAY 1 LUMBAR LATERAL INTERBODY FUSION WITH NEURO ROBOT L2, L3.L4;  Surgeon: John Do MD;  Location: [facility] OR;  Service: Robotics - Neuro;  Laterality: N/A;  left side approach    MOUTH SURGERY      SPINE SURGERY  10/4&5/2022    Dr. John Do, [facility] group      General Information       Row Name 01/15/25 1336          Physical Therapy Time and Intention    Document Type evaluation  -RD     Mode of Treatment physical therapy  -RD       Row Name 01/15/25 1336          General Information    Patient Profile Reviewed yes  -RD     Prior Level of Function  independent:;community mobility  occas used cane in public  -RD     Existing Precautions/Restrictions oxygen therapy device and L/min  -RD     Barriers to Rehab none identified  -RD       Row Name 01/15/25 1336          Living Environment    People in Home alone  -RD       Row Name 01/15/25 1336          Home Main Entrance    Number of Stairs, Main Entrance eight  -RD       Row Name 01/15/25 1336          Stairs Within Home, Primary    Number of Stairs, Within Home, Primary eight  -RD       Row Name 01/15/25 1336          Cognition    Orientation Status (Cognition) oriented x 4  -RD       Row Name 01/15/25 1336          Safety Issues/Impairments Affecting Functional Mobility    Impairments Affecting Function (Mobility) balance;endurance/activity tolerance;shortness of breath  -RD               User Key  (r) = Recorded By, (t) = Taken By, (c) = Cosigned By      Initials Name Provider Type    Ceci Dale, PT Physical Therapist                   Mobility       Row Name 01/15/25 1337          Bed Mobility    Bed Mobility bed mobility (all) activities  -RD     All Activities, [place] (Bed Mobility) modified independence  -RD       Row Name 01/15/25 1337          Sit-Stand Transfer    Sit-Stand [place] (Transfers) supervision  -RD       Row Name 01/15/25 1337          Gait/Stairs (Locomotion)    [place] Level (Gait) minimum assist (75% patient effort)  -RD     Distance in Feet (Gait) 3  -RD     Deviations/Abnormal Patterns (Gait) jordy decreased;stride length decreased  -RD               User Key  (r) = Recorded By, (t) = Taken By, (c) = Cosigned By      Initials Name Provider Type    Ceci Dale, PT Physical Therapist                   Obj/Interventions       Row Name 01/15/25 1338          Range of Motion Comprehensive    General Range of Motion no range of motion deficits identified  -RD       Row Name 01/15/25 1338          Strength Comprehensive (MMT)    General Manual Muscle  Testing (MMT) Assessment no strength deficits identified  -RD       Row Name 01/15/25 1338          Motor Skills    Therapeutic Exercise --  5 reps sit to stand; 5 reps sh abd/add  -RD       Row Name 01/15/25 1338          Balance    Balance Assessment sitting dynamic balance;standing static balance;standing dynamic balance  -RD     Dynamic Sitting Balance independent  -RD     Position, Sitting Balance unsupported;sitting edge of bed  -RD     Static Standing Balance supervision  -RD     Dynamic Standing Balance minimal assist  -RD     Position/Device Used, Standing Balance supported  -RD     Comment, Balance marching in place  -RD       Row Name 01/15/25 1338          Sensory Assessment (Somatosensory)    Sensory Assessment (Somatosensory) sensation intact  -RD               User Key  (r) = Recorded By, (t) = Taken By, (c) = Cosigned By      Initials Name Provider Type    Ceci Dale, PT Physical Therapist                   Goals/Plan       Row Name 01/15/25 1348          Gait Training Goal 1 (PT)    Activity/Assistive Device (Gait Training Goal 1, PT) gait (walking locomotion);increase energy conservation;increase endurance/gait distance;improve balance and speed  -RD     [place] Level (Gait Training Goal 1, PT) supervision required  -RD     Distance (Gait Training Goal 1, PT) 100  -RD     Time Frame (Gait Training Goal 1, PT) 2 weeks  -RD       Row Name 01/15/25 1348          Balance Goal 1 (PT)    Activity/Assistive Device (Balance Goal) standing dynamic balance  -RD     [place] Level/Cues Needed (Balance Goal 1, PT) supervision required  -RD     Time Frame (Balance Goal 1, PT) 2 weeks  -       Row Name 01/15/25 1404 01/15/25 1348       Stairs Goal 1 (PT)    Activity/Assistive Device (Stairs Goal 1, PT) -- stairs, all skills  -RD    [place] Level/Cues Needed (Stairs Goal 1, PT) -- contact guard required  -RD    Number of Stairs (Stairs Goal 1, PT) -- 8  -RD    Time Frame (Stairs Goal 1,  PT) 2 weeks  -RD --      Row Name 01/15/25 1345          Therapy Assessment/Plan (PT)    Planned Therapy Interventions (PT) gait training;home exercise program;patient/family education;balance training;stair training  -RD               User Key  (r) = Recorded By, (t) = Taken By, (c) = Cosigned By      Initials Name Provider Type    RD Ceci Brice, PT Physical Therapist                   Clinical Impression       Row Name 01/15/25 1340          Pain    Pretreatment Pain Rating 2/10  -RD     Posttreatment Pain Rating 2/10  -RD     Pain Location abdomen;back  primarily related to coughing  -RD     Pain Management Interventions exercise or physical activity utilized  -RD       Row Name 01/15/25 1340          Plan of Care Review    Plan of Care Reviewed With patient  -RD     Progress improving  -RD     Outcome Evaluation 78 yo admitted with saddle PE.  Pt is currently on optiflow (see levels per nsg notes).  Pt demonstrated good strength, ROM, and bed mobility.  Pt demonstrated dec indep with transfers, gait, and has significant decreased pulmonary tolerance to activity. Prior to admission, pt lived alone and was staying in a home in [place], [place] which has stairs to enter and stairs to bedroom and primary bathroom.  Pt does have another home available which only has stairs to enter but is in a more remote area.  Due to patient's baseline strength, anticipate pt will progress quickly with function but may continue to be limited by pulm status.  PT goals and plans will depend on how quickly pt is able to progress to lower 02 delivery systems.  Pt is interested in [facility] rehab.  Currently, as pt lives alone, recommend dc to inpt facility at NH.  -RD       Row Name 01/15/25 1348          Therapy Assessment/Plan (PT)    Patient/Family Therapy Goals Statement (PT) get stronger, improve balance.  -RD     Rehab Potential (PT) good  -RD     Criteria for Skilled Interventions Met (PT) yes  -RD     Therapy  Frequency (PT) 5 times/wk  -RD       Row Name 01/15/25 1340          Vital Signs    O2 Delivery Pre Treatment hi-flow  -RD     O2 Delivery Intra Treatment hi-flow  -RD     O2 Delivery Post Treatment hi-flow  -RD       Row Name 01/15/25 1340          Positioning and Restraints    Pre-Treatment Position in bed  -RD     Post Treatment Position chair  -RD     In Chair with nsg;call light within reach;encouraged to call for assist;exit alarm on;sitting  -RD               User Key  (r) = Recorded By, (t) = Taken By, (c) = Cosigned By      Initials Name Provider Type    Ceci Dale, PT Physical Therapist                   Outcome Measures       Row Name 01/15/25 1402          How much help from another person do you currently need...    Turning from your back to your side while in flat bed without using bedrails? 4  -RD     Moving from lying on back to sitting on the side of a flat bed without bedrails? 4  -RD     Moving to and from a bed to a chair (including a wheelchair)? 4  -RD     Standing up from a chair using your arms (e.g., wheelchair, bedside chair)? 3  -RD     Climbing 3-5 steps with a railing? 2  -RD     To walk in hospital room? 3  -RD     AM-PAC 6 Clicks Score (PT) 20  -RD     Highest Level of Mobility Goal 6 --> Walk 10 steps or more  -       Row Name 01/15/25 1402          Functional Assessment    Outcome Measure Options AM-PAC 6 Clicks Basic Mobility (PT)  -RD               User Key  (r) = Recorded By, (t) = Taken By, (c) = Cosigned By      Initials Name Provider Type    Ceci Dale, PT Physical Therapist                                 Physical Therapy Education       Title: PT OT SLP Therapies (Done)       Topic: Physical Therapy (Done)       Point: Mobility training (Done)       Learning Progress Summary            Patient Acceptance, E,TB, VU by ROGERIO at 1/15/2025 1403    Acceptance, E, VU,NR by  at 1/13/2025 1038                      Point: Home exercise program (Done)        Learning Progress Summary            Patient Acceptance, E,TB, VU by RD at 1/15/2025 1403    Acceptance, E, VU,NR by  at 1/13/2025 1038                      Point: Body mechanics (Done)       Learning Progress Summary            Patient Acceptance, E,TB, VU by RD at 1/15/2025 1403    Acceptance, E, VU,NR by  at 1/13/2025 1038                      Point: Precautions (Done)       Learning Progress Summary            Patient Acceptance, E,TB, VU by  at 1/15/2025 1403    Acceptance, E, VU,NR by  at 1/13/2025 1038                                      User Key       Initials Effective Dates Name Provider Type Discipline     06/16/21 -  Ceci Brice PT Physical Therapist PT     10/30/24 -  Yamileth Abernathy RN Registered Nurse Nurse                  PT Recommendation and Plan  Planned Therapy Interventions (PT): gait training, home exercise program, patient/family education, balance training, stair training  Progress: improving  Outcome Evaluation: 78 yo admitted with saddle PE.  Pt is currently on optiflow (see levels per nsg notes).  Pt demonstrated good strength, ROM, and bed mobility.  Pt demonstrated dec indep with transfers, gait, and has significant decreased pulmonary tolerance to activity. Prior to admission, pt lived alone and was staying in a home in [place], [place] which has stairs to enter and stairs to bedroom and primary bathroom.  Pt does have another home available which only has stairs to enter but is in a more remote area.  Due to patient's baseline strength, anticipate pt will progress quickly with function but may continue to be limited by pulm status.  PT goals and plans will depend on how quickly pt is able to progress to lower 02 delivery systems.  Pt is interested in [facility] rehab.  Currently, as pt lives alone, recommend dc to inpt facility at [facility].     Time Calculation:         PT Charges       Row Name 01/15/25 1404             Time Calculation    Start Time 1115  -RD       Stop Time 1140  -RD      Time Calculation (min) 25 min  -RD      PT Received On 01/15/25  -RD      PT - Next Appointment 01/16/25  -RD      PT Goal Re-Cert Due Date 01/29/25  -RD         Time Calculation- PT    Total Timed Code Minutes- PT 10 minute(s)  -RD                User Key  (r) = Recorded By, (t) = Taken By, (c) = Cosigned By      Initials Name Provider Type    RD Ceci Brice, PT Physical Therapist                  Therapy Charges for Today       Code Description Service Date Service Provider Modifiers Qty    06236072985  PT EVAL MOD COMPLEXITY 2 1/15/2025 Ceci Brice, PT GP 1    [id number] HC PT THERAPEUTIC ACT EA 15 MIN 1/15/2025 Ceci Brice, PT GP 1            PT G-Codes  Outcome Measure Options: AM-PAC 6 Clicks Basic Mobility (PT)  AM-PAC 6 Clicks Score (PT): 20  PT Discharge Summary  Anticipated Discharge Disposition (PT): inpatient rehabilitation facility (due to medical needs and 02 delivery.)    Ceci Brice, PT  1/15/2025

## 2025-01-16 LAB
ANION GAP SERPL CALCULATED.3IONS-SCNC: 8.4 MMOL/L (ref 5–15)
BUN SERPL-MCNC: 14 MG/DL (ref 8–23)
BUN/CREAT SERPL: 20 (ref 7–25)
CALCIUM SPEC-SCNC: 8 MG/DL (ref 8.6–10.5)
CHLORIDE SERPL-SCNC: 102 MMOL/L (ref 98–107)
CO2 SERPL-SCNC: 23.6 MMOL/L (ref 22–29)
CREAT SERPL-MCNC: 0.7 MG/DL (ref 0.76–1.27)
DEPRECATED RDW RBC AUTO: 46.2 FL (ref 37–54)
EGFRCR SERPLBLD CKD-EPI 2021: 94.9 ML/MIN/1.73
ERYTHROCYTE [DISTWIDTH] IN BLOOD BY AUTOMATED COUNT: 13.3 % (ref 12.3–15.4)
FOLATE SERPL-MCNC: 13.1 NG/ML (ref 4.78–24.2)
GLUCOSE BLDC GLUCOMTR-MCNC: 89 MG/DL (ref 70–130)
GLUCOSE SERPL-MCNC: 93 MG/DL (ref 65–99)
HCT VFR BLD AUTO: 29.9 % (ref 37.5–51)
HGB BLD-MCNC: 9.7 G/DL (ref 13–17.7)
INR PPP: 1.41 (ref 0.9–1.1)
MCH RBC QN AUTO: 31 PG (ref 26.6–33)
MCHC RBC AUTO-ENTMCNC: 32.4 G/DL (ref 31.5–35.7)
MCV RBC AUTO: 95.5 FL (ref 79–97)
PLATELET # BLD AUTO: 137 10*3/MM3 (ref 140–450)
PMV BLD AUTO: 10.9 FL (ref 6–12)
POTASSIUM SERPL-SCNC: 4.1 MMOL/L (ref 3.5–5.2)
PROTHROMBIN TIME: 17.5 SECONDS (ref 11.7–14.2)
RBC # BLD AUTO: 3.13 10*6/MM3 (ref 4.14–5.8)
SODIUM SERPL-SCNC: 134 MMOL/L (ref 136–145)
VIT B12 BLD-MCNC: 434 PG/ML (ref 211–946)
WBC NRBC COR # BLD AUTO: 6.07 10*3/MM3 (ref 3.4–10.8)

## 2025-01-16 PROCEDURE — 94799 UNLISTED PULMONARY SVC/PX: CPT

## 2025-01-16 PROCEDURE — 97530 THERAPEUTIC ACTIVITIES: CPT

## 2025-01-16 PROCEDURE — 82607 VITAMIN B-12: CPT | Performed by: INTERNAL MEDICINE

## 2025-01-16 PROCEDURE — 94660 CPAP INITIATION&MGMT: CPT

## 2025-01-16 PROCEDURE — 85027 COMPLETE CBC AUTOMATED: CPT | Performed by: INTERNAL MEDICINE

## 2025-01-16 PROCEDURE — 94664 DEMO&/EVAL PT USE INHALER: CPT

## 2025-01-16 PROCEDURE — 85610 PROTHROMBIN TIME: CPT | Performed by: INTERNAL MEDICINE

## 2025-01-16 PROCEDURE — 94760 N-INVAS EAR/PLS OXIMETRY 1: CPT

## 2025-01-16 PROCEDURE — 80048 BASIC METABOLIC PNL TOTAL CA: CPT | Performed by: INTERNAL MEDICINE

## 2025-01-16 PROCEDURE — 82746 ASSAY OF FOLIC ACID SERUM: CPT | Performed by: INTERNAL MEDICINE

## 2025-01-16 PROCEDURE — 94761 N-INVAS EAR/PLS OXIMETRY MLT: CPT

## 2025-01-16 PROCEDURE — 99232 SBSQ HOSP IP/OBS MODERATE 35: CPT | Performed by: NURSE PRACTITIONER

## 2025-01-16 PROCEDURE — 82948 REAGENT STRIP/BLOOD GLUCOSE: CPT

## 2025-01-16 PROCEDURE — 25010000002 ENOXAPARIN PER 10 MG: Performed by: INTERNAL MEDICINE

## 2025-01-16 RX ORDER — WARFARIN SODIUM 7.5 MG/1
7.5 TABLET ORAL
Status: COMPLETED | OUTPATIENT
Start: 2025-01-16 | End: 2025-01-16

## 2025-01-16 RX ADMIN — HYDROCODONE BITARTRATE AND ACETAMINOPHEN 1 TABLET: 5; 325 TABLET ORAL at 14:05

## 2025-01-16 RX ADMIN — SENNOSIDES AND DOCUSATE SODIUM 2 TABLET: 50; 8.6 TABLET ORAL at 20:17

## 2025-01-16 RX ADMIN — LOSARTAN POTASSIUM 25 MG: 50 TABLET, FILM COATED ORAL at 09:57

## 2025-01-16 RX ADMIN — Medication 10 ML: at 09:57

## 2025-01-16 RX ADMIN — Medication 10 ML: at 20:17

## 2025-01-16 RX ADMIN — GABAPENTIN 300 MG: 300 CAPSULE ORAL at 14:02

## 2025-01-16 RX ADMIN — WARFARIN 7.5 MG: 7.5 TABLET ORAL at 17:46

## 2025-01-16 RX ADMIN — IPRATROPIUM BROMIDE AND ALBUTEROL SULFATE 3 ML: 2.5; .5 SOLUTION RESPIRATORY (INHALATION) at 08:24

## 2025-01-16 RX ADMIN — FERROUS SULFATE TAB 325 MG (65 MG ELEMENTAL FE) 325 MG: 325 (65 FE) TAB at 09:57

## 2025-01-16 RX ADMIN — OXYCODONE HYDROCHLORIDE AND ACETAMINOPHEN 500 MG: 500 TABLET ORAL at 09:57

## 2025-01-16 RX ADMIN — BUDESONIDE AND FORMOTEROL FUMARATE DIHYDRATE 2 PUFF: 160; 4.5 AEROSOL RESPIRATORY (INHALATION) at 08:32

## 2025-01-16 RX ADMIN — GUAIFENESIN 1200 MG: 600 TABLET, EXTENDED RELEASE ORAL at 20:17

## 2025-01-16 RX ADMIN — ENOXAPARIN SODIUM 80 MG: 100 INJECTION SUBCUTANEOUS at 20:17

## 2025-01-16 RX ADMIN — GABAPENTIN 600 MG: 300 CAPSULE ORAL at 20:17

## 2025-01-16 RX ADMIN — BUDESONIDE AND FORMOTEROL FUMARATE DIHYDRATE 2 PUFF: 160; 4.5 AEROSOL RESPIRATORY (INHALATION) at 19:45

## 2025-01-16 RX ADMIN — IPRATROPIUM BROMIDE AND ALBUTEROL SULFATE 3 ML: 2.5; .5 SOLUTION RESPIRATORY (INHALATION) at 15:59

## 2025-01-16 RX ADMIN — IPRATROPIUM BROMIDE AND ALBUTEROL SULFATE 3 ML: 2.5; .5 SOLUTION RESPIRATORY (INHALATION) at 19:44

## 2025-01-16 RX ADMIN — ENOXAPARIN SODIUM 80 MG: 100 INJECTION SUBCUTANEOUS at 09:56

## 2025-01-16 RX ADMIN — IPRATROPIUM BROMIDE AND ALBUTEROL SULFATE 3 ML: 2.5; .5 SOLUTION RESPIRATORY (INHALATION) at 11:35

## 2025-01-16 RX ADMIN — GUAIFENESIN 1200 MG: 600 TABLET, EXTENDED RELEASE ORAL at 09:57

## 2025-01-16 RX ADMIN — GABAPENTIN 300 MG: 300 CAPSULE ORAL at 09:57

## 2025-01-16 RX ADMIN — MONTELUKAST 10 MG: 10 TABLET, FILM COATED ORAL at 09:57

## 2025-01-16 RX ADMIN — HYDROCODONE BITARTRATE AND ACETAMINOPHEN 1 TABLET: 5; 325 TABLET ORAL at 10:01

## 2025-01-16 RX ADMIN — SENNOSIDES AND DOCUSATE SODIUM 2 TABLET: 50; 8.6 TABLET ORAL at 09:57

## 2025-01-16 NOTE — PLAN OF CARE
Goal Outcome Evaluation:  Plan of Care Reviewed With: patient        Progress: improving  Outcome Evaluation: VSS. Coumadin and lovenox continues. Ambulated with PT. Pt on 4L of O2. Will continue to monitor.

## 2025-01-16 NOTE — PLAN OF CARE
Goal Outcome Evaluation:  Plan of Care Reviewed With: patient           Outcome Evaluation: Patient seen for PT session this PM. Patient completed all bed mobility independently this date. Patient stood from EOB with SV. Patient ambulated 85ft with no AD and SBA. Gait slow but mostly steady with no overt LOB noted. O2 sats >95% throughout ambulation while on 4L O2. Patient sitting at EOB at end of session. Encouraged patient to continue mobilizing several times per day with nsg. Acute PT will continue to monitor.    Anticipated Discharge Disposition (PT): home with assist

## 2025-01-16 NOTE — PROGRESS NOTES
Hospital Follow Up    LOS:  LOS: 6 days   Patient Name: Omar Choudhary  Age/Sex: 79 y.o. male  : 1945  MRN: [id number]    Day of Service: 25   Length of Stay: 6  Encounter Provider: EMILY Zepeda  Place of Service: [facility] CARDIOLOGY  Patient Care Team:  Dennis Kwong MD as PCP - General (Internal Medicine)  Arslan Carlson PA-C as Physician Assistant (Physician Assistant)  Ang Peña APRN as Nurse Practitioner (Family Medicine)  Benny Ordaz MD as Surgeon (Orthopedic Surgery)  Ebony Hurd RN as Ambulatory  ([facility])    Subjective:     Chief Complaint: follow up PE    Interval History: Breathing continues to improve. O2 requirement is decreasing. No chest pain.    Objective:     Objective:  Temp:  [97.9 °F (36.6 °C)-98.6 °F (37 °C)] 98.1 °F (36.7 °C)  Heart Rate:  [57-75] 64  Resp:  [20-22] 20  BP: (103-135)/(73-88) 135/87     Intake/Output Summary (Last 24 hours) at 2025 0803  Last data filed at 2025 0357  Gross per 24 hour   Intake 600 ml   Output 2350 ml   Net -1750 ml     Body mass index is 28.58 kg/m².      25  0600 01/15/25  0603 25  0607   Weight: 79.4 kg (175 lb 0.7 oz) 80.1 kg (176 lb 9.4 oz) 80.3 kg (177 lb 1.6 oz)     Weight change: 0.232 kg (8.2 oz)    Physical Exam:   General Appearance:    Awake alert and oriented in no acute distress.   Color:  Skin:  Neuro:  HEENT:    Lungs:     Pink  Warm and dry  No focal, motor or sensory deficits  Neck supple, pupils equal, round and reactive. No JVD, No Bruit  Diminished breath sounds, respirations regular, even and       unlabored    Heart:    Regular rate and rhythm, S1 and S2, no murmur, no gallop, no rub. No edema, DP/PT pulses are 2+   Chest Wall:    No abnormalities observed   Abdomen:     Normal bowel sounds, no masses, no organomegaly, soft        non-tender, non-distended, no guarding, no ascites noted  "  Extremities:   Moves all extremities well, no edema, no cyanosis, no redness       Lab Review:   Results from last 7 days   Lab Units 01/16/25  0418 01/14/25  0357   SODIUM mmol/L 134* 137   POTASSIUM mmol/L 4.1 4.4   CHLORIDE mmol/L 102 106   CO2 mmol/L 23.6 22.8   BUN mg/dL 14 18   CREATININE mg/dL 0.70* 0.88   GLUCOSE mg/dL 93 104*   CALCIUM mg/dL 8.0* 7.9*     Results from last 7 days   Lab Units 01/10/25  1748 01/10/25  1638   HSTROP T ng/L 118* 115*     Results from last 7 days   Lab Units 01/16/25  0418 01/15/25  0843   WBC 10*3/mm3 6.07 6.75   HEMOGLOBIN g/dL 9.7* 10.4*   HEMATOCRIT % 29.9* 30.7*   PLATELETS 10*3/mm3 137* 147     Results from last 7 days   Lab Units 01/16/25  0418 01/15/25  0249 01/14/25  0357 01/13/25  0330 01/12/25  1726   INR  1.41* 1.25*   < >  --   --    APTT seconds  --   --   --  35.9* 153.3*    < > = values in this interval not displayed.     Results from last 7 days   Lab Units 01/13/25  0330 01/12/25  0458   MAGNESIUM mg/dL 2.3 2.5*           Invalid input(s): \"LDLCALC\"  Results from last 7 days   Lab Units 01/10/25  1638   PROBNP pg/mL 3,412.0*         I reviewed the patient's new clinical results.  I personally viewed and interpreted the patient's EKG  Current Medications:   Scheduled Meds:vitamin C, 500 mg, Oral, Daily  budesonide-formoterol, 2 puff, Inhalation, BID - RT  enoxaparin, 1 mg/kg, Subcutaneous, Q12H  ferrous sulfate, 325 mg, Oral, Daily With Breakfast  gabapentin, 300 mg, Oral, BID   And  gabapentin, 600 mg, Oral, Nightly  guaiFENesin, 1,200 mg, Oral, Q12H  ipratropium-albuterol, 3 mL, Nebulization, 4x Daily - RT  losartan, 25 mg, Oral, Q24H  montelukast, 10 mg, Oral, Daily  senna-docusate sodium, 2 tablet, Oral, BID  sodium chloride, 10 mL, Intravenous, Q12H      Continuous Infusions:Pharmacy to Dose enoxaparin (LOVENOX),   Pharmacy to dose warfarin,         Allergies:  Allergies   Allergen Reactions    Statins Myalgia     Other reaction(s): muscle soreness "       Assessment/Plan:    1.  Acute bilateral pulmonary embolism with saddle embolism and acute cor pulmonale.  Status post bilateral pulmonary artery thrombectomy on 1/11.  Noted to have only a small to moderate amount of residual thrombus by the time of procedure which was removed.  Now on enoxaparin bridge and warfarin which is being dosed by pharmacy.  2.  Acute right lower extremity DVT.  Noted the peroneal, popliteal and distal femoral vein.  3.  Acute hypoxic respiratory failure.  Still with high O2 requirement.  On high flow nasal cannula during the day and BiPAP at night.   4.  Severe pulmonary hypertension.  Systolic pressures in the 70s to 80s and mean PA of 44 to 46 mmHg.  Noted by right heart catheterization both before and following thrombectomy without any improvement.  Likely chronic.  5.  Hypertension.  Losartan resumed yesterday.  6.  Paralyzed left hemidiaphragm  7.  Asthma  8.  Obstructive sleep apnea  9.  Thrombocytopenia.  .     -INR trending up, on warfarin with Lovenox bridge  -platelets mildly low but stable. Will keep a close eye on this.  -worked with PT/OT yesterday. Hoping to be able to go home with   -No changes from a cardiac standpoint      EMILY Zepeda  01/16/25  08:03 EST  Electronically signed by EMILY Zepeda, 01/15/25, 8:31 AM EST.

## 2025-01-16 NOTE — PLAN OF CARE
Goal Outcome Evaluation:  Plan of Care Reviewed With: patient           Outcome Evaluation: On optiflow. BIPAP at HS. C/o pain abdomen and back. PRN pain med given. No other acute events.

## 2025-01-16 NOTE — PROGRESS NOTES
[facility] Clinical Pharmacy Services: Warfarin Dosing/Monitoring Consult    Omar Choudhary is a 79 y.o. male, estimated creatinine clearance is 88 mL/min (A) (by C-G formula based on SCr of 0.7 mg/dL (L)). weighing 80.3 kg (177 lb 1.6 oz).    Results from last 7 days   Lab Units 01/16/25  0418 01/15/25  0843 01/15/25  0249 01/14/25  0357 01/13/25  0330 01/12/25  1726 01/12/25  1003 01/12/25  0458 01/11/25  2033 01/11/25  0019 01/10/25  1819   INR  1.41*  --  1.25* 1.32*  --   --   --   --   --   --  1.04   APTT seconds  --   --   --   --  35.9* 153.3* 53.4* 76.8* >200.0*   < > 29.8   HEMOGLOBIN g/dL 9.7* 10.4*  --  10.8* 12.3*  --   --  13.1  --    < >  --    HEMOGLOBIN, POC   --   --   --   --   --   --   --   --   --    < >  --    HEMATOCRIT % 29.9* 30.7*  --  30.8* 36.0*  --   --  41.0  --    < >  --    HEMATOCRIT POC   --   --   --   --   --   --   --   --   --    < >  --    PLATELETS 10*3/mm3 137* 147  --  130* 153  --   --  148  --    < >  --     < > = values in this interval not displayed.     Prior to admission anticoagulation: Newly started on AC this admission. Transitioned from apixaban to enoxaparin/warfarin on 01/13 due to affordability issues.     Hospital Anticoagulation:  Consulting provider: Nancy Hdez MD   Start date: 01/13  Indication: DVT/PE (active thrombosis)  Target INR: 2 - 3  Expected duration: N/A   Bridge Therapy: Yes  with enoxaparin    Potential food or drug interactions: No significant DDI's at this time    Education complete?/Date: No; plan for follow up TBD    Assessment/Plan:  INR at 1.41 today. Will give another 7.5 mg x1 today.   Monitor for any signs or symptoms of bleeding; H/H/Plts stable  Follow up daily INRs and dose adjustments    Pharmacy will continue to follow until discharge or discontinuation of warfarin.     Kaleigh Johnston, PharmD  Clinical Pharmacist

## 2025-01-16 NOTE — THERAPY TREATMENT NOTE
Patient Name: Omar Choudhary  : 1945    MRN: [id number]                              Today's Date: 2025       Admit Date: 1/10/2025    Visit Dx:     ICD-10-CM ICD-9-CM   1. Acute saddle pulmonary embolism, unspecified whether acute cor pulmonale present  I26.92 415.13   2. Acute hypoxic respiratory failure  J96.01 518.81   3. History of asthma  Z87.09 V12.69   4. Acute saddle pulmonary embolism with acute cor pulmonale  I26.02 415.13     415.0     Patient Active Problem List   Diagnosis    Benign prostatic hyperplasia    Bursitis    Depression    Diverticulosis    Family history of malignant neoplasm of breast    Hyperlipidemia    Hypertension    Internal derangement of right knee    Knee effusion    Asthma    Obstructive sleep apnea syndrome    Osteoarthritis    Osteopenia    Pain in knee    Postnasal drip    Prepatellar bursitis    Sciatica of right side    Diaphragm paralysis    Spinal stenosis of lumbar region with neurogenic claudication    Degenerative lumbar spinal stenosis    Anxiety    Pinguecula of right eye    Lumbar radiculopathy, chronic    Bilateral pseudophakia    Diverticulosis of colon    Hemorrhoids without complication    Lumbar degenerative disc disease    Lumbar spondylosis    Other fecal abnormalities    Rectal bleeding    Scoliosis of lumbosacral region due to degenerative disease of spine in adult    Arthritis    Acute right-sided low back pain without sciatica    Sacroiliitis    Acquired spondylolisthesis    Acute saddle pulmonary embolism    Acute cor pulmonale    Chronic low back pain    Anemia     Past Medical History:   Diagnosis Date    ADHD (attention deficit hyperactivity disorder)     Hard to focus on tasks and follow through.    Allergic     Arthritis     Asthma     Carpal tunnel syndrome     no pain    Cataract     Depression     Erectile dysfunction     2018 at 73 years old    Family history of malignant neoplasm of breast 2019    Hyperlipidemia 2019     Hypertension 03/01/2012    Leg pain, right     Low back pain     Neuropathy     feet    Osteopenia     Poor balance     Reactive airway disease 01/15/2016    Scoliosis 1960    Shortness of breath     Sleep apnea     Substance abuse 1966    Alcoholic     Past Surgical History:   Procedure Laterality Date    CARDIAC CATHETERIZATION  1992    CARDIAC CATHETERIZATION N/A 1/11/2025    Procedure: Right Heart Cath;  Surgeon: Nancy Hdez MD;  Location:  [facility];  Service: Cardiovascular;  Laterality: N/A;    CARDIAC CATHETERIZATION  1/11/2025    Procedure: Percutaneous Manual Thrombectomy;  Surgeon: Nancy Hdez MD;  Location:  [facility];  Service: Cardiovascular;;    CARDIAC CATHETERIZATION Bilateral 1/11/2025    Procedure: Pulmonary angiography;  Surgeon: Nancy Hdez MD;  Location:  [facility];  Service: Cardiovascular;  Laterality: Bilateral;    COLONOSCOPY  2013    No polyps, slight diverticulitis    EYE SURGERY  2016    KNEE ARTHROSCOPY W/ ACL RECONSTRUCTION Right 2019    LUMBAR FUSION Bilateral 10/05/2022    Procedure: DAY 2 LUMBAR LAMINECTOMY TRANSFORAMINAL LUMBAR INTERBODY FUSION L2,L3,L4 WITH NEURO ROBOT;  Surgeon: John Do MD;  Location: [facility] MAIN OR;  Service: Robotics - Neuro;  Laterality: Bilateral;    LUMBAR FUSION N/A 10/04/2022    Procedure: DAY 1 LUMBAR LATERAL INTERBODY FUSION WITH NEURO ROBOT L2, L3.L4;  Surgeon: John Do MD;  Location: [facility] MAIN OR;  Service: Robotics - Neuro;  Laterality: N/A;  left side approach    MOUTH SURGERY      SPINE SURGERY  10/4&5/2022    Dr. John Do, [facility] group      General Information       Row Name 01/16/25 1457          Physical Therapy Time and Intention    Document Type therapy note (daily note)  -SM     Mode of Treatment physical therapy;individual therapy  -       Row Name 01/16/25 1457          General Information    Patient Profile Reviewed yes  -SM      Existing Precautions/Restrictions oxygen therapy device and L/min  -SM       Row Name 01/16/25 1457          Cognition    Orientation Status (Cognition) oriented x 4  -SM       Row Name 01/16/25 1457          Safety Issues/Impairments Affecting Functional Mobility    Impairments Affecting Function (Mobility) balance;endurance/activity tolerance;shortness of breath  -               User Key  (r) = Recorded By, (t) = Taken By, (c) = Cosigned By      Initials Name Provider Type     Елена Hanson, CHAYO Physical Therapist                   Mobility       Row Name 01/16/25 1458          Bed Mobility    Bed Mobility supine-sit  -SM     All Activities, Divide (Bed Mobility) modified independence  -     Comment, (Bed Mobility) sitting EOB at end of session  -       Row Name 01/16/25 1458          Sit-Stand Transfer    Sit-Stand Divide (Transfers) supervision  -       Row Name 01/16/25 1458          Gait/Stairs (Locomotion)    Divide Level (Gait) standby assist  -SM     Distance in Feet (Gait) 85  -SM     Comment, (Gait/Stairs) Gait slow but mostly steady with no overt LOB noted. O2 sats >95% throughout ambulation while on 4L O2.  -               User Key  (r) = Recorded By, (t) = Taken By, (c) = Cosigned By      Initials Name Provider Type     Елена Hanson, CHAYO Physical Therapist                   Obj/Interventions       Row Name 01/16/25 1500          Balance    Balance Assessment sitting static balance;sitting dynamic balance;standing static balance;standing dynamic balance  -     Static Sitting Balance independent  -     Dynamic Sitting Balance independent  -     Position, Sitting Balance sitting edge of bed  -     Static Standing Balance supervision  -     Dynamic Standing Balance standby assist  -     Position/Device Used, Standing Balance unsupported  -     Balance Interventions sitting;standing;sit to stand;static;dynamic  -               User Key  (r) = Recorded  By, (t) = Taken By, (c) = Cosigned By      Initials Name Provider Type     Елена Hanson PT Physical Therapist                   Goals/Plan    No documentation.                  Clinical Impression       Row Name 01/16/25 1501          Pain    Pretreatment Pain Rating 0/10 - no pain  -SM     Posttreatment Pain Rating 0/10 - no pain  -SM       Row Name 01/16/25 1501          Plan of Care Review    Plan of Care Reviewed With patient  -SM     Outcome Evaluation Patient seen for PT session this PM. Patient completed all bed mobility independently this date. Patient stood from EOB with SV. Patient ambulated 85ft with no AD and SBA. Gait slow but mostly steady with no overt LOB noted. O2 sats >95% throughout ambulation while on 4L O2. Patient sitting at EOB at end of session. Encouraged patient to continue mobilizing several times per day with nsg. Acute PT will continue to monitor.  -SM       Row Name 01/16/25 1501          Vital Signs    O2 Delivery Pre Treatment supplemental O2  -SM     O2 Delivery Intra Treatment supplemental O2  -SM     O2 Delivery Post Treatment supplemental O2  -SM     Pre Patient Position Supine  -SM     Intra Patient Position Standing  -SM     Post Patient Position Sitting  -SM       Row Name 01/16/25 1501          Positioning and Restraints    Pre-Treatment Position in bed  -SM     Post Treatment Position bed  -SM     In Bed notified nsg;call light within reach;encouraged to call for assist;sitting EOB;exit alarm on  -SM               User Key  (r) = Recorded By, (t) = Taken By, (c) = Cosigned By      Initials Name Provider Type     Елена Hanson PT Physical Therapist                   Outcome Measures       Row Name 01/16/25 1504 01/16/25 1001       How much help from another person do you currently need...    Turning from your back to your side while in flat bed without using bedrails? 4  -SM 4  -LM    Moving from lying on back to sitting on the side of a flat bed without  bedrails? 4  -SM 4  -LM    Moving to and from a bed to a chair (including a wheelchair)? 4  -SM 4  -LM    Standing up from a chair using your arms (e.g., wheelchair, bedside chair)? 4  -SM 3  -LM    Climbing 3-5 steps with a railing? 3  -SM 2  -LM    To walk in hospital room? 3  -SM 3  -LM    AM-PAC 6 Clicks Score (PT) 22  -SM 20  -LM    Highest Level of Mobility Goal 7 --> Walk 25 feet or more  -SM 6 --> Walk 10 steps or more  -LM      Row Name 01/16/25 1504          Functional Assessment    Outcome Measure Options AM-PAC 6 Clicks Basic Mobility (PT)  -               User Key  (r) = Recorded By, (t) = Taken By, (c) = Cosigned By      Initials Name Provider Type    Cristina Moreno, RN Registered Nurse    Елена Szymanski, CHAYO Physical Therapist                                 Physical Therapy Education       Title: PT OT SLP Therapies (Done)       Topic: Physical Therapy (Done)       Point: Mobility training (Done)       Learning Progress Summary            Patient Acceptance, E, VU by  at 1/16/2025 1505    Acceptance, E,TB, VU by ROGERIO at 1/15/2025 1403    Acceptance, E, VU,NR by  at 1/13/2025 1038                      Point: Home exercise program (Done)       Learning Progress Summary            Patient Acceptance, E, VU by  at 1/16/2025 1505    Acceptance, E,TB, VU by ROGERIO at 1/15/2025 1403    Acceptance, E, VU,NR by  at 1/13/2025 1038                      Point: Body mechanics (Done)       Learning Progress Summary            Patient Acceptance, E, VU by  at 1/16/2025 1505    Acceptance, E,TB, VU by ROGERIO at 1/15/2025 1403    Acceptance, E, VU,NR by  at 1/13/2025 1038                      Point: Precautions (Done)       Learning Progress Summary            Patient Acceptance, E, VU by  at 1/16/2025 1505    Acceptance, E,TB, VU by ROGERIO at 1/15/2025 1403    Acceptance, E, VU,NR by  at 1/13/2025 1038                                      User Key       Initials Effective Dates Name Provider Type  Discipline    RD 06/16/21 -  Ceci Brice PT Physical Therapist PT     05/02/22 -  Елена Hanson, CHAYO Physical Therapist PT     10/30/24 -  Yamileth Abernathy, RN Registered Nurse Nurse                  PT Recommendation and Plan     Outcome Evaluation: Patient seen for PT session this PM. Patient completed all bed mobility independently this date. Patient stood from EOB with SV. Patient ambulated 85ft with no AD and SBA. Gait slow but mostly steady with no overt LOB noted. O2 sats >95% throughout ambulation while on 4L O2. Patient sitting at EOB at end of session. Encouraged patient to continue mobilizing several times per day with nsg. Acute PT will continue to monitor.     Time Calculation:         PT Charges       Row Name 01/16/25 1505             Time Calculation    Start Time 1327  -      Stop Time 1341  -      Time Calculation (min) 14 min  -SM      PT Received On 01/16/25  -      PT - Next Appointment 01/17/25  -         Time Calculation- PT    Total Timed Code Minutes- PT 14 minute(s)  -         Timed Charges    98761 - PT Therapeutic Activity Minutes 14  -SM         Total Minutes    Timed Charges Total Minutes 14  -SM       Total Minutes 14  -SM                User Key  (r) = Recorded By, (t) = Taken By, (c) = Cosigned By      Initials Name Provider Type     Елена Hanson PT Physical Therapist                  Therapy Charges for Today       Code Description Service Date Service Provider Modifiers Qty    11081041268  PT THERAPEUTIC ACT EA 15 MIN 1/16/2025 Елена Hanson, PT GP 1            PT G-Codes  Outcome Measure Options: AM-PAC 6 Clicks Basic Mobility (PT)  AM-PAC 6 Clicks Score (PT): 22  AM-PAC 6 Clicks Score (OT): 20  Modified Rush Scale: 3 - Moderate disability.  Requiring some help, but able to walk without assistance.  PT Discharge Summary  Anticipated Discharge Disposition (PT): home with assist    Елена Hanson PT  1/16/2025

## 2025-01-16 NOTE — PROGRESS NOTES
Name: Omar Choudhary ADMIT: 1/10/2025   : 1945  PCP: Dennis Kwong MD    MRN: [id number] LOS: 6 days   AGE/SEX: 79 y.o. male  ROOM: [facility]   Subjective   Chief Complaint   Patient presents with    Shortness of Breath    Cough     Dyspnea moderate  Off optiflow  Now on high flow oxygen 8L  On lovenox  On coumadin    ROS  No f/c  No n/v  No cp/palp  + soa/cough    Objective   Vital Signs  Temp:  [97.9 °F (36.6 °C)-98.6 °F (37 °C)] 98.1 °F (36.7 °C)  Heart Rate:  [57-74] 71  Resp:  [20-22] 20  BP: (103-135)/(73-88) 135/87  SpO2:  [95 %-100 %] 100 %  on  Flow (L/min) (Oxygen Therapy):  [8-60] 8;   Device (Oxygen Therapy): humidified;high-flow nasal cannula  Body mass index is 28.58 kg/m².    Physical Exam  Constitutional:       Appearance: He is ill-appearing.   HENT:      Head: Normocephalic and atraumatic.   Eyes:      General: No scleral icterus.  Cardiovascular:      Rate and Rhythm: Normal rate and regular rhythm.      Heart sounds: Normal heart sounds.   Pulmonary:      Effort: Pulmonary effort is normal. Respiratory distress (slight) present.      Breath sounds: Normal breath sounds. No wheezing.   Abdominal:      General: There is no distension.      Palpations: Abdomen is soft.      Tenderness: There is no abdominal tenderness.   Musculoskeletal:      Cervical back: Neck supple.   Neurological:      Mental Status: He is alert.   Psychiatric:         Mood and Affect: Mood normal.         Behavior: Behavior normal.         Thought Content: Thought content normal.         Results Review:       I reviewed the patient's new clinical results.  Results from last 7 days   Lab Units 258 01/15/25  0843 25  033   WBC 10*3/mm3 6.07 6.75 7.21 8.55   HEMOGLOBIN g/dL 9.7* 10.4* 10.8* 12.3*   PLATELETS 10*3/mm3 137* 147 130* 153     Results from last 7 days   Lab Units 25  0418 257 25  0330 25  0458   SODIUM mmol/L 134* 137 141 143   POTASSIUM  "mmol/L 4.1 4.4 4.2 4.0   CHLORIDE mmol/L 102 106 107 109*   CO2 mmol/L 23.6 22.8 22.0 25.1   BUN mg/dL 14 18 20 21   CREATININE mg/dL 0.70* 0.88 0.87 0.89   GLUCOSE mg/dL 93 104* 110* 112*   Estimated Creatinine Clearance: 88 mL/min (A) (by C-G formula based on SCr of 0.7 mg/dL (L)).    Results from last 7 days   Lab Units 01/16/25 0418 01/14/25 0357 01/13/25  0330 01/12/25 0458 01/12/25  0053 01/11/25  0244   CALCIUM mg/dL 8.0* 7.9* 7.9* 8.1* 7.9* 8.2*   MAGNESIUM mg/dL  --   --  2.3 2.5* 2.4 2.6*   PHOSPHORUS mg/dL  --   --  3.3 4.0 4.0 3.9     Results from last 7 days   Lab Units 01/11/25  0019 01/10/25  2226 01/10/25  1931 01/10/25  1638   PROCALCITONIN ng/mL  --   --   --  0.07   LACTATE mmol/L 1.6 2.6* 3.6* 3.6*       Coag   Results from last 7 days   Lab Units 01/16/25  0418 01/15/25  0249 01/14/25  0357 01/13/25  0330 01/12/25  1726 01/12/25  1003 01/12/25  0458 01/11/25  2033 01/11/25  1618 01/11/25  0852 01/11/25  0019 01/10/25  1819   INR  1.41* 1.25* 1.32*  --   --   --   --   --   --   --   --  1.04   APTT seconds  --   --   --  35.9* 153.3* 53.4* 76.8* >200.0* 90.8* 104.4*   < > 29.8    < > = values in this interval not displayed.     HbA1C   Lab Results   Component Value Date    HGBA1C 5.60 04/18/2024    HGBA1C 5.80 (H) 03/23/2023    HGBA1C 5.7 (H) 05/17/2022     Infection   Results from last 7 days   Lab Units 01/10/25  1638   PROCALCITONIN ng/mL 0.07     Radiology(recent) No radiology results for the last day  No results found for: \"TROPONINT\", \"TROPONINI\", \"BNP\"  No components found for: \"TSH;2\"    vitamin C, 500 mg, Oral, Daily  budesonide-formoterol, 2 puff, Inhalation, BID - RT  enoxaparin, 1 mg/kg, Subcutaneous, Q12H  ferrous sulfate, 325 mg, Oral, Daily With Breakfast  gabapentin, 300 mg, Oral, BID   And  gabapentin, 600 mg, Oral, Nightly  guaiFENesin, 1,200 mg, Oral, Q12H  ipratropium-albuterol, 3 mL, Nebulization, 4x Daily - RT  losartan, 25 mg, Oral, Q24H  montelukast, 10 mg, Oral, " Daily  senna-docusate sodium, 2 tablet, Oral, BID  sodium chloride, 10 mL, Intravenous, Q12H  warfarin, 7.5 mg, Oral, Once      Pharmacy to Dose enoxaparin (LOVENOX),   Pharmacy to dose warfarin,     Diet: Cardiac; Healthy Heart (2-3 Na+); Fluid Consistency: Thin (IDDSI 0)      Assessment & Plan      Active Hospital Problems    Diagnosis  POA    **Acute saddle pulmonary embolism [I26.92]  Yes    Anemia [D64.9]  Unknown    Acute cor pulmonale [I26.09]  Yes    Chronic low back pain [M54.50, G89.29]  Yes    Hyperlipidemia [E78.5]  Yes    Diaphragm paralysis [J98.6]  Yes    Asthma [J45.909]  Yes    Obstructive sleep apnea syndrome [G47.33]  Yes    Hypertension [I10]  Yes      Resolved Hospital Problems   No resolved problems to display.       Brief Hospital Course to date:  Omar Choudhary is a 79 y.o. male presents the hospital with a saddle pulmonary embolism with cor pulmonale.     Discussion/plan for today:   INR daily.  Adjust warfarin dosing with the assistance of pharmacy.   oral iron supplementation.  Continue CPAP for ELINOR.  Continue asthma treatments.  Monitor blood pressure and adjust medicines as needed.      Continue to wean oxygen       Acute saddle PE with acute cor pulmonale s/p catheter-based bilateral pulmonary artery embolectomy   - pt taken for thrombectomy urgently 1/11 with cardiology  - heparin gtt transitioned to Lovenox bridge while patient has been started on warfarin (per pharmacy note- no DOAC due to affordability issues)  - PT to see for discharge planning     Acute hypoxic respiratory failure - related to #1; continue to wean O2; pt's FiO2 requirements decreasing, monitor and adjust as needed     Severe pulmonary HTN - Noted by right heart catheterization both before and following thrombectomy without any improvement- per cardiology- suspect chronic component; will need outpatient cardiology follow up     HTN - home losartan resumed, monitor blood pressure and adjust as needed     Acute RLE  DVT- AC as above     Chronic low back pain  - follows w/ Dr. John Do outpatient, reasonably stable     Asthma - follows outpatient w/ Dr. Lagunas, resume home singulair, symbicort, albuterol nebs prn     Paralyzed L hemidiaphragm - known/chronic     ELINOR  - compliant with bipap at home; using here     Thrombocytopenia - mild, monitoring     Peripheral neuropathy  - resume home gabapentin, stable        DVT Prophylaxis: Anticoagulation        Disposition: Home when oxygen level improved      Ladarius Yeager MD  [facility]  01/16/25  11:05 EST

## 2025-01-17 LAB
ANION GAP SERPL CALCULATED.3IONS-SCNC: 7 MMOL/L (ref 5–15)
BUN SERPL-MCNC: 15 MG/DL (ref 8–23)
BUN/CREAT SERPL: 20 (ref 7–25)
CALCIUM SPEC-SCNC: 8.1 MG/DL (ref 8.6–10.5)
CHLORIDE SERPL-SCNC: 102 MMOL/L (ref 98–107)
CO2 SERPL-SCNC: 26 MMOL/L (ref 22–29)
CREAT SERPL-MCNC: 0.75 MG/DL (ref 0.76–1.27)
DEPRECATED RDW RBC AUTO: 47.3 FL (ref 37–54)
EGFRCR SERPLBLD CKD-EPI 2021: 92.9 ML/MIN/1.73
ERYTHROCYTE [DISTWIDTH] IN BLOOD BY AUTOMATED COUNT: 13.5 % (ref 12.3–15.4)
GLUCOSE SERPL-MCNC: 88 MG/DL (ref 65–99)
HCT VFR BLD AUTO: 29.7 % (ref 37.5–51)
HGB BLD-MCNC: 9.5 G/DL (ref 13–17.7)
INR PPP: 1.78 (ref 0.9–1.1)
MCH RBC QN AUTO: 30.7 PG (ref 26.6–33)
MCHC RBC AUTO-ENTMCNC: 32 G/DL (ref 31.5–35.7)
MCV RBC AUTO: 96.1 FL (ref 79–97)
PLATELET # BLD AUTO: 159 10*3/MM3 (ref 140–450)
PMV BLD AUTO: 10.5 FL (ref 6–12)
POTASSIUM SERPL-SCNC: 4.1 MMOL/L (ref 3.5–5.2)
PROTHROMBIN TIME: 21 SECONDS (ref 11.7–14.2)
RBC # BLD AUTO: 3.09 10*6/MM3 (ref 4.14–5.8)
SODIUM SERPL-SCNC: 135 MMOL/L (ref 136–145)
WBC NRBC COR # BLD AUTO: 5.79 10*3/MM3 (ref 3.4–10.8)

## 2025-01-17 PROCEDURE — 97535 SELF CARE MNGMENT TRAINING: CPT

## 2025-01-17 PROCEDURE — 97530 THERAPEUTIC ACTIVITIES: CPT

## 2025-01-17 PROCEDURE — 94664 DEMO&/EVAL PT USE INHALER: CPT

## 2025-01-17 PROCEDURE — 80048 BASIC METABOLIC PNL TOTAL CA: CPT | Performed by: INTERNAL MEDICINE

## 2025-01-17 PROCEDURE — 94799 UNLISTED PULMONARY SVC/PX: CPT

## 2025-01-17 PROCEDURE — 94761 N-INVAS EAR/PLS OXIMETRY MLT: CPT

## 2025-01-17 PROCEDURE — 25010000002 ENOXAPARIN PER 10 MG: Performed by: INTERNAL MEDICINE

## 2025-01-17 PROCEDURE — 99232 SBSQ HOSP IP/OBS MODERATE 35: CPT | Performed by: INTERNAL MEDICINE

## 2025-01-17 PROCEDURE — 85027 COMPLETE CBC AUTOMATED: CPT | Performed by: INTERNAL MEDICINE

## 2025-01-17 PROCEDURE — 85610 PROTHROMBIN TIME: CPT | Performed by: INTERNAL MEDICINE

## 2025-01-17 RX ORDER — WARFARIN SODIUM 7.5 MG/1
7.5 TABLET ORAL
Status: COMPLETED | OUTPATIENT
Start: 2025-01-17 | End: 2025-01-17

## 2025-01-17 RX ORDER — ECHINACEA PURPUREA EXTRACT 125 MG
2 TABLET ORAL AS NEEDED
Status: DISCONTINUED | OUTPATIENT
Start: 2025-01-17 | End: 2025-01-19 | Stop reason: HOSPADM

## 2025-01-17 RX ADMIN — BUDESONIDE AND FORMOTEROL FUMARATE DIHYDRATE 2 PUFF: 160; 4.5 AEROSOL RESPIRATORY (INHALATION) at 19:38

## 2025-01-17 RX ADMIN — ENOXAPARIN SODIUM 80 MG: 100 INJECTION SUBCUTANEOUS at 20:31

## 2025-01-17 RX ADMIN — SENNOSIDES AND DOCUSATE SODIUM 2 TABLET: 50; 8.6 TABLET ORAL at 09:06

## 2025-01-17 RX ADMIN — MONTELUKAST 10 MG: 10 TABLET, FILM COATED ORAL at 09:03

## 2025-01-17 RX ADMIN — LOSARTAN POTASSIUM 25 MG: 50 TABLET, FILM COATED ORAL at 09:03

## 2025-01-17 RX ADMIN — GUAIFENESIN 1200 MG: 600 TABLET, EXTENDED RELEASE ORAL at 20:30

## 2025-01-17 RX ADMIN — WARFARIN 7.5 MG: 7.5 TABLET ORAL at 17:21

## 2025-01-17 RX ADMIN — GABAPENTIN 300 MG: 300 CAPSULE ORAL at 13:42

## 2025-01-17 RX ADMIN — ENOXAPARIN SODIUM 80 MG: 100 INJECTION SUBCUTANEOUS at 09:03

## 2025-01-17 RX ADMIN — GUAIFENESIN 1200 MG: 600 TABLET, EXTENDED RELEASE ORAL at 09:03

## 2025-01-17 RX ADMIN — IPRATROPIUM BROMIDE AND ALBUTEROL SULFATE 3 ML: 2.5; .5 SOLUTION RESPIRATORY (INHALATION) at 11:45

## 2025-01-17 RX ADMIN — IPRATROPIUM BROMIDE AND ALBUTEROL SULFATE 3 ML: 2.5; .5 SOLUTION RESPIRATORY (INHALATION) at 16:05

## 2025-01-17 RX ADMIN — Medication 10 ML: at 09:03

## 2025-01-17 RX ADMIN — GABAPENTIN 600 MG: 300 CAPSULE ORAL at 20:30

## 2025-01-17 RX ADMIN — SENNOSIDES AND DOCUSATE SODIUM 2 TABLET: 50; 8.6 TABLET ORAL at 20:30

## 2025-01-17 RX ADMIN — FERROUS SULFATE TAB 325 MG (65 MG ELEMENTAL FE) 325 MG: 325 (65 FE) TAB at 09:03

## 2025-01-17 RX ADMIN — HYDROCODONE BITARTRATE AND ACETAMINOPHEN 1 TABLET: 5; 325 TABLET ORAL at 05:00

## 2025-01-17 RX ADMIN — GABAPENTIN 300 MG: 300 CAPSULE ORAL at 09:03

## 2025-01-17 RX ADMIN — IPRATROPIUM BROMIDE AND ALBUTEROL SULFATE 3 ML: 2.5; .5 SOLUTION RESPIRATORY (INHALATION) at 19:38

## 2025-01-17 RX ADMIN — IPRATROPIUM BROMIDE AND ALBUTEROL SULFATE 3 ML: 2.5; .5 SOLUTION RESPIRATORY (INHALATION) at 07:14

## 2025-01-17 RX ADMIN — OXYCODONE HYDROCHLORIDE AND ACETAMINOPHEN 500 MG: 500 TABLET ORAL at 09:03

## 2025-01-17 RX ADMIN — Medication 10 ML: at 20:30

## 2025-01-17 NOTE — PROGRESS NOTES
[facility] Follow Up    Chief Complaint: Follow up PE    Interval History: The patient was weaned down to 4 L yesterday.  Today when I walked in he was actually off of oxygen and saturating 97% on room air.  He reports that he feels comfortable at rest but does feel more fatigued and out of breath with activity.  Denies any chest discomfort.    Objective:     Objective:  Temp:  [97.5 °F (36.4 °C)-98.2 °F (36.8 °C)] 97.5 °F (36.4 °C)  Heart Rate:  [64-89] 89  Resp:  [18-20] 18  BP: (100-127)/(67-89) 127/89     Intake/Output Summary (Last 24 hours) at 1/17/2025 0724  Last data filed at 1/16/2025 6473  Gross per 24 hour   Intake 100 ml   Output 1400 ml   Net -1300 ml     Body mass index is 28.17 kg/m².      01/15/25  0603 01/16/25  0607 01/17/25  0635   Weight: 80.1 kg (176 lb 9.4 oz) 80.3 kg (177 lb 1.6 oz) 79.2 kg (174 lb 8 oz)     Weight change: -1.179 kg (-2 lb 9.6 oz)      Physical Exam:   General : Alert, cooperative, in no acute distress.  Neuro: Alert,cooperative and oriented.  Lungs: CTAB. Normal respiratory effort and rate.  CV: Regular rate and rhythm, normal S1 and S2, no murmurs, gallops or rubs.  ABD: Soft, nontender, nondistended. Positive bowel sounds.  Extr: No edema or cyanosis, moves all extremities.    Lab Review:   Results from last 7 days   Lab Units 01/17/25  0353 01/16/25  0418   SODIUM mmol/L 135* 134*   POTASSIUM mmol/L 4.1 4.1   CHLORIDE mmol/L 102 102   CO2 mmol/L 26.0 23.6   BUN mg/dL 15 14   CREATININE mg/dL 0.75* 0.70*   GLUCOSE mg/dL 88 93   CALCIUM mg/dL 8.1* 8.0*     Results from last 7 days   Lab Units 01/10/25  1748 01/10/25  1638   HSTROP T ng/L 118* 115*     Results from last 7 days   Lab Units 01/17/25  0353 01/16/25  0418   WBC 10*3/mm3 5.79 6.07   HEMOGLOBIN g/dL 9.5* 9.7*   HEMATOCRIT % 29.7* 29.9*   PLATELETS 10*3/mm3 159 137*     Results from last 7 days   Lab Units 01/17/25  0353 01/16/25  0418 01/14/25  0357 01/13/25  0330 01/12/25  1726   INR  1.78*  "1.41*   < >  --   --    APTT seconds  --   --   --  35.9* 153.3*    < > = values in this interval not displayed.     Results from last 7 days   Lab Units 01/13/25  0330 01/12/25  0458   MAGNESIUM mg/dL 2.3 2.5*           Invalid input(s): \"LDLCALC\"  Results from last 7 days   Lab Units 01/10/25  1638   PROBNP pg/mL 3,412.0*         I reviewed the patient's new clinical results.  I personally viewed and interpreted the patient's EKG  Current Medications:   Scheduled Meds:vitamin C, 500 mg, Oral, Daily  budesonide-formoterol, 2 puff, Inhalation, BID - RT  enoxaparin, 1 mg/kg, Subcutaneous, Q12H  ferrous sulfate, 325 mg, Oral, Daily With Breakfast  gabapentin, 300 mg, Oral, BID   And  gabapentin, 600 mg, Oral, Nightly  guaiFENesin, 1,200 mg, Oral, Q12H  ipratropium-albuterol, 3 mL, Nebulization, 4x Daily - RT  losartan, 25 mg, Oral, Q24H  montelukast, 10 mg, Oral, Daily  senna-docusate sodium, 2 tablet, Oral, BID  sodium chloride, 10 mL, Intravenous, Q12H      Continuous Infusions:Pharmacy to Dose enoxaparin (LOVENOX),   Pharmacy to dose warfarin,         Allergies:  Allergies   Allergen Reactions    Statins Myalgia     Other reaction(s): muscle soreness       Assessment/Plan:     1.  Acute bilateral pulmonary embolism with saddle embolism and acute cor pulmonale.  Status post bilateral pulmonary artery thrombectomy on 1/11.  Noted to have only a small to moderate amount of residual thrombus by the time of procedure which was removed.  Now on enoxaparin bridge and warfarin which is being dosed by pharmacy.  2.  Acute right lower extremity DVT.  Noted in the peroneal, popliteal and distal femoral vein.  3.  Acute hypoxic respiratory failure.  Improved.  Down to 4 L nasal cannula as of yesterday but when I saw the patient he appeared to be comfortable at rest and on room air with normal saturations.    4.  Severe pulmonary hypertension.  Systolic pressures in the 70s to 80s and mean PA of 44 to 46 mmHg.  Noted by right " heart catheterization both before and following thrombectomy without any improvement.  Likely chronic.    5.  Hypertension.  Losartan resumed yesterday.  6.  Paralyzed left hemidiaphragm  7.  Asthma  8.  Obstructive sleep apnea.  On BiPAP here.  9.  Thrombocytopenia.  Improved..    -I suspect he can remain off of oxygen at rest.  He may still need oxygen with exertion.  If this is the case he may need further testing to get him set up for oxygen at home.  - INR is slowly rising.  I am hopeful that his INR will become therapeutic in the next 1 to 2 days.  Continue enoxaparin bridge.  I think would be best if the patient stayed until his INR was therapeutic so he does not need to self administer enoxaparin injections.  However the patient is interested in sooner discharge with enoxaparin injections if needed.  -Will plan on following up on his pulmonary hypertension and RV dysfunction with a repeat echocardiogram in 1 month as an outpatient.  -Once the above is sorted out he is okay for discharge from a cardiac standpoint.  Will get him back in for a follow-up in a week with Addie Lennox, APRN.      Nancy Hdez MD  01/17/25  07:49 EST

## 2025-01-17 NOTE — PROGRESS NOTES
Name: Omar Choudhary ADMIT: 1/10/2025   : 1945  PCP: Dennis Kwong MD    MRN: [id number] LOS: 7 days   AGE/SEX: 79 y.o. male  ROOM: [facility]   Subjective   Chief Complaint   Patient presents with    Shortness of Breath    Cough     Dyspnea moderate  Off optiflow  O2 requirements improving  Still with fatigue and dyspnea with exertion  On lovenox  On coumadin    ROS  No f/c  No n/v  No cp/palp  + soa/cough    Objective   Vital Signs  Temp:  [97.5 °F (36.4 °C)-98.2 °F (36.8 °C)] 97.7 °F (36.5 °C)  Heart Rate:  [63-89] 66  Resp:  [16-20] 16  BP: (100-135)/(67-89) 135/89  SpO2:  [96 %-100 %] 100 %  on  Flow (L/min) (Oxygen Therapy):  [2-4] 2;   Device (Oxygen Therapy): room air  Body mass index is 28.17 kg/m².    Physical Exam  Constitutional:       Appearance: He is ill-appearing.   HENT:      Head: Normocephalic and atraumatic.   Eyes:      General: No scleral icterus.  Cardiovascular:      Rate and Rhythm: Normal rate and regular rhythm.      Heart sounds: Normal heart sounds.   Pulmonary:      Effort: Pulmonary effort is normal. Respiratory distress (slight) present.      Breath sounds: Normal breath sounds. No wheezing.   Abdominal:      General: There is no distension.      Palpations: Abdomen is soft.      Tenderness: There is no abdominal tenderness.   Musculoskeletal:      Cervical back: Neck supple.   Neurological:      Mental Status: He is alert.   Psychiatric:         Mood and Affect: Mood normal.         Behavior: Behavior normal.         Thought Content: Thought content normal.         Results Review:       I reviewed the patient's new clinical results.  Results from last 7 days   Lab Units 25  0353 01/16/25  0418 01/15/25  0843 25   WBC 10*3/mm3 5.79 6.07 6.75 7.21   HEMOGLOBIN g/dL 9.5* 9.7* 10.4* 10.8*   PLATELETS 10*3/mm3 159 137* 147 130*     Results from last 7 days   Lab Units 25  0353 258 25  0357 25  0330   SODIUM mmol/L 135* 134*  "137 141   POTASSIUM mmol/L 4.1 4.1 4.4 4.2   CHLORIDE mmol/L 102 102 106 107   CO2 mmol/L 26.0 23.6 22.8 22.0   BUN mg/dL 15 14 18 20   CREATININE mg/dL 0.75* 0.70* 0.88 0.87   GLUCOSE mg/dL 88 93 104* 110*   Estimated Creatinine Clearance: 81.7 mL/min (A) (by C-G formula based on SCr of 0.75 mg/dL (L)).    Results from last 7 days   Lab Units 01/17/25 0353 01/16/25 0418 01/14/25  0357 01/13/25  0330 01/12/25  0458 01/12/25  0053 01/11/25  0244   CALCIUM mg/dL 8.1* 8.0* 7.9* 7.9* 8.1* 7.9* 8.2*   MAGNESIUM mg/dL  --   --   --  2.3 2.5* 2.4 2.6*   PHOSPHORUS mg/dL  --   --   --  3.3 4.0 4.0 3.9     Results from last 7 days   Lab Units 01/11/25  0019 01/10/25  2226 01/10/25  1931 01/10/25  1638   PROCALCITONIN ng/mL  --   --   --  0.07   LACTATE mmol/L 1.6 2.6* 3.6* 3.6*       Coag   Results from last 7 days   Lab Units 01/17/25 0353 01/16/25  0418 01/15/25  0249 01/14/25  0357 01/13/25  0330 01/12/25  1726 01/12/25  1003 01/12/25  0458 01/11/25  2033 01/11/25  1618 01/11/25  0852 01/11/25  0019 01/10/25  1819   INR  1.78* 1.41* 1.25* 1.32*  --   --   --   --   --   --   --   --  1.04   APTT seconds  --   --   --   --  35.9* 153.3* 53.4* 76.8* >200.0* 90.8* 104.4*   < > 29.8    < > = values in this interval not displayed.     HbA1C   Lab Results   Component Value Date    HGBA1C 5.60 04/18/2024    HGBA1C 5.80 (H) 03/23/2023    HGBA1C 5.7 (H) 05/17/2022     Infection   Results from last 7 days   Lab Units 01/10/25  1638   PROCALCITONIN ng/mL 0.07     Radiology(recent) No radiology results for the last day  No results found for: \"TROPONINT\", \"TROPONINI\", \"BNP\"  No components found for: \"TSH;2\"    vitamin C, 500 mg, Oral, Daily  budesonide-formoterol, 2 puff, Inhalation, BID - RT  enoxaparin, 1 mg/kg, Subcutaneous, Q12H  ferrous sulfate, 325 mg, Oral, Daily With Breakfast  gabapentin, 300 mg, Oral, BID   And  gabapentin, 600 mg, Oral, Nightly  guaiFENesin, 1,200 mg, Oral, Q12H  ipratropium-albuterol, 3 mL, " Nebulization, 4x Daily - RT  losartan, 25 mg, Oral, Q24H  montelukast, 10 mg, Oral, Daily  senna-docusate sodium, 2 tablet, Oral, BID  sodium chloride, 10 mL, Intravenous, Q12H  warfarin, 7.5 mg, Oral, Daily      Pharmacy to Dose enoxaparin (LOVENOX),   Pharmacy to dose warfarin,     Diet: Cardiac; Healthy Heart (2-3 Na+); Fluid Consistency: Thin (IDDSI 0)      Assessment & Plan      Active Hospital Problems    Diagnosis  POA    **Acute saddle pulmonary embolism [I26.92]  Yes    Anemia [D64.9]  Unknown    Acute cor pulmonale [I26.09]  Yes    Chronic low back pain [M54.50, G89.29]  Yes    Hyperlipidemia [E78.5]  Yes    Diaphragm paralysis [J98.6]  Yes    Asthma [J45.909]  Yes    Obstructive sleep apnea syndrome [G47.33]  Yes    Hypertension [I10]  Yes      Resolved Hospital Problems   No resolved problems to display.       Brief Hospital Course to date:  Omar Choudhary is a 79 y.o. male presents the hospital with a saddle pulmonary embolism with cor pulmonale.     Discussion/plan for today:   INR daily.  Adjust warfarin dosing with the assistance of pharmacy.   oral iron supplementation.  Continue CPAP for ELINOR.  Continue asthma treatments.  Monitor blood pressure and adjust medicines as needed.      Continue to wean oxygen. Likely walking ox on 1/18       Acute saddle PE with acute cor pulmonale s/p catheter-based bilateral pulmonary artery embolectomy   - pt taken for thrombectomy urgently 1/11 with cardiology  - heparin gtt transitioned to Lovenox bridge while patient has been started on warfarin (per pharmacy note- no DOAC due to affordability issues)  - PT to see for discharge planning     Acute hypoxic respiratory failure - related to #1; continue to wean O2; pt's FiO2 requirements decreasing, monitor and adjust as needed     Severe pulmonary HTN - Noted by right heart catheterization both before and following thrombectomy without any improvement- per cardiology- suspect chronic component; will need outpatient  cardiology follow up     HTN - home losartan resumed, monitor blood pressure and adjust as needed     Acute RLE DVT- AC as above     Chronic low back pain  - follows w/ Dr. John Do outpatient, reasonably stable     Asthma - follows outpatient w/ Dr. Lagunas, resume home singulair, symbicort, albuterol nebs prn     Paralyzed L hemidiaphragm - known/chronic     ELINOR  - compliant with bipap at home; using here     Thrombocytopenia - mild, monitoring     Peripheral neuropathy  - resume home gabapentin, stable        DVT Prophylaxis: Anticoagulation        Disposition: Home when oxygen level improved. Potential walking ox and discharge 1/18 depending how he is feeling.       Ladarius Yeager MD  [facility]  01/17/25  11:05 EST

## 2025-01-17 NOTE — CASE MANAGEMENT/SOCIAL WORK
Continued Stay Note  [facility]     Patient Name: Omar Choudhary  MRN: [id number]  Today's Date: 1/17/2025    Admit Date: 1/10/2025    Plan: Going to stay at his sig other's home at [address] [place], [place] [id number] with HH. Pt reviewing choices for HH. Follow for possible home O2. Pt wants POC if home O2 needed. [employer] to supply O2. Friend to transport at D/C.   Discharge Plan       Row Name 01/17/25 1624       Plan    Plan Going to stay at his sig other's home at [address] [place] [id number] with HH. Pt reviewing choices for HH. Follow for possible home O2. Pt wants POC if home O2 needed. [employer] to supply O2. Friend to transport at D/C.    Patient/Family in Agreement with Plan yes    Plan Comments Met with pt at D/C. Pt no longer feels he needs IRF. Pt states he is going to stay at his sig other's home at [address] [place], [place] [id number]. Pt to have HH at D/C. Reviewing choices for HH. Need to follow up on walking Ox for possible home O2 at D/C. If O2 needed, will use [employer] as they serve [place] and [place] (Pt's home is in [place]). Pt wants POC if he needs home O2. D/C when INR 2 or greater.  Today INR 1.78  Ahsan Garcia RN-BC                   Discharge Codes    No documentation.                 Expected Discharge Date and Time       Expected Discharge Date Expected Discharge Time    Jan 17, 2025               Ahsan Garcia RN

## 2025-01-17 NOTE — THERAPY EVALUATION
Patient Name: Omar Choudhary  : 1945    MRN: [id number]                              Today's Date: 2025       Admit Date: 1/10/2025    Visit Dx:     ICD-10-CM ICD-9-CM   1. Acute saddle pulmonary embolism, unspecified whether acute cor pulmonale present  I26.92 415.13   2. Acute hypoxic respiratory failure  J96.01 518.81   3. History of asthma  Z87.09 V12.69   4. Acute saddle pulmonary embolism with acute cor pulmonale  I26.02 415.13     415.0     Patient Active Problem List   Diagnosis    Benign prostatic hyperplasia    Bursitis    Depression    Diverticulosis    Family history of malignant neoplasm of breast    Hyperlipidemia    Hypertension    Internal derangement of right knee    Knee effusion    Asthma    Obstructive sleep apnea syndrome    Osteoarthritis    Osteopenia    Pain in knee    Postnasal drip    Prepatellar bursitis    Sciatica of right side    Diaphragm paralysis    Spinal stenosis of lumbar region with neurogenic claudication    Degenerative lumbar spinal stenosis    Anxiety    Pinguecula of right eye    Lumbar radiculopathy, chronic    Bilateral pseudophakia    Diverticulosis of colon    Hemorrhoids without complication    Lumbar degenerative disc disease    Lumbar spondylosis    Other fecal abnormalities    Rectal bleeding    Scoliosis of lumbosacral region due to degenerative disease of spine in adult    Arthritis    Acute right-sided low back pain without sciatica    Sacroiliitis    Acquired spondylolisthesis    Acute saddle pulmonary embolism    Acute cor pulmonale    Chronic low back pain    Anemia     Past Medical History:   Diagnosis Date    ADHD (attention deficit hyperactivity disorder)     Hard to focus on tasks and follow through.    Allergic     Arthritis     Asthma     Carpal tunnel syndrome     no pain    Cataract     Depression     Erectile dysfunction     2018 at 73 years old    Family history of malignant neoplasm of breast 2019    Hyperlipidemia 2019     Hypertension 03/01/2012    Leg pain, right     Low back pain     Neuropathy     feet    Osteopenia     Poor balance     Reactive airway disease 01/15/2016    Scoliosis 1960    Shortness of breath     Sleep apnea     Substance abuse 1966    Alcoholic     Past Surgical History:   Procedure Laterality Date    CARDIAC CATHETERIZATION  1992    CARDIAC CATHETERIZATION N/A 1/11/2025    Procedure: Right Heart Cath;  Surgeon: Nancy Hdez MD;  Location:  [facility];  Service: Cardiovascular;  Laterality: N/A;    CARDIAC CATHETERIZATION  1/11/2025    Procedure: Percutaneous Manual Thrombectomy;  Surgeon: Nancy Hdez MD;  Location:  [facility];  Service: Cardiovascular;;    CARDIAC CATHETERIZATION Bilateral 1/11/2025    Procedure: Pulmonary angiography;  Surgeon: Nancy Hdez MD;  Location:  [facility];  Service: Cardiovascular;  Laterality: Bilateral;    COLONOSCOPY  2013    No polyps, slight diverticulitis    EYE SURGERY  2016    KNEE ARTHROSCOPY W/ ACL RECONSTRUCTION Right 2019    LUMBAR FUSION Bilateral 10/05/2022    Procedure: DAY 2 LUMBAR LAMINECTOMY TRANSFORAMINAL LUMBAR INTERBODY FUSION L2,L3,L4 WITH NEURO ROBOT;  Surgeon: John Do MD;  Location: [facility] MAIN OR;  Service: Robotics - Neuro;  Laterality: Bilateral;    LUMBAR FUSION N/A 10/04/2022    Procedure: DAY 1 LUMBAR LATERAL INTERBODY FUSION WITH NEURO ROBOT L2, L3.L4;  Surgeon: John Do MD;  Location: [facility] MAIN OR;  Service: Robotics - Neuro;  Laterality: N/A;  left side approach    MOUTH SURGERY      SPINE SURGERY  10/4&5/2022    Dr. John Do, [facility] group      General Information       Row Name 01/17/25 1303          OT Time and Intention    Document Type therapy note (daily note)  -JR     Mode of Treatment occupational therapy;individual therapy  -JR       Row Name 01/17/25 1306          General Information    Patient Profile Reviewed yes  -JR     Existing  Precautions/Restrictions oxygen therapy device and L/min  -       Row Name 01/17/25 1303          Cognition    Orientation Status (Cognition) oriented x 4  -       Row Name 01/17/25 1303          Safety Issues/Impairments Affecting Functional Mobility    Impairments Affecting Function (Mobility) balance;endurance/activity tolerance;shortness of breath  -     Comment, Safety Issues/Impairments (Mobility) gait belt and non skid socks donned  -               User Key  (r) = Recorded By, (t) = Taken By, (c) = Cosigned By      Initials Name Provider Type    Adam Ng OT Occupational Therapist                     Mobility/ADL's       Row Name 01/17/25 1303          Bed Mobility    Comment, (Bed Mobility) Sitting on EOB upon arrival.  -[facility] Name 01/17/25 1303          Transfers    Transfers sit-stand transfer  -[facility] Name 01/17/25 1303          Sit-Stand Transfer    Sit-Stand [place] (Transfers) supervision  -       Row Name 01/17/25 1303          Functional Mobility    Patient was able to Ambulate yes  -[facility] Name 01/17/25 1303          Activities of Daily Living    BADL Assessment/Intervention grooming  -[facility] Name 01/17/25 1303          Grooming Assessment/Training    [place] Level (Grooming) wash face, hands;contact guard assist  -     Position (Grooming) sink side  -               User Key  (r) = Recorded By, (t) = Taken By, (c) = Cosigned By      Initials Name Provider Type    Adam Ng OT Occupational Therapist                   Obj/Interventions       Row Name 01/17/25 1304          Sensory Assessment (Somatosensory)    Sensory Assessment (Somatosensory) sensation intact  -       Row Name 01/17/25 1304          Vision Assessment/Intervention    Visual Impairment/Limitations WFL  -               User Key  (r) = Recorded By, (t) = Taken By, (c) = Cosigned By      Initials Name Provider Type    Adam Ng OT Occupational Therapist                    Goals/Plan    No documentation.                  Clinical Impression       Row Name 01/17/25 1305          Pain Assessment    Pretreatment Pain Rating 0/10 - no pain  -     Posttreatment Pain Rating 0/10 - no pain  -       Row Name 01/17/25 1305          Plan of Care Review    Plan of Care Reviewed With patient  -     Progress improving  -     Outcome Evaluation Patient seen for OT this am.  Patient reports O2 titrated down to 2L this am and he is feeling better.  O2 sat 98% with patient sitting on EOB upon arrival.  STS from EOB with Supervision.  Patient performed various standing stretches for UEs and trunk.  Patient ambulated with CGA to sink area to wash face and hands.  Patient walked the length of room x 3 with CGA, no AD and O2 ~ 95%.  Patient wanted to sit UIC at end of tx session.  Patient wanting to d/c to home with HH assist ance.  Cont OT as tolerated.  -       Row Name 01/17/25 1309          Therapy Assessment/Plan (OT)    Rehab Potential (OT) good  -     Criteria for Skilled Therapeutic Interventions Met (OT) yes;skilled treatment is necessary  -     Therapy Frequency (OT) 5 times/wk  -       Row Name 01/17/25 1301          Therapy Plan Review/Discharge Plan (OT)    Anticipated Discharge Disposition (OT) home with home health  -       Row Name 01/17/25 1305          Vital Signs    O2 Delivery Pre Treatment supplemental O2  -JR     O2 Delivery Intra Treatment supplemental O2  -JR     O2 Delivery Post Treatment supplemental O2  -JR     Pre Patient Position Sitting  -JR     Intra Patient Position Standing  -JR     Post Patient Position Sitting  -       Row Name 01/17/25 1309          Positioning and Restraints    Pre-Treatment Position in bed  -JR     Post Treatment Position chair  -JR     In Chair notified nsg;sitting;call light within reach;encouraged to call for assist;exit alarm on  -JR               User Key  (r) = Recorded By, (t) = Taken By, (c) = Cosigned By      Initials Name  Provider Type    Adam Ng OT Occupational Therapist                   Outcome Measures       Row Name 01/17/25 1310          How much help from another is currently needed...    Putting on and taking off regular lower body clothing? 3  -JR     Bathing (including washing, rinsing, and drying) 3  -JR     Toileting (which includes using toilet bed pan or urinal) 3  -JR     Putting on and taking off regular upper body clothing 3  -JR     Taking care of personal grooming (such as brushing teeth) 4  -JR     Eating meals 4  -JR     AM-PAC 6 Clicks Score (OT) 20  -JR       Row Name 01/17/25 0912          How much help from another person do you currently need...    Turning from your back to your side while in flat bed without using bedrails? 4  -LM     Moving from lying on back to sitting on the side of a flat bed without bedrails? 4  -LM     Moving to and from a bed to a chair (including a wheelchair)? 4  -LM     Standing up from a chair using your arms (e.g., wheelchair, bedside chair)? 4  -LM     Climbing 3-5 steps with a railing? 3  -LM     To walk in hospital room? 3  -LM     AM-PAC 6 Clicks Score (PT) 22  -LM     Highest Level of Mobility Goal 7 --> Walk 25 feet or more  -LM       Row Name 01/17/25 1310          Modified [place] Scale    Modified Kendra Scale 3 - Moderate disability.  Requiring some help, but able to walk without assistance.  -       Row Name 01/17/25 1310          Functional Assessment    Outcome Measure Options AM-PAC 6 Clicks Daily Activity (OT);Modified [place]  -               User Key  (r) = Recorded By, (t) = Taken By, (c) = Cosigned By      Initials Name Provider Type    Cristina Moreno, RN Registered Nurse    Adam Ng OT Occupational Therapist                    Occupational Therapy Education       Title: PT OT SLP Therapies (Done)       Topic: Occupational Therapy (Done)       Point: ADL training (Done)       Description:   Instruct learner(s) on proper safety adaptation and  remediation techniques during self care or transfers.   Instruct in proper use of assistive devices.                  Learning Progress Summary            Patient Eager, E, VU by  at 1/15/2025 1554    Comment: Role of OT    Acceptance, E,TB, VU by RD at 1/15/2025 1403    Acceptance, E, VU,NR by  at 1/13/2025 1038                      Point: Home exercise program (Done)       Description:   Instruct learner(s) on appropriate technique for monitoring, assisting and/or progressing therapeutic exercises/activities.                  Learning Progress Summary            Patient Eager, E, VU by  at 1/15/2025 1554    Comment: Role of OT    Acceptance, E,TB, VU by RD at 1/15/2025 1403    Acceptance, E, VU,NR by  at 1/13/2025 1038                      Point: Precautions (Done)       Description:   Instruct learner(s) on prescribed precautions during self-care and functional transfers.                  Learning Progress Summary            Patient Eager, E, VU by  at 1/15/2025 1554    Comment: Role of OT    Acceptance, E,TB, VU by  at 1/15/2025 1403    Acceptance, E, VU,NR by  at 1/13/2025 1038                      Point: Body mechanics (Done)       Description:   Instruct learner(s) on proper positioning and spine alignment during self-care, functional mobility activities and/or exercises.                  Learning Progress Summary            Patient Eager, E, VU by  at 1/15/2025 1554    Comment: Role of OT    Acceptance, E,TB, VU by  at 1/15/2025 1403    Acceptance, E, VU,NR by  at 1/13/2025 1038                                      User Key       Initials Effective Dates Name Provider Type Discipline    ROGERIO 06/16/21 -  Ceci Brice PT Physical Therapist PT     07/24/24 -  Adam Albright OT Occupational Therapist OT     10/30/24 -  Yamileth Abernathy, RN Registered Nurse Nurse                  OT Recommendation and Plan  Planned Therapy Interventions (OT): activity tolerance training, adaptive  equipment training, BADL retraining, neuromuscular control/coordination retraining, functional balance retraining, occupation/activity based interventions, passive ROM/stretching, transfer/mobility retraining, strengthening exercise, ROM/therapeutic exercise  Therapy Frequency (OT): 5 times/wk  Plan of Care Review  Plan of Care Reviewed With: patient  Progress: improving  Outcome Evaluation: Patient seen for OT this am.  Patient reports O2 titrated down to 2L this am and he is feeling better.  O2 sat 98% with patient sitting on EOB upon arrival.  STS from EOB with Supervision.  Patient performed various standing stretches for UEs and trunk.  Patient ambulated with CGA to sink area to wash face and hands.  Patient walked the length of room x 3 with CGA, no AD and O2 ~ 95%.  Patient wanted to sit UIC at end of tx session.  Patient wanting to d/c to home with HH assist ance.  Cont OT as tolerated.     Time Calculation:   Evaluation Complexity (OT)  Review Occupational Profile/Medical/Therapy History Complexity: expanded/moderate complexity  Assessment, Occupational Performance/Identification of Deficit Complexity: 3-5 performance deficits  Clinical Decision Making Complexity (OT): detailed assessment/moderate complexity  Overall Complexity of Evaluation (OT): moderate complexity     Time Calculation- OT       Row Name 01/17/25 1310             Time Calculation- OT    OT Start Time 0945  -JR      OT Stop Time 1014  -JR      OT Time Calculation (min) 29 min  -JR      Total Timed Code Minutes- OT 29 minute(s)  -JR      OT Received On 01/17/25  -JR      OT - Next Appointment 01/20/25  -JR         Timed Charges    34741 - OT Therapeutic Activity Minutes 19  -JR      20992 - OT Self Care/Mgmt Minutes 10  -JR         Total Minutes    Timed Charges Total Minutes 29  -JR       Total Minutes 29  -JR                User Key  (r) = Recorded By, (t) = Taken By, (c) = Cosigned By      Initials Name Provider Type    Adam Ng, OT  Occupational Therapist                  Therapy Charges for Today       Code Description Service Date Service Provider Modifiers Qty    [id number]  OT THERAPEUTIC ACT EA 15 MIN 1/17/2025 Adam Albright OT GO 1    [id number]  OT SELF CARE/MGMT/TRAIN EA 15 MIN 1/17/2025 Adam Albright OT GO 1                 Adam Albright OT  1/17/2025

## 2025-01-17 NOTE — PLAN OF CARE
Goal Outcome Evaluation:  Plan of Care Reviewed With: patient        Progress: improving  Outcome Evaluation: VSS. O2 on 4L NC. Coumadin and lovenox continue. Worked with OT. Will continue to monitor.

## 2025-01-17 NOTE — PLAN OF CARE
Goal Outcome Evaluation:  Plan of Care Reviewed With: patient        Progress: improving  Outcome Evaluation: Patient seen for OT this am.  Patient reports O2 titrated down to 2L this am and he is feeling better.  O2 sat 98% with patient sitting on EOB upon arrival.  STS from EOB with Supervision.  Patient performed various standing stretches for UEs and trunk.  Patient ambulated with CGA to sink area to wash face and hands.  Patient walked the length of room x 3 with CGA, no AD and O2 ~ 95%.  Patient wanted to sit UIC at end of tx session.  Patient wanting to d/c to home with HH assist ance.  Cont OT as tolerated.    Anticipated Discharge Disposition (OT): home with home health

## 2025-01-17 NOTE — PROGRESS NOTES
[facility] Clinical Pharmacy Services: Warfarin Dosing/Monitoring Consult    Omar Choudhary is a 79 y.o. male, estimated creatinine clearance is 88 mL/min (A) (by C-G formula based on SCr of 0.7 mg/dL (L)). weighing 80.3 kg (177 lb 1.6 oz).    Results from last 7 days   Lab Units 01/17/25  0353 01/16/25  0418 01/15/25  0843 01/15/25  0249 01/14/25  0357 01/13/25  0330 01/12/25  1726 01/12/25  1003 01/12/25  0458 01/11/25  2033 01/11/25  0019 01/10/25  1819   INR  1.78* 1.41*  --  1.25* 1.32*  --   --   --   --   --   --  1.04   APTT seconds  --   --   --   --   --  35.9* 153.3* 53.4* 76.8* >200.0*   < > 29.8   HEMOGLOBIN g/dL 9.5* 9.7* 10.4*  --  10.8* 12.3*  --   --  13.1  --    < >  --    HEMOGLOBIN, POC   --   --   --   --   --   --   --   --   --   --    < >  --    HEMATOCRIT % 29.7* 29.9* 30.7*  --  30.8* 36.0*  --   --  41.0  --    < >  --    HEMATOCRIT POC   --   --   --   --   --   --   --   --   --   --    < >  --    PLATELETS 10*3/mm3 159 137* 147  --  130* 153  --   --  148  --    < >  --     < > = values in this interval not displayed.     Prior to admission anticoagulation: Newly started on AC this admission. Transitioned from apixaban to enoxaparin/warfarin on 01/13 due to affordability issues.     Hospital Anticoagulation:  Consulting provider: Nancy Hdez MD   Start date: 01/13  Indication: DVT/PE (active thrombosis)  Target INR: 2 - 3  Expected duration: N/A   Bridge Therapy: Yes  with enoxaparin    Potential food or drug interactions: No significant DDI's at this time    Education complete?/Date: No; plan for follow up TBD    Assessment/Plan:  INR moving appropriately towards goal, anticipate in the next 1-2 days to reach goal.  Continues with Lovenox bridge.  Will give 7.5mg X one today    Pharmacy will continue to follow until discharge or discontinuation of warfarin.     Raine CarrD, BCPS

## 2025-01-17 NOTE — PLAN OF CARE
Problem: Adult Inpatient Plan of Care  Goal: Plan of Care Review  Outcome: Progressing  Flowsheets (Taken 1/17/2025 5995)  Progress: improving  Outcome Evaluation: Vital signs stable. Pt on 4L O2 NC while awake, BIPAP worn while sleeping. PRN norco given for pain. Lovenox continued. Pt resting between care overnight. Plan of care ongoing.  Plan of Care Reviewed With: patient

## 2025-01-18 LAB
ANION GAP SERPL CALCULATED.3IONS-SCNC: 8 MMOL/L (ref 5–15)
BUN SERPL-MCNC: 15 MG/DL (ref 8–23)
BUN/CREAT SERPL: 25.4 (ref 7–25)
CALCIUM SPEC-SCNC: 8 MG/DL (ref 8.6–10.5)
CHLORIDE SERPL-SCNC: 104 MMOL/L (ref 98–107)
CO2 SERPL-SCNC: 24 MMOL/L (ref 22–29)
CREAT SERPL-MCNC: 0.59 MG/DL (ref 0.76–1.27)
DEPRECATED RDW RBC AUTO: 47.8 FL (ref 37–54)
EGFRCR SERPLBLD CKD-EPI 2021: 99.9 ML/MIN/1.73
ERYTHROCYTE [DISTWIDTH] IN BLOOD BY AUTOMATED COUNT: 13.4 % (ref 12.3–15.4)
GLUCOSE SERPL-MCNC: 86 MG/DL (ref 65–99)
HCT VFR BLD AUTO: 31.2 % (ref 37.5–51)
HGB BLD-MCNC: 9.7 G/DL (ref 13–17.7)
INR PPP: 2.13 (ref 0.9–1.1)
MCH RBC QN AUTO: 30.1 PG (ref 26.6–33)
MCHC RBC AUTO-ENTMCNC: 31.1 G/DL (ref 31.5–35.7)
MCV RBC AUTO: 96.9 FL (ref 79–97)
PLATELET # BLD AUTO: 182 10*3/MM3 (ref 140–450)
PMV BLD AUTO: 10.3 FL (ref 6–12)
POTASSIUM SERPL-SCNC: 4.1 MMOL/L (ref 3.5–5.2)
PROTHROMBIN TIME: 24 SECONDS (ref 11.7–14.2)
RBC # BLD AUTO: 3.22 10*6/MM3 (ref 4.14–5.8)
SODIUM SERPL-SCNC: 136 MMOL/L (ref 136–145)
WBC NRBC COR # BLD AUTO: 6.02 10*3/MM3 (ref 3.4–10.8)

## 2025-01-18 PROCEDURE — 25010000002 ENOXAPARIN PER 10 MG: Performed by: INTERNAL MEDICINE

## 2025-01-18 PROCEDURE — 94799 UNLISTED PULMONARY SVC/PX: CPT

## 2025-01-18 PROCEDURE — 85610 PROTHROMBIN TIME: CPT | Performed by: INTERNAL MEDICINE

## 2025-01-18 PROCEDURE — 85027 COMPLETE CBC AUTOMATED: CPT | Performed by: INTERNAL MEDICINE

## 2025-01-18 PROCEDURE — 94762 N-INVAS EAR/PLS OXIMTRY CONT: CPT

## 2025-01-18 PROCEDURE — 99232 SBSQ HOSP IP/OBS MODERATE 35: CPT

## 2025-01-18 PROCEDURE — 80048 BASIC METABOLIC PNL TOTAL CA: CPT | Performed by: INTERNAL MEDICINE

## 2025-01-18 PROCEDURE — 94618 PULMONARY STRESS TESTING: CPT

## 2025-01-18 RX ORDER — ENOXAPARIN SODIUM 100 MG/ML
1 INJECTION SUBCUTANEOUS EVERY 12 HOURS
Status: DISCONTINUED | OUTPATIENT
Start: 2025-01-19 | End: 2025-01-19 | Stop reason: HOSPADM

## 2025-01-18 RX ORDER — WARFARIN SODIUM 7.5 MG/1
7.5 TABLET ORAL
Status: COMPLETED | OUTPATIENT
Start: 2025-01-18 | End: 2025-01-18

## 2025-01-18 RX ORDER — ENOXAPARIN SODIUM 100 MG/ML
1 INJECTION SUBCUTANEOUS ONCE
Status: COMPLETED | OUTPATIENT
Start: 2025-01-18 | End: 2025-01-18

## 2025-01-18 RX ADMIN — GABAPENTIN 300 MG: 300 CAPSULE ORAL at 08:15

## 2025-01-18 RX ADMIN — GUAIFENESIN 1200 MG: 600 TABLET, EXTENDED RELEASE ORAL at 20:46

## 2025-01-18 RX ADMIN — IPRATROPIUM BROMIDE AND ALBUTEROL SULFATE 3 ML: 2.5; .5 SOLUTION RESPIRATORY (INHALATION) at 20:23

## 2025-01-18 RX ADMIN — IPRATROPIUM BROMIDE AND ALBUTEROL SULFATE 3 ML: 2.5; .5 SOLUTION RESPIRATORY (INHALATION) at 16:29

## 2025-01-18 RX ADMIN — SENNOSIDES AND DOCUSATE SODIUM 2 TABLET: 50; 8.6 TABLET ORAL at 08:15

## 2025-01-18 RX ADMIN — Medication 10 ML: at 20:46

## 2025-01-18 RX ADMIN — ENOXAPARIN SODIUM 80 MG: 100 INJECTION SUBCUTANEOUS at 16:10

## 2025-01-18 RX ADMIN — GUAIFENESIN 1200 MG: 600 TABLET, EXTENDED RELEASE ORAL at 08:15

## 2025-01-18 RX ADMIN — OXYCODONE HYDROCHLORIDE AND ACETAMINOPHEN 500 MG: 500 TABLET ORAL at 08:16

## 2025-01-18 RX ADMIN — MONTELUKAST 10 MG: 10 TABLET, FILM COATED ORAL at 08:15

## 2025-01-18 RX ADMIN — HYDROCODONE BITARTRATE AND ACETAMINOPHEN 1 TABLET: 5; 325 TABLET ORAL at 05:51

## 2025-01-18 RX ADMIN — FERROUS SULFATE TAB 325 MG (65 MG ELEMENTAL FE) 325 MG: 325 (65 FE) TAB at 08:15

## 2025-01-18 RX ADMIN — Medication 10 ML: at 08:16

## 2025-01-18 RX ADMIN — BUDESONIDE AND FORMOTEROL FUMARATE DIHYDRATE 2 PUFF: 160; 4.5 AEROSOL RESPIRATORY (INHALATION) at 09:22

## 2025-01-18 RX ADMIN — GABAPENTIN 600 MG: 300 CAPSULE ORAL at 20:46

## 2025-01-18 RX ADMIN — IPRATROPIUM BROMIDE AND ALBUTEROL SULFATE 3 ML: 2.5; .5 SOLUTION RESPIRATORY (INHALATION) at 13:22

## 2025-01-18 RX ADMIN — GABAPENTIN 300 MG: 300 CAPSULE ORAL at 13:01

## 2025-01-18 RX ADMIN — WARFARIN 7.5 MG: 7.5 TABLET ORAL at 17:31

## 2025-01-18 RX ADMIN — BUDESONIDE AND FORMOTEROL FUMARATE DIHYDRATE 2 PUFF: 160; 4.5 AEROSOL RESPIRATORY (INHALATION) at 20:23

## 2025-01-18 RX ADMIN — IPRATROPIUM BROMIDE AND ALBUTEROL SULFATE 3 ML: 2.5; .5 SOLUTION RESPIRATORY (INHALATION) at 09:17

## 2025-01-18 RX ADMIN — LOSARTAN POTASSIUM 25 MG: 50 TABLET, FILM COATED ORAL at 08:15

## 2025-01-18 NOTE — PLAN OF CARE
Goal Outcome Evaluation:  Plan of Care Reviewed With: patient           Outcome Evaluation: Patient is AAOx4. No complaint of pain throughout the day. He was weaned off oxygen for approx 3 hours for which he tolerated. Patient pass the walked study. Bridging Lovenox with Coumadin. Patient was fully updated by the hospitalist. The plan is for sleep study tonight and discharge tomorrow.

## 2025-01-18 NOTE — PLAN OF CARE
Goal Outcome Evaluation:  Plan of Care Reviewed With: patient           Outcome Evaluation: Tolerating 2L NC. Denies pain . VSS. No acute events during this shift.

## 2025-01-18 NOTE — THERAPY EVALUATION
Exercise Oximetry    Patient Name:Omar Choudhary   MRN: [id number]   Date: 01/18/25             ROOM AIR BASELINE   SpO2% 98   Heart Rate 78   Blood Pressure      EXERCISE ON ROOM AIR SpO2% EXERCISE ON O2 @ LPM SpO2%   1 MINUTE 95 1 MINUTE    2 MINUTES 96 2 MINUTES    3 MINUTES 93 3 MINUTES    4 MINUTES 93 4 MINUTES    5 MINUTES 95 5 MINUTES    6 MINUTES 96 6 MINUTES               Distance Walked   Distance Walked   Dyspnea (Dana Scale)   Dyspnea (Dana Scale)   Fatigue (Dana Scale)   Fatigue (Dana Scale)   SpO2% Post Exercise  98 SpO2% Post Exercise   HR Post Exercise  78 HR Post Exercise   Time to Recovery Time to Recovery     Comments: Used a forehead probe for the walk. Pt took a cane but barely used it.   Walked pt around the  nurses station with assistance from his nurse. Had a wheelchair on standby in case pt needed it.   Pt walked at a good pace around the nurses station. Sats stayed in the 90s the whole walk. Pt did not need o2 at all.   Pt did push himself quite hard during the walk. Probably furthest he's walked since he's been here. Did appear to be very soa during the walk, with heavy breathing and he did say he felt dizzy. Pt had stop a few times to catch his breath. Would resume walk after about 15 secs. Offered the chair but he said he was okay. Also mentioned to the pt that if he felt like he couldn't finish the walk to let us know. Pt did stumble a few times during the walk as well. Pt also said he felt dizzy before the walk while he was standing after eating. Pt was still able to finish the walk. Took probably about 5 min for pt to start feeling better after sitting down. Gave pt his breathing tx post walk.  Pt's heart rate also jumped a bit from 78bpm to 103bpm during the walk.

## 2025-01-18 NOTE — PROGRESS NOTES
[facility] Clinical Pharmacy Services: Warfarin Dosing/Monitoring Consult    Omar Choudhary is a 79 y.o. male, estimated creatinine clearance is 102.9 mL/min (A) (by C-G formula based on SCr of 0.59 mg/dL (L)). weighing 77.9 kg (171 lb 11.2 oz).    Results from last 7 days   Lab Units 01/18/25  0336 01/17/25  0353 01/16/25  0418 01/15/25  0843 01/15/25  0249 01/14/25  0357 01/13/25  0330 01/12/25  1726 01/12/25  1003 01/12/25  0458 01/12/25  0458 01/11/25 2033   INR  2.13* 1.78* 1.41*  --  1.25* 1.32*  --   --   --   --   --   --    APTT seconds  --   --   --   --   --   --  35.9* 153.3* 53.4*  --  76.8* >200.0*   HEMOGLOBIN g/dL 9.7* 9.5* 9.7* 10.4*  --  10.8* 12.3*  --   --    < > 13.1  --    HEMATOCRIT % 31.2* 29.7* 29.9* 30.7*  --  30.8* 36.0*  --   --    < > 41.0  --    PLATELETS 10*3/mm3 182 159 137* 147  --  130* 153  --   --    < > 148  --     < > = values in this interval not displayed.     Prior to admission anticoagulation: Newly started on AC this admission.  Transitioned from apixaban to enoxaparin/warfarin on 01/13 due to affordability issues.    Hospital Anticoagulation:  Consulting provider: Nancy Hdez MD  Start date: 01/13  Indication: DVT/PE (active thrombosis)  Target INR: 2 - 3  Expected duration: indefinite   Bridge Therapy: Yes  with enoxaparin    Potential food or drug interactions: no significant DDI's at this time    Education complete?/Date: No; plan for follow up TBD    Assessment/Plan:  INR at goal today at 2.13;  platelets increasing; will discontinue Enoxaparin bridge today; will give warfarin 7.5 mg today and follow up in am.  Monitor for any signs or symptoms of bleeding  Follow up daily INRs and dose adjustments    Pharmacy will continue to follow until discharge or discontinuation of warfarin.     Rita Méndez [facility]  Clinical Pharmacist

## 2025-01-18 NOTE — PROGRESS NOTES
Weekend coverage  Cardiology Progress Note    Patient Identification:  Name: Omar Choudhary  Age: 79 y.o.  Sex: male  :  1945  MRN: [id number]                 Follow Up / Chief Complaint: Follow up for PE    Interval History: Patient and all problems new to me.  Resting in bed on 2 L nasal cannula.  No chest pain.  Shortness of breath improving.      Objective:    Past Medical History:  Past Medical History:   Diagnosis Date    ADHD (attention deficit hyperactivity disorder)     Hard to focus on tasks and follow through.    Allergic     Arthritis     Asthma     Carpal tunnel syndrome     no pain    Cataract     Depression     Erectile dysfunction     2018 at 73 years old    Family history of malignant neoplasm of breast 2019    Hyperlipidemia 2019    Hypertension 2012    Leg pain, right     Low back pain     Neuropathy     feet    Osteopenia     Poor balance     Reactive airway disease 01/15/2016    Scoliosis 1960    Shortness of breath     Sleep apnea     Substance abuse 1966    Alcoholic     Past Surgical History:  Past Surgical History:   Procedure Laterality Date    CARDIAC CATHETERIZATION      CARDIAC CATHETERIZATION N/A 2025    Procedure: Right Heart Cath;  Surgeon: Nancy Hdez MD;  Location:  [facility];  Service: Cardiovascular;  Laterality: N/A;    CARDIAC CATHETERIZATION  2025    Procedure: Percutaneous Manual Thrombectomy;  Surgeon: Nancy Hdez MD;  Location:  [facility];  Service: Cardiovascular;;    CARDIAC CATHETERIZATION Bilateral 2025    Procedure: Pulmonary angiography;  Surgeon: Nancy Hdez MD;  Location:  [facility];  Service: Cardiovascular;  Laterality: Bilateral;    COLONOSCOPY      No polyps, slight diverticulitis    EYE SURGERY  2016    KNEE ARTHROSCOPY W/ ACL RECONSTRUCTION Right 2019    LUMBAR FUSION Bilateral 10/05/2022    Procedure: DAY 2 LUMBAR LAMINECTOMY  TRANSFORAMINAL LUMBAR INTERBODY FUSION L2,L3,L4 WITH NEURO ROBOT;  Surgeon: John Do MD;  Location: [facility] MAIN OR;  Service: Rob[facility] - Neuro;  Laterality: Bilateral;    LUMBAR FUSION N/A 10/04/2022    Procedure: DAY 1 LUMBAR LATERAL INTERBODY FUSION WITH NEURO ROBOT L2, L3.L4;  Surgeon: John oD MD;  Location: [facility] MAIN OR;  Service: Robotics - Neuro;  Laterality: N/A;  left side approach    MOUTH SURGERY      SPINE SURGERY  10/4&2022    Dr. John Do, [facility]        Social History:   Social History     Tobacco Use    Smoking status: Former     Current packs/day: 0.00     Average packs/day: 0.5 packs/day for 26.0 years (13.0 ttl pk-yrs)     Types: Cigarettes     Start date: 1966     Quit date: 1992     Years since quittin.0     Passive exposure: Past    Smokeless tobacco: Never   Substance Use Topics    Alcohol use: Not Currently     Alcohol/week: 1.0 standard drink of alcohol     Types: 1 Glasses of wine per week      Family History:  Family History   Problem Relation Age of Onset    Heart disease Mother     Hypertension Mother     Breast cancer Mother     Stroke Mother         Several TIAs    Alcohol abuse Mother     Thyroid disease Mother     Arthritis Mother     Cancer Mother         Breast    Aortic aneurysm Father     Heart attack Father     Liver cancer Father     Cancer Father         Liver    Heart disease Father         Heart attack    Early death Brother         Coronary thrombosis @ 48 years while mountain climbing.          Allergies:  Allergies   Allergen Reactions    Statins Myalgia     Other reaction(s): muscle soreness     Scheduled Meds:  vitamin C, 500 mg, Daily  budesonide-formoterol, 2 puff, BID - RT  ferrous sulfate, 325 mg, Daily With Breakfast  gabapentin, 300 mg, BID   And  gabapentin, 600 mg, Nightly  guaiFENesin, 1,200 mg, Q12H  ipratropium-albuterol, 3 mL, 4x Daily - RT  losartan, 25 mg, Q24H  montelukast, 10 mg,  Daily  senna-docusate sodium, 2 tablet, BID  sodium chloride, 10 mL, Q12H  warfarin, 7.5 mg, Once            INTAKE AND OUTPUT:    Intake/Output Summary (Last 24 hours) at 1/18/2025 1043  Last data filed at 1/18/2025 0546  Gross per 24 hour   Intake 480 ml   Output 1775 ml   Net -1295 ml       Review of Systems:   GI: no n/v or abd pain  Cardiac: no chest pain or palpitations  Pulmonary: + shortness of breath improving, +dry cough      Constitutional:  Temp:  [97.7 °F (36.5 °C)-98.8 °F (37.1 °C)] 97.7 °F (36.5 °C)  Heart Rate:  [65-81] 67  Resp:  [16-24] 18  BP: (100-131)/(67-95) 100/68    Physical Exam:  General:  Alert, cooperative, appears in no acute distress  Respiratory: Clear to auscultation.  Normal respiratory effort and rate.  Cardiovascular: S1S2 Regular rate and rhythm. No murmur, rub or gallop.   Gastrointestinal: soft, non tender. Bowel sounds present.   Extremities: ASHLEY x4. No pretibial pitting edema. Adequate musculoskeletal strength.   Neuro: AAO x3 CN II-XII grossly intact                Cardiographics  Echocardiogram:     Interpretation Summary         The right ventricular cavity is severely dilated. Severely reduced right ventricular systolic function noted.    Left ventricular systolic function is normal. Left ventricular ejection fraction appears to be 61 - 65%.    Left ventricular diastolic function is consistent with (grade I) impaired relaxation.    The aortic valve leaflets are moderately calcified (aortic sclerosis)    Mild tricuspid valve regurgitation is present.    Calculated right ventricular systolic pressure from tricuspid regurgitation is 28 mmHg.    There is a trivial pericardial effusion.     Lab Review       Results from last 7 days   Lab Units 01/13/25  0330   MAGNESIUM mg/dL 2.3     Results from last 7 days   Lab Units 01/18/25  0336   SODIUM mmol/L 136   POTASSIUM mmol/L 4.1   BUN mg/dL 15   CREATININE mg/dL 0.59*   CALCIUM mg/dL 8.0*     @[id number]@  Results from last 7  "days   Lab Units 01/18/25  0336 01/17/25  0353 01/16/25  0418   WBC 10*3/mm3 6.02 5.79 6.07   HEMOGLOBIN g/dL 9.7* 9.5* 9.7*   HEMATOCRIT % 31.2* 29.7* 29.9*   PLATELETS 10*3/mm3 182 159 137*     Results from last 7 days   Lab Units 01/18/25  0336 01/17/25  0353 01/16/25  0418 01/14/25  0357 01/13/25  0330 01/12/25  1726 01/12/25  1003   INR  2.13* 1.78* 1.41*   < >  --   --   --    APTT seconds  --   --   --   --  35.9* 153.3* 53.4*    < > = values in this interval not displayed.         Assessment/plan:  1.  Acute bilateral pulmonary embolism with saddle embolism and acute cor pulmonale-s/p bilateral pulmonary artery thrombectomy on 1/11/25.  Anticoagulated on warfarin with INR 2.13 today, dosing per pharmacy.  2.  Acute right lower extremity DVT  3.  Acute hypoxic respiratory failure-improving, currently on 2 L nasal cannula  4.  Severe pulmonary hypertension likely chronic.  Noted by right heart catheterization but both before and after thrombectomy without improvement.  5.  Hypertension  6.  Paralyzed left diaphragm  7.  Asthma  8.  ELINOR  9.  Thrombocytopenia-improved, platelets 182    On room air at rest  6min walk test planned this afternoon  BP stable  Anticoagulated on warfarin, INR 2.13, dosing per pharmacy-currently in range goal 2-3.  Hemoglobin stable 9.7.  Cardiac stable, no objection to discharge from cardiac standpoint once home o2 requirements determined   Plan for repeat echocardiogram in 1 month, follow up appt scheduled 1/24      )1/18/2025  EMILY Ndiayeon/Transcription:   \"Dictated utilizing Dragon dictation\".     "

## 2025-01-18 NOTE — PROGRESS NOTES
Name: Omar Choudhary ADMIT: 1/10/2025   : 1945  PCP: Dennis Kwong MD    MRN: [id number] LOS: 8 days   AGE/SEX: 79 y.o. male  ROOM: [facility]   Subjective   Chief Complaint   Patient presents with    Shortness of Breath    Cough     Dyspnea improving but present with exertion  Off oxygen  Still with fatigue and dyspnea with exertion  On coumadin  INR therapuetic  To do 6min walk today    ROS  No f/c  No n/v  No cp/palp  + soa/cough    Objective   Vital Signs  Temp:  [97.3 °F (36.3 °C)-98.8 °F (37.1 °C)] 97.3 °F (36.3 °C)  Heart Rate:  [65-82] 76  Resp:  [16-24] 18  BP: (100-131)/(68-95) 108/80  SpO2:  [96 %-100 %] 96 %  on  Flow (L/min) (Oxygen Therapy):  [2] 2;   Device (Oxygen Therapy): room air  Body mass index is 27.71 kg/m².    Physical Exam  Constitutional:       Appearance: He is not ill-appearing.   HENT:      Head: Normocephalic and atraumatic.   Eyes:      General: No scleral icterus.  Cardiovascular:      Rate and Rhythm: Normal rate and regular rhythm.      Heart sounds: Normal heart sounds.   Pulmonary:      Effort: Pulmonary effort is normal. No respiratory distress.      Breath sounds: Normal breath sounds. No wheezing.   Abdominal:      General: There is no distension.      Palpations: Abdomen is soft.      Tenderness: There is no abdominal tenderness.   Musculoskeletal:      Cervical back: Neck supple.   Neurological:      Mental Status: He is alert.   Psychiatric:         Mood and Affect: Mood normal.         Behavior: Behavior normal.         Thought Content: Thought content normal.         Results Review:       I reviewed the patient's new clinical results.  Results from last 7 days   Lab Units 256 01/17/25  0353 01/16/25  0418 01/15/25  0843   WBC 10*3/mm3 6.02 5.79 6.07 6.75   HEMOGLOBIN g/dL 9.7* 9.5* 9.7* 10.4*   PLATELETS 10*3/mm3 182 159 137* 147     Results from last 7 days   Lab Units 25  0336 25  0353 25 25  0357   SODIUM  "mmol/L 136 135* 134* 137   POTASSIUM mmol/L 4.1 4.1 4.1 4.4   CHLORIDE mmol/L 104 102 102 106   CO2 mmol/L 24.0 26.0 23.6 22.8   BUN mg/dL 15 15 14 18   CREATININE mg/dL 0.59* 0.75* 0.70* 0.88   GLUCOSE mg/dL 86 88 93 104*   Estimated Creatinine Clearance: 102.9 mL/min (A) (by C-G formula based on SCr of 0.59 mg/dL (L)).    Results from last 7 days   Lab Units 01/18/25  0336 01/17/25  0353 01/16/25  0418 01/14/25  0357 01/13/25  0330 01/12/25  0458 01/12/25  0053   CALCIUM mg/dL 8.0* 8.1* 8.0* 7.9* 7.9* 8.1* 7.9*   MAGNESIUM mg/dL  --   --   --   --  2.3 2.5* 2.4   PHOSPHORUS mg/dL  --   --   --   --  3.3 4.0 4.0             Coag   Results from last 7 days   Lab Units 01/18/25  0336 01/17/25  0353 01/16/25  0418 01/15/25  0249 01/14/25  0357 01/13/25  0330 01/12/25  1726 01/12/25  1003 01/12/25  0458 01/11/25  2033 01/11/25  1618   INR  2.13* 1.78* 1.41* 1.25* 1.32*  --   --   --   --   --   --    APTT seconds  --   --   --   --   --  35.9* 153.3* 53.4* 76.8* >200.0* 90.8*     HbA1C   Lab Results   Component Value Date    HGBA1C 5.60 04/18/2024    HGBA1C 5.80 (H) 03/23/2023    HGBA1C 5.7 (H) 05/17/2022     Infection         Radiology(recent) No radiology results for the last day  No results found for: \"TROPONINT\", \"TROPONINI\", \"BNP\"  No components found for: \"TSH;2\"    vitamin C, 500 mg, Oral, Daily  budesonide-formoterol, 2 puff, Inhalation, BID - RT  enoxaparin, 1 mg/kg, Subcutaneous, Once  [START ON 1/19/2025] enoxaparin, 1 mg/kg, Subcutaneous, Q12H  ferrous sulfate, 325 mg, Oral, Daily With Breakfast  gabapentin, 300 mg, Oral, BID   And  gabapentin, 600 mg, Oral, Nightly  guaiFENesin, 1,200 mg, Oral, Q12H  ipratropium-albuterol, 3 mL, Nebulization, 4x Daily - RT  losartan, 25 mg, Oral, Q24H  montelukast, 10 mg, Oral, Daily  senna-docusate sodium, 2 tablet, Oral, BID  sodium chloride, 10 mL, Intravenous, Q12H  warfarin, 7.5 mg, Oral, Once      Pharmacy to dose warfarin,     Diet: Cardiac; Healthy Heart (2-3 Na+); " Fluid Consistency: Thin (IDDSI 0)      Assessment & Plan      Active Hospital Problems    Diagnosis  POA    **Acute saddle pulmonary embolism [I26.92]  Yes    Anemia [D64.9]  Unknown    Acute cor pulmonale [I26.09]  Yes    Chronic low back pain [M54.50, G89.29]  Yes    Hyperlipidemia [E78.5]  Yes    Diaphragm paralysis [J98.6]  Yes    Asthma [J45.909]  Yes    Obstructive sleep apnea syndrome [G47.33]  Yes    Hypertension [I10]  Yes      Resolved Hospital Problems   No resolved problems to display.       Brief Hospital Course to date:  Omar Choudhary is a 79 y.o. male presents the hospital with a saddle pulmonary embolism with cor pulmonale.     Discussion/plan for today:   INR daily.  Adjust warfarin dosing with the assistance of pharmacy.   oral iron supplementation.  Continue CPAP for ELINOR.     Walking oximetry is improved and actually didn't require any supplemental oxygen.     He's still pretty fatigued will wait one more day for dc. If he doesn't feel comfortable going home tomorrow than would need to arrange rehab    Check overnight oximetry to see if needs supplimentation for oxygen with cpap    Will give another couple of doses for bridge of lovenox with INR just therapeutic today.        Acute saddle PE with acute cor pulmonale s/p catheter-based bilateral pulmonary artery embolectomy   - pt taken for thrombectomy urgently 1/11 with cardiology  - heparin gtt transitioned to Lovenox bridge while patient has been started on warfarin (per pharmacy note- no DOAC due to affordability issues)  - PT to see for discharge planning     Acute hypoxic respiratory failure - resolved.      Severe pulmonary HTN - Noted by right heart catheterization both before and following thrombectomy without any improvement- per cardiology- suspect chronic component; will need outpatient cardiology follow up     HTN - home losartan resumed, monitor blood pressure and adjust as needed     Acute RLE DVT- AC as above     Chronic low  back pain  - follows w/ Dr. John Do outpatient, reasonably stable     Asthma - follows outpatient w/ Dr. Lagunas, resume home singulair, symbicort, albuterol nebs prn     Paralyzed L hemidiaphragm - known/chronic     ELINOR  - compliant with bipap at home; using here     Thrombocytopenia - monitoring     Peripheral neuropathy  - home gabapentin, stable        DVT Prophylaxis: Anticoagulation        Disposition: likely to home 1/19      Ladarius Yeager MD  [facility]  01/18/25  11:05 EST

## 2025-01-19 ENCOUNTER — READMISSION MANAGEMENT (OUTPATIENT)
Dept: CALL CENTER | Facility: HOSPITAL | Age: 80
End: 2025-01-19
Payer: MEDICARE

## 2025-01-19 VITALS
HEIGHT: 66 IN | HEART RATE: 63 BPM | DIASTOLIC BLOOD PRESSURE: 90 MMHG | TEMPERATURE: 98.6 F | OXYGEN SATURATION: 100 % | RESPIRATION RATE: 20 BRPM | WEIGHT: 170.2 LBS | BODY MASS INDEX: 27.35 KG/M2 | SYSTOLIC BLOOD PRESSURE: 139 MMHG

## 2025-01-19 PROBLEM — J96.01 ACUTE RESPIRATORY FAILURE WITH HYPOXIA: Status: ACTIVE | Noted: 2025-01-19

## 2025-01-19 PROBLEM — I26.02 ACUTE SADDLE PULMONARY EMBOLISM WITH ACUTE COR PULMONALE: Status: ACTIVE | Noted: 2025-01-19

## 2025-01-19 PROBLEM — I82.411 ACUTE DEEP VEIN THROMBOSIS (DVT) OF FEMORAL VEIN OF RIGHT LOWER EXTREMITY: Status: ACTIVE | Noted: 2025-01-19

## 2025-01-19 LAB
DEPRECATED RDW RBC AUTO: 45.1 FL (ref 37–54)
ERYTHROCYTE [DISTWIDTH] IN BLOOD BY AUTOMATED COUNT: 13.1 % (ref 12.3–15.4)
HCT VFR BLD AUTO: 30 % (ref 37.5–51)
HGB BLD-MCNC: 10.3 G/DL (ref 13–17.7)
INR PPP: 2.28 (ref 0.9–1.1)
MCH RBC QN AUTO: 32.3 PG (ref 26.6–33)
MCHC RBC AUTO-ENTMCNC: 34.3 G/DL (ref 31.5–35.7)
MCV RBC AUTO: 94 FL (ref 79–97)
PLATELET # BLD AUTO: 196 10*3/MM3 (ref 140–450)
PMV BLD AUTO: 10.1 FL (ref 6–12)
PROTHROMBIN TIME: 25.4 SECONDS (ref 11.7–14.2)
RBC # BLD AUTO: 3.19 10*6/MM3 (ref 4.14–5.8)
WBC NRBC COR # BLD AUTO: 5.85 10*3/MM3 (ref 3.4–10.8)

## 2025-01-19 PROCEDURE — 25010000002 ENOXAPARIN PER 10 MG: Performed by: INTERNAL MEDICINE

## 2025-01-19 PROCEDURE — 85610 PROTHROMBIN TIME: CPT | Performed by: INTERNAL MEDICINE

## 2025-01-19 PROCEDURE — 94799 UNLISTED PULMONARY SVC/PX: CPT

## 2025-01-19 PROCEDURE — 94761 N-INVAS EAR/PLS OXIMETRY MLT: CPT

## 2025-01-19 PROCEDURE — 85027 COMPLETE CBC AUTOMATED: CPT | Performed by: INTERNAL MEDICINE

## 2025-01-19 PROCEDURE — 94660 CPAP INITIATION&MGMT: CPT

## 2025-01-19 PROCEDURE — 94664 DEMO&/EVAL PT USE INHALER: CPT

## 2025-01-19 RX ORDER — WARFARIN SODIUM 7.5 MG/1
7.5 TABLET ORAL
Status: DISCONTINUED | OUTPATIENT
Start: 2025-01-19 | End: 2025-01-19 | Stop reason: HOSPADM

## 2025-01-19 RX ORDER — HYDROCODONE BITARTRATE AND ACETAMINOPHEN 5; 325 MG/1; MG/1
1 TABLET ORAL 2 TIMES DAILY PRN
Qty: 6 TABLET | Refills: 0 | Status: SHIPPED | OUTPATIENT
Start: 2025-01-19

## 2025-01-19 RX ORDER — FERROUS SULFATE 325(65) MG
325 TABLET ORAL
Qty: 30 TABLET | Refills: 0 | Status: SHIPPED | OUTPATIENT
Start: 2025-01-20

## 2025-01-19 RX ORDER — WARFARIN SODIUM 7.5 MG/1
7.5 TABLET ORAL
Qty: 30 TABLET | Refills: 0 | Status: SHIPPED | OUTPATIENT
Start: 2025-01-19

## 2025-01-19 RX ORDER — LOSARTAN POTASSIUM 50 MG/1
25 TABLET ORAL
Start: 2025-01-19

## 2025-01-19 RX ADMIN — ENOXAPARIN SODIUM 80 MG: 100 INJECTION SUBCUTANEOUS at 04:10

## 2025-01-19 RX ADMIN — BUDESONIDE AND FORMOTEROL FUMARATE DIHYDRATE 2 PUFF: 160; 4.5 AEROSOL RESPIRATORY (INHALATION) at 08:02

## 2025-01-19 RX ADMIN — GABAPENTIN 300 MG: 300 CAPSULE ORAL at 09:01

## 2025-01-19 RX ADMIN — LOSARTAN POTASSIUM 25 MG: 50 TABLET, FILM COATED ORAL at 09:01

## 2025-01-19 RX ADMIN — Medication 10 ML: at 09:01

## 2025-01-19 RX ADMIN — HYDROCODONE BITARTRATE AND ACETAMINOPHEN 1 TABLET: 5; 325 TABLET ORAL at 05:36

## 2025-01-19 RX ADMIN — IPRATROPIUM BROMIDE AND ALBUTEROL SULFATE 3 ML: 2.5; .5 SOLUTION RESPIRATORY (INHALATION) at 07:54

## 2025-01-19 RX ADMIN — GUAIFENESIN 1200 MG: 600 TABLET, EXTENDED RELEASE ORAL at 09:00

## 2025-01-19 RX ADMIN — MONTELUKAST 10 MG: 10 TABLET, FILM COATED ORAL at 09:01

## 2025-01-19 RX ADMIN — OXYCODONE HYDROCHLORIDE AND ACETAMINOPHEN 500 MG: 500 TABLET ORAL at 09:01

## 2025-01-19 RX ADMIN — IPRATROPIUM BROMIDE AND ALBUTEROL SULFATE 3 ML: 2.5; .5 SOLUTION RESPIRATORY (INHALATION) at 12:16

## 2025-01-19 RX ADMIN — FERROUS SULFATE TAB 325 MG (65 MG ELEMENTAL FE) 325 MG: 325 (65 FE) TAB at 09:01

## 2025-01-19 NOTE — OUTREACH NOTE
Prep Survey      Flowsheet Row Responses   [facility] patient discharged from? [facility]   Is LACE score < 7 ? No   Eligibility [facility]   Date of Admission 01/10/25   Date of Discharge 01/19/25   Discharge Disposition Home-Health Care S[facility]   Discharge diagnosis Acute saddle pulmonary embolism   Does the patient have one of the following disease processes/diagnoses(primary or secondary)? Other   Does the patient have Home health ordered? Yes   What is the Home health agency?  [facility]   Is there a DME ordered? Yes   What DME was ordered? possible O2 from [employer]   Prep survey completed? Yes            Ebony PIERCE - Registered Nurse

## 2025-01-19 NOTE — DISCHARGE SUMMARY
NAME: Omar Choudhary ADMIT: 1/10/2025   : 1945  PCP: Dennis Kwong MD    MRN: [id number] LOS: 9 days   AGE/SEX: 79 y.o. male  ROOM: 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       SECONDARY DIAGNOSES  Past Medical History:   Diagnosis Date    ADHD (attention deficit hyperactivity disorder)     Hard to focus on tasks and follow through.    Allergic     Arthritis     Asthma     Carpal tunnel syndrome     no pain    Cataract     Depression     Erectile dysfunction     2018 at 73 years old    Family history of malignant neoplasm of breast 2019    Hyperlipidemia 2019    Hypertension 2012    Leg pain, right     Low back pain     Neuropathy     feet    Osteopenia     Poor balance     Reactive airway disease 01/15/2016    Scoliosis 1960    Shortness of breath     Sleep apnea     Substance abuse 1966    Alcoholic       CONSULTS   Cardiology  Pulmonary/CC    HOSPITAL COURSE  Patient is a 79 y.o. male who was admitted with saddle emboli with severe respiratory failure. He was admitted to the ICU and had thrombectomy in addition to a/c. He had high oxygen needs initially optiflow but gradually O2 needs improved and he is off oxygen and had overnight oximetry as well as  6 minute walk without any need for supplemental oxygen. He has been on coumadin and INR has been therapeutic for 2 days. He will discharge to home with HH and discussed should have INR check in about 2 days to continue to monitor INR and coumadin dosing. Severe pulmonary hypertension seen on RHC and will follow with Cardiology. He's needed a low dose of norco about BID for the stay due to resolving chest pain from PE. I wrote short rx for this to have available but d/w patient trying to move towards tylenol for pain control and to avoid NSAIDs.     He had planned on flying to [place] in a couple of weeks but I have recommended he postpone that trip for the time being until followed up with Cardiology and cleared for flying/altitude.       DIAGNOSTICS     Duplex Venous Lower Extremity - Bilateral CAR [[id number]] Dennis as Reviewed   Order Status: Completed Resulted: 01/11/25 1202    Updated: 01/11/25 1202    Right Distal Femoral Spont 1.0    Right Popliteal Spont 1.0    Right Peroneal Vessel 1.0    Right Greater Saph BK Vessel 1.0    Right Common Femoral Spont Y    Right Common Femoral Competent Y    Right Common Femoral Phasic Y    Right Common Femoral Compress C    Right Common Femoral Augment Y    Right Saphenofemoral Junction Compress C    Right Profunda Femoral Compress C    Right Proximal Femoral Compress C    Right Mid Femoral Spont Y    Right Mid Femoral Competent Y    Right Mid Femoral Phasic Y    Right Mid Femoral Compress C    Right Mid Femoral Augment Y    Right Distal Femoral Spont N    Right Distal Femoral Phasic N    Right Distal Femoral Compress N    Right Distal Femoral Thrombus A    Right Popliteal Spont N    Right Popliteal Phasic N    Right Popliteal Compress N    Right Popliteal Thrombus A    Right Posterior Tibial Compress C    Right Peroneal Compress N    Right Peroneal Thrombus A    Right Gastronemius Compress C    Right Greater Saph AK Compress C    Right Greater Saph BK Compress P    Right  Greater Saph BK Thrombus C    Right Lesser Saph Compress C    Left Common Femoral Spont Y    Left Common Femoral Competent Y    Left Common Femoral Phasic Y    Left Common Femoral Compress C    Left Common Femoral Augment Y    Left Saphenofemoral Junction Compress C    Left Profunda Femoral Compress C    Left Proximal Femoral Compress C    Left Mid Femoral Spont Y    Left Mid Femoral Competent Y    Left Mid Femoral Phasic Y    Left Mid Femoral Compress C    Left Mid Femoral Augment Y    Left Distal Femoral Compress C    Left Popliteal Spont Y    Left Popliteal Competent Y    Left Popliteal Phasic Y    Left Popliteal Compress C    Left Popliteal Augment Y    Left Posterior Tibial Compress C    Left Peroneal Compress C    Left Gastronemius Compress C    Left Greater Saph AK Compress C    Left Greater Saph BK Compress C    Left Lesser Saph Compress C    BH CV VAS PRELIMINARY FINDINGS SCRIPTING 1.0   Narrative:       Acute right lower extremity deep vein thrombosis noted in the distal  femoral, popliteal and peroneal.    Chronic right lower extremity superficial thrombophlebitis noted in the  great saphenous (below knee).    All other veins appeared normal bilaterally.    CT Angiogram Chest Pulmonary Embolism [[id number]] Dennis as Reviewed   Order Status: Completed Collected: 01/10/25 1833    Updated: 01/10/25 1843   Narrative:     CT ANGIOGRAM CHEST PULMONARY EMBOLISM-     INDICATIONS: Dyspnea     TECHNIQUE: Radiation dose reduction techniques were utilized, including  automated exposure control and exposure modulation based on body size.  CT angiography of the chest. Three-dimensional reconstructions     COMPARISON: 7/23/2015     FINDINGS:     Critical test result. Extensive bilateral pulmonary embolic disease is  present, beginning in the pulmonary arterial trunk and extending  bilaterally (saddle pulmonary embolism). RV LV ratio of 1.2 suggests  right heart strain.     No aortic dissection. Ascending aorta is dilated,  3.9 cm.     The heart size is borderline, without pericardial effusion. Coronary  arterial calcification is present. A few small subcentimeter short axis  mediastinal lymph nodes are seen that are not significant by size  criteria. Bilateral gynecomastia is noted.     The airways appear clear.     No pleural effusion or pneumothorax.     The lungs show small areas of opacity posteriorly in the upper lobes,  for example axial image 36 that could be atelectasis or in the setting  of pulmonary embolic disease, could represent infarct. Another focal  opacity is seen posterior in the right lower lobe on axial image 107.  Follow-up is advised.     Upper abdominal structures showed no acute findings.     Degenerative changes are seen in the spine. Old left rib deformities are  noted. A nondisplaced fracture at the medial right clavicle is noted,  age-indeterminate, but showing healing response, correlate clinically.      Impression:        Critical test result. Extensive pulmonary embolism/saddle embolus.  Increased RV LV ratio 1.2 suggesting right heart strain.     Discussed by telephone with Dr. Salinas at time of interpretation, 1835,  1/10/2025.     This report was finalized on 1/10/2025 6:40 PM by Dr. Beka Bowden M.D on Workstation: [id number]       XR Chest 1 View [[id number]] Dennis as Reviewed   Order Status: Completed Collected: 01/10/25 1705    Updated: 01/10/25 1710   Narrative:     XR CHEST 1 VW-     HISTORY: Male who is 79 years-old, cough, hypoxia     TECHNIQUE: Frontal views of the chest     COMPARISON: 5/20/2024     FINDINGS: Heart, mediastinum and pulmonary vasculature are unremarkable.  Mild left basilar atelectasis or infiltrate, follow-up suggested. There  may be small left pleural effusion. No pneumothorax. Left hemidiaphragm  remains elevated. No acute osseous process.      Impression:     As described.     This report was finalized on 1/10/2025 5:07 PM by Dr. Beka Bowden M.D on Workstation:  [id number]      XR Spine Lumbar Complete With Flex & Ext [[id number]] Reviewed: 01/07/25 1203   Order Status: Completed Collected: 01/06/25 0753    Updated: 01/06/25 0804   Narrative:     XR SPINE LUMBAR COMPLETE W FLEX EXT    Date of Exam: 1/2/2025 11:56 AM EST    Indication: evaluate L5-S1 instability    Comparison: Lumbar spine x-rays 3/19/2024, CT lumbar spine 3/21/2023    Technique: Radiographs of the lumbar spine were obtained with flexion and extension.  6 views minimum were obtained.    Findings:  There is stable grade 1 anterolisthesis of L5 on S1, which measures 7 mm neutral, 8 mm flexion, 7 mm extension.    Postoperative changes from posterior fusion at L2-L4 appear unchanged. There are severe degenerative changes at L1-L2, L4-L5, L5-S1, evidenced by disc space narrowing and endplate osteophyte formation. Facet arthropathy is most severe in the lower lumbar   spine. There is stable leftward curvature of the lumbar spine.    Atherosclerotic vascular calcifications are noted.   Impression:     Impression:  1.Stable postoperative changes at L2-L4, multilevel severe degenerative changes, leftward curvature of the lumbar spine.  2.Stable grade 1 anterolisthesis of L5 on S1, as described above.       lected Updated Procedure Result Status    01/19/2025 0347 01/19/2025 0538 Protime-INR [[id number]]   (Abnormal)   Blood    Final result Component Value Units   Protime 25.4 High  Seconds   INR 2.28 High            01/19/2025 0347 01/19/2025 0516 CBC (No Diff) [[id number]]   (Abnormal)   Blood    Final result Component Value Units   WBC 5.85 10*3/mm3   RBC 3.19 Low  10*6/mm3   Hemoglobin 10.3 Low  g/dL   Hematocrit 30.0 Low  %   MCV 94.0 fL   MCH 32.3 pg   MCHC 34.3 g/dL   RDW 13.1 %   RDW-SD 45.1 fl   MPV 10.1 fL   Platelets 196 10*3/mm3          PHYSICAL EXAM  Objective:  Vital signs: (most recent): Blood pressure 139/90, pulse 63, temperature 98.6 °F (37 °C), temperature source Oral, resp. rate 20, height 167.6 cm  "(66\"), weight 77.2 kg (170 lb 3.2 oz), SpO2 100%.                Alert  nad  No resp distress  Lungs clear     CONDITION ON DISCHARGE  Stable.      DISCHARGE DISPOSITION   Home or Self Care      DISCHARGE MEDICATIONS       Your medication list        START taking these medications        Instructions Last Dose Given Next Dose Due   ferrous sulfate 325 (65 FE) MG tablet  Start taking on: January 20, 2025      Take 1 tablet by mouth Daily With Breakfast. Can cause constipation       HYDROcodone-acetaminophen 5-325 MG per tablet  Commonly known as: NORCO      Take 1 tablet by mouth 2 (Two) Times a Day As Needed for Moderate Pain.       naloxone 4 MG/0.1ML nasal spray  Commonly known as: NARCAN      Call 911. Don't prime. Webb in 1 nostril for overdose. Repeat in 2-3 minutes in other nostril if no or minimal breathing/responsiveness.       warfarin 7.5 MG tablet  Commonly known as: COUMADIN      Take 1 tablet by mouth Every Afternoon. Dose may be adjusted by Pharmacy/Cardiology office. Indications: DVT/PE (active thrombosis)              CHANGE how you take these medications        Instructions Last Dose Given Next Dose Due   gabapentin 300 MG capsule  Commonly known as: NEURONTIN  What changed: Another medication with the same name was removed. Continue taking this medication, and follow the directions you see here.      TAKE 1 CAPSULE BY MOUTH IN THE MORING, 1 CAPSULE IN THE AFTERNOON AND 2 CAPSULES AT NIGHT       losartan 50 MG tablet  Commonly known as: COZAAR  What changed: how much to take      Take 0.5 tablets by mouth Daily.              CONTINUE taking these medications        Instructions Last Dose Given Next Dose Due   acetaminophen 500 MG tablet  Commonly known as: TYLENOL      Take 1 tablet by mouth Every 6 (Six) Hours As Needed for Mild Pain.       albuterol sulfate  (90 Base) MCG/ACT inhaler  Commonly known as: PROVENTIL HFA;VENTOLIN HFA;PROAIR HFA      Inhale 2 puffs Every 4 (Four) Hours As " Needed for Wheezing or Shortness of Air.       budesonide-formoterol 160-4.5 MCG/ACT inhaler  Commonly known as: SYMBICORT      Inhale 2 puffs 2 (Two) Times a Day.       calcium 500 mg vitamin D 5 mcg (200 UT) 500-5 MG-MCG tablet per tablet      TAKE TWO TABLETS BY MOUTH DAILY       clonazePAM 0.5 MG tablet  Commonly known as: KlonoPIN      TAKE ONE TABLET BY MOUTH TWICE DAILY AS NEEDED FOR ANXIETY       coenzyme Q10 100 MG capsule      Take 1 capsule by mouth Daily.       cyclobenzaprine 10 MG tablet  Commonly known as: FLEXERIL      Take 1 tablet by mouth 3 (Three) Times a Day As Needed for Muscle Spasms.       EMERGEN-C BLUE PO      Take 1 tablet by mouth Daily. LD 9-27       fish oil 1000 MG capsule capsule      Take 1 capsule by mouth Daily With Breakfast. LD 9-27       montelukast 10 MG tablet  Commonly known as: SINGULAIR      Take 1 tablet by mouth Daily.       promethazine-dextromethorphan 6.25-15 MG/5ML syrup  Commonly known as: PROMETHAZINE-DM      Take 5 mL by mouth.       YURY-e 400 MG tablet      Take 400 mg by mouth Daily.       sildenafil 20 MG tablet  Commonly known as: REVATIO      take 1 to 2 tablets by mouth as needed for erictile dysfunction       theophylline 300 MG 12 hr tablet  Commonly known as: THEODUR      Take 1 tablet by mouth Daily.       traZODone 50 MG tablet  Commonly known as: DESYREL      Take 1-2 tablets by mouth Every Night.       triamcinolone 0.025 % cream  Commonly known as: KENALOG      Apply 1 Application topically to the appropriate area as directed 2 (Two) Times a Day.       Turmeric 400 MG capsule      Take 1 capsule by mouth Daily.       vitamin b complex capsule capsule      Take 1 capsule by mouth Daily. LD 9-27              STOP taking these medications      azithromycin 250 MG tablet  Commonly known as: ZITHROMAX        dexAMETHasone 1.5 MG tablet  Commonly known as: DECADRON        meloxicam 7.5 MG tablet  Commonly known as: MOBIC                  Where to Get Your  "Medications        [facility] [id number]      Hours: Monday to Friday 7 AM to 6 PM, Saturday & Sunday 8 AM to 4:30 PM (Closed 12 PM to 12:30 PM) Phone: [phone]   ferrous sulfate 325 (65 FE) MG tablet  HYDROcodone-acetaminophen 5-325 MG per tablet  naloxone 4 MG/0.1ML nasal spray  warfarin 7.5 MG tablet       Information about where to get these medications is not yet available    Ask your nurse or doctor about these medications  losartan 50 MG tablet          Future Appointments   Date Time Provider Department Center   1/24/2025  2:00 PM Jennifer Cox APRN [facility]   1/27/2025  9:45 AM Jerrod Lagunas MD [facility] None   4/22/2025 10:30 AM LAB [facility]   4/29/2025 10:45 AM Sultana Gaspar MD [facility]     Additional Instructions for the Follow-ups that You Need to Schedule       Ambulatory Referral to Anti Coag Clinic   As directed       Select To DEPT SPEC to filter TO DEPT       Send INR reminders to the \"clinical pool\" of the TO DEPT     (ie:  [facility] CLINIC  or [facility] CLINICAL POOL)     Ambulatory Referral to Home Health (Hospital)   As directed      Sultana Gaspar MD    Order Comments: Sultana Gaspar MD    Face to Face Visit Date: 1/19/2025   Follow-up provider for Plan of Care?: I treated the patient in an acute care facility and will not continue treatment after discharge.   Follow-up provider: SULTANA GASPAR [[id number]]   Reason/Clinical Findings: pulmonary embolism, debility   Describe mobility limitations that make leaving home difficult: pulmonary embolism, debility   Nursing/Therapeutic Services Requested: Skilled Nursing Physical Therapy Occupational Therapy   Skilled nursing orders: Medication education (INR check) Other   PT orders: Therapeutic exercise Strengthening   Occupational orders: Activities of daily living " Strengthening        Discharge Follow-up with Specialty: INR check on Tuesday or Wednesday through Dr. Hdez's office (or though PCP office or coumadin clinic). Alternatively INR may be able to be checked with draw from home health nurse. Call on Tuesday if you have n...   As directed      Specialty: INR check on Tuesday or Wednesday through Dr. Hdez's office (or though PCP office or coumadin clinic). Alternatively INR may be able to be checked with draw from home health nurse. Call on Tuesday if you have not heard from them        Discharge Follow-up with Specialty: PCP in ~1 week, Cardiology in 1-2 weeks as they have scheduled   As directed      Specialty: PCP in ~1 week, Cardiology in 1-2 weeks as they have scheduled               Follow-up Information       Dennis Kwong MD .    Specialty: Internal Medicine  Contact information:  [address]  [place] [address]  [phone]               Kalyani Sebastian, APRN. Go on 1/24/2025.    Specialties: Nurse Practitioner, Cardiology  Contact information:  [address] [facility] [facility]  [address] [place] [address]  [phone]                             TEST  RESULTS PENDING AT DISCHARGE         Ladarius Yeager MD  [facility]  01/19/25  12:43 EST      Time: greater than 32 minutes on discharge  It was a pleasure taking care of this patient while in the hospital.

## 2025-01-19 NOTE — PLAN OF CARE
Goal Outcome Evaluation:  Plan of Care Reviewed With: patient           Outcome Evaluation: Walking and overnight oximetry completed with RT. Possible d/c today. No other complaints during this shift.

## 2025-01-19 NOTE — PROGRESS NOTES
[facility] Clinical Pharmacy Services: Warfarin Dosing/Monitoring Consult    Omar Choudhary is a 79 y.o. male, estimated creatinine clearance is 102.6 mL/min (A) (by C-G formula based on SCr of 0.59 mg/dL (L)). weighing 77.2 kg (170 lb 3.2 oz).    Results from last 7 days   Lab Units 01/19/25  0347 01/18/25  0336 01/17/25  0353 01/16/25  0418 01/15/25  0843 01/15/25  0249 01/14/25  0357 01/13/25  0330 01/12/25  1726   INR  2.28* 2.13* 1.78* 1.41*  --  1.25*   < >  --   --    APTT seconds  --   --   --   --   --   --   --  35.9* 153.3*   HEMOGLOBIN g/dL 10.3* 9.7* 9.5* 9.7* 10.4*  --    < > 12.3*  --    HEMATOCRIT % 30.0* 31.2* 29.7* 29.9* 30.7*  --    < > 36.0*  --    PLATELETS 10*3/mm3 196 182 159 137* 147  --    < > 153  --     < > = values in this interval not displayed.     Prior to admission anticoagulation: Newly started on AC this admission.  Transitioned from apixaban to warfarin on 01/13 due to affordability issues.  Enoxaparin bridge to be discontinued this afternoon. (01/19/25)    Hospital Anticoagulation:  Consulting provider: Nancy Hdez MD  Start date: 01/13/25  Indication: DVT/PE (active thrombosis)  Target INR: 2 - 3  Expected duration: indefinite   Bridge Therapy: Yes  with enoxaparin    Potential food or drug interactions: no significant DDI's at this time    Education complete?/Date: No; plan for follow up TBD    Assessment/Plan:  Dose: INR in goal range today (2.28).  will start daily dose of warfarin 7.5 mg today as patient may be discharged today.  Enoxaparin to be discontinued later today.  Monitor for any signs or symptoms of bleeding  Follow up daily INRs and dose adjustments    Pharmacy will continue to follow until discharge or discontinuation of warfarin.     Rita Méndez, [facility]  Clinical Pharmacist

## 2025-01-20 ENCOUNTER — TRANSITIONAL CARE MANAGEMENT TELEPHONE ENCOUNTER (OUTPATIENT)
Dept: CALL CENTER | Facility: HOSPITAL | Age: 80
End: 2025-01-20
Payer: MEDICARE

## 2025-01-20 NOTE — OUTREACH NOTE
Call Center TCM Note      Flowsheet Row Responses   [facility] patient discharged from? [place]   Does the patient have one of the following disease processes/diagnoses(primary or secondary)? Other   TCM attempt successful? Yes   Call start time 1433   Call end time 1445   Discharge diagnosis Acute saddle pulmonary embolism   Person spoke with today (if not patient) and relationship pt   Meds reviewed with patient/caregiver? Yes   Is the patient having any side effects they believe may be caused by any medication additions or changes? No   Does the patient have all medications ordered at discharge? Yes   Is the patient taking all medications as directed (includes completed medication regime)? Yes   Comments Pulmonary 1/27/25,  Cardiology 1/24/25   Does the patient have an appointment with their PCP within 7-14 days of discharge? No   Nursing Interventions Patient declined scheduling/rescheduling appointment at this time   What is the Home health agency?  HH services TBA   Has home health visited the patient within 72 hours of discharge? Unsure   Psychosocial issues? No   Did the patient receive a copy of their discharge instructions? Yes   Nursing interventions Reviewed instructions with patient   What is the patient's perception of their health status since discharge? Improving   Is the patient/caregiver able to teach back signs and symptoms related to disease process for when to call PCP? Yes   Is the patient/caregiver able to teach back signs and symptoms related to disease process for when to call 911? Yes   Is the patient/caregiver able to teach back the hierarchy of who to call/visit for symptoms/problems? PCP, Specialist, Home health nurse, Urgent Care, ED, 911 Yes   TCM call completed? Yes   Wrap up additional comments Pt shares he has a productive cough, and baseline SOB. Reviewed AVS/meds with pt. Pt verified Cardiology/Pulmonology fu appt. Pt did not want to make PCP fu appt until after he sees  Pulmonologist   Call end time 1446   Would this patient benefit from a Referral to [facility] Social Work? No   Is the patient interested in additional calls from an ambulatory ? No            Shi Khan RN    1/20/2025, 14:46 EST

## 2025-01-21 ENCOUNTER — PATIENT OUTREACH (OUTPATIENT)
Dept: CASE MANAGEMENT | Facility: CLINIC | Age: 80
End: 2025-01-21
Payer: MEDICARE

## 2025-01-21 NOTE — OUTREACH NOTE
"AMBULATORY CASE MANAGEMENT NOTE    Names and Relationships of Patient/Support Persons: Contact: DarnellOmar crews \"Mendez\"; Relationship: Self -     Patient Outreach    Spoke with patient at this time regarding recent hospitalization, identified self and role.  Patient states he is doing well since his hospitalization, reports that his breathing has improved and he is taking his medication as prescribed (including new medications).  He states he had spoken with home health and has been waiting to hear back from them to schedule.  He has follow up appts scheduled with pulmonology Dr. Lagunas and Cardiology EMILY Cox within the next week.  He declines appt with PCP for now, states he would like to see what his specialists say and then he will call to schedule if desired.  Patient states he has been approved for cardiac rehab by Dr. Lagunas but is holding off for now.  He is staying at his S.O.'s home in [place] for now since it is closer to his follow up appts, and is doing some light PT of his own - does endorse some weakness still.    Patient is very grateful for the care he received while in the hospital.  He states he was supposed to visit his daughter in [place] in a week but has been advised by the attending hospitalist to wait 4-6 weeks prior to significant travel such as this.  He denies any other needs from [facility] at this time, advised that he contact PCP office if he does not hear from  to schedule.  [facility] will also follow up on this.      Ebony LOZANO  Ambulatory Case Management    1/21/2025, 15:59 EST  "

## 2025-01-22 ENCOUNTER — HOME CARE VISIT (OUTPATIENT)
Dept: HOME HEALTH SERVICES | Facility: HOME HEALTHCARE | Age: 80
End: 2025-01-22

## 2025-01-24 ENCOUNTER — TELEPHONE (OUTPATIENT)
Dept: CARDIOLOGY | Facility: CLINIC | Age: 80
End: 2025-01-24
Payer: MEDICARE

## 2025-01-24 ENCOUNTER — DOCUMENTATION (OUTPATIENT)
Dept: CARDIOLOGY | Facility: CLINIC | Age: 80
End: 2025-01-24

## 2025-01-24 ENCOUNTER — HOSPITAL ENCOUNTER (OUTPATIENT)
Dept: CARDIOLOGY | Facility: HOSPITAL | Age: 80
Discharge: HOME | End: 2025-01-24
Payer: MEDICARE

## 2025-01-24 ENCOUNTER — OFFICE VISIT (OUTPATIENT)
Dept: CARDIOLOGY | Facility: CLINIC | Age: 80
End: 2025-01-24
Payer: MEDICARE

## 2025-01-24 VITALS
SYSTOLIC BLOOD PRESSURE: 140 MMHG | BODY MASS INDEX: 28.61 KG/M2 | HEART RATE: 80 BPM | WEIGHT: 178 LBS | HEIGHT: 66 IN | DIASTOLIC BLOOD PRESSURE: 80 MMHG

## 2025-01-24 DIAGNOSIS — I26.99 PULMONARY EMBOLISM, UNSPECIFIED CHRONICITY, UNSPECIFIED PULMONARY EMBOLISM TYPE, UNSPECIFIED WHETHER ACUTE COR PULMONALE PRESENT: Primary | ICD-10-CM

## 2025-01-24 DIAGNOSIS — I26.99 PULMONARY EMBOLISM, UNSPECIFIED CHRONICITY, UNSPECIFIED PULMONARY EMBOLISM TYPE, UNSPECIFIED WHETHER ACUTE COR PULMONALE PRESENT: ICD-10-CM

## 2025-01-24 LAB
INR PPP: 4.19 (ref 0.9–1.1)
PROTHROMBIN TIME: 41.2 SECONDS (ref 11.7–14.2)

## 2025-01-24 PROCEDURE — 85610 PROTHROMBIN TIME: CPT | Performed by: NURSE PRACTITIONER

## 2025-01-24 PROCEDURE — 36415 COLL VENOUS BLD VENIPUNCTURE: CPT

## 2025-01-24 RX ORDER — WARFARIN SODIUM 5 MG/1
5 TABLET ORAL NIGHTLY
Qty: 90 TABLET | Refills: 3 | Status: SHIPPED | OUTPATIENT
Start: 2025-01-24

## 2025-01-24 NOTE — PROGRESS NOTES
Subjective:     Encounter Date:01/24/2025      Patient ID: Omar Choudhary is a 79 y.o. male.    Chief Complaint:  History of Present Illness  This is a 79-year-old man with a past medical history of asthma, paralyzed Heema diaphragm, osteoarthritis, hypertension he was recently hospitalized with a pulmonary embolism.  He presented to the hospital on 1/10/2025 with complaints of worsening shortness of breath.  His shortness of breath became so severe while cleaning snow off his car that he opted to call EMS.  When EMS arrived his oxygen saturations were in the 50s.  He was placed on supplemental oxygen and eventually placed on BiPAP when he was admitted to the ICU.  proBNP was 3412, lactic acid 3.6, troponin 115 and white count 16.87.  A CT of the chest was obtained that showed bilateral pulmonary embolism including a saddle embolism with evidence of elevated RV to LV ratio of 1:2 an echocardiogram was also performed which showed evidence of severe right ventricular enlargement and dysfunction but no significant evidence of pulmonary hypertension although it was felt this may be underestimated. Left ventricular function was normal and there was no evidence of thrombus in transit. He was started on heparin infusion and admitted to the ICU.     On January 11 he underwent bilateral pulmonary artery thrombectomy and was noted to have only a small to moderate amount of residual thrombus by the time the procedure had ended.  He was also found to have acute right lower extremity DVT in the peroneal, popliteal and distal femoral veins.  He was noted to have severe pulmonary hypertension with systolic pressures in the 70s to 80s and a mean PA of 44 to 46 mmHg noted on right heart cath before and following the thrombectomy.  It was felt that this was likely chronic.  He was transition to Coumadin with a Lovenox bridge as DOAC's were cost prohibitive.  He did remain in the hospital for about a week due to high O2  requirements as well as trying to get his INR therapeutic.  What is expected walk test prior to discharge and was able to maintain O2 sats above 93% by the end of the walk.  He was ultimately discharged home on 1/18/2025.      He is here today for follow-up visit.  He reports that he has been doing well since he was discharged home from the hospital.  He still has some shortness of breath particularly with exertion.  No complaints of chest pain, swelling in legs, orthopnea or PND.  Blood pressures are pretty well-controlled.  His O2 saturation today was 96% on room air.  He has been doing well on the Coumadin with no hematuria or dark tarry stools.  He has not heard from the anticoagulation clinic in regards to getting set up for INR checks.  He was told that he would need his INR checked a couple of days after discharge from the hospital.    I have reviewed and updated as appropriate allergies, current medications, past family history, past medical history, past surgical history and problem list.    Review of Systems   Constitutional: Negative for fever, malaise/fatigue, weight gain and weight loss.   HENT:  Negative for congestion, hoarse voice and sore throat.    Eyes:  Negative for blurred vision and double vision.   Cardiovascular:  Negative for chest pain, dyspnea on exertion, leg swelling, orthopnea, palpitations and syncope.   Respiratory:  Positive for shortness of breath. Negative for cough and wheezing.    Gastrointestinal:  Negative for abdominal pain, hematemesis, hematochezia and melena.   Genitourinary:  Negative for dysuria and hematuria.   Neurological:  Negative for dizziness, headaches, light-headedness and numbness.   Psychiatric/Behavioral:  Negative for depression. The patient is not nervous/anxious.          Current Outpatient Medications:     acetaminophen (TYLENOL) 500 MG tablet, Take 1 tablet by mouth Every 6 (Six) Hours As Needed for Mild Pain., Disp: , Rfl:     albuterol sulfate   (90 Base) MCG/ACT inhaler, Inhale 2 puffs Every 4 (Four) Hours As Needed for Wheezing or Shortness of Air., Disp: 18 g, Rfl: 0    B Complex Vitamins (VITAMIN B COMPLEX) capsule capsule, Take 1 capsule by mouth Daily. LD 9-27, Disp: , Rfl:     budesonide-formoterol (SYMBICORT) 160-4.5 MCG/ACT inhaler, Inhale 2 puffs 2 (Two) Times a Day., Disp: 10.2 g, Rfl: 0    Calcium Carb-Cholecalciferol (Oyster Shell Calcium w/D) 500-5 MG-MCG tablet, TAKE TWO TABLETS BY MOUTH DAILY, Disp: 180 tablet, Rfl: 0    clonazePAM (KlonoPIN) 0.5 MG tablet, TAKE ONE TABLET BY MOUTH TWICE DAILY AS NEEDED FOR ANXIETY, Disp: 30 tablet, Rfl: 2    coenzyme Q10 100 MG capsule, Take 1 capsule by mouth Daily., Disp: , Rfl:     cyclobenzaprine (FLEXERIL) 10 MG tablet, Take 1 tablet by mouth 3 (Three) Times a Day As Needed for Muscle Spasms., Disp: 60 tablet, Rfl: 2    ferrous sulfate 325 (65 FE) MG tablet, Take 1 tablet by mouth Daily With Breakfast. Can cause constipation, Disp: 30 tablet, Rfl: 0    gabapentin (NEURONTIN) 300 MG capsule, TAKE 1 CAPSULE BY MOUTH IN THE MORING, 1 CAPSULE IN THE AFTERNOON AND 2 CAPSULES AT NIGHT (Patient taking differently: TAKE 1 CAPSULE BY MOUTH IN THE MORING, 1 CAPSULE IN THE AFTERNOON AND 1 CAPSULES AT NIGHT), Disp: 120 capsule, Rfl: 2    HYDROcodone-acetaminophen (NORCO) 5-325 MG per tablet, Take 1 tablet by mouth 2 (Two) Times a Day As Needed for Moderate Pain., Disp: 6 tablet, Rfl: 0    losartan (COZAAR) 50 MG tablet, Take 0.5 tablets by mouth Daily., Disp: , Rfl:     montelukast (SINGULAIR) 10 MG tablet, Take 1 tablet by mouth Daily., Disp: 90 tablet, Rfl: 0    Multiple Vitamins-Minerals (EMERGEN-C BLUE PO), Take 1 tablet by mouth Daily. LD 9-27, Disp: , Rfl:     naloxone (NARCAN) 4 MG/0.1ML nasal spray, Call 911. Don't prime. [place] in 1 nostril for overdose. Repeat in 2-3 minutes in other nostril if no or minimal breathing/responsiveness., Disp: 2 each, Rfl: 0    promethazine-dextromethorphan (PROMETHAZINE-DM)  6.25-15 MG/5ML syrup, Take 5 mL by mouth., Disp: , Rfl:     S-Adenosylmethionine (YURY-e) 400 MG tablet, Take 400 mg by mouth Daily., Disp: , Rfl:     sildenafil (REVATIO) 20 MG tablet, take 1 to 2 tablets by mouth as needed for erictile dysfunction, Disp: 50 tablet, Rfl: 0    theophylline (THEODUR) 300 MG 12 hr tablet, Take 1 tablet by mouth Daily., Disp: 90 tablet, Rfl: 3    traZODone (DESYREL) 50 MG tablet, Take 1-2 tablets by mouth Every Night., Disp: 180 tablet, Rfl: 3    triamcinolone (KENALOG) 0.025 % cream, Apply 1 Application topically to the appropriate area as directed 2 (Two) Times a Day., Disp: , Rfl:     Turmeric 400 MG capsule, Take 1 capsule by mouth Daily., Disp: , Rfl:     warfarin (COUMADIN) 7.5 MG tablet, Take 1 tablet by mouth Every Afternoon. Dose may be adjusted by Pharmacy/Cardiology office. Indications: DVT/PE (active thrombosis), Disp: 30 tablet, Rfl: 0    Current Facility-Administered Medications:     Tezepelumab-ekko solution auto-injector 210 mg, 210 mg, Subcutaneous, Q30 Days, Jerrod Lagunas MD    Past Medical History:   Diagnosis Date    ADHD (attention deficit hyperactivity disorder) 2021    Hard to focus on tasks and follow through.    Allergic     Arthritis     Asthma     Carpal tunnel syndrome     no pain    Cataract     Depression     Erectile dysfunction     2018 at 73 years old    Family history of malignant neoplasm of breast 09/26/2019    Hyperlipidemia 09/26/2019    Hypertension 03/01/2012    Leg pain, right     Low back pain     Neuropathy     feet    Osteopenia     Poor balance     Reactive airway disease 01/15/2016    Scoliosis 1960    Shortness of breath     Sleep apnea     Substance abuse 1966    Alcoholic       Past Surgical History:   Procedure Laterality Date    CARDIAC CATHETERIZATION  1992    CARDIAC CATHETERIZATION N/A 1/11/2025    Procedure: Right Heart Cath;  Surgeon: Nancy Hdez MD;  Location: [facility] CATH INVASIVE LOC[facility];  Service: Cardiovascular;   Laterality: N/A;    CARDIAC CATHETERIZATION  2025    Procedure: Percutaneous Manual Thrombectomy;  Surgeon: Nancy Hdez MD;  Location:  [facility] CATH INVASIVE LOCATION;  Service: Cardiovascular;;    CARDIAC CATHETERIZATION Bilateral 2025    Procedure: Pulmonary angiography;  Surgeon: Nancy Hdez MD;  Location:  [facility] CATH INVASIVE LOCATION;  Service: Cardiovascular;  Laterality: Bilateral;    COLONOSCOPY  2013    No polyps, slight diverticulitis    EYE SURGERY  2016    KNEE ARTHROSCOPY W/ ACL RECONSTRUCTION Right 2019    LUMBAR FUSION Bilateral 10/05/2022    Procedure: DAY 2 LUMBAR LAMINECTOMY TRANSFORAMINAL LUMBAR INTERBODY FUSION L2,L3,L4 WITH NEURO ROBOT;  Surgeon: John Do MD;  Location: [facility] MAIN OR;  Service: Robotics - Neuro;  Laterality: Bilateral;    LUMBAR FUSION N/A 10/04/2022    Procedure: DAY 1 LUMBAR LATERAL INTERBODY FUSION WITH NEURO ROBOT L2, L3.L4;  Surgeon: John Do MD;  Location: [facility] MAIN OR;  Service: Robotics - Neuro;  Laterality: N/A;  left side approach    MOUTH SURGERY      SPINE SURGERY  10/4&2022    Dr. John Do, [facility]       Family History   Problem Relation Age of Onset    Heart disease Mother     Hypertension Mother     Breast cancer Mother     Stroke Mother         Several TIAs    Alcohol abuse Mother     Thyroid disease Mother     Arthritis Mother     Cancer Mother         Breast    Aortic aneurysm Father     Heart attack Father     Liver cancer Father     Cancer Father         Liver    Heart disease Father         Heart attack    Early death Brother         Coronary thrombosis @ 48 years while mountain climbing.       Social History     Tobacco Use    Smoking status: Former     Current packs/day: 0.00     Average packs/day: 0.5 packs/day for 26.0 years (13.0 ttl pk-yrs)     Types: Cigarettes     Start date: 1966     Quit date: 1992     Years since quittin.0     Passive exposure: Past    Smokeless tobacco:  "Never   Vaping Use    Vaping status: Never Used   Substance Use Topics    Alcohol use: Not Currently     Alcohol/week: 1.0 standard drink of alcohol     Types: 1 Glasses of wine per week    Drug use: Never     Types: Marijuana     Comment: CBD gummies- stop now for surgery         ECG 12 Lead    Date/Time: 1/24/2025 3:50 PM  Performed by: Jennifer Cox APRN    Authorized by: Jennifer Cox APRN  Comparison: compared with previous ECG from 1/24/2025  Similar to previous ECG  Rhythm: sinus rhythm  T inversion: III, aVF, V3 and V4  Other findings: left ventricular hypertrophy             Objective:     Visit Vitals  /80 (BP Location: Right arm, Patient Position: Sitting, Cuff Size: Adult)   Pulse 80   Ht 167.6 cm (66\")   Wt 80.7 kg (178 lb)   BMI 28.73 kg/m²             Physical Exam  Constitutional:       Appearance: Normal appearance. He is normal weight.   HENT:      Head: Normocephalic.   Neck:      Vascular: No carotid bruit.   Cardiovascular:      Rate and Rhythm: Normal rate and regular rhythm.      Chest Wall: PMI is not displaced.      Pulses: Normal pulses.      Heart sounds: No murmur heard.     No friction rub. No gallop.   Pulmonary:      Effort: Pulmonary effort is normal.      Breath sounds: Normal breath sounds.   Abdominal:      General: Bowel sounds are normal. There is no distension.      Palpations: Abdomen is soft.   Musculoskeletal:      Right lower leg: No edema.      Left lower leg: No edema.   Skin:     General: Skin is warm and dry.      Capillary Refill: Capillary refill takes less than 2 seconds.   Neurological:      Mental Status: He is alert and oriented to person, place, and time.   Psychiatric:         Mood and Affect: Mood normal.         Behavior: Behavior normal.         Thought Content: Thought content normal.          Lab Review:   Lipid Panel          4/18/2024    13:13   Lipid Panel   Total Cholesterol 216    Triglycerides 142    HDL Cholesterol 84    VLDL Cholesterol 24  "   LDL Cholesterol  108    LDL/HDL Ratio 1.23          Cardiac Procedures:       Assessment:         Diagnoses and all orders for this visit:    1. Pulmonary embolism, unspecified chronicity, unspecified pulmonary embolism type, unspecified whether acute cor pulmonale present (Primary)  -     Protime-INR; Future            Plan:       Pulmonary embolism/DVT: with evidence of RV strain. S/p thrombectomy. On Coumadin. He has been referred to the  clinic for management of his Coumadin but has not heard from them to arrange for INR check. I am going to obtain one today here in the office and will adjust Coumadin if needed. Will also reach out to the AC clinic to make sure he gets scheduled to be seen soon.  Pulmonary HTN: severe on echo and right heart cath. Remained elevated despite thrombectomy which leads us to believe this is likely chronic. Will plan for follow up echo at next visit.   Acute hypoxic respiratory failure: no longer requiring supplemental O2  HTN: blood pressure stable. No changes.  ELINOR: on CPAP at [facility]  Paralyzed hemidiaphragm    Thank you for allowing me to participate in this patient's care. Please call with any questions or concerns. Mr. Choudhary will follow up with Dr. Hdez in 6 weeks with repeat echo on same day.          Your medication list            Accurate as of January 24, 2025  2:41 PM. If you have any questions, ask your nurse or doctor.                CHANGE how you take these medications        Instructions Last Dose Given Next Dose Due   gabapentin 300 MG capsule  Commonly known as: NEURONTIN  What changed: additional instructions      TAKE 1 CAPSULE BY MOUTH IN THE MORING, 1 CAPSULE IN THE AFTERNOON AND 2 CAPSULES AT NIGHT              CONTINUE taking these medications        Instructions Last Dose Given Next Dose Due   acetaminophen 500 MG tablet  Commonly known as: TYLENOL      Take 1 tablet by mouth Every 6 (Six) Hours As Needed for Mild Pain.       albuterol sulfate  (90  Base) MCG/ACT inhaler  Commonly known as: PROVENTIL HFA;VENTOLIN HFA;PROAIR HFA      Inhale 2 puffs Every 4 (Four) Hours As Needed for Wheezing or Shortness of Air.       budesonide-formoterol 160-4.5 MCG/ACT inhaler  Commonly known as: SYMBICORT      Inhale 2 puffs 2 (Two) Times a Day.       calcium 500 mg vitamin D 5 mcg (200 UT) 500-5 MG-MCG tablet per tablet      TAKE TWO TABLETS BY MOUTH DAILY       clonazePAM 0.5 MG tablet  Commonly known as: KlonoPIN      TAKE ONE TABLET BY MOUTH TWICE DAILY AS NEEDED FOR ANXIETY       coenzyme Q10 100 MG capsule      Take 1 capsule by mouth Daily.       cyclobenzaprine 10 MG tablet  Commonly known as: FLEXERIL      Take 1 tablet by mouth 3 (Three) Times a Day As Needed for Muscle Spasms.       EMERGEN-C BLUE PO      Take 1 tablet by mouth Daily. LD 9-27       ferrous sulfate 325 (65 FE) MG tablet      Take 1 tablet by mouth Daily With Breakfast. Can cause constipation       HYDROcodone-acetaminophen 5-325 MG per tablet  Commonly known as: NORCO      Take 1 tablet by mouth 2 (Two) Times a Day As Needed for Moderate Pain.       losartan 50 MG tablet  Commonly known as: COZAAR      Take 0.5 tablets by mouth Daily.       montelukast 10 MG tablet  Commonly known as: SINGULAIR      Take 1 tablet by mouth Daily.       naloxone 4 MG/0.1ML nasal spray  Commonly known as: NARCAN      Call 911. Don't prime. [place] in 1 nostril for overdose. Repeat in 2-3 minutes in other nostril if no or minimal breathing/responsiveness.       promethazine-dextromethorphan 6.25-15 MG/5ML syrup  Commonly known as: PROMETHAZINE-DM      Take 5 mL by mouth.       YURY-e 400 MG tablet      Take 400 mg by mouth Daily.       sildenafil 20 MG tablet  Commonly known as: REVATIO      take 1 to 2 tablets by mouth as needed for erictile dysfunction       theophylline 300 MG 12 hr tablet  Commonly known as: THEODUR      Take 1 tablet by mouth Daily.       traZODone 50 MG tablet  Commonly known as: DESYREL      Take  1-2 tablets by mouth Every Night.       triamcinolone 0.025 % cream  Commonly known as: KENALOG      Apply 1 Application topically to the appropriate area as directed 2 (Two) Times a Day.       Turmeric 400 MG capsule      Take 1 capsule by mouth Daily.       vitamin b complex capsule capsule      Take 1 capsule by mouth Daily. LD 9-27       warfarin 7.5 MG tablet  Commonly known as: COUMADIN      Take 1 tablet by mouth Every Afternoon. Dose may be adjusted by Pharmacy/Cardiology office. Indications: DVT/PE (active thrombosis)                  EMILY Zepeda  01/24/25  2:41 PM EST

## 2025-01-25 NOTE — TELEPHONE ENCOUNTER
Received call from lab about elevated INR. Left message with patient and daughter to hold coumadin Saturday and Sunday. Restart coumadin Monday given recent PE. Sent message to AC clinic to have him seen Monday.

## 2025-01-27 ENCOUNTER — TELEPHONE (OUTPATIENT)
Dept: PHARMACY | Facility: HOSPITAL | Age: 80
End: 2025-01-27
Payer: MEDICARE

## 2025-01-27 ENCOUNTER — TELEPHONE (OUTPATIENT)
Dept: FAMILY MEDICINE CLINIC | Facility: CLINIC | Age: 80
End: 2025-01-27
Payer: MEDICARE

## 2025-01-27 ENCOUNTER — OFFICE VISIT (OUTPATIENT)
Dept: PULMONOLOGY | Facility: HOSPITAL | Age: 80
End: 2025-01-27
Payer: MEDICARE

## 2025-01-27 ENCOUNTER — ANTICOAGULATION VISIT (OUTPATIENT)
Dept: PHARMACY | Facility: HOSPITAL | Age: 80
End: 2025-01-27
Payer: MEDICARE

## 2025-01-27 VITALS
RESPIRATION RATE: 16 BRPM | HEIGHT: 66 IN | BODY MASS INDEX: 28.61 KG/M2 | OXYGEN SATURATION: 98 % | SYSTOLIC BLOOD PRESSURE: 170 MMHG | WEIGHT: 178 LBS | DIASTOLIC BLOOD PRESSURE: 97 MMHG | HEART RATE: 70 BPM

## 2025-01-27 DIAGNOSIS — I26.02 ACUTE SADDLE PULMONARY EMBOLISM WITH ACUTE COR PULMONALE: Primary | ICD-10-CM

## 2025-01-27 DIAGNOSIS — I82.4Y9 ACUTE DEEP VEIN THROMBOSIS (DVT) OF PROXIMAL VEIN OF LOWER EXTREMITY, UNSPECIFIED LATERALITY: Primary | ICD-10-CM

## 2025-01-27 PROCEDURE — G0463 HOSPITAL OUTPT CLINIC VISIT: HCPCS

## 2025-01-27 NOTE — TELEPHONE ENCOUNTER
----- Message from Jennifer Cox sent at 1/27/2025  8:24 AM EST -----  Good morning,    Yes, please change the referral to [facility] and have either myself or Dr. Hdez as the referring provider. And yes, the goal INR is 2-3 for acute saddle PE.    Thank you both for your help!  Jennifer  ----- Message -----  From: eKri Boyer RPH  Sent: 1/27/2025   8:17 AM EST  To: Adam Nguyen Jr., MD; EMILY Zepeda    Good Morning,     We are happy to see the patient today. However, the referral was sent by Dr. Yeager and unfortunately we do not have a collaborative care agreement with him. He also sent it to the wrong pool, therefore, the med management clinic never received the referral.     Are you agreeable to me changing the referral to [facility] with you being the referring provider? If so, can you confirm the goal INR is 2-3 with an indication of acute saddle PE?     Thanks,   Keri  ----- Message -----  From: Adam Nguyen Jr., MD  Sent: 1/24/2025   8:18 PM EST  To: Keri Boyer RPH    Are you still with AC clinic? Got after hours call on this gentleman for INR 4. Left message to hold coumadin Saturday and Sunday. Resume Monday. Will need INR early next week.    Adam

## 2025-01-27 NOTE — TELEPHONE ENCOUNTER
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 HE SAID TO CALL HIM WHEN FORMS ARE COMPLETED AND HE WILL PICK THEM UP. PUT IN MA BOX IN THE FAX ROOM

## 2025-01-27 NOTE — PROGRESS NOTES
PULMONARY/ CRITICAL CARE/ SLEEP MEDICINE OUTPATIENT CONSULT/ FOLLOW UP NOTE        Patient Name:  Omar Choudhary    :  1945    Medical Record:  [id number]    PRIMARY CARE PHYSICIAN     Dennis Kwong MD    REASON FOR CONSULTATION    Omar Choudhary is a 79 y.o. male who is referred for consultation for Dyspnea  REVIEW OF SYSTEMS    Constitutional:  Denies fever or chills   Eyes:  Denies change in visual acuity   HENT:  Denies nasal congestion or sore throat   Respiratory:  Denies cough or shortness of breath   Cardiovascular:  Denies chest pain or edema   GI:  Denies abdominal pain, nausea, vomiting, bloody stools or diarrhea   :  Denies dysuria   Musculoskeletal:  Denies back pain or joint pain   Integument:  Denies rash   Neurologic:  Denies headache, focal weakness or sensory changes   Endocrine:  Denies polyuria or polydipsia   Lymphatic:  Denies swollen glands   Psychiatric:  Denies depression or anxiety     MEDICAL HISTORY    Past Medical History:   Diagnosis Date    ADHD (attention deficit hyperactivity disorder)     Hard to focus on tasks and follow through.    Allergic     Arthritis     Asthma     Carpal tunnel syndrome     no pain    Cataract     Depression     Erectile dysfunction     2018 at 73 years old    Family history of malignant neoplasm of breast 2019    Hyperlipidemia 2019    Hypertension 2012    Leg pain, right     Low back pain     Neuropathy     feet    Osteopenia     Poor balance     Reactive airway disease 01/15/2016    Scoliosis 1960    Shortness of breath     Sleep apnea     Substance abuse 1966    Alcoholic        SURGICAL HISTORY    Past Surgical History:   Procedure Laterality Date    CARDIAC CATHETERIZATION      CARDIAC CATHETERIZATION N/A 2025    Procedure: Right Heart Cath;  Surgeon: Nancy Hdez MD;  Location: [facility] INVASIVE LOCATION;  Service: Cardiovascular;  Laterality: N/A;    CARDIAC CATHETERIZATION  2025     Procedure: Percutaneous Manual Thrombectomy;  Surgeon: Nancy Hdez MD;  Location:  [facility] INVASIVE LOCATION;  Service: Cardiovascular;;    CARDIAC CATHETERIZATION Bilateral 2025    Procedure: Pulmonary angiography;  Surgeon: Nancy Hdez MD;  Location:  [facility] INVASIVE LOCATION;  Service: Cardiovascular;  Laterality: Bilateral;    COLONOSCOPY      No polyps, slight diverticulitis    EYE SURGERY  2016    KNEE ARTHROSCOPY W/ ACL RECONSTRUCTION Right 2019    LUMBAR FUSION Bilateral 10/05/2022    Procedure: DAY 2 LUMBAR LAMINECTOMY TRANSFORAMINAL LUMBAR INTERBODY FUSION L2,L3,L4 WITH NEURO ROBOT;  Surgeon: John Do MD;  Location: [facility] MAIN OR;  Service: Rob[facility] - Neuro;  Laterality: Bilateral;    LUMBAR FUSION N/A 10/04/2022    Procedure: DAY 1 LUMBAR LATERAL INTERBODY FUSION WITH NEURO ROBOT L2, L3.L4;  Surgeon: John Do MD;  Location: [facility] MAIN OR;  Service: Robotics - Neuro;  Laterality: N/A;  left side approach    MOUTH SURGERY      SPINE SURGERY  10/4&2022    Dr. John Do, [facility]        FAMILY HISTORY    Family History   Problem Relation Age of Onset    Heart disease Mother     Hypertension Mother     Breast cancer Mother     Stroke Mother         Several TIAs    Alcohol abuse Mother     Thyroid disease Mother     Arthritis Mother     Cancer Mother         Breast    Aortic aneurysm Father     Heart attack Father     Liver cancer Father     Cancer Father         Liver    Heart disease Father         Heart attack    Early death Brother         Coronary thrombosis @ 48 years while mountain climbing.       SOCIAL HISTORY    Social History     Tobacco Use    Smoking status: Former     Current packs/day: 0.00     Average packs/day: 0.5 packs/day for 26.0 years (13.0 ttl pk-yrs)     Types: Cigarettes     Start date: 1966     Quit date: 1992     Years since quittin.0     Passive exposure: Past    Smokeless tobacco: Never   Substance Use  Topics    Alcohol use: Not Currently     Alcohol/week: 1.0 standard drink of alcohol     Types: 1 Glasses of wine per week        ALLERGIES    Allergies   Allergen Reactions    Statins Myalgia     Other reaction(s): muscle soreness         MEDICATIONS    Current Outpatient Medications on File Prior to Visit   Medication Sig Dispense Refill    acetaminophen (TYLENOL) 500 MG tablet Take 1 tablet by mouth Every 6 (Six) Hours As Needed for Mild Pain.      albuterol sulfate  (90 Base) MCG/ACT inhaler Inhale 2 puffs Every 4 (Four) Hours As Needed for Wheezing or Shortness of Air. 18 g 0    B Complex Vitamins (VITAMIN B COMPLEX) capsule capsule Take 1 capsule by mouth Daily. LD 9-27      budesonide-formoterol (SYMBICORT) 160-4.5 MCG/ACT inhaler Inhale 2 puffs 2 (Two) Times a Day. 10.2 g 0    Calcium Carb-Cholecalciferol (Oyster Shell Calcium w/D) 500-5 MG-MCG tablet TAKE TWO TABLETS BY MOUTH DAILY 180 tablet 0    clonazePAM (KlonoPIN) 0.5 MG tablet TAKE ONE TABLET BY MOUTH TWICE DAILY AS NEEDED FOR ANXIETY 30 tablet 2    coenzyme Q10 100 MG capsule Take 1 capsule by mouth Daily.      cyclobenzaprine (FLEXERIL) 10 MG tablet Take 1 tablet by mouth 3 (Three) Times a Day As Needed for Muscle Spasms. 60 tablet 2    ferrous sulfate 325 (65 FE) MG tablet Take 1 tablet by mouth Daily With Breakfast. Can cause constipation 30 tablet 0    gabapentin (NEURONTIN) 300 MG capsule TAKE 1 CAPSULE BY MOUTH IN THE MORING, 1 CAPSULE IN THE AFTERNOON AND 2 CAPSULES AT NIGHT (Patient taking differently: TAKE 1 CAPSULE BY MOUTH IN THE MORING, 1 CAPSULE IN THE AFTERNOON AND 1 CAPSULES AT NIGHT) 120 capsule 2    HYDROcodone-acetaminophen (NORCO) 5-325 MG per tablet Take 1 tablet by mouth 2 (Two) Times a Day As Needed for Moderate Pain. 6 tablet 0    losartan (COZAAR) 50 MG tablet Take 0.5 tablets by mouth Daily.      montelukast (SINGULAIR) 10 MG tablet Take 1 tablet by mouth Daily. 90 tablet 0    Multiple Vitamins-Minerals (EMERGEN-C BLUE  "PO) Take 1 tablet by mouth Daily. LD 9-27      naloxone (NARCAN) 4 MG/0.1ML nasal spray Call 911. Don't prime. [place] in 1 nostril for overdose. Repeat in 2-3 minutes in other nostril if no or minimal breathing/responsiveness. 2 each 0    promethazine-dextromethorphan (PROMETHAZINE-DM) 6.25-15 MG/5ML syrup Take 5 mL by mouth.      S-Adenosylmethionine (YURY-e) 400 MG tablet Take 400 mg by mouth Daily.      sildenafil (REVATIO) 20 MG tablet take 1 to 2 tablets by mouth as needed for erictile dysfunction 50 tablet 0    theophylline (THEODUR) 300 MG 12 hr tablet Take 1 tablet by mouth Daily. 90 tablet 3    traZODone (DESYREL) 50 MG tablet Take 1-2 tablets by mouth Every Night. 180 tablet 3    triamcinolone (KENALOG) 0.025 % cream Apply 1 Application topically to the appropriate area as directed 2 (Two) Times a Day.      Turmeric 400 MG capsule Take 1 capsule by mouth Daily.      warfarin (COUMADIN) 5 MG tablet Take 1 tablet by mouth Every Night. 90 tablet 3    warfarin (COUMADIN) 7.5 MG tablet Take 1 tablet by mouth Every Afternoon. Dose may be adjusted by Pharmacy/Cardiology office. Indications: DVT/PE (active thrombosis) 30 tablet 0     Current Facility-Administered Medications on File Prior to Visit   Medication Dose Route Frequency Provider Last Rate Last Admin    Tezepelumab-ekko solution auto-injector 210 mg  210 mg Subcutaneous Q30 Days Jerrod Lagunas MD           PHYSICAL EXAM    Vitals:    01/27/25 1032   Weight: 80.7 kg (178 lb)   Height: 167.6 cm (66\")        Constitutional:  Well developed, well nourished, no acute distress, non-toxic appearance   Eyes:  PERRL, conjunctiva normal   HENT:  Atraumatic, external ears normal, nose normal, oropharynx moist, no pharyngeal exudates. mallampatti   Neck- normal range of motion, no tenderness, supple   Respiratory:  No respiratory distress, normal breath sounds, no rales, no wheezing   Cardiovascular:  Normal rate, normal rhythm, no murmurs, no gallops, no rubs   GI:  " Soft, nondistended, normal bowel sounds, nontender, no organomegaly, no mass, no rebound, no guarding   :  No costovertebral angle tenderness   Musculoskeletal:  No edema, no tenderness, no deformities. Back- no tenderness  Integument:  Well hydrated, no rash   Lymphatic:  No lymphadenopathy noted   Neurologic:  Alert & oriented x 3, CN 2-12 normal, normal motor function, normal sensory function, no focal deficits noted   Psychiatric:  Speech and behavior appropriate     CT Angiogram Chest Pulmonary Embolism    Result Date: 1/10/2025   Critical test result. Extensive pulmonary embolism/saddle embolus. Increased RV LV ratio 1.2 suggesting right heart strain.  Discussed by telephone with Dr. Salinas at time of interpretation, 1835, 1/10/2025.  This report was finalized on 1/10/2025 6:40 PM by Dr. Beka Bowden M.D on Workstation: ePrep Chest 1 View    Result Date: 1/10/2025  As described.  This report was finalized on 1/10/2025 5:07 PM by Dr. Beka Bowden M.D on Workstation: [employer]      XR Spine Lumbar Complete With Flex & Ext    Result Date: 1/6/2025  Impression: 1.Stable postoperative changes at L2-L4, multilevel severe degenerative changes, leftward curvature of the lumbar spine. 2.Stable grade 1 anterolisthesis of L5 on S1, as described above. Electronically Signed: Rita Flores  1/6/2025 8:01 AM EST  Workstation ID: [id number]    Results for orders placed during the hospital encounter of 01/10/25    ADULT TRANSTHORACIC ECHO COMPLETE W/ CONT IF NECESSARY PER PROTOCOL    Interpretation Summary    The right ventricular cavity is severely dilated. Severely reduced right ventricular systolic function noted.    Left ventricular systolic function is normal. Left ventricular ejection fraction appears to be 61 - 65%.    Left ventricular diastolic function is consistent with (grade I) impaired relaxation.    The aortic valve leaflets are moderately calcified (aortic sclerosis)    Mild tricuspid valve  regurgitation is present.    Calculated right ventricular systolic pressure from tricuspid regurgitation is 28 mmHg.    There is a trivial pericardial effusion.      ASSESSMENT & PLAN:      S/p hosp discharge 1/19/25 : saddle emboli with severe respiratory failure. S/p thrombectomy ,on warfarin    Asthma (J45.909)  paralyzed Left hemidiapghram     Impression: CXR 1/19: Elevated L Hemidiaphragm with bibasilar atelectasis  IgE 5/18: 765  RAST Panel 5/18: +ve  PFts 4/18:  (FEV1 78%, TLC 82%, DLCO 76, DL/%  Repeat Lab 3/19: WBC 5.3, Eosinophil count normal. Mold Panel:negative.       IgE: 515  Xolair weekly injections vs allergy shots  Feels better allergy injections     Tests at [facility]; PFts 8/19: Non specific ventilatory defect. FEV1 70%, + D response, %, DLCO 82%   Methacholine chalenge test 8/19: negative  HRCT : No ILD  VQ: Negative except decrease in LLL related to paralyzed diaphragm  Metabolic cardiopulmonary stress test: negative  Echo: EF 60%, RVSP 31 min  Tried Breo and Bevespi with no improvement in symptoms  Remains on Singulair,   symbicort  expensive and albuterol.   -Also on allergy shots now     Obstructive sleep apnea (G47.33)  Has new ResMed machine,  CPAP at 8-20  Average use 8hrs 27 min. Average AHI 1.1. compliance 97% > 4 hrs,   Patient is on AutoSet 8-20 pressure   Patient is compliant with using CPAP/BiPAP therapy and is benefitting from PAP therapy.     Hypertension (I10)           PLAN:     Overnight oximetry    Echo in 6 weeks    Hematology consult for w/u     continue on Symbicort and albuterol  Montelukast and theophylline     Discussed with patient utilizing Xolair for elevated IgE as well as either Fasenra or Nucala for elevated Eosinophills but he deferred due to lack of medication insurance coverage     Continue AutoSet 8-20 pressure      SAFETY DRIVING  ADEQUATE SLEEP HYGEINE  DISCUSSED CARDIOVASCULAR & METABOLIC SIDE EFFECTS OF UNTREATED ELINOR  PATIENT IS  COMPLIANT USING MACHINE AND BENEFITS FROM THERAPY, , PATIENT HAS NASAL DRYNESS AND WILL NEED HEATED HUMIDITY WITH PAP                     This document has been electronically signed by  Jerrod Lagunas MD  10:38 EST

## 2025-01-27 NOTE — PROGRESS NOTES
This encounter is for documentation purposes only. Received a message from [facility] regarding patient and needing an appointment on Monday. Warfarin referral was sent to the wrong pool, therefore the medication management clinic never received the referral. Referral was also sent by a hospitalist and unfortunately does not have a CCA with the clinic. Reached out to [facility] to see if we can change the referral to their care. See telephone note on 1/27- changing referral to Jennifer DIAZ.  Documented inpatient dosing as well as patient was discharged on 7.5 mg daily on warfarin. INR tested on 1/24 and patient was instructed by [facility] to hold for 2 days and then prescribed 5 mg daily. INR needed today.

## 2025-01-28 ENCOUNTER — ANTICOAGULATION VISIT (OUTPATIENT)
Dept: PHARMACY | Facility: HOSPITAL | Age: 80
End: 2025-01-28
Payer: MEDICARE

## 2025-01-28 ENCOUNTER — READMISSION MANAGEMENT (OUTPATIENT)
Dept: CALL CENTER | Facility: HOSPITAL | Age: 80
End: 2025-01-28
Payer: MEDICARE

## 2025-01-28 DIAGNOSIS — I26.02 ACUTE SADDLE PULMONARY EMBOLISM WITH ACUTE COR PULMONALE: Primary | ICD-10-CM

## 2025-01-28 LAB
INR PPP: 3.2 (ref 0.91–1.09)
PROTHROMBIN TIME: 38.7 SECONDS (ref 10–13.8)

## 2025-01-28 PROCEDURE — G0463 HOSPITAL OUTPT CLINIC VISIT: HCPCS

## 2025-01-28 PROCEDURE — 85610 PROTHROMBIN TIME: CPT

## 2025-01-28 NOTE — PROGRESS NOTES
Anticoagulation Clinic Progress Note  Anticoagulation Summary  As of 2025      INR goal:  2.0-3.0   TTR:  --   INR used for dosing:  3.2 (2025)   Warfarin maintenance plan:  7.5 mg every Mon, Fri; 3.75 mg all other days   Weekly warfarin total:  33.75 mg   Plan last modified:  Keri Boyer RPH (2025)   Next INR check:  2025   Target end date:  --    Indications    Acute saddle pulmonary embolism with acute cor pulmonale [I26.02]                 Anticoagulation Episode Summary       INR check location:  --    Preferred lab:  --    Send INR reminders to:  [facility]    Comments:  --          Anticoagulation Care Providers       Provider Role Specialty Phone number    Jennifer Cox APRN Referring Nurse Practitioner [phone]            Clinic Interview:  Patient Findings     Negatives:  Signs/symptoms of thrombosis, Signs/symptoms of bleeding,   Laboratory test error suspected, Change in health, Change in alcohol use,   Change in activity, Upcoming invasive procedure, Emergency department   visit, Upcoming dental procedure, Missed doses, Extra doses, Change in   medications, Change in diet/appetite, Hospital admission, Bruising, Other   complaints      Clinical Outcomes     Negatives:  Major bleeding event, Thromboembolic event,   Anticoagulation-related hospital admission, Anticoagulation-related ED   visit, Anticoagulation-related fatality        Education:  Omar Choudhary is a new start in the Medication Management Clinic. We discussed the followin) Warfarin's indication, mechanism, and dosing  2) Enforced the importance of taking warfarin as instructed and at the same time every day, preferably in the evening so that we can make dose adjustments more easily following subsequent clinic visits  3) What he should do about a missed dose; pts can take missed doses within about 12 hours of their usual scheduled dose, but he was instructed on the importance of not  doubling up on doses unless told to do so by the Medication Management Clinic  4) Explained possible side effects of warfarin therapy, including increased risk of bleeding, s/sx of bleeding and s/sx of any additional clots/PE/CVA.   5) Discussed monitoring of warfarin, the INR, goal INR range, and the frequency of monitoring  6) Reviewed drug/food/tobacco/EtOH interactions and provided written information covering these topics in more detail, explaining that green, leafy vegetables interact most heavily with warfarin  7) Instructed the pt not to take or discontinue any medications without informing his physician/pharmacist and reminded him to inform us of any dietary changes, as well  8) Explained that he would be coming into the clinic more frequently in these first few weeks of therapy as we try to adjust his dose and achieve a therapeutic INR x 2 consecutive readings. Once that is achieved, patient will follow up in clinic every 4 weeks, on average.    He stated no problems with transportation or scheduling clinic appts in this manner. he expressed understanding of the information provided and has no additional questions at this time.    Omar AYDIN Choudhary was presented with a copy of the Patients Rights and Responsibilities. he expressed verbal consent and agreement to receive care in the Medication Management Clinic under the current collaborative care agreement with [facility].       INR History:      Latest Ref Rng & Units 1/16/2025     4:18 AM 1/17/2025     3:53 AM 1/18/2025     3:36 AM 1/19/2025     3:47 AM 1/24/2025     2:54 PM 1/27/2025     8:06 AM 1/28/2025     2:30 PM   Anticoagulation Monitoring   INR       4.19 3.2   INR Date       1/24/2025 1/28/2025   INR Goal       2.0-3.0 2.0-3.0   Trend        Down   Last Week Total       37.5 mg 33.75 mg   Next Week Total       52.5 mg 33.75 mg   Sun       - 3.75 mg   Mon       - 7.5 mg   Tue       - 3.75 mg (1/28)   Wed       - 3.75 mg   Thu       -  3.75 mg   Fri       - 7.5 mg   Sat       - 3.75 mg   Historical INR 0.90 - 1.10 1.41  1.78  2.13  2.28  4.19      Visit Report      Report Report        Plan:  1. INR is Supratherapeutic today- see above in Anticoagulation Summary.   Will instruct Omar Choudhary to Change their warfarin regimen- see above in Anticoagulation Summary.  [facility] had prescribed a 5 mg tablet, however patient has not picked up (instructed him to leave it at the pharmacy to prevent confusion).     He held his warfarin on Saturday and Sunday and then has been taking 3.75 mg daily. Trial on 7.5 mg MF, 3.75 mg AOD and rck in 1 week . May consider only one day of 7.5 mg pending the INR on Monday. Patient does take his warfarin in the AM (by lunch) but will hold off on appointment dates .     2. Follow up in 1 week  3. Patient declines warfarin refills.  4. Verbal and written information provided. Patient expresses understanding and has no further questions at this time.    Keri Boyre, [facility]

## 2025-01-28 NOTE — OUTREACH NOTE
Medical Week 2 Survey      Flowsheet Row Responses   [facility] patient discharged from? [place]   Does the patient have one of the following disease processes/diagnoses(primary or secondary)? Other   Week 2 attempt successful? No   Unsuccessful attempts Attempt 1   Revoke Other transitional program            Katie Nicole Registered Nurse

## 2025-01-28 NOTE — OUTREACH NOTE
Prep Survey      Flowsheet Row Responses   [facility] [place] [facility] from[facility]   Does the patient have one of the following disease processes/diagnoses(primary or secondary)? Jeronimo PIERCE - Registered Nurse

## 2025-01-29 ENCOUNTER — PATIENT OUTREACH (OUTPATIENT)
Dept: CASE MANAGEMENT | Facility: CLINIC | Age: 80
End: 2025-01-29
Payer: MEDICARE

## 2025-01-29 NOTE — OUTREACH NOTE
"AMBULATORY CASE MANAGEMENT NOTE    Names and Relationships of Patient/Support Persons: Contact: Omar Choudhary \"Mendez\"; Relationship: Self -     Patient Outreach    Spoke with patient at this time to check on status of home health.  Patient states he hasn't been in contact with home health to schedule, but he believes he no longer needs home health.  He states the plan is for him to re-engage with cardiac rehab at [facility] and he will go from there if he feels he needs regular PT.  He is currently using a cane to assist with balance issues for his safety.    Patient states he has completed follow ups with pulmonology and carlos, and has started monitoring at the INR clinic for his Coumadin management.  He states he is doing well and his O2 sats have been in the mid 90s.  Patient denies any other needs at this time.    PLAN:  30D ZOILA LOZANO  Ambulatory Case Management    1/29/2025, 10:50 EST  "

## 2025-02-03 ENCOUNTER — ANTICOAGULATION VISIT (OUTPATIENT)
Dept: PHARMACY | Facility: HOSPITAL | Age: 80
End: 2025-02-03
Payer: MEDICARE

## 2025-02-03 ENCOUNTER — APPOINTMENT (OUTPATIENT)
Dept: CARDIAC REHAB | Facility: HOSPITAL | Age: 80
End: 2025-02-03
Payer: MEDICARE

## 2025-02-03 DIAGNOSIS — I26.02 ACUTE SADDLE PULMONARY EMBOLISM WITH ACUTE COR PULMONALE: Primary | ICD-10-CM

## 2025-02-03 LAB
INR PPP: 3.5 (ref 0.91–1.09)
PROTHROMBIN TIME: 41.7 SECONDS (ref 10–13.8)

## 2025-02-03 PROCEDURE — 85610 PROTHROMBIN TIME: CPT

## 2025-02-03 PROCEDURE — G0463 HOSPITAL OUTPT CLINIC VISIT: HCPCS

## 2025-02-03 NOTE — PROGRESS NOTES
Anticoagulation Clinic Progress Note    Anticoagulation Summary  As of 2/3/2025      INR goal:  2.0-3.0   TTR:  0.0% (5 d)   INR used for dosing:  3.5 (2/3/2025)   Warfarin maintenance plan:  3.75 mg every day   Weekly warfarin total:  26.25 mg   Plan last modified:  Rivera Arana, PharmD (2/3/2025)   Next INR check:  2/10/2025   Target end date:  --    Indications    Acute saddle pulmonary embolism with acute cor pulmonale [I26.02]                 Anticoagulation Episode Summary       INR check location:  --    Preferred lab:  --    Send INR reminders to:   [facility]    Comments:  --          Anticoagulation Care Providers       Provider Role Specialty Phone number    KennyJennifer mckeon, EMILY Referring Nurse Practitioner [phone]            Clinic Interview:  Patient Findings     Negatives:  Signs/symptoms of thrombosis, Signs/symptoms of bleeding,   Laboratory test error suspected, Change in health, Change in alcohol use,   Change in activity, Upcoming invasive procedure, Emergency department   visit, Upcoming dental procedure, Missed doses, Extra doses, Change in   medications, Change in diet/appetite, Hospital admission, Bruising, Other   complaints      Clinical Outcomes     Negatives:  Major bleeding event, Thromboembolic event,   Anticoagulation-related hospital admission, Anticoagulation-related ED   visit, Anticoagulation-related fatality        INR History:      Latest Ref Rng & Units 1/17/2025     3:53 AM 1/18/2025     3:36 AM 1/19/2025     3:47 AM 1/24/2025     2:54 PM 1/27/2025     8:06 AM 1/28/2025     2:30 PM 2/3/2025    10:00 AM   Anticoagulation Monitoring   INR      4.19 3.2 3.5   INR Date      1/24/2025 1/28/2025 2/3/2025   INR Goal      2.0-3.0 2.0-3.0 2.0-3.0   Trend       Down Down   Last Week Total      37.5 mg 33.75 mg 30 mg   Next Week Total      52.5 mg 33.75 mg 22.5 mg   Sun      - 3.75 mg 3.75 mg   Mon      - 7.5 mg Hold (2/3)   Tue      - 3.75 mg (1/28) 3.75 mg   Wed       - 3.75 mg 3.75 mg   Thu      - 3.75 mg 3.75 mg   Fri      - 7.5 mg 3.75 mg   Sat      - 3.75 mg 3.75 mg   Historical INR 0.90 - 1.10 1.78  2.13  2.28  4.19       Visit Report     Report Report         Plan:  1. INR is Supratherapeutic today- see above in Anticoagulation Summary.  Will instruct Omar PERRIN Kenrick to Change their warfarin regimen (HOLD today, then decrease to 3.75 mg daily) - see above in Anticoagulation Summary.  2. Follow up in 1 week. Pending INR, may need to prescribe lower tablet strength of warfarin to allow for smaller dose adjustments.   3. Patient declines warfarin refills.  4. Verbal and written information provided. Patient expresses understanding and has no further questions at this time.    Rivera Arana, PharmD

## 2025-02-05 ENCOUNTER — APPOINTMENT (OUTPATIENT)
Dept: CARDIAC REHAB | Facility: HOSPITAL | Age: 80
End: 2025-02-05
Payer: MEDICARE

## 2025-02-06 ENCOUNTER — TREATMENT (OUTPATIENT)
Dept: CARDIAC REHAB | Facility: HOSPITAL | Age: 80
End: 2025-02-06
Payer: MEDICARE

## 2025-02-06 DIAGNOSIS — J45.20 MILD INTERMITTENT ASTHMA WITHOUT COMPLICATION: ICD-10-CM

## 2025-02-06 DIAGNOSIS — J45.909 ASTHMA, UNSPECIFIED ASTHMA SEVERITY, UNSPECIFIED WHETHER COMPLICATED, UNSPECIFIED WHETHER PERSISTENT: Primary | ICD-10-CM

## 2025-02-06 PROCEDURE — G0237 THERAPEUTIC PROCD STRG ENDUR: HCPCS

## 2025-02-06 PROCEDURE — G0238 OTH RESP PROC, INDIV: HCPCS

## 2025-02-07 RX ORDER — MONTELUKAST SODIUM 10 MG/1
10 TABLET ORAL DAILY
Qty: 90 TABLET | Refills: 0 | Status: SHIPPED | OUTPATIENT
Start: 2025-02-07

## 2025-02-07 RX ORDER — SILDENAFIL CITRATE 20 MG/1
TABLET ORAL
Qty: 50 TABLET | Refills: 0 | Status: SHIPPED | OUTPATIENT
Start: 2025-02-07

## 2025-02-10 ENCOUNTER — APPOINTMENT (OUTPATIENT)
Dept: CARDIAC REHAB | Facility: HOSPITAL | Age: 80
End: 2025-02-10
Payer: MEDICARE

## 2025-02-10 ENCOUNTER — ANTICOAGULATION VISIT (OUTPATIENT)
Dept: PHARMACY | Facility: HOSPITAL | Age: 80
End: 2025-02-10
Payer: MEDICARE

## 2025-02-10 DIAGNOSIS — I26.02 ACUTE SADDLE PULMONARY EMBOLISM WITH ACUTE COR PULMONALE: Primary | ICD-10-CM

## 2025-02-10 LAB
INR PPP: 1.1 (ref 0.91–1.09)
PROTHROMBIN TIME: 13.6 SECONDS (ref 10–13.8)

## 2025-02-10 PROCEDURE — G0463 HOSPITAL OUTPT CLINIC VISIT: HCPCS

## 2025-02-10 PROCEDURE — 85610 PROTHROMBIN TIME: CPT

## 2025-02-10 NOTE — PROGRESS NOTES
Anticoagulation Clinic Progress Note    Anticoagulation Summary  As of 2/10/2025      INR goal:  2.0-3.0   TTR:  23.5% (1.7 wk)   INR used for dosin.1 (2/10/2025)   Warfarin maintenance plan:  3.75 mg every day   Weekly warfarin total:  26.25 mg   Plan last modified:  Rivera Arana, PharmD (2/3/2025)   Next INR check:  2025   Target end date:  --    Indications    Acute saddle pulmonary embolism with acute cor pulmonale [I26.02]                 Anticoagulation Episode Summary       INR check location:  --    Preferred lab:  --    Send INR reminders to:   [facility]    Comments:  --          Anticoagulation Care Providers       Provider Role Specialty Phone number    [place]ennifer [email or web address], EMILY Referring Nurse Practitioner [phone]            Clinic Interview:  Patient Findings     Positives:  Missed doses, Change in medications    Negatives:  Signs/symptoms of thrombosis, Signs/symptoms of bleeding,   Laboratory test error suspected, Change in health, Change in alcohol use,   Change in activity, Upcoming invasive procedure, Emergency department   visit, Upcoming dental procedure, Extra doses, Change in diet/appetite,   Hospital admission, Bruising, Other complaints      Clinical Outcomes     Negatives:  Major bleeding event, Thromboembolic event,   Anticoagulation-related hospital admission, Anticoagulation-related ED   visit, Anticoagulation-related fatality        INR History:      Latest Ref Rng & Units 2025     3:36 AM 2025     3:47 AM 2025     2:54 PM 2025     8:06 AM 2025     2:30 PM 2/3/2025    10:00 AM 2/10/2025    10:15 AM   Anticoagulation Monitoring   INR     4.19 3.2 3.5 1.1   INR Date     2025 2025 2/3/2025 2/10/2025   INR Goal     2.0-3.0 2.0-3.0 2.0-3.0 2.0-3.0   Trend      Down Down Same   Last Week Total     37.5 mg 33.75 mg 30 mg 18.75 mg   Next Week Total     52.5 mg 33.75 mg 22.5 mg 30 mg   Sun     - 3.75 mg 3.75 mg 3.75 mg   Mon     -  7.5 mg Hold (2/3) 7.5 mg (2/10)   Tue     - 3.75 mg (1/28) 3.75 mg 3.75 mg   Wed     - 3.75 mg 3.75 mg 3.75 mg   Thu     - 3.75 mg 3.75 mg 3.75 mg   Fri     - 7.5 mg 3.75 mg 3.75 mg   Sat     - 3.75 mg 3.75 mg 3.75 mg   Historical INR 0.90 - 1.10 2.13  2.28  4.19        Visit Report    Report Report          Plan:  1. INR is Subtherapeutic today- see above in Anticoagulation Summary.   Will instruct Omar PERRIN Darnellmars to Increase their warfarin regimen- see above in Anticoagulation Summary.   More etoh super bowl, two doses of viagra, possible missed dose on Sunday. Still drinking his regular spinach smoothies. Boost to 7.5 mg then resume, rck 1 week   2. Follow up in 1 weeks  3. They have been instructed to call if any changes in medications, doses, concerns, etc. Patient expresses understanding and has no further questions at this time.    Keri Boyer [facility]

## 2025-02-11 ENCOUNTER — TREATMENT (OUTPATIENT)
Dept: CARDIAC REHAB | Facility: HOSPITAL | Age: 80
End: 2025-02-11
Payer: MEDICARE

## 2025-02-11 DIAGNOSIS — J45.909 ASTHMA, UNSPECIFIED ASTHMA SEVERITY, UNSPECIFIED WHETHER COMPLICATED, UNSPECIFIED WHETHER PERSISTENT: Primary | ICD-10-CM

## 2025-02-11 PROCEDURE — 94625 PHY/QHP OP PULM RHB W/O MNTR: CPT

## 2025-02-12 ENCOUNTER — APPOINTMENT (OUTPATIENT)
Dept: CARDIAC REHAB | Facility: HOSPITAL | Age: 80
End: 2025-02-12
Payer: MEDICARE

## 2025-02-13 ENCOUNTER — APPOINTMENT (OUTPATIENT)
Dept: CARDIAC REHAB | Facility: HOSPITAL | Age: 80
End: 2025-02-13
Payer: MEDICARE

## 2025-02-15 DIAGNOSIS — I10 PRIMARY HYPERTENSION: ICD-10-CM

## 2025-02-17 ENCOUNTER — APPOINTMENT (OUTPATIENT)
Dept: CARDIAC REHAB | Facility: HOSPITAL | Age: 80
End: 2025-02-17
Payer: MEDICARE

## 2025-02-17 ENCOUNTER — ANTICOAGULATION VISIT (OUTPATIENT)
Dept: PHARMACY | Facility: HOSPITAL | Age: 80
End: 2025-02-17
Payer: MEDICARE

## 2025-02-17 DIAGNOSIS — I26.02 ACUTE SADDLE PULMONARY EMBOLISM WITH ACUTE COR PULMONALE: Primary | ICD-10-CM

## 2025-02-17 LAB
INR PPP: 1.5 (ref 0.91–1.09)
PROTHROMBIN TIME: 18.2 SECONDS (ref 10–13.8)

## 2025-02-17 PROCEDURE — 85610 PROTHROMBIN TIME: CPT

## 2025-02-17 PROCEDURE — G0463 HOSPITAL OUTPT CLINIC VISIT: HCPCS

## 2025-02-17 RX ORDER — ENOXAPARIN SODIUM 100 MG/ML
80 INJECTION SUBCUTANEOUS EVERY 12 HOURS SCHEDULED
Qty: 4.8 ML | Refills: 0 | Status: SHIPPED | OUTPATIENT
Start: 2025-02-17

## 2025-02-17 RX ORDER — LOSARTAN POTASSIUM 50 MG/1
50 TABLET ORAL DAILY
Qty: 90 TABLET | Refills: 0 | Status: SHIPPED | OUTPATIENT
Start: 2025-02-17

## 2025-02-17 NOTE — PROGRESS NOTES
Anticoagulation Clinic Progress Note    Anticoagulation Summary  As of 2025      INR goal:  2.0-3.0   TTR:  15.0% (2.7 wk)   INR used for dosin.5 (2025)   Warfarin maintenance plan:  7.5 mg every Mon, Fri; 3.75 mg all other days   Weekly warfarin total:  33.75 mg   Plan last modified:  Rivera Arana, PharmD (2025)   Next INR check:  2025   Target end date:  --    Indications    Acute saddle pulmonary embolism with acute cor pulmonale [I26.02]                 Anticoagulation Episode Summary       INR check location:  --    Preferred lab:  --    Send INR reminders to:   [facility]    Comments:  --          Anticoagulation Care Providers       Provider Role Specialty Phone number    Jennifer Cox APRN Referring Nurse Practitioner [phone]            Clinic Interview:  Patient Findings     Negatives:  Signs/symptoms of thrombosis, Signs/symptoms of bleeding,   Laboratory test error suspected, Change in health, Change in alcohol use,   Change in activity, Upcoming invasive procedure, Emergency department   visit, Upcoming dental procedure, Missed doses, Extra doses, Change in   medications, Change in diet/appetite, Hospital admission, Bruising, Other   complaints      Clinical Outcomes     Negatives:  Major bleeding event, Thromboembolic event,   Anticoagulation-related hospital admission, Anticoagulation-related ED   visit, Anticoagulation-related fatality        INR History:      Latest Ref Rng & Units 2025     3:47 AM 2025     2:54 PM 2025     8:06 AM 2025     2:30 PM 2/3/2025    10:00 AM 2/10/2025    10:15 AM 2025    10:15 AM   Anticoagulation Monitoring   INR    4.19 3.2 3.5 1.1 1.5   INR Date    2025 2025 2/3/2025 2/10/2025 2025   INR Goal    2.0-3.0 2.0-3.0 2.0-3.0 2.0-3.0 2.0-3.0   Trend     Down Down Same Up   Last Week Total    37.5 mg 33.75 mg 30 mg 18.75 mg 30 mg   Next Week Total    52.5 mg 33.75 mg 22.5 mg 30 mg 37.5 mg    Sun    - 3.75 mg 3.75 mg 3.75 mg -   Mon    - 7.5 mg Hold (2/3) 7.5 mg (2/10) 7.5 mg   Tue    - 3.75 mg (1/28) 3.75 mg 3.75 mg 7.5 mg (2/18)   Wed    - 3.75 mg 3.75 mg 3.75 mg 3.75 mg   Thu    - 3.75 mg 3.75 mg 3.75 mg -   Fri    - 7.5 mg 3.75 mg 3.75 mg -   Sat    - 3.75 mg 3.75 mg 3.75 mg -   Historical INR 0.90 - 1.10 2.28  4.19         Visit Report   Report Report           Plan:  1. INR is Subtherapeutic today- see above in Anticoagulation Summary. PE ~1 month ago. INR persistently subtherapeutic.   Will instruct Omar PERRIN Kenrick to Change their warfarin regimen (7.5 mg today and tomorrow, 3.75 mg Wed) - see above in Anticoagulation Summary. INITIATE enoxaparin 80 mg every 12 hours (wt 80.7 kg; CrCl >30 mL/min). Reviewed/provided education handout relating to enoxaparin use/administration. He voiced comfort and confidence with its use.   2. Follow up in 3 days.  3. Patient declines warfarin refills.  4. Verbal and written information provided. Patient expresses understanding and has no further questions at this time.    Rivera Arana, PharmD

## 2025-02-18 ENCOUNTER — TREATMENT (OUTPATIENT)
Dept: CARDIAC REHAB | Facility: HOSPITAL | Age: 80
End: 2025-02-18
Payer: MEDICARE

## 2025-02-18 DIAGNOSIS — J45.909 ASTHMA, UNSPECIFIED ASTHMA SEVERITY, UNSPECIFIED WHETHER COMPLICATED, UNSPECIFIED WHETHER PERSISTENT: Primary | ICD-10-CM

## 2025-02-18 DIAGNOSIS — J43.8 OTHER EMPHYSEMA: ICD-10-CM

## 2025-02-18 PROCEDURE — 94626 PHY/QHP OP PULM RHB W/MNTR: CPT

## 2025-02-19 ENCOUNTER — APPOINTMENT (OUTPATIENT)
Dept: CARDIAC REHAB | Facility: HOSPITAL | Age: 80
End: 2025-02-19
Payer: MEDICARE

## 2025-02-20 ENCOUNTER — APPOINTMENT (OUTPATIENT)
Dept: CARDIAC REHAB | Facility: HOSPITAL | Age: 80
End: 2025-02-20
Payer: MEDICARE

## 2025-02-20 ENCOUNTER — ANTICOAGULATION VISIT (OUTPATIENT)
Dept: PHARMACY | Facility: HOSPITAL | Age: 80
End: 2025-02-20
Payer: MEDICARE

## 2025-02-20 DIAGNOSIS — I26.02 ACUTE SADDLE PULMONARY EMBOLISM WITH ACUTE COR PULMONALE: Primary | ICD-10-CM

## 2025-02-20 LAB
INR PPP: 2.2 (ref 0.91–1.09)
PROTHROMBIN TIME: 26.2 SECONDS (ref 10–13.8)

## 2025-02-20 PROCEDURE — 85610 PROTHROMBIN TIME: CPT

## 2025-02-20 PROCEDURE — G0463 HOSPITAL OUTPT CLINIC VISIT: HCPCS | Performed by: PHARMACIST

## 2025-02-20 NOTE — PROGRESS NOTES
Anticoagulation Clinic Progress Note    Anticoagulation Summary  As of 2025      INR goal:  2.0-3.0   TTR:  16.8% (3.1 wk)   INR used for dosin.2 (2025)   Warfarin maintenance plan:  7.5 mg every Mon, Fri; 3.75 mg all other days   Weekly warfarin total:  33.75 mg   No change documented:  Shayy Rausch   Plan last modified:  Rivera Arana, PharmD (2025)   Next INR check:  2025   Target end date:  --    Indications    Acute saddle pulmonary embolism with acute cor pulmonale [I26.02]                 Anticoagulation Episode Summary       INR check location:  --    Preferred lab:  --    Send INR reminders to:   ROHINI VELAZQUE[facility]    Comments:  --          Anticoagulation Care Providers       Provider Role Specialty Phone number    Kenny Jennifer PERRIN, EMILY Referring Nurse Practitioner [phone]            Clinic Interview:  Patient Findings     Negatives:  Signs/symptoms of thrombosis, Signs/symptoms of bleeding,   Laboratory test error suspected, Change in health, Change in alcohol use,   Change in activity, Upcoming invasive procedure, Emergency department   visit, Upcoming dental procedure, Missed doses, Extra doses, Change in   medications, Change in diet/appetite, Hospital admission, Bruising, Other   complaints      Clinical Outcomes     Negatives:  Major bleeding event, Thromboembolic event,   Anticoagulation-related hospital admission, Anticoagulation-related ED   visit, Anticoagulation-related fatality        INR History:      Latest Ref Rng & Units 2025     2:54 PM 2025     8:06 AM 2025     2:30 PM 2/3/2025    10:00 AM 2/10/2025    10:15 AM 2025    10:15 AM 2025    11:30 AM   Anticoagulation Monitoring   INR   4.19 3.2 3.5 1.1 1.5 2.2   INR Date   2025 2025 2/3/2025 2/10/2025 2025 2025   INR Goal   2.0-3.0 2.0-3.0 2.0-3.0 2.0-3.0 2.0-3.0 2.0-3.0   Trend    Down Down Same Up Same   Last Week Total   37.5 mg 33.75 mg 30 mg  18.75 mg 30 mg 33.75 mg   Next Week Total   52.5 mg 33.75 mg 22.5 mg 30 mg 37.5 mg 33.75 mg   Sun   - 3.75 mg 3.75 mg 3.75 mg - 3.75 mg   Mon   - 7.5 mg Hold (2/3) 7.5 mg (2/10) 7.5 mg -   Tue   - 3.75 mg (1/28) 3.75 mg 3.75 mg 7.5 mg (2/18) -   Wed   - 3.75 mg 3.75 mg 3.75 mg 3.75 mg -   Thu   - 3.75 mg 3.75 mg 3.75 mg - 3.75 mg   Fri   - 7.5 mg 3.75 mg 3.75 mg - 7.5 mg   Sat   - 3.75 mg 3.75 mg 3.75 mg - 3.75 mg   Historical INR 0.90 - 1.10 4.19          Visit Report  Report Report            Plan:  1. INR is therapeutic today- see above in Anticoagulation Summary.   Will instruct Omar Choudhary to STOP Lovenox and continue their warfarin regimen- see above in Anticoagulation Summary.  2. Follow up in 4 days.  3. Patient declines warfarin refills.  4. Verbal and written information provided. Patient expresses understanding and has no further questions at this time.    Shayy Rausch

## 2025-02-20 NOTE — PROGRESS NOTES
I have supervised and reviewed the notes, assessments, and/or procedures performed. I personally performed the assessment and implemented the plan. I concur with the documentation of this patient encounter.    Harsha Macias, BrunoD

## 2025-02-21 ENCOUNTER — TREATMENT (OUTPATIENT)
Dept: CARDIAC REHAB | Facility: HOSPITAL | Age: 80
End: 2025-02-21
Payer: MEDICARE

## 2025-02-21 DIAGNOSIS — J43.8 OTHER EMPHYSEMA: Primary | ICD-10-CM

## 2025-02-21 PROCEDURE — 94625 PHY/QHP OP PULM RHB W/O MNTR: CPT

## 2025-02-24 ENCOUNTER — ANTICOAGULATION VISIT (OUTPATIENT)
Dept: PHARMACY | Facility: HOSPITAL | Age: 80
End: 2025-02-24
Payer: MEDICARE

## 2025-02-24 ENCOUNTER — APPOINTMENT (OUTPATIENT)
Dept: CARDIAC REHAB | Facility: HOSPITAL | Age: 80
End: 2025-02-24
Payer: MEDICARE

## 2025-02-24 DIAGNOSIS — I26.02 ACUTE SADDLE PULMONARY EMBOLISM WITH ACUTE COR PULMONALE: Primary | ICD-10-CM

## 2025-02-24 LAB
INR PPP: 2.1 (ref 0.91–1.09)
PROTHROMBIN TIME: 25.2 SECONDS (ref 10–13.8)

## 2025-02-24 PROCEDURE — 85610 PROTHROMBIN TIME: CPT

## 2025-02-24 PROCEDURE — G0463 HOSPITAL OUTPT CLINIC VISIT: HCPCS

## 2025-02-24 RX ORDER — WARFARIN SODIUM 7.5 MG/1
TABLET ORAL
Qty: 60 TABLET | Refills: 0 | Status: SHIPPED | OUTPATIENT
Start: 2025-02-24

## 2025-02-24 NOTE — PROGRESS NOTES
Anticoagulation Clinic Progress Note    Anticoagulation Summary  As of 2025      INR goal:  2.0-3.0   TTR:  29.2% (3.7 wk)   INR used for dosin.1 (2025)   Warfarin maintenance plan:  7.5 mg every Mon, Fri; 3.75 mg all other days   Weekly warfarin total:  33.75 mg   No change documented:  Shayy Rausch   Plan last modified:  Rivera Arana, PharmD (2025)   Next INR check:  3/3/2025   Target end date:  --    Indications    Acute saddle pulmonary embolism with acute cor pulmonale [I26.02]                 Anticoagulation Episode Summary       INR check location:  --    Preferred lab:  --    Send INR reminders to:   ROHIIN VELAZQUEZ [facility]    Comments:  --          Anticoagulation Care Providers       Provider Role Specialty Phone number    Kenny EMILY Corado Referring Nurse Practitioner [phone]            Clinic Interview:  Patient Findings     Negatives:  Signs/symptoms of thrombosis, Signs/symptoms of bleeding,   Laboratory test error suspected, Change in health, Change in alcohol use,   Change in activity, Upcoming invasive procedure, Emergency department   visit, Upcoming dental procedure, Missed doses, Extra doses, Change in   medications, Change in diet/appetite, Hospital admission, Bruising, Other   complaints      Clinical Outcomes     Negatives:  Major bleeding event, Thromboembolic event,   Anticoagulation-related hospital admission, Anticoagulation-related ED   visit, Anticoagulation-related fatality        INR History:      2025     8:06 AM 2025     2:30 PM 2/3/2025    10:00 AM 2/10/2025    10:15 AM 2025    10:15 AM 2025    11:30 AM 2025    10:15 AM   Anticoagulation Monitoring   INR 4.19 3.2 3.5 1.1 1.5 2.2 2.1   INR Date 2025 2025 2/3/2025 2/10/2025 2025 2025 2025   INR Goal 2.0-3.0 2.0-3.0 2.0-3.0 2.0-3.0 2.0-3.0 2.0-3.0 2.0-3.0   Trend  Down Down Same Up Same Same   Last Week Total 37.5 mg 33.75 mg 30 mg 18.75 mg  30 mg 33.75 mg 37.5 mg   Next Week Total 52.5 mg 33.75 mg 22.5 mg 30 mg 37.5 mg 33.75 mg 33.75 mg   Sun - 3.75 mg 3.75 mg 3.75 mg - 3.75 mg 3.75 mg   Mon - 7.5 mg Hold (2/3) 7.5 mg (2/10) 7.5 mg - 7.5 mg   Tue - 3.75 mg (1/28) 3.75 mg 3.75 mg 7.5 mg (2/18) - 3.75 mg   Wed - 3.75 mg 3.75 mg 3.75 mg 3.75 mg - 3.75 mg   Thu - 3.75 mg 3.75 mg 3.75 mg - 3.75 mg 3.75 mg   Fri - 7.5 mg 3.75 mg 3.75 mg - 7.5 mg 7.5 mg   Sat - 3.75 mg 3.75 mg 3.75 mg - 3.75 mg 3.75 mg   Visit Report Report             Plan:  1. INR is therapeutic today- see above in Anticoagulation Summary.   Will instruct Omar AYDIN Choudhary to continue their warfarin regimen- see above in Anticoagulation Summary.  Patient has a subconjunctival hemorrhage. Denies any blurry vision or pain. Continue 7.5 mg MF, 3.75 mg AOD, rck 1 week   2. Follow up in 1 weeks.  3. Patient desires warfarin refills.  4. Verbal and written information provided. Patient expresses understanding and has no further questions at this time.    Shayy Rausch    I have supervised and reviewed the notes, assessments, and/or procedures performed. I personally performed the assessment and implemented the plan. I concur with the documentation of this patient encounter.    Keri Boyer, [facility]

## 2025-02-25 ENCOUNTER — TREATMENT (OUTPATIENT)
Dept: CARDIAC REHAB | Facility: HOSPITAL | Age: 80
End: 2025-02-25
Payer: MEDICARE

## 2025-02-25 ENCOUNTER — APPOINTMENT (OUTPATIENT)
Dept: CARDIAC REHAB | Facility: HOSPITAL | Age: 80
End: 2025-02-25
Payer: MEDICARE

## 2025-02-25 ENCOUNTER — TELEPHONE (OUTPATIENT)
Dept: FAMILY MEDICINE CLINIC | Facility: CLINIC | Age: 80
End: 2025-02-25

## 2025-02-25 DIAGNOSIS — M48.061 DEGENERATIVE LUMBAR SPINAL STENOSIS: ICD-10-CM

## 2025-02-25 DIAGNOSIS — J43.8 OTHER EMPHYSEMA: Primary | ICD-10-CM

## 2025-02-25 PROCEDURE — 94625 PHY/QHP OP PULM RHB W/O MNTR: CPT

## 2025-02-25 RX ORDER — GABAPENTIN 300 MG/1
CAPSULE ORAL
Qty: 120 CAPSULE | Refills: 0 | Status: SHIPPED | OUTPATIENT
Start: 2025-02-25

## 2025-02-25 NOTE — PROGRESS NOTES
HEMATOLOGY ONCOLOGY OUTPATIENT CONSULTATION       Patient name: Omar Choudhary  : 1945  MRN: [id number]  Primary Care Physician: Dennis Kwong MD  Referring Physician: Jerrod Lagunas MD  Reason For Consult:       History of Present Illness:  Patient is a 79 y.o. male who has been referred to us for further evaluation and management of recent PE. Pertinent history is as follows:      The patient was admitted to [facility] from 1/10/25-25 with saddle emboli with severe respiratory failure. He was admitted to the ICU and had thrombectomy in addition to a/c. He had high oxygen needs initially optiflow but gradually O2 needs improved and he is off oxygen and had overnight oximetry as well as 6 minute walk without any need for supplemental oxygen. He has been on coumadin and INR has been therapeutic for 2 days. He will discharge to home with  and discussed should have INR check in about 2 days to continue to monitor INR and coumadin dosing. Severe pulmonary hypertension seen on [facility] and will follow with Cardiology. He's needed a low dose of norco about BID for the stay due to resolving chest pain from PE. I wrote short rx for this to have available but d/w patient trying to move towards tylenol for pain control and to avoid NSAIDs.      He had planned on flying to [place] in a couple of weeks but was recommended he postpone that trip for the time being until followed up with Cardiology and cleared for flying/altitude.       25: Bilateral LE Venous Duplex US:      Acute right lower extremity deep vein thrombosis noted in the distal femoral, popliteal and peroneal.    Chronic right lower extremity superficial thrombophlebitis noted in the great saphenous (below knee).    All other veins appeared normal bilaterally.    1/10/25: CTA chest:  IMPRESSION:   Extensive pulmonary embolism/saddle embolus.  Increased RV LV ratio 1.2 suggesting right heart strain.    1/10/25:  Echocardiogram:    The right ventricular cavity is severely dilated. Severely reduced right ventricular systolic function noted.    Left ventricular systolic function is normal. Left ventricular ejection fraction appears to be 61 - 65%.    Left ventricular diastolic function is consistent with (grade I) impaired relaxation.    The aortic valve leaflets are moderately calcified (aortic sclerosis)      Past Medical History:  Late onset asthma        Family History:   Brother  of MI at age 46,  Father  at 55 of cardiac issues.    Subjective:  3/3/25: Patient presents for initial consultation. Denied any respiratory/chest symptoms presently.     Patient claimed being in very good health for most of his adult life., he was a Platelet donor for several years. Had a tibial compression fracture in , he is Undergoing outpatient rehab.    Reported that he has been uptodate with his age appropriate cancer screening, Had a colonoscopy: Diverticulosis. No polyps      Past Medical History:   Diagnosis Date    ADHD (attention deficit hyperactivity disorder)     Hard to focus on tasks and follow through.    Allergic     Arthritis     Asthma     Carpal tunnel syndrome     no pain    Cataract     Depression     Erectile dysfunction     2018 at 73 years old    Family history of malignant neoplasm of breast 2019    Hyperlipidemia 2019    Hypertension 2012    Leg pain, right     Low back pain     Neuropathy     feet    Osteopenia     Poor balance     Reactive airway disease 01/15/2016    Scoliosis 1960    Shortness of breath     Sleep apnea     Substance abuse 1966    Alcoholic       Past Surgical History:   Procedure Laterality Date    CARDIAC CATHETERIZATION      CARDIAC CATHETERIZATION N/A 2025    Procedure: Right Heart Cath;  Surgeon: Nancy Hdez MD;  Location: [facility] CATH INVASIVE LOCATION;  Service: Cardiovascular;  Laterality: N/A;    CARDIAC CATHETERIZATION  2025    Procedure:  Percutaneous Manual Thrombectomy;  Surgeon: Nancy Hdez MD;  Location:  [facility] CATH INVASIVE LOCATION;  Service: Cardiovascular;;    CARDIAC CATHETERIZATION Bilateral 1/11/2025    Procedure: Pulmonary angiography;  Surgeon: Nancy Hdez MD;  Location:  [facility] CATH INVASIVE LOCATION;  Service: Cardiovascular;  Laterality: Bilateral;    COLONOSCOPY  2013    No polyps, slight diverticulitis    EYE SURGERY  2016    KNEE ARTHROSCOPY W/ ACL RECONSTRUCTION Right 2019    LUMBAR FUSION Bilateral 10/05/2022    Procedure: DAY 2 LUMBAR LAMINECTOMY TRANSFORAMINAL LUMBAR INTERBODY FUSION L2,L3,L4 WITH NEURO ROBOT;  Surgeon: John Do MD;  Location: [facility] MAIN OR;  Service: Robotics - Neuro;  Laterality: Bilateral;    LUMBAR FUSION N/A 10/04/2022    Procedure: DAY 1 LUMBAR LATERAL INTERBODY FUSION WITH NEURO ROBOT L2, L3.L4;  Surgeon: John Do MD;  Location: [facility] MAIN OR;  Service: Robotics - Neuro;  Laterality: N/A;  left side approach    MOUTH SURGERY      SPINE SURGERY  10/4&5/2022    Dr. John Do, [facility] Neurosurgery group         Current Outpatient Medications:     acetaminophen (TYLENOL) 500 MG tablet, Take 1 tablet by mouth Every 6 (Six) Hours As Needed for Mild Pain., Disp: , Rfl:     albuterol sulfate  (90 Base) MCG/ACT inhaler, Inhale 2 puffs Every 4 (Four) Hours As Needed for Wheezing or Shortness of Air., Disp: 18 g, Rfl: 0    B Complex Vitamins (VITAMIN B COMPLEX) capsule capsule, Take 1 capsule by mouth Daily. LD 9-27, Disp: , Rfl:     budesonide-formoterol (SYMBICORT) 160-4.5 MCG/ACT inhaler, Inhale 2 puffs 2 (Two) Times a Day., Disp: 10.2 g, Rfl: 0    Calcium Carb-Cholecalciferol (Oyster Shell Calcium w/D) 500-5 MG-MCG tablet, TAKE TWO TABLETS BY MOUTH DAILY, Disp: 180 tablet, Rfl: 0    clonazePAM (KlonoPIN) 0.5 MG tablet, TAKE ONE TABLET BY MOUTH TWICE DAILY AS NEEDED FOR ANXIETY, Disp: 30 tablet, Rfl: 2    coenzyme Q10 100 MG capsule, Take 1 capsule by mouth Daily., Disp: ,  Rfl:     cyclobenzaprine (FLEXERIL) 10 MG tablet, Take 1 tablet by mouth 3 (Three) Times a Day As Needed for Muscle Spasms., Disp: 60 tablet, Rfl: 2    Enoxaparin Sodium (LOVENOX) 80 MG/0.8ML solution prefilled syringe syringe, Inject 0.8 mL under the skin into the appropriate area as directed Every 12 (Twelve) Hours., Disp: 4.8 mL, Rfl: 0    ferrous sulfate 325 (65 FE) MG tablet, Take 1 tablet by mouth Daily With Breakfast. Can cause constipation, Disp: 30 tablet, Rfl: 0    gabapentin (NEURONTIN) 300 MG capsule, TAKE 1 CAPSULE BY MOUTH IN THE MORING, 1 CAPSULE IN THE AFTERNOON AND 2 CAPSULES AT NIGHT (Patient taking differently: TAKE 1 CAPSULE BY MOUTH IN THE MORING, 1 CAPSULE IN THE AFTERNOON AND 1 CAPSULES AT NIGHT), Disp: 120 capsule, Rfl: 2    HYDROcodone-acetaminophen (NORCO) 5-325 MG per tablet, Take 1 tablet by mouth 2 (Two) Times a Day As Needed for Moderate Pain., Disp: 6 tablet, Rfl: 0    losartan (COZAAR) 50 MG tablet, TAKE 1 TABLET BY MOUTH DAILY, Disp: 90 tablet, Rfl: 0    montelukast (SINGULAIR) 10 MG tablet, TAKE 1 TABLET BY MOUTH ONCE DAILY, Disp: 90 tablet, Rfl: 0    Multiple Vitamins-Minerals (EMERGEN-C BLUE PO), Take 1 tablet by mouth Daily. LD 9-27, Disp: , Rfl:     naloxone (NARCAN) 4 MG/0.1ML nasal spray, Call 911. Don't prime. Everly in 1 nostril for overdose. Repeat in 2-3 minutes in other nostril if no or minimal breathing/responsiveness., Disp: 2 each, Rfl: 0    promethazine-dextromethorphan (PROMETHAZINE-DM) 6.25-15 MG/5ML syrup, Take 5 mL by mouth., Disp: , Rfl:     S-Adenosylmethionine (YURY-e) 400 MG tablet, Take 400 mg by mouth Daily., Disp: , Rfl:     sildenafil (REVATIO) 20 MG tablet, take 1 to 2 tablets by mouth as needed for erictile dysfunction, Disp: 50 tablet, Rfl: 0    theophylline (THEODUR) 300 MG 12 hr tablet, Take 1 tablet by mouth Daily., Disp: 90 tablet, Rfl: 3    traZODone (DESYREL) 50 MG tablet, Take 1-2 tablets by mouth Every Night., Disp: 180 tablet, Rfl: 3     triamcinolone (KENALOG) 0.025 % cream, Apply 1 Application topically to the appropriate area as directed 2 (Two) Times a Day., Disp: , Rfl:     Turmeric 400 MG capsule, Take 1 capsule by mouth Daily., Disp: , Rfl:     warfarin (COUMADIN) 7.5 MG tablet, Take one tablet by mouth on Mon and Fri and take one-half of a tablet by mouth all other days or as directed  Indications: DVT/PE (active thrombosis), Disp: 60 tablet, Rfl: 0    Current Facility-Administered Medications:     Tezepelumab-ekko solution auto-injector 210 mg, 210 mg, Subcutaneous, Q30 Days, Draw, MD Jerrod    Allergies   Allergen Reactions    Statins Myalgia     Other reaction(s): muscle soreness       Family History   Problem Relation Age of Onset    Heart disease Mother     Hypertension Mother     Breast cancer Mother     Stroke Mother         Several TIAs    Alcohol abuse Mother     Thyroid disease Mother     Arthritis Mother     Cancer Mother         Breast    Aortic aneurysm Father     Heart attack Father     Liver cancer Father     Cancer Father         Liver    Heart disease Father         Heart attack    Early death Brother         Coronary thrombosis @ 48 years while mountain climbing.       Cancer-related family history includes Breast cancer in his mother; Cancer in his father and mother; Liver cancer in his father.      Social History     Tobacco Use    Smoking status: Former     Current packs/day: 0.00     Average packs/day: 0.5 packs/day for 26.0 years (13.0 ttl pk-yrs)     Types: Cigarettes     Start date: 1966     Quit date: 1992     Years since quittin.1     Passive exposure: Past    Smokeless tobacco: Never   Vaping Use    Vaping status: Never Used   Substance Use Topics    Alcohol use: Not Currently     Alcohol/week: 1.0 standard drink of alcohol     Types: 1 Glasses of wine per week    Drug use: Not Currently     Types: Marijuana     Comment: CBD gummies- stop now for surgery     Social History     Social History Narrative  "   Not on file       ROS:   Review of Systems   Constitutional: Negative.    HENT: Negative.     Eyes: Negative.    Respiratory: Negative.     Cardiovascular: Negative.    Gastrointestinal: Negative.    Endocrine: Negative.    Genitourinary: Negative.    Musculoskeletal: Negative.    Skin: Negative.    Allergic/Immunologic: Negative.    Neurological: Negative.    Hematological: Negative.    Psychiatric/Behavioral: Negative.           Objective:    Vital Signs:  Vitals:    03/03/25 1129   BP: (!) 177/103   Pulse: 74   SpO2: 93%   Weight: 83.3 kg (183 lb 9.6 oz)   Height: 167.6 cm (66\")   PainSc: 0-No pain     Body mass index is 29.63 kg/m².    ECOG  (1) Restricted in physically strenuous activity, ambulatory and able to do work of light nature    Physical Exam:   Physical Exam  Constitutional:       Appearance: Normal appearance. He is normal weight.   HENT:      Head: Normocephalic and atraumatic.      Right Ear: External ear normal.      Left Ear: External ear normal.      Nose: Nose normal.      Mouth/Throat:      Mouth: Mucous membranes are moist.      Pharynx: Oropharynx is clear.   Eyes:      Extraocular Movements: Extraocular movements intact.      Conjunctiva/sclera: Conjunctivae normal.      Pupils: Pupils are equal, round, and reactive to light.   Cardiovascular:      Rate and Rhythm: Normal rate.      Pulses: Normal pulses.   Pulmonary:      Effort: Pulmonary effort is normal.   Abdominal:      General: Abdomen is flat.      Palpations: Abdomen is soft.   Musculoskeletal:         General: Normal range of motion.      Cervical back: Normal range of motion and neck supple.   Skin:     General: Skin is warm.   Neurological:      Mental Status: He is alert.   Psychiatric:         Mood and Affect: Mood normal.         Behavior: Behavior normal.         Thought Content: Thought content normal.         Judgment: Judgment normal.         Lab Results - Last 18 Months   Lab Units 01/19/25  0347 01/18/25  0336 " "01/17/25  0353   WBC 10*3/mm3 5.85 6.02 5.79   HEMOGLOBIN g/dL 10.3* 9.7* 9.5*   HEMATOCRIT % 30.0* 31.2* 29.7*   PLATELETS 10*3/mm3 196 182 159   MCV fL 94.0 96.9 96.1     Lab Results - Last 18 Months   Lab Units 01/18/25  0336 01/17/25  0353 01/16/25  0418 01/10/25  1638 04/18/24  1313   SODIUM mmol/L 136 135* 134*   < > 141   POTASSIUM mmol/L 4.1 4.1 4.1   < > 4.0   CHLORIDE mmol/L 104 102 102   < > 104   CO2 mmol/L 24.0 26.0 23.6   < > 22.0   BUN mg/dL 15 15 14   < > 14   CREATININE mg/dL 0.59* 0.75* 0.70*   < > 0.88   CALCIUM mg/dL 8.0* 8.1* 8.0*   < > 9.2   BILIRUBIN mg/dL  --   --   --   --  0.9   ALK PHOS U/L  --   --   --   --  59   ALT (SGPT) U/L  --   --   --   --  22   AST (SGOT) U/L  --   --   --   --  24   GLUCOSE mg/dL 86 88 93   < > 86    < > = values in this interval not displayed.       Lab Results   Component Value Date    GLUCOSE 86 01/18/2025    BUN 15 01/18/2025    CREATININE 0.59 (L) 01/18/2025    EGFRIFNONA 69 11/24/2021    BCR 25.4 (H) 01/18/2025    K 4.1 01/18/2025    CO2 24.0 01/18/2025    CALCIUM 8.0 (L) 01/18/2025    ALBUMIN 4.5 04/18/2024    AST 24 04/18/2024    ALT 22 04/18/2024       Lab Results - Last 18 Months   Lab Units 02/24/25  1022 02/20/25  1142 02/17/25  1030 01/14/25  0357 01/13/25  0330 01/12/25  1726 01/12/25  1003   INR  2.1* 2.2* 1.5*   < >  --   --   --    APTT seconds  --   --   --   --  35.9* 153.3* 53.4*    < > = values in this interval not displayed.       Lab Results   Component Value Date    IRON 51 (L) 01/14/2025    TIBC 267 (L) 01/14/2025    FERRITIN 225.00 01/14/2025       Lab Results   Component Value Date    FOLATE 13.10 01/16/2025       No results found for: \"OCCULTBLD\"    Lab Results   Component Value Date    RETICCTPCT 3.10 (H) 01/15/2025     Lab Results   Component Value Date    NJULCQGP09 434 01/16/2025     No results found for: \"SPEP\", \"UPEP\"  No results found for: \"LDH\", \"URICACID\"  No results found for: \"SUZANNE\", \"RF\", \"SEDRATE\"  No results found for: " "\"FIBRINOGEN\", \"HAPTOGLOBIN\"  Lab Results   Component Value Date    PTT 35.9 (H) 01/13/2025    INR 2.1 (H) 02/24/2025     No results found for: \"\"  No results found for: \"CEA\"  No components found for: \"CA-19-9\"  Lab Results   Component Value Date    PSA 0.499 04/18/2024     No results found for: \"SEDRATE\"       Assessment & Plan     Saddle Pulmonary Embolism:  Right LE Extensive DVT:  -recent imaging and hospitalization records were reviewed as above.  CT angiogram chest consistent with a large saddle pulmonary embolism.  -No clear risk factors were identified, however relative immobilization following tibial fracture and recent road trip for 5 hours could be weak risk factors. patient denied any recent acute illness.  -Lower extremity venous duplex ultrasound was reported positive for Extensive DVT involving RLE proximal and distal veins.  -Discussed with patient/family about management of pulmonary embolism and evaluation for risk factors  Given no clear provoking factors,  he can be considered for thrombophilia workup as this may  have implications for testing for family members. Patient is agreeable to it.  Order thrombophilia workup today  -Given Saddle PE and extensive DVT, likely candidate for indefinite anticoagulation.   -currently on Warfarin for long term anticoagulation. Denied any bleeding issues presently.  -Hold off on any elective procedures for 6 months.    Right heart Strain: noted during hospital admission. Pt is following with Cardiology, planned for repeat Echo this week. Will defer to Cardiology regarding clearance for travel.      4 week follow up with lab results. Sooner as needed.      Thank you very much for providing the opportunity to participate in this patient’s care. Please do not hesitate to call if there are any other questions.    "

## 2025-02-25 NOTE — Clinical Note
Jordin is here for his Subsequent Wellness visit. Last Medicare Wellness visit information reviewed, patient interviewed and updates made to the record today.      Health Risk Assessment:   Patient rates overall health as good. Patient feels that their physical health rating is same. Patient is satisfied with their life. Eyesight was rated as same. Hearing was rated as same. Patient feels that their emotional and mental health rating is same. Patients states they are sometimes angry. Patient states they are sometimes unusually tired/fatigued. Pain experienced in the last 7 days has been some. Patient's pain rating has been 1/10. Patient states that he has experienced no weight loss or gain in last 6 months.     Depression Screening:   PHQ-2 Score: 0      Fall Risk Screening:   In the past year, patient has experienced: no history of falling in past year      Home Safety:  Patient does not have trouble with stairs inside or outside of their home. Patient has working smoke alarms and has working carbon monoxide detector. Home safety hazards include: none.     Nutrition:   Current diet is Regular.     Medications:   Patient is currently taking over-the-counter supplements. OTC medications include: see medication list. Patient is able to manage medications.     Activities of Daily Living (ADLs)/Instrumental Activities of Daily Living (IADLs):   Walk and transfer into and out of bed and chair?: Yes  Dress and groom yourself?: Yes    Bathe or shower yourself?: Yes    Feed yourself? Yes  Do your laundry/housekeeping?: Yes  Manage your money, pay your bills and track your expenses?: Yes  Make your own meals?: Yes    Do your own shopping?: Yes    Previous Hospitalizations:   Any hospitalizations or ED visits within the last 12 months?: Yes    How many hospitalizations have you had in the last year?: 1-2    Advance Care Planning:   Living will: Yes    Durable POA for healthcare: Yes    Advanced directive: Yes    End of Life  removed. Decisions reviewed with patient: No      Cognitive Screening:   Provider or family/friend/caregiver concerned regarding cognition?: No    PREVENTIVE SCREENINGS      Cardiovascular Screening:    General: Screening Not Indicated and History Lipid Disorder      Diabetes Screening:     General: Screening Current      Colorectal Cancer Screening:     General: Screening Current      Prostate Cancer Screening:    General: Screening Current      Osteoporosis Screening:    General: Screening Not Indicated      Abdominal Aortic Aneurysm (AAA) Screening:    Risk factors include: age between 65-76 yo and tobacco use        General: Screening Not Indicated      Lung Cancer Screening:     General: Screening Not Indicated      Hepatitis C Screening:    General: Screening Current    Hep C Screening Accepted: No     Screening, Brief Intervention, and Referral to Treatment (SBIRT)     Screening  Typical number of drinks in a day: 1  Typical number of drinks in a week: 1  Interpretation: Low risk drinking behavior.    Single Item Drug Screening:  How often have you used an illegal drug (including marijuana) or a prescription medication for non-medical reasons in the past year? never    Single Item Drug Screen Score: 0  Interpretation: Negative screen for possible drug use disorder    Other Counseling Topics:   Car/seat belt/driving safety and regular weightbearing exercise.

## 2025-02-25 NOTE — TELEPHONE ENCOUNTER
"  Caller: Omar Choudhary \"Mendez\"    Relationship: Self    Best call back number:     Omar Choudhary \"Mendez\" (Self) [phone] (Mobile)       What was the call regarding: PATIENT IS WANTING TO SEE IF YOU COULD SCHEDULE HIM WITH DR GASPAR FOR AN OFFICE VISIT     WANTS TO DISCUSS PHYSICAL THERAPY OPTIONS AND ALSO HAVING ISSUES WITH HIS FOOT     THE SOONEST IS MARCH 31ST AND WOULD LIKE SOONER     Is it okay if the provider responds through [employer]: CALL     "

## 2025-02-26 ENCOUNTER — APPOINTMENT (OUTPATIENT)
Dept: CARDIAC REHAB | Facility: HOSPITAL | Age: 80
End: 2025-02-26
Payer: MEDICARE

## 2025-02-26 NOTE — TELEPHONE ENCOUNTER
"    Caller: Omar Choudhary \"Mendez\"    Relationship to patient: Self    Best call back number: [phone]    Patient is needing: PATIENT WOULD LIKE A CALL BACK ABOUT THIS AND HIS REFILL REQUEST ON HIS GABAPENTIN.     PATIENT STATED HE IF HE NEED TO BE SEEN JUST LET HIM KNOW.       PLEASE ADVISE      "

## 2025-02-27 ENCOUNTER — APPOINTMENT (OUTPATIENT)
Dept: CARDIAC REHAB | Facility: HOSPITAL | Age: 80
End: 2025-02-27
Payer: MEDICARE

## 2025-02-27 NOTE — Clinical Note
Problem: Chronic Conditions and Co-morbidities  Goal: Patient's chronic conditions and co-morbidity symptoms are monitored and maintained or improved  2/27/2025 0419 by Iliana Jacobs RN  Outcome: Progressing  Flowsheets (Taken 2/27/2025 0419)  Care Plan - Patient's Chronic Conditions and Co-Morbidity Symptoms are Monitored and Maintained or Improved:   Monitor and assess patient's chronic conditions and comorbid symptoms for stability, deterioration, or improvement   Collaborate with multidisciplinary team to address chronic and comorbid conditions and prevent exacerbation or deterioration   Update acute care plan with appropriate goals if chronic or comorbid symptoms are exacerbated and prevent overall improvement and discharge     Problem: Discharge Planning  Goal: Discharge to home or other facility with appropriate resources  2/27/2025 0419 by Iliana Jacobs RN  Outcome: Progressing  Flowsheets (Taken 2/27/2025 0419)  Discharge to home or other facility with appropriate resources: Identify barriers to discharge with patient and caregiver     Problem: Pain  Goal: Verbalizes/displays adequate comfort level or baseline comfort level  2/27/2025 0419 by Iliana Jacobs RN  Outcome: Progressing  Flowsheets (Taken 2/27/2025 0419)  Verbalizes/displays adequate comfort level or baseline comfort level:   Encourage patient to monitor pain and request assistance   Administer analgesics based on type and severity of pain and evaluate response   Assess pain using appropriate pain scale     Problem: Safety - Adult  Goal: Free from fall injury  2/27/2025 0419 by Iliana Jacobs RN  Outcome: Progressing  Flowsheets (Taken 2/27/2025 0419)  Free From Fall Injury: Instruct family/caregiver on patient safety      removed.

## 2025-02-28 ENCOUNTER — TELEPHONE (OUTPATIENT)
Dept: FAMILY MEDICINE CLINIC | Facility: CLINIC | Age: 80
End: 2025-02-28
Payer: MEDICARE

## 2025-02-28 ENCOUNTER — TREATMENT (OUTPATIENT)
Dept: CARDIAC REHAB | Facility: HOSPITAL | Age: 80
End: 2025-02-28
Payer: MEDICARE

## 2025-02-28 DIAGNOSIS — J43.8 OTHER EMPHYSEMA: Primary | ICD-10-CM

## 2025-02-28 DIAGNOSIS — J45.909 ASTHMA, UNSPECIFIED ASTHMA SEVERITY, UNSPECIFIED WHETHER COMPLICATED, UNSPECIFIED WHETHER PERSISTENT: ICD-10-CM

## 2025-02-28 PROCEDURE — 94625 PHY/QHP OP PULM RHB W/O MNTR: CPT

## 2025-03-03 ENCOUNTER — ANTICOAGULATION VISIT (OUTPATIENT)
Dept: PHARMACY | Facility: HOSPITAL | Age: 80
End: 2025-03-03
Payer: MEDICARE

## 2025-03-03 ENCOUNTER — APPOINTMENT (OUTPATIENT)
Dept: CARDIAC REHAB | Facility: HOSPITAL | Age: 80
End: 2025-03-03
Payer: MEDICARE

## 2025-03-03 ENCOUNTER — LAB (OUTPATIENT)
Dept: LAB | Facility: HOSPITAL | Age: 80
End: 2025-03-03
Payer: MEDICARE

## 2025-03-03 ENCOUNTER — CONSULT (OUTPATIENT)
Dept: ONCOLOGY | Facility: CLINIC | Age: 80
End: 2025-03-03
Payer: MEDICARE

## 2025-03-03 VITALS
SYSTOLIC BLOOD PRESSURE: 177 MMHG | HEART RATE: 74 BPM | OXYGEN SATURATION: 93 % | BODY MASS INDEX: 29.51 KG/M2 | DIASTOLIC BLOOD PRESSURE: 103 MMHG | WEIGHT: 183.6 LBS | HEIGHT: 66 IN

## 2025-03-03 DIAGNOSIS — I26.09 OTHER ACUTE PULMONARY EMBOLISM WITH ACUTE COR PULMONALE: ICD-10-CM

## 2025-03-03 DIAGNOSIS — I26.09 OTHER ACUTE PULMONARY EMBOLISM WITH ACUTE COR PULMONALE: Primary | ICD-10-CM

## 2025-03-03 DIAGNOSIS — I26.02 ACUTE SADDLE PULMONARY EMBOLISM WITH ACUTE COR PULMONALE: Primary | ICD-10-CM

## 2025-03-03 DIAGNOSIS — I82.4Y1 ACUTE DEEP VEIN THROMBOSIS (DVT) OF PROXIMAL VEIN OF RIGHT LOWER EXTREMITY: ICD-10-CM

## 2025-03-03 LAB
BASOPHILS # BLD AUTO: 0.02 10*3/MM3 (ref 0–0.2)
BASOPHILS NFR BLD AUTO: 0.5 % (ref 0–1.5)
D DIMER PPP FEU-MCNC: 0.7 MCGFEU/ML (ref 0–0.77)
DEPRECATED RDW RBC AUTO: 50.5 FL (ref 37–54)
EOSINOPHIL # BLD AUTO: 0.03 10*3/MM3 (ref 0–0.4)
EOSINOPHIL NFR BLD AUTO: 0.7 % (ref 0.3–6.2)
ERYTHROCYTE [DISTWIDTH] IN BLOOD BY AUTOMATED COUNT: 13.8 % (ref 12.3–15.4)
HCT VFR BLD AUTO: 48.4 % (ref 37.5–51)
HGB BLD-MCNC: 15.4 G/DL (ref 13–17.7)
INR PPP: 1.4 (ref 0.91–1.09)
LYMPHOCYTES # BLD AUTO: 0.94 10*3/MM3 (ref 0.7–3.1)
LYMPHOCYTES NFR BLD AUTO: 22.3 % (ref 19.6–45.3)
MCH RBC QN AUTO: 32 PG (ref 26.6–33)
MCHC RBC AUTO-ENTMCNC: 31.8 G/DL (ref 31.5–35.7)
MCV RBC AUTO: 100.6 FL (ref 79–97)
MONOCYTES # BLD AUTO: 0.51 10*3/MM3 (ref 0.1–0.9)
MONOCYTES NFR BLD AUTO: 12.1 % (ref 5–12)
NEUTROPHILS NFR BLD AUTO: 2.71 10*3/MM3 (ref 1.7–7)
NEUTROPHILS NFR BLD AUTO: 64.4 % (ref 42.7–76)
PLATELET # BLD AUTO: 177 10*3/MM3 (ref 140–450)
PMV BLD AUTO: 10.2 FL (ref 6–12)
PROTHROMBIN TIME: 16.4 SECONDS (ref 10–13.8)
RBC # BLD AUTO: 4.81 10*6/MM3 (ref 4.14–5.8)
WBC NRBC COR # BLD AUTO: 4.21 10*3/MM3 (ref 3.4–10.8)

## 2025-03-03 PROCEDURE — 81240 F2 GENE: CPT | Performed by: STUDENT IN AN ORGANIZED HEALTH CARE EDUCATION/TRAINING PROGRAM

## 2025-03-03 PROCEDURE — 85379 FIBRIN DEGRADATION QUANT: CPT | Performed by: STUDENT IN AN ORGANIZED HEALTH CARE EDUCATION/TRAINING PROGRAM

## 2025-03-03 PROCEDURE — 85610 PROTHROMBIN TIME: CPT

## 2025-03-03 PROCEDURE — 36415 COLL VENOUS BLD VENIPUNCTURE: CPT

## 2025-03-03 PROCEDURE — 81241 F5 GENE: CPT | Performed by: STUDENT IN AN ORGANIZED HEALTH CARE EDUCATION/TRAINING PROGRAM

## 2025-03-03 PROCEDURE — G0463 HOSPITAL OUTPT CLINIC VISIT: HCPCS

## 2025-03-03 PROCEDURE — 85303 CLOT INHIBIT PROT C ACTIVITY: CPT | Performed by: STUDENT IN AN ORGANIZED HEALTH CARE EDUCATION/TRAINING PROGRAM

## 2025-03-03 PROCEDURE — 85300 ANTITHROMBIN III ACTIVITY: CPT | Performed by: STUDENT IN AN ORGANIZED HEALTH CARE EDUCATION/TRAINING PROGRAM

## 2025-03-03 PROCEDURE — 85025 COMPLETE CBC W/AUTO DIFF WBC: CPT

## 2025-03-03 NOTE — PROGRESS NOTES
Anticoagulation Clinic Progress Note    Anticoagulation Summary  As of 3/3/2025      INR goal:  2.0-3.0   TTR:  26.1% (1.1 mo)   INR used for dosin.4 (3/3/2025)   Warfarin maintenance plan:  7.5 mg every Mon, Wed, Fri; 3.75 mg all other days   Weekly warfarin total:  37.5 mg   Plan last modified:  Keri Boyer RPH (3/3/2025)   Next INR check:  3/11/2025   Target end date:  --    Indications    Acute saddle pulmonary embolism with acute cor pulmonale [I26.02]                 Anticoagulation Episode Summary       INR check location:  --    Preferred lab:  --    Send INR reminders to:   [facility] [place]    Comments:  --          Anticoagulation Care Providers       Provider Role Specialty Phone number    Kenny EMILY Corado Referring Nurse Practitioner [phone]            Clinic Interview:  Patient Findings     Negatives:  Signs/symptoms of thrombosis, Signs/symptoms of bleeding,   Laboratory test error suspected, Change in health, Change in alcohol use,   Change in activity, Upcoming invasive procedure, Emergency department   visit, Upcoming dental procedure, Missed doses, Extra doses, Change in   medications, Change in diet/appetite, Hospital admission, Bruising, Other   complaints    Comments:  INR=1.4. Per pt no changes. Instructed pt to take 11.25 mg   booster dose today, then change dosing regimen to 7.5 mg MoWeFr, 3.75 mg   AODs. Recheck in 1.5 weeks.       Clinical Outcomes     Negatives:  Major bleeding event, Thromboembolic event,   Anticoagulation-related hospital admission, Anticoagulation-related ED   visit, Anticoagulation-related fatality    Comments:  INR=1.4. Per pt no changes. Instructed pt to take 11.25 mg   booster dose today, then change dosing regimen to 7.5 mg MoWeFr, 3.75 mg   AODs. Recheck in 1.5 weeks.         INR History:      2025     2:30 PM 2/3/2025    10:00 AM 2/10/2025    10:15 AM 2025    10:15 AM 2025    11:30 AM 2025    10:15 AM 3/3/2025     10:15 AM   Anticoagulation Monitoring   INR 3.2 3.5 1.1 1.5 2.2 2.1 1.4   INR Date 1/28/2025 2/3/2025 2/10/2025 2/17/2025 2/20/2025 2/24/2025 3/3/2025   INR Goal 2.0-3.0 2.0-3.0 2.0-3.0 2.0-3.0 2.0-3.0 2.0-3.0 2.0-3.0   Trend Down Down Same Up Same Same Up   Last Week Total 33.75 mg 30 mg 18.75 mg 30 mg 33.75 mg 37.5 mg 33.75 mg   Next Week Total 33.75 mg 22.5 mg 30 mg 37.5 mg 33.75 mg 33.75 mg 41.25 mg   Sun 3.75 mg 3.75 mg 3.75 mg - 3.75 mg 3.75 mg 3.75 mg   Mon 7.5 mg Hold (2/3) 7.5 mg (2/10) 7.5 mg - 7.5 mg 11.25 mg (3/3); Otherwise 7.5 mg   Tue 3.75 mg (1/28) 3.75 mg 3.75 mg 7.5 mg (2/18) - 3.75 mg 3.75 mg   Wed 3.75 mg 3.75 mg 3.75 mg 3.75 mg - 3.75 mg 7.5 mg   Thu 3.75 mg 3.75 mg 3.75 mg - 3.75 mg 3.75 mg 3.75 mg   Fri 7.5 mg 3.75 mg 3.75 mg - 7.5 mg 7.5 mg 7.5 mg   Sat 3.75 mg 3.75 mg 3.75 mg - 3.75 mg 3.75 mg 3.75 mg       Plan:  1. INR is Subtherapeutic today- see above in Anticoagulation Summary.  Will instruct Omar AYDIN Choudhary to take 11.25 mg booster dose today, then Change their warfarin regimen to 7.5 mg MoWeFr, 3.75 mg AODs - see above in Anticoagulation Summary.  2. Follow up in 1.5 weeks  3. Patient declines warfarin refills.  4. Verbal and written information provided. Patient expresses understanding and has no further questions at this time.    Tristen GutierrezOgden, Pharmacy Intern

## 2025-03-03 NOTE — PROGRESS NOTES
I have supervised and reviewed the notes, assessments, and/or procedures performed. The documented assessment and plan were developed cooperatively, and the plan was implemented in my presence. I concur with the documentation of this patient encounter.    Keri Boyer, [facility]

## 2025-03-04 ENCOUNTER — TREATMENT (OUTPATIENT)
Dept: CARDIAC REHAB | Facility: HOSPITAL | Age: 80
End: 2025-03-04
Payer: MEDICARE

## 2025-03-04 ENCOUNTER — APPOINTMENT (OUTPATIENT)
Dept: CARDIAC REHAB | Facility: HOSPITAL | Age: 80
End: 2025-03-04
Payer: MEDICARE

## 2025-03-04 ENCOUNTER — PATIENT ROUNDING (BHMG ONLY) (OUTPATIENT)
Dept: ONCOLOGY | Facility: CLINIC | Age: 80
End: 2025-03-04
Payer: MEDICARE

## 2025-03-04 DIAGNOSIS — J43.8 OTHER EMPHYSEMA: Primary | ICD-10-CM

## 2025-03-04 LAB
B2 GLYCOPROT1 IGA SER-ACNC: <9 GPI IGA UNITS (ref 0–25)
B2 GLYCOPROT1 IGG SER-ACNC: <9 GPI IGG UNITS (ref 0–20)
B2 GLYCOPROT1 IGM SER-ACNC: <9 GPI IGM UNITS (ref 0–32)

## 2025-03-04 PROCEDURE — 94625 PHY/QHP OP PULM RHB W/O MNTR: CPT

## 2025-03-04 NOTE — PROGRESS NOTES
March 4, 2025    Hello, may I speak with Omar Choudhary?    My name is Zaida      I am  with [facility]  [address] [place] [address] [phone].    Before we get started may I verify your date of birth? 1945    I am calling to officially welcome you to our practice and ask about your recent visit. Is this a good time to talk? no    Tell me about your visit with us. What things went well?  A My Chart message was sent to the patient.         We're always looking for ways to make our patients' experiences even better. Do you have recommendations on ways we may improve?  no    Overall were you satisfied with your first visit to our practice? yes       I appreciate you taking the time to speak with me today. Is there anything else I can do for you? no      Thank you, and have a great day.

## 2025-03-05 ENCOUNTER — APPOINTMENT (OUTPATIENT)
Dept: CARDIAC REHAB | Facility: HOSPITAL | Age: 80
End: 2025-03-05
Payer: MEDICARE

## 2025-03-05 LAB
APTT SCREEN TO CONFIRM RATIO: 0.92 RATIO (ref 0–1.34)
AT III PPP CHRO-ACNC: 116 % (ref 90–134)
CONFIRM APTT/NORMAL: 44.3 SEC (ref 0–47.6)
F5 GENE MUT ANL BLD/T: NORMAL
FACTOR II, DNA ANALYSIS: NORMAL
LA 2 SCREEN W REFLEX-IMP: NORMAL
PROT C ACT/NOR PPP: 80 % (ref 86–163)
PROT S ACT/NOR PPP: 55 % (ref 63–140)
SCREEN APTT: 39.9 SEC (ref 0–43.5)
SCREEN DRVVT: 39.6 SEC (ref 0–47)
THROMBIN TIME: 17.7 SEC (ref 0–23)

## 2025-03-05 NOTE — PROGRESS NOTES
Subjective:     Encounter Date:03/06/2025      Patient ID: Omar Choudhary is a 79 y.o. male.    Chief Complaint:  History of Present Illness    This is a 79-year-old with asthma, paralyzed hemidiaphragm, osteoarthritis, hypertension, bilateral pulmonary embolism status post bilateral pulmonary thrombectomy, right lower extremity DVT, severe pulmonary hypertension, obstructive sleep apnea on BiPAP, who presents for follow-up.    I saw the patient initially in 1/2025 when he presented to the hospital with complaints of worsening shortness of breath.  He was noted to be hypoxic with saturations in the 50s on EMS arrival.  He was placed on BiPAP and admitted to the ICU.  Hemodynamics are stable.  CT scan showed bilateral pulmonary embolism with evidence of saddle embolism and elevated RV to LV ratio.  Echocardiogram confirmed findings of severe right ventricular enlargement and dysfunction.  Recommended proceeding with thrombectomy.  This was performed on 1/11/2025.  He was noted to have severe pulmonary hypertension with a pulmonary artery pressure of 74 mmHg and a mean pressure of 44 mmHg.  There is no significant improvement at the end of the procedure despite successful bilateral pulmonary artery thrombectomy.  He also underwent lower extremity venous Doppler ultrasound which did show evidence of acute right lower extremity DVT involving the distal femoral, popliteal and peroneal veins.  The patient slowly improved over the course of his hospitalization and were able to wean down his oxygen.  He was started on warfarin for anticoagulation.    He saw Addie Lennox, APRN in follow up on 1/24/2025.  He was doing well on the warfarin.  Unfortunately he had not been set up with the medication management clinic.  This was arranged at that office visit.  He had his INR checked that day and was noted to be elevated.  He was asked to hold the warfarin for a couple of days and restart after that.  Fortunately since then  he has been established in the anticoagulation clinic.  It also appears that he has followed up with his pulmonologist and was referred to hematology who he saw earlier this week for hypercoagulable workup.    He presents back today for follow-up.  He reports his overall his breathing has improved although it is not back to his baseline.  He denies any chest pain, palpitations, orthopnea, near-syncope or syncope.  He has some mild pedal leg swelling.  He has been tolerating the warfarin pretty well and is working with a medication management clinic to control his INR.  Most recently his INR was subtherapeutic.  He has been attending pulmonary rehab.    He underwent a repeat echocardiogram today which on my review shows improvement in his right ventricular size and function although there may be still some borderline dilatation.  Unable to assess his right ventricular systolic pressure due to insufficient TR.  Final echocardiogram report was not available for my review at the time of this dictation.    Review of Systems   Constitutional: Positive for malaise/fatigue.   HENT:  Negative for hearing loss, hoarse voice, nosebleeds and sore throat.    Eyes:  Negative for pain.   Cardiovascular:  Positive for dyspnea on exertion and leg swelling. Negative for chest pain, claudication, cyanosis, irregular heartbeat, near-syncope, orthopnea, palpitations, paroxysmal nocturnal dyspnea and syncope.   Respiratory:  Negative for shortness of breath and snoring.    Endocrine: Negative for cold intolerance, heat intolerance, polydipsia, polyphagia and polyuria.   Skin:  Negative for itching and rash.   Musculoskeletal:  Negative for arthritis, falls, joint pain, joint swelling, muscle cramps, muscle weakness and myalgias.   Gastrointestinal:  Negative for constipation, diarrhea, dysphagia, heartburn, hematemesis, hematochezia, melena, nausea and vomiting.   Genitourinary:  Negative for frequency, hematuria and hesitancy.    Neurological:  Negative for excessive daytime sleepiness, dizziness, headaches, light-headedness, numbness and weakness.   Psychiatric/Behavioral:  Negative for depression. The patient is not nervous/anxious.          Current Outpatient Medications:     acetaminophen (TYLENOL) 500 MG tablet, Take 1 tablet by mouth Every 6 (Six) Hours As Needed for Mild Pain., Disp: , Rfl:     albuterol sulfate  (90 Base) MCG/ACT inhaler, Inhale 2 puffs Every 4 (Four) Hours As Needed for Wheezing or Shortness of Air., Disp: 18 g, Rfl: 0    B Complex Vitamins (VITAMIN B COMPLEX) capsule capsule, Take 1 capsule by mouth Daily. LD 9-27, Disp: , Rfl:     budesonide-formoterol (SYMBICORT) 160-4.5 MCG/ACT inhaler, Inhale 2 puffs 2 (Two) Times a Day., Disp: 10.2 g, Rfl: 0    Calcium Carb-Cholecalciferol (Oyster Shell Calcium w/D) 500-5 MG-MCG tablet, TAKE TWO TABLETS BY MOUTH DAILY, Disp: 180 tablet, Rfl: 0    coenzyme Q10 100 MG capsule, Take 1 capsule by mouth Daily., Disp: , Rfl:     ferrous sulfate 325 (65 FE) MG tablet, Take 1 tablet by mouth Daily With Breakfast. Can cause constipation, Disp: 30 tablet, Rfl: 0    losartan (COZAAR) 50 MG tablet, TAKE 1 TABLET BY MOUTH DAILY, Disp: 90 tablet, Rfl: 0    montelukast (SINGULAIR) 10 MG tablet, TAKE 1 TABLET BY MOUTH ONCE DAILY, Disp: 90 tablet, Rfl: 0    Multiple Vitamins-Minerals (EMERGEN-C BLUE PO), Take 1 tablet by mouth Daily. LD 9-27, Disp: , Rfl:     sildenafil (REVATIO) 20 MG tablet, take 1 to 2 tablets by mouth as needed for erictile dysfunction, Disp: 50 tablet, Rfl: 0    theophylline (THEODUR) 300 MG 12 hr tablet, Take 1 tablet by mouth Daily., Disp: 90 tablet, Rfl: 3    triamcinolone (KENALOG) 0.025 % cream, Apply 1 Application topically to the appropriate area as directed 2 (Two) Times a Day., Disp: , Rfl:     Turmeric 400 MG capsule, Take 1 capsule by mouth Daily., Disp: , Rfl:     warfarin (COUMADIN) 7.5 MG tablet, Take one tablet by mouth on Mon and Fri and take  one-half of a tablet by mouth all other days or as directed  Indications: DVT/PE (active thrombosis), Disp: 60 tablet, Rfl: 0    clonazePAM (KlonoPIN) 0.5 MG tablet, TAKE ONE TABLET BY MOUTH TWICE DAILY AS NEEDED FOR ANXIETY (Patient not taking: Reported on 3/6/2025), Disp: 30 tablet, Rfl: 2    cyclobenzaprine (FLEXERIL) 10 MG tablet, Take 1 tablet by mouth 3 (Three) Times a Day As Needed for Muscle Spasms. (Patient not taking: Reported on 3/6/2025), Disp: 60 tablet, Rfl: 2    gabapentin (NEURONTIN) 300 MG capsule, TAKE 1 CAPSULE BY MOUTH IN THE MORING, 1 CAPSULE IN THE AFTERNOON AND 1 CAPSULES AT NIGHT (Patient not taking: Reported on 3/6/2025), Disp: 120 capsule, Rfl: 0    HYDROcodone-acetaminophen (NORCO) 5-325 MG per tablet, Take 1 tablet by mouth 2 (Two) Times a Day As Needed for Moderate Pain. (Patient not taking: Reported on 3/6/2025), Disp: 6 tablet, Rfl: 0    naloxone (NARCAN) 4 MG/0.1ML nasal spray, Call 911. Don't prime. [place] in 1 nostril for overdose. Repeat in 2-3 minutes in other nostril if no or minimal breathing/responsiveness. (Patient not taking: Reported on 3/6/2025), Disp: 2 each, Rfl: 0    S-Adenosylmethionine (YURY-e) 400 MG tablet, Take 400 mg by mouth Daily. (Patient not taking: Reported on 3/6/2025), Disp: , Rfl:     traZODone (DESYREL) 50 MG tablet, Take 1-2 tablets by mouth Every Night. (Patient not taking: Reported on 3/6/2025), Disp: 180 tablet, Rfl: 3  No current facility-administered medications for this visit.    Past Medical History:   Diagnosis Date    ADHD (attention deficit hyperactivity disorder) 2021    Hard to focus on tasks and follow through.    Allergic     Arthritis     Asthma     Carpal tunnel syndrome     no pain    Cataract     Depression     Erectile dysfunction     2018 at 73 years old    Family history of malignant neoplasm of breast 09/26/2019    Hyperlipidemia 09/26/2019    Hypertension 03/01/2012    Leg pain, right     Low back pain     Neuropathy     feet     Osteopenia     Poor balance     Pulmonary embolism     Reactive airway disease 01/15/2016    Scoliosis 1960    Shortness of breath     Sleep apnea     Substance abuse 1966    Alcoholic       Past Surgical History:   Procedure Laterality Date    CARDIAC CATHETERIZATION  1992    CARDIAC CATHETERIZATION N/A 1/11/2025    Procedure: Right Heart Cath;  Surgeon: Nancy Hdez MD;  Location:  [facility];  Service: Cardiovascular;  Laterality: N/A;    CARDIAC CATHETERIZATION  1/11/2025    Procedure: Percutaneous Manual Thrombectomy;  Surgeon: Nancy Hdez MD;  Location:  [facility];  Service: Cardiovascular;;    CARDIAC CATHETERIZATION Bilateral 1/11/2025    Procedure: Pulmonary angiography;  Surgeon: Nancy Hdez MD;  Location:  [facility];  Service: Cardiovascular;  Laterality: Bilateral;    COLONOSCOPY  2013    No polyps, slight diverticulitis    EYE SURGERY  2016    KNEE ARTHROSCOPY W/ ACL RECONSTRUCTION Right 2019    LUMBAR FUSION Bilateral 10/05/2022    Procedure: DAY 2 LUMBAR LAMINECTOMY TRANSFORAMINAL LUMBAR INTERBODY FUSION L2,L3,L4 WITH NEURO ROBOT;  Surgeon: John Do MD;  Location: [facility] MAIN OR;  Service: Robotics - Neuro;  Laterality: Bilateral;    LUMBAR FUSION N/A 10/04/2022    Procedure: DAY 1 LUMBAR LATERAL INTERBODY FUSION WITH NEURO ROBOT L2, L3.L4;  Surgeon: John Do MD;  Location: [facility] MAIN OR;  Service: Robotics - Neuro;  Laterality: N/A;  left side approach    MOUTH SURGERY      SPINE SURGERY  10/4&5/2022    Dr. John Do, [facility]       Family History   Problem Relation Age of Onset    Heart disease Mother     Hypertension Mother     Breast cancer Mother     Stroke Mother         Several TIAs    Alcohol abuse Mother     Thyroid disease Mother     Arthritis Mother     Cancer Mother         Breast    Aortic aneurysm Father     Heart attack Father     Liver cancer Father     Cancer Father          "Liver    Heart disease Father         Heart attack    Early death Brother         Coronary thrombosis @ 48 years while mountain climbing.       Social History     Tobacco Use    Smoking status: Former     Current packs/day: 0.00     Average packs/day: 0.5 packs/day for 26.0 years (13.0 ttl pk-yrs)     Types: Cigarettes     Start date: 1966     Quit date: 1992     Years since quittin.2     Passive exposure: Past    Smokeless tobacco: Never   Vaping Use    Vaping status: Never Used   Substance Use Topics    Alcohol use: Yes     Alcohol/week: 2.0 standard drinks of alcohol     Types: 1 Glasses of wine, 1 Cans of beer per week    Drug use: Not Currently     Comment: CBD gummies- stop now for surgery         ECG 12 Lead    Date/Time: 3/6/2025 12:32 PM  Performed by: Nancy Hdez MD    Authorized by: Nancy Hdez MD  Comparison: compared with previous ECG   Similar to previous ECG  Rhythm: sinus rhythm  Ectopy: atrial premature contractions             Objective:     Visit Vitals  /71 (BP Location: Left arm, Patient Position: Sitting, Cuff Size: Adult)   Pulse 72   Ht 167.6 cm (66\")   Wt 84.6 kg (186 lb 6.4 oz)   SpO2 97%   BMI 30.09 kg/m²         Constitutional:       Appearance: Normal appearance. Well-developed.   HENT:      Head: Normocephalic and atraumatic.   Neck:      Vascular: No carotid bruit or JVD.   Pulmonary:      Effort: Pulmonary effort is normal.      Breath sounds: Normal breath sounds.   Cardiovascular:      Normal rate. Regular rhythm.      No gallop.    Pulses:     Radial: 2+ bilaterally.  Edema:     Peripheral edema absent.   Abdominal:      Palpations: Abdomen is soft.   Skin:     General: Skin is warm and dry.   Neurological:      Mental Status: Alert and oriented to person, place, and time.             Assessment:          Diagnosis Plan   1. Acute deep vein thrombosis (DVT) of femoral vein of right lower extremity        2. Acute saddle pulmonary embolism with acute cor " pulmonale        3. Mixed hyperlipidemia        4. Primary hypertension        5. Obstructive sleep apnea syndrome               Plan:         1.  Bilateral pulmonary embolism with acute cor pulmonale.  Status post thrombectomy in 1/2025.  Recovering from this.  Repeat echocardiogram today shows improvement in right ventricular size.  On anticoagulation with warfarin which is managed by the medication management clinic.  2.  Right lower extremity DVT.  On warfarin as per #1.  3.  Pulmonary hypertension.  Noted at the time of his pulmonary embolism prior right heart cath to be severely elevated (unable to assess by echocardiogram).  This did not improved just after thrombectomy.  Unfortunately unable to assess pulmonary pressures again by echocardiogram today due to insufficient TR.  We may need to consider reassessment of this with a repeat right heart catheterization in the future.  4.  Hypertension.  Well-controlled on current regimen medications.  5.  Hyperlipidemia.  6.  Obstructive sleep apnea.  Compliant with CPAP.    Will plan on seeing the patient back again in 3 months.

## 2025-03-06 ENCOUNTER — HOSPITAL ENCOUNTER (OUTPATIENT)
Dept: CARDIOLOGY | Facility: HOSPITAL | Age: 80
Discharge: HOME | End: 2025-03-06
Admitting: NURSE PRACTITIONER
Payer: MEDICARE

## 2025-03-06 ENCOUNTER — OFFICE VISIT (OUTPATIENT)
Age: 80
End: 2025-03-06
Payer: MEDICARE

## 2025-03-06 ENCOUNTER — APPOINTMENT (OUTPATIENT)
Dept: CARDIAC REHAB | Facility: HOSPITAL | Age: 80
End: 2025-03-06
Payer: MEDICARE

## 2025-03-06 VITALS
HEIGHT: 66 IN | BODY MASS INDEX: 29.96 KG/M2 | HEART RATE: 72 BPM | WEIGHT: 186.4 LBS | DIASTOLIC BLOOD PRESSURE: 71 MMHG | SYSTOLIC BLOOD PRESSURE: 122 MMHG | OXYGEN SATURATION: 97 %

## 2025-03-06 VITALS
HEIGHT: 66 IN | WEIGHT: 183 LBS | OXYGEN SATURATION: 95 % | HEART RATE: 80 BPM | BODY MASS INDEX: 29.41 KG/M2 | DIASTOLIC BLOOD PRESSURE: 84 MMHG | SYSTOLIC BLOOD PRESSURE: 156 MMHG

## 2025-03-06 DIAGNOSIS — I26.99 PULMONARY EMBOLISM, UNSPECIFIED CHRONICITY, UNSPECIFIED PULMONARY EMBOLISM TYPE, UNSPECIFIED WHETHER ACUTE COR PULMONALE PRESENT: ICD-10-CM

## 2025-03-06 DIAGNOSIS — G47.33 OBSTRUCTIVE SLEEP APNEA SYNDROME: ICD-10-CM

## 2025-03-06 DIAGNOSIS — I82.411 ACUTE DEEP VEIN THROMBOSIS (DVT) OF FEMORAL VEIN OF RIGHT LOWER EXTREMITY: Primary | ICD-10-CM

## 2025-03-06 DIAGNOSIS — I27.20 PULMONARY HYPERTENSION: ICD-10-CM

## 2025-03-06 DIAGNOSIS — I26.02 ACUTE SADDLE PULMONARY EMBOLISM WITH ACUTE COR PULMONALE: ICD-10-CM

## 2025-03-06 DIAGNOSIS — I10 PRIMARY HYPERTENSION: ICD-10-CM

## 2025-03-06 DIAGNOSIS — E78.2 MIXED HYPERLIPIDEMIA: ICD-10-CM

## 2025-03-06 PROBLEM — I26.92 ACUTE SADDLE PULMONARY EMBOLISM: Status: RESOLVED | Noted: 2025-01-10 | Resolved: 2025-03-06

## 2025-03-06 LAB
AORTIC DIMENSIONLESS INDEX: 0.64 (DI)
APTT HEX PL PPP: 2 SEC (ref 0–11)
APTT PPP: 25.7 SEC (ref 22.9–30.2)
ASCENDING AORTA: 4 CM
AV MEAN PRESS GRAD SYS DOP V1V2: 6 MMHG
AV VMAX SYS DOP: 170 CM/SEC
B2 GLYCOPROT1 IGG SER-ACNC: ABNORMAL GPI IGG UNITS
B2 GLYCOPROT1 IGM SER-ACNC: ABNORMAL GPI IGM UNITS
BH CV ECHO MEAS - ACS: 1.84 CM
BH CV ECHO MEAS - AI P1/2T: 2436 MSEC
BH CV ECHO MEAS - AO MAX PG: 11.6 MMHG
BH CV ECHO MEAS - AO ROOT AREA (BSA CORRECTED): 2.1 CM2
BH CV ECHO MEAS - AO ROOT DIAM: 3.9 CM
BH CV ECHO MEAS - AO V2 VTI: 35.4 CM
BH CV ECHO MEAS - AVA(I,D): 2.06 CM2
BH CV ECHO MEAS - EDV(CUBED): 194.8 ML
BH CV ECHO MEAS - EDV(MOD-SP2): 134 ML
BH CV ECHO MEAS - EDV(MOD-SP4): 84 ML
BH CV ECHO MEAS - EF(MOD-SP2): 53 %
BH CV ECHO MEAS - EF(MOD-SP4): 60.7 %
BH CV ECHO MEAS - ESV(MOD-SP2): 63 ML
BH CV ECHO MEAS - ESV(MOD-SP4): 33 ML
BH CV ECHO MEAS - FS: 33.4 %
BH CV ECHO MEAS - IVS/LVPW: 0.84 CM
BH CV ECHO MEAS - IVSD: 0.68 CM
BH CV ECHO MEAS - LV DIASTOLIC VOL/BSA (35-75): 43.6 CM2
BH CV ECHO MEAS - LV MASS(C)D: 160.3 GRAMS
BH CV ECHO MEAS - LV MAX PG: 5.1 MMHG
BH CV ECHO MEAS - LV MEAN PG: 3 MMHG
BH CV ECHO MEAS - LV SYSTOLIC VOL/BSA (12-30): 17.1 CM2
BH CV ECHO MEAS - LV V1 MAX: 113 CM/SEC
BH CV ECHO MEAS - LV V1 VTI: 22.6 CM
BH CV ECHO MEAS - LVIDD: 5.8 CM
BH CV ECHO MEAS - LVIDS: 3.9 CM
BH CV ECHO MEAS - LVOT AREA: 3.2 CM2
BH CV ECHO MEAS - LVOT DIAM: 2.03 CM
BH CV ECHO MEAS - LVPWD: 0.81 CM
BH CV ECHO MEAS - MR MAX PG: 34.4 MMHG
BH CV ECHO MEAS - MR MAX VEL: 293.4 CM/SEC
BH CV ECHO MEAS - MV A DUR: 0.13 SEC
BH CV ECHO MEAS - MV A MAX VEL: 113.8 CM/SEC
BH CV ECHO MEAS - MV DEC SLOPE: 205.3 CM/SEC2
BH CV ECHO MEAS - MV DEC TIME: 0.16 SEC
BH CV ECHO MEAS - MV E MAX VEL: 63.4 CM/SEC
BH CV ECHO MEAS - MV E/A: 0.56
BH CV ECHO MEAS - MV MAX PG: 5 MMHG
BH CV ECHO MEAS - MV MEAN PG: 1.81 MMHG
BH CV ECHO MEAS - MV P1/2T: 55.6 MSEC
BH CV ECHO MEAS - MV V2 VTI: 26.2 CM
BH CV ECHO MEAS - MVA(P1/2T): 4 CM2
BH CV ECHO MEAS - MVA(VTI): 2.8 CM2
BH CV ECHO MEAS - PA V2 MAX: 77.6 CM/SEC
BH CV ECHO MEAS - PULM A REVS DUR: 0.16 SEC
BH CV ECHO MEAS - PULM A REVS VEL: 25.7 CM/SEC
BH CV ECHO MEAS - PULM DIAS VEL: 45.3 CM/SEC
BH CV ECHO MEAS - PULM S/D: 1.33
BH CV ECHO MEAS - PULM SYS VEL: 60.1 CM/SEC
BH CV ECHO MEAS - QP/QS: 0.58
BH CV ECHO MEAS - RAP SYSTOLE: 8 MMHG
BH CV ECHO MEAS - RV MAX PG: 1.13 MMHG
BH CV ECHO MEAS - RV V1 MAX: 53.1 CM/SEC
BH CV ECHO MEAS - RV V1 VTI: 10.1 CM
BH CV ECHO MEAS - RVOT DIAM: 2.31 CM
BH CV ECHO MEAS - SV(LVOT): 73 ML
BH CV ECHO MEAS - SV(MOD-SP2): 71 ML
BH CV ECHO MEAS - SV(MOD-SP4): 51 ML
BH CV ECHO MEAS - SV(RVOT): 42.6 ML
BH CV ECHO MEAS - SVI(LVOT): 37.9 ML/M2
BH CV ECHO MEAS - SVI(MOD-SP2): 36.9 ML/M2
BH CV ECHO MEAS - SVI(MOD-SP4): 26.5 ML/M2
BH CV ECHO MEAS - TAPSE (>1.6): 2.35 CM
BH CV XLRA - RV BASE: 4.2 CM
BH CV XLRA - RV LENGTH: 8.6 CM
BH CV XLRA - RV MID: 3.3 CM
CARDIOLIPIN IGG SER IA-ACNC: ABNORMAL GPL U/ML
CARDIOLIPIN IGM SER IA-ACNC: ABNORMAL MPL U/ML
INR PPP: 1.3 (ref 0.9–1.2)
LEFT ATRIUM VOLUME INDEX: 36.4 ML/M2
LV EF BIPLANE MOD: 58.5 %
PATH INTERP BLD-IMP: NORMAL
PATH INTERP SPEC-IMP: ABNORMAL
PROTHROMBIN TIME: 14 SEC (ref 9.1–12)
SCREEN DRVVT: 35.2 SEC (ref 0–47)
SINUS: 4.1 CM
STJ: 2.7 CM
THROMBIN TIME: 18 SEC (ref 0–23)

## 2025-03-06 PROCEDURE — 3078F DIAST BP <80 MM HG: CPT | Performed by: INTERNAL MEDICINE

## 2025-03-06 PROCEDURE — 3074F SYST BP LT 130 MM HG: CPT | Performed by: INTERNAL MEDICINE

## 2025-03-06 PROCEDURE — 25510000001 PERFLUTREN 6.52 MG/ML SUSPENSION 2 ML VIAL: Performed by: NURSE PRACTITIONER

## 2025-03-06 PROCEDURE — 93000 ELECTROCARDIOGRAM COMPLETE: CPT | Performed by: INTERNAL MEDICINE

## 2025-03-06 PROCEDURE — 1160F RVW MEDS BY RX/DR IN RCRD: CPT | Performed by: INTERNAL MEDICINE

## 2025-03-06 PROCEDURE — 1159F MED LIST DOCD IN RCRD: CPT | Performed by: INTERNAL MEDICINE

## 2025-03-06 PROCEDURE — 93306 TTE W/DOPPLER COMPLETE: CPT

## 2025-03-06 PROCEDURE — 99214 OFFICE O/P EST MOD 30 MIN: CPT | Performed by: INTERNAL MEDICINE

## 2025-03-06 RX ADMIN — PERFLUTREN 1 ML: 6.52 INJECTION, SUSPENSION INTRAVENOUS at 10:06

## 2025-03-06 NOTE — LETTER
March 6, 2025     Dennis Kwong MD  [address]  [address]  [place] [address]    Patient: Omar Choudhary   YOB: 1945   Date of Visit: 3/6/2025       Dear Dennis Kwong MD,    Omar Choudhary was in my office today. Below are the relevant portions of my assessment and plan of care.           If you have questions, please do not hesitate to call me. I look forward to following Omar along with you.         Sincerely,        Nancy Hdez MD        CC: No Recipients

## 2025-03-07 ENCOUNTER — TREATMENT (OUTPATIENT)
Dept: CARDIAC REHAB | Facility: HOSPITAL | Age: 80
End: 2025-03-07
Payer: MEDICARE

## 2025-03-07 DIAGNOSIS — J45.909 ASTHMA, UNSPECIFIED ASTHMA SEVERITY, UNSPECIFIED WHETHER COMPLICATED, UNSPECIFIED WHETHER PERSISTENT: ICD-10-CM

## 2025-03-07 DIAGNOSIS — J43.8 OTHER EMPHYSEMA: Primary | ICD-10-CM

## 2025-03-07 LAB — SPECIMEN STATUS: NORMAL

## 2025-03-07 PROCEDURE — 94625 PHY/QHP OP PULM RHB W/O MNTR: CPT

## 2025-03-08 LAB
CARDIOLIPIN IGG SER IA-ACNC: <9 GPL U/ML (ref 0–14)
CARDIOLIPIN IGM SER IA-ACNC: 9 MPL U/ML (ref 0–12)

## 2025-03-10 ENCOUNTER — APPOINTMENT (OUTPATIENT)
Dept: CARDIAC REHAB | Facility: HOSPITAL | Age: 80
End: 2025-03-10
Payer: MEDICARE

## 2025-03-11 ENCOUNTER — APPOINTMENT (OUTPATIENT)
Dept: CARDIAC REHAB | Facility: HOSPITAL | Age: 80
End: 2025-03-11
Payer: MEDICARE

## 2025-03-11 ENCOUNTER — TREATMENT (OUTPATIENT)
Dept: CARDIAC REHAB | Facility: HOSPITAL | Age: 80
End: 2025-03-11
Payer: MEDICARE

## 2025-03-11 ENCOUNTER — ANTICOAGULATION VISIT (OUTPATIENT)
Dept: PHARMACY | Facility: HOSPITAL | Age: 80
End: 2025-03-11
Payer: MEDICARE

## 2025-03-11 DIAGNOSIS — J43.8 OTHER EMPHYSEMA: Primary | ICD-10-CM

## 2025-03-11 DIAGNOSIS — I26.02 ACUTE SADDLE PULMONARY EMBOLISM WITH ACUTE COR PULMONALE: Primary | ICD-10-CM

## 2025-03-11 LAB
INR PPP: 1.8 (ref 0.91–1.09)
PROTHROMBIN TIME: 21.5 SECONDS (ref 10–13.8)

## 2025-03-11 PROCEDURE — G0463 HOSPITAL OUTPT CLINIC VISIT: HCPCS

## 2025-03-11 PROCEDURE — 85610 PROTHROMBIN TIME: CPT

## 2025-03-11 PROCEDURE — 94625 PHY/QHP OP PULM RHB W/O MNTR: CPT

## 2025-03-11 NOTE — PROGRESS NOTES
Anticoagulation Clinic Progress Note    Anticoagulation Summary  As of 3/11/2025      INR goal:  2.0-3.0   TTR:  21.1% (1.4 mo)   INR used for dosin.8 (3/11/2025)   Warfarin maintenance plan:  3.75 mg every Sun, e, Thu; 7.5 mg all other days   Weekly warfarin total:  41.25 mg   Plan last modified:  Keri Boyer RPH (3/11/2025)   Next INR check:  3/18/2025   Target end date:  --    Indications    Acute saddle pulmonary embolism with acute cor pulmonale [I26.02]                 Anticoagulation Episode Summary       INR check location:  --    Preferred lab:  --    Send INR reminders to:   [facility] [place]    Comments:  --          Anticoagulation Care Providers       Provider Role Specialty Phone number    Jennifer Cox APRN Referring Nurse Practitioner [phone]            Clinic Interview:  Patient Findings     Negatives:  Signs/symptoms of thrombosis, Signs/symptoms of bleeding,   Laboratory test error suspected, Change in health, Change in alcohol use,   Change in activity, Upcoming invasive procedure, Emergency department   visit, Upcoming dental procedure, Missed doses, Extra doses, Change in   medications, Change in diet/appetite, Hospital admission, Bruising, Other   complaints      Clinical Outcomes     Negatives:  Major bleeding event, Thromboembolic event,   Anticoagulation-related hospital admission, Anticoagulation-related ED   visit, Anticoagulation-related fatality        INR History:      2/3/2025    10:00 AM 2/10/2025    10:15 AM 2025    10:15 AM 2025    11:30 AM 2025    10:15 AM 3/3/2025    10:15 AM 3/11/2025     9:15 AM   Anticoagulation Monitoring   INR 3.5 1.1 1.5 2.2 2.1 1.4 1.8   INR Date 2/3/2025 2/10/2025 2025 2025 2025 3/3/2025 3/11/2025   INR Goal 2.0-3.0 2.0-3.0 2.0-3.0 2.0-3.0 2.0-3.0 2.0-3.0 2.0-3.0   Trend Down Same Up Same Same Up Up   Last Week Total 30 mg 18.75 mg 30 mg 33.75 mg 37.5 mg 33.75 mg 37.5 mg   Next Week Total 22.5 mg  30 mg 37.5 mg 33.75 mg 33.75 mg 41.25 mg 41.25 mg   Sun 3.75 mg 3.75 mg - 3.75 mg 3.75 mg 3.75 mg 3.75 mg   Mon Hold (2/3) 7.5 mg (2/10) 7.5 mg - 7.5 mg 11.25 mg (3/3); Otherwise 7.5 mg 7.5 mg   Tue 3.75 mg 3.75 mg 7.5 mg (2/18) - 3.75 mg 3.75 mg 3.75 mg   Wed 3.75 mg 3.75 mg 3.75 mg - 3.75 mg 7.5 mg 7.5 mg   Thu 3.75 mg 3.75 mg - 3.75 mg 3.75 mg 3.75 mg 3.75 mg   Fri 3.75 mg 3.75 mg - 7.5 mg 7.5 mg 7.5 mg 7.5 mg   Sat 3.75 mg 3.75 mg - 3.75 mg 3.75 mg 3.75 mg 7.5 mg       Plan:  1. INR is Subtherapeutic today- see above in Anticoagulation Summary.  Will instruct Omar Choudhary to Increase their warfarin regimen- see above in Anticoagulation Summary.   Increase to 3.75 mg on sun, tues, thurs and 7.5 mg AORCIS, rck 1 week   2. Follow up in 1 week  3. Patient declines warfarin refills.  4. Verbal and written information provided. Patient expresses understanding and has no further questions at this time.    Keri Boyer [facility]

## 2025-03-12 ENCOUNTER — APPOINTMENT (OUTPATIENT)
Dept: CARDIAC REHAB | Facility: HOSPITAL | Age: 80
End: 2025-03-12
Payer: MEDICARE

## 2025-03-13 ENCOUNTER — APPOINTMENT (OUTPATIENT)
Dept: CARDIAC REHAB | Facility: HOSPITAL | Age: 80
End: 2025-03-13
Payer: MEDICARE

## 2025-03-14 ENCOUNTER — TREATMENT (OUTPATIENT)
Dept: CARDIAC REHAB | Facility: HOSPITAL | Age: 80
End: 2025-03-14
Payer: MEDICARE

## 2025-03-14 ENCOUNTER — APPOINTMENT (OUTPATIENT)
Dept: CARDIAC REHAB | Facility: HOSPITAL | Age: 80
End: 2025-03-14
Payer: MEDICARE

## 2025-03-14 DIAGNOSIS — J43.8 OTHER EMPHYSEMA: Primary | ICD-10-CM

## 2025-03-14 DIAGNOSIS — J45.909 ASTHMA, UNSPECIFIED ASTHMA SEVERITY, UNSPECIFIED WHETHER COMPLICATED, UNSPECIFIED WHETHER PERSISTENT: ICD-10-CM

## 2025-03-14 PROCEDURE — G0237 THERAPEUTIC PROCD STRG ENDUR: HCPCS

## 2025-03-14 PROCEDURE — G0238 OTH RESP PROC, INDIV: HCPCS

## 2025-03-17 ENCOUNTER — APPOINTMENT (OUTPATIENT)
Dept: CARDIAC REHAB | Facility: HOSPITAL | Age: 80
End: 2025-03-17
Payer: MEDICARE

## 2025-03-18 ENCOUNTER — APPOINTMENT (OUTPATIENT)
Dept: CARDIAC REHAB | Facility: HOSPITAL | Age: 80
End: 2025-03-18
Payer: MEDICARE

## 2025-03-18 ENCOUNTER — ANTICOAGULATION VISIT (OUTPATIENT)
Dept: PHARMACY | Facility: HOSPITAL | Age: 80
End: 2025-03-18
Payer: MEDICARE

## 2025-03-18 DIAGNOSIS — I26.02 ACUTE SADDLE PULMONARY EMBOLISM WITH ACUTE COR PULMONALE: Primary | ICD-10-CM

## 2025-03-18 LAB
INR PPP: 3.6 (ref 0.91–1.09)
PROTHROMBIN TIME: 42.9 SECONDS (ref 10–13.8)

## 2025-03-18 PROCEDURE — G0463 HOSPITAL OUTPT CLINIC VISIT: HCPCS

## 2025-03-18 PROCEDURE — 85610 PROTHROMBIN TIME: CPT

## 2025-03-18 NOTE — PROGRESS NOTES
Anticoagulation Clinic Progress Note    Anticoagulation Summary  As of 3/18/2025      INR goal:  2.0-3.0   TTR:  26.1% (1.6 mo)   INR used for dosing:  3.6 (3/18/2025)   Warfarin maintenance plan:  3.75 mg every Sun, Tue, Thu; 7.5 mg all other days   Weekly warfarin total:  41.25 mg   Plan last modified:  Keri Boyer RPH (3/11/2025)   Next INR check:  3/25/2025   Target end date:  --    Indications    Acute saddle pulmonary embolism with acute cor pulmonale [I26.02]                 Anticoagulation Episode Summary       INR check location:  --    Preferred lab:  --    Send INR reminders to:   [facility] [place]    Comments:  --          Anticoagulation Care Providers       Provider Role Specialty Phone number    Jennifer Cox APRN Referring Nurse Practitioner [phone]            Clinic Interview:  Patient Findings     Positives:  Change in alcohol use    Negatives:  Signs/symptoms of thrombosis, Signs/symptoms of bleeding,   Laboratory test error suspected, Change in health, Change in activity,   Upcoming invasive procedure, Emergency department visit, Upcoming dental   procedure, Missed doses, Extra doses, Change in medications, Change in   diet/appetite, Hospital admission, Bruising, Other complaints      Clinical Outcomes     Negatives:  Major bleeding event, Thromboembolic event,   Anticoagulation-related hospital admission, Anticoagulation-related ED   visit, Anticoagulation-related fatality        INR History:      2/10/2025    10:15 AM 2/17/2025    10:15 AM 2/20/2025    11:30 AM 2/24/2025    10:15 AM 3/3/2025    10:15 AM 3/11/2025     9:15 AM 3/18/2025     9:15 AM   Anticoagulation Monitoring   INR 1.1 1.5 2.2 2.1 1.4 1.8 3.6   INR Date 2/10/2025 2/17/2025 2/20/2025 2/24/2025 3/3/2025 3/11/2025 3/18/2025   INR Goal 2.0-3.0 2.0-3.0 2.0-3.0 2.0-3.0 2.0-3.0 2.0-3.0 2.0-3.0   Trend Same Up Same Same Up Up Same   Last Week Total 18.75 mg 30 mg 33.75 mg 37.5 mg 33.75 mg 37.5 mg 41.25 mg   Next  Week Total 30 mg 37.5 mg 33.75 mg 33.75 mg 41.25 mg 41.25 mg 37.5 mg   Sun 3.75 mg - 3.75 mg 3.75 mg 3.75 mg 3.75 mg 3.75 mg   Mon 7.5 mg (2/10) 7.5 mg - 7.5 mg 11.25 mg (3/3); Otherwise 7.5 mg 7.5 mg 7.5 mg   Tue 3.75 mg 7.5 mg (2/18) - 3.75 mg 3.75 mg 3.75 mg Hold (3/18)   Wed 3.75 mg 3.75 mg - 3.75 mg 7.5 mg 7.5 mg 7.5 mg   Thu 3.75 mg - 3.75 mg 3.75 mg 3.75 mg 3.75 mg 3.75 mg   Fri 3.75 mg - 7.5 mg 7.5 mg 7.5 mg 7.5 mg 7.5 mg   Sat 3.75 mg - 3.75 mg 3.75 mg 3.75 mg 7.5 mg 7.5 mg       Plan:  1. INR is Supratherapeutic today- see above in Anticoagulation Summary.  Will instruct Omar AYDIN Choudhary to Change their warfarin regimen- see above in Anticoagulation Summary.  Increase in alcohol for st reggie's day. Hold and then resume, rck 1 week   2. Follow up in 1 week  3. Patient declines warfarin refills.  4. Verbal and written information provided. Patient expresses understanding and has no further questions at this time.    Keri Boyer [facility]

## 2025-03-19 ENCOUNTER — APPOINTMENT (OUTPATIENT)
Dept: CARDIAC REHAB | Facility: HOSPITAL | Age: 80
End: 2025-03-19
Payer: MEDICARE

## 2025-03-20 ENCOUNTER — APPOINTMENT (OUTPATIENT)
Dept: CARDIAC REHAB | Facility: HOSPITAL | Age: 80
End: 2025-03-20
Payer: MEDICARE

## 2025-03-21 ENCOUNTER — TREATMENT (OUTPATIENT)
Dept: CARDIAC REHAB | Facility: HOSPITAL | Age: 80
End: 2025-03-21
Payer: MEDICARE

## 2025-03-21 DIAGNOSIS — J43.8 OTHER EMPHYSEMA: Primary | ICD-10-CM

## 2025-03-21 DIAGNOSIS — J45.909 ASTHMA, UNSPECIFIED ASTHMA SEVERITY, UNSPECIFIED WHETHER COMPLICATED, UNSPECIFIED WHETHER PERSISTENT: ICD-10-CM

## 2025-03-21 PROCEDURE — 94625 PHY/QHP OP PULM RHB W/O MNTR: CPT

## 2025-03-24 ENCOUNTER — APPOINTMENT (OUTPATIENT)
Dept: CARDIAC REHAB | Facility: HOSPITAL | Age: 80
End: 2025-03-24
Payer: MEDICARE

## 2025-03-25 ENCOUNTER — TREATMENT (OUTPATIENT)
Dept: CARDIAC REHAB | Facility: HOSPITAL | Age: 80
End: 2025-03-25
Payer: MEDICARE

## 2025-03-25 ENCOUNTER — APPOINTMENT (OUTPATIENT)
Dept: CARDIAC REHAB | Facility: HOSPITAL | Age: 80
End: 2025-03-25
Payer: MEDICARE

## 2025-03-25 ENCOUNTER — ANTICOAGULATION VISIT (OUTPATIENT)
Dept: PHARMACY | Facility: HOSPITAL | Age: 80
End: 2025-03-25
Payer: MEDICARE

## 2025-03-25 DIAGNOSIS — I26.02 ACUTE SADDLE PULMONARY EMBOLISM WITH ACUTE COR PULMONALE: Primary | ICD-10-CM

## 2025-03-25 DIAGNOSIS — J45.909 ASTHMA, UNSPECIFIED ASTHMA SEVERITY, UNSPECIFIED WHETHER COMPLICATED, UNSPECIFIED WHETHER PERSISTENT: Primary | ICD-10-CM

## 2025-03-25 LAB
INR PPP: 2 (ref 0.91–1.09)
PROTHROMBIN TIME: 23.8 SECONDS (ref 10–13.8)

## 2025-03-25 PROCEDURE — 85610 PROTHROMBIN TIME: CPT

## 2025-03-25 PROCEDURE — G0463 HOSPITAL OUTPT CLINIC VISIT: HCPCS

## 2025-03-25 PROCEDURE — 94625 PHY/QHP OP PULM RHB W/O MNTR: CPT

## 2025-03-25 NOTE — PROGRESS NOTES
Anticoagulation Clinic Progress Note    Anticoagulation Summary  As of 3/25/2025      INR goal:  2.0-3.0   TTR:  30.7% (1.8 mo)   INR used for dosin.0 (3/25/2025)   Warfarin maintenance plan:  3.75 mg every Sun, e, Thu; 7.5 mg all other days   Weekly warfarin total:  41.25 mg   No change documented:  Kemal High, Pharmacy Intern   Plan last modified:  Keri Boyer RPH (3/11/2025)   Next INR check:  3/31/2025   Target end date:  --    Indications    Acute saddle pulmonary embolism with acute cor pulmonale [I26.02]                 Anticoagulation Episode Summary       INR check location:  --    Preferred lab:  --    Send INR reminders to:   [facility] [place]    Comments:  --          Anticoagulation Care Providers       Provider Role Specialty Phone number    Kenny EMILY Corado Referring Nurse Practitioner [phone]            Clinic Interview:  Patient Findings     Positives:  Missed doses, Extra doses, Change in medications    Negatives:  Signs/symptoms of thrombosis, Signs/symptoms of bleeding,   Laboratory test error suspected, Change in health, Change in alcohol use,   Change in activity, Upcoming invasive procedure, Emergency department   visit, Upcoming dental procedure, Change in diet/appetite, Hospital   admission, Bruising, Other complaints    Comments:   Pt reports missed dose on Sa 3/22/25, and took 7.5 mg booster   dose on [place] 3/23/25 vs normal 3.75 mg dose. Reviewed missed dose counseling   with pt (can take missed dose up to 12 hours from time missed. If past 12   hours, skip dose. Do no double up). Pt started OTC herbal supplements   L-Leucine 500 mg BID &  mg daily.       Clinical Outcomes     Negatives:  Major bleeding event, Thromboembolic event,   Anticoagulation-related hospital admission, Anticoagulation-related ED   visit, Anticoagulation-related fatality    Comments:   Pt reports missed dose on Sa 3/22/25, and took 7.5 mg booster   dose on [place] 3/23/25  vs normal 3.75 mg dose. Reviewed missed dose counseling   with pt (can take missed dose up to 12 hours from time missed. If past 12   hours, skip dose. Do no double up). Pt started OTC herbal supplements   L-Leucine 500 mg BID &  mg daily.         INR History:      2/17/2025    10:15 AM 2/20/2025    11:30 AM 2/24/2025    10:15 AM 3/3/2025    10:15 AM 3/11/2025     9:15 AM 3/18/2025     9:15 AM 3/25/2025     9:15 AM   Anticoagulation Monitoring   INR 1.5 2.2 2.1 1.4 1.8 3.6 2.0   INR Date 2/17/2025 2/20/2025 2/24/2025 3/3/2025 3/11/2025 3/18/2025 3/25/2025   INR Goal 2.0-3.0 2.0-3.0 2.0-3.0 2.0-3.0 2.0-3.0 2.0-3.0 2.0-3.0   Trend Up Same Same Up Up Same Same   Last Week Total 30 mg 33.75 mg 37.5 mg 33.75 mg 37.5 mg 41.25 mg 33.75 mg   Next Week Total 37.5 mg 33.75 mg 33.75 mg 41.25 mg 41.25 mg 37.5 mg 41.25 mg   Sun - 3.75 mg 3.75 mg 3.75 mg 3.75 mg 3.75 mg 3.75 mg   Mon 7.5 mg - 7.5 mg 11.25 mg (3/3); Otherwise 7.5 mg 7.5 mg 7.5 mg -   Tue 7.5 mg (2/18) - 3.75 mg 3.75 mg 3.75 mg Hold (3/18) 3.75 mg   Wed 3.75 mg - 3.75 mg 7.5 mg 7.5 mg 7.5 mg 7.5 mg   Thu - 3.75 mg 3.75 mg 3.75 mg 3.75 mg 3.75 mg 3.75 mg   Fri - 7.5 mg 7.5 mg 7.5 mg 7.5 mg 7.5 mg 7.5 mg   Sat - 3.75 mg 3.75 mg 3.75 mg 7.5 mg 7.5 mg 7.5 mg       Plan:  1. INR is Therapeutic today- see above in Anticoagulation Summary.  Will instruct Omar AYDIN Choudhary to Continue their warfarin regimen (3.75 mg SuTuTh, 7.5 mg AOD) - see above in Anticoagulation Summary.  2. Follow up in 1 week  3. Patient declines warfarin refills.  4. Verbal and written information provided. Patient expresses understanding and has no further questions at this time.    Tristen High, Pharmacy Intern

## 2025-03-25 NOTE — PROGRESS NOTES
I have supervised and reviewed the notes, assessments, and/or procedures performed. The documented assessment and plan were developed cooperatively, and the plan was implemented in my presence. I concur with the documentation of this patient encounter.    Keri Boyer, [facility]

## 2025-03-26 ENCOUNTER — APPOINTMENT (OUTPATIENT)
Dept: CARDIAC REHAB | Facility: HOSPITAL | Age: 80
End: 2025-03-26
Payer: MEDICARE

## 2025-03-27 ENCOUNTER — APPOINTMENT (OUTPATIENT)
Dept: CARDIAC REHAB | Facility: HOSPITAL | Age: 80
End: 2025-03-27
Payer: MEDICARE

## 2025-03-28 ENCOUNTER — APPOINTMENT (OUTPATIENT)
Dept: CARDIAC REHAB | Facility: HOSPITAL | Age: 80
End: 2025-03-28
Payer: MEDICARE

## 2025-03-31 ENCOUNTER — ANTICOAGULATION VISIT (OUTPATIENT)
Dept: PHARMACY | Facility: HOSPITAL | Age: 80
End: 2025-03-31
Payer: MEDICARE

## 2025-03-31 ENCOUNTER — APPOINTMENT (OUTPATIENT)
Dept: CARDIAC REHAB | Facility: HOSPITAL | Age: 80
End: 2025-03-31
Payer: MEDICARE

## 2025-03-31 DIAGNOSIS — I26.02 ACUTE SADDLE PULMONARY EMBOLISM WITH ACUTE COR PULMONALE: Primary | ICD-10-CM

## 2025-03-31 LAB
INR PPP: 1.6 (ref 0.91–1.09)
PROTHROMBIN TIME: 19.2 SECONDS (ref 10–13.8)

## 2025-03-31 PROCEDURE — 85610 PROTHROMBIN TIME: CPT

## 2025-03-31 PROCEDURE — G0463 HOSPITAL OUTPT CLINIC VISIT: HCPCS

## 2025-03-31 NOTE — PROGRESS NOTES
Anticoagulation Clinic Progress Note    Anticoagulation Summary  As of 3/31/2025      INR goal:  2.0-3.0   TTR:  27.7% (2 mo)   INR used for dosin.6 (3/31/2025)   Warfarin maintenance plan:  3.75 mg every Sun, Thu; 7.5 mg all other days   Weekly warfarin total:  45 mg   Plan last modified:  Rivera Arana, PharmD (3/31/2025)   Next INR check:  2025   Target end date:  --    Indications    Acute saddle pulmonary embolism with acute cor pulmonale [I26.02]                 Anticoagulation Episode Summary       INR check location:  --    Preferred lab:  --    Send INR reminders to:   [facility]    Comments:  --          Anticoagulation Care Providers       Provider Role Specialty Phone number    MendonJennifer mckeon APRN Referring Nurse Practitioner [phone]            Clinic Interview:  Patient Findings     Positives:  Change in diet/appetite    Negatives:  Signs/symptoms of thrombosis, Signs/symptoms of bleeding,   Laboratory test error suspected, Change in health, Change in alcohol use,   Change in activity, Upcoming invasive procedure, Emergency department   visit, Upcoming dental procedure, Missed doses, Extra doses, Change in   medications, Hospital admission, Bruising, Other complaints    Comments:  Reports he started drinking Premier Protein (contains 30 mcg   vit k) 4-5 days/wk ~1.5 wks ago, and he plans to continue.       Clinical Outcomes     Negatives:  Major bleeding event, Thromboembolic event,   Anticoagulation-related hospital admission, Anticoagulation-related ED   visit, Anticoagulation-related fatality    Comments:  Reports he started drinking Premier Protein (contains 30 mcg   vit k) 4-5 days/wk ~1.5 wks ago, and he plans to continue.         INR History:      2025    11:30 AM 2025    10:15 AM 3/3/2025    10:15 AM 3/11/2025     9:15 AM 3/18/2025     9:15 AM 3/25/2025     9:15 AM 3/31/2025     9:15 AM   Anticoagulation Monitoring   INR 2.2 2.1 1.4 1.8 3.6 2.0 1.6   INR  Date 2/20/2025 2/24/2025 3/3/2025 3/11/2025 3/18/2025 3/25/2025 3/31/2025   INR Goal 2.0-3.0 2.0-3.0 2.0-3.0 2.0-3.0 2.0-3.0 2.0-3.0 2.0-3.0   Trend Same Same Up Up Same Same Up   Last Week Total 33.75 mg 37.5 mg 33.75 mg 37.5 mg 41.25 mg 33.75 mg 41.25 mg   Next Week Total 33.75 mg 33.75 mg 41.25 mg 41.25 mg 37.5 mg 41.25 mg 48.75 mg   Sun 3.75 mg 3.75 mg 3.75 mg 3.75 mg 3.75 mg 3.75 mg 3.75 mg   Mon - 7.5 mg 11.25 mg (3/3); Otherwise 7.5 mg 7.5 mg 7.5 mg - 11.25 mg (3/31); Otherwise 7.5 mg   Tue - 3.75 mg 3.75 mg 3.75 mg Hold (3/18) 3.75 mg 7.5 mg   Wed - 3.75 mg 7.5 mg 7.5 mg 7.5 mg 7.5 mg 7.5 mg   Thu 3.75 mg 3.75 mg 3.75 mg 3.75 mg 3.75 mg 3.75 mg 3.75 mg   Fri 7.5 mg 7.5 mg 7.5 mg 7.5 mg 7.5 mg 7.5 mg 7.5 mg   Sat 3.75 mg 3.75 mg 3.75 mg 7.5 mg 7.5 mg 7.5 mg 7.5 mg       Plan:  1. INR is Subtherapeutic today- see above in Anticoagulation Summary.  Will instruct Omar PERRIN Kenrick to Increase their warfarin regimen (11.25 mg today, then increase to 3.75 mg Sun/Thurs, 7.5 mg all other days) - see above in Anticoagulation Summary.  2. Follow up in 1 week.  3. Patient declines warfarin refills.  4. Verbal and written information provided. Patient expresses understanding and has no further questions at this time.    Rivera Arana, PharmD

## 2025-04-01 ENCOUNTER — APPOINTMENT (OUTPATIENT)
Dept: CARDIAC REHAB | Facility: HOSPITAL | Age: 80
End: 2025-04-01
Payer: MEDICARE

## 2025-04-01 ENCOUNTER — TREATMENT (OUTPATIENT)
Dept: CARDIAC REHAB | Facility: HOSPITAL | Age: 80
End: 2025-04-01
Payer: MEDICARE

## 2025-04-01 DIAGNOSIS — J45.909 ASTHMA, UNSPECIFIED ASTHMA SEVERITY, UNSPECIFIED WHETHER COMPLICATED, UNSPECIFIED WHETHER PERSISTENT: Primary | ICD-10-CM

## 2025-04-01 DIAGNOSIS — J43.8 OTHER EMPHYSEMA: ICD-10-CM

## 2025-04-01 PROCEDURE — 94625 PHY/QHP OP PULM RHB W/O MNTR: CPT

## 2025-04-02 ENCOUNTER — OFFICE VISIT (OUTPATIENT)
Dept: FAMILY MEDICINE CLINIC | Facility: CLINIC | Age: 80
End: 2025-04-02
Payer: MEDICARE

## 2025-04-02 VITALS
HEART RATE: 74 BPM | OXYGEN SATURATION: 97 % | BODY MASS INDEX: 29.41 KG/M2 | SYSTOLIC BLOOD PRESSURE: 146 MMHG | DIASTOLIC BLOOD PRESSURE: 82 MMHG | HEIGHT: 66 IN | RESPIRATION RATE: 18 BRPM | WEIGHT: 183 LBS

## 2025-04-02 DIAGNOSIS — M75.121 COMPLETE TEAR OF RIGHT ROTATOR CUFF, UNSPECIFIED WHETHER TRAUMATIC: ICD-10-CM

## 2025-04-02 DIAGNOSIS — I26.02 ACUTE SADDLE PULMONARY EMBOLISM WITH ACUTE COR PULMONALE: ICD-10-CM

## 2025-04-02 DIAGNOSIS — M25.562 CHRONIC PAIN OF LEFT KNEE: Primary | ICD-10-CM

## 2025-04-02 DIAGNOSIS — I27.20 PULMONARY HYPERTENSION: ICD-10-CM

## 2025-04-02 DIAGNOSIS — D68.59 PROTEIN C DEFICIENCY: ICD-10-CM

## 2025-04-02 DIAGNOSIS — D68.59 PROTEIN S DEFICIENCY: ICD-10-CM

## 2025-04-02 DIAGNOSIS — G89.29 CHRONIC PAIN OF LEFT KNEE: Primary | ICD-10-CM

## 2025-04-02 RX ORDER — TRIAMCINOLONE ACETONIDE 40 MG/ML
40 INJECTION, SUSPENSION INTRA-ARTICULAR; INTRAMUSCULAR
Status: COMPLETED | OUTPATIENT
Start: 2025-04-02 | End: 2025-04-02

## 2025-04-02 RX ORDER — LIDOCAINE HYDROCHLORIDE 10 MG/ML
2 INJECTION, SOLUTION INFILTRATION; PERINEURAL
Status: COMPLETED | OUTPATIENT
Start: 2025-04-02 | End: 2025-04-02

## 2025-04-02 RX ORDER — BUPIVACAINE HYDROCHLORIDE 5 MG/ML
2 INJECTION, SOLUTION EPIDURAL; INTRACAUDAL; PERINEURAL
Status: COMPLETED | OUTPATIENT
Start: 2025-04-02 | End: 2025-04-02

## 2025-04-02 RX ADMIN — BUPIVACAINE HYDROCHLORIDE 2 ML: 5 INJECTION, SOLUTION EPIDURAL; INTRACAUDAL; PERINEURAL at 15:26

## 2025-04-02 RX ADMIN — TRIAMCINOLONE ACETONIDE 40 MG: 40 INJECTION, SUSPENSION INTRA-ARTICULAR; INTRAMUSCULAR at 15:26

## 2025-04-02 RX ADMIN — LIDOCAINE HYDROCHLORIDE 2 ML: 10 INJECTION, SOLUTION INFILTRATION; PERINEURAL at 15:26

## 2025-04-02 NOTE — PROGRESS NOTES
Procedure   Arthrocentesis    Date/Time: 4/2/2025 3:26 PM    Performed by: Dennis Kwong MD  Authorized by: Dennis Kwong MD  Indications: pain   Body area: knee  Joint: left knee  Local anesthesia used: no    Anesthesia:  Local anesthesia used: no    Sedation:  Patient sedated: no    Preparation: Patient was prepped and draped in the usual sterile fashion.  Needle size: 22 G  Ultrasound guidance: no  Approach: anterior  Meds administered: 2 mL lidocaine 1 %; 2 mL bupivacaine (PF) 0.5 %; 40 mg triamcinolone acetonide 40 MG/ML
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
## 2025-04-02 NOTE — PROGRESS NOTES
HEMATOLOGY ONCOLOGY OUTPATIENT CONSULTATION       Patient name: Omar Choudhary  : 1945  MRN: [id number]  Primary Care Physician: Dennis Kwong MD  Referring Physician: No ref. provider found  Reason For Consult:       History of Present Illness:  Patient is a 79 y.o. male who has been referred to us for further evaluation and management of recent PE. Pertinent history is as follows:      The patient was admitted to [facility] from 1/10/25-25 with saddle emboli with severe respiratory failure. He was admitted to the ICU and had thrombectomy in addition to a/c. He had high oxygen needs initially optiflow but gradually O2 needs improved and he is off oxygen and had overnight oximetry as well as 6 minute walk without any need for supplemental oxygen. He has been on coumadin and INR has been therapeutic for 2 days. He will discharge to home with  and discussed should have INR check in about 2 days to continue to monitor INR and coumadin dosing. Severe pulmonary hypertension seen on [facility] and will follow with Cardiology. He's needed a low dose of norco about BID for the stay due to resolving chest pain from PE. I wrote short rx for this to have available but d/w patient trying to move towards tylenol for pain control and to avoid NSAIDs.      He had planned on flying to [place] in a couple of weeks but was recommended he postpone that trip for the time being until followed up with Cardiology and cleared for flying/altitude.       25: Bilateral LE Venous Duplex US:      Acute right lower extremity deep vein thrombosis noted in the distal femoral, popliteal and peroneal.    Chronic right lower extremity superficial thrombophlebitis noted in the great saphenous (below knee).    All other veins appeared normal bilaterally.    1/10/25: CTA chest:  IMPRESSION:   Extensive pulmonary embolism/saddle embolus.  Increased RV LV ratio 1.2 suggesting right heart strain.    1/10/25:  Echocardiogram:    The right ventricular cavity is severely dilated. Severely reduced right ventricular systolic function noted.    Left ventricular systolic function is normal. Left ventricular ejection fraction appears to be 61 - 65%.    Left ventricular diastolic function is consistent with (grade I) impaired relaxation.    The aortic valve leaflets are moderately calcified (aortic sclerosis)      Past Medical History:  Late onset asthma        Family History:   Brother  of MI at age 46,  Father  at 55 of cardiac issues.    3/3/25: Patient presents for initial consultation. Denied any respiratory/chest symptoms presently.     Patient claimed being in very good health for most of his adult life., he was a Platelet donor for several years. Had a tibial compression fracture in , he is Undergoing outpatient rehab.    Reported that he has been uptodate with his age appropriate cancer screening, Had a colonoscopy: Diverticulosis. No polyps    History of Present Illness  4/3/25: The patient presents for follow-up visit.    He reports a fluctuating response to his warfarin, with periods of both subtherapeutic and supratherapeutic levels. He has been on a regimen of warfarin 7.5 mg for 5 days a week, supplemented by half tablets on the remaining 2 days. Despite dietary modifications, including a shift from spinach to lettuce, he continues to experience these fluctuations.  His most recent INR was obtained this past Monday.   He has been receiving weekly management at [facility] and is considering transferring his care to our facility.         Past Medical History:   Diagnosis Date    ADHD (attention deficit hyperactivity disorder)     Hard to focus on tasks and follow through.    Allergic     Arthritis     Asthma     Carpal tunnel syndrome     no pain    Cataract     Deep vein thrombosis 01/10/2025    Depression     Erectile dysfunction     2018 at 73 years old    Family history of malignant neoplasm of  breast 09/26/2019    Hyperlipidemia 09/26/2019    Hypertension 03/01/2012    Leg pain, right     Low back pain     Neuropathy     feet    Osteopenia     Poor balance     Pulmonary embolism     Reactive airway disease 01/15/2016    Scoliosis 1960    Shortness of breath     Sleep apnea     Substance abuse 1966    Alcoholic       Past Surgical History:   Procedure Laterality Date    CARDIAC CATHETERIZATION  1992    CARDIAC CATHETERIZATION N/A 1/11/2025    Procedure: Right Heart Cath;  Surgeon: Nancy Hdez MD;  Location:  [facility];  Service: Cardiovascular;  Laterality: N/A;    CARDIAC CATHETERIZATION  1/11/2025    Procedure: Percutaneous Manual Thrombectomy;  Surgeon: Nancy Hdez MD;  Location:  [facility];  Service: Cardiovascular;;    CARDIAC CATHETERIZATION Bilateral 1/11/2025    Procedure: Pulmonary angiography;  Surgeon: Nancy Hdez MD;  Location:  [facility];  Service: Cardiovascular;  Laterality: Bilateral;    COLONOSCOPY  2013    No polyps, slight diverticulitis    EYE SURGERY  2016    KNEE ARTHROSCOPY W/ ACL RECONSTRUCTION Right 2019    LUMBAR FUSION Bilateral 10/05/2022    Procedure: DAY 2 LUMBAR LAMINECTOMY TRANSFORAMINAL LUMBAR INTERBODY FUSION L2,L3,L4 WITH NEURO ROBOT;  Surgeon: John Do MD;  Location: [facility] MAIN OR;  Service: Robotics - Neuro;  Laterality: Bilateral;    LUMBAR FUSION N/A 10/04/2022    Procedure: DAY 1 LUMBAR LATERAL INTERBODY FUSION WITH NEURO ROBOT L2, L3.L4;  Surgeon: John Do MD;  Location: [facility] MAIN OR;  Service: Robotics - Neuro;  Laterality: N/A;  left side approach    MOUTH SURGERY      SPINE SURGERY  10/4&5/2022    Dr. John Do, [facility] group         Current Outpatient Medications:     acetaminophen (TYLENOL) 500 MG tablet, Take 1 tablet by mouth Every 6 (Six) Hours As Needed for Mild Pain., Disp: , Rfl:     albuterol sulfate  (90 Base) MCG/ACT inhaler, Inhale 2 puffs  Every 4 (Four) Hours As Needed for Wheezing or Shortness of Air., Disp: 18 g, Rfl: 0    B Complex Vitamins (VITAMIN B COMPLEX) capsule capsule, Take 1 capsule by mouth Daily. LD 9-27, Disp: , Rfl:     budesonide-formoterol (SYMBICORT) 160-4.5 MCG/ACT inhaler, Inhale 2 puffs 2 (Two) Times a Day., Disp: 10.2 g, Rfl: 0    Calcium Carb-Cholecalciferol (Oyster Shell Calcium w/D) 500-5 MG-MCG tablet, TAKE TWO TABLETS BY MOUTH DAILY, Disp: 180 tablet, Rfl: 0    clonazePAM (KlonoPIN) 0.5 MG tablet, TAKE ONE TABLET BY MOUTH TWICE DAILY AS NEEDED FOR ANXIETY, Disp: 30 tablet, Rfl: 2    coenzyme Q10 100 MG capsule, Take 1 capsule by mouth Daily., Disp: , Rfl:     cyclobenzaprine (FLEXERIL) 10 MG tablet, Take 1 tablet by mouth 3 (Three) Times a Day As Needed for Muscle Spasms., Disp: 60 tablet, Rfl: 2    ferrous sulfate 325 (65 FE) MG tablet, Take 1 tablet by mouth Daily With Breakfast. Can cause constipation, Disp: 30 tablet, Rfl: 0    gabapentin (NEURONTIN) 300 MG capsule, TAKE 1 CAPSULE BY MOUTH IN THE MORING, 1 CAPSULE IN THE AFTERNOON AND 1 CAPSULES AT NIGHT, Disp: 120 capsule, Rfl: 0    HYDROcodone-acetaminophen (NORCO) 5-325 MG per tablet, Take 1 tablet by mouth 2 (Two) Times a Day As Needed for Moderate Pain., Disp: 6 tablet, Rfl: 0    losartan (COZAAR) 50 MG tablet, TAKE 1 TABLET BY MOUTH DAILY, Disp: 90 tablet, Rfl: 0    montelukast (SINGULAIR) 10 MG tablet, TAKE 1 TABLET BY MOUTH ONCE DAILY, Disp: 90 tablet, Rfl: 0    Multiple Vitamins-Minerals (EMERGEN-C BLUE PO), Take 1 tablet by mouth Daily. LD 9-27, Disp: , Rfl:     naloxone (NARCAN) 4 MG/0.1ML nasal spray, Call 911. Don't prime. [place] in 1 nostril for overdose. Repeat in 2-3 minutes in other nostril if no or minimal breathing/responsiveness., Disp: 2 each, Rfl: 0    S-Adenosylmethionine (YURY-e) 400 MG tablet, Take 400 mg by mouth Daily., Disp: , Rfl:     sildenafil (REVATIO) 20 MG tablet, take 1 to 2 tablets by mouth as needed for erictile dysfunction, Disp: 50  tablet, Rfl: 0    theophylline (THEODUR) 300 MG 12 hr tablet, Take 1 tablet by mouth Daily., Disp: 90 tablet, Rfl: 3    traZODone (DESYREL) 50 MG tablet, Take 1-2 tablets by mouth Every Night., Disp: 180 tablet, Rfl: 3    triamcinolone (KENALOG) 0.025 % cream, Apply 1 Application topically to the appropriate area as directed 2 (Two) Times a Day., Disp: , Rfl:     Turmeric 400 MG capsule, Take 1 capsule by mouth Daily., Disp: , Rfl:     warfarin (COUMADIN) 7.5 MG tablet, Take one tablet by mouth on Mon and Fri and take one-half of a tablet by mouth all other days or as directed  Indications: DVT/PE (active thrombosis), Disp: 60 tablet, Rfl: 0    Allergies   Allergen Reactions    Statins Myalgia     Other reaction(s): muscle soreness       Family History   Problem Relation Age of Onset    Heart disease Mother     Hypertension Mother     Breast cancer Mother     Stroke Mother         Several TIAs    Alcohol abuse Mother     Thyroid disease Mother     Arthritis Mother     Cancer Mother         Breast    Aortic aneurysm Father     Heart attack Father     Liver cancer Father     Cancer Father         Liver    Heart disease Father         Heart attack    Early death Brother         Coronary thrombosis @ 46 years while mountain climbing.    Heart attack Brother         , at 47 years, fatal       Cancer-related family history includes Breast cancer in his mother; Cancer in his father and mother; Liver cancer in his father.      Social History     Tobacco Use    Smoking status: Former     Current packs/day: 0.00     Average packs/day: 0.5 packs/day for 26.0 years (13.0 ttl pk-yrs)     Types: Cigarettes     Start date: 1966     Quit date: 1992     Years since quittin.2     Passive exposure: Past    Smokeless tobacco: Never   Vaping Use    Vaping status: Never Used   Substance Use Topics    Alcohol use: Yes     Alcohol/week: 5.0 standard drinks of alcohol     Types: 3 Glasses of wine, 2 Cans of beer per week     "Drug use: Never     Comment: CBD gummies- stop now for surgery     Social History     Social History Narrative    Not on file       ROS:   Review of Systems   Constitutional: Negative.    HENT: Negative.     Eyes: Negative.    Respiratory: Negative.     Cardiovascular: Negative.    Gastrointestinal: Negative.    Endocrine: Negative.    Genitourinary: Negative.    Musculoskeletal: Negative.    Skin: Negative.    Allergic/Immunologic: Negative.    Neurological: Negative.    Hematological: Negative.    Psychiatric/Behavioral: Negative.           Objective:    Vital Signs:  Vitals:    04/03/25 1014   BP: 153/100   Pulse: 82   SpO2: 97%   Weight: 83.3 kg (183 lb 9.6 oz)   Height: 167.6 cm (66\")   PainSc: 0-No pain       Body mass index is 29.63 kg/m².    ECOG  (1) Restricted in physically strenuous activity, ambulatory and able to do work of light nature    Physical Exam:   Physical Exam  Constitutional:       Appearance: Normal appearance. He is normal weight.   HENT:      Head: Normocephalic and atraumatic.      Right Ear: External ear normal.      Left Ear: External ear normal.      Nose: Nose normal.      Mouth/Throat:      Mouth: Mucous membranes are moist.      Pharynx: Oropharynx is clear.   Eyes:      Extraocular Movements: Extraocular movements intact.      Conjunctiva/sclera: Conjunctivae normal.      Pupils: Pupils are equal, round, and reactive to light.   Cardiovascular:      Rate and Rhythm: Normal rate.      Pulses: Normal pulses.   Pulmonary:      Effort: Pulmonary effort is normal.   Abdominal:      General: Abdomen is flat.      Palpations: Abdomen is soft.   Musculoskeletal:         General: Normal range of motion.      Cervical back: Normal range of motion and neck supple.   Skin:     General: Skin is warm.   Neurological:      Mental Status: He is alert.   Psychiatric:         Mood and Affect: Mood normal.         Behavior: Behavior normal.         Thought Content: Thought content normal.         " "Judgment: Judgment normal.         Lab Results - Last 18 Months   Lab Units 03/03/25  1256 01/19/25  0347 01/18/25  0336   WBC 10*3/mm3 4.21 5.85 6.02   HEMOGLOBIN g/dL 15.4 10.3* 9.7*   HEMATOCRIT % 48.4 30.0* 31.2*   PLATELETS 10*3/mm3 177 196 182   MCV fL 100.6* 94.0 96.9     Lab Results - Last 18 Months   Lab Units 01/18/25  0336 01/17/25  0353 01/16/25  0418 01/10/25  1638 04/18/24  1313   SODIUM mmol/L 136 135* 134*   < > 141   POTASSIUM mmol/L 4.1 4.1 4.1   < > 4.0   CHLORIDE mmol/L 104 102 102   < > 104   CO2 mmol/L 24.0 26.0 23.6   < > 22.0   BUN mg/dL 15 15 14   < > 14   CREATININE mg/dL 0.59* 0.75* 0.70*   < > 0.88   CALCIUM mg/dL 8.0* 8.1* 8.0*   < > 9.2   BILIRUBIN mg/dL  --   --   --   --  0.9   ALK PHOS U/L  --   --   --   --  59   ALT (SGPT) U/L  --   --   --   --  22   AST (SGOT) U/L  --   --   --   --  24   GLUCOSE mg/dL 86 88 93   < > 86    < > = values in this interval not displayed.       Lab Results   Component Value Date    GLUCOSE 86 01/18/2025    BUN 15 01/18/2025    CREATININE 0.59 (L) 01/18/2025    EGFRIFNONA 69 11/24/2021    BCR 25.4 (H) 01/18/2025    K 4.1 01/18/2025    CO2 24.0 01/18/2025    CALCIUM 8.0 (L) 01/18/2025    ALBUMIN 4.5 04/18/2024    AST 24 04/18/2024    ALT 22 04/18/2024       Lab Results - Last 18 Months   Lab Units 03/31/25  0930 03/25/25  0934 03/18/25  0929 01/14/25  0357 01/13/25  0330 01/12/25  1726 01/12/25  1003   INR  1.6* 2.0* 3.6*   < >  --   --   --    APTT seconds  --   --   --   --  35.9* 153.3* 53.4*    < > = values in this interval not displayed.       Lab Results   Component Value Date    IRON 51 (L) 01/14/2025    TIBC 267 (L) 01/14/2025    FERRITIN 225.00 01/14/2025       Lab Results   Component Value Date    FOLATE 13.10 01/16/2025       No results found for: \"OCCULTBLD\"    Lab Results   Component Value Date    RETICCTPCT 3.10 (H) 01/15/2025     Lab Results   Component Value Date    UMYDHRSB18 434 01/16/2025     No results found for: \"SPEP\", \"UPEP\"  No " "results found for: \"LDH\", \"URICACID\"  No results found for: \"SUZANNE\", \"RF\", \"SEDRATE\"  No results found for: \"FIBRINOGEN\", \"HAPTOGLOBIN\"  Lab Results   Component Value Date    PTT 35.9 (H) 01/13/2025    INR 1.6 (H) 03/31/2025     No results found for: \"\"  No results found for: \"CEA\"  No components found for: \"CA-19-9\"  Lab Results   Component Value Date    PSA 0.499 04/18/2024     No results found for: \"SEDRATE\"       Assessment & Plan     Saddle Pulmonary Embolism:  Right LE Extensive DVT:  -recent imaging and hospitalization records were reviewed as above.  CT angiogram chest consistent with a large saddle pulmonary embolism.  -No clear risk factors were identified, however relative immobilization following tibial fracture and recent road trip for 5 hours could be weak risk factors. patient denied any recent acute illness.  -Lower extremity venous duplex ultrasound was reported positive for Extensive DVT involving RLE proximal and distal veins.  -Discussed with patient/family about management of pulmonary embolism and evaluation for risk factors  Given no clear provoking factors,   thrombophilia workup was ordered as this may  have implications for testing for family members.   -Thrombophilia workup is reported negative, low protein C and protein S levels are likely secondary to warfarin use.  -Given Saddle PE and extensive DVT, likely candidate for indefinite anticoagulation.   -currently on Warfarin for long term anticoagulation. Denied any bleeding issues presently.  -Hold off on any elective procedures for 6 months.  -Given wild fluctuation in INR levels, discussed with patient regarding switching to Eliquis, he is concerned about his high co-pay with Eliquis, will request financial counselor evaluation to look into this.  -Patient will establish care with the [facility] anticoagulation clinic, ordered POC INR today.    Right heart Strain: noted during hospital admission. Pt is following with Cardiology, " planned for repeat Echo this week. Will defer to Cardiology regarding clearance for travel.    3-month follow-up with repeat labs, sooner as needed      Thank you very much for providing the opportunity to participate in this patient’s care. Please do not hesitate to call if there are any other questions.

## 2025-04-03 ENCOUNTER — APPOINTMENT (OUTPATIENT)
Dept: LAB | Facility: HOSPITAL | Age: 80
End: 2025-04-03
Payer: MEDICARE

## 2025-04-03 ENCOUNTER — APPOINTMENT (OUTPATIENT)
Dept: CARDIAC REHAB | Facility: HOSPITAL | Age: 80
End: 2025-04-03
Payer: MEDICARE

## 2025-04-03 ENCOUNTER — OFFICE VISIT (OUTPATIENT)
Dept: ONCOLOGY | Facility: CLINIC | Age: 80
End: 2025-04-03
Payer: MEDICARE

## 2025-04-03 ENCOUNTER — ANTICOAGULATION VISIT (OUTPATIENT)
Dept: PHARMACY | Facility: HOSPITAL | Age: 80
End: 2025-04-03
Payer: MEDICARE

## 2025-04-03 VITALS
BODY MASS INDEX: 29.51 KG/M2 | HEART RATE: 82 BPM | WEIGHT: 183.6 LBS | HEIGHT: 66 IN | SYSTOLIC BLOOD PRESSURE: 153 MMHG | DIASTOLIC BLOOD PRESSURE: 100 MMHG | OXYGEN SATURATION: 97 %

## 2025-04-03 DIAGNOSIS — D64.9 ANEMIA, UNSPECIFIED TYPE: ICD-10-CM

## 2025-04-03 DIAGNOSIS — I82.4Y1 ACUTE DEEP VEIN THROMBOSIS (DVT) OF PROXIMAL VEIN OF RIGHT LOWER EXTREMITY: Primary | ICD-10-CM

## 2025-04-03 DIAGNOSIS — I26.02 ACUTE SADDLE PULMONARY EMBOLISM WITH ACUTE COR PULMONALE: Primary | ICD-10-CM

## 2025-04-03 DIAGNOSIS — I26.09 OTHER ACUTE PULMONARY EMBOLISM WITH ACUTE COR PULMONALE: ICD-10-CM

## 2025-04-03 LAB — INR PPP: 2.1 (ref 0.8–1.2)

## 2025-04-03 PROCEDURE — 1126F AMNT PAIN NOTED NONE PRSNT: CPT | Performed by: STUDENT IN AN ORGANIZED HEALTH CARE EDUCATION/TRAINING PROGRAM

## 2025-04-03 PROCEDURE — 3077F SYST BP >= 140 MM HG: CPT | Performed by: STUDENT IN AN ORGANIZED HEALTH CARE EDUCATION/TRAINING PROGRAM

## 2025-04-03 PROCEDURE — 3080F DIAST BP >= 90 MM HG: CPT | Performed by: STUDENT IN AN ORGANIZED HEALTH CARE EDUCATION/TRAINING PROGRAM

## 2025-04-03 PROCEDURE — 99214 OFFICE O/P EST MOD 30 MIN: CPT | Performed by: STUDENT IN AN ORGANIZED HEALTH CARE EDUCATION/TRAINING PROGRAM

## 2025-04-03 NOTE — PROGRESS NOTES
Anticoagulation Clinic Progress Note    Patient's visit was held in office today. This was patients first visit in this office today, he was previously followed by [facility].     Anticoagulation Summary  As of 4/3/2025      INR goal:  2.0-3.0   TTR:  27.3% (2.1 mo)   INR used for dosin.10 (4/3/2025)   Warfarin maintenance plan:  3.75 mg every Sun, Thu; 7.5 mg all other days   Weekly warfarin total:  45 mg   Plan last modified:  Rivera Arana, PharmD (3/31/2025)   Next INR check:  2025   Target end date:  --    Indications    Acute saddle pulmonary embolism with acute cor pulmonale [I26.02]                 Anticoagulation Episode Summary       INR check location:  --    Preferred lab:  --    Send INR reminders to:   [facility] CBC ANTICOAG POOL    Comments:  --          Anticoagulation Care Providers       Provider Role Specialty Phone number    Kenny Jennifer AYDIN, EMILY Referring Nurse Practitioner [phone]            Clinic Interview:  Patient Findings     Negatives:  Signs/symptoms of thrombosis, Signs/symptoms of bleeding,   Laboratory test error suspected, Change in health, Change in alcohol use,   Change in activity, Upcoming invasive procedure, Emergency department   visit, Upcoming dental procedure, Missed doses, Extra doses, Change in   medications, Change in diet/appetite, Hospital admission, Bruising, Other   complaints      Clinical Outcomes     Negatives:  Major bleeding event, Thromboembolic event,   Anticoagulation-related hospital admission, Anticoagulation-related ED   visit, Anticoagulation-related fatality        INR History:      Latest Ref Rng & Units 3/3/2025    10:15 AM 3/11/2025     9:15 AM 3/18/2025     9:15 AM 3/25/2025     9:15 AM 3/31/2025     9:15 AM 4/3/2025    11:38 AM 4/3/2025    11:45 AM   Anticoagulation Monitoring   INR  1.4 1.8 3.6 2.0 1.6  2.10   INR Date  3/3/2025 3/11/2025 3/18/2025 3/25/2025 3/31/2025  4/3/2025   INR Goal  2.0-3.0 2.0-3.0 2.0-3.0 2.0-3.0 2.0-3.0   2.0-3.0   Trend  Up Up Same Same Up  Same   Last Week Total  33.75 mg 37.5 mg 41.25 mg 33.75 mg 41.25 mg  48.75 mg   Next Week Total  41.25 mg 41.25 mg 37.5 mg 41.25 mg 48.75 mg  48.75 mg   Sun  3.75 mg 3.75 mg 3.75 mg 3.75 mg 3.75 mg  3.75 mg   Mon  11.25 mg (3/3); Otherwise 7.5 mg 7.5 mg 7.5 mg - 11.25 mg (3/31); Otherwise 7.5 mg  7.5 mg   Tue  3.75 mg 3.75 mg Hold (3/18) 3.75 mg 7.5 mg  -   Wed  7.5 mg 7.5 mg 7.5 mg 7.5 mg 7.5 mg  -   Thu  3.75 mg 3.75 mg 3.75 mg 3.75 mg 3.75 mg  7.5 mg (4/3)   Fri  7.5 mg 7.5 mg 7.5 mg 7.5 mg 7.5 mg  7.5 mg   Sat  3.75 mg 7.5 mg 7.5 mg 7.5 mg 7.5 mg  7.5 mg   Historical INR 0.80 - 1.20      2.10     Visit Report       Report Report       Plan:  1. INR is Therapeutic today- see above in Anticoagulation Summary.  Will instruct Omar AYDIN Choudhary to Continue their warfarin regimen- see above in Anticoagulation Summary. Patient was scheduled to take half tablet today, however with INR only being at lower end of range will have patient take 7.5 mg. He is to still take half tablet on Sunday.   2. Follow up in 1 week  3. Patient declines warfarin refills.  4. Verbal and written information provided. Patient expresses understanding and has no further questions at this time.    Ramona Sauceda [facility]

## 2025-04-04 ENCOUNTER — APPOINTMENT (OUTPATIENT)
Dept: CARDIAC REHAB | Facility: HOSPITAL | Age: 80
End: 2025-04-04
Payer: MEDICARE

## 2025-04-08 ENCOUNTER — ANTICOAGULATION VISIT (OUTPATIENT)
Dept: PHARMACY | Facility: HOSPITAL | Age: 80
End: 2025-04-08
Payer: MEDICARE

## 2025-04-08 ENCOUNTER — TREATMENT (OUTPATIENT)
Dept: CARDIAC REHAB | Facility: HOSPITAL | Age: 80
End: 2025-04-08
Payer: MEDICARE

## 2025-04-08 ENCOUNTER — APPOINTMENT (OUTPATIENT)
Dept: CARDIAC REHAB | Facility: HOSPITAL | Age: 80
End: 2025-04-08
Payer: MEDICARE

## 2025-04-08 ENCOUNTER — LAB (OUTPATIENT)
Dept: LAB | Facility: HOSPITAL | Age: 80
End: 2025-04-08
Payer: MEDICARE

## 2025-04-08 DIAGNOSIS — J45.909 ASTHMA, UNSPECIFIED ASTHMA SEVERITY, UNSPECIFIED WHETHER COMPLICATED, UNSPECIFIED WHETHER PERSISTENT: Primary | ICD-10-CM

## 2025-04-08 DIAGNOSIS — I26.02 ACUTE SADDLE PULMONARY EMBOLISM WITH ACUTE COR PULMONALE: ICD-10-CM

## 2025-04-08 DIAGNOSIS — I26.02 ACUTE SADDLE PULMONARY EMBOLISM WITH ACUTE COR PULMONALE: Primary | ICD-10-CM

## 2025-04-08 LAB — INR PPP: 2 (ref 0.8–1.2)

## 2025-04-08 PROCEDURE — 94625 PHY/QHP OP PULM RHB W/O MNTR: CPT

## 2025-04-08 PROCEDURE — 85610 PROTHROMBIN TIME: CPT

## 2025-04-08 NOTE — PROGRESS NOTES
Anticoagulation Clinic Progress Note    Patient's visit was held in office today.    Anticoagulation Summary  As of 2025      INR goal:  2.0-3.0   TTR:  32.5% (2.3 mo)   INR used for dosin.00 (2025)   Warfarin maintenance plan:  7.5 mg (7.5 mg x 1) every day   Weekly warfarin total:  52.5 mg   Plan last modified:  Ramona Sauceda RPH (2025)   Next INR check:  --   Target end date:  --    Indications    Acute saddle pulmonary embolism with acute cor pulmonale [I26.02]                 Anticoagulation Episode Summary       INR check location:  --    Preferred lab:  --    Send INR reminders to:   [facility] CBC ANTICOAG POOL    Comments:  --          Anticoagulation Care Providers       Provider Role Specialty Phone number    [place], EMILY Referring Nurse Practitioner [phone]            Clinic Interview:  Patient Findings     Negatives:  Signs/symptoms of thrombosis, Signs/symptoms of bleeding,   Laboratory test error suspected, Change in health, Change in alcohol use,   Change in activity, Upcoming invasive procedure, Emergency department   visit, Upcoming dental procedure, Missed doses, Extra doses, Change in   medications, Change in diet/appetite, Hospital admission, Bruising, Other   complaints      Clinical Outcomes     Negatives:  Major bleeding event, Thromboembolic event,   Anticoagulation-related hospital admission, Anticoagulation-related ED   visit, Anticoagulation-related fatality        INR History:      Latest Ref Rng & Units 3/18/2025     9:15 AM 3/25/2025     9:15 AM 3/31/2025     9:15 AM 4/3/2025    11:38 AM 4/3/2025    11:45 AM 2025     9:18 AM 2025     9:45 AM   Anticoagulation Monitoring   INR  3.6 2.0 1.6  2.10  2.00   INR Date  3/18/2025 3/25/2025 3/31/2025  4/3/2025  2025   INR Goal  2.0-3.0 2.0-3.0 2.0-3.0  2.0-3.0  2.0-3.0   Trend  Same Same Up  Same  Up   Last Week Total  41.25 mg 33.75 mg 41.25 mg  48.75 mg  48.75 mg   Next Week Total  37.5 mg 41.25  mg 48.75 mg  48.75 mg  52.5 mg   Sun  3.75 mg 3.75 mg 3.75 mg  3.75 mg  7.5 mg   Mon  7.5 mg - 11.25 mg (3/31); Otherwise 7.5 mg  7.5 mg  7.5 mg   Tue  Hold (3/18) 3.75 mg 7.5 mg  -  7.5 mg   Wed  7.5 mg 7.5 mg 7.5 mg  -  7.5 mg   Thu  3.75 mg 3.75 mg 3.75 mg  7.5 mg (4/3)  7.5 mg   Fri  7.5 mg 7.5 mg 7.5 mg  7.5 mg  7.5 mg   Sat  7.5 mg 7.5 mg 7.5 mg  7.5 mg  7.5 mg   Historical INR 0.80 - 1.20    2.10   2.00     Visit Report     Report Report         Plan:  1. INR is Therapeutic today- see above in Anticoagulation Summary. However still at lower end of range.   Will instruct Omar Choudhary to Change their warfarin regimen- see above in Anticoagulation Summary. Will begin 7.5 mg daily. (Total weekly dose 52.5 mg, ~ 8% increase from last week)  2. Follow up in 1 week  3. Patient declines warfarin refills.  4. Verbal and written information provided. Patient expresses understanding and has no further questions at this time.    Ramona Sauceda [facility]

## 2025-04-09 ENCOUNTER — TELEPHONE (OUTPATIENT)
Dept: FAMILY MEDICINE CLINIC | Facility: CLINIC | Age: 80
End: 2025-04-09

## 2025-04-09 NOTE — TELEPHONE ENCOUNTER
"Caller: Omar Choudhary \"Mendez\"    Relationship: Self    Best call back number: [phone]    What is the medical concern/diagnosis: FLOP FOOT (CAN'T LIFT TOES ON LEFT FOOT)    What specialty or service is being requested: AMEND PHYSICAL THERAPY REFERRAL THAT'S ALREADY PLACED.    Any additional details:   PLEASE CALL TO CONFIRM/DENY.  "

## 2025-04-10 ENCOUNTER — APPOINTMENT (OUTPATIENT)
Dept: CARDIAC REHAB | Facility: HOSPITAL | Age: 80
End: 2025-04-10
Payer: MEDICARE

## 2025-04-11 ENCOUNTER — APPOINTMENT (OUTPATIENT)
Dept: CARDIAC REHAB | Facility: HOSPITAL | Age: 80
End: 2025-04-11
Payer: MEDICARE

## 2025-04-14 ENCOUNTER — TREATMENT (OUTPATIENT)
Dept: PHYSICAL THERAPY | Facility: CLINIC | Age: 80
End: 2025-04-14
Payer: MEDICARE

## 2025-04-14 DIAGNOSIS — M25.511 RIGHT SHOULDER PAIN, UNSPECIFIED CHRONICITY: Primary | ICD-10-CM

## 2025-04-14 DIAGNOSIS — M25.562 LEFT KNEE PAIN, UNSPECIFIED CHRONICITY: ICD-10-CM

## 2025-04-14 PROCEDURE — 97162 PT EVAL MOD COMPLEX 30 MIN: CPT | Performed by: PHYSICAL THERAPIST

## 2025-04-14 PROCEDURE — 97140 MANUAL THERAPY 1/> REGIONS: CPT | Performed by: PHYSICAL THERAPIST

## 2025-04-14 PROCEDURE — 97112 NEUROMUSCULAR REEDUCATION: CPT | Performed by: PHYSICAL THERAPIST

## 2025-04-14 PROCEDURE — 97110 THERAPEUTIC EXERCISES: CPT | Performed by: PHYSICAL THERAPIST
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
## 2025-04-15 ENCOUNTER — ANTICOAGULATION VISIT (OUTPATIENT)
Dept: PHARMACY | Facility: HOSPITAL | Age: 80
End: 2025-04-15
Payer: MEDICARE

## 2025-04-15 ENCOUNTER — APPOINTMENT (OUTPATIENT)
Dept: CARDIAC REHAB | Facility: HOSPITAL | Age: 80
End: 2025-04-15
Payer: MEDICARE

## 2025-04-15 ENCOUNTER — LAB (OUTPATIENT)
Dept: LAB | Facility: HOSPITAL | Age: 80
End: 2025-04-15
Payer: MEDICARE

## 2025-04-15 ENCOUNTER — TREATMENT (OUTPATIENT)
Dept: CARDIAC REHAB | Facility: HOSPITAL | Age: 80
End: 2025-04-15
Payer: MEDICARE

## 2025-04-15 DIAGNOSIS — I26.02 ACUTE SADDLE PULMONARY EMBOLISM WITH ACUTE COR PULMONALE: ICD-10-CM

## 2025-04-15 DIAGNOSIS — J43.8 OTHER EMPHYSEMA: ICD-10-CM

## 2025-04-15 DIAGNOSIS — I26.02 ACUTE SADDLE PULMONARY EMBOLISM WITH ACUTE COR PULMONALE: Primary | ICD-10-CM

## 2025-04-15 DIAGNOSIS — J45.909 ASTHMA, UNSPECIFIED ASTHMA SEVERITY, UNSPECIFIED WHETHER COMPLICATED, UNSPECIFIED WHETHER PERSISTENT: Primary | ICD-10-CM

## 2025-04-15 LAB — INR PPP: 1.6 (ref 0.8–1.2)

## 2025-04-15 PROCEDURE — 85610 PROTHROMBIN TIME: CPT

## 2025-04-15 PROCEDURE — 94625 PHY/QHP OP PULM RHB W/O MNTR: CPT

## 2025-04-15 RX ORDER — SOY ISOFLAVONE 40 MG
1 TABLET ORAL DAILY
COMMUNITY

## 2025-04-15 NOTE — PROGRESS NOTES
Anticoagulation Clinic Progress Note    Patient's visit was held in office today.    Anticoagulation Summary  As of 4/15/2025      INR goal:  2.0-3.0   TTR:  29.5% (2.5 mo)   INR used for dosin.60 (4/15/2025)   Warfarin maintenance plan:  7.5 mg (7.5 mg x 1) every day   Weekly warfarin total:  52.5 mg   Plan last modified:  Ramona Sauceda [facility] (2025)   Next INR check:  2025   Target end date:  --    Indications    Acute saddle pulmonary embolism with acute cor pulmonale [I26.02]                 Anticoagulation Episode Summary       INR check location:  --    Preferred lab:  --    Send INR reminders to:   [facility] PO[facility]    Comments:  --          Anticoagulation Care Providers       Provider Role Specialty Phone number    PrincetonJennifer mckeon, APRN Referring Nurse Practitioner [phone]            Clinic Interview:      INR History:      Latest Ref Rng & Units 3/31/2025     9:15 AM 4/3/2025    11:38 AM 4/3/2025    11:45 AM 2025     9:18 AM 2025     9:45 AM 4/15/2025     9:30 AM 4/15/2025     9:34 AM   Anticoagulation Monitoring   INR  1.6  2.10  2.00 1.60    INR Date  3/31/2025  4/3/2025  2025 4/15/2025    INR Goal  2.0-3.0  2.0-3.0  2.0-3.0 2.0-3.0    Trend  Up  Same  Up Same    Last Week Total  41.25 mg  48.75 mg  48.75 mg 41.25 mg    Next Week Total  48.75 mg  48.75 mg  52.5 mg 48.75 mg    Sun  3.75 mg  3.75 mg  7.5 mg 7.5 mg    Mon  11.25 mg (3/31); Otherwise 7.5 mg  7.5 mg  7.5 mg 7.5 mg    Tue  7.5 mg  -  7.5 mg 7.5 mg    Wed  7.5 mg  -  7.5 mg 7.5 mg    Thu  3.75 mg  7.5 mg (4/3)  7.5 mg 3.75 mg ()    Fri  7.5 mg  7.5 mg  7.5 mg 7.5 mg    Sat  7.5 mg  7.5 mg  7.5 mg 7.5 mg    Historical INR 0.80 - 1.20  2.10   2.00    1.60    Visit Report   Report Report           Plan:  1. INR is Subtherapeutic today- see above in Anticoagulation Summary. Patient inadvertently took half dose yesterday and then forgot yesterday's dose. He was also started on a 5 day Prednisone dose  pack yesterday.   Will instruct Omar AYDIN Choudhary to Change their warfarin regimen- see above in Anticoagulation Summary. Will do half tablet (3.75 mg) on Thursday secondary to steroids and continue 7.5 mg all other days.   2. Follow up in 1 week  3. Patient declines warfarin refills.  4. Verbal and written information provided. Patient expresses understanding and has no further questions at this time.    Ramona Sauceda [facility]

## 2025-04-17 ENCOUNTER — APPOINTMENT (OUTPATIENT)
Dept: CARDIAC REHAB | Facility: HOSPITAL | Age: 80
End: 2025-04-17
Payer: MEDICARE

## 2025-04-17 ENCOUNTER — TREATMENT (OUTPATIENT)
Dept: PHYSICAL THERAPY | Facility: CLINIC | Age: 80
End: 2025-04-17
Payer: MEDICARE

## 2025-04-17 DIAGNOSIS — M25.511 RIGHT SHOULDER PAIN, UNSPECIFIED CHRONICITY: Primary | ICD-10-CM

## 2025-04-17 DIAGNOSIS — M25.562 LEFT KNEE PAIN, UNSPECIFIED CHRONICITY: ICD-10-CM

## 2025-04-17 PROCEDURE — 97110 THERAPEUTIC EXERCISES: CPT | Performed by: PHYSICAL THERAPIST

## 2025-04-17 PROCEDURE — 97140 MANUAL THERAPY 1/> REGIONS: CPT | Performed by: PHYSICAL THERAPIST

## 2025-04-17 NOTE — PROGRESS NOTES
Physical Therapy Daily Treatment Note      Patient: Omar Choudhary   : 1945  Referring practitioner: Dennis Kwong MD  Date of Initial Visit: Type: THERAPY  Noted: 2025  Today's Date: 2025  Patient seen for 2 sessions       Visit Diagnoses:    ICD-10-CM ICD-9-CM   1. Right shoulder pain, unspecified chronicity  M25.511 719.41   2. Left knee pain, unspecified chronicity  M25.562 719.46       Subjective Primarily limited by knee pain today. HEP going well.     Objective   See Exercise, Manual, and Modality Logs for complete treatment.     L knee varus deformity noted  Assessment/Plan Knee pain improved post visit.       Timed:         Manual Therapy:    25     mins  24978;     Therapeutic Exercise:    15     mins  35175;     Neuromuscular Nayeli:        mins  03036;    Therapeutic Activity:          mins  58017;     Gait Training:           mins  46266;     Ultrasound:          mins  41759;    Ionto                                   mins   96009  Self Care                            mins   91232      Un-Timed:  Electrical Stimulation:         mins  31003 ( );  Dry Needling          mins self-pay  Traction          mins 10732  Canalith Repos         mins 59349    Timed Treatment:   40   mins   Total Treatment:     40   mins      Kendall Brown, PT, PT, DPT, OCS  [place] license: [id number]

## 2025-04-18 ENCOUNTER — APPOINTMENT (OUTPATIENT)
Dept: CARDIAC REHAB | Facility: HOSPITAL | Age: 80
End: 2025-04-18
Payer: MEDICARE

## 2025-04-21 ENCOUNTER — TREATMENT (OUTPATIENT)
Dept: PHYSICAL THERAPY | Facility: CLINIC | Age: 80
End: 2025-04-21
Payer: MEDICARE

## 2025-04-21 DIAGNOSIS — M25.511 RIGHT SHOULDER PAIN, UNSPECIFIED CHRONICITY: Primary | ICD-10-CM

## 2025-04-21 DIAGNOSIS — M25.562 LEFT KNEE PAIN, UNSPECIFIED CHRONICITY: ICD-10-CM

## 2025-04-21 PROCEDURE — 97112 NEUROMUSCULAR REEDUCATION: CPT | Performed by: PHYSICAL THERAPIST

## 2025-04-21 PROCEDURE — 97110 THERAPEUTIC EXERCISES: CPT | Performed by: PHYSICAL THERAPIST

## 2025-04-21 PROCEDURE — 97140 MANUAL THERAPY 1/> REGIONS: CPT | Performed by: PHYSICAL THERAPIST

## 2025-04-21 NOTE — PROGRESS NOTES
Physical Therapy Daily Treatment Note      Patient: Omar Choudhary   : 1945  Referring practitioner: Dennis Kwong MD  Date of Initial Visit: Type: THERAPY  Noted: 2025  Today's Date: 2025  Patient seen for 3 sessions       Visit Diagnoses:    ICD-10-CM ICD-9-CM   1. Right shoulder pain, unspecified chronicity  M25.511 719.41   2. Left knee pain, unspecified chronicity  M25.562 719.46       Subjective More pain over the weekend after cleaning his home and getting ready for Easter. HEP going well.     Objective   See Exercise, Manual, and Modality Logs for complete treatment.       Assessment/Plan Thigh is fatigued post visit.      Timed:         Manual Therapy:    15     mins  20210;     Therapeutic Exercise:    15     mins  61888;     Neuromuscular Nayeli:    10    mins  98713;    Therapeutic Activity:          mins  39379;     Gait Training:           mins  22827;     Ultrasound:          mins  22562;    Ionto                                   mins   12464  Self Care                            mins   91407      Un-Timed:  Electrical Stimulation:         mins  23097 ( );  Dry Needling          mins self-pay  Traction          mins 71871  Canalith Repos         mins 99554    Timed Treatment:   40   mins   Total Treatment:     40   mins      Kendall Brown, PT, PT, DPT, OCS  [place] license: [id number]

## 2025-04-22 ENCOUNTER — APPOINTMENT (OUTPATIENT)
Dept: CARDIAC REHAB | Facility: HOSPITAL | Age: 80
End: 2025-04-22
Payer: MEDICARE

## 2025-04-22 ENCOUNTER — LAB (OUTPATIENT)
Dept: LAB | Facility: HOSPITAL | Age: 80
End: 2025-04-22
Payer: MEDICARE

## 2025-04-22 ENCOUNTER — ANTICOAGULATION VISIT (OUTPATIENT)
Dept: PHARMACY | Facility: HOSPITAL | Age: 80
End: 2025-04-22
Payer: MEDICARE

## 2025-04-22 ENCOUNTER — TREATMENT (OUTPATIENT)
Dept: CARDIAC REHAB | Facility: HOSPITAL | Age: 80
End: 2025-04-22
Payer: MEDICARE

## 2025-04-22 ENCOUNTER — LAB (OUTPATIENT)
Dept: FAMILY MEDICINE CLINIC | Facility: CLINIC | Age: 80
End: 2025-04-22
Payer: MEDICARE

## 2025-04-22 DIAGNOSIS — J43.8 OTHER EMPHYSEMA: ICD-10-CM

## 2025-04-22 DIAGNOSIS — E78.2 MIXED HYPERLIPIDEMIA: Primary | ICD-10-CM

## 2025-04-22 DIAGNOSIS — I26.02 ACUTE SADDLE PULMONARY EMBOLISM WITH ACUTE COR PULMONALE: ICD-10-CM

## 2025-04-22 DIAGNOSIS — E55.9 VITAMIN D DEFICIENCY: ICD-10-CM

## 2025-04-22 DIAGNOSIS — I26.02 ACUTE SADDLE PULMONARY EMBOLISM WITH ACUTE COR PULMONALE: Primary | ICD-10-CM

## 2025-04-22 DIAGNOSIS — E53.8 VITAMIN B12 DEFICIENCY: ICD-10-CM

## 2025-04-22 DIAGNOSIS — Z12.5 SCREENING FOR PROSTATE CANCER: ICD-10-CM

## 2025-04-22 DIAGNOSIS — R79.9 ABNORMAL BLOOD CHEMISTRY: ICD-10-CM

## 2025-04-22 DIAGNOSIS — J45.909 ASTHMA, UNSPECIFIED ASTHMA SEVERITY, UNSPECIFIED WHETHER COMPLICATED, UNSPECIFIED WHETHER PERSISTENT: Primary | ICD-10-CM

## 2025-04-22 DIAGNOSIS — I10 PRIMARY HYPERTENSION: ICD-10-CM

## 2025-04-22 LAB
25(OH)D3 SERPL-MCNC: 35.5 NG/ML (ref 30–100)
ALBUMIN SERPL-MCNC: 4 G/DL (ref 3.5–5.2)
ALBUMIN/GLOB SERPL: 2 G/DL
ALP SERPL-CCNC: 61 U/L (ref 39–117)
ALT SERPL W P-5'-P-CCNC: 29 U/L (ref 1–41)
ANION GAP SERPL CALCULATED.3IONS-SCNC: 6.9 MMOL/L (ref 5–15)
AST SERPL-CCNC: 26 U/L (ref 1–40)
BASOPHILS # BLD AUTO: 0.02 10*3/MM3 (ref 0–0.2)
BASOPHILS NFR BLD AUTO: 0.4 % (ref 0–1.5)
BILIRUB SERPL-MCNC: 1.3 MG/DL (ref 0–1.2)
BUN SERPL-MCNC: 15 MG/DL (ref 8–23)
BUN/CREAT SERPL: 15.2 (ref 7–25)
CALCIUM SPEC-SCNC: 8.9 MG/DL (ref 8.6–10.5)
CHLORIDE SERPL-SCNC: 107 MMOL/L (ref 98–107)
CHOLEST SERPL-MCNC: 253 MG/DL (ref 0–200)
CO2 SERPL-SCNC: 25.1 MMOL/L (ref 22–29)
CREAT SERPL-MCNC: 0.99 MG/DL (ref 0.76–1.27)
DEPRECATED RDW RBC AUTO: 43.7 FL (ref 37–54)
EGFRCR SERPLBLD CKD-EPI 2021: 78.5 ML/MIN/1.73
EOSINOPHIL # BLD AUTO: 0.08 10*3/MM3 (ref 0–0.4)
EOSINOPHIL NFR BLD AUTO: 1.7 % (ref 0.3–6.2)
ERYTHROCYTE [DISTWIDTH] IN BLOOD BY AUTOMATED COUNT: 13 % (ref 12.3–15.4)
GLOBULIN UR ELPH-MCNC: 2 GM/DL
GLUCOSE SERPL-MCNC: 100 MG/DL (ref 65–99)
HBA1C MFR BLD: 6 % (ref 4.8–5.6)
HCT VFR BLD AUTO: 45 % (ref 37.5–51)
HDLC SERPL-MCNC: 95 MG/DL (ref 40–60)
HGB BLD-MCNC: 15.5 G/DL (ref 13–17.7)
IMM GRANULOCYTES # BLD AUTO: 0.02 10*3/MM3 (ref 0–0.05)
IMM GRANULOCYTES NFR BLD AUTO: 0.4 % (ref 0–0.5)
INR PPP: 1.9 (ref 0.8–1.2)
LDLC SERPL CALC-MCNC: 135 MG/DL (ref 0–100)
LDLC/HDLC SERPL: 1.38 {RATIO}
LYMPHOCYTES # BLD AUTO: 0.79 10*3/MM3 (ref 0.7–3.1)
LYMPHOCYTES NFR BLD AUTO: 17 % (ref 19.6–45.3)
MCH RBC QN AUTO: 32.1 PG (ref 26.6–33)
MCHC RBC AUTO-ENTMCNC: 34.4 G/DL (ref 31.5–35.7)
MCV RBC AUTO: 93.2 FL (ref 79–97)
MONOCYTES # BLD AUTO: 0.45 10*3/MM3 (ref 0.1–0.9)
MONOCYTES NFR BLD AUTO: 9.7 % (ref 5–12)
NEUTROPHILS NFR BLD AUTO: 3.28 10*3/MM3 (ref 1.7–7)
NEUTROPHILS NFR BLD AUTO: 70.8 % (ref 42.7–76)
NRBC BLD AUTO-RTO: 0 /100 WBC (ref 0–0.2)
PLATELET # BLD AUTO: 175 10*3/MM3 (ref 140–450)
PMV BLD AUTO: 10.1 FL (ref 6–12)
POTASSIUM SERPL-SCNC: 4.2 MMOL/L (ref 3.5–5.2)
PROT SERPL-MCNC: 6 G/DL (ref 6–8.5)
PSA SERPL-MCNC: 0.38 NG/ML (ref 0–4)
RBC # BLD AUTO: 4.83 10*6/MM3 (ref 4.14–5.8)
SODIUM SERPL-SCNC: 139 MMOL/L (ref 136–145)
TRIGL SERPL-MCNC: 136 MG/DL (ref 0–150)
TSH SERPL DL<=0.05 MIU/L-ACNC: 1.04 UIU/ML (ref 0.27–4.2)
VIT B12 BLD-MCNC: 317 PG/ML (ref 211–946)
VLDLC SERPL-MCNC: 23 MG/DL (ref 5–40)
WBC NRBC COR # BLD AUTO: 4.64 10*3/MM3 (ref 3.4–10.8)

## 2025-04-22 PROCEDURE — 83036 HEMOGLOBIN GLYCOSYLATED A1C: CPT | Performed by: FAMILY MEDICINE

## 2025-04-22 PROCEDURE — 82306 VITAMIN D 25 HYDROXY: CPT | Performed by: FAMILY MEDICINE

## 2025-04-22 PROCEDURE — 84443 ASSAY THYROID STIM HORMONE: CPT | Performed by: FAMILY MEDICINE

## 2025-04-22 PROCEDURE — 80053 COMPREHEN METABOLIC PANEL: CPT | Performed by: FAMILY MEDICINE

## 2025-04-22 PROCEDURE — 82607 VITAMIN B-12: CPT | Performed by: FAMILY MEDICINE

## 2025-04-22 PROCEDURE — 80061 LIPID PANEL: CPT | Performed by: FAMILY MEDICINE

## 2025-04-22 PROCEDURE — 85610 PROTHROMBIN TIME: CPT

## 2025-04-22 PROCEDURE — 85025 COMPLETE CBC W/AUTO DIFF WBC: CPT | Performed by: FAMILY MEDICINE

## 2025-04-22 PROCEDURE — G0103 PSA SCREENING: HCPCS | Performed by: FAMILY MEDICINE

## 2025-04-22 PROCEDURE — 36415 COLL VENOUS BLD VENIPUNCTURE: CPT

## 2025-04-22 PROCEDURE — 94625 PHY/QHP OP PULM RHB W/O MNTR: CPT

## 2025-04-22 NOTE — PROGRESS NOTES
Anticoagulation Clinic Progress Note    Patient's visit was held in office today.    Anticoagulation Summary  As of 2025      INR goal:  2.0-3.0   TTR:  27.0% (2.8 mo)   INR used for dosin.90 (2025)   Warfarin maintenance plan:  7.5 mg (7.5 mg x 1) every day   Weekly warfarin total:  52.5 mg   No change documented:  Ramona Sauceda RPH   Plan last modified:  Ramona Sauceda RPH (2025)   Next INR check:  2025   Target end date:  --    Indications    Acute saddle pulmonary embolism with acute cor pulmonale [I26.02]                 Anticoagulation Episode Summary       INR check location:  --    Preferred lab:  --    Send INR reminders to:   [facility] ANTICOAG POOL    Comments:  --          Anticoagulation Care Providers       Provider Role Specialty Phone number    KennyJennifer mckeon APRN Referring Nurse Practitioner [phone]            Clinic Interview:  Patient Findings     Negatives:  Signs/symptoms of thrombosis, Signs/symptoms of bleeding,   Laboratory test error suspected, Change in health, Change in alcohol use,   Change in activity, Upcoming invasive procedure, Emergency department   visit, Upcoming dental procedure, Missed doses, Extra doses, Change in   medications, Change in diet/appetite, Hospital admission, Bruising, Other   complaints      Clinical Outcomes     Negatives:  Major bleeding event, Thromboembolic event,   Anticoagulation-related hospital admission, Anticoagulation-related ED   visit, Anticoagulation-related fatality        INR History:      Latest Ref Rng & Units 4/3/2025    11:45 AM 2025     9:18 AM 2025     9:45 AM 4/15/2025     9:30 AM 4/15/2025     9:34 AM 2025     9:30 AM 2025     9:42 AM   Anticoagulation Monitoring   INR  2.10  2.00 1.60  1.90    INR Date  4/3/2025  2025 4/15/2025  2025    INR Goal  2.0-3.0  2.0-3.0 2.0-3.0  2.0-3.0    Trend  Same  Up Same  Same    Last Week Total  48.75 mg  48.75 mg 41.25 mg  48.75 mg     Next Week Total  48.75 mg  52.5 mg 48.75 mg  52.5 mg    Sun  3.75 mg  7.5 mg 7.5 mg  7.5 mg    Mon  7.5 mg  7.5 mg 7.5 mg  7.5 mg    Tue  -  7.5 mg 7.5 mg  7.5 mg    Wed  -  7.5 mg 7.5 mg  7.5 mg    Thu  7.5 mg (4/3)  7.5 mg 3.75 mg (4/17)  7.5 mg    Fri  7.5 mg  7.5 mg 7.5 mg  7.5 mg    Sat  7.5 mg  7.5 mg 7.5 mg  7.5 mg    Historical INR 0.80 - 1.20  2.00    1.60   1.90    Visit Report  Report             Plan:  1. INR is Slighty Subtherapeutic today- see above in Anticoagulation Summary.  Will instruct Omar AYDIN Choudhary to Continue their warfarin regimen- see above in Anticoagulation Summary. Had reduced one dose last week secondary to patient being on steroids. Will resume 7.5 mg daily this week.   2. Follow up in 1 week  3. Patient declines warfarin refills.  4. Verbal and written information provided. Patient expresses understanding and has no further questions at this time.    Ramona Sauceda [facility]

## 2025-04-23 RX ORDER — SILDENAFIL CITRATE 20 MG/1
TABLET ORAL
Qty: 50 TABLET | Refills: 0 | Status: SHIPPED | OUTPATIENT
Start: 2025-04-23

## 2025-04-24 ENCOUNTER — APPOINTMENT (OUTPATIENT)
Dept: CARDIAC REHAB | Facility: HOSPITAL | Age: 80
End: 2025-04-24
Payer: MEDICARE

## 2025-04-25 ENCOUNTER — TREATMENT (OUTPATIENT)
Dept: CARDIAC REHAB | Facility: HOSPITAL | Age: 80
End: 2025-04-25
Payer: MEDICARE

## 2025-04-25 DIAGNOSIS — J45.909 ASTHMA, UNSPECIFIED ASTHMA SEVERITY, UNSPECIFIED WHETHER COMPLICATED, UNSPECIFIED WHETHER PERSISTENT: Primary | ICD-10-CM

## 2025-04-25 PROCEDURE — G0237 THERAPEUTIC PROCD STRG ENDUR: HCPCS

## 2025-04-25 PROCEDURE — G0238 OTH RESP PROC, INDIV: HCPCS

## 2025-04-28 ENCOUNTER — TREATMENT (OUTPATIENT)
Dept: PHYSICAL THERAPY | Facility: CLINIC | Age: 80
End: 2025-04-28
Payer: MEDICARE

## 2025-04-28 DIAGNOSIS — M25.511 RIGHT SHOULDER PAIN, UNSPECIFIED CHRONICITY: Primary | ICD-10-CM

## 2025-04-28 DIAGNOSIS — M25.562 LEFT KNEE PAIN, UNSPECIFIED CHRONICITY: ICD-10-CM

## 2025-04-28 PROCEDURE — 97140 MANUAL THERAPY 1/> REGIONS: CPT | Performed by: PHYSICAL THERAPIST

## 2025-04-28 PROCEDURE — 97112 NEUROMUSCULAR REEDUCATION: CPT | Performed by: PHYSICAL THERAPIST

## 2025-04-28 PROCEDURE — 97110 THERAPEUTIC EXERCISES: CPT | Performed by: PHYSICAL THERAPIST

## 2025-04-28 NOTE — PROGRESS NOTES
Physical Therapy Daily Progress Note      Patient: Omar Choudhary   : 1945  Diagnosis/ICD-10 Code:  Right shoulder pain, unspecified chronicity [M25.511]  Referring practitioner: Dennis Kwong MD  Date of Initial Visit: Type: THERAPY  Noted: 2025  Today's Date: 2025  Patient seen for 4 sessions             Subjective Still having left knee pain.    Objective   See Exercise, Manual, and Modality Logs for complete treatment.       Assessment/Plan  Good effort with progression of resistance exercises.  Fatigue noted by end of session.    Progress per Plan of Care           Timed:         Manual Therapy:    15     mins  05283;     Therapeutic Exercise:    15     mins  02909;     Neuromuscular Nayeli:    15    mins  18840;        Timed Treatment:   45   mins   Total Treatment:     45   mins        Gil Perry PTA  Physical Therapist Assistant

## 2025-04-29 ENCOUNTER — OFFICE VISIT (OUTPATIENT)
Dept: FAMILY MEDICINE CLINIC | Facility: CLINIC | Age: 80
End: 2025-04-29
Payer: MEDICARE

## 2025-04-29 ENCOUNTER — TELEPHONE (OUTPATIENT)
Dept: FAMILY MEDICINE CLINIC | Facility: CLINIC | Age: 80
End: 2025-04-29

## 2025-04-29 ENCOUNTER — APPOINTMENT (OUTPATIENT)
Dept: CARDIAC REHAB | Facility: HOSPITAL | Age: 80
End: 2025-04-29
Payer: MEDICARE

## 2025-04-29 ENCOUNTER — ANTICOAGULATION VISIT (OUTPATIENT)
Dept: PHARMACY | Facility: HOSPITAL | Age: 80
End: 2025-04-29
Payer: MEDICARE

## 2025-04-29 ENCOUNTER — LAB (OUTPATIENT)
Dept: LAB | Facility: HOSPITAL | Age: 80
End: 2025-04-29
Payer: MEDICARE

## 2025-04-29 VITALS
WEIGHT: 183 LBS | HEART RATE: 69 BPM | RESPIRATION RATE: 16 BRPM | OXYGEN SATURATION: 98 % | SYSTOLIC BLOOD PRESSURE: 146 MMHG | BODY MASS INDEX: 29.41 KG/M2 | HEIGHT: 66 IN | DIASTOLIC BLOOD PRESSURE: 74 MMHG

## 2025-04-29 DIAGNOSIS — M85.88 OSTEOPENIA OF SPINE: ICD-10-CM

## 2025-04-29 DIAGNOSIS — G47.00 INSOMNIA, UNSPECIFIED TYPE: ICD-10-CM

## 2025-04-29 DIAGNOSIS — Z00.00 MEDICARE ANNUAL WELLNESS VISIT, SUBSEQUENT: Primary | ICD-10-CM

## 2025-04-29 DIAGNOSIS — M75.101 TEAR OF RIGHT ROTATOR CUFF, UNSPECIFIED TEAR EXTENT, UNSPECIFIED WHETHER TRAUMATIC: ICD-10-CM

## 2025-04-29 DIAGNOSIS — I26.02 ACUTE SADDLE PULMONARY EMBOLISM WITH ACUTE COR PULMONALE: Primary | ICD-10-CM

## 2025-04-29 DIAGNOSIS — E78.2 MIXED HYPERLIPIDEMIA: ICD-10-CM

## 2025-04-29 DIAGNOSIS — I27.20 PULMONARY HYPERTENSION: ICD-10-CM

## 2025-04-29 DIAGNOSIS — J45.20 MILD INTERMITTENT ASTHMA WITHOUT COMPLICATION: ICD-10-CM

## 2025-04-29 DIAGNOSIS — G47.33 OBSTRUCTIVE SLEEP APNEA SYNDROME: ICD-10-CM

## 2025-04-29 DIAGNOSIS — G62.9 NEUROPATHY: ICD-10-CM

## 2025-04-29 DIAGNOSIS — I26.92 ACUTE SADDLE PULMONARY EMBOLISM, UNSPECIFIED WHETHER ACUTE COR PULMONALE PRESENT: ICD-10-CM

## 2025-04-29 DIAGNOSIS — I26.02 ACUTE SADDLE PULMONARY EMBOLISM WITH ACUTE COR PULMONALE: ICD-10-CM

## 2025-04-29 DIAGNOSIS — M85.80 OSTEOPENIA, UNSPECIFIED LOCATION: ICD-10-CM

## 2025-04-29 DIAGNOSIS — M15.9 OSTEOARTHRITIS OF MULTIPLE JOINTS, UNSPECIFIED OSTEOARTHRITIS TYPE: ICD-10-CM

## 2025-04-29 DIAGNOSIS — I10 PRIMARY HYPERTENSION: ICD-10-CM

## 2025-04-29 DIAGNOSIS — J30.9 ALLERGIC RHINITIS, UNSPECIFIED SEASONALITY, UNSPECIFIED TRIGGER: ICD-10-CM

## 2025-04-29 DIAGNOSIS — M54.50 CHRONIC LOW BACK PAIN, UNSPECIFIED BACK PAIN LATERALITY, UNSPECIFIED WHETHER SCIATICA PRESENT: ICD-10-CM

## 2025-04-29 DIAGNOSIS — G89.29 CHRONIC LOW BACK PAIN, UNSPECIFIED BACK PAIN LATERALITY, UNSPECIFIED WHETHER SCIATICA PRESENT: ICD-10-CM

## 2025-04-29 LAB — INR PPP: 4 (ref 0.8–1.2)

## 2025-04-29 PROCEDURE — 85610 PROTHROMBIN TIME: CPT

## 2025-04-29 RX ORDER — LOSARTAN POTASSIUM AND HYDROCHLOROTHIAZIDE 12.5; 5 MG/1; MG/1
1 TABLET ORAL DAILY
Qty: 90 TABLET | Refills: 1 | Status: SHIPPED | OUTPATIENT
Start: 2025-04-29

## 2025-04-29 RX ORDER — HYDROCODONE BITARTRATE AND ACETAMINOPHEN 7.5; 325 MG/1; MG/1
.5-1 TABLET ORAL EVERY 6 HOURS PRN
Qty: 28 TABLET | Refills: 0 | Status: SHIPPED | OUTPATIENT
Start: 2025-04-29

## 2025-04-29 RX ORDER — DISULFIRAM 250 MG/1
250 TABLET ORAL DAILY
Qty: 30 TABLET | Refills: 1 | Status: SHIPPED | OUTPATIENT
Start: 2025-04-29

## 2025-04-29 RX ORDER — CYCLOBENZAPRINE HCL 10 MG
10 TABLET ORAL 3 TIMES DAILY PRN
Qty: 30 TABLET | Refills: 1 | Status: SHIPPED | OUTPATIENT
Start: 2025-04-29

## 2025-04-29 NOTE — ASSESSMENT & PLAN NOTE
W/ hx PE  - Cont home warfarin 7.5mg 6 days/week and 3.75mg qSunday   -- Managed by anticoagulation clinic

## 2025-04-29 NOTE — ASSESSMENT & PLAN NOTE
146/74 today  - D/C losartan 50mg daily    Start losartan-hydrochlorothiazide (Hyzaar) 50-12.5 MG per tablet; Take 1 tablet by mouth Daily.

## 2025-04-29 NOTE — ASSESSMENT & PLAN NOTE
s/p lateral L2-4 interbody fusion w/ cage placement and posterior decompression and posterior fusion in Oct 2022  Orders:    HYDROcodone-acetaminophen (NORCO) 7.5-325 MG per tablet; Take 0.5-1 tablets by mouth Every 6 (Six) Hours As Needed for Moderate Pain or Severe Pain.    cyclobenzaprine (FLEXERIL) 10 MG tablet; Take 1 tablet by mouth 3 (Three) Times a Day As Needed for Muscle Spasms.  - Cont pain meds for trip  - Cont Tylenol PRN

## 2025-04-29 NOTE — ASSESSMENT & PLAN NOTE
Significant medial compartment disease. Will provide offloading brace  Orders:     Knee Orthosis, Elastic With Joints (Hinged Knee Brace)    HYDROcodone-acetaminophen (NORCO) 7.5-325 MG per tablet; Take 0.5-1 tablets by mouth Every 6 (Six) Hours As Needed for Moderate Pain or Severe Pain.    cyclobenzaprine (FLEXERIL) 10 MG tablet; Take 1 tablet by mouth 3 (Three) Times a Day As Needed for Muscle Spasms.  - Plan for ortho f/u- Sushma

## 2025-04-29 NOTE — TELEPHONE ENCOUNTER
Caller: [id number] - [place], [place] - [address]  - [phone]  - [phone] FX    Relationship: Pharmacy         Which medication are you concerned about:   disulfiram (ANTABUSE) 250 MG tablet [4760] (Order [id number])     Who prescribed you this medication:   : Dennis Kwong MD         When did you start taking this medication: NA    What are your concerns: CAN CAUSE PROBLEMS WITH PATIENTS WARAFRIN CAN INCREASE HIS INR.

## 2025-04-29 NOTE — PROGRESS NOTES
Anticoagulation Clinic Progress Note    Patient's visit was held in office today.    Anticoagulation Summary  As of 2025      INR goal:  2.0-3.0   TTR:  28.6% (3 mo)   INR used for dosin.00 (2025)   Warfarin maintenance plan:  3.75 mg (7.5 mg x 0.5) every Sun; 7.5 mg (7.5 mg x 1) all other days   Weekly warfarin total:  48.75 mg   Plan last modified:  Ramona Sauceda RPH (2025)   Next INR check:  2025   Target end date:  --    Indications    Acute saddle pulmonary embolism with acute cor pulmonale [I26.02]                 Anticoagulation Episode Summary       INR check location:  --    Preferred lab:  --    Send INR reminders to:   [facility] ANTICOAG POOL    Comments:  --          Anticoagulation Care Providers       Provider Role Specialty Phone number    KennyJennifer mckeon APRN Referring Nurse Practitioner [phone]            Clinic Interview:  Patient Findings     Positives:  Change in diet/appetite    Negatives:  Signs/symptoms of thrombosis, Signs/symptoms of bleeding,   Laboratory test error suspected, Change in health, Change in alcohol use,   Change in activity, Upcoming invasive procedure, Emergency department   visit, Upcoming dental procedure, Missed doses, Extra doses, Change in   medications, Hospital admission, Bruising, Other complaints      Clinical Outcomes     Negatives:  Major bleeding event, Thromboembolic event,   Anticoagulation-related hospital admission, Anticoagulation-related ED   visit, Anticoagulation-related fatality        INR History:      Latest Ref Rng & Units 2025     9:45 AM 4/15/2025     9:30 AM 4/15/2025     9:34 AM 2025     9:30 AM 2025     9:42 AM 2025     9:31 AM 2025     9:45 AM   Anticoagulation Monitoring   INR  2.00 1.60  1.90   4.00   INR Date  2025 4/15/2025  2025   2025   INR Goal  2.0-3.0 2.0-3.0  2.0-3.0   2.0-3.0   Trend  Up Same  Same   Down   Last Week Total  48.75 mg 41.25 mg  48.75 mg   52.5 mg    Next Week Total  52.5 mg 48.75 mg  52.5 mg   41.25 mg   Sun  7.5 mg 7.5 mg  7.5 mg   3.75 mg (5/4)   Mon  7.5 mg 7.5 mg  7.5 mg   7.5 mg   Tue  7.5 mg 7.5 mg  7.5 mg   7.5 mg   Wed  7.5 mg 7.5 mg  7.5 mg   Hold (4/30)   Thu  7.5 mg 3.75 mg (4/17)  7.5 mg   7.5 mg   Fri  7.5 mg 7.5 mg  7.5 mg   7.5 mg   Sat  7.5 mg 7.5 mg  7.5 mg   7.5 mg   Historical INR 0.80 - 1.20   1.60   1.90  4.00         Plan:  1. INR is Supratherapeutic today- see above in Anticoagulation Summary. Per patient no signs or symptoms of bleeding/excessive bruising. Might of had fewer protein shakes this week? Patient trying to focus on protein and vegetables. Discussed trying to stay consistent with Vitamin K intake.   Will instruct Omar Choudhary to Change their warfarin regimen- see above in Anticoagulation Summary. Hold tomorrow mornings dose. (Patient already took today's dose). Then begin half tablet (3.75 mg) on Sundays and 7.5 mg all other days. (Total weekly dose 48.75 mg, ~ 7% dose reduction)  2. Follow up in 1 week  3. Patient declines warfarin refills.  4. Verbal and written information provided. Patient expresses understanding and has no further questions at this time.    Ramona Sauceda [facility]
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
## 2025-05-01 ENCOUNTER — APPOINTMENT (OUTPATIENT)
Dept: CARDIAC REHAB | Facility: HOSPITAL | Age: 80
End: 2025-05-01
Payer: MEDICARE

## 2025-05-02 ENCOUNTER — APPOINTMENT (OUTPATIENT)
Dept: CARDIAC REHAB | Facility: HOSPITAL | Age: 80
End: 2025-05-02
Payer: MEDICARE

## 2025-05-05 ENCOUNTER — TREATMENT (OUTPATIENT)
Dept: PHYSICAL THERAPY | Facility: CLINIC | Age: 80
End: 2025-05-05
Payer: MEDICARE

## 2025-05-05 DIAGNOSIS — M25.511 RIGHT SHOULDER PAIN, UNSPECIFIED CHRONICITY: Primary | ICD-10-CM

## 2025-05-05 PROCEDURE — 97112 NEUROMUSCULAR REEDUCATION: CPT | Performed by: PHYSICAL THERAPIST

## 2025-05-05 PROCEDURE — 97110 THERAPEUTIC EXERCISES: CPT | Performed by: PHYSICAL THERAPIST

## 2025-05-05 PROCEDURE — 97140 MANUAL THERAPY 1/> REGIONS: CPT | Performed by: PHYSICAL THERAPIST

## 2025-05-05 NOTE — PROGRESS NOTES
Physical Therapy Daily Treatment Note      Patient: Omar Choudhary   : 1945  Referring practitioner: Dennis Kwong MD  Date of Initial Visit: Type: THERAPY  Noted: 2025  Today's Date: 2025  Patient seen for 5 sessions       Visit Diagnoses:    ICD-10-CM ICD-9-CM   1. Right shoulder pain, unspecified chronicity  M25.511 719.41       Subjective No new complaints vs last visit. HEP going well.     Objective   See Exercise, Manual, and Modality Logs for complete treatment.       Assessment/Plan Less pain post visit. Good response to treatment.       Timed:         Manual Therapy:    15     mins  62347;     Therapeutic Exercise:    15     mins  06509;     Neuromuscular Nayeli:    8    mins  84440;    Therapeutic Activity:          mins  59146;     Gait Training:           mins  68617;     Ultrasound:          mins  70669;    Ionto                                   mins   67454  Self Care                            mins   54797      Un-Timed:  Electrical Stimulation:         mins  58119 (MC );  Dry Needling          mins self-pay  Traction          mins 25582  Canalith Repos         mins 55154    Timed Treatment:   38   mins   Total Treatment:     38   mins      Kendall Brown, PT, PT, DPT, OCS  [place] license: [id number]

## 2025-05-06 ENCOUNTER — LAB (OUTPATIENT)
Dept: LAB | Facility: HOSPITAL | Age: 80
End: 2025-05-06
Payer: MEDICARE

## 2025-05-06 ENCOUNTER — TREATMENT (OUTPATIENT)
Dept: CARDIAC REHAB | Facility: HOSPITAL | Age: 80
End: 2025-05-06
Payer: MEDICARE

## 2025-05-06 ENCOUNTER — APPOINTMENT (OUTPATIENT)
Dept: CARDIAC REHAB | Facility: HOSPITAL | Age: 80
End: 2025-05-06
Payer: MEDICARE

## 2025-05-06 ENCOUNTER — ANTICOAGULATION VISIT (OUTPATIENT)
Dept: PHARMACY | Facility: HOSPITAL | Age: 80
End: 2025-05-06
Payer: MEDICARE

## 2025-05-06 DIAGNOSIS — I26.02 ACUTE SADDLE PULMONARY EMBOLISM WITH ACUTE COR PULMONALE: Primary | ICD-10-CM

## 2025-05-06 DIAGNOSIS — J45.909 ASTHMA, UNSPECIFIED ASTHMA SEVERITY, UNSPECIFIED WHETHER COMPLICATED, UNSPECIFIED WHETHER PERSISTENT: Primary | ICD-10-CM

## 2025-05-06 DIAGNOSIS — I26.02 ACUTE SADDLE PULMONARY EMBOLISM WITH ACUTE COR PULMONALE: ICD-10-CM

## 2025-05-06 LAB — INR PPP: 5.4 (ref 0.8–1.2)

## 2025-05-06 PROCEDURE — G0237 THERAPEUTIC PROCD STRG ENDUR: HCPCS

## 2025-05-06 PROCEDURE — G0238 OTH RESP PROC, INDIV: HCPCS

## 2025-05-06 PROCEDURE — 85610 PROTHROMBIN TIME: CPT

## 2025-05-06 NOTE — TELEPHONE ENCOUNTER
Jose, pharmacist with [facility], called to check on this drug interaction he called about last week. [facility] had never been called back.    I read Dr Kwong message to the pharmacist. He said he would reach out to the patient and let him know that he will need more frequent protime checks while on medication.

## 2025-05-06 NOTE — PROGRESS NOTES
Anticoagulation Clinic Progress Note    Patient's visit was held in office today.    Anticoagulation Summary  As of 2025      INR goal:  2.0-3.0   TTR:  26.5% (3.2 mo)   INR used for dosin.40 (2025)   Warfarin maintenance plan:  3.75 mg (7.5 mg x 0.5) every Sun, Thu; 7.5 mg (7.5 mg x 1) all other days   Weekly warfarin total:  45 mg   Plan last modified:  Ramona Sauceda RPH (2025)   Next INR check:  2025   Target end date:  --    Indications    Acute saddle pulmonary embolism with acute cor pulmonale [I26.02]                 Anticoagulation Episode Summary       INR check location:  --    Preferred lab:  --    Send INR reminders to:   [facility] CBC ANTICOAG POOL    Comments:  --          Anticoagulation Care Providers       Provider Role Specialty Phone number    KennyJennifer, APRN Referring Nurse Practitioner [phone]            Clinic Interview:  Patient Findings     Positives:  Change in alcohol use, Extra doses    Negatives:  Signs/symptoms of thrombosis, Signs/symptoms of bleeding,   Laboratory test error suspected, Change in health, Change in activity,   Upcoming invasive procedure, Emergency department visit, Upcoming dental   procedure, Missed doses, Change in medications, Change in diet/appetite,   Hospital admission, Bruising, Other complaints      Clinical Outcomes     Negatives:  Major bleeding event, Thromboembolic event,   Anticoagulation-related hospital admission, Anticoagulation-related ED   visit, Anticoagulation-related fatality        INR History:      Latest Ref Rng & Units 4/15/2025     9:34 AM 2025     9:30 AM 2025     9:42 AM 2025     9:31 AM 2025     9:45 AM 2025     9:30 AM 2025     9:44 AM   Anticoagulation Monitoring   INR   1.90   4.00 5.40    INR Date   2025    INR Goal   2.0-3.0   2.0-3.0 2.0-3.0    Trend   Same   Down Down    Last Week Total   48.75 mg   52.5 mg 45 mg    Next Week Total   52.5 mg    41.25 mg 30 mg    Sun   7.5 mg   3.75 mg (5/4) 3.75 mg    Mon   7.5 mg   7.5 mg 7.5 mg    Tue   7.5 mg   7.5 mg Hold (5/6); Otherwise 7.5 mg    Wed   7.5 mg   Hold (4/30) Hold (5/7); Otherwise 7.5 mg    Thu   7.5 mg   7.5 mg 3.75 mg    Fri   7.5 mg   7.5 mg 7.5 mg    Sat   7.5 mg   7.5 mg 7.5 mg    Historical INR 0.80 - 1.20 1.60   1.90  4.00    5.40    Visit Report     Report Report         Plan:  1. INR is Supratherapeutic today- see above in Anticoagulation Summary. Per patient no signs or symptoms of bleeding. Patient does state he consumed more alcohol than usual this weekend secondary to [place] weekend. He also inadvertently took full tablet on Monday instead of half tablet.   Will instruct Omar AYDIN Choudhary to Change their warfarin regimen- see above in Anticoagulation Summary. Hold Warfarin for 2 days. Then begin half tablet (3.75 mg) on Sunday/Thursday and a whole tablet (7.5) mg all other days.   2. Follow up in 2 weeks  3. Patient declines warfarin refills.  4. Verbal and written information provided. Patient expresses understanding and has no further questions at this time.    Ramona Sauceda [facility]

## 2025-05-07 ENCOUNTER — TELEPHONE (OUTPATIENT)
Dept: PHYSICAL THERAPY | Facility: CLINIC | Age: 80
End: 2025-05-07

## 2025-05-07 NOTE — TELEPHONE ENCOUNTER
"  Caller: Omar Choudhary \"Mendez\"    Relationship: Self    What was the call regarding: ANOTHER APPOINTMENT  "

## 2025-05-08 ENCOUNTER — APPOINTMENT (OUTPATIENT)
Dept: CARDIAC REHAB | Facility: HOSPITAL | Age: 80
End: 2025-05-08
Payer: MEDICARE

## 2025-05-09 ENCOUNTER — APPOINTMENT (OUTPATIENT)
Dept: CARDIAC REHAB | Facility: HOSPITAL | Age: 80
End: 2025-05-09
Payer: MEDICARE

## 2025-05-12 ENCOUNTER — TREATMENT (OUTPATIENT)
Dept: PHYSICAL THERAPY | Facility: CLINIC | Age: 80
End: 2025-05-12
Payer: MEDICARE

## 2025-05-12 DIAGNOSIS — M25.562 LEFT KNEE PAIN, UNSPECIFIED CHRONICITY: ICD-10-CM

## 2025-05-12 DIAGNOSIS — M25.511 RIGHT SHOULDER PAIN, UNSPECIFIED CHRONICITY: Primary | ICD-10-CM

## 2025-05-12 PROCEDURE — 97140 MANUAL THERAPY 1/> REGIONS: CPT | Performed by: PHYSICAL THERAPIST

## 2025-05-12 PROCEDURE — 97112 NEUROMUSCULAR REEDUCATION: CPT | Performed by: PHYSICAL THERAPIST

## 2025-05-12 PROCEDURE — 97110 THERAPEUTIC EXERCISES: CPT | Performed by: PHYSICAL THERAPIST

## 2025-05-12 NOTE — PROGRESS NOTES
Physical Therapy Daily Treatment Note      Patient: Omar Choudhary   : 1945  Referring practitioner: Dennis Kwong MD  Date of Initial Visit: Type: THERAPY  Noted: 2025  Today's Date: 2025  Patient seen for 6 sessions       Visit Diagnoses:    ICD-10-CM ICD-9-CM   1. Right shoulder pain, unspecified chronicity  M25.511 719.41   2. Left knee pain, unspecified chronicity  M25.562 719.46       Subjective Knee pain improving. HEP going well.     Objective   See Exercise, Manual, and Modality Logs for complete treatment.       Assessment/Plan Good response to rx today.      Timed:         Manual Therapy:    15     mins  58927;     Therapeutic Exercise:    15     mins  35512;     Neuromuscular Nayeli:    15    mins  86892;    Therapeutic Activity:          mins  00615;     Gait Training:           mins  02785;     Ultrasound:          mins  51334;    Ionto                                   mins   46816  Self Care                            mins   26842      Un-Timed:  Electrical Stimulation:         mins  78877 ( );  Dry Needling          mins self-pay  Traction          mins 59775  Canalith Repos         mins 18246    Timed Treatment:   45   mins   Total Treatment:     45   mins      Kendall Brown, PT, PT, DPT, OCS  [place] license: [id number]

## 2025-05-13 ENCOUNTER — APPOINTMENT (OUTPATIENT)
Dept: CARDIAC REHAB | Facility: HOSPITAL | Age: 80
End: 2025-05-13
Payer: MEDICARE

## 2025-05-13 ENCOUNTER — TREATMENT (OUTPATIENT)
Dept: CARDIAC REHAB | Facility: HOSPITAL | Age: 80
End: 2025-05-13
Payer: MEDICARE

## 2025-05-13 ENCOUNTER — HOSPITAL ENCOUNTER (OUTPATIENT)
Dept: BONE DENSITY | Facility: HOSPITAL | Age: 80
Discharge: HOME | End: 2025-05-13
Admitting: FAMILY MEDICINE
Payer: MEDICARE

## 2025-05-13 DIAGNOSIS — J45.909 ASTHMA, UNSPECIFIED ASTHMA SEVERITY, UNSPECIFIED WHETHER COMPLICATED, UNSPECIFIED WHETHER PERSISTENT: Primary | ICD-10-CM

## 2025-05-13 DIAGNOSIS — Z00.00 MEDICARE ANNUAL WELLNESS VISIT, SUBSEQUENT: ICD-10-CM

## 2025-05-13 DIAGNOSIS — M85.88 OSTEOPENIA OF SPINE: ICD-10-CM

## 2025-05-13 DIAGNOSIS — M85.80 OSTEOPENIA, UNSPECIFIED LOCATION: ICD-10-CM

## 2025-05-13 PROCEDURE — G0238 OTH RESP PROC, INDIV: HCPCS

## 2025-05-13 PROCEDURE — G0237 THERAPEUTIC PROCD STRG ENDUR: HCPCS

## 2025-05-13 PROCEDURE — 77080 DXA BONE DENSITY AXIAL: CPT

## 2025-05-15 ENCOUNTER — TRANSCRIBE ORDERS (OUTPATIENT)
Dept: PHYSICAL THERAPY | Facility: CLINIC | Age: 80
End: 2025-05-15
Payer: MEDICARE

## 2025-05-15 ENCOUNTER — APPOINTMENT (OUTPATIENT)
Dept: CARDIAC REHAB | Facility: HOSPITAL | Age: 80
End: 2025-05-15
Payer: MEDICARE

## 2025-05-15 DIAGNOSIS — M17.12 OSTEOARTHRITIS OF LEFT KNEE, UNSPECIFIED OSTEOARTHRITIS TYPE: Primary | ICD-10-CM

## 2025-05-15 DIAGNOSIS — J45.20 MILD INTERMITTENT ASTHMA WITHOUT COMPLICATION: ICD-10-CM

## 2025-05-15 RX ORDER — MONTELUKAST SODIUM 10 MG/1
10 TABLET ORAL DAILY
Qty: 90 TABLET | Refills: 0 | Status: SHIPPED | OUTPATIENT
Start: 2025-05-15

## 2025-05-16 ENCOUNTER — APPOINTMENT (OUTPATIENT)
Dept: CARDIAC REHAB | Facility: HOSPITAL | Age: 80
End: 2025-05-16
Payer: MEDICARE

## 2025-05-19 ENCOUNTER — TELEPHONE (OUTPATIENT)
Dept: FAMILY MEDICINE CLINIC | Facility: CLINIC | Age: 80
End: 2025-05-19
Payer: MEDICARE

## 2025-05-19 NOTE — TELEPHONE ENCOUNTER
Called patient about dexascan and patient wanted me to let you know that he is not taking the antabuse. He did some research on it and he decided against it. But states he is on week two without alcohol. But was wanting to know if he could get a refill on his klonopin. As he uses alcohol to cope with his anxiety.

## 2025-05-20 ENCOUNTER — APPOINTMENT (OUTPATIENT)
Dept: CARDIAC REHAB | Facility: HOSPITAL | Age: 80
End: 2025-05-20
Payer: MEDICARE

## 2025-05-20 ENCOUNTER — TREATMENT (OUTPATIENT)
Dept: CARDIAC REHAB | Facility: HOSPITAL | Age: 80
End: 2025-05-20
Payer: MEDICARE

## 2025-05-20 ENCOUNTER — LAB (OUTPATIENT)
Dept: LAB | Facility: HOSPITAL | Age: 80
End: 2025-05-20
Payer: MEDICARE

## 2025-05-20 ENCOUNTER — ANTICOAGULATION VISIT (OUTPATIENT)
Dept: PHARMACY | Facility: HOSPITAL | Age: 80
End: 2025-05-20
Payer: MEDICARE

## 2025-05-20 DIAGNOSIS — J45.909 ASTHMA, UNSPECIFIED ASTHMA SEVERITY, UNSPECIFIED WHETHER COMPLICATED, UNSPECIFIED WHETHER PERSISTENT: Primary | ICD-10-CM

## 2025-05-20 DIAGNOSIS — F41.9 ANXIETY: ICD-10-CM

## 2025-05-20 DIAGNOSIS — I26.02 ACUTE SADDLE PULMONARY EMBOLISM WITH ACUTE COR PULMONALE: ICD-10-CM

## 2025-05-20 DIAGNOSIS — I26.02 ACUTE SADDLE PULMONARY EMBOLISM WITH ACUTE COR PULMONALE: Primary | ICD-10-CM

## 2025-05-20 LAB — INR PPP: 1.8 (ref 0.8–1.2)

## 2025-05-20 PROCEDURE — 94625 PHY/QHP OP PULM RHB W/O MNTR: CPT

## 2025-05-20 PROCEDURE — 85610 PROTHROMBIN TIME: CPT

## 2025-05-20 RX ORDER — CLONAZEPAM 0.5 MG/1
0.5 TABLET ORAL 2 TIMES DAILY PRN
Qty: 30 TABLET | Refills: 2 | Status: SHIPPED | OUTPATIENT
Start: 2025-05-20

## 2025-05-20 NOTE — PROGRESS NOTES
Anticoagulation Clinic Progress Note    Patient's visit was held in office today.    Anticoagulation Summary  As of 2025      INR goal:  2.0-3.0   TTR:  26.7% (3.7 mo)   INR used for dosin.80 (2025)   Warfarin maintenance plan:  7.5 mg (7.5 mg x 1) every day   Weekly warfarin total:  52.5 mg   Plan last modified:  Ramona Sauceda RP (2025)   Next INR check:  6/3/2025   Target end date:  --    Indications    Acute saddle pulmonary embolism with acute cor pulmonale [I26.02]                 Anticoagulation Episode Summary       INR check location:  --    Preferred lab:  --    Send INR reminders to:   [facility] CBC ANTICOAG POOL    Comments:  --          Anticoagulation Care Providers       Provider Role Specialty Phone number    Kenny Jennifer PERRIN, EMILY Referring Nurse Practitioner [phone]            Clinic Interview:  Patient Findings     Negatives:  Signs/symptoms of thrombosis, Signs/symptoms of bleeding,   Laboratory test error suspected, Change in health, Change in alcohol use,   Change in activity, Upcoming invasive procedure, Emergency department   visit, Upcoming dental procedure, Missed doses, Extra doses, Change in   medications, Change in diet/appetite, Hospital admission, Bruising, Other   complaints      Clinical Outcomes     Negatives:  Major bleeding event, Thromboembolic event,   Anticoagulation-related hospital admission, Anticoagulation-related ED   visit, Anticoagulation-related fatality        INR History:      Latest Ref Rng & Units 2025     9:42 AM 2025     9:31 AM 2025     9:45 AM 2025     9:30 AM 2025     9:44 AM 2025     9:45 AM 2025     9:56 AM   Anticoagulation Monitoring   INR    4.00 5.40  1.80    INR Date    2025    INR Goal    2.0-3.0 2.0-3.0  2.0-3.0    Trend    Down Down  Up    Last Week Total    52.5 mg 45 mg  45 mg    Next Week Total    41.25 mg 30 mg  52.5 mg    Sun    3.75 mg () 3.75 mg  7.5 mg     Mon    7.5 mg 7.5 mg  7.5 mg    Tue    7.5 mg Hold (5/6); Otherwise 7.5 mg  7.5 mg    Wed    Hold (4/30) Hold (5/7); Otherwise 7.5 mg  7.5 mg    Thu    7.5 mg 3.75 mg  7.5 mg    Fri    7.5 mg 7.5 mg  7.5 mg    Sat    7.5 mg 7.5 mg  7.5 mg    Historical INR 0.80 - 1.20 1.90  4.00    5.40   1.80    Visit Report   Report Report           Plan:  1. INR is Subtherapeutic today- see above in Anticoagulation Summary. Patient has made some lifestyle changes recently, including decrease in alcohol use and increase in vegetable intake.    Will instruct Omar Choudhary to Change their warfarin regimen- see above in Anticoagulation Summary. Will resume 7.5 mg daily at this time.   2. Follow up in 2 weeks.   3. Patient declines warfarin refills.  4. Verbal and written information provided. Patient expresses understanding and has no further questions at this time.    Ramona Sauceda [facility]

## 2025-05-22 ENCOUNTER — APPOINTMENT (OUTPATIENT)
Dept: CARDIAC REHAB | Facility: HOSPITAL | Age: 80
End: 2025-05-22
Payer: MEDICARE

## 2025-05-23 ENCOUNTER — APPOINTMENT (OUTPATIENT)
Dept: CARDIAC REHAB | Facility: HOSPITAL | Age: 80
End: 2025-05-23
Payer: MEDICARE

## 2025-05-27 ENCOUNTER — APPOINTMENT (OUTPATIENT)
Dept: CARDIAC REHAB | Facility: HOSPITAL | Age: 80
End: 2025-05-27
Payer: MEDICARE

## 2025-05-28 ENCOUNTER — TREATMENT (OUTPATIENT)
Dept: PHYSICAL THERAPY | Facility: CLINIC | Age: 80
End: 2025-05-28
Payer: MEDICARE

## 2025-05-28 DIAGNOSIS — M17.12 ARTHRITIS OF LEFT KNEE: Primary | ICD-10-CM

## 2025-05-28 DIAGNOSIS — M25.562 LEFT KNEE PAIN, UNSPECIFIED CHRONICITY: ICD-10-CM

## 2025-05-28 DIAGNOSIS — S83.242D TEAR OF MEDIAL MENISCUS OF LEFT KNEE, UNSPECIFIED TEAR TYPE, UNSPECIFIED WHETHER OLD OR CURRENT TEAR, SUBSEQUENT ENCOUNTER: ICD-10-CM

## 2025-05-28 RX ORDER — WARFARIN SODIUM 7.5 MG/1
TABLET ORAL
Qty: 60 TABLET | Refills: 0 | Status: SHIPPED | OUTPATIENT
Start: 2025-05-28
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
## 2025-05-29 ENCOUNTER — APPOINTMENT (OUTPATIENT)
Dept: CARDIAC REHAB | Facility: HOSPITAL | Age: 80
End: 2025-05-29
Payer: MEDICARE

## 2025-05-30 ENCOUNTER — APPOINTMENT (OUTPATIENT)
Dept: CARDIAC REHAB | Facility: HOSPITAL | Age: 80
End: 2025-05-30
Payer: MEDICARE

## 2025-05-30 ENCOUNTER — TREATMENT (OUTPATIENT)
Dept: CARDIAC REHAB | Facility: HOSPITAL | Age: 80
End: 2025-05-30
Payer: MEDICARE

## 2025-05-30 DIAGNOSIS — J45.909 ASTHMA, UNSPECIFIED ASTHMA SEVERITY, UNSPECIFIED WHETHER COMPLICATED, UNSPECIFIED WHETHER PERSISTENT: ICD-10-CM

## 2025-05-30 DIAGNOSIS — J43.8 OTHER EMPHYSEMA: Primary | ICD-10-CM

## 2025-05-30 PROCEDURE — 94625 PHY/QHP OP PULM RHB W/O MNTR: CPT

## 2025-06-02 ENCOUNTER — OFFICE VISIT (OUTPATIENT)
Age: 80
End: 2025-06-02
Payer: MEDICARE

## 2025-06-02 VITALS
BODY MASS INDEX: 29.41 KG/M2 | WEIGHT: 183 LBS | HEIGHT: 66 IN | SYSTOLIC BLOOD PRESSURE: 142 MMHG | DIASTOLIC BLOOD PRESSURE: 80 MMHG | HEART RATE: 64 BPM

## 2025-06-02 DIAGNOSIS — I27.20 PULMONARY HYPERTENSION: Primary | ICD-10-CM

## 2025-06-02 DIAGNOSIS — R06.09 DYSPNEA ON EXERTION: ICD-10-CM

## 2025-06-02 PROCEDURE — 3079F DIAST BP 80-89 MM HG: CPT | Performed by: NURSE PRACTITIONER

## 2025-06-02 PROCEDURE — 93000 ELECTROCARDIOGRAM COMPLETE: CPT | Performed by: NURSE PRACTITIONER

## 2025-06-02 PROCEDURE — 1159F MED LIST DOCD IN RCRD: CPT | Performed by: NURSE PRACTITIONER

## 2025-06-02 PROCEDURE — 1160F RVW MEDS BY RX/DR IN RCRD: CPT | Performed by: NURSE PRACTITIONER

## 2025-06-02 PROCEDURE — 99214 OFFICE O/P EST MOD 30 MIN: CPT | Performed by: NURSE PRACTITIONER

## 2025-06-02 PROCEDURE — 3077F SYST BP >= 140 MM HG: CPT | Performed by: NURSE PRACTITIONER

## 2025-06-02 NOTE — PROGRESS NOTES
Subjective:     Encounter Date:06/02/2025      Patient ID: Omar Choudhary is a 79 y.o. male.    Chief Complaint:  History of Present Illness  This is a 79-year-old man who follows with Dr. Hdez and is known to me.  He has a past medical history of asthma, paralyzed hemidiaphragm, osteoarthritis, hypertension he was recently hospitalized with a pulmonary embolism.      He is here today for a follow-up visit.  He tells me he was in [place] for graduation last week.  While there they were walking about 2 miles a day but he was only able to walk about 100 steps at a time.  He ended up going to the emergency room on their last day due to his symptoms.  His workup there was pretty unremarkable.  CT did not show any evidence of pulmonary embolism.  Troponin was a little elevated but flat.  It was felt that his symptoms were likely upper respiratory and he was sent home with a 5-day course of prednisone.  He has completed the prednisone and is unsure if it helped with his symptoms or not.  He said since he has been home he has been doing some work around the house and gardening but has not been walking long distances for an extended period of time.  He states at home he has been walking at least 1 mile a day and it is not easy and he will have to stop and rest.  The shortness of breath persist.  He denies any chest discomfort or palpitations.  There is no swelling in his lower extremities, orthopnea or PND.  He is participating in pulmonary rehab.  He has a follow-up with his pulmonologist in July.  He says he is lost about 15 pounds since his thrombectomy.    Prior history :  He presented to the hospital on 1/10/2025 with complaints of worsening shortness of breath.  His shortness of breath became so severe while cleaning snow off his car that he opted to call EMS.  When EMS arrived his oxygen saturations were in the 50s.  He was placed on supplemental oxygen and eventually placed on BiPAP when he was admitted to the  ICU.  proBNP was 3412, lactic acid 3.6, troponin 115 and white count 16.87.  A CT of the chest was obtained that showed bilateral pulmonary embolism including a saddle embolism with evidence of elevated RV to LV ratio of 1:2 an echocardiogram was also performed which showed evidence of severe right ventricular enlargement and dysfunction but no significant evidence of pulmonary hypertension although it was felt this may be underestimated. Left ventricular function was normal and there was no evidence of thrombus in transit. He was started on heparin infusion and admitted to the ICU.     On January 11 he underwent bilateral pulmonary artery thrombectomy and was noted to have only a small to moderate amount of residual thrombus by the time the procedure had ended.  He was also found to have acute right lower extremity DVT in the peroneal, popliteal and distal femoral veins.  He was noted to have severe pulmonary hypertension with systolic pressures in the 70s to 80s and a mean PA of 44 to 46 mmHg noted on right heart cath before and following the thrombectomy.  It was felt that this was likely chronic.  He was transition to Coumadin with a Lovenox bridge as DOAC's were cost prohibitive.  He did remain in the hospital for about a week due to high O2 requirements as well as trying to get his INR therapeutic.  What is expected walk test prior to discharge and was able to maintain O2 sats above 93% by the end of the walk.  He was ultimately discharged home on 1/18/2025.    I saw him in January for hospital follow-up.  He was feeling well.  He had not been set up with the anticoagulation clinic so we got him started with that.  He then followed up with Dr. Hdez in March with an echocardiogram performed at that visit.  It did show some improvement in his right ventricular size and function but there is still some borderline dilatation.  They were unable to assess his right ventricular systolic pressure due to insufficient  SANDEEP  She recommended at that visit that we may need to consider reassessment of his pulmonary hypertension with a repeat right heart cath in the future.    I have reviewed and updated as appropriate allergies, current medications, past family history, past medical history, past surgical history and problem list.    Review of Systems   Constitutional: Negative for fever, malaise/fatigue, weight gain and weight loss.   HENT:  Negative for congestion, hoarse voice and sore throat.    Eyes:  Negative for blurred vision and double vision.   Cardiovascular:  Positive for dyspnea on exertion. Negative for chest pain, leg swelling, orthopnea, palpitations and syncope.   Respiratory:  Positive for shortness of breath. Negative for cough and wheezing.    Gastrointestinal:  Negative for abdominal pain, hematemesis, hematochezia and melena.   Genitourinary:  Negative for dysuria and hematuria.   Neurological:  Negative for dizziness, headaches, light-headedness and numbness.   Psychiatric/Behavioral:  Negative for depression. The patient is not nervous/anxious.          Current Outpatient Medications:     acetaminophen (TYLENOL) 500 MG tablet, Take 1 tablet by mouth Every 6 (Six) Hours As Needed for Mild Pain., Disp: , Rfl:     Acetylcysteine capsule capsule, Take 1 capsule by mouth Daily., Disp: , Rfl:     albuterol sulfate  (90 Base) MCG/ACT inhaler, Inhale 2 puffs Every 4 (Four) Hours As Needed for Wheezing or Shortness of Air., Disp: 18 g, Rfl: 0    B Complex Vitamins (VITAMIN B COMPLEX) capsule capsule, Take 1 capsule by mouth Daily. LD 9-27, Disp: , Rfl:     budesonide-formoterol (SYMBICORT) 160-4.5 MCG/ACT inhaler, Inhale 2 puffs 2 (Two) Times a Day., Disp: 10.2 g, Rfl: 0    Calcium Carb-Cholecalciferol (Oyster Shell Calcium w/D) 500-5 MG-MCG tablet, TAKE TWO TABLETS BY MOUTH DAILY, Disp: 180 tablet, Rfl: 0    clonazePAM (KlonoPIN) 0.5 MG tablet, Take 1 tablet by mouth 2 (Two) Times a Day As Needed for Anxiety.  for anxiety, Disp: 30 tablet, Rfl: 2    coenzyme Q10 100 MG capsule, Take 1 capsule by mouth Daily., Disp: , Rfl:     cyclobenzaprine (FLEXERIL) 10 MG tablet, Take 1 tablet by mouth 3 (Three) Times a Day As Needed for Muscle Spasms., Disp: 30 tablet, Rfl: 1    gabapentin (NEURONTIN) 300 MG capsule, TAKE 1 CAPSULE BY MOUTH IN THE MORING, 1 CAPSULE IN THE AFTERNOON AND 1 CAPSULES AT NIGHT, Disp: 120 capsule, Rfl: 0    HYDROcodone-acetaminophen (NORCO) 7.5-325 MG per tablet, Take 0.5-1 tablets by mouth Every 6 (Six) Hours As Needed for Moderate Pain or Severe Pain., Disp: 28 tablet, Rfl: 0    L-Arginine 500 MG capsule, Take 1 capsule by mouth Daily., Disp: , Rfl:     losartan-hydrochlorothiazide (Hyzaar) 50-12.5 MG per tablet, Take 1 tablet by mouth Daily., Disp: 90 tablet, Rfl: 1    montelukast (SINGULAIR) 10 MG tablet, TAKE 1 TABLET BY MOUTH ONCE DAILY, Disp: 90 tablet, Rfl: 0    Multiple Vitamins-Minerals (EMERGEN-C BLUE PO), Take 1 tablet by mouth Daily. LD 9-27, Disp: , Rfl:     naloxone (NARCAN) 4 MG/0.1ML nasal spray, Call 911. Don't prime. [place] in 1 nostril for overdose. Repeat in 2-3 minutes in other nostril if no or minimal breathing/responsiveness., Disp: 2 each, Rfl: 0    S-Adenosylmethionine (YURY-e) 400 MG tablet, Take 400 mg by mouth Daily., Disp: , Rfl:     sildenafil (REVATIO) 20 MG tablet, TAKE 1 TO 2 TABLETS BY MOUTH AS NEEDED FOR ERICTILE DYSFUNCTION, Disp: 50 tablet, Rfl: 0    theophylline (THEODUR) 300 MG 12 hr tablet, Take 1 tablet by mouth Daily., Disp: 90 tablet, Rfl: 3    traZODone (DESYREL) 50 MG tablet, Take 1-2 tablets by mouth Every Night., Disp: 180 tablet, Rfl: 3    triamcinolone (KENALOG) 0.025 % cream, Apply 1 Application topically to the appropriate area as directed 2 (Two) Times a Day., Disp: , Rfl:     warfarin (COUMADIN) 7.5 MG tablet, Take 1 tablet by mouth on Mon and Fri, and take 1/2 tablet by mouth all other days or as directed., Disp: 60 tablet, Rfl: 0    disulfiram (ANTABUSE)  250 MG tablet, Take 1 tablet by mouth Daily., Disp: 30 tablet, Rfl: 1    Past Medical History:   Diagnosis Date    ADHD (attention deficit hyperactivity disorder) 2021    Hard to focus on tasks and follow through.    Allergic 08/20/2018    Arthritis     Asthma     Carpal tunnel syndrome     no pain    Cataract     Deep vein thrombosis 01/10/2025    Depression     Erectile dysfunction     2018 at 73 years old    Family history of malignant neoplasm of breast 09/26/2019    Hyperlipidemia 09/26/2019    Allergic to statins    Hypertension 03/01/2012    Managing w/ Losartan Potassium    Leg pain, right     Low back pain     Decompress and fusion surgery 10/4&5/2022    Neuropathy     feet    Osteopenia 11/28/2019    Poor balance     Pulmonary embolism     Acute pulmonary saddle embolism    Reactive airway disease 01/15/2016    Scoliosis 1960    Shortness of breath 2017    Sleep apnea     Substance abuse 1966    Alcoholic       Past Surgical History:   Procedure Laterality Date    CARDIAC CATHETERIZATION  1992    CARDIAC CATHETERIZATION N/A 01/11/2025    Procedure: Right Heart Cath;  Surgeon: Nancy Hdez MD;  Location: [facility] CATH INVASIVE LOCATION;  Service: Cardiovascular;  Laterality: N/A;    CARDIAC CATHETERIZATION  01/11/2025    Procedure: Percutaneous Manual Thrombectomy;  Surgeon: Nancy Hdez MD;  Location:  [facility] CATH INVASIVE LOCATION;  Service: Cardiovascular;;    CARDIAC CATHETERIZATION Bilateral 01/11/2025    Procedure: Pulmonary angiography;  Surgeon: Nancy Hdez MD;  Location:  [facility] CATH INVASIVE LOCATION;  Service: Cardiovascular;  Laterality: Bilateral;    COLONOSCOPY  2013    No polyps, slight diverticulitis    EYE SURGERY  2016    Cataracts    KNEE ARTHROSCOPY W/ ACL RECONSTRUCTION Right 2019    LUMBAR FUSION Bilateral 10/05/2022    Procedure: DAY 2 LUMBAR LAMINECTOMY TRANSFORAMINAL LUMBAR INTERBODY FUSION L2,L3,L4 WITH NEURO ROBOT;  Surgeon: John Do MD;  Location: [facility] MAIN OR;  " Service: Robotics - Neuro;  Laterality: Bilateral;    LUMBAR FUSION N/A 10/04/2022    Procedure: DAY 1 LUMBAR LATERAL INTERBODY FUSION WITH NEURO ROBOT L2, L3.L4;  Surgeon: John Do MD;  Location: [facility] MAIN OR;  Service: Robotics - Neuro;  Laterality: N/A;  left side approach    MOUTH SURGERY      SPINE SURGERY  10/4&2022    Dr. John Do, [facility]       Family History   Problem Relation Age of Onset    Heart disease Mother     Hypertension Mother     Breast cancer Mother     Stroke Mother         Several TIAs    Alcohol abuse Mother     Thyroid disease Mother     Arthritis Mother     Cancer Mother         Breast    Aortic aneurysm Father     Heart attack Father     Liver cancer Father     Cancer Father         Liver    Heart disease Father         Heart attack    Early death Brother         Coronary thrombosis @ 48 years while mountain climbing.    Heart attack Brother         , at 47 years, fatal       Social History     Tobacco Use    Smoking status: Former     Current packs/day: 0.00     Average packs/day: 0.5 packs/day for 26.0 years (13.0 ttl pk-yrs)     Types: Cigarettes     Start date: 1966     Quit date: 1992     Years since quittin.4     Passive exposure: Past    Smokeless tobacco: Never   Vaping Use    Vaping status: Never Used   Substance Use Topics    Alcohol use: Yes     Alcohol/week: 5.0 standard drinks of alcohol     Types: 3 Glasses of wine, 2 Cans of beer per week    Drug use: Never     Comment: CBD gummies- stop now for surgery         ECG 12 Lead    Date/Time: 2025 2:19 PM  Performed by: Jennifer Cox APRN    Authorized by: Jennifer Cox APRN  Comparison: compared with previous ECG from 3/6/2025  Similar to previous ECG  Rhythm: sinus rhythm  Ectopy: atrial premature contractions             Objective:     Visit Vitals  /80 (BP Location: Right arm, Patient Position: Sitting, Cuff Size: Adult)   Pulse 64   Ht 167.6 cm (66\")   Wt 83 " kg (183 lb)   BMI 29.54 kg/m²             Physical Exam  Constitutional:       Appearance: Normal appearance. He is normal weight.   HENT:      Head: Normocephalic.   Neck:      Vascular: No carotid bruit.   Cardiovascular:      Rate and Rhythm: Normal rate and regular rhythm.      Chest Wall: PMI is not displaced.      Pulses: Normal pulses.           Radial pulses are 2+ on the right side and 2+ on the left side.        Posterior tibial pulses are 2+ on the right side and 2+ on the left side.      Heart sounds: Normal heart sounds. No murmur heard.     No friction rub. No gallop.   Pulmonary:      Effort: Pulmonary effort is normal.      Breath sounds: Normal breath sounds.   Abdominal:      General: Bowel sounds are normal. There is no distension.      Palpations: Abdomen is soft.   Musculoskeletal:      Right lower leg: No edema.      Left lower leg: No edema.   Skin:     General: Skin is warm and dry.      Capillary Refill: Capillary refill takes less than 2 seconds.   Neurological:      Mental Status: He is alert and oriented to person, place, and time.   Psychiatric:         Mood and Affect: Mood normal.         Behavior: Behavior normal.         Thought Content: Thought content normal.          Lab Review:   Lipid Panel          4/22/2025    12:18   Lipid Panel   Total Cholesterol 253    Triglycerides 136    HDL Cholesterol 95    VLDL Cholesterol 23    LDL Cholesterol  135    LDL/HDL Ratio 1.38          Cardiac Procedures:       Assessment:         Diagnoses and all orders for this visit:    1. Pulmonary hypertension (Primary)    2. Dyspnea on exertion            Plan:       Pulmonary embolism/DVT: with evidence of RV strain. S/p thrombectomy. On Coumadin, managed by  clinic. INRs had been stable but are a little more variable now. He has an appt with the AC clinic tomorrow.  Pulmonary HTN:  Remained elevated despite thrombectomy which leads us to believe this is likely chronic. Unable to get an accurate  assessment on echo in March 2025. He continues to have symptoms. At this point, I think we should proceed with a right heart cath to further assess his pulmonary pressures.   HTN: blood pressure stable at home. No changes.  ELINOR: on CPAP at home  Paralyzed hemidiaphragm    Thank you for allowing me to participate in this patient's care. Please call with any questions or concerns. Follow up to be determined post cath.          Your medication list            Accurate as of June 2, 2025  1:34 PM. If you have any questions, ask your nurse or doctor.                CONTINUE taking these medications        Instructions Last Dose Given Next Dose Due   acetaminophen 500 MG tablet  Commonly known as: TYLENOL      Take 1 tablet by mouth Every 6 (Six) Hours As Needed for Mild Pain.       Acetylcysteine capsule capsule      Take 1 capsule by mouth Daily.       albuterol sulfate  (90 Base) MCG/ACT inhaler  Commonly known as: PROVENTIL HFA;VENTOLIN HFA;PROAIR HFA      Inhale 2 puffs Every 4 (Four) Hours As Needed for Wheezing or Shortness of Air.       budesonide-formoterol 160-4.5 MCG/ACT inhaler  Commonly known as: SYMBICORT      Inhale 2 puffs 2 (Two) Times a Day.       calcium 500 mg vitamin D 5 mcg (200 UT) 500-5 MG-MCG tablet per tablet      TAKE TWO TABLETS BY MOUTH DAILY       clonazePAM 0.5 MG tablet  Commonly known as: KlonoPIN      Take 1 tablet by mouth 2 (Two) Times a Day As Needed for Anxiety. for anxiety       coenzyme Q10 100 MG capsule      Take 1 capsule by mouth Daily.       cyclobenzaprine 10 MG tablet  Commonly known as: FLEXERIL      Take 1 tablet by mouth 3 (Three) Times a Day As Needed for Muscle Spasms.       disulfiram 250 MG tablet  Commonly known as: ANTABUSE      Take 1 tablet by mouth Daily.       EMERGEN-C BLUE PO      Take 1 tablet by mouth Daily. LD 9-27       gabapentin 300 MG capsule  Commonly known as: NEURONTIN      TAKE 1 CAPSULE BY MOUTH IN THE MORING, 1 CAPSULE IN THE AFTERNOON AND 1  CAPSULES AT NIGHT       HYDROcodone-acetaminophen 7.5-325 MG per tablet  Commonly known as: NORCO      Take 0.5-1 tablets by mouth Every 6 (Six) Hours As Needed for Moderate Pain or Severe Pain.       L-Arginine 500 MG capsule      Take 1 capsule by mouth Daily.       losartan-hydrochlorothiazide 50-12.5 MG per tablet  Commonly known as: Hyzaar      Take 1 tablet by mouth Daily.       montelukast 10 MG tablet  Commonly known as: SINGULAIR      TAKE 1 TABLET BY MOUTH ONCE DAILY       naloxone 4 MG/0.1ML nasal spray  Commonly known as: NARCAN      Call 911. Don't prime. [place] in 1 nostril for overdose. Repeat in 2-3 minutes in other nostril if no or minimal breathing/responsiveness.       YURY-e 400 MG tablet      Take 400 mg by mouth Daily.       sildenafil 20 MG tablet  Commonly known as: REVATIO      TAKE 1 TO 2 TABLETS BY MOUTH AS NEEDED FOR ERICTILE DYSFUNCTION       theophylline 300 MG 12 hr tablet  Commonly known as: THEODUR      Take 1 tablet by mouth Daily.       traZODone 50 MG tablet  Commonly known as: DESYREL      Take 1-2 tablets by mouth Every Night.       triamcinolone 0.025 % cream  Commonly known as: KENALOG      Apply 1 Application topically to the appropriate area as directed 2 (Two) Times a Day.       vitamin b complex capsule capsule      Take 1 capsule by mouth Daily. LD 9-27       warfarin 7.5 MG tablet  Commonly known as: COUMADIN      Take 1 tablet by mouth on Mon and Fri, and take 1/2 tablet by mouth all other days or as directed.                  EMILY Zepeda  06/02/25  2:41 PM EST

## 2025-06-02 NOTE — H&P (VIEW-ONLY)
Subjective:     Encounter Date:06/02/2025      Patient ID: Omar Choudhary is a 79 y.o. male.    Chief Complaint:  History of Present Illness  This is a 79-year-old man who follows with Dr. Hdez and is known to me.  He has a past medical history of asthma, paralyzed hemidiaphragm, osteoarthritis, hypertension he was recently hospitalized with a pulmonary embolism.      He is here today for a follow-up visit.  He tells me he was in [place] for graduation last week.  While there they were walking about 2 miles a day but he was only able to walk about 100 steps at a time.  He ended up going to the emergency room on their last day due to his symptoms.  His workup there was pretty unremarkable.  CT did not show any evidence of pulmonary embolism.  Troponin was a little elevated but flat.  It was felt that his symptoms were likely upper respiratory and he was sent home with a 5-day course of prednisone.  He has completed the prednisone and is unsure if it helped with his symptoms or not.  He said since he has been home he has been doing some work around the house and gardening but has not been walking long distances for an extended period of time.  He states at home he has been walking at least 1 mile a day and it is not easy and he will have to stop and rest.  The shortness of breath persist.  He denies any chest discomfort or palpitations.  There is no swelling in his lower extremities, orthopnea or PND.  He is participating in pulmonary rehab.  He has a follow-up with his pulmonologist in July.  He says he is lost about 15 pounds since his thrombectomy.    Prior history :  He presented to the hospital on 1/10/2025 with complaints of worsening shortness of breath.  His shortness of breath became so severe while cleaning snow off his car that he opted to call EMS.  When EMS arrived his oxygen saturations were in the 50s.  He was placed on supplemental oxygen and eventually placed on BiPAP when he was admitted to the  ICU.  proBNP was 3412, lactic acid 3.6, troponin 115 and white count 16.87.  A CT of the chest was obtained that showed bilateral pulmonary embolism including a saddle embolism with evidence of elevated RV to LV ratio of 1:2 an echocardiogram was also performed which showed evidence of severe right ventricular enlargement and dysfunction but no significant evidence of pulmonary hypertension although it was felt this may be underestimated. Left ventricular function was normal and there was no evidence of thrombus in transit. He was started on heparin infusion and admitted to the ICU.     On January 11 he underwent bilateral pulmonary artery thrombectomy and was noted to have only a small to moderate amount of residual thrombus by the time the procedure had ended.  He was also found to have acute right lower extremity DVT in the peroneal, popliteal and distal femoral veins.  He was noted to have severe pulmonary hypertension with systolic pressures in the 70s to 80s and a mean PA of 44 to 46 mmHg noted on right heart cath before and following the thrombectomy.  It was felt that this was likely chronic.  He was transition to Coumadin with a Lovenox bridge as DOAC's were cost prohibitive.  He did remain in the hospital for about a week due to high O2 requirements as well as trying to get his INR therapeutic.  What is expected walk test prior to discharge and was able to maintain O2 sats above 93% by the end of the walk.  He was ultimately discharged home on 1/18/2025.    I saw him in January for hospital follow-up.  He was feeling well.  He had not been set up with the anticoagulation clinic so we got him started with that.  He then followed up with Dr. Hdez in March with an echocardiogram performed at that visit.  It did show some improvement in his right ventricular size and function but there is still some borderline dilatation.  They were unable to assess his right ventricular systolic pressure due to insufficient  SANDEEP  She recommended at that visit that we may need to consider reassessment of his pulmonary hypertension with a repeat right heart cath in the future.    I have reviewed and updated as appropriate allergies, current medications, past family history, past medical history, past surgical history and problem list.    Review of Systems   Constitutional: Negative for fever, malaise/fatigue, weight gain and weight loss.   HENT:  Negative for congestion, hoarse voice and sore throat.    Eyes:  Negative for blurred vision and double vision.   Cardiovascular:  Positive for dyspnea on exertion. Negative for chest pain, leg swelling, orthopnea, palpitations and syncope.   Respiratory:  Positive for shortness of breath. Negative for cough and wheezing.    Gastrointestinal:  Negative for abdominal pain, hematemesis, hematochezia and melena.   Genitourinary:  Negative for dysuria and hematuria.   Neurological:  Negative for dizziness, headaches, light-headedness and numbness.   Psychiatric/Behavioral:  Negative for depression. The patient is not nervous/anxious.          Current Outpatient Medications:     acetaminophen (TYLENOL) 500 MG tablet, Take 1 tablet by mouth Every 6 (Six) Hours As Needed for Mild Pain., Disp: , Rfl:     Acetylcysteine capsule capsule, Take 1 capsule by mouth Daily., Disp: , Rfl:     albuterol sulfate  (90 Base) MCG/ACT inhaler, Inhale 2 puffs Every 4 (Four) Hours As Needed for Wheezing or Shortness of Air., Disp: 18 g, Rfl: 0    B Complex Vitamins (VITAMIN B COMPLEX) capsule capsule, Take 1 capsule by mouth Daily. LD 9-27, Disp: , Rfl:     budesonide-formoterol (SYMBICORT) 160-4.5 MCG/ACT inhaler, Inhale 2 puffs 2 (Two) Times a Day., Disp: 10.2 g, Rfl: 0    Calcium Carb-Cholecalciferol (Oyster Shell Calcium w/D) 500-5 MG-MCG tablet, TAKE TWO TABLETS BY MOUTH DAILY, Disp: 180 tablet, Rfl: 0    clonazePAM (KlonoPIN) 0.5 MG tablet, Take 1 tablet by mouth 2 (Two) Times a Day As Needed for Anxiety.  for anxiety, Disp: 30 tablet, Rfl: 2    coenzyme Q10 100 MG capsule, Take 1 capsule by mouth Daily., Disp: , Rfl:     cyclobenzaprine (FLEXERIL) 10 MG tablet, Take 1 tablet by mouth 3 (Three) Times a Day As Needed for Muscle Spasms., Disp: 30 tablet, Rfl: 1    gabapentin (NEURONTIN) 300 MG capsule, TAKE 1 CAPSULE BY MOUTH IN THE MORING, 1 CAPSULE IN THE AFTERNOON AND 1 CAPSULES AT NIGHT, Disp: 120 capsule, Rfl: 0    HYDROcodone-acetaminophen (NORCO) 7.5-325 MG per tablet, Take 0.5-1 tablets by mouth Every 6 (Six) Hours As Needed for Moderate Pain or Severe Pain., Disp: 28 tablet, Rfl: 0    L-Arginine 500 MG capsule, Take 1 capsule by mouth Daily., Disp: , Rfl:     losartan-hydrochlorothiazide (Hyzaar) 50-12.5 MG per tablet, Take 1 tablet by mouth Daily., Disp: 90 tablet, Rfl: 1    montelukast (SINGULAIR) 10 MG tablet, TAKE 1 TABLET BY MOUTH ONCE DAILY, Disp: 90 tablet, Rfl: 0    Multiple Vitamins-Minerals (EMERGEN-C BLUE PO), Take 1 tablet by mouth Daily. LD 9-27, Disp: , Rfl:     naloxone (NARCAN) 4 MG/0.1ML nasal spray, Call 911. Don't prime. [place] in 1 nostril for overdose. Repeat in 2-3 minutes in other nostril if no or minimal breathing/responsiveness., Disp: 2 each, Rfl: 0    S-Adenosylmethionine (YURY-e) 400 MG tablet, Take 400 mg by mouth Daily., Disp: , Rfl:     sildenafil (REVATIO) 20 MG tablet, TAKE 1 TO 2 TABLETS BY MOUTH AS NEEDED FOR ERICTILE DYSFUNCTION, Disp: 50 tablet, Rfl: 0    theophylline (THEODUR) 300 MG 12 hr tablet, Take 1 tablet by mouth Daily., Disp: 90 tablet, Rfl: 3    traZODone (DESYREL) 50 MG tablet, Take 1-2 tablets by mouth Every Night., Disp: 180 tablet, Rfl: 3    triamcinolone (KENALOG) 0.025 % cream, Apply 1 Application topically to the appropriate area as directed 2 (Two) Times a Day., Disp: , Rfl:     warfarin (COUMADIN) 7.5 MG tablet, Take 1 tablet by mouth on Mon and Fri, and take 1/2 tablet by mouth all other days or as directed., Disp: 60 tablet, Rfl: 0    disulfiram (ANTABUSE)  250 MG tablet, Take 1 tablet by mouth Daily., Disp: 30 tablet, Rfl: 1    Past Medical History:   Diagnosis Date    ADHD (attention deficit hyperactivity disorder) 2021    Hard to focus on tasks and follow through.    Allergic 08/20/2018    Arthritis     Asthma     Carpal tunnel syndrome     no pain    Cataract     Deep vein thrombosis 01/10/2025    Depression     Erectile dysfunction     2018 at 73 years old    Family history of malignant neoplasm of breast 09/26/2019    Hyperlipidemia 09/26/2019    Allergic to statins    Hypertension 03/01/2012    Managing w/ Losartan Potassium    Leg pain, right     Low back pain     Decompress and fusion surgery 10/4&5/2022    Neuropathy     feet    Osteopenia 11/28/2019    Poor balance     Pulmonary embolism     Acute pulmonary saddle embolism    Reactive airway disease 01/15/2016    Scoliosis 1960    Shortness of breath 2017    Sleep apnea     Substance abuse 1966    Alcoholic       Past Surgical History:   Procedure Laterality Date    CARDIAC CATHETERIZATION  1992    CARDIAC CATHETERIZATION N/A 01/11/2025    Procedure: Right Heart Cath;  Surgeon: Nancy Hdez MD;  Location: [facility] CATH INVASIVE LOCATION;  Service: Cardiovascular;  Laterality: N/A;    CARDIAC CATHETERIZATION  01/11/2025    Procedure: Percutaneous Manual Thrombectomy;  Surgeon: Nancy Hdez MD;  Location:  [facility] CATH INVASIVE LOCATION;  Service: Cardiovascular;;    CARDIAC CATHETERIZATION Bilateral 01/11/2025    Procedure: Pulmonary angiography;  Surgeon: Nancy Hdez MD;  Location:  [facility] CATH INVASIVE LOCATION;  Service: Cardiovascular;  Laterality: Bilateral;    COLONOSCOPY  2013    No polyps, slight diverticulitis    EYE SURGERY  2016    Cataracts    KNEE ARTHROSCOPY W/ ACL RECONSTRUCTION Right 2019    LUMBAR FUSION Bilateral 10/05/2022    Procedure: DAY 2 LUMBAR LAMINECTOMY TRANSFORAMINAL LUMBAR INTERBODY FUSION L2,L3,L4 WITH NEURO ROBOT;  Surgeon: John Do MD;  Location: [facility] MAIN OR;  " Service: Robotics - Neuro;  Laterality: Bilateral;    LUMBAR FUSION N/A 10/04/2022    Procedure: DAY 1 LUMBAR LATERAL INTERBODY FUSION WITH NEURO ROBOT L2, L3.L4;  Surgeon: John Do MD;  Location: [facility] MAIN OR;  Service: Robotics - Neuro;  Laterality: N/A;  left side approach    MOUTH SURGERY      SPINE SURGERY  10/4&2022    Dr. John Do, [facility]       Family History   Problem Relation Age of Onset    Heart disease Mother     Hypertension Mother     Breast cancer Mother     Stroke Mother         Several TIAs    Alcohol abuse Mother     Thyroid disease Mother     Arthritis Mother     Cancer Mother         Breast    Aortic aneurysm Father     Heart attack Father     Liver cancer Father     Cancer Father         Liver    Heart disease Father         Heart attack    Early death Brother         Coronary thrombosis @ 48 years while mountain climbing.    Heart attack Brother         , at 47 years, fatal       Social History     Tobacco Use    Smoking status: Former     Current packs/day: 0.00     Average packs/day: 0.5 packs/day for 26.0 years (13.0 ttl pk-yrs)     Types: Cigarettes     Start date: 1966     Quit date: 1992     Years since quittin.4     Passive exposure: Past    Smokeless tobacco: Never   Vaping Use    Vaping status: Never Used   Substance Use Topics    Alcohol use: Yes     Alcohol/week: 5.0 standard drinks of alcohol     Types: 3 Glasses of wine, 2 Cans of beer per week    Drug use: Never     Comment: CBD gummies- stop now for surgery         ECG 12 Lead    Date/Time: 2025 2:19 PM  Performed by: Jennifer Cox APRN    Authorized by: Jennifer Cox APRN  Comparison: compared with previous ECG from 3/6/2025  Similar to previous ECG  Rhythm: sinus rhythm  Ectopy: atrial premature contractions             Objective:     Visit Vitals  /80 (BP Location: Right arm, Patient Position: Sitting, Cuff Size: Adult)   Pulse 64   Ht 167.6 cm (66\")   Wt 83 " kg (183 lb)   BMI 29.54 kg/m²             Physical Exam  Constitutional:       Appearance: Normal appearance. He is normal weight.   HENT:      Head: Normocephalic.   Neck:      Vascular: No carotid bruit.   Cardiovascular:      Rate and Rhythm: Normal rate and regular rhythm.      Chest Wall: PMI is not displaced.      Pulses: Normal pulses.           Radial pulses are 2+ on the right side and 2+ on the left side.        Posterior tibial pulses are 2+ on the right side and 2+ on the left side.      Heart sounds: Normal heart sounds. No murmur heard.     No friction rub. No gallop.   Pulmonary:      Effort: Pulmonary effort is normal.      Breath sounds: Normal breath sounds.   Abdominal:      General: Bowel sounds are normal. There is no distension.      Palpations: Abdomen is soft.   Musculoskeletal:      Right lower leg: No edema.      Left lower leg: No edema.   Skin:     General: Skin is warm and dry.      Capillary Refill: Capillary refill takes less than 2 seconds.   Neurological:      Mental Status: He is alert and oriented to person, place, and time.   Psychiatric:         Mood and Affect: Mood normal.         Behavior: Behavior normal.         Thought Content: Thought content normal.          Lab Review:   Lipid Panel          4/22/2025    12:18   Lipid Panel   Total Cholesterol 253    Triglycerides 136    HDL Cholesterol 95    VLDL Cholesterol 23    LDL Cholesterol  135    LDL/HDL Ratio 1.38          Cardiac Procedures:       Assessment:         Diagnoses and all orders for this visit:    1. Pulmonary hypertension (Primary)    2. Dyspnea on exertion            Plan:       Pulmonary embolism/DVT: with evidence of RV strain. S/p thrombectomy. On Coumadin, managed by  clinic. INRs had been stable but are a little more variable now. He has an appt with the AC clinic tomorrow.  Pulmonary HTN:  Remained elevated despite thrombectomy which leads us to believe this is likely chronic. Unable to get an accurate  assessment on echo in March 2025. He continues to have symptoms. At this point, I think we should proceed with a right heart cath to further assess his pulmonary pressures.   HTN: blood pressure stable at home. No changes.  ELINOR: on CPAP at home  Paralyzed hemidiaphragm    Thank you for allowing me to participate in this patient's care. Please call with any questions or concerns. Follow up to be determined post cath.          Your medication list            Accurate as of June 2, 2025  1:34 PM. If you have any questions, ask your nurse or doctor.                CONTINUE taking these medications        Instructions Last Dose Given Next Dose Due   acetaminophen 500 MG tablet  Commonly known as: TYLENOL      Take 1 tablet by mouth Every 6 (Six) Hours As Needed for Mild Pain.       Acetylcysteine capsule capsule      Take 1 capsule by mouth Daily.       albuterol sulfate  (90 Base) MCG/ACT inhaler  Commonly known as: PROVENTIL HFA;VENTOLIN HFA;PROAIR HFA      Inhale 2 puffs Every 4 (Four) Hours As Needed for Wheezing or Shortness of Air.       budesonide-formoterol 160-4.5 MCG/ACT inhaler  Commonly known as: SYMBICORT      Inhale 2 puffs 2 (Two) Times a Day.       calcium 500 mg vitamin D 5 mcg (200 UT) 500-5 MG-MCG tablet per tablet      TAKE TWO TABLETS BY MOUTH DAILY       clonazePAM 0.5 MG tablet  Commonly known as: KlonoPIN      Take 1 tablet by mouth 2 (Two) Times a Day As Needed for Anxiety. for anxiety       coenzyme Q10 100 MG capsule      Take 1 capsule by mouth Daily.       cyclobenzaprine 10 MG tablet  Commonly known as: FLEXERIL      Take 1 tablet by mouth 3 (Three) Times a Day As Needed for Muscle Spasms.       disulfiram 250 MG tablet  Commonly known as: ANTABUSE      Take 1 tablet by mouth Daily.       EMERGEN-C BLUE PO      Take 1 tablet by mouth Daily. LD 9-27       gabapentin 300 MG capsule  Commonly known as: NEURONTIN      TAKE 1 CAPSULE BY MOUTH IN THE MORING, 1 CAPSULE IN THE AFTERNOON AND 1  CAPSULES AT NIGHT       HYDROcodone-acetaminophen 7.5-325 MG per tablet  Commonly known as: NORCO      Take 0.5-1 tablets by mouth Every 6 (Six) Hours As Needed for Moderate Pain or Severe Pain.       L-Arginine 500 MG capsule      Take 1 capsule by mouth Daily.       losartan-hydrochlorothiazide 50-12.5 MG per tablet  Commonly known as: Hyzaar      Take 1 tablet by mouth Daily.       montelukast 10 MG tablet  Commonly known as: SINGULAIR      TAKE 1 TABLET BY MOUTH ONCE DAILY       naloxone 4 MG/0.1ML nasal spray  Commonly known as: NARCAN      Call 911. Don't prime. [place] in 1 nostril for overdose. Repeat in 2-3 minutes in other nostril if no or minimal breathing/responsiveness.       YURY-e 400 MG tablet      Take 400 mg by mouth Daily.       sildenafil 20 MG tablet  Commonly known as: REVATIO      TAKE 1 TO 2 TABLETS BY MOUTH AS NEEDED FOR ERICTILE DYSFUNCTION       theophylline 300 MG 12 hr tablet  Commonly known as: THEODUR      Take 1 tablet by mouth Daily.       traZODone 50 MG tablet  Commonly known as: DESYREL      Take 1-2 tablets by mouth Every Night.       triamcinolone 0.025 % cream  Commonly known as: KENALOG      Apply 1 Application topically to the appropriate area as directed 2 (Two) Times a Day.       vitamin b complex capsule capsule      Take 1 capsule by mouth Daily. LD 9-27       warfarin 7.5 MG tablet  Commonly known as: COUMADIN      Take 1 tablet by mouth on Mon and Fri, and take 1/2 tablet by mouth all other days or as directed.                  EMILY Zepeda  06/02/25  2:41 PM EST

## 2025-06-03 ENCOUNTER — ANTICOAGULATION VISIT (OUTPATIENT)
Dept: PHARMACY | Facility: HOSPITAL | Age: 80
End: 2025-06-03
Payer: MEDICARE

## 2025-06-03 ENCOUNTER — LAB (OUTPATIENT)
Dept: LAB | Facility: HOSPITAL | Age: 80
End: 2025-06-03
Payer: MEDICARE

## 2025-06-03 DIAGNOSIS — I26.02 ACUTE SADDLE PULMONARY EMBOLISM WITH ACUTE COR PULMONALE: ICD-10-CM

## 2025-06-03 DIAGNOSIS — I26.02 ACUTE SADDLE PULMONARY EMBOLISM WITH ACUTE COR PULMONALE: Primary | ICD-10-CM

## 2025-06-03 LAB — INR PPP: 4.3 (ref 0.8–1.2)

## 2025-06-03 PROCEDURE — 85610 PROTHROMBIN TIME: CPT

## 2025-06-03 NOTE — PROGRESS NOTES
Anticoagulation Clinic Progress Note    Patient's visit was held in office today.    Anticoagulation Summary  As of 6/3/2025      INR goal:  2.0-3.0   TTR:  28.2% (4.2 mo)   INR used for dosin.30 (6/3/2025)   Warfarin maintenance plan:  3.75 mg (7.5 mg x 0.5) every Thu; 7.5 mg (7.5 mg x 1) all other days   Weekly warfarin total:  48.75 mg   Plan last modified:  Ramona Sauceda RPH (6/3/2025)   Next INR check:  6/10/2025   Target end date:  --    Indications    Acute saddle pulmonary embolism with acute cor pulmonale [I26.02]                 Anticoagulation Episode Summary       INR check location:  --    Preferred lab:  --    Send INR reminders to:   [facility] CBC ANTICOAG POOL    Comments:  --          Anticoagulation Care Providers       Provider Role Specialty Phone number    KennyJennifer, APRN Referring Nurse Practitioner [phone]            Clinic Interview:  Patient Findings     Positives:  Upcoming invasive procedure, Change in medications    Negatives:  Signs/symptoms of thrombosis, Signs/symptoms of bleeding,   Laboratory test error suspected, Change in health, Change in alcohol use,   Change in activity, Emergency department visit, Upcoming dental procedure,   Missed doses, Extra doses, Change in diet/appetite, Hospital admission,   Bruising, Other complaints      Clinical Outcomes     Negatives:  Major bleeding event, Thromboembolic event,   Anticoagulation-related hospital admission, Anticoagulation-related ED   visit, Anticoagulation-related fatality        INR History:      Latest Ref Rng & Units 2025     9:45 AM 2025     9:30 AM 2025     9:44 AM 2025     9:45 AM 2025     9:56 AM 6/3/2025     9:37 AM 6/3/2025    10:00 AM   Anticoagulation Monitoring   INR  4.00 5.40  1.80   4.30   INR Date  2025 2025  2025   6/3/2025   INR Goal  2.0-3.0 2.0-3.0  2.0-3.0   2.0-3.0   Trend  Down Down  Up   Down   Last Week Total  52.5 mg 45 mg  45 mg   52.5 mg   Next  Week Total  41.25 mg 30 mg  52.5 mg   41.25 mg   Sun  3.75 mg (5/4) 3.75 mg  7.5 mg   7.5 mg   Mon  7.5 mg 7.5 mg  7.5 mg   7.5 mg   Tue  7.5 mg Hold (5/6); Otherwise 7.5 mg  7.5 mg   7.5 mg   Wed  Hold (4/30) Hold (5/7); Otherwise 7.5 mg  7.5 mg   Hold (6/4)   Thu  7.5 mg 3.75 mg  7.5 mg   3.75 mg   Fri  7.5 mg 7.5 mg  7.5 mg   7.5 mg   Sat  7.5 mg 7.5 mg  7.5 mg   7.5 mg   Historical INR 0.80 - 1.20   5.40   1.80  4.30     Visit Report  Report             Plan:  1. INR is Supratherapeutic today- see above in Anticoagulation Summary. Patient recently completed course of steroids, clinic unaware of medication. Reminded patient to call clinic with any new medications.   Will instruct Omar Choudhary to Change their warfarin regimen- see above in Anticoagulation Summary. Hold tomorrow's dose (patient takes in AM and had already taken today's dose) Then reduce to 3.75 mg on Thursday and 7.5 mg all other days until re-check.  Patient with upcoming Heart Cath on 6/18.   2. Follow up in 1 week  3. Patient declines warfarin refills.  4. Verbal and written information provided. Patient expresses understanding and has no further questions at this time.    Ramona Sauceda [facility]

## 2025-06-04 ENCOUNTER — TREATMENT (OUTPATIENT)
Dept: PHYSICAL THERAPY | Facility: CLINIC | Age: 80
End: 2025-06-04
Payer: MEDICARE

## 2025-06-04 DIAGNOSIS — M25.562 LEFT KNEE PAIN, UNSPECIFIED CHRONICITY: ICD-10-CM

## 2025-06-04 DIAGNOSIS — S83.242D TEAR OF MEDIAL MENISCUS OF LEFT KNEE, UNSPECIFIED TEAR TYPE, UNSPECIFIED WHETHER OLD OR CURRENT TEAR, SUBSEQUENT ENCOUNTER: ICD-10-CM

## 2025-06-04 DIAGNOSIS — M25.511 RIGHT SHOULDER PAIN, UNSPECIFIED CHRONICITY: ICD-10-CM

## 2025-06-04 DIAGNOSIS — M17.12 ARTHRITIS OF LEFT KNEE: Primary | ICD-10-CM

## 2025-06-04 NOTE — PROGRESS NOTES
Physical Therapy Daily Progress Note      Patient: Omar Choudhary   : 1945  Diagnosis/ICD-10 Code:  Arthritis of left knee [M17.12]  Referring practitioner: Benny Ordaz MD  Date of Initial Visit: Type: THERAPY  Noted: 2025  Today's Date: 2025  Patient seen for 2 sessions             Subjective Nothing new to report since his initial eval.    Objective   See Exercise, Manual, and Modality Logs for complete treatment.       Assessment/Plan  Pt seemed to tolerate manual techniques and progression/addition of exercises well today.  However, after his last exercise of standing hamstring stretch, pt was unsteady on his feet.  He said the unsteadiness has a lot to do with his neuropathy.    Progress per Plan of Care           Timed:         Manual Therapy:    10     mins  [id number];     Therapeutic Exercise:    43     mins  [id number];         Timed Treatment:   53   mins   Total Treatment:     53   mins        Gil Perry PTA  Physical Therapist Assistant

## 2025-06-06 DIAGNOSIS — J45.909 ASTHMA, UNSPECIFIED ASTHMA SEVERITY, UNSPECIFIED WHETHER COMPLICATED, UNSPECIFIED WHETHER PERSISTENT: ICD-10-CM

## 2025-06-06 RX ORDER — BUDESONIDE AND FORMOTEROL FUMARATE DIHYDRATE 160; 4.5 UG/1; UG/1
2 AEROSOL RESPIRATORY (INHALATION) 2 TIMES DAILY
Qty: 30.6 G | Refills: 1 | Status: SHIPPED | OUTPATIENT
Start: 2025-06-06

## 2025-06-09 ENCOUNTER — TELEPHONE (OUTPATIENT)
Dept: NEUROSURGERY | Facility: CLINIC | Age: 80
End: 2025-06-09
Payer: MEDICARE

## 2025-06-09 NOTE — TELEPHONE ENCOUNTER
Patient called wanting to know if he should make an appointment or can he get imaging 1st. He did something to bother his back over the weekend and he's really sore.

## 2025-06-10 ENCOUNTER — ANTICOAGULATION VISIT (OUTPATIENT)
Dept: PHARMACY | Facility: HOSPITAL | Age: 80
End: 2025-06-10
Payer: MEDICARE

## 2025-06-10 ENCOUNTER — LAB (OUTPATIENT)
Dept: LAB | Facility: HOSPITAL | Age: 80
End: 2025-06-10
Payer: MEDICARE

## 2025-06-10 ENCOUNTER — TELEPHONE (OUTPATIENT)
Age: 80
End: 2025-06-10
Payer: MEDICARE

## 2025-06-10 DIAGNOSIS — M54.50 ACUTE RIGHT-SIDED LOW BACK PAIN WITHOUT SCIATICA: Primary | ICD-10-CM

## 2025-06-10 DIAGNOSIS — M48.061 DEGENERATIVE LUMBAR SPINAL STENOSIS: ICD-10-CM

## 2025-06-10 DIAGNOSIS — I26.02 ACUTE SADDLE PULMONARY EMBOLISM WITH ACUTE COR PULMONALE: Primary | ICD-10-CM

## 2025-06-10 DIAGNOSIS — I26.02 ACUTE SADDLE PULMONARY EMBOLISM WITH ACUTE COR PULMONALE: ICD-10-CM

## 2025-06-10 LAB — INR PPP: 2.6 (ref 0.8–1.2)

## 2025-06-10 PROCEDURE — 85610 PROTHROMBIN TIME: CPT

## 2025-06-10 RX ORDER — GABAPENTIN 300 MG/1
CAPSULE ORAL
Qty: 120 CAPSULE | Refills: 0 | Status: SHIPPED | OUTPATIENT
Start: 2025-06-10

## 2025-06-10 NOTE — TELEPHONE ENCOUNTER
Discussed left heart cath with patient and he would like to proceed. Scheduling notified to add this to his order for right heart cath.

## 2025-06-10 NOTE — PROGRESS NOTES
Anticoagulation Clinic Progress Note    Patient's visit was held in office today.    Anticoagulation Summary  As of 6/10/2025      INR goal:  2.0-3.0   TTR:  27.9% (4.4 mo)   INR used for dosin.60 (6/10/2025)   Warfarin maintenance plan:  3.75 mg (7.5 mg x 0.5) every Sun, Thu; 7.5 mg (7.5 mg x 1) all other days   Weekly warfarin total:  45 mg   Plan last modified:  Ramona Sauceda RPH (6/10/2025)   Next INR check:  2025   Target end date:  --    Indications    Acute saddle pulmonary embolism with acute cor pulmonale [I26.02]                 Anticoagulation Episode Summary       INR check location:  --    Preferred lab:  --    Send INR reminders to:   [facility] CBC ANTICOAG POOL    Comments:  --          Anticoagulation Care Providers       Provider Role Specialty Phone number    KennyJennifer, APRN Referring Nurse Practitioner [phone]            Clinic Interview:  Patient Findings     Positives:  Upcoming invasive procedure    Negatives:  Signs/symptoms of thrombosis, Signs/symptoms of bleeding,   Laboratory test error suspected, Change in health, Change in alcohol use,   Change in activity, Emergency department visit, Upcoming dental procedure,   Missed doses, Extra doses, Change in medications, Change in diet/appetite,   Hospital admission, Bruising, Other complaints      Clinical Outcomes     Negatives:  Major bleeding event, Thromboembolic event,   Anticoagulation-related hospital admission, Anticoagulation-related ED   visit, Anticoagulation-related fatality        INR History:      Latest Ref Rng & Units 2025     9:44 AM 2025     9:45 AM 2025     9:56 AM 6/3/2025     9:37 AM 6/3/2025    10:00 AM 6/10/2025     9:30 AM 6/10/2025     9:51 AM   Anticoagulation Monitoring   INR   1.80   4.30 2.60    INR Date   2025   6/3/2025 6/10/2025    INR Goal   2.0-3.0   2.0-3.0 2.0-3.0    Trend   Up   Down Down    Last Week Total   45 mg   52.5 mg 37.5 mg    Next Week Total   52.5 mg    41.25 mg 37.5 mg    Sun   7.5 mg   7.5 mg 3.75 mg    Mon   7.5 mg   7.5 mg Hold (6/16); Otherwise 7.5 mg    Tue   7.5 mg   7.5 mg Hold (6/17); Otherwise 7.5 mg    Wed   7.5 mg   Hold (6/4) 7.5 mg    Thu   7.5 mg   3.75 mg 3.75 mg    Fri   7.5 mg   7.5 mg 7.5 mg    Sat   7.5 mg   7.5 mg 7.5 mg    Historical INR 0.80 - 1.20 5.40   1.80  4.30    2.60        Plan:  1. INR is Therapeutic today- see above in Anticoagulation Summary.  Will instruct Omar AYDIN Choudhary to Continue their warfarin regimen- see above in Anticoagulation Summary. Patient has upcoming heart cath next Wednesday. He is to hold his dose for 2 days prior. Calendar provided to patient.   2. Follow up in 2 weeks  3. Patient declines warfarin refills.  4. Verbal and written information provided. Patient expresses understanding and has no further questions at this time.    Ramona Sauceda [facility]

## 2025-06-10 NOTE — TELEPHONE ENCOUNTER
Called patient and told him to complete the X-Rays. I did schedule him a follow up appointment next week.  He verbally understood.

## 2025-06-11 ENCOUNTER — HOSPITAL ENCOUNTER (OUTPATIENT)
Dept: GENERAL RADIOLOGY | Facility: HOSPITAL | Age: 80
Discharge: HOME | End: 2025-06-11
Admitting: NURSE PRACTITIONER
Payer: MEDICARE

## 2025-06-11 DIAGNOSIS — M54.50 ACUTE RIGHT-SIDED LOW BACK PAIN WITHOUT SCIATICA: ICD-10-CM

## 2025-06-11 PROCEDURE — 72114 X-RAY EXAM L-S SPINE BENDING: CPT

## 2025-06-17 DIAGNOSIS — M48.061 DEGENERATIVE LUMBAR SPINAL STENOSIS: ICD-10-CM

## 2025-06-17 RX ORDER — GABAPENTIN 300 MG/1
CAPSULE ORAL
Qty: 120 CAPSULE | Refills: 0 | Status: ON HOLD | OUTPATIENT
Start: 2025-06-17 | End: 2025-06-23 | Stop reason: SDUPTHER

## 2025-06-18 ENCOUNTER — TELEPHONE (OUTPATIENT)
Dept: ONCOLOGY | Facility: CLINIC | Age: 80
End: 2025-06-18

## 2025-06-18 ENCOUNTER — APPOINTMENT (OUTPATIENT)
Dept: CARDIOLOGY | Facility: HOSPITAL | Age: 80
End: 2025-06-18
Payer: MEDICARE

## 2025-06-18 ENCOUNTER — APPOINTMENT (OUTPATIENT)
Dept: GENERAL RADIOLOGY | Facility: HOSPITAL | Age: 80
End: 2025-06-18
Payer: MEDICARE

## 2025-06-18 ENCOUNTER — TELEPHONE (OUTPATIENT)
Dept: CARDIAC REHAB | Facility: HOSPITAL | Age: 80
End: 2025-06-18
Payer: MEDICARE

## 2025-06-18 ENCOUNTER — HOSPITAL ENCOUNTER (INPATIENT)
Facility: HOSPITAL | Age: 80
LOS: 7 days | Discharge: HOME | End: 2025-06-25
Attending: INTERNAL MEDICINE | Admitting: INTERNAL MEDICINE
Payer: MEDICARE

## 2025-06-18 DIAGNOSIS — I27.20 PULMONARY HYPERTENSION: ICD-10-CM

## 2025-06-18 DIAGNOSIS — R93.89 ABNORMAL FINDINGS ON DIAGNOSTIC IMAGING OF OTHER SPECIFIED BODY STRUCTURES: ICD-10-CM

## 2025-06-18 DIAGNOSIS — Z95.1 S/P CABG (CORONARY ARTERY BYPASS GRAFT): ICD-10-CM

## 2025-06-18 DIAGNOSIS — I25.10 CORONARY ARTERY DISEASE INVOLVING NATIVE HEART, UNSPECIFIED VESSEL OR LESION TYPE, UNSPECIFIED WHETHER ANGINA PRESENT: Primary | ICD-10-CM

## 2025-06-18 LAB
ACT BLD: 170 SECONDS (ref 82–152)
ACT BLD: 193 SECONDS (ref 82–152)
ACT BLD: 245 SECONDS (ref 82–152)
ARTERIAL PATENCY WRIST A: POSITIVE
ATMOSPHERIC PRESS: 746 MMHG
BASE EXCESS BLDA CALC-SCNC: 0.7 MMOL/L (ref 0–2)
BDY SITE: ABNORMAL
BH CV XLRA MEAS - DIST GSV CALF DIST LEFT: 0.21 CM
BH CV XLRA MEAS - DIST GSV CALF DIST RIGHT: 0.18 CM
BH CV XLRA MEAS - DIST GSV THIGH DIST LEFT: 0.27 CM
BH CV XLRA MEAS - DIST GSV THIGH DIST RIGHT: 0.26 CM
BH CV XLRA MEAS - DIST LSV CALF DIST LEFT: 0.17 CM
BH CV XLRA MEAS - DIST LSV CALF DIST RIGHT: 0.23 CM
BH CV XLRA MEAS - GSV KNEE DIST LEFT: 0.21 CM
BH CV XLRA MEAS - GSV KNEE DIST RIGHT: 0.28 CM
BH CV XLRA MEAS - GSV ORIGIN DIST LEFT: 0.28 CM
BH CV XLRA MEAS - GSV ORIGIN DIST RIGHT: 0.37 CM
BH CV XLRA MEAS - MID GSV CALF LEFT: 0.22 CM
BH CV XLRA MEAS - MID GSV CALF RIGHT: 0.19 CM
BH CV XLRA MEAS - MID GSV THIGH  LEFT: 0.26 CM
BH CV XLRA MEAS - MID GSV THIGH  RIGHT: 0.35 CM
BH CV XLRA MEAS - MID LSV CALF DIST LEFT: 0.16 CM
BH CV XLRA MEAS - MID LSV CALF DIST RIGHT: 0.2 CM
BH CV XLRA MEAS - PROX GSV CALF DIST LEFT: 0.24 CM
BH CV XLRA MEAS - PROX GSV CALF DIST RIGHT: 0.23 CM
BH CV XLRA MEAS - PROX GSV THIGH  LEFT: 0.35 CM
BH CV XLRA MEAS - PROX GSV THIGH  RIGHT: 0.35 CM
BH CV XLRA MEAS - PROX LSV CALF DIST LEFT: 0.22 CM
BH CV XLRA MEAS - PROX LSV CALF DIST RIGHT: 0.19 CM
BH CV XLRA MEAS LEFT DIST CCA EDV: 18.1 CM/SEC
BH CV XLRA MEAS LEFT DIST CCA PSV: 63.1 CM/SEC
BH CV XLRA MEAS LEFT DIST ICA EDV: -29 CM/SEC
BH CV XLRA MEAS LEFT DIST ICA PSV: -79.2 CM/SEC
BH CV XLRA MEAS LEFT ICA/CCA RATIO: 1.26
BH CV XLRA MEAS LEFT MID ICA EDV: -20.8 CM/SEC
BH CV XLRA MEAS LEFT MID ICA PSV: -58.8 CM/SEC
BH CV XLRA MEAS LEFT PROX CCA EDV: -25.1 CM/SEC
BH CV XLRA MEAS LEFT PROX CCA PSV: -83.2 CM/SEC
BH CV XLRA MEAS LEFT PROX ECA EDV: -12.3 CM/SEC
BH CV XLRA MEAS LEFT PROX ECA PSV: -71.8 CM/SEC
BH CV XLRA MEAS LEFT PROX ICA EDV: -17.6 CM/SEC
BH CV XLRA MEAS LEFT PROX ICA PSV: -56.8 CM/SEC
BH CV XLRA MEAS LEFT PROX SCLA PSV: 149 CM/SEC
BH CV XLRA MEAS LEFT VERTEBRAL A EDV: -18.8 CM/SEC
BH CV XLRA MEAS LEFT VERTEBRAL A PSV: -56.1 CM/SEC
BH CV XLRA MEAS RIGHT DIST CCA EDV: -18.6 CM/SEC
BH CV XLRA MEAS RIGHT DIST CCA PSV: -90.7 CM/SEC
BH CV XLRA MEAS RIGHT DIST ICA EDV: -18.4 CM/SEC
BH CV XLRA MEAS RIGHT DIST ICA PSV: -46.1 CM/SEC
BH CV XLRA MEAS RIGHT ICA/CCA RATIO: 1.3
BH CV XLRA MEAS RIGHT MID ICA EDV: -18.9 CM/SEC
BH CV XLRA MEAS RIGHT MID ICA PSV: -61.6 CM/SEC
BH CV XLRA MEAS RIGHT PROX CCA EDV: 18.6 CM/SEC
BH CV XLRA MEAS RIGHT PROX CCA PSV: 80.1 CM/SEC
BH CV XLRA MEAS RIGHT PROX ECA EDV: -14.4 CM/SEC
BH CV XLRA MEAS RIGHT PROX ECA PSV: -89.1 CM/SEC
BH CV XLRA MEAS RIGHT PROX ICA EDV: -25.2 CM/SEC
BH CV XLRA MEAS RIGHT PROX ICA PSV: -118.1 CM/SEC
BH CV XLRA MEAS RIGHT PROX SCLA PSV: 107.4 CM/SEC
BH CV XLRA MEAS RIGHT VERTEBRAL A EDV: -28.1 CM/SEC
BH CV XLRA MEAS RIGHT VERTEBRAL A PSV: -75.1 CM/SEC
BILIRUB UR QL STRIP: NEGATIVE
CLARITY UR: CLEAR
COLOR UR: YELLOW
GLUCOSE BLDC GLUCOMTR-MCNC: 123 MG/DL (ref 70–130)
GLUCOSE UR STRIP-MCNC: NEGATIVE MG/DL
HCO3 BLDA-SCNC: 22.6 MMOL/L (ref 21–28)
HCO3 BLDA-SCNC: 23.2 MMOL/L (ref 22–28)
HCO3 BLDA-SCNC: 24.1 MMOL/L (ref 21–28)
HCT VFR BLDA CALC: 31 % (ref 38–51)
HCT VFR BLDA CALC: 32 % (ref 38–51)
HEMODILUTION: NO
HGB BLDA-MCNC: 10.5 G/DL (ref 12–17)
HGB BLDA-MCNC: 10.9 G/DL (ref 12–17)
HGB UR QL STRIP.AUTO: NEGATIVE
INR PPP: 1.4 (ref 0.8–1.2)
INR PPP: 1.71 (ref 0.9–1.1)
KETONES UR QL STRIP: NEGATIVE
LEUKOCYTE ESTERASE UR QL STRIP.AUTO: NEGATIVE
MODALITY: ABNORMAL
NITRITE UR QL STRIP: NEGATIVE
PCO2 BLDA: 29.9 MM HG (ref 35–45)
PCO2 BLDV: 30.4 MMHG (ref 41–51)
PCO2 BLDV: 30.6 MMHG (ref 41–51)
PH BLDA: 7.48 [PH] (ref 7.31–7.41)
PH BLDA: 7.5 PH UNITS (ref 7.35–7.45)
PH BLDA: 7.5 [PH] (ref 7.31–7.41)
PH UR STRIP.AUTO: 8 [PH] (ref 5–8)
PO2 BLDA: 77.9 MM HG (ref 80–100)
PO2 BLDV: 29 MM HG (ref 35–42)
PO2 BLDV: 75 MM HG (ref 35–42)
POTASSIUM BLDA-SCNC: 3.7 MMOL/L (ref 3.5–4.9)
POTASSIUM BLDA-SCNC: 3.8 MMOL/L (ref 3.5–4.9)
PROT UR QL STRIP: NEGATIVE
PROTHROMBIN TIME: 13.7 SECONDS (ref 12.8–15.2)
PROTHROMBIN TIME: 20.1 SECONDS (ref 11.7–14.2)
SAO2 % BLDCOA: 63 % (ref 45–75)
SAO2 % BLDCOA: 96 % (ref 45–75)
SAO2 % BLDCOA: 96.7 % (ref 92–98.5)
SP GR UR STRIP: 1.02 (ref 1–1.03)
TOTAL RATE: 18 BREATHS/MINUTE
UROBILINOGEN UR QL STRIP: NORMAL

## 2025-06-18 PROCEDURE — 82948 REAGENT STRIP/BLOOD GLUCOSE: CPT

## 2025-06-18 PROCEDURE — 81003 URINALYSIS AUTO W/O SCOPE: CPT | Performed by: NURSE PRACTITIONER

## 2025-06-18 PROCEDURE — C1894 INTRO/SHEATH, NON-LASER: HCPCS | Performed by: INTERNAL MEDICINE

## 2025-06-18 PROCEDURE — 82803 BLOOD GASES ANY COMBINATION: CPT

## 2025-06-18 PROCEDURE — 71046 X-RAY EXAM CHEST 2 VIEWS: CPT

## 2025-06-18 PROCEDURE — 25010000002 LIDOCAINE 2% SOLUTION: Performed by: INTERNAL MEDICINE

## 2025-06-18 PROCEDURE — C1769 GUIDE WIRE: HCPCS | Performed by: INTERNAL MEDICINE

## 2025-06-18 PROCEDURE — 93970 EXTREMITY STUDY: CPT | Performed by: SURGERY

## 2025-06-18 PROCEDURE — 36600 WITHDRAWAL OF ARTERIAL BLOOD: CPT

## 2025-06-18 PROCEDURE — 99223 1ST HOSP IP/OBS HIGH 75: CPT | Performed by: NURSE PRACTITIONER

## 2025-06-18 PROCEDURE — 94761 N-INVAS EAR/PLS OXIMETRY MLT: CPT

## 2025-06-18 PROCEDURE — 93880 EXTRACRANIAL BILAT STUDY: CPT | Performed by: SURGERY

## 2025-06-18 PROCEDURE — 82810 BLOOD GASES O2 SAT ONLY: CPT

## 2025-06-18 PROCEDURE — 25810000003 SODIUM CHLORIDE 0.9 % SOLUTION: Performed by: INTERNAL MEDICINE

## 2025-06-18 PROCEDURE — 94760 N-INVAS EAR/PLS OXIMETRY 1: CPT

## 2025-06-18 PROCEDURE — 85018 HEMOGLOBIN: CPT

## 2025-06-18 PROCEDURE — 85347 COAGULATION TIME ACTIVATED: CPT

## 2025-06-18 PROCEDURE — 85610 PROTHROMBIN TIME: CPT | Performed by: INTERNAL MEDICINE

## 2025-06-18 PROCEDURE — B2151ZZ FLUOROSCOPY OF LEFT HEART USING LOW OSMOLAR CONTRAST: ICD-10-PCS | Performed by: INTERNAL MEDICINE

## 2025-06-18 PROCEDURE — 85014 HEMATOCRIT: CPT

## 2025-06-18 PROCEDURE — 25010000002 FENTANYL CITRATE (PF) 50 MCG/ML SOLUTION: Performed by: INTERNAL MEDICINE

## 2025-06-18 PROCEDURE — C1773 RET DEV, INSERTABLE: HCPCS | Performed by: INTERNAL MEDICINE

## 2025-06-18 PROCEDURE — 94799 UNLISTED PULMONARY SVC/PX: CPT

## 2025-06-18 PROCEDURE — 4A023N7 MEASUREMENT OF CARDIAC SAMPLING AND PRESSURE, LEFT HEART, PERCUTANEOUS APPROACH: ICD-10-PCS | Performed by: INTERNAL MEDICINE

## 2025-06-18 PROCEDURE — 93456 R HRT CORONARY ARTERY ANGIO: CPT | Performed by: INTERNAL MEDICINE

## 2025-06-18 PROCEDURE — 25010000002 MIDAZOLAM PER 1 MG: Performed by: INTERNAL MEDICINE

## 2025-06-18 PROCEDURE — 25010000002 HEPARIN (PORCINE) PER 1000 UNITS: Performed by: INTERNAL MEDICINE

## 2025-06-18 PROCEDURE — 94640 AIRWAY INHALATION TREATMENT: CPT

## 2025-06-18 PROCEDURE — 93010 ELECTROCARDIOGRAM REPORT: CPT | Performed by: STUDENT IN AN ORGANIZED HEALTH CARE EDUCATION/TRAINING PROGRAM

## 2025-06-18 PROCEDURE — 25510000001 IOPAMIDOL PER 1 ML: Performed by: INTERNAL MEDICINE

## 2025-06-18 PROCEDURE — 93880 EXTRACRANIAL BILAT STUDY: CPT

## 2025-06-18 PROCEDURE — 93970 EXTREMITY STUDY: CPT

## 2025-06-18 PROCEDURE — B2111ZZ FLUOROSCOPY OF MULTIPLE CORONARY ARTERIES USING LOW OSMOLAR CONTRAST: ICD-10-PCS | Performed by: INTERNAL MEDICINE

## 2025-06-18 PROCEDURE — C1887 CATHETER, GUIDING: HCPCS | Performed by: INTERNAL MEDICINE

## 2025-06-18 RX ORDER — CHLORHEXIDINE GLUCONATE ORAL RINSE 1.2 MG/ML
15 SOLUTION DENTAL EVERY 12 HOURS SCHEDULED
Status: COMPLETED | OUTPATIENT
Start: 2025-06-19 | End: 2025-06-20

## 2025-06-18 RX ORDER — TRAZODONE HYDROCHLORIDE 50 MG/1
50 TABLET ORAL NIGHTLY PRN
Status: DISCONTINUED | OUTPATIENT
Start: 2025-06-18 | End: 2025-06-20

## 2025-06-18 RX ORDER — CLONAZEPAM 0.5 MG/1
0.5 TABLET ORAL 2 TIMES DAILY PRN
Status: DISCONTINUED | OUTPATIENT
Start: 2025-06-18 | End: 2025-06-20

## 2025-06-18 RX ORDER — METOPROLOL TARTRATE 25 MG/1
12.5 TABLET, FILM COATED ORAL
Status: COMPLETED | OUTPATIENT
Start: 2025-06-20 | End: 2025-06-20

## 2025-06-18 RX ORDER — NITROGLYCERIN 0.4 MG/1
0.4 TABLET SUBLINGUAL
Status: DISCONTINUED | OUTPATIENT
Start: 2025-06-18 | End: 2025-06-20

## 2025-06-18 RX ORDER — MIDAZOLAM HYDROCHLORIDE 1 MG/ML
INJECTION, SOLUTION INTRAMUSCULAR; INTRAVENOUS
Status: DISCONTINUED | OUTPATIENT
Start: 2025-06-18 | End: 2025-06-18 | Stop reason: HOSPADM

## 2025-06-18 RX ORDER — VERAPAMIL HYDROCHLORIDE 2.5 MG/ML
INJECTION INTRAVENOUS
Status: DISCONTINUED | OUTPATIENT
Start: 2025-06-18 | End: 2025-06-18 | Stop reason: HOSPADM

## 2025-06-18 RX ORDER — SODIUM CHLORIDE 9 MG/ML
75 INJECTION, SOLUTION INTRAVENOUS CONTINUOUS
Status: ACTIVE | OUTPATIENT
Start: 2025-06-18 | End: 2025-06-18

## 2025-06-18 RX ORDER — CHLORHEXIDINE GLUCONATE 500 MG/1
1 CLOTH TOPICAL EVERY 12 HOURS
Status: DISCONTINUED | OUTPATIENT
Start: 2025-06-19 | End: 2025-06-20

## 2025-06-18 RX ORDER — SODIUM CHLORIDE 9 MG/ML
250 INJECTION, SOLUTION INTRAVENOUS ONCE AS NEEDED
Status: ACTIVE | OUTPATIENT
Start: 2025-06-18 | End: 2025-06-19

## 2025-06-18 RX ORDER — HEPARIN SODIUM 1000 [USP'U]/ML
INJECTION, SOLUTION INTRAVENOUS; SUBCUTANEOUS
Status: DISCONTINUED | OUTPATIENT
Start: 2025-06-18 | End: 2025-06-18 | Stop reason: HOSPADM

## 2025-06-18 RX ORDER — FENTANYL CITRATE 50 UG/ML
INJECTION, SOLUTION INTRAMUSCULAR; INTRAVENOUS
Status: DISCONTINUED | OUTPATIENT
Start: 2025-06-18 | End: 2025-06-18 | Stop reason: HOSPADM

## 2025-06-18 RX ORDER — MORPHINE SULFATE 2 MG/ML
1 INJECTION, SOLUTION INTRAMUSCULAR; INTRAVENOUS EVERY 4 HOURS PRN
Status: DISCONTINUED | OUTPATIENT
Start: 2025-06-18 | End: 2025-06-20

## 2025-06-18 RX ORDER — ASPIRIN 81 MG/1
81 TABLET, CHEWABLE ORAL DAILY
Status: DISCONTINUED | OUTPATIENT
Start: 2025-06-18 | End: 2025-06-20

## 2025-06-18 RX ORDER — MONTELUKAST SODIUM 10 MG/1
10 TABLET ORAL DAILY
Status: DISCONTINUED | OUTPATIENT
Start: 2025-06-19 | End: 2025-06-20

## 2025-06-18 RX ORDER — HYDROCODONE BITARTRATE AND ACETAMINOPHEN 5; 325 MG/1; MG/1
1 TABLET ORAL EVERY 4 HOURS PRN
Refills: 0 | Status: DISCONTINUED | OUTPATIENT
Start: 2025-06-18 | End: 2025-06-20

## 2025-06-18 RX ORDER — BUDESONIDE AND FORMOTEROL FUMARATE DIHYDRATE 160; 4.5 UG/1; UG/1
2 AEROSOL RESPIRATORY (INHALATION) 2 TIMES DAILY
Status: DISCONTINUED | OUTPATIENT
Start: 2025-06-18 | End: 2025-06-20

## 2025-06-18 RX ORDER — ALBUTEROL SULFATE 0.83 MG/ML
2.5 SOLUTION RESPIRATORY (INHALATION) EVERY 6 HOURS PRN
Status: DISCONTINUED | OUTPATIENT
Start: 2025-06-18 | End: 2025-06-20

## 2025-06-18 RX ORDER — ONDANSETRON 2 MG/ML
4 INJECTION INTRAMUSCULAR; INTRAVENOUS EVERY 6 HOURS PRN
Status: DISCONTINUED | OUTPATIENT
Start: 2025-06-18 | End: 2025-06-20

## 2025-06-18 RX ORDER — ONDANSETRON 4 MG/1
4 TABLET, ORALLY DISINTEGRATING ORAL EVERY 6 HOURS PRN
Status: DISCONTINUED | OUTPATIENT
Start: 2025-06-18 | End: 2025-06-20

## 2025-06-18 RX ORDER — ACETAMINOPHEN 325 MG/1
650 TABLET ORAL EVERY 4 HOURS PRN
Status: DISCONTINUED | OUTPATIENT
Start: 2025-06-18 | End: 2025-06-20

## 2025-06-18 RX ORDER — THEOPHYLLINE 300 MG/1
300 TABLET, EXTENDED RELEASE ORAL EVERY 12 HOURS SCHEDULED
Status: DISCONTINUED | OUTPATIENT
Start: 2025-06-18 | End: 2025-06-20

## 2025-06-18 RX ORDER — GABAPENTIN 300 MG/1
300 CAPSULE ORAL EVERY 8 HOURS SCHEDULED
Status: DISCONTINUED | OUTPATIENT
Start: 2025-06-18 | End: 2025-06-20

## 2025-06-18 RX ORDER — ACETYLCYSTEINE 200 MG/ML
600 SOLUTION ORAL; RESPIRATORY (INHALATION) DAILY
Status: DISCONTINUED | OUTPATIENT
Start: 2025-06-18 | End: 2025-06-20

## 2025-06-18 RX ORDER — LIDOCAINE HYDROCHLORIDE 20 MG/ML
INJECTION, SOLUTION INFILTRATION; PERINEURAL
Status: DISCONTINUED | OUTPATIENT
Start: 2025-06-18 | End: 2025-06-18 | Stop reason: HOSPADM

## 2025-06-18 RX ORDER — IOPAMIDOL 755 MG/ML
INJECTION, SOLUTION INTRAVASCULAR
Status: DISCONTINUED | OUTPATIENT
Start: 2025-06-18 | End: 2025-06-18 | Stop reason: HOSPADM

## 2025-06-18 RX ORDER — HYDROCHLOROTHIAZIDE 12.5 MG/1
12.5 TABLET ORAL
Status: DISCONTINUED | OUTPATIENT
Start: 2025-06-18 | End: 2025-06-20

## 2025-06-18 RX ORDER — NALOXONE HCL 0.4 MG/ML
0.4 VIAL (ML) INJECTION
Status: DISCONTINUED | OUTPATIENT
Start: 2025-06-18 | End: 2025-06-20

## 2025-06-18 RX ORDER — SODIUM CHLORIDE 0.9 % (FLUSH) 0.9 %
10 SYRINGE (ML) INJECTION AS NEEDED
Status: DISCONTINUED | OUTPATIENT
Start: 2025-06-18 | End: 2025-06-18 | Stop reason: HOSPADM

## 2025-06-18 RX ORDER — LOSARTAN POTASSIUM 50 MG/1
50 TABLET ORAL
Status: DISCONTINUED | OUTPATIENT
Start: 2025-06-19 | End: 2025-06-20

## 2025-06-18 RX ADMIN — THEOPHYLLINE 300 MG: 300 TABLET, EXTENDED RELEASE ORAL at 17:55

## 2025-06-18 RX ADMIN — BUDESONIDE AND FORMOTEROL FUMARATE DIHYDRATE 2 PUFF: 160; 4.5 AEROSOL RESPIRATORY (INHALATION) at 20:30

## 2025-06-18 RX ADMIN — THEOPHYLLINE 300 MG: 300 TABLET, EXTENDED RELEASE ORAL at 20:59

## 2025-06-18 RX ADMIN — ASPIRIN 81 MG CHEWABLE TABLET 81 MG: 81 TABLET CHEWABLE at 17:55

## 2025-06-18 RX ADMIN — TRAZODONE HYDROCHLORIDE 50 MG: 50 TABLET ORAL at 23:46

## 2025-06-18 RX ADMIN — GABAPENTIN 300 MG: 300 CAPSULE ORAL at 17:54

## 2025-06-18 RX ADMIN — SODIUM CHLORIDE 75 ML/HR: 9 INJECTION, SOLUTION INTRAVENOUS at 09:16

## 2025-06-18 RX ADMIN — HYDROCHLOROTHIAZIDE 12.5 MG: 12.5 TABLET ORAL at 17:55

## 2025-06-18 RX ADMIN — GABAPENTIN 300 MG: 300 CAPSULE ORAL at 20:59

## 2025-06-18 RX ADMIN — CLONAZEPAM 0.5 MG: 0.5 TABLET ORAL at 17:55

## 2025-06-18 NOTE — CONSULTS
[facility] Cardiac Surgery      Patient Care Team:  Dennis Kwong MD as PCP - General (Internal Medicine)  Arslan Carlson PA-C as Physician Assistant (Physician Assistant)  Ang Peña APRN as Nurse Practitioner (Family Medicine)  Benny Ordaz MD as Surgeon (Orthopedic Surgery)  Jerrod Lagunas MD as Consulting Physician (Pulmonary Disease)  Luis Alberto Alvarenga MD as Consulting Physician (Hematology and Oncology)    Chief complaint  Chest pain    Subjective     History of Present Illness  Patient is a 79 y.o. male with a past medical history including hyperlipidemia, hypertension, hx of DVT/pulmonary embolus, chronic anticoagulation (warfarin), and pulmonary hypertension who is followed by Dr. Hdez.  He presented to the hospital today for cardiac catheterization which revealed severe multivessel CAD.  We were consulted for cardiac surgery evaluation.     Review of Systems   Constitutional:  Positive for activity change and fatigue.   HENT: Negative.     Eyes: Negative.    Respiratory:  Positive for chest tightness and shortness of breath.    Cardiovascular: Negative.    Gastrointestinal: Negative.    Endocrine: Negative.    Genitourinary: Negative.    Musculoskeletal: Negative.    Skin: Negative.    Allergic/Immunologic: Negative.    Neurological: Negative.    Hematological: Negative.    Psychiatric/Behavioral: Negative.          Past Medical History:   Diagnosis Date    ADHD (attention deficit hyperactivity disorder) 2021    Hard to focus on tasks and follow through.    Allergic 08/20/2018    Arthritis     Asthma     Carpal tunnel syndrome     no pain    Cataract     Deep vein thrombosis 01/10/2025    Depression     Erectile dysfunction     2018 at 73 years old    Family history of malignant neoplasm of breast 09/26/2019    Hyperlipidemia 09/26/2019    Allergic to statins    Hypertension 03/01/2012    Managing w/ Losartan Potassium    Leg pain, right     Low back pain      Decompress and fusion surgery 10/4&5/2022    Neuropathy     feet    Osteopenia 11/28/2019    Poor balance     Pulmonary embolism     Acute pulmonary saddle embolism    Reactive airway disease 01/15/2016    Scoliosis 1960    Shortness of breath 2017    Sleep apnea     Substance abuse 1966    Alcoholic     Past Surgical History:   Procedure Laterality Date    CARDIAC CATHETERIZATION  1992    CARDIAC CATHETERIZATION N/A 01/11/2025    Procedure: Right Heart Cath;  Surgeon: Nancy Hdez MD;  Location:  [facility];  Service: Cardiovascular;  Laterality: N/A;    CARDIAC CATHETERIZATION  01/11/2025    Procedure: Percutaneous Manual Thrombectomy;  Surgeon: Nancy Hdez MD;  Location:  [facility];  Service: Cardiovascular;;    CARDIAC CATHETERIZATION Bilateral 01/11/2025    Procedure: Pulmonary angiography;  Surgeon: Nancy Hdez MD;  Location:  [facility];  Service: Cardiovascular;  Laterality: Bilateral;    COLONOSCOPY  2013    No polyps, slight diverticulitis    EYE SURGERY  2016    Cataracts    KNEE ARTHROSCOPY W/ ACL RECONSTRUCTION Right 2019    LUMBAR FUSION Bilateral 10/05/2022    Procedure: DAY 2 LUMBAR LAMINECTOMY TRANSFORAMINAL LUMBAR INTERBODY FUSION L2,L3,L4 WITH NEURO ROBOT;  Surgeon: John Do MD;  Location: [facility] MAIN OR;  Service: Robotics - Neuro;  Laterality: Bilateral;    LUMBAR FUSION N/A 10/04/2022    Procedure: DAY 1 LUMBAR LATERAL INTERBODY FUSION WITH NEURO ROBOT L2, L3.L4;  Surgeon: John Do MD;  Location: [facility] MAIN OR;  Service: Robotics - Neuro;  Laterality: N/A;  left side approach    MOUTH SURGERY      SPINE SURGERY  10/4&5/2022    Dr. Jhon Do, [facility]     Family History   Problem Relation Age of Onset    Heart disease Mother     Hypertension Mother     Breast cancer Mother     Stroke Mother         Several TIAs    Alcohol abuse Mother     Thyroid disease Mother     Arthritis Mother      Cancer Mother         Breast    Aortic aneurysm Father     Heart attack Father     Liver cancer Father     Cancer Father         Liver    Heart disease Father         Heart attack    Early death Brother         Coronary thrombosis @ 46 years while mountain climbing.    Heart attack Brother         , at 47 years, fatal     Social History     Tobacco Use    Smoking status: Former     Current packs/day: 0.00     Average packs/day: 0.5 packs/day for 26.0 years (13.0 ttl pk-yrs)     Types: Cigarettes     Start date: 1966     Quit date: 1992     Years since quittin.4     Passive exposure: Past    Smokeless tobacco: Never   Vaping Use    Vaping status: Never Used   Substance Use Topics    Alcohol use: Yes     Alcohol/week: 16.0 standard drinks of alcohol     Types: 14 Glasses of wine, 2 Cans of beer per week    Drug use: Never     Comment: CBD gummies- stop now for surgery     Medications Prior to Admission   Medication Sig Dispense Refill Last Dose/Taking    acetaminophen (TYLENOL) 500 MG tablet Take 1 tablet by mouth Every 6 (Six) Hours As Needed for Mild Pain.   2025    Acetylcysteine capsule capsule Take 1 capsule by mouth Daily.   2025    albuterol sulfate  (90 Base) MCG/ACT inhaler Inhale 2 puffs Every 4 (Four) Hours As Needed for Wheezing or Shortness of Air. 18 g 0 2025 Morning    B Complex Vitamins (VITAMIN B COMPLEX) capsule capsule Take 1 capsule by mouth Daily. LD 9-27   2025    budesonide-formoterol (SYMBICORT) 160-4.5 MCG/ACT inhaler Inhale 2 puffs 2 (Two) Times a Day. 30.6 g 1 2025 Morning    Calcium Carb-Cholecalciferol (Oyster Shell Calcium w/D) 500-5 MG-MCG tablet TAKE TWO TABLETS BY MOUTH DAILY 180 tablet 0 Past Week    clonazePAM (KlonoPIN) 0.5 MG tablet Take 1 tablet by mouth 2 (Two) Times a Day As Needed for Anxiety. for anxiety 30 tablet 2 Past Month    coenzyme Q10 100 MG capsule Take 1 capsule by mouth Daily.   Past Week    cyclobenzaprine (FLEXERIL)  10 MG tablet Take 1 tablet by mouth 3 (Three) Times a Day As Needed for Muscle Spasms. 30 tablet 1 Past Week    gabapentin (NEURONTIN) 300 MG capsule TAKE 1 TABLET BY MOUTH IN THE MORNING, TAKE 1 TABLET BY MOUTH IN THE AFTERNOON AND 1 TABLET BY MOUTH IN THE EVENING. 120 capsule 0 Past Week    HYDROcodone-acetaminophen (NORCO) 7.5-325 MG per tablet Take 0.5-1 tablets by mouth Every 6 (Six) Hours As Needed for Moderate Pain or Severe Pain. 28 tablet 0 Past Month    L-Arginine 500 MG capsule Take 1 capsule by mouth Daily.   Past Week    losartan-hydrochlorothiazide (Hyzaar) 50-12.5 MG per tablet Take 1 tablet by mouth Daily. 90 tablet 1 6/18/2025 Morning    montelukast (SINGULAIR) 10 MG tablet TAKE 1 TABLET BY MOUTH ONCE DAILY 90 tablet 0 6/18/2025 Morning    Multiple Vitamins-Minerals (EMERGEN-C BLUE PO) Take 1 tablet by mouth Daily. LD 9-27   6/17/2025    S-Adenosylmethionine (YURY-e) 400 MG tablet Take 400 mg by mouth Daily.   6/17/2025    sildenafil (REVATIO) 20 MG tablet TAKE 1 TO 2 TABLETS BY MOUTH AS NEEDED FOR ERICTILE DYSFUNCTION 50 tablet 0 Past Week    theophylline (THEODUR) 300 MG 12 hr tablet Take 1 tablet by mouth Daily. 90 tablet 3 6/18/2025 Morning    traZODone (DESYREL) 50 MG tablet Take 1-2 tablets by mouth Every Night. 180 tablet 3 Past Week    triamcinolone (KENALOG) 0.025 % cream Apply 1 Application topically to the appropriate area as directed 2 (Two) Times a Day.   Past Month    naloxone (NARCAN) 4 MG/0.1ML nasal spray Call 911. Don't prime. [place] in 1 nostril for overdose. Repeat in 2-3 minutes in other nostril if no or minimal breathing/responsiveness. 2 each 0     warfarin (COUMADIN) 7.5 MG tablet Take 1 tablet by mouth on Mon and Fri, and take 1/2 tablet by mouth all other days or as directed. 60 tablet 0 6/15/2025     acetylcysteine, 600 mg, Oral, Daily  aspirin, 81 mg, Oral, Daily  budesonide-formoterol, 2 puff, Inhalation, BID  [START ON 6/20/2025] ceFAZolin, 2,000 mg, Intravenous, On Call  "to OR  [START ON 6/19/2025] chlorhexidine, 15 mL, Mouth/Throat, Q12H  [START ON 6/19/2025] Chlorhexidine Gluconate Cloth, 1 Application, Topical, Q12H  gabapentin, 300 mg, Oral, Q8H  [START ON 6/19/2025] losartan, 50 mg, Oral, Q24H   And  hydroCHLOROthiazide, 12.5 mg, Oral, Q24H  [START ON 6/20/2025] metoprolol tartrate, 12.5 mg, Oral, On Call to OR  [START ON 6/19/2025] montelukast, 10 mg, Oral, Daily  [START ON 6/19/2025] mupirocin, 1 Application, Each Nare, Q12H  theophylline, 300 mg, Oral, Q12H      Allergies:  Statins    Objective      Vital Signs  Temp:  [96.2 °F (35.7 °C)] 96.2 °F (35.7 °C)  Heart Rate:  [57-72] 58  Resp:  [12-18] 16  BP: (120-159)/(72-97) 138/72    Flowsheet Rows      Flowsheet Row First Filed Value   Admission Height 167.6 cm (66\") Documented at 06/18/2025 0907   Admission Weight 79.4 kg (175 lb) Documented at 06/18/2025 0907          167.6 cm (66\")    Physical Exam  Constitutional:       General: He is not in acute distress.     Appearance: Normal appearance. He is normal weight. He is not ill-appearing.   HENT:      Head: Normocephalic and atraumatic.      Nose: Nose normal.      Mouth/Throat:      Pharynx: No oropharyngeal exudate or posterior oropharyngeal erythema.   Cardiovascular:      Pulses: Normal pulses.      Heart sounds: Normal heart sounds. No murmur heard.  Pulmonary:      Effort: Pulmonary effort is normal. No respiratory distress.      Breath sounds: Normal breath sounds.   Abdominal:      General: Abdomen is flat. There is no distension.      Tenderness: There is no guarding.   Neurological:      General: No focal deficit present.      Mental Status: He is alert and oriented to person, place, and time.   Psychiatric:         Mood and Affect: Mood normal.         Behavior: Behavior normal.         Thought Content: Thought content normal.         Results Review:   Lab Results (last 24 hours)       Procedure Component Value Units Date/Time    POC Protime / INR [[id number]]  " (Abnormal) Collected: 06/18/25 0913    Specimen: Blood Updated: 06/18/25 1158     Protime 13.7 seconds      Comment: Serial Number: [id number]:  [id number]        INR 1.4    Protime-INR [[id number]]  (Abnormal) Collected: 06/18/25 1127    Specimen: Blood Updated: 06/18/25 1152     Protime 20.1 Seconds      INR 1.71    POCT OR PANEL, Venous [[id number]]  (Abnormal) Collected: 06/18/25 1009    Specimen: Venous Blood Updated: 06/18/25 1058     Hemoglobin 10.9 g/dL      Hematocrit 32 %      Comment: Serial Number: [id number]:  [id number]        HCO3 24.1 mmol/L      POC Potassium 3.8 mmol/L      PH (Venous) 7.500     pCO2 (Venous) 30.6 mmHg      pO2, Venous 29.0 mm Hg      SO2 63.0 %           FINDINGS:  RIGHT HEART HEMODYNAMICS:  1.  Pulmonary wedge pressure: 15/14, 10  2.  Pulmonary artery pressure: 36/11, 20  3.  Right ventricular pressure: 29/80, 7  4.  Right atrial pressure: 10/8, 7  5.  Pulmonary artery saturation: 63% on room air  6.  Right radial artery saturation: 96% on room air  7.  Kimberly calculated cardiac output 4.18 L/min, cardiac index 2.21 L/min/m²     SELECTIVE CORONARY ANGIOGRAMS:  1.  Left main artery: Large-caliber vessel with severe calcification especially the distal vessel  with an associated 50 to 60% stenosis.  The vessel trifurcates into left anterior descending, ramus intermedius, and left circumflex arteries.  2.  Left anterior descending artery: Small caliber vessel with.  The ostial vessel is difficult to visualize but there appears to be at least 70% severely calcified ostial stenosis.  Remainder of the proximal vessel has about 30% stenosis.  Gives rise to a small caliber first diagonal branch with no significant disease.  Mid LAD remains a small caliber giving rise to small caliber 2nd and 3rd diagonal branches with no significant stenosis.  Distal LAD tapers to a small caliber with no significant disease.  3.  Ramus intermedius: Small caliber vessel with 95% ostial stenosis.  4.  Left  circumflex artery: Small caliber vessel with no apparent ostial disease.  Proximal vessel has 95% stenosis.  The mid vessel remains a small caliber vessel giving rise to a small caliber first obtuse marginal branch with no significant disease.  The distal circumflex has serial 90 to 95% stenosis before giving rise to a small caliber second obtuse marginal branch with no significant disease.  5.  Right coronary artery: Small caliber vessel with 100% chronic total occlusion of the mid vessel.  There is right to right collateral filling via bridging collaterals as well as left-to-right collateral filling of the distal vessel.      Assessment & Plan       Pulmonary hypertension    CAD (coronary artery disease)      Assessment & Plan    -Severe multivessel CAD  -Hypertension  -hx of PE and DVT- on coumadin  -pulmonary hypertension  -ELINOR with home cpap  -Left hemidiaphragm elevation    Dr. Simon reviewed films and recommends surgical revascularization.  He has been off of his coumadin for several days, plan to keep him off his coumadin and operate this Friday.  Discussed with the patient and he is agreeable. Plan to consult pulmonary. Preoperative studies pending.    Thank you for allowing us to participate in the care of this patient.      EMILY Sahu  06/18/25  12:20 EDT

## 2025-06-18 NOTE — TELEPHONE ENCOUNTER
Patient would like to be discharged from pulmonary program due to will be having CABG procedure with Dr. Simon on 6/20/25.

## 2025-06-18 NOTE — Clinical Note
A 7 fr sheath was successfully inserted using micropuncture technique with ultrasound guidance into the left femoral artery. Sheath insertion not delayed. 4fr micro used first then up to 7fr after cine to check bifurcation

## 2025-06-18 NOTE — PERIOPERATIVE NURSING NOTE
Dr. Simon at bedside, talking with pt and friend (via phone).  Plan surgery Friday, pt to be admitted.

## 2025-06-18 NOTE — Clinical Note
Hemostasis started on the right brachial vein. Manual pressure applied to vessel. Manual pressure was held by shaw garzon. Manual pressure was held for 5 min. Hemostasis achieved successfully. Closure device additional comment: 4x4 tegaderm

## 2025-06-18 NOTE — Clinical Note
A 4 fr sheath was successfully inserted using micropuncture technique with ultrasound guidance into the right femoral artery. Sheath insertion not delayed. Had to use left femoral artery

## 2025-06-18 NOTE — Clinical Note
Hemostasis started on the right femoral artery. Closure device additional comment: Tegaderm 4x4 applied red capped ports

## 2025-06-18 NOTE — DISCHARGE INSTRUCTIONS
[facility]  [address] [place] [address]    Coronary Angiogram (Radial/Ulnar Approach) After Care    Refer to this sheet in the next few weeks. These instructions provide you with information on caring for yourself after your procedure. Your caregiver may also give you more specific instructions. Your treatment has been planned according to current medical practices, but problems sometimes occur. Call your caregiver if you have any problems or questions after your procedure.    Home Care Instructions:  You may shower the day after the procedure. Remove the bandage (dressing) and gently wash the site with plain soap and water. Gently pat the site dry. You may apply a band aid daily for 2 days if desired.    Do not apply powder or lotion to the site.  Do not submerge the affected site in water for 3 to 5 days or until the site is completely healed.   Do not lift, push or pull anything over 5 pounds for 5 days after your procedure or as directed by your physician.  As a reference, a gallon of milk weighs 8 pounds.   Inspect the site at least twice daily. You may notice some bruising at the site and it may be tender for 1 to 2 weeks.     Increase your fluid intake for the next 2 days.    Keep arm elevated for 24 hours. For the remainder of the day, keep your arm in “Pledge of Allegiance” position when up and about.     You may drive 24 hours after the procedure unless otherwise instructed by your caregiver.  Do not operate machinery or power tools for 24 hours.  A responsible adult should be with you for the first 24 hours after you arrive home. Do not make any important legal decisions or sign legal papers for 24 hours.  Do not drink alcohol for 24 hours.    Metformin or any medications containing Metformin should not be taken for 48 hours after your procedure.      Call Your Doctor if:   You have unusual pain at the radial/ulnar (wrist) site.  You have redness, warmth, swelling, or pain at the  radial/ulnar (wrist) site.  You have drainage (other than a small amount of blood on the dressing).  `You have chills or a fever > 101.  Your arm becomes pale or dark, cool, tingly, or numb.  You develop chest pain, shortness of breath, feel faint or pass out.    You have heavy bleeding from the site, hold pressure on the site for 20 minutes.  If the bleeding stops, apply a fresh bandage and call your cardiologist.  However, if you        continue to have bleeding, call 911 and continue to apply pressure to the site.   You have any symptoms of a stroke.  Remember BE FAST  B-balance. Sudden trouble walking or loss of balance.  E-eyes.  Sudden changes in how you see or a sudden onset of a very bad headache.   F-face. Sudden weakness or loss of feeling of the face or facial droop on one side.   A-arms Sudden weakness or numbness in one arm.  One arm drifts down if they are both held out in front of you. This happens suddenly and usually on one side of the body.   S-speech.  Sudden trouble speaking, slurred speech or trouble understanding what are saying.   T-time  Time to call emergency services.  Write down the symptoms and the time they started.     [facility]  [address] [place] [address]      Coronary Angiogram (Femoral Approach) After Care     Refer to this sheet in the next few weeks. These instructions provide you with information on caring for yourself after your procedure. Your health care provider may also give you more specific instructions. Your treatment has been planned according to current medical practices, but problems sometimes occur. Call your health care provider if you have any problems or questions after your procedure.      What to Expect After the Procedure:  After your procedure, it is typical to have the following sensations:  Minor discomfort or tenderness and a small bump at the catheter insertion site. The bump should usually decrease in size and tenderness within 1  to 2 weeks.  Any bruising will usually fade within 2 to 4 weeks.    Home Care Instructions:  Do not apply powder or lotion to the site.  Do not take baths, swim, or use a hot tub until your health care provider approves and the site is completely healed.  Do not bend, squat, or lift anything over 20 lb (9 kg) or as directed by your health care provider. However, we recommend lifting nothing heavier than a gallon of milk.    You may shower 24 hours after the procedure. Remove the bandage (dressing) and gently wash the site with plain soap and water. Gently pat the site dry. You may apply a band aid daily for 2 days if desired.    Inspect the site at least twice daily.  Increase your fluid intake for the next 2 days.    Limit your activity for the first 48 hours. .    Avoid strenuous activity for 1 week or as advised by your physician.    Follow instructions about when you can drive or return to work as directed by your physician.    Hold direct pressure over the site when you cough, sneeze, laugh or change positions.  Do this for the next 2 days.    Do not operate machinery or power tools for 24 hours.  A responsible adult should be with you for the first 24 hours after you arrive home. Do not make any important legal decisions or sign legal papers for 24 hours.  Do not drink alcohol for 24 hours.  Metformin or any medications containing Metformin should not be taken for 48 hours after your procedure.      Call Your Doctor If:  You have drainage (other than a small amount of blood on the dressing).  You have chills or a fever > 101.  You have redness, warmth, swelling(larger than a walnut), or pain at the insertion site  You develop chest pain or shortness of breath, feel faint, or pass out.  You develop pain, discoloration, coldness, numbness, tingling, or severe bruising in the leg that held the catheter.  You develop bleeding from any other place, such as the bowels.  You have heavy bleeding from the site, hold  pressure on the site for 20 minutes.  If the bleeding stops, apply a fresh bandage and call your cardiologist.  However, if you continue to have bleeding, call 911.  You have any symptoms of a stroke.  Remember BE FAST  B-balance. Sudden trouble walking or loss of balance.  E-eyes.  Sudden changes in how you see or a sudden onset of a very bad headache.   F-face. Sudden weakness or loss of feeling of the face or facial droop on one side.   A-arms Sudden weakness or numbness in one arm.  One arm drifts down if they are both held out in front of you. This happens suddenly and usually on one side of the body.  S-speech.  Sudden trouble speaking, slurred speech or trouble understanding what people are saying.   T-time  Time to call emergency services.  Write down the symptoms and the time they started.        Make Sure You:  Understand these instructions.  Will watch your condition.  Will get help right away if you are not doing well or get worse.[facility]  [address] [place] [address]    Right Heart Cath After Care    Refer to this sheet in the next few weeks. These instructions provide you with information on caring for yourself after your procedure. Your caregiver may also give you more specific instructions. Your treatment has been planned according to current medical practices, but problems sometimes occur. Call your caregiver if you have any problems or questions after your procedure.    Home Care Instructions:  You may shower the day after the procedure. Remove the bandage (dressing) and gently wash the site with plain soap and water. Gently pat the site dry. You may apply a band aid daily for 2 days if desired.    Do not apply powder or lotion to the site until the site is completely healed.  Do not submerge the affected site in water until the site is completely healed.   No heavy lifting today.   Inspect the site at least twice daily. You may notice some bruising at the site..     If  you received sedation a responsible adult should be with you for the first 24 hours after you arrive home. Do not make any important legal decisions or sign legal papers for 24 hours.  Do not drink alcohol for 24 hours.  Do not operate machinery or power tools for 24 hours. You may drive 24 hours after the procedure unless otherwise instructed by your caregiver.        Call Your Doctor if:   You have unusual pain at the puncture site.  You have redness, warmth, swelling, or pain at the puncture site.  You have drainage (other than a small amount of blood on the dressing).  `You have chills or a fever > 101.  Your arm becomes pale or dark, cool, tingly, or numb.  You develop chest pain, shortness of breath, feel faint or pass out.    You have heavy bleeding from the site, hold pressure on the site for 20 minutes.  If the bleeding stops, apply a fresh bandage and call your cardiologist.  However, if you        continue to have bleeding, call 911 and continue to apply pressure to the site.   You have any symptoms of a stroke.  Remember BE FAST  B-balance. Sudden trouble walking or loss of balance.  E-eyes.  Sudden changes in how you see or a sudden onset of a very bad headache.   F-face. Sudden weakness or loss of feeling of the face or facial droop on one side.   A-arms Sudden weakness or numbness in one arm.  One arm drifts down if they are both held out in front of you. This happens suddenly and usually on one side of the body.   S-speech.  Sudden trouble speaking, slurred speech or trouble understanding what are saying.   T-time  Time to call emergency services.  Write down the symptoms and the time they started.

## 2025-06-18 NOTE — PROGRESS NOTES
Patient has 13 pack history, former smoker. No PFT required at this time.   Patient does wear home CPAP of 8.  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

## 2025-06-18 NOTE — CONSULTS
Group: [facility]         CONSULT NOTE    Patient Identification:  Omar Choudhary  79 y.o.  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    In January 2025, patient was hospitalized with complaints of shortness of breath he was found to have saddle pulmonary embolism and right lower extremity DVT.  He underwent bilateral pulmonary artery thrombectomy.  During right heart cath, he was found to have severe pulmonary hypertension with systolic PA pressures 70-80 and a mean PA pressure of 44-46 mmHg.  Repeat echocardiogram in March 2025 showing normal RV wall thickness, systolic function and septal motion noted.    Patient had been having issues with shortness of breath with exertion over the past several weeks.  He recently went to [place] on a trip and reported that he would try to walk about 1 to 2 miles a day, however had to stop that every 100 feet to catch his breath.  He denied any chest tightness or wheezing during these episodes, reported that he felt he was able to take a deep breath.    He presented to the hospital on 6/18/2025 for scheduled left and right heart catheterization with Dr. Hdez.  During heart catheterization, patient was found to have severe multivessel coronary artery disease (50 to 60% distal left main stenosis, 70% severely calcified ostial LAD stenosis, 95% ostial ramus intermedius stenosis, 95% proximal and serial distal left circumflex artery stenosis, 100% total chronic occlusion of the right RCA).  Patient was also found to have normal to mildly elevated pulmonary artery pressures with a mean pulmonary pressure of 20 mmHg.   Given findings of severe multivessel disease, cardiothoracic surgery was consulted and plans for surgical revascularization.    Pulmonary was asked to see patient given patient's pulmonary history. Patient follows with Dr. Lagunas.  Workup so far included pulmonary function testing from 8/19 which showed nonspecific ventilatory defect with FEV1 70% with bronchodilator response.  High-resolution CT showed no evidence of interstitial lung disease.  Patient previously was started on Breo and Bevespi without improvement in symptoms-she is on Singulair, theophylline, Symbicort and albuterol.  He had a notably elevated IgE during workup at [facility], he is on allergy injections.  He also has obstructive sleep apnea and uses CPAP every night with a pressure of 8.  He has not had any recent  Diagnosis of pneumonia, COPD exacerbation or asthma exacerbation.  He also symptoms are relatively well-controlled.  He is not on supplemental oxygen at baseline.  Patient does have a prior smoking history of about 13 pack years, quit in 1992.    Review of Systems:  CONSTITUTIONAL:  Denies fevers or chills  EYE:  No new vision changes  EAR:  No change in hearing  CARDIAC:  No chest pain  PULMONARY:  No productive cough or shortness of breath, positive for exertional dyspnea  GI:  No diarrhea, hematemesis or hematochezia  RENAL:  No dysuria or urinary frequency  MUSCULOSKELETAL:  No musculoskeletal complaints  ENDOCRINE:  No heat or cold intolerance  INTEGUMENTARY: No skin rashes  NEUROLOGICAL:  No dizziness or confusion.  No seizure activity  PSYCHIATRIC:  No new anxiety or depression  12 system review of systems performed and all else negative     Past Medical History:  Past Medical History:   Diagnosis Date    ADHD (attention deficit hyperactivity disorder) 2021    Hard to focus on tasks and follow through.    Allergic 08/20/2018    Arthritis     Asthma     Carpal tunnel syndrome     no pain    Cataract     Deep vein thrombosis 01/10/2025     Depression     Erectile dysfunction     2018 at 73 years old    Family history of malignant neoplasm of breast 09/26/2019    Hyperlipidemia 09/26/2019    Allergic to statins    Hypertension 03/01/2012    Managing w/ Losartan Potassium    Leg pain, right     Low back pain     Decompress and fusion surgery 10/4&5/2022    Neuropathy     feet    Osteopenia 11/28/2019    Poor balance     Pulmonary embolism     Acute pulmonary saddle embolism    Reactive airway disease 01/15/2016    Scoliosis 1960    Shortness of breath 2017    Sleep apnea     Substance abuse 1966    Alcoholic       Past Surgical History:  Past Surgical History:   Procedure Laterality Date    CARDIAC CATHETERIZATION  1992    CARDIAC CATHETERIZATION N/A 01/11/2025    Procedure: Right Heart Cath;  Surgeon: Nancy Hdez MD;  Location:  [facility];  Service: Cardiovascular;  Laterality: N/A;    CARDIAC CATHETERIZATION  01/11/2025    Procedure: Percutaneous Manual Thrombectomy;  Surgeon: Nancy Hdez MD;  Location:  [facility];  Service: Cardiovascular;;    CARDIAC CATHETERIZATION Bilateral 01/11/2025    Procedure: Pulmonary angiography;  Surgeon: Nancy Hdez MD;  Location:  [facility];  Service: Cardiovascular;  Laterality: Bilateral;    COLONOSCOPY  2013    No polyps, slight diverticulitis    EYE SURGERY  2016    Cataracts    KNEE ARTHROSCOPY W/ ACL RECONSTRUCTION Right 2019    LUMBAR FUSION Bilateral 10/05/2022    Procedure: DAY 2 LUMBAR LAMINECTOMY TRANSFORAMINAL LUMBAR INTERBODY FUSION L2,L3,L4 WITH NEURO ROBOT;  Surgeon: John Do MD;  Location: [facility] MAIN OR;  Service: Robotics - Neuro;  Laterality: Bilateral;    LUMBAR FUSION N/A 10/04/2022    Procedure: DAY 1 LUMBAR LATERAL INTERBODY FUSION WITH NEURO ROBOT L2, L3.L4;  Surgeon: John Do MD;  Location: [facility] MAIN OR;  Service: Robotics - Neuro;  Laterality: N/A;  left side approach    MOUTH SURGERY      SPINE SURGERY   10/4&5/2022    Dr. John Do, [facility]        Home Meds:  Medications Prior to Admission   Medication Sig Dispense Refill Last Dose/Taking    acetaminophen (TYLENOL) 500 MG tablet Take 1 tablet by mouth Every 6 (Six) Hours As Needed for Mild Pain.   6/17/2025    Acetylcysteine capsule capsule Take 1 capsule by mouth Daily.   6/17/2025    albuterol sulfate  (90 Base) MCG/ACT inhaler Inhale 2 puffs Every 4 (Four) Hours As Needed for Wheezing or Shortness of Air. 18 g 0 6/18/2025 Morning    B Complex Vitamins (VITAMIN B COMPLEX) capsule capsule Take 1 capsule by mouth Daily. LD 9-27   6/17/2025    budesonide-formoterol (SYMBICORT) 160-4.5 MCG/ACT inhaler Inhale 2 puffs 2 (Two) Times a Day. 30.6 g 1 6/18/2025 Morning    Calcium Carb-Cholecalciferol (Oyster Shell Calcium w/D) 500-5 MG-MCG tablet TAKE TWO TABLETS BY MOUTH DAILY 180 tablet 0 Past Week    clonazePAM (KlonoPIN) 0.5 MG tablet Take 1 tablet by mouth 2 (Two) Times a Day As Needed for Anxiety. for anxiety 30 tablet 2 Past Month    coenzyme Q10 100 MG capsule Take 1 capsule by mouth Daily.   Past Week    cyclobenzaprine (FLEXERIL) 10 MG tablet Take 1 tablet by mouth 3 (Three) Times a Day As Needed for Muscle Spasms. 30 tablet 1 Past Week    gabapentin (NEURONTIN) 300 MG capsule TAKE 1 TABLET BY MOUTH IN THE MORNING, TAKE 1 TABLET BY MOUTH IN THE AFTERNOON AND 1 TABLET BY MOUTH IN THE EVENING. 120 capsule 0 Past Week    HYDROcodone-acetaminophen (NORCO) 7.5-325 MG per tablet Take 0.5-1 tablets by mouth Every 6 (Six) Hours As Needed for Moderate Pain or Severe Pain. 28 tablet 0 Past Month    L-Arginine 500 MG capsule Take 1 capsule by mouth Daily.   Past Week    losartan-hydrochlorothiazide (Hyzaar) 50-12.5 MG per tablet Take 1 tablet by mouth Daily. 90 tablet 1 6/18/2025 Morning    montelukast (SINGULAIR) 10 MG tablet TAKE 1 TABLET BY MOUTH ONCE DAILY 90 tablet 0 6/18/2025 Morning    Multiple Vitamins-Minerals (EMERGEN-C BLUE PO) Take 1  tablet by mouth Daily. LD 9-27   2025    S-Adenosylmethionine (YURY-e) 400 MG tablet Take 400 mg by mouth Daily.   2025    sildenafil (REVATIO) 20 MG tablet TAKE 1 TO 2 TABLETS BY MOUTH AS NEEDED FOR ERICTILE DYSFUNCTION 50 tablet 0 Past Week    theophylline (THEODUR) 300 MG 12 hr tablet Take 1 tablet by mouth Daily. 90 tablet 3 2025 Morning    traZODone (DESYREL) 50 MG tablet Take 1-2 tablets by mouth Every Night. 180 tablet 3 Past Week    triamcinolone (KENALOG) 0.025 % cream Apply 1 Application topically to the appropriate area as directed 2 (Two) Times a Day.   Past Month    naloxone (NARCAN) 4 MG/0.1ML nasal spray Call 911. Don't prime. [place] in 1 nostril for overdose. Repeat in 2-3 minutes in other nostril if no or minimal breathing/responsiveness. 2 each 0     warfarin (COUMADIN) 7.5 MG tablet Take 1 tablet by mouth on Mon and Fri, and take 1/2 tablet by mouth all other days or as directed. 60 tablet 0 6/15/2025       Allergies:  Allergies   Allergen Reactions    Statins Myalgia     Other reaction(s): muscle soreness       Social History:   Social History     Socioeconomic History    Marital status: Significant Other   Tobacco Use    Smoking status: Former     Current packs/day: 0.00     Average packs/day: 0.5 packs/day for 26.0 years (13.0 ttl pk-yrs)     Types: Cigarettes     Start date: 1966     Quit date: 1992     Years since quittin.4     Passive exposure: Past    Smokeless tobacco: Never   Vaping Use    Vaping status: Never Used   Substance and Sexual Activity    Alcohol use: Yes     Alcohol/week: 16.0 standard drinks of alcohol     Types: 14 Glasses of wine, 2 Cans of beer per week    Drug use: Never     Comment: CBD gummies- stop now for surgery    Sexual activity: Defer     Partners: Female     Birth control/protection: Post-menopausal     Comment: We're both >70 years old       Family History:  Family History   Problem Relation Age of Onset    Heart disease Mother      "Hypertension Mother     Breast cancer Mother     Stroke Mother         Several TIAs    Alcohol abuse Mother     Thyroid disease Mother     Arthritis Mother     Cancer Mother         Breast    Aortic aneurysm Father     Heart attack Father     Liver cancer Father     Cancer Father         Liver    Heart disease Father         Heart attack    Early death Brother         Coronary thrombosis @ 48 years while mountain climbing.    Heart attack Brother         1991, at 47 years, fatal       Physical Exam:  /83   Pulse 63   Temp 96.2 °F (35.7 °C) (Temporal)   Resp 18   Ht 167.6 cm (66\")   Wt 79.4 kg (175 lb)   SpO2 97%   BMI 28.25 kg/m²  Body mass index is 28.25 kg/m². 97% 79.4 kg (175 lb)  Constitutional: Middle aged, pleasant male pt in bed, No acute respiratory distress, no accessory muscle use, SpO2 97% on room air  Head: - NCAT  Eyes: No pallor, Anicteric conjunctiva, EOMI.  ENMT:  Mallampati 3, no oral thrush. Dry MM.   NECK: Trachea midline, No thyromegaly, no palpable cervical LNpathy  Heart: RRR, no murmur. No pedal edema   Lungs: JESSICA +, No wheezes/ crackles heard    Abdomen: Soft. No tenderness, guarding or rigidity. No palpable masses  Extremities: Extremities warm and well perfused. No cyanosis/ clubbing  Neuro: Conscious, answers appropriately, no gross focal neuro deficits  Psych: Mood and affect appropriate    PPE recommended per [facility] infectious disease Isolation protocol for the current clinical scenario(as mentioned below) was followed.      LABS:  COVID19   Date Value Ref Range Status   01/10/2025 Not Detected Not Detected - Ref. Range Final       Lab Results   Component Value Date    CALCIUM 9.1 05/26/2025    PHOS 3.3 01/13/2025     Results from last 7 days   Lab Units 06/18/25  1012 06/18/25  1009   HEMOGLOBIN, POC g/dL 10.5* 10.9*     Lab Results   Component Value Date    TROPONINT 118 (C) 01/10/2025                 Results from last 7 days   Lab Units 06/18/25  1743   PH, " ARTERIAL pH units 7.498*   PCO2, ARTERIAL mm Hg 29.9*   PO2 ART mm Hg 77.9*   O2 SATURATION ART % 96.7   MODALITY  Room Air         Results from last 7 days   Lab Units 06/18/25  1127 06/18/25  0913   INR  1.71* 1.4*         Lab Results   Component Value Date    TSH 1.040 04/22/2025     Estimated Creatinine Clearance: 63.8 mL/min (by C-G formula based on SCr of 0.96 mg/dL).      Microbiology Results (last 10 days)       ** No results found for the last 240 hours. **               Imaging: I personally visualized the images of scans/x-rays performed within last 3 days.      Assessment:  Severe multivessel coronary artery disease  Hypertension  History of PE/DVT  Chronic anticoagulation on Coumadin  ELINOR, compliant with home CPAP  Left hemidiaphragm paralysis    Recommendations:  Pulmonary asked to see patient given his respiratory history including asthma, mild COPD, and obstructive sleep apnea.    Patient's ARISCAT score is 27 implying intermediate risk for pulmonary complications after surgery, including respiratory failure.  Risk factors include age of 79  and intrathoracic surgical incision required for surgery.  These risk factors are nonmodifiable and therefore nonprohibitive for surgery.  Patient is on room air, does not show evidence of anemia or acute respiratory issues, appears optimized from pulmonary standpoint for surgery.    Discussed with patient risk factors for surgery given history of asthma, obstructive sleep apnea and mild COPD.  Spirometry, early ambulation, pain control and aggressive pulmonary hygiene techniques talked about.  Additionally, given patient's history of obstructive sleep apnea, likely require CPAP or BiPAP in the postoperative period. Continue maintenance medications for asthma and COPD including Symbicort, as needed albuterol, montelukast.    Okay to use home CPAP available, patient does not have with him currently.  Hospital CPAP pressure 8 ordered.  Theophylline level ordered as  well.      Patient was placed in face mask upon entering room and kept mask on throughout our encounter. I wore full protective equipment throughout this patient encounter including a face mask, gown and gloves. Hand hygiene was performed before donning protective equipment and after removal when leaving the room.    Mayra Anton, APRN  6/18/2025  19:27 EDT      Much of this encounter note is an electronic transcription/translation of spoken language to printed text using Dragon Software.

## 2025-06-18 NOTE — TELEPHONE ENCOUNTER
"  Caller: Omar Choudhary \"Mendez\"    Relationship: Self    Best call back number: [phone]    What was the call regarding: PLEASE CANCEL PT'S INR LAB FOR  6/24, HE IS INPATIENT AT [facility].  PT IS HAVING OPEN HEART SURGERY 6/20 AND WILL BE KEPT FOR ONE WEEK AFTER.  PT WILL CB ONCE DISCHARGED    "

## 2025-06-19 LAB
ABO GROUP BLD: NORMAL
ALBUMIN SERPL-MCNC: 3.6 G/DL (ref 3.5–5.2)
ALBUMIN/GLOB SERPL: 1.7 G/DL
ALP SERPL-CCNC: 56 U/L (ref 39–117)
ALT SERPL W P-5'-P-CCNC: 19 U/L (ref 1–41)
ANION GAP SERPL CALCULATED.3IONS-SCNC: 13.3 MMOL/L (ref 5–15)
APTT PPP: 32.6 SECONDS (ref 22.7–35.4)
AST SERPL-CCNC: 18 U/L (ref 1–40)
BASOPHILS # BLD AUTO: 0.01 10*3/MM3 (ref 0–0.2)
BASOPHILS NFR BLD AUTO: 0.3 % (ref 0–1.5)
BILIRUB SERPL-MCNC: 1.3 MG/DL (ref 0–1.2)
BLD GP AB SCN SERPL QL: NEGATIVE
BUN SERPL-MCNC: 9 MG/DL (ref 8–23)
BUN/CREAT SERPL: 10.2 (ref 7–25)
CALCIUM SPEC-SCNC: 8.8 MG/DL (ref 8.6–10.5)
CHLORIDE SERPL-SCNC: 103 MMOL/L (ref 98–107)
CHOLEST SERPL-MCNC: 227 MG/DL (ref 0–200)
CLOSE TME COLL+ADP + EPINEP PNL BLD: 37 % (ref 86–100)
CLOSE TME COLL+ADP + EPINEP PNL BLD: 87 % (ref 86–100)
CO2 SERPL-SCNC: 21.7 MMOL/L (ref 22–29)
CREAT SERPL-MCNC: 0.88 MG/DL (ref 0.76–1.27)
DEPRECATED RDW RBC AUTO: 49.5 FL (ref 37–54)
EGFRCR SERPLBLD CKD-EPI 2021: 88.6 ML/MIN/1.73
EOSINOPHIL # BLD AUTO: 0.15 10*3/MM3 (ref 0–0.4)
EOSINOPHIL NFR BLD AUTO: 3.8 % (ref 0.3–6.2)
ERYTHROCYTE [DISTWIDTH] IN BLOOD BY AUTOMATED COUNT: 14.2 % (ref 12.3–15.4)
GLOBULIN UR ELPH-MCNC: 2.1 GM/DL
GLUCOSE SERPL-MCNC: 104 MG/DL (ref 65–99)
HBA1C MFR BLD: 5.8 % (ref 4.8–5.6)
HCT VFR BLD AUTO: 36.5 % (ref 37.5–51)
HDLC SERPL-MCNC: 59 MG/DL (ref 40–60)
HGB BLD-MCNC: 12.1 G/DL (ref 13–17.7)
IMM GRANULOCYTES # BLD AUTO: 0.02 10*3/MM3 (ref 0–0.05)
IMM GRANULOCYTES NFR BLD AUTO: 0.5 % (ref 0–0.5)
INR PPP: 1.27 (ref 0.9–1.1)
LDLC SERPL CALC-MCNC: 135 MG/DL (ref 0–100)
LDLC/HDLC SERPL: 2.21 {RATIO}
LYMPHOCYTES # BLD AUTO: 0.75 10*3/MM3 (ref 0.7–3.1)
LYMPHOCYTES NFR BLD AUTO: 18.8 % (ref 19.6–45.3)
MAGNESIUM SERPL-MCNC: 2.1 MG/DL (ref 1.6–2.4)
MCH RBC QN AUTO: 31.8 PG (ref 26.6–33)
MCHC RBC AUTO-ENTMCNC: 33.2 G/DL (ref 31.5–35.7)
MCV RBC AUTO: 95.8 FL (ref 79–97)
MONOCYTES # BLD AUTO: 0.58 10*3/MM3 (ref 0.1–0.9)
MONOCYTES NFR BLD AUTO: 14.5 % (ref 5–12)
NEUTROPHILS NFR BLD AUTO: 2.48 10*3/MM3 (ref 1.7–7)
NEUTROPHILS NFR BLD AUTO: 62.1 % (ref 42.7–76)
NRBC BLD AUTO-RTO: 0 /100 WBC (ref 0–0.2)
NT-PROBNP SERPL-MCNC: 245 PG/ML (ref 0–1800)
PLATELET # BLD AUTO: 178 10*3/MM3 (ref 140–450)
PMV BLD AUTO: 9.5 FL (ref 6–12)
POTASSIUM SERPL-SCNC: 3.9 MMOL/L (ref 3.5–5.2)
PROT SERPL-MCNC: 5.7 G/DL (ref 6–8.5)
PROTHROMBIN TIME: 15.9 SECONDS (ref 11.7–14.2)
QT INTERVAL: 391 MS
QTC INTERVAL: 428 MS
RBC # BLD AUTO: 3.81 10*6/MM3 (ref 4.14–5.8)
RH BLD: POSITIVE
SODIUM SERPL-SCNC: 138 MMOL/L (ref 136–145)
T&S EXPIRATION DATE: NORMAL
THEOPHYLLINE SERPL-MCNC: 14.2 MCG/ML (ref 10–20)
TRIGL SERPL-MCNC: 189 MG/DL (ref 0–150)
VLDLC SERPL-MCNC: 33 MG/DL (ref 5–40)
WBC NRBC COR # BLD AUTO: 3.99 10*3/MM3 (ref 3.4–10.8)

## 2025-06-19 PROCEDURE — 80053 COMPREHEN METABOLIC PANEL: CPT | Performed by: NURSE PRACTITIONER

## 2025-06-19 PROCEDURE — 99024 POSTOP FOLLOW-UP VISIT: CPT | Performed by: NURSE PRACTITIONER

## 2025-06-19 PROCEDURE — 86900 BLOOD TYPING SEROLOGIC ABO: CPT | Performed by: NURSE PRACTITIONER

## 2025-06-19 PROCEDURE — 86923 COMPATIBILITY TEST ELECTRIC: CPT

## 2025-06-19 PROCEDURE — 80061 LIPID PANEL: CPT | Performed by: NURSE PRACTITIONER

## 2025-06-19 PROCEDURE — 93005 ELECTROCARDIOGRAM TRACING: CPT | Performed by: THORACIC SURGERY (CARDIOTHORACIC VASCULAR SURGERY)

## 2025-06-19 PROCEDURE — 83735 ASSAY OF MAGNESIUM: CPT | Performed by: NURSE PRACTITIONER

## 2025-06-19 PROCEDURE — 83880 ASSAY OF NATRIURETIC PEPTIDE: CPT | Performed by: NURSE PRACTITIONER

## 2025-06-19 PROCEDURE — 94799 UNLISTED PULMONARY SVC/PX: CPT

## 2025-06-19 PROCEDURE — 80198 ASSAY OF THEOPHYLLINE: CPT

## 2025-06-19 PROCEDURE — 85610 PROTHROMBIN TIME: CPT | Performed by: NURSE PRACTITIONER

## 2025-06-19 PROCEDURE — 86901 BLOOD TYPING SEROLOGIC RH(D): CPT | Performed by: NURSE PRACTITIONER

## 2025-06-19 PROCEDURE — 86850 RBC ANTIBODY SCREEN: CPT | Performed by: NURSE PRACTITIONER

## 2025-06-19 PROCEDURE — 83036 HEMOGLOBIN GLYCOSYLATED A1C: CPT | Performed by: NURSE PRACTITIONER

## 2025-06-19 PROCEDURE — 85576 BLOOD PLATELET AGGREGATION: CPT | Performed by: NURSE PRACTITIONER

## 2025-06-19 PROCEDURE — 85730 THROMBOPLASTIN TIME PARTIAL: CPT | Performed by: NURSE PRACTITIONER

## 2025-06-19 PROCEDURE — 94761 N-INVAS EAR/PLS OXIMETRY MLT: CPT

## 2025-06-19 PROCEDURE — 99232 SBSQ HOSP IP/OBS MODERATE 35: CPT | Performed by: INTERNAL MEDICINE

## 2025-06-19 PROCEDURE — 86900 BLOOD TYPING SEROLOGIC ABO: CPT

## 2025-06-19 PROCEDURE — 85025 COMPLETE CBC W/AUTO DIFF WBC: CPT | Performed by: NURSE PRACTITIONER

## 2025-06-19 PROCEDURE — 94660 CPAP INITIATION&MGMT: CPT

## 2025-06-19 PROCEDURE — 86901 BLOOD TYPING SEROLOGIC RH(D): CPT

## 2025-06-19 RX ADMIN — ASPIRIN 81 MG CHEWABLE TABLET 81 MG: 81 TABLET CHEWABLE at 09:12

## 2025-06-19 RX ADMIN — LOSARTAN POTASSIUM 50 MG: 50 TABLET, FILM COATED ORAL at 09:12

## 2025-06-19 RX ADMIN — THEOPHYLLINE 300 MG: 300 TABLET, EXTENDED RELEASE ORAL at 09:12

## 2025-06-19 RX ADMIN — GABAPENTIN 300 MG: 300 CAPSULE ORAL at 21:22

## 2025-06-19 RX ADMIN — CHLORHEXIDINE GLUCONATE 15 ML: 1.2 RINSE ORAL at 21:22

## 2025-06-19 RX ADMIN — MONTELUKAST 10 MG: 10 TABLET, FILM COATED ORAL at 09:12

## 2025-06-19 RX ADMIN — THEOPHYLLINE 300 MG: 300 TABLET, EXTENDED RELEASE ORAL at 21:22

## 2025-06-19 RX ADMIN — GABAPENTIN 300 MG: 300 CAPSULE ORAL at 05:45

## 2025-06-19 RX ADMIN — BUDESONIDE AND FORMOTEROL FUMARATE DIHYDRATE 2 PUFF: 160; 4.5 AEROSOL RESPIRATORY (INHALATION) at 21:36

## 2025-06-19 RX ADMIN — GABAPENTIN 300 MG: 300 CAPSULE ORAL at 13:54

## 2025-06-19 RX ADMIN — TRAZODONE HYDROCHLORIDE 50 MG: 50 TABLET ORAL at 23:40

## 2025-06-19 RX ADMIN — MUPIROCIN 1 APPLICATION: 20 OINTMENT TOPICAL at 21:22

## 2025-06-19 RX ADMIN — HYDROCHLOROTHIAZIDE 12.5 MG: 12.5 TABLET ORAL at 09:12

## 2025-06-19 RX ADMIN — BUDESONIDE AND FORMOTEROL FUMARATE DIHYDRATE 2 PUFF: 160; 4.5 AEROSOL RESPIRATORY (INHALATION) at 06:56

## 2025-06-19 NOTE — DISCHARGE PLACEMENT REQUEST
"Omar Choudhary \"Mendez\" (79 y.o. Male)       Date of Birth   1945    Social Security Number       Address   [address] [place] [address]    Home Phone   [phone]    MRN   [id number]       Amish   Mosque    Marital Status   Significant Other                            Admission Date   6/18/2025    Admission Type   Elective    Admitting Provider   Nancy Hdez MD    Attending Provider   Nancy Hdez MD    Department, Room/Bed   [facility] CARDIOVASC UNIT, 2212/1       Discharge Date       Discharge Disposition       Discharge Destination                                 Attending Provider: Nancy Hdez MD    Allergies: Statins    Isolation: None   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)   Code Status: CPR    Ht: 167.6 cm (66\")   Wt: 79.4 kg (175 lb)    Admission Cmt: None   Principal Problem: Pulmonary hypertension [I27.20]                   Active Insurance as of 6/18/2025       Primary Coverage       Payor Plan Insurance Group Employer/Plan Group    MEDICARE MEDICARE A & B        Payor Plan Address Payor Plan Phone Number Payor Plan Fax Number Effective Dates    [address] [phone]  7/1/2012 - None Entered    [facility] [id number]         Subscriber Name Subscriber Birth Date Member ID       OMAR CHOUDHARY 1945 [id number]               Secondary Coverage       Payor Plan Insurance Group Employer/Plan Group    [employer] BUREAU HEALTH PLANS  SUP  [employer] HEALTH PLANS  SUP  PLAN N       Payor Plan Address Payor Plan Phone Number Payor Plan Fax Number Effective Dates    [address] [address] [phone]  1/1/2021 - None Entered    [place] [address]         Subscriber Name Subscriber Birth Date Member ID       OMAR CHOUDHARY 1945 [id number]                     Emergency Contacts        (Rel.) Home Phone Work Phone Mobile Phone    CHATO AYALA (Significant Other) [phone] -- [phone]    DAVIANYAYA HOPSON (Daughter) -- -- " [phone]    CHELSEY HARMAN (Daughter) [phone] -- [phone]

## 2025-06-19 NOTE — PLAN OF CARE
Goal Outcome Evaluation:  Plan of Care Reviewed With: patient        Progress: no change  Outcome Evaluation: S/P right and left heart cath, right radial and left groin sites CDI, going for CABG tomorrow with Dr Simon, preop orders started, CPAP at night, SR on the monitor, VSS, continue plan of care.

## 2025-06-19 NOTE — CASE MANAGEMENT/SOCIAL WORK
Discharge Planning Assessment  [facility]     Patient Name: Omar Choudhary  MRN: [id number]  Today's Date: 6/19/2025    Admit Date: 6/18/2025    Plan: Home w/ assist of S[facility] & [facility]    Discharge Needs Assessment       Row Name 06/19/25 1621       Living Environment    People in Home alone    Current Living Arrangements home    Potentially Unsafe Housing Conditions none    In the past 12 months has the electric, gas, oil, or water company threatened to shut off services in your home? No    Primary Care Provided by self    Provides Primary Care For no one    Family Caregiver if Needed significant other    Family Caregiver Names Batsheva Haro/ MOSES    Quality of Family Relationships helpful;supportive    Able to Return to Prior Arrangements yes       Resource/Environmental Concerns    Resource/Environmental Concerns home accessibility  8 steps to enter from walk-out basement    Transportation Concerns none       Transportation Needs    In the past 12 months, has lack of transportation kept you from medical appointments or from getting medications? no    In the past 12 months, has lack of transportation kept you from meetings, work, or from getting things needed for daily living? No       Food Insecurity    Within the past 12 months, you worried that your food would run out before you got the money to buy more. Never true    Within the past 12 months, the food you bought just didn't last and you didn't have money to get more. Never true       Transition Planning    Patient/Family Anticipates Transition to home with family    Patient/Family Anticipated Services at Transition home health care    Transportation Anticipated family or friend will provide       Discharge Needs Assessment    Readmission Within the Last 30 Days no previous admission in last 30 days    Equipment Currently Used at Home cane, straight;bp cuff;scales;cpap    Concerns to be Addressed discharge planning    Do you want help with school or  training? For example, starting or completing job training or getting a high school diploma, GED or equivalent No    Anticipated Changes Related to Illness none    Equipment Needed After Discharge none    Provided Post Acute Provider Quality & Resource List? Yes    Post Acute Provider Quality and Resource List Home Health    Delivered To Patient    Method of Delivery In person    Patient's Choice of Community Agency(s) [facility] [place]                   Discharge Plan       Row Name 06/19/25 1632       Plan    Plan Home w/ assist of [facility]. & [facility]    Plan Comments CCP spoke with the patient at bedside.  Explained role, verified face sheet, and discussed discharge plan.  Patient stated he is IADL's, retired and drives.  Patient lives alone in a two-level home with a walk-out basement.  Patient has eight steps to enter.  Patients PCP confirmed as Dennis Kwong and home pharmacy is [address]  Patient has the following DME- straight cane, CPAP ([employer]), BP cuff and scale.  Patient denies use of past home health.  Patient has been to [facility] in past.  Patient plans to return home at discharge with assistance of Batsheva Haro/S.O.  Patient is scheduled for heart surgery in the morning, 6/20/25 and will need home health at discharge.  Patient was given the CMS compare HH list and he chose [facility] in [place].  CCP called the referral to cande/Eboni ([phone]) and she accepted.  CCP will continue to follow….…Ena HOBBS /MOLINA.                  Continued Care and Services - Admitted Since 6/18/2025       Home Medical Care Coordination complete.      Service Provider Request Status Services Address Phone Fax Patient Preferred    [facility]  Selected Home Nursing [address] [place] [address] [place] [address] [phone] --                  Expected Discharge Date and Time       Expected Discharge Date Expected Discharge Time    Jun 27, 2025             Demographic Summary       Row Name 06/19/25 1611       General Information    Admission Type inpatient    Arrived From home    Required Notices Provided Important Message from Medicare    Referral Source admission list;physician    Reason for Consult discharge planning    Preferred Language English       Contact Information    Permission Granted to Share Info With                    Functional Status       Row Name 06/19/25 1620       Functional Status    Usual Activity Tolerance good    Current Activity Tolerance good       Physical Activity    On average, how many days per week do you engage in moderate to strenuous exercise (like a brisk walk)? 3 days    On average, how many minutes do you engage in exercise at this level? 60 min    Number of minutes of exercise per week 180       Assessment of Health Literacy    How often do you have someone help you read hospital materials? Never    How often do you have problems learning about your medical condition because of difficulty understanding written information? Never    How often do you have a problem understanding what is told to you about your medical condition? Never    How confident are you filling out medical forms by yourself? Quite a bit    Health Literacy Good       Functional Status, IADL    Medications independent    Meal Preparation independent    Housekeeping independent    Laundry independent    Shopping independent    If for any reason you need help with day-to-day activities such as bathing, preparing meals, shopping, managing finances, etc., do you get the help you need? 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                  Abuse/Neglect    No documentation.                  Legal    No documentation.                  Substance Abuse    No documentation.                  Patient Forms    No documentation.                     Ena Brewer RN

## 2025-06-19 NOTE — PROGRESS NOTES
" LOS: 1 day   Patient Care Team:  Dennis Kwong MD as PCP - General (Internal Medicine)  Arslan Carlson PA-C as Physician Assistant (Physician Assistant)  Ang Peña APRN as Nurse Practitioner (Family Medicine)  Benny Ordaz MD as Surgeon (Orthopedic Surgery)  Jerrod Lagunas MD as Consulting Physician (Pulmonary Disease)  Luis Alberto Alvarenga MD as Consulting Physician (Hematology and Oncology)    Chief Complaint: CAD    Subjective    No new complaints.  Denies chest pain.  Has been reading about the procedure.     Vital Signs  Temp:  [98 °F (36.7 °C)-98.6 °F (37 °C)] 98 °F (36.7 °C)  Heart Rate:  [57-80] 74  Resp:  [12-20] 18  BP: (100-146)/(72-96) 116/79  Body mass index is 28.25 kg/m².    Intake/Output Summary (Last 24 hours) at 6/19/2025 0918  Last data filed at 6/19/2025 0848  Gross per 24 hour   Intake 1550 ml   Output 2650 ml   Net -1100 ml     I/O this shift:  In: 240 [P.O.:240]  Out: 600 [Urine:600]    Chest tube drainage last 8 hours         06/18/25  0907   Weight: 79.4 kg (175 lb)         Objective:  Vital signs: (most recent): Blood pressure 116/79, pulse 74, temperature 98 °F (36.7 °C), temperature source Oral, resp. rate 18, height 167.6 cm (66\"), weight 79.4 kg (175 lb), SpO2 96%.                Physical Exam:   General Appearance: awake and alert, no acute distress   Lungs: respirations regular and respirations even   Heart: regular rhythm & normal rate and normal S1, S2   Abdomen: bowel sounds present and normal in all 4 quadrants, active bowel sounds    Skin: warm, dry   Neuro: alert and oriented, no focal deficits.     Results Review:        WBC WBC   Date Value Ref Range Status   06/19/2025 3.99 3.40 - 10.80 10*3/mm3 Final      HGB Hemoglobin   Date Value Ref Range Status   06/19/2025 12.1 (L) 13.0 - 17.7 g/dL Final   06/18/2025 10.5 (L) 12.0 - 17.0 g/dL Final   06/18/2025 10.9 (L) 12.0 - 17.0 g/dL Final      HCT Hematocrit   Date Value Ref Range Status "   06/19/2025 36.5 (L) 37.5 - 51.0 % Final   06/18/2025 31 (L) 38 - 51 % Final     Comment:     Serial Number: [id number]:  [id number]   06/18/2025 32 (L) 38 - 51 % Final     Comment:     Serial Number: [id number]:  [id number]      Platelets Platelets   Date Value Ref Range Status   06/19/2025 178 140 - 450 10*3/mm3 Final        PT/INR:    Protime   Date Value Ref Range Status   06/19/2025 15.9 (H) 11.7 - 14.2 Seconds Final   06/18/2025 20.1 (H) 11.7 - 14.2 Seconds Final   06/18/2025 13.7 12.8 - 15.2 seconds Final     Comment:     Serial Number: [id number]:  [id number]   /  INR   Date Value Ref Range Status   06/19/2025 1.27 (H) 0.90 - 1.10 Final   06/18/2025 1.71 (H) 0.90 - 1.10 Final   06/18/2025 1.4 (H) 0.8 - 1.2 Final       Sodium Sodium   Date Value Ref Range Status   06/19/2025 138 136 - 145 mmol/L Final      Potassium Potassium   Date Value Ref Range Status   06/19/2025 3.9 3.5 - 5.2 mmol/L Final      Chloride Chloride   Date Value Ref Range Status   06/19/2025 103 98 - 107 mmol/L Final      Bicarbonate CO2   Date Value Ref Range Status   06/19/2025 21.7 (L) 22.0 - 29.0 mmol/L Final      BUN BUN   Date Value Ref Range Status   06/19/2025 9.0 8.0 - 23.0 mg/dL Final      Creatinine Creatinine   Date Value Ref Range Status   06/19/2025 0.88 0.76 - 1.27 mg/dL Final      Calcium Calcium   Date Value Ref Range Status   06/19/2025 8.8 8.6 - 10.5 mg/dL Final      Magnesium Magnesium   Date Value Ref Range Status   06/19/2025 2.1 1.6 - 2.4 mg/dL Final          acetylcysteine, 600 mg, Oral, Daily  aspirin, 81 mg, Oral, Daily  budesonide-formoterol, 2 puff, Inhalation, BID  [START ON 6/20/2025] ceFAZolin, 2,000 mg, Intravenous, On Call to OR  chlorhexidine, 15 mL, Mouth/Throat, Q12H  Chlorhexidine Gluconate Cloth, 1 Application, Topical, Q12H  gabapentin, 300 mg, Oral, Q8H  losartan, 50 mg, Oral, Q24H   And  hydroCHLOROthiazide, 12.5 mg, Oral, Q24H  [START ON 6/20/2025] metoprolol tartrate, 12.5 mg, Oral, On Call to  OR  montelukast, 10 mg, Oral, Daily  mupirocin, 1 Application, Each Nare, Q12H  theophylline, 300 mg, Oral, Q12H                 Pulmonary hypertension    CAD (coronary artery disease)    Abnormal findings on diagnostic imaging of other specified body structures      Assessment & Plan    -Severe multivessel CAD  -Hypertension  -hx of PE and DVT- on coumadin  -pulmonary hypertension  -ELINOR with home cpap  -Left hemidiaphragm elevation    Vein map with adequate conduit.  Carotid duplex without significant stenosis  Platelet agg only 37-- will repeat today  INR 1.37  Plan for surgery tomorrow with Dr. Simon.       Ying Raines, APRN  06/19/25  09:18 EDT

## 2025-06-19 NOTE — PROGRESS NOTES
[facility] Follow Up    Chief Complaint: Follow up CAD    Interval History: No chest pain or shortness of breath.  No issues with left femoral or right radial site.    Objective:     Objective:  Temp:  [96.2 °F (35.7 °C)-98.6 °F (37 °C)] 98 °F (36.7 °C)  Heart Rate:  [57-80] 70  Resp:  [12-20] 18  BP: (100-159)/(72-97) 100/76     Intake/Output Summary (Last 24 hours) at 6/19/2025 0729  Last data filed at 6/19/2025 0545  Gross per 24 hour   Intake 1310 ml   Output 2050 ml   Net -740 ml     Body mass index is 28.25 kg/m².      06/18/25  0907   Weight: 79.4 kg (175 lb)     Weight change:       Physical Exam:   General : Alert, cooperative, in no acute distress.  Neuro: Alert,cooperative and oriented.  Lungs: CTAB. Normal respiratory effort and rate.  CV: Regular rate and rhythm, normal S1 and S2, no murmurs, gallops or rubs.  ABD: Soft, nontender, nondistended. Positive bowel sounds.  Extr: No edema or cyanosis, moves all extremities.    Lab Review:   Results from last 7 days   Lab Units 06/19/25  0406   SODIUM mmol/L 138   POTASSIUM mmol/L 3.9   CHLORIDE mmol/L 103   CO2 mmol/L 21.7*   BUN mg/dL 9.0   CREATININE mg/dL 0.88   GLUCOSE mg/dL 104*   CALCIUM mg/dL 8.8   AST (SGOT) U/L 18   ALT (SGPT) U/L 19         Results from last 7 days   Lab Units 06/19/25  0406 06/18/25  1012   WBC 10*3/mm3 3.99  --    HEMOGLOBIN g/dL 12.1*  --    HEMOGLOBIN, POC g/dL  --  10.5*   HEMATOCRIT % 36.5*  --    HEMATOCRIT POC %  --  31*   PLATELETS 10*3/mm3 178  --      Results from last 7 days   Lab Units 06/19/25  0406 06/18/25  1127   INR  1.27* 1.71*   APTT seconds 32.6  --      Results from last 7 days   Lab Units 06/19/25  0406   MAGNESIUM mg/dL 2.1     Results from last 7 days   Lab Units 06/19/25  0406   CHOLESTEROL mg/dL 227*   TRIGLYCERIDES mg/dL 189*   HDL CHOL mg/dL 59     Results from last 7 days   Lab Units 06/19/25  0406   PROBNP pg/mL 245.0         I reviewed the patient's new clinical results.  I  personally viewed and interpreted the patient's EKG  Current Medications:   Scheduled Meds:acetylcysteine, 600 mg, Oral, Daily  aspirin, 81 mg, Oral, Daily  budesonide-formoterol, 2 puff, Inhalation, BID  [START ON 6/20/2025] ceFAZolin, 2,000 mg, Intravenous, On Call to OR  chlorhexidine, 15 mL, Mouth/Throat, Q12H  Chlorhexidine Gluconate Cloth, 1 Application, Topical, Q12H  gabapentin, 300 mg, Oral, Q8H  losartan, 50 mg, Oral, Q24H   And  hydroCHLOROthiazide, 12.5 mg, Oral, Q24H  [START ON 6/20/2025] metoprolol tartrate, 12.5 mg, Oral, On Call to OR  montelukast, 10 mg, Oral, Daily  mupirocin, 1 Application, Each Nare, Q12H  theophylline, 300 mg, Oral, Q12H      Continuous Infusions:     Allergies:  Allergies   Allergen Reactions    Statins Myalgia     Other reaction(s): muscle soreness       Assessment/Plan:     Severe multivessel coronary artery disease.  Including of distal left main.    History of pulmonary embolism/DVT.  Requiring thrombectomy in 1/2025.  No provoking cause known.  Normally on warfarin which is on hold.    History of pulmonary hypertension.  Noted at time of thrombectomy.  Pulmonary pressures normalized by right heart cath on 6/18.    Hypertension  Hyperlipidemia.  Statin intolerant.  Uncontrolled.   Obstructive sleep apnea.   Asthma/Mild COPD.  On theophylline.  Pulmonary now following.  Left hemidiaphragm paralysis    -Continue current cardiac management.  - Patient reports statin intolerance.  Will need to consider statin alternative therapy as an outpatient.  -Tentative plan for CABG tomorrow.  - Appreciate pulmonary evaluation and assistance.    Nancy Hdez MD  06/19/25  07:29 EDT

## 2025-06-20 ENCOUNTER — ANESTHESIA (OUTPATIENT)
Dept: PERIOP | Facility: HOSPITAL | Age: 80
End: 2025-06-20
Payer: MEDICARE

## 2025-06-20 ENCOUNTER — ANCILLARY PROCEDURE (OUTPATIENT)
Dept: PERIOP | Facility: HOSPITAL | Age: 80
End: 2025-06-20
Payer: MEDICARE

## 2025-06-20 ENCOUNTER — APPOINTMENT (OUTPATIENT)
Dept: GENERAL RADIOLOGY | Facility: HOSPITAL | Age: 80
End: 2025-06-20
Payer: MEDICARE

## 2025-06-20 ENCOUNTER — ANESTHESIA EVENT (OUTPATIENT)
Dept: PERIOP | Facility: HOSPITAL | Age: 80
End: 2025-06-20
Payer: MEDICARE

## 2025-06-20 LAB
ACT BLD: 112 SECONDS (ref 82–152)
ACT BLD: 112 SECONDS (ref 82–152)
ACT BLD: 343 SECONDS (ref 82–152)
ACT BLD: 343 SECONDS (ref 82–152)
ACT BLD: 412 SECONDS (ref 82–152)
ACT BLD: 464 SECONDS (ref 82–152)
ACT BLD: 487 SECONDS (ref 82–152)
ALBUMIN SERPL-MCNC: 3.8 G/DL (ref 3.5–5.2)
ALBUMIN SERPL-MCNC: 4.4 G/DL (ref 3.5–5.2)
ANION GAP SERPL CALCULATED.3IONS-SCNC: 12.4 MMOL/L (ref 5–15)
ANION GAP SERPL CALCULATED.3IONS-SCNC: 9.9 MMOL/L (ref 5–15)
APTT PPP: 33 SECONDS (ref 22.7–35.4)
ARTERIAL PATENCY WRIST A: ABNORMAL
ATMOSPHERIC PRESS: 749.6 MMHG
ATMOSPHERIC PRESS: 749.8 MMHG
ATMOSPHERIC PRESS: 750.3 MMHG
ATMOSPHERIC PRESS: 751 MMHG
BASE EXCESS BLDA CALC-SCNC: -0.9 MMOL/L (ref 0–2)
BASE EXCESS BLDA CALC-SCNC: -1.5 MMOL/L (ref 0–2)
BASE EXCESS BLDA CALC-SCNC: -2 MMOL/L (ref -5–5)
BASE EXCESS BLDA CALC-SCNC: -3.4 MMOL/L (ref 0–2)
BASE EXCESS BLDA CALC-SCNC: -4 MMOL/L (ref -5–5)
BASE EXCESS BLDA CALC-SCNC: -4 MMOL/L (ref -5–5)
BASE EXCESS BLDA CALC-SCNC: 0 MMOL/L (ref -5–5)
BASE EXCESS BLDA CALC-SCNC: 1 MMOL/L (ref -5–5)
BASE EXCESS BLDA CALC-SCNC: 2 MMOL/L (ref -5–5)
BASE EXCESS BLDA CALC-SCNC: 2 MMOL/L (ref -5–5)
BASE EXCESS BLDV CALC-SCNC: -3.9 MMOL/L (ref -2–2)
BASOPHILS # BLD AUTO: 0.05 10*3/MM3 (ref 0–0.2)
BASOPHILS NFR BLD AUTO: 0.5 % (ref 0–1.5)
BDY SITE: ABNORMAL
BUN SERPL-MCNC: 13 MG/DL (ref 8–23)
BUN SERPL-MCNC: 14 MG/DL (ref 8–23)
BUN/CREAT SERPL: 12.6 (ref 7–25)
BUN/CREAT SERPL: 13.1 (ref 7–25)
CA-I SERPL ISE-MCNC: 1.18 MMOL/L (ref 1.15–1.35)
CALCIUM SPEC-SCNC: 8.3 MG/DL (ref 8.6–10.5)
CALCIUM SPEC-SCNC: 8.4 MG/DL (ref 8.6–10.5)
CHLORIDE SERPL-SCNC: 106 MMOL/L (ref 98–107)
CHLORIDE SERPL-SCNC: 107 MMOL/L (ref 98–107)
CLOSE TME COLL+ADP + EPINEP PNL BLD: 73 % (ref 86–100)
CO2 BLDA-SCNC: 22 MMOL/L (ref 24–29)
CO2 BLDA-SCNC: 24 MMOL/L (ref 24–29)
CO2 BLDA-SCNC: 24 MMOL/L (ref 24–29)
CO2 BLDA-SCNC: 26 MMOL/L (ref 24–29)
CO2 BLDA-SCNC: 27 MMOL/L (ref 24–29)
CO2 BLDA-SCNC: 27 MMOL/L (ref 24–29)
CO2 BLDA-SCNC: 29 MMOL/L (ref 24–29)
CO2 SERPL-SCNC: 20.6 MMOL/L (ref 22–29)
CO2 SERPL-SCNC: 22.1 MMOL/L (ref 22–29)
CREAT SERPL-MCNC: 0.99 MG/DL (ref 0.76–1.27)
CREAT SERPL-MCNC: 1.11 MG/DL (ref 0.76–1.27)
DEPRECATED RDW RBC AUTO: 48.8 FL (ref 37–54)
DEPRECATED RDW RBC AUTO: 50.2 FL (ref 37–54)
DEVICE COMMENT: ABNORMAL
EGFRCR SERPLBLD CKD-EPI 2021: 68.4 ML/MIN/1.73
EGFRCR SERPLBLD CKD-EPI 2021: 78.5 ML/MIN/1.73
EOSINOPHIL # BLD AUTO: 0.17 10*3/MM3 (ref 0–0.4)
EOSINOPHIL NFR BLD AUTO: 1.6 % (ref 0.3–6.2)
ERYTHROCYTE [DISTWIDTH] IN BLOOD BY AUTOMATED COUNT: 14.2 % (ref 12.3–15.4)
ERYTHROCYTE [DISTWIDTH] IN BLOOD BY AUTOMATED COUNT: 14.3 % (ref 12.3–15.4)
FIBRINOGEN PPP-MCNC: 288 MG/DL (ref 219–464)
FIBRINOGEN PPP-MCNC: 295 MG/DL (ref 219–464)
GLUCOSE BLDC GLUCOMTR-MCNC: 106 MG/DL (ref 70–130)
GLUCOSE BLDC GLUCOMTR-MCNC: 117 MG/DL (ref 70–130)
GLUCOSE BLDC GLUCOMTR-MCNC: 131 MG/DL (ref 70–130)
GLUCOSE BLDC GLUCOMTR-MCNC: 134 MG/DL (ref 70–130)
GLUCOSE BLDC GLUCOMTR-MCNC: 137 MG/DL (ref 70–130)
GLUCOSE BLDC GLUCOMTR-MCNC: 137 MG/DL (ref 70–130)
GLUCOSE BLDC GLUCOMTR-MCNC: 141 MG/DL (ref 70–130)
GLUCOSE BLDC GLUCOMTR-MCNC: 143 MG/DL (ref 70–130)
GLUCOSE BLDC GLUCOMTR-MCNC: 148 MG/DL (ref 70–130)
GLUCOSE BLDC GLUCOMTR-MCNC: 149 MG/DL (ref 70–130)
GLUCOSE BLDC GLUCOMTR-MCNC: 149 MG/DL (ref 70–130)
GLUCOSE BLDC GLUCOMTR-MCNC: 150 MG/DL (ref 70–130)
GLUCOSE BLDC GLUCOMTR-MCNC: 168 MG/DL (ref 70–130)
GLUCOSE BLDC GLUCOMTR-MCNC: 172 MG/DL (ref 70–130)
GLUCOSE SERPL-MCNC: 134 MG/DL (ref 65–99)
GLUCOSE SERPL-MCNC: 145 MG/DL (ref 65–99)
HCO3 BLDA-SCNC: 21.2 MMOL/L (ref 22–28)
HCO3 BLDA-SCNC: 21.4 MMOL/L (ref 22–26)
HCO3 BLDA-SCNC: 22.4 MMOL/L (ref 22–28)
HCO3 BLDA-SCNC: 23 MMOL/L (ref 22–26)
HCO3 BLDA-SCNC: 23.2 MMOL/L (ref 22–26)
HCO3 BLDA-SCNC: 24 MMOL/L (ref 22–28)
HCO3 BLDA-SCNC: 24.7 MMOL/L (ref 22–26)
HCO3 BLDA-SCNC: 25.5 MMOL/L (ref 22–26)
HCO3 BLDA-SCNC: 26.2 MMOL/L (ref 22–26)
HCO3 BLDA-SCNC: 27.1 MMOL/L (ref 22–26)
HCO3 BLDV-SCNC: 21.7 MMOL/L (ref 22–28)
HCT VFR BLD AUTO: 33.8 % (ref 37.5–51)
HCT VFR BLD AUTO: 36.2 % (ref 37.5–51)
HCT VFR BLDA CALC: 23 % (ref 38–51)
HCT VFR BLDA CALC: 23 % (ref 38–51)
HCT VFR BLDA CALC: 24 % (ref 38–51)
HCT VFR BLDA CALC: 24 % (ref 38–51)
HCT VFR BLDA CALC: 25 % (ref 38–51)
HCT VFR BLDA CALC: 26 % (ref 38–51)
HCT VFR BLDA CALC: 32 % (ref 38–51)
HEMODILUTION: NO
HGB BLD-MCNC: 11.3 G/DL (ref 13–17.7)
HGB BLD-MCNC: 11.8 G/DL (ref 13–17.7)
HGB BLDA-MCNC: 10.9 G/DL (ref 12–17)
HGB BLDA-MCNC: 7.8 G/DL (ref 12–17)
HGB BLDA-MCNC: 7.8 G/DL (ref 12–17)
HGB BLDA-MCNC: 8.2 G/DL (ref 12–17)
HGB BLDA-MCNC: 8.2 G/DL (ref 12–17)
HGB BLDA-MCNC: 8.5 G/DL (ref 12–17)
HGB BLDA-MCNC: 8.8 G/DL (ref 12–17)
IMM GRANULOCYTES # BLD AUTO: 0.09 10*3/MM3 (ref 0–0.05)
IMM GRANULOCYTES NFR BLD AUTO: 0.9 % (ref 0–0.5)
INHALED O2 CONCENTRATION: 100 %
INHALED O2 CONCENTRATION: 40 %
INHALED O2 CONCENTRATION: 40 %
INR PPP: 1.49 (ref 0.9–1.1)
INR PPP: 1.7 (ref 0.8–1.2)
LYMPHOCYTES # BLD AUTO: 0.85 10*3/MM3 (ref 0.7–3.1)
LYMPHOCYTES NFR BLD AUTO: 8.1 % (ref 19.6–45.3)
MAGNESIUM SERPL-MCNC: 3.2 MG/DL (ref 1.6–2.4)
MAGNESIUM SERPL-MCNC: 3.4 MG/DL (ref 1.6–2.4)
MCH RBC QN AUTO: 31.5 PG (ref 26.6–33)
MCH RBC QN AUTO: 31.6 PG (ref 26.6–33)
MCHC RBC AUTO-ENTMCNC: 32.6 G/DL (ref 31.5–35.7)
MCHC RBC AUTO-ENTMCNC: 33.4 G/DL (ref 31.5–35.7)
MCV RBC AUTO: 94.2 FL (ref 79–97)
MCV RBC AUTO: 96.8 FL (ref 79–97)
MODALITY: ABNORMAL
MONOCYTES # BLD AUTO: 0.94 10*3/MM3 (ref 0.1–0.9)
MONOCYTES NFR BLD AUTO: 8.9 % (ref 5–12)
NEUTROPHILS NFR BLD AUTO: 8.43 10*3/MM3 (ref 1.7–7)
NEUTROPHILS NFR BLD AUTO: 80 % (ref 42.7–76)
NRBC BLD AUTO-RTO: 0 /100 WBC (ref 0–0.2)
O2 A-A PPRESDIFF RESPIRATORY: 0.4 MMHG
O2 A-A PPRESDIFF RESPIRATORY: 0.5 MMHG
PCO2 BLDA: 31.4 MM HG (ref 35–45)
PCO2 BLDA: 35.2 MM HG (ref 35–45)
PCO2 BLDA: 35.6 MM HG (ref 35–45)
PCO2 BLDA: 40.2 MM HG (ref 35–45)
PCO2 BLDA: 41.1 MM HG (ref 35–45)
PCO2 BLDA: 41.3 MM HG (ref 35–45)
PCO2 BLDA: 41.7 MM HG (ref 35–45)
PCO2 BLDA: 42.9 MM HG (ref 35–45)
PCO2 BLDA: 47.2 MM HG (ref 35–45)
PCO2 BLDA: 48.1 MM HG (ref 35–45)
PCO2 BLDV: 40.5 MM HG (ref 41–51)
PEEP RESPIRATORY: 8 CM[H2O]
PH BLDA: 7.31 PH UNITS (ref 7.35–7.6)
PH BLDA: 7.35 PH UNITS (ref 7.35–7.6)
PH BLDA: 7.36 PH UNITS (ref 7.35–7.45)
PH BLDA: 7.38 PH UNITS (ref 7.35–7.45)
PH BLDA: 7.4 PH UNITS (ref 7.35–7.6)
PH BLDA: 7.4 PH UNITS (ref 7.35–7.6)
PH BLDA: 7.41 PH UNITS (ref 7.35–7.6)
PH BLDA: 7.43 PH UNITS (ref 7.35–7.6)
PH BLDA: 7.44 PH UNITS (ref 7.35–7.6)
PH BLDA: 7.46 PH UNITS (ref 7.35–7.45)
PH BLDV: 7.34 PH UNITS (ref 7.31–7.41)
PHOSPHATE SERPL-MCNC: 3 MG/DL (ref 2.5–4.5)
PHOSPHATE SERPL-MCNC: 3.4 MG/DL (ref 2.5–4.5)
PLATELET # BLD AUTO: 105 10*3/MM3 (ref 140–450)
PLATELET # BLD AUTO: 132 10*3/MM3 (ref 140–450)
PLATELET # BLD AUTO: 140 10*3/MM3 (ref 140–450)
PMV BLD AUTO: 9.6 FL (ref 6–12)
PMV BLD AUTO: 9.8 FL (ref 6–12)
PO2 BLD: 283 MM[HG] (ref 0–500)
PO2 BLD: 333 MM[HG] (ref 0–500)
PO2 BLDA: 133 MM HG (ref 80–100)
PO2 BLDA: 140.3 MM HG (ref 80–100)
PO2 BLDA: 282.8 MM HG (ref 80–100)
PO2 BLDA: 319 MMHG (ref 80–105)
PO2 BLDA: 331 MMHG (ref 80–105)
PO2 BLDA: 335 MMHG (ref 80–105)
PO2 BLDA: 344 MMHG (ref 80–105)
PO2 BLDA: 41 MMHG (ref 80–105)
PO2 BLDA: 411 MMHG (ref 80–105)
PO2 BLDA: 514 MMHG (ref 80–105)
PO2 BLDV: 31.1 MM HG (ref 35–45)
POTASSIUM BLDA-SCNC: 3.4 MMOL/L (ref 3.5–4.9)
POTASSIUM BLDA-SCNC: 3.7 MMOL/L (ref 3.5–4.9)
POTASSIUM BLDA-SCNC: 4.1 MMOL/L (ref 3.5–4.9)
POTASSIUM BLDA-SCNC: 4.7 MMOL/L (ref 3.5–4.9)
POTASSIUM BLDA-SCNC: 4.9 MMOL/L (ref 3.5–4.9)
POTASSIUM BLDA-SCNC: 4.9 MMOL/L (ref 3.5–4.9)
POTASSIUM BLDA-SCNC: 5.1 MMOL/L (ref 3.5–4.9)
POTASSIUM SERPL-SCNC: 4.3 MMOL/L (ref 3.5–5.2)
POTASSIUM SERPL-SCNC: 4.6 MMOL/L (ref 3.5–5.2)
PROTHROMBIN TIME: 16.9 SECONDS (ref 12.8–15.2)
PROTHROMBIN TIME: 18 SECONDS (ref 11.7–14.2)
PSV: 8 CMH2O
QT INTERVAL: 457 MS
QTC INTERVAL: 402 MS
RBC # BLD AUTO: 3.59 10*6/MM3 (ref 4.14–5.8)
RBC # BLD AUTO: 3.74 10*6/MM3 (ref 4.14–5.8)
SAO2 % BLDA: 100 % (ref 95–98)
SAO2 % BLDA: 74 % (ref 95–98)
SAO2 % BLDCOA: 99 % (ref 92–98.5)
SAO2 % BLDCOA: 99.3 % (ref 92–98.5)
SAO2 % BLDCOA: 99.9 % (ref 92–98.5)
SAO2 % BLDCOV: 55.7 % (ref 45–75)
SET MECH RESP RATE: 16
SET MECH RESP RATE: 18
SODIUM SERPL-SCNC: 138 MMOL/L (ref 136–145)
SODIUM SERPL-SCNC: 140 MMOL/L (ref 136–145)
TOTAL RATE: 16 BREATHS/MINUTE
VENTILATOR MODE: ABNORMAL
VENTILATOR MODE: AC
VT ON VENT VENT: 600 ML
VT ON VENT VENT: 800 ML
WBC NRBC COR # BLD AUTO: 10.53 10*3/MM3 (ref 3.4–10.8)
WBC NRBC COR # BLD AUTO: 7.05 10*3/MM3 (ref 3.4–10.8)

## 2025-06-20 PROCEDURE — 94799 UNLISTED PULMONARY SVC/PX: CPT

## 2025-06-20 PROCEDURE — 36430 TRANSFUSION BLD/BLD COMPNT: CPT

## 2025-06-20 PROCEDURE — 25010000002 PROPOFOL 10 MG/ML EMULSION: Performed by: ANESTHESIOLOGY

## 2025-06-20 PROCEDURE — P9041 ALBUMIN (HUMAN),5%, 50ML: HCPCS | Performed by: NURSE PRACTITIONER

## 2025-06-20 PROCEDURE — 25010000003 DEXTROSE 5 % SOLUTION 250 ML FLEX CONT: Performed by: ANESTHESIOLOGY

## 2025-06-20 PROCEDURE — 25010000002 PROPOFOL 10 MG/ML EMULSION: Performed by: NURSE PRACTITIONER

## 2025-06-20 PROCEDURE — 25010000002 AMINOCAPROIC ACID PER 5 G: Performed by: ANESTHESIOLOGY

## 2025-06-20 PROCEDURE — 33268 EXCL LAA OPN OTH PX ANY METH: CPT | Performed by: THORACIC SURGERY (CARDIOTHORACIC VASCULAR SURGERY)

## 2025-06-20 PROCEDURE — 85730 THROMBOPLASTIN TIME PARTIAL: CPT | Performed by: NURSE PRACTITIONER

## 2025-06-20 PROCEDURE — 85014 HEMATOCRIT: CPT

## 2025-06-20 PROCEDURE — 85384 FIBRINOGEN ACTIVITY: CPT | Performed by: THORACIC SURGERY (CARDIOTHORACIC VASCULAR SURGERY)

## 2025-06-20 PROCEDURE — 25010000003 PROTAMINE SULFATE PER 10 MG: Performed by: THORACIC SURGERY (CARDIOTHORACIC VASCULAR SURGERY)

## 2025-06-20 PROCEDURE — 25010000002 MIDAZOLAM PER 1 MG: Performed by: ANESTHESIOLOGY

## 2025-06-20 PROCEDURE — C1713 ANCHOR/SCREW BN/BN,TIS/BN: HCPCS | Performed by: THORACIC SURGERY (CARDIOTHORACIC VASCULAR SURGERY)

## 2025-06-20 PROCEDURE — 25010000002 CEFAZOLIN PER 500 MG: Performed by: NURSE PRACTITIONER

## 2025-06-20 PROCEDURE — 33521 CABG ARTERY-VEIN FOUR: CPT

## 2025-06-20 PROCEDURE — 33508 ENDOSCOPIC VEIN HARVEST: CPT | Performed by: THORACIC SURGERY (CARDIOTHORACIC VASCULAR SURGERY)

## 2025-06-20 PROCEDURE — 63710000001 INSULIN REGULAR HUMAN PER 5 UNITS: Performed by: ANESTHESIOLOGY

## 2025-06-20 PROCEDURE — 25010000002 PAPAVERINE PER 60 MG: Performed by: THORACIC SURGERY (CARDIOTHORACIC VASCULAR SURGERY)

## 2025-06-20 PROCEDURE — 94002 VENT MGMT INPAT INIT DAY: CPT

## 2025-06-20 PROCEDURE — P9016 RBC LEUKOCYTES REDUCED: HCPCS

## 2025-06-20 PROCEDURE — C1751 CATH, INF, PER/CENT/MIDLINE: HCPCS | Performed by: ANESTHESIOLOGY

## 2025-06-20 PROCEDURE — 82803 BLOOD GASES ANY COMBINATION: CPT | Performed by: ANESTHESIOLOGY

## 2025-06-20 PROCEDURE — 82947 ASSAY GLUCOSE BLOOD QUANT: CPT

## 2025-06-20 PROCEDURE — 06BQ4ZZ EXCISION OF LEFT SAPHENOUS VEIN, PERCUTANEOUS ENDOSCOPIC APPROACH: ICD-10-PCS | Performed by: THORACIC SURGERY (CARDIOTHORACIC VASCULAR SURGERY)

## 2025-06-20 PROCEDURE — 33533 CABG ARTERIAL SINGLE: CPT | Performed by: THORACIC SURGERY (CARDIOTHORACIC VASCULAR SURGERY)

## 2025-06-20 PROCEDURE — 02100Z9 BYPASS CORONARY ARTERY, ONE ARTERY FROM LEFT INTERNAL MAMMARY, OPEN APPROACH: ICD-10-PCS | Performed by: THORACIC SURGERY (CARDIOTHORACIC VASCULAR SURGERY)

## 2025-06-20 PROCEDURE — 86900 BLOOD TYPING SEROLOGIC ABO: CPT

## 2025-06-20 PROCEDURE — 83735 ASSAY OF MAGNESIUM: CPT | Performed by: NURSE PRACTITIONER

## 2025-06-20 PROCEDURE — 82803 BLOOD GASES ANY COMBINATION: CPT | Performed by: NURSE PRACTITIONER

## 2025-06-20 PROCEDURE — 5A1221Z PERFORMANCE OF CARDIAC OUTPUT, CONTINUOUS: ICD-10-PCS | Performed by: THORACIC SURGERY (CARDIOTHORACIC VASCULAR SURGERY)

## 2025-06-20 PROCEDURE — 021309W BYPASS CORONARY ARTERY, FOUR OR MORE ARTERIES FROM AORTA WITH AUTOLOGOUS VENOUS TISSUE, OPEN APPROACH: ICD-10-PCS | Performed by: THORACIC SURGERY (CARDIOTHORACIC VASCULAR SURGERY)

## 2025-06-20 PROCEDURE — 82803 BLOOD GASES ANY COMBINATION: CPT

## 2025-06-20 PROCEDURE — 25010000002 MAGNESIUM SULFATE IN D5W 1G/100ML (PREMIX) 1-5 GM/100ML-% SOLUTION: Performed by: NURSE PRACTITIONER

## 2025-06-20 PROCEDURE — C1729 CATH, DRAINAGE: HCPCS | Performed by: THORACIC SURGERY (CARDIOTHORACIC VASCULAR SURGERY)

## 2025-06-20 PROCEDURE — 25010000002 METOCLOPRAMIDE PER 10 MG: Performed by: NURSE PRACTITIONER

## 2025-06-20 PROCEDURE — 33268 EXCL LAA OPN OTH PX ANY METH: CPT

## 2025-06-20 PROCEDURE — 33521 CABG ARTERY-VEIN FOUR: CPT | Performed by: THORACIC SURGERY (CARDIOTHORACIC VASCULAR SURGERY)

## 2025-06-20 PROCEDURE — 85384 FIBRINOGEN ACTIVITY: CPT | Performed by: NURSE PRACTITIONER

## 2025-06-20 PROCEDURE — 85025 COMPLETE CBC W/AUTO DIFF WBC: CPT | Performed by: NURSE PRACTITIONER

## 2025-06-20 PROCEDURE — 82948 REAGENT STRIP/BLOOD GLUCOSE: CPT

## 2025-06-20 PROCEDURE — 33508 ENDOSCOPIC VEIN HARVEST: CPT

## 2025-06-20 PROCEDURE — 25010000002 FENTANYL CITRATE (PF) 50 MCG/ML SOLUTION: Performed by: ANESTHESIOLOGY

## 2025-06-20 PROCEDURE — 85610 PROTHROMBIN TIME: CPT | Performed by: NURSE PRACTITIONER

## 2025-06-20 PROCEDURE — 02Q50ZZ REPAIR ATRIAL SEPTUM, OPEN APPROACH: ICD-10-PCS | Performed by: THORACIC SURGERY (CARDIOTHORACIC VASCULAR SURGERY)

## 2025-06-20 PROCEDURE — 25010000002 ACETAMINOPHEN 10 MG/ML SOLUTION: Performed by: NURSE PRACTITIONER

## 2025-06-20 PROCEDURE — 99291 CRITICAL CARE FIRST HOUR: CPT | Performed by: ANESTHESIOLOGY

## 2025-06-20 PROCEDURE — 94660 CPAP INITIATION&MGMT: CPT

## 2025-06-20 PROCEDURE — 25010000002 HEPARIN (PORCINE) PER 1000 UNITS: Performed by: ANESTHESIOLOGY

## 2025-06-20 PROCEDURE — B245ZZ4 ULTRASONOGRAPHY OF LEFT HEART, TRANSESOPHAGEAL: ICD-10-PCS | Performed by: THORACIC SURGERY (CARDIOTHORACIC VASCULAR SURGERY)

## 2025-06-20 PROCEDURE — 93010 ELECTROCARDIOGRAM REPORT: CPT | Performed by: INTERNAL MEDICINE

## 2025-06-20 PROCEDURE — 85018 HEMOGLOBIN: CPT

## 2025-06-20 PROCEDURE — 85576 BLOOD PLATELET AGGREGATION: CPT | Performed by: NURSE PRACTITIONER

## 2025-06-20 PROCEDURE — 85049 AUTOMATED PLATELET COUNT: CPT | Performed by: THORACIC SURGERY (CARDIOTHORACIC VASCULAR SURGERY)

## 2025-06-20 PROCEDURE — 93005 ELECTROCARDIOGRAM TRACING: CPT | Performed by: NURSE PRACTITIONER

## 2025-06-20 PROCEDURE — 02L70DK OCCLUSION OF LEFT ATRIAL APPENDAGE WITH INTRALUMINAL DEVICE, OPEN APPROACH: ICD-10-PCS | Performed by: THORACIC SURGERY (CARDIOTHORACIC VASCULAR SURGERY)

## 2025-06-20 PROCEDURE — 93318 ECHO TRANSESOPHAGEAL INTRAOP: CPT | Performed by: ANESTHESIOLOGY

## 2025-06-20 PROCEDURE — C1889 IMPLANT/INSERT DEVICE, NOC: HCPCS | Performed by: THORACIC SURGERY (CARDIOTHORACIC VASCULAR SURGERY)

## 2025-06-20 PROCEDURE — 25010000002 MILRINONE LACTATE IN DEXTROSE 20-5 MG/100ML-% SOLUTION: Performed by: NURSE PRACTITIONER

## 2025-06-20 PROCEDURE — 82330 ASSAY OF CALCIUM: CPT | Performed by: NURSE PRACTITIONER

## 2025-06-20 PROCEDURE — 80069 RENAL FUNCTION PANEL: CPT | Performed by: NURSE PRACTITIONER

## 2025-06-20 PROCEDURE — 85347 COAGULATION TIME ACTIVATED: CPT

## 2025-06-20 PROCEDURE — 85610 PROTHROMBIN TIME: CPT

## 2025-06-20 PROCEDURE — 25010000002 ALBUMIN HUMAN 5% PER 50 ML: Performed by: NURSE PRACTITIONER

## 2025-06-20 PROCEDURE — 33533 CABG ARTERIAL SINGLE: CPT

## 2025-06-20 PROCEDURE — 25010000002 HEPARIN (PORCINE) PER 1000 UNITS: Performed by: THORACIC SURGERY (CARDIOTHORACIC VASCULAR SURGERY)

## 2025-06-20 PROCEDURE — A4648 IMPLANTABLE TISSUE MARKER: HCPCS | Performed by: THORACIC SURGERY (CARDIOTHORACIC VASCULAR SURGERY)

## 2025-06-20 PROCEDURE — 25010000002 MORPHINE PER 10 MG: Performed by: NURSE PRACTITIONER

## 2025-06-20 PROCEDURE — 25010000002 MAGNESIUM SULFATE PER 500 MG OF MAGNESIUM: Performed by: ANESTHESIOLOGY

## 2025-06-20 PROCEDURE — 71045 X-RAY EXAM CHEST 1 VIEW: CPT

## 2025-06-20 PROCEDURE — 25010000002 LIDOCAINE 2% SOLUTION: Performed by: ANESTHESIOLOGY

## 2025-06-20 PROCEDURE — 85027 COMPLETE CBC AUTOMATED: CPT | Performed by: NURSE PRACTITIONER

## 2025-06-20 DEVICE — APPL CLIP LAA ATRICLIP FLXV 40MM: Type: IMPLANTABLE DEVICE | Site: STERNUM | Status: FUNCTIONAL

## 2025-06-20 DEVICE — SS SUTURE, 4 PER SLEEVE
Type: IMPLANTABLE DEVICE | Site: STERNUM | Status: FUNCTIONAL
Brand: MYO/WIRE II

## 2025-06-20 DEVICE — SS SUTURE, 3 PER SLEEVE
Type: IMPLANTABLE DEVICE | Site: STERNUM | Status: FUNCTIONAL
Brand: MYO/WIRE II

## 2025-06-20 DEVICE — HEMOST ABS SURGIFOAM SZ100 8X12 10MM: Type: IMPLANTABLE DEVICE | Site: STERNUM | Status: FUNCTIONAL

## 2025-06-20 DEVICE — DEV CLS STERN FIBERTAPE W/BLNT/NDL: Type: IMPLANTABLE DEVICE | Site: STERNUM | Status: FUNCTIONAL

## 2025-06-20 RX ORDER — NITROGLYCERIN 0.4 MG/1
0.4 TABLET SUBLINGUAL
Status: DISCONTINUED | OUTPATIENT
Start: 2025-06-20 | End: 2025-06-25 | Stop reason: HOSPADM

## 2025-06-20 RX ORDER — NICOTINE POLACRILEX 4 MG
15 LOZENGE BUCCAL
Status: DISCONTINUED | OUTPATIENT
Start: 2025-06-20 | End: 2025-06-21

## 2025-06-20 RX ORDER — ACETAMINOPHEN 650 MG/1
650 SUPPOSITORY RECTAL EVERY 4 HOURS
Status: ACTIVE | OUTPATIENT
Start: 2025-06-20 | End: 2025-06-21

## 2025-06-20 RX ORDER — BISACODYL 10 MG
10 SUPPOSITORY, RECTAL RECTAL DAILY PRN
Status: DISCONTINUED | OUTPATIENT
Start: 2025-06-21 | End: 2025-06-25 | Stop reason: HOSPADM

## 2025-06-20 RX ORDER — ACETAMINOPHEN 160 MG/5ML
650 SOLUTION ORAL EVERY 4 HOURS PRN
Status: DISCONTINUED | OUTPATIENT
Start: 2025-06-21 | End: 2025-06-25 | Stop reason: HOSPADM

## 2025-06-20 RX ORDER — HEPARIN SODIUM 5000 [USP'U]/ML
INJECTION, SOLUTION INTRAVENOUS; SUBCUTANEOUS AS NEEDED
Status: DISCONTINUED | OUTPATIENT
Start: 2025-06-20 | End: 2025-06-20 | Stop reason: HOSPADM

## 2025-06-20 RX ORDER — LIDOCAINE HYDROCHLORIDE 20 MG/ML
INJECTION, SOLUTION INFILTRATION; PERINEURAL AS NEEDED
Status: DISCONTINUED | OUTPATIENT
Start: 2025-06-20 | End: 2025-06-20 | Stop reason: SURG

## 2025-06-20 RX ORDER — DOPAMINE HYDROCHLORIDE 160 MG/100ML
2-20 INJECTION, SOLUTION INTRAVENOUS CONTINUOUS PRN
Status: DISCONTINUED | OUTPATIENT
Start: 2025-06-20 | End: 2025-06-21

## 2025-06-20 RX ORDER — ACETAMINOPHEN 10 MG/ML
1000 INJECTION, SOLUTION INTRAVENOUS EVERY 8 HOURS
Status: COMPLETED | OUTPATIENT
Start: 2025-06-20 | End: 2025-06-21

## 2025-06-20 RX ORDER — ROCURONIUM BROMIDE 10 MG/ML
INJECTION, SOLUTION INTRAVENOUS AS NEEDED
Status: DISCONTINUED | OUTPATIENT
Start: 2025-06-20 | End: 2025-06-20 | Stop reason: SURG

## 2025-06-20 RX ORDER — SODIUM CHLORIDE 9 MG/ML
INJECTION, SOLUTION INTRAVENOUS CONTINUOUS PRN
Status: DISCONTINUED | OUTPATIENT
Start: 2025-06-20 | End: 2025-06-20 | Stop reason: SURG

## 2025-06-20 RX ORDER — CLONAZEPAM 0.5 MG/1
0.5 TABLET ORAL 2 TIMES DAILY PRN
Status: ACTIVE | OUTPATIENT
Start: 2025-06-20 | End: 2025-06-23

## 2025-06-20 RX ORDER — POLYETHYLENE GLYCOL 3350 17 G/17G
17 POWDER, FOR SOLUTION ORAL DAILY PRN
Status: DISCONTINUED | OUTPATIENT
Start: 2025-06-20 | End: 2025-06-25 | Stop reason: HOSPADM

## 2025-06-20 RX ORDER — MAGNESIUM SULFATE HEPTAHYDRATE 500 MG/ML
INJECTION, SOLUTION INTRAMUSCULAR; INTRAVENOUS AS NEEDED
Status: DISCONTINUED | OUTPATIENT
Start: 2025-06-20 | End: 2025-06-20 | Stop reason: SURG

## 2025-06-20 RX ORDER — CYCLOBENZAPRINE HCL 10 MG
10 TABLET ORAL EVERY 8 HOURS PRN
Status: DISCONTINUED | OUTPATIENT
Start: 2025-06-21 | End: 2025-06-25 | Stop reason: HOSPADM

## 2025-06-20 RX ORDER — THEOPHYLLINE 300 MG/1
300 TABLET, EXTENDED RELEASE ORAL EVERY 12 HOURS SCHEDULED
Status: DISCONTINUED | OUTPATIENT
Start: 2025-06-21 | End: 2025-06-25 | Stop reason: HOSPADM

## 2025-06-20 RX ORDER — MORPHINE SULFATE 2 MG/ML
4 INJECTION, SOLUTION INTRAMUSCULAR; INTRAVENOUS
Status: DISCONTINUED | OUTPATIENT
Start: 2025-06-20 | End: 2025-06-21

## 2025-06-20 RX ORDER — HEPARIN SODIUM 1000 [USP'U]/ML
INJECTION, SOLUTION INTRAVENOUS; SUBCUTANEOUS AS NEEDED
Status: DISCONTINUED | OUTPATIENT
Start: 2025-06-20 | End: 2025-06-20 | Stop reason: SURG

## 2025-06-20 RX ORDER — OXYCODONE HYDROCHLORIDE 10 MG/1
10 TABLET ORAL EVERY 4 HOURS PRN
Status: DISCONTINUED | OUTPATIENT
Start: 2025-06-20 | End: 2025-06-25 | Stop reason: HOSPADM

## 2025-06-20 RX ORDER — AMOXICILLIN 250 MG
2 CAPSULE ORAL NIGHTLY
Status: DISCONTINUED | OUTPATIENT
Start: 2025-06-21 | End: 2025-06-25 | Stop reason: HOSPADM

## 2025-06-20 RX ORDER — MILRINONE LACTATE 0.2 MG/ML
.25-.375 INJECTION, SOLUTION INTRAVENOUS CONTINUOUS PRN
Status: DISCONTINUED | OUTPATIENT
Start: 2025-06-20 | End: 2025-06-25 | Stop reason: HOSPADM

## 2025-06-20 RX ORDER — NITROGLYCERIN 20 MG/100ML
5-200 INJECTION INTRAVENOUS
Status: DISCONTINUED | OUTPATIENT
Start: 2025-06-20 | End: 2025-06-21

## 2025-06-20 RX ORDER — MIDAZOLAM HYDROCHLORIDE 1 MG/ML
2 INJECTION, SOLUTION INTRAMUSCULAR; INTRAVENOUS
Status: DISCONTINUED | OUTPATIENT
Start: 2025-06-20 | End: 2025-06-21

## 2025-06-20 RX ORDER — ONDANSETRON 2 MG/ML
4 INJECTION INTRAMUSCULAR; INTRAVENOUS EVERY 6 HOURS PRN
Status: DISCONTINUED | OUTPATIENT
Start: 2025-06-20 | End: 2025-06-25 | Stop reason: HOSPADM

## 2025-06-20 RX ORDER — HYDROCODONE BITARTRATE AND ACETAMINOPHEN 5; 325 MG/1; MG/1
2 TABLET ORAL EVERY 4 HOURS PRN
Status: DISCONTINUED | OUTPATIENT
Start: 2025-06-20 | End: 2025-06-25 | Stop reason: HOSPADM

## 2025-06-20 RX ORDER — PAPAVERINE HYDROCHLORIDE 30 MG/ML
INJECTION INTRAMUSCULAR; INTRAVENOUS AS NEEDED
Status: DISCONTINUED | OUTPATIENT
Start: 2025-06-20 | End: 2025-06-20 | Stop reason: HOSPADM

## 2025-06-20 RX ORDER — PANTOPRAZOLE SODIUM 40 MG/10ML
40 INJECTION, POWDER, LYOPHILIZED, FOR SOLUTION INTRAVENOUS ONCE
Status: COMPLETED | OUTPATIENT
Start: 2025-06-20 | End: 2025-06-20

## 2025-06-20 RX ORDER — DEXMEDETOMIDINE HYDROCHLORIDE 4 UG/ML
.2-1.5 INJECTION, SOLUTION INTRAVENOUS
Status: DISCONTINUED | OUTPATIENT
Start: 2025-06-20 | End: 2025-06-21

## 2025-06-20 RX ORDER — ACETAMINOPHEN 325 MG/1
650 TABLET ORAL EVERY 4 HOURS PRN
Status: DISCONTINUED | OUTPATIENT
Start: 2025-06-21 | End: 2025-06-25 | Stop reason: HOSPADM

## 2025-06-20 RX ORDER — SODIUM CHLORIDE 9 MG/ML
40 INJECTION, SOLUTION INTRAVENOUS AS NEEDED
Status: DISCONTINUED | OUTPATIENT
Start: 2025-06-20 | End: 2025-06-20 | Stop reason: HOSPADM

## 2025-06-20 RX ORDER — PHENYLEPHRINE HCL IN 0.9% NACL 0.5 MG/5ML
.2-2 SYRINGE (ML) INTRAVENOUS CONTINUOUS PRN
Status: DISCONTINUED | OUTPATIENT
Start: 2025-06-20 | End: 2025-06-21

## 2025-06-20 RX ORDER — KETAMINE HCL IN NACL, ISO-OSM 100MG/10ML
SYRINGE (ML) INJECTION AS NEEDED
Status: DISCONTINUED | OUTPATIENT
Start: 2025-06-20 | End: 2025-06-20 | Stop reason: SURG

## 2025-06-20 RX ORDER — SODIUM CHLORIDE 0.9 % (FLUSH) 0.9 %
10 SYRINGE (ML) INJECTION EVERY 12 HOURS SCHEDULED
Status: DISCONTINUED | OUTPATIENT
Start: 2025-06-20 | End: 2025-06-20 | Stop reason: HOSPADM

## 2025-06-20 RX ORDER — METOPROLOL TARTRATE 25 MG/1
12.5 TABLET, FILM COATED ORAL EVERY 12 HOURS SCHEDULED
Status: DISCONTINUED | OUTPATIENT
Start: 2025-06-20 | End: 2025-06-21

## 2025-06-20 RX ORDER — MAGNESIUM SULFATE 1 G/100ML
1 INJECTION INTRAVENOUS EVERY 8 HOURS
Status: DISPENSED | OUTPATIENT
Start: 2025-06-20 | End: 2025-06-21

## 2025-06-20 RX ORDER — BUDESONIDE AND FORMOTEROL FUMARATE DIHYDRATE 160; 4.5 UG/1; UG/1
2 AEROSOL RESPIRATORY (INHALATION) 2 TIMES DAILY
Status: DISCONTINUED | OUTPATIENT
Start: 2025-06-21 | End: 2025-06-25 | Stop reason: HOSPADM

## 2025-06-20 RX ORDER — AMINOCAPROIC ACID 250 MG/ML
INJECTION, SOLUTION INTRAVENOUS AS NEEDED
Status: DISCONTINUED | OUTPATIENT
Start: 2025-06-20 | End: 2025-06-20 | Stop reason: SURG

## 2025-06-20 RX ORDER — FENTANYL CITRATE 50 UG/ML
INJECTION, SOLUTION INTRAMUSCULAR; INTRAVENOUS AS NEEDED
Status: DISCONTINUED | OUTPATIENT
Start: 2025-06-20 | End: 2025-06-20 | Stop reason: SURG

## 2025-06-20 RX ORDER — METOCLOPRAMIDE HYDROCHLORIDE 5 MG/ML
10 INJECTION INTRAMUSCULAR; INTRAVENOUS EVERY 6 HOURS
Status: COMPLETED | OUTPATIENT
Start: 2025-06-20 | End: 2025-06-21

## 2025-06-20 RX ORDER — ENOXAPARIN SODIUM 100 MG/ML
40 INJECTION SUBCUTANEOUS DAILY
Status: DISCONTINUED | OUTPATIENT
Start: 2025-06-21 | End: 2025-06-25 | Stop reason: HOSPADM

## 2025-06-20 RX ORDER — MIDAZOLAM HYDROCHLORIDE 1 MG/ML
INJECTION, SOLUTION INTRAMUSCULAR; INTRAVENOUS AS NEEDED
Status: DISCONTINUED | OUTPATIENT
Start: 2025-06-20 | End: 2025-06-20 | Stop reason: SURG

## 2025-06-20 RX ORDER — GABAPENTIN 300 MG/1
300 CAPSULE ORAL EVERY 8 HOURS SCHEDULED
Status: DISCONTINUED | OUTPATIENT
Start: 2025-06-21 | End: 2025-06-21

## 2025-06-20 RX ORDER — PANTOPRAZOLE SODIUM 40 MG/1
40 TABLET, DELAYED RELEASE ORAL EVERY MORNING
Status: DISCONTINUED | OUTPATIENT
Start: 2025-06-21 | End: 2025-06-25 | Stop reason: HOSPADM

## 2025-06-20 RX ORDER — ACETAMINOPHEN 325 MG/1
650 TABLET ORAL EVERY 4 HOURS
Status: DISPENSED | OUTPATIENT
Start: 2025-06-20 | End: 2025-06-21

## 2025-06-20 RX ORDER — IBUPROFEN 600 MG/1
1 TABLET ORAL
Status: DISCONTINUED | OUTPATIENT
Start: 2025-06-20 | End: 2025-06-21

## 2025-06-20 RX ORDER — MORPHINE SULFATE 2 MG/ML
1 INJECTION, SOLUTION INTRAMUSCULAR; INTRAVENOUS EVERY 4 HOURS PRN
Status: DISCONTINUED | OUTPATIENT
Start: 2025-06-20 | End: 2025-06-25 | Stop reason: HOSPADM

## 2025-06-20 RX ORDER — ASPIRIN 81 MG/1
81 TABLET ORAL DAILY
Status: DISCONTINUED | OUTPATIENT
Start: 2025-06-21 | End: 2025-06-25 | Stop reason: HOSPADM

## 2025-06-20 RX ORDER — DEXMEDETOMIDINE HYDROCHLORIDE 4 UG/ML
INJECTION, SOLUTION INTRAVENOUS CONTINUOUS PRN
Status: DISCONTINUED | OUTPATIENT
Start: 2025-06-20 | End: 2025-06-20 | Stop reason: SURG

## 2025-06-20 RX ORDER — BISACODYL 5 MG/1
10 TABLET, DELAYED RELEASE ORAL DAILY PRN
Status: DISCONTINUED | OUTPATIENT
Start: 2025-06-20 | End: 2025-06-25 | Stop reason: HOSPADM

## 2025-06-20 RX ORDER — PROPOFOL 10 MG/ML
VIAL (ML) INTRAVENOUS AS NEEDED
Status: DISCONTINUED | OUTPATIENT
Start: 2025-06-20 | End: 2025-06-20 | Stop reason: SURG

## 2025-06-20 RX ORDER — IPRATROPIUM BROMIDE AND ALBUTEROL SULFATE 2.5; .5 MG/3ML; MG/3ML
3 SOLUTION RESPIRATORY (INHALATION)
Status: DISCONTINUED | OUTPATIENT
Start: 2025-06-20 | End: 2025-06-25 | Stop reason: HOSPADM

## 2025-06-20 RX ORDER — ALBUMIN HUMAN 50 G/1000ML
1500 SOLUTION INTRAVENOUS AS NEEDED
Status: DISPENSED | OUTPATIENT
Start: 2025-06-20 | End: 2025-06-21

## 2025-06-20 RX ORDER — ACETAMINOPHEN 650 MG/1
650 SUPPOSITORY RECTAL EVERY 4 HOURS PRN
Status: DISCONTINUED | OUTPATIENT
Start: 2025-06-21 | End: 2025-06-25 | Stop reason: HOSPADM

## 2025-06-20 RX ORDER — DEXTROSE MONOHYDRATE 25 G/50ML
10-50 INJECTION, SOLUTION INTRAVENOUS
Status: DISCONTINUED | OUTPATIENT
Start: 2025-06-20 | End: 2025-06-21

## 2025-06-20 RX ORDER — NOREPINEPHRINE BITARTRATE 0.03 MG/ML
.02-.2 INJECTION, SOLUTION INTRAVENOUS CONTINUOUS PRN
Status: DISCONTINUED | OUTPATIENT
Start: 2025-06-20 | End: 2025-06-21

## 2025-06-20 RX ORDER — MEPERIDINE HYDROCHLORIDE 25 MG/ML
25 INJECTION INTRAMUSCULAR; INTRAVENOUS; SUBCUTANEOUS EVERY 4 HOURS PRN
Status: ACTIVE | OUTPATIENT
Start: 2025-06-20 | End: 2025-06-20

## 2025-06-20 RX ORDER — ACETAMINOPHEN 160 MG/5ML
650 SOLUTION ORAL EVERY 4 HOURS
Status: ACTIVE | OUTPATIENT
Start: 2025-06-20 | End: 2025-06-21

## 2025-06-20 RX ORDER — PROTAMINE SULFATE 10 MG/ML
INJECTION, SOLUTION INTRAVENOUS AS NEEDED
Status: DISCONTINUED | OUTPATIENT
Start: 2025-06-20 | End: 2025-06-20 | Stop reason: HOSPADM

## 2025-06-20 RX ORDER — NALOXONE HCL 0.4 MG/ML
0.4 VIAL (ML) INJECTION
Status: DISCONTINUED | OUTPATIENT
Start: 2025-06-20 | End: 2025-06-25 | Stop reason: HOSPADM

## 2025-06-20 RX ORDER — SODIUM CHLORIDE 0.9 % (FLUSH) 0.9 %
10 SYRINGE (ML) INJECTION AS NEEDED
Status: DISCONTINUED | OUTPATIENT
Start: 2025-06-20 | End: 2025-06-20 | Stop reason: HOSPADM

## 2025-06-20 RX ADMIN — IPRATROPIUM BROMIDE AND ALBUTEROL SULFATE 3 ML: .5; 3 SOLUTION RESPIRATORY (INHALATION) at 19:23

## 2025-06-20 RX ADMIN — INSULIN HUMAN 2 UNITS/HR: 1 INJECTION, SOLUTION INTRAVENOUS at 10:09

## 2025-06-20 RX ADMIN — SODIUM CHLORIDE 2000 MG: 900 INJECTION INTRAVENOUS at 07:20

## 2025-06-20 RX ADMIN — FENTANYL CITRATE 100 MCG: 0.05 INJECTION, SOLUTION INTRAMUSCULAR; INTRAVENOUS at 08:51

## 2025-06-20 RX ADMIN — NOREPINEPHRINE BITARTRATE 0.02 MCG/KG/MIN: 1 SOLUTION INTRAVENOUS at 11:05

## 2025-06-20 RX ADMIN — FENTANYL CITRATE 100 MCG: 0.05 INJECTION, SOLUTION INTRAMUSCULAR; INTRAVENOUS at 07:39

## 2025-06-20 RX ADMIN — CHLORHEXIDINE GLUCONATE 15 ML: 1.2 RINSE ORAL at 06:07

## 2025-06-20 RX ADMIN — INSULIN HUMAN 6 UNITS: 100 INJECTION, SOLUTION PARENTERAL at 10:08

## 2025-06-20 RX ADMIN — MIDAZOLAM HYDROCHLORIDE 2 MG: 1 INJECTION, SOLUTION INTRAMUSCULAR; INTRAVENOUS at 06:45

## 2025-06-20 RX ADMIN — HEPARIN SODIUM 25000 UNITS: 1000 INJECTION, SOLUTION INTRAVENOUS; SUBCUTANEOUS at 08:44

## 2025-06-20 RX ADMIN — FENTANYL CITRATE 100 MCG: 0.05 INJECTION, SOLUTION INTRAMUSCULAR; INTRAVENOUS at 08:06

## 2025-06-20 RX ADMIN — METOPROLOL TARTRATE 12.5 MG: 25 TABLET, FILM COATED ORAL at 06:08

## 2025-06-20 RX ADMIN — METOPROLOL TARTRATE 12.5 MG: 25 TABLET, FILM COATED ORAL at 21:20

## 2025-06-20 RX ADMIN — ALBUMIN (HUMAN) 250 ML: 12.5 INJECTION, SOLUTION INTRAVENOUS at 13:10

## 2025-06-20 RX ADMIN — GABAPENTIN 300 MG: 300 CAPSULE ORAL at 06:08

## 2025-06-20 RX ADMIN — MAGNESIUM SULFATE HEPTAHYDRATE 1 G: 1 INJECTION, SOLUTION INTRAVENOUS at 12:45

## 2025-06-20 RX ADMIN — METOCLOPRAMIDE 10 MG: 5 INJECTION, SOLUTION INTRAMUSCULAR; INTRAVENOUS at 17:49

## 2025-06-20 RX ADMIN — SODIUM CHLORIDE: 9 INJECTION, SOLUTION INTRAVENOUS at 06:35

## 2025-06-20 RX ADMIN — MUPIROCIN 1 APPLICATION: 20 OINTMENT TOPICAL at 12:45

## 2025-06-20 RX ADMIN — MAGNESIUM SULFATE HEPTAHYDRATE 2 G: 500 INJECTION, SOLUTION INTRAMUSCULAR; INTRAVENOUS at 11:19

## 2025-06-20 RX ADMIN — LIDOCAINE HYDROCHLORIDE 100 MG: 20 INJECTION, SOLUTION INFILTRATION; PERINEURAL at 07:09

## 2025-06-20 RX ADMIN — ACETAMINOPHEN 1000 MG: 1000 INJECTION, SOLUTION INTRAVENOUS at 21:20

## 2025-06-20 RX ADMIN — MORPHINE SULFATE 2 MG: 2 INJECTION, SOLUTION INTRAMUSCULAR; INTRAVENOUS at 14:42

## 2025-06-20 RX ADMIN — FENTANYL CITRATE 200 MCG: 0.05 INJECTION, SOLUTION INTRAMUSCULAR; INTRAVENOUS at 07:09

## 2025-06-20 RX ADMIN — AMINOCAPROIC ACID 10 G: 250 INJECTION, SOLUTION INTRAVENOUS at 10:53

## 2025-06-20 RX ADMIN — OXYCODONE HYDROCHLORIDE 10 MG: 10 TABLET ORAL at 21:24

## 2025-06-20 RX ADMIN — DEXMEDETOMIDINE HYDROCHLORIDE 0.5 MCG/KG/HR: 4 INJECTION, SOLUTION INTRAVENOUS at 07:40

## 2025-06-20 RX ADMIN — ALBUMIN (HUMAN) 250 ML: 12.5 INJECTION, SOLUTION INTRAVENOUS at 14:16

## 2025-06-20 RX ADMIN — MILRINONE LACTATE 0.12 MCG/KG/MIN: 0.2 INJECTION, SOLUTION INTRAVENOUS at 15:29

## 2025-06-20 RX ADMIN — METOCLOPRAMIDE 10 MG: 5 INJECTION, SOLUTION INTRAMUSCULAR; INTRAVENOUS at 12:49

## 2025-06-20 RX ADMIN — Medication 25 MG: at 08:50

## 2025-06-20 RX ADMIN — ROCURONIUM BROMIDE 80 MG: 10 INJECTION INTRAVENOUS at 07:09

## 2025-06-20 RX ADMIN — PROPOFOL 25 MCG/KG/MIN: 10 INJECTION, EMULSION INTRAVENOUS at 07:45

## 2025-06-20 RX ADMIN — ACETAMINOPHEN 1000 MG: 1000 INJECTION, SOLUTION INTRAVENOUS at 12:45

## 2025-06-20 RX ADMIN — OXYCODONE HYDROCHLORIDE 10 MG: 10 TABLET ORAL at 17:06

## 2025-06-20 RX ADMIN — PROPOFOL 120 MG: 10 INJECTION, EMULSION INTRAVENOUS at 07:09

## 2025-06-20 RX ADMIN — CEFAZOLIN 2 G: 2 INJECTION, POWDER, FOR SOLUTION INTRAMUSCULAR; INTRAVENOUS at 17:49

## 2025-06-20 RX ADMIN — ALBUMIN (HUMAN) 250 ML: 12.5 INJECTION, SOLUTION INTRAVENOUS at 19:48

## 2025-06-20 RX ADMIN — MUPIROCIN 1 APPLICATION: 20 OINTMENT TOPICAL at 21:20

## 2025-06-20 RX ADMIN — AMINOCAPROIC ACID 10 G: 250 INJECTION, SOLUTION INTRAVENOUS at 08:42

## 2025-06-20 RX ADMIN — PROPOFOL 25 MCG/KG/MIN: 10 INJECTION, EMULSION INTRAVENOUS at 12:48

## 2025-06-20 RX ADMIN — Medication 5 MG: at 23:23

## 2025-06-20 RX ADMIN — Medication 25 MG: at 07:47

## 2025-06-20 RX ADMIN — ALBUMIN (HUMAN) 250 ML: 12.5 INJECTION, SOLUTION INTRAVENOUS at 15:00

## 2025-06-20 RX ADMIN — ROCURONIUM BROMIDE 50 MG: 10 INJECTION INTRAVENOUS at 08:51

## 2025-06-20 RX ADMIN — SODIUM CHLORIDE 2000 MG: 900 INJECTION INTRAVENOUS at 10:45

## 2025-06-20 RX ADMIN — SODIUM CHLORIDE: 9 INJECTION, SOLUTION INTRAVENOUS at 06:34

## 2025-06-20 RX ADMIN — PANTOPRAZOLE SODIUM 40 MG: 40 INJECTION, POWDER, FOR SOLUTION INTRAVENOUS at 12:49

## 2025-06-20 RX ADMIN — MUPIROCIN 1 APPLICATION: 20 OINTMENT TOPICAL at 06:07

## 2025-06-20 NOTE — PLAN OF CARE
Goal Outcome Evaluation:  Plan of Care Reviewed With: patient        Progress: no change  Outcome Evaluation: A&O, no complaints of pain, surgery today with Dr Simon, preop orders started, consent in chart, VSS, SR on the monitor, continue plan of care.

## 2025-06-20 NOTE — OP NOTE
[facility] CARDIAC SURGERY OP NOTE    Preop Diagnosis: Severe coronary artery disease.  Left main stenosis.  History of pulmonary thromboembolism with pulmonary hypertension.  Coumadin therapy.  Elevation of left hemidiaphragm.    Postop Diagnosis: Same with large inferior PFO with left-to-right shunt.  Resolved pulmonary hypertension.    Chronic Comorbid Conditions relative to CABG include:  Cardiovascular: Coronary Artery Disease, Hyperlipidemia, Hypertension, Peripheral Venous Disease, and history of pulmonary thromboembolism treated  Respiratory: History of pulmonary thromboembolism treated.  Endocrine: None   Nephrology: None  Hematology: Anemia of Chronic Disease  Other: None    Indications: This patient had a pulmonary thromboembolism below some treated with the Inari device.  He had pulmonary hypertension in the 80s at this time but this improved.  He had multivessel coronary disease.  He was on Coumadin therapy.  Operation was advisable to prolong life and relieve symptoms.The  calculated STS Risk score was discussed with the patient and family. All risks and alternatives were discussed with the patient and family.  Counseling was done regarding abuse of tobacco, alcohol and drugs as needed.  A discussion about advanced directive was done with the patient. They understand and wish to proceed.    Procedure: Urgent CABG x 5.  Skeletonized LIMA to LAD.  Vein graft to ramus intermedius.  Vein graft to OM 3.  Vein graft to RV branch.  Vein graft to PDA.  Closure of left atrial appendage with a 40 mm clip.  Closure of inferior patent foramen ovale.  Temporary cardiopulmonary bypass.  Antegrade and retrograde blood cardioplegia with warm reperfusion.  Neurologic monitoring.  Transesophageal echo.    Surgeon: Chago Simon MD    Assistant: Assistant: Karena Carl PA-C was responsible for performing the following activities: Cardiac Surgery First assist, Endoscopic Vein [place]  if needed for CABG,  surgical wound closure and their skilled assistance was necessary for the success of this case.     Anesthesia: GET    Findings : Body mass index is 27.52 kg/m².  Pulmonary artery was enlarged.  Right ventricle was satisfactory.  LV function was normal.  Valvular structures were normal.  There was a large PFO with left-to-right shunt.  Vein was fairly good quality a little bit thickened and spots but 4 mm in diameter.  The TALON was a beautiful almost 3 mm vessel with excellent blood flow.  It was an interesting patent foramen ovale and that it was in the inferior portion of the septum as opposed to the superior portion where they usually are.  It was closed at the end of the procedure with the echo.  I closed the PFO because of the history of thrombus and the risk of left-to-right embolic events.    Operative Procedure: A primary median sternotomy was made while the left greater saphenous vein was harvested with the endoscope.  The internal mammary artery was dissected off the left chest wall with the cautery at low voltage.  Cardiopulmonary bypass was then established for 101 minutes 15 to 34 °C and appropriate flow rates.  The aorta was crossclamped for 73 minutes and we gave a liter of antegrade cold blood cardioplegia then 2 L of retrograde cold blood cardioplegia and repeated doses every 10 to 15 minutes to good effect.  4 veins were anastomosed the ascending aorta with 6-0 Prolene to reinaldo the washers.  Veins were sewn to the RV branch, PDA, OM 3, and ramus intermedius all with 7-0 Prolene.  A 40 mm clip was placed on the base of the left atrial appendage for secure closure.  The mammary was then sewn to the LAD with 7-0 Prolene.  We made a 7 cm posterolateral pericardial window for drainage.    The right atrium was opened and the PFO was identified.  It was closed securely with good atrial tissue and the limbus of the fossa ovalis with 3-0 Prolene.  We did not close the inferior vena cava.   Then with strong suction on the aortic needle vent the cross-clamp was released.  The patient was rewarmed and complete de-airing performed.  Cardiopulmonary bypass was then weaned and discontinued.  Decannulation was effected and the usual devices were placed.  3 chest tubes were inserted and we applied vancomycin enriched platelet rich plasma.  The sternum was closed with stainless steel wires and 1 fiber tape.  The fascia, soft tissues and skin were closed as usual.  The sponge, needle and instrument counts noted to be correct and he went to open-heart recovery in good condition.  Because of the chronic anemia we gave 2 units of packed cells.    Complications: None    Tubes: 3    Epicardial Wires: 3    Blood Loss:  Minimal, 480 ml Cell Saver Reinfused    Ultrafiltration: 2800 ml     Bypass Time: 101 min    Aortic cross-clamp time: 73 min    Specimen: None    Condition: Good      Patient Care Team:  Dennis Kwong MD as PCP - General (Internal Medicine)  Arslan Carlson PA-C as Physician Assistant (Physician Assistant)  Ang Peña APRN as Nurse Practitioner (Family Medicine)  Benny Ordaz MD as Surgeon (Orthopedic Surgery)  Jerrod Lagunas MD as Consulting Physician (Pulmonary Disease)  Luis Alberto Alvarenga MD as Consulting Physician (Hematology and Oncology)        Chago Simon MD  6/20/2025  11:30 EDT

## 2025-06-20 NOTE — ANESTHESIA PROCEDURE NOTES
Airway  Reason: elective    Date/Time: 6/20/2025 7:13 AM  Airway not difficult    General Information and Staff    Patient location during procedure: OR  Anesthesiologist: Keyon Castorena MD    Indications and Patient Condition  Indications for airway management: airway protection    Preoxygenated: yes  MILS maintained throughout    Mask difficulty assessment: 1 - vent by mask    Final Airway Details    Final airway type: endotracheal airway      Successful airway: ETT  Cuffed: yes   Successful intubation technique: direct laryngoscopy  Endotracheal tube insertion site: oral  Blade: Gaudencio  Blade size: 3  ETT size (mm): 8.0  Cormack-Lehane Classification: grade I - full view of glottis  Placement verified by: chest auscultation and capnometry   Measured from: lips  ETT/EBT  to lips (cm): 23  Number of attempts at approach: 1  Assessment: lips, teeth, and gum same as pre-op and atraumatic intubation

## 2025-06-20 NOTE — ANESTHESIA PROCEDURE NOTES
Diagnostic IntraOp [place]    Procedure Performed: Diagnostic IntraOp [place]       Start Time:        End Time:      Preanesthesia Checklist:  Patient identified, IV assessed, risks and benefits discussed, monitors and equipment assessed, procedure being performed at surgeon's request and anesthesia consent obtained.    General Procedure Information  Diagnostic Indications for Echo:  assessment of ascending aorta and assessment of surgical repair  Physician Requesting Echo: Jr Chago Simon MD  Location performed:  OR  Intubated  Bite block placed  Heart visualized  Probe Insertion:  Easy  Probe Type:  Multiplane  Modalities:  Color flow mapping, continuous wave Doppler and pulse wave Doppler    Echocardiographic and Doppler Measurements    Ventricles    Right Ventricle:  Cavity size normal.  Hypertrophy not present.  Thrombus not present.  Global function normal.    Left Ventricle:  Cavity size normal.  Thrombus not present.  Global Function mildly impaired.  Ejection Fraction 50%.          Valves    Aortic Valve:  Annulus normal.  Stenosis not present.  Regurgitation trace.  Leaflets thickened.  Leaflet motions normal.      Mitral Valve:  Annulus calcified.  Stenosis not present.  Regurgitation trace.  Leaflets normal.  Leaflet motions normal.      Tricuspid Valve:  Annulus normal.  Stenosis not present.  Regurgitation trace.  Leaflet motions normal.    Pulmonic Valve:  Annulus normal.  Stenosis not present.  Regurgitation trace.        Aorta    Ascending Aorta:  Size dilated.  Dissection not present.  Plaque thickness less than 3 mm.  Mobile plaque not present.    Aortic Arch:  Size normal.  Dissection not present.  Plaque thickness less than 3 mm.  Mobile plaque not present.    Descending Aorta:  Size normal.  Dissection not present.  Plaque thickness less than 3 mm.  Mobile plaque not present.        Atria    Right Atrium:  Size dilated.  Spontaneous echo contrast not present.  Thrombus not present.  Tumor not  present.  Device not present.      Left Atrium:  Spontaneous echo contrast not present.  Thrombus not present.  Tumor not present.  Device not present.    Left atrial appendage normal.      Septa    Atrial Septum:  Intra-atrial septal morphology aneurysmal and contains patent foramen ovale.  Patent foramen ovale shunts left to right.      Ventricular Septum:  Intra-ventricular septum morphology normal.          Other Findings  Pericardium:  normal  Pleural Effusion:  none  Pulmonary Arteries:  normal      Anesthesia Information  Performed Personally  Anesthesiologist:  Keyon Castorena MD      Echocardiogram Comments:       Post CABG x 5, MARLENE ligation and PFO closure,    LV systolic function has marginally improved to 55%.  RV is mildly depressed.    Valvular function is similar to baseline.    MARLENE has been ligated.    PFO has been closed with no L>R shunt.    Aorta is intact

## 2025-06-20 NOTE — ANESTHESIA PROCEDURE NOTES
[place] Malachi catheter      Patient reassessed immediately prior to procedure    Patient location during procedure: OR  Indications: central pressure monitoring and vascular access  Staff  Anesthesiologist: Keyon Castorena MD  Preanesthetic Checklist  Completed: patient identified, IV checked, site marked, risks and benefits discussed, surgical consent, monitors and equipment checked, pre-op evaluation and timeout performed  Central Line Prep  Sterile Tech:gloves, cap, gown, mask and sterile barriers  Prep: chloraprep  no medical exclusion documented for following all elements of maximal sterile barrier technique  Patient monitoring: blood pressure monitoring, continuous pulse oximetry and EKG  Central Line Procedure  Laterality:right  Location:internal jugular  Catheter Type:MAC and [place]-Malachi  Catheter Size:7.5 Fr  Guidance:ultrasound guided  PROCEDURE NOTE/ULTRASOUND INTERPRETATION.  Using ultrasound guidance the potential vascular sites for insertion of the catheter were visualized to determine the patency of the vessel to be used for vascular access.  After selecting the appropriate site for insertion, the needle was visualized under ultrasound being inserted into the internal jugular vein, followed by ultrasound confirmation of wire and catheter placement. There were no abnormalities seen on ultrasound; an image was taken; and the patient tolerated the procedure with no complications. Images: still images obtained, printed/placed on chart  Assessment  Post procedure:biopatch applied, line sutured and occlusive dressing applied  Assessement:blood return through all ports and free fluid flow  Complications:no  Patient Tolerance:patient tolerated the procedure well with no apparent complications  Additional Notes  SGC @

## 2025-06-20 NOTE — ANESTHESIA PREPROCEDURE EVALUATION
Anesthesia Evaluation     Patient summary reviewed   NPO Solid Status: > 8 hours  NPO Liquid Status: > 2 hours           Airway   Mallampati: II  TM distance: >3 FB  Neck ROM: full  No difficulty expected  Dental - normal exam     Pulmonary    (+) pulmonary embolism, asthma,shortness of breath, sleep apnea  Cardiovascular   Exercise tolerance: poor (<4 METS)    ECG reviewed  Patient on routine beta blocker and Beta blocker given within 24 hours of surgery  Rhythm: regular    (+) hypertension, CAD, hyperlipidemia      Neuro/Psych  GI/Hepatic/Renal/Endo      Musculoskeletal     Abdominal    Substance History      OB/GYN          Other   arthritis,                 Anesthesia Plan    ASA 4     general, Denia, CVL and PAC     intravenous induction     Anesthetic plan, risks, benefits, and alternatives have been provided, discussed and informed consent has been obtained with: patient.  Pre-procedure education provided  Use of blood products discussed with patient  Consented to blood products.      CODE STATUS:    Code Status (Patient has no pulse and is not breathing): CPR (Attempt to Resuscitate)  Medical Interventions (Patient has pulse or is breathing): Full Support

## 2025-06-20 NOTE — PLAN OF CARE
Goal Outcome Evaluation:  Plan of Care Reviewed With: patient        Progress: improving  Outcome Evaluation: POD 0 s/p CABGx5. A&Ox4, NSR 70s, maintaining MAP >65, on 4L NC sat. 100%. Epicardial wires on backup at 60. CI >2. On Milrinone at 0.125. Monitoring CT & UOP closely. Pain treated per MAR. POC updated with family at bedside, care ongoing.

## 2025-06-20 NOTE — ANESTHESIA PROCEDURE NOTES
Central Line      Patient reassessed immediately prior to procedure    Patient location during procedure: OR  Indications: central pressure monitoring and vascular access  Staff  Anesthesiologist: Keyon Castorena MD  Preanesthetic Checklist  Completed: patient identified, IV checked, site marked, risks and benefits discussed, surgical consent, monitors and equipment checked, pre-op evaluation and timeout performed  Central Line Prep  Sterile Tech:gloves, cap, gown, mask and sterile barriers  Prep: chloraprep  no medical exclusion documented for following all elements of maximal sterile barrier technique  Patient monitoring: blood pressure monitoring, continuous pulse oximetry and EKG  Central Line Procedure  Laterality:right  Location:internal jugular  Catheter Type:MAC  Catheter Size:9 Fr  Guidance:ultrasound guided  PROCEDURE NOTE/ULTRASOUND INTERPRETATION.  Using ultrasound guidance the potential vascular sites for insertion of the catheter were visualized to determine the patency of the vessel to be used for vascular access.  After selecting the appropriate site for insertion, the needle was visualized under ultrasound being inserted into the internal jugular vein, followed by ultrasound confirmation of wire and catheter placement. There were no abnormalities seen on ultrasound; an image was taken; and the patient tolerated the procedure with no complications. Images: still images obtained, printed/placed on chart  Assessment  Post procedure:biopatch applied, line sutured and occlusive dressing applied  Assessement:blood return through all ports and free fluid flow  Complications:no  Patient Tolerance:patient tolerated the procedure well with no apparent complications

## 2025-06-20 NOTE — CONSULTS
CVICU Intensivist Progress Note    HPI/Chief Complaint: Mr. Omar Choudhary is a 79M with a history of severe multivessel CAD ([facility] 6/18). On 6/20 he went to the OR with Dr. Simon for a CABG x 5v + PFO closure + MARLENE clip. He was admitted to CVICU postoperatively.    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 Simon. He was easily intubated. SHE showed LVEF  50%, RV normal, trace AI/MR/TR, PFO with left to right shunt; post-op LVEF 55%, mild RV dysfunction, PFO closed without residual shunt. He received 2 prbcs + 400ml of cellsaver. He was brought to the CVICU on norepi, sedation and insulin. He was sinus meghana pre-operatively, in the 40s post-operatively and pacing AAI @ 70 bpm. Initial CI 1.7 with SVR >2000.    Hospital Course:   6/18 [facility]  6/20 Operative Course    Daily Assessment and Plan 6/20/25: BP has been quite labile since arrival, suggesting hypovolemia. Hypotensive requiring volume resuscitation in addition to pressors. CI <2 despite volume resuscitation, SVR quite elevated so that may be culprit; will check mixed venous and calculate Kimberly. He does have some amount of underlying pulmonary hypertension, presumably related to long-standing PFO and L to right shunt with RV overload. If CI remains <2 suggesting cardiogenic shock, will add milrinone for inotropic support and afterload reduction. Making adequate urine, chest tube output as expected. Follow CBCs closely. Sinus meghana in the 40s, will pace AAI @ 70-80 to improve cardiac  output.      Neuro: propofol/precedex for sedation in immediate post-op period. Wean when hemodynamically appropriate for SBT. Pain control w/ morphine vs. Apap vs. Oxy prn. On home gabapentin 300mg TID, also appears to have prescription for flexeril and clonazepam. Will restart when appropriate    CV: CAD and PFO now s/p CABG and PFO  closure. Hypotension, maintain MAP >65 with norepi and volume resuscitation. May require inotropic support for cardiogenic shock with CI <2, f/up on mixed venous   Rhythm: epicardial wires in place, pacing AAI @ 80 bpm to assist with BP    Pulm: maintain mechanical ventilation for now, adjust vent settings for appropriate oxygenation/ventilation. Chart notes paralyzed hemidiaphragm, I'm unsure of the circumstances. CXR appears to have left paresis.    Renal: appropriate UOP at this time, continue volume resuscitation as needed. Replete electrolytes.    GI: start bowel regimen to avoid constipation/ileus. NPO for now, advance diet once appropriate. PPI for prophy.    Endo: stress induced hyperglycemia, A1C 5.8. Insulin drip for goal euglycemia, transition when appropriate    ID: periop cefazolin for prophylaxis    Heme: acute blood loss anemia, received cellsaver + prbc x2 in OR. Follow chest tube output closely, transfuse as necessary. Start Lovenox POD#1 for prophy    acetaminophen, 1,000 mg, Intravenous, Q8H  acetaminophen, 650 mg, Oral, Q4H   Or  acetaminophen, 650 mg, Oral, Q4H   Or  acetaminophen, 650 mg, Rectal, Q4H  [START ON 6/21/2025] aspirin, 81 mg, Oral, Daily  [START ON 6/21/2025] budesonide-formoterol, 2 puff, Inhalation, BID  ceFAZolin, 2 g, Intravenous, Q8H  [START ON 6/21/2025] enoxaparin sodium, 40 mg, Subcutaneous, Daily  [START ON 6/21/2025] gabapentin, 300 mg, Oral, Q8H  magnesium sulfate, 1 g, Intravenous, Q8H  metoclopramide, 10 mg, Intravenous, Q6H  metoprolol tartrate, 12.5 mg, Oral, Q12H  mupirocin, 1 Application, Each Nare, BID  [START ON 6/21/2025]  pantoprazole, 40 mg, Oral, QAM  [START ON 6/21/2025] senna-docusate sodium, 2 tablet, Oral, Nightly  [START ON 6/21/2025] theophylline, 300 mg, Oral, Q12H        ------------------------------------------------------------------------------------------------------------------------------------------------------  Prophy: HOB elevated + oral care + scds + doran stat lock + PPI + subglottic suction + no chemical DVT prophy 2/2 risk for bleeding  Code Status: full      Critical Care time: 41 mins on 6/20/25 by Anisa Ken MD for the assessment and treatment of: interpretation of serial cardiac output measurements, evaluation of CXR, interpretation of serial blood gases, ventilator management, hypotension, cardiogenic shock

## 2025-06-20 NOTE — ANESTHESIA PROCEDURE NOTES
Arterial Line      Patient reassessed immediately prior to procedure    Patient location during procedure: holding area   Line placed for hemodynamic monitoring and ABGs/Labs/ISTAT.  Performed By   Anesthesiologist: Keyon Castorena MD   Preanesthetic Checklist  Completed: patient identified, IV checked, site marked, risks and benefits discussed, surgical consent, monitors and equipment checked, pre-op evaluation and timeout performed  Arterial Line Prep    Sterile Tech: cap, gloves and mask  Prep: ChloraPrep  Patient monitoring: blood pressure monitoring, continuous pulse oximetry and EKG  Arterial Line Procedure   Laterality:left  Location:  radial artery  Catheter size: 20 G   Guidance: ultrasound guided  PROCEDURE NOTE/ULTRASOUND INTERPRETATION.  Using ultrasound guidance the potential vascular sites for insertion of the catheter were visualized to determine the patency of the vessel to be used for vascular access.  After selecting the appropriate site for insertion, the needle was visualized under ultrasound being inserted into the radial artery, followed by ultrasound confirmation of wire and catheter placement. There were no abnormalities seen on ultrasound; an image was taken; and the patient tolerated the procedure with no complications.   Number of attempts: 1  Successful placement: yes   Post Assessment   Dressing Type: occlusive dressing applied, secured with tape and wrist guard applied.   Complications no  Circ/Move/Sens Assessment: unchanged.   Patient Tolerance: patient tolerated the procedure well with no apparent complications  Additional Notes  US was used for anatomical localization of artery and directing the arterial catheter.

## 2025-06-20 NOTE — ANESTHESIA POSTPROCEDURE EVALUATION
Patient: Omar Choudhary    Procedure Summary       Date: 06/20/25 Room / Location: [place] [id number] / [facility] CARDIOVASCULAR OPERATING ROOM    Anesthesia Start: 0634 Anesthesia Stop: 1155    Procedure: SHE STERNOTOMY CORONARY ARTERY BYPASS GRAFT TIMES 5 USING LEFT INTERNAL MAMMARY ARTERY AND LEFT GREATER SAPHENOUS VEIN GRAFT PER ENDOSCOPIC VEIN HARVESTING, CLOSURE OF PFO, EXCLUSION OF LEFT ATRIAL APPENDAGE AND PRP (Chest) Diagnosis:       Coronary artery disease involving native heart, unspecified vessel or lesion type, unspecified whether angina present      Abnormal findings on diagnostic imaging of other specified body structures      (Coronary artery disease involving native heart, unspecified vessel or lesion type, unspecified whether angina present [I25.10])      (Abnormal findings on diagnostic imaging of other specified body structures [R93.89])    Surgeons: Jr Chago Simon MD Provider: Keyon Castorena MD    Anesthesia Type: general, Denia, CVL, PAC ASA Status: 4            Anesthesia Type: general, [place], CVL, PAC    Vitals  Vitals Value Taken Time   /73 06/20/25 16:15   Temp 36.7 °C (98.06 °F) 06/20/25 16:26   Pulse 67 06/20/25 16:26   Resp 16 06/20/25 13:30   SpO2 100 % 06/20/25 16:26   Vitals shown include unfiled device data.        Post Anesthesia Care and Evaluation    Patient location during evaluation: ICU  Patient participation: complete - patient cannot participate  Level of consciousness: responsive to physical stimuli  Pain management: adequate    Airway patency: patent  Anesthetic complications: No anesthetic complications  PONV Status: none  Cardiovascular status: acceptable and hemodynamically stable  Respiratory status: acceptable, ETT, intubated and ventilator  Hydration status: acceptable

## 2025-06-20 NOTE — PROGRESS NOTES
Visited multiple times, patient off the floor in surgery all day.  Call if needed otherwise we will see tomorrow

## 2025-06-21 ENCOUNTER — APPOINTMENT (OUTPATIENT)
Dept: GENERAL RADIOLOGY | Facility: HOSPITAL | Age: 80
End: 2025-06-21
Payer: MEDICARE

## 2025-06-21 LAB
ALBUMIN SERPL-MCNC: 4.1 G/DL (ref 3.5–5.2)
ANION GAP SERPL CALCULATED.3IONS-SCNC: 12 MMOL/L (ref 5–15)
BASOPHILS # BLD AUTO: 0.01 10*3/MM3 (ref 0–0.2)
BASOPHILS NFR BLD AUTO: 0.1 % (ref 0–1.5)
BH BB BLOOD EXPIRATION DATE: NORMAL
BH BB BLOOD EXPIRATION DATE: NORMAL
BH BB BLOOD TYPE BARCODE: 6200
BH BB BLOOD TYPE BARCODE: 6200
BH BB DISPENSE STATUS: NORMAL
BH BB DISPENSE STATUS: NORMAL
BH BB PRODUCT CODE: NORMAL
BH BB PRODUCT CODE: NORMAL
BH BB UNIT NUMBER: NORMAL
BH BB UNIT NUMBER: NORMAL
BUN SERPL-MCNC: 14 MG/DL (ref 8–23)
BUN/CREAT SERPL: 13.2 (ref 7–25)
CA-I SERPL ISE-MCNC: 1.15 MMOL/L (ref 1.15–1.35)
CALCIUM SPEC-SCNC: 8.1 MG/DL (ref 8.6–10.5)
CHLORIDE SERPL-SCNC: 109 MMOL/L (ref 98–107)
CO2 SERPL-SCNC: 21 MMOL/L (ref 22–29)
CREAT SERPL-MCNC: 1.06 MG/DL (ref 0.76–1.27)
CROSSMATCH INTERPRETATION: NORMAL
CROSSMATCH INTERPRETATION: NORMAL
DEPRECATED RDW RBC AUTO: 49.6 FL (ref 37–54)
EGFRCR SERPLBLD CKD-EPI 2021: 72.3 ML/MIN/1.73
EOSINOPHIL # BLD AUTO: 0 10*3/MM3 (ref 0–0.4)
EOSINOPHIL NFR BLD AUTO: 0 % (ref 0.3–6.2)
ERYTHROCYTE [DISTWIDTH] IN BLOOD BY AUTOMATED COUNT: 14.4 % (ref 12.3–15.4)
GLUCOSE BLDC GLUCOMTR-MCNC: 144 MG/DL (ref 70–130)
GLUCOSE BLDC GLUCOMTR-MCNC: 145 MG/DL (ref 70–130)
GLUCOSE BLDC GLUCOMTR-MCNC: 147 MG/DL (ref 70–130)
GLUCOSE BLDC GLUCOMTR-MCNC: 156 MG/DL (ref 70–130)
GLUCOSE BLDC GLUCOMTR-MCNC: 167 MG/DL (ref 70–130)
GLUCOSE BLDC GLUCOMTR-MCNC: 171 MG/DL (ref 70–130)
GLUCOSE SERPL-MCNC: 141 MG/DL (ref 65–99)
HCT VFR BLD AUTO: 30.5 % (ref 37.5–51)
HGB BLD-MCNC: 10.2 G/DL (ref 13–17.7)
IMM GRANULOCYTES # BLD AUTO: 0.02 10*3/MM3 (ref 0–0.05)
IMM GRANULOCYTES NFR BLD AUTO: 0.3 % (ref 0–0.5)
INR PPP: 1.31 (ref 0.9–1.1)
LYMPHOCYTES # BLD AUTO: 0.43 10*3/MM3 (ref 0.7–3.1)
LYMPHOCYTES NFR BLD AUTO: 6.2 % (ref 19.6–45.3)
MAGNESIUM SERPL-MCNC: 2.6 MG/DL (ref 1.6–2.4)
MCH RBC QN AUTO: 31.9 PG (ref 26.6–33)
MCHC RBC AUTO-ENTMCNC: 33.4 G/DL (ref 31.5–35.7)
MCV RBC AUTO: 95.3 FL (ref 79–97)
MONOCYTES # BLD AUTO: 0.69 10*3/MM3 (ref 0.1–0.9)
MONOCYTES NFR BLD AUTO: 10 % (ref 5–12)
NEUTROPHILS NFR BLD AUTO: 5.76 10*3/MM3 (ref 1.7–7)
NEUTROPHILS NFR BLD AUTO: 83.4 % (ref 42.7–76)
NRBC BLD AUTO-RTO: 0 /100 WBC (ref 0–0.2)
PHOSPHATE SERPL-MCNC: 4.8 MG/DL (ref 2.5–4.5)
PLATELET # BLD AUTO: 112 10*3/MM3 (ref 140–450)
PMV BLD AUTO: 10.1 FL (ref 6–12)
POTASSIUM SERPL-SCNC: 4 MMOL/L (ref 3.5–5.2)
PROTHROMBIN TIME: 16.3 SECONDS (ref 11.7–14.2)
QT INTERVAL: 423 MS
QTC INTERVAL: 456 MS
RBC # BLD AUTO: 3.2 10*6/MM3 (ref 4.14–5.8)
SODIUM SERPL-SCNC: 142 MMOL/L (ref 136–145)
UNIT  ABO: NORMAL
UNIT  ABO: NORMAL
UNIT  RH: NORMAL
UNIT  RH: NORMAL
WBC NRBC COR # BLD AUTO: 6.91 10*3/MM3 (ref 3.4–10.8)

## 2025-06-21 PROCEDURE — 25010000002 CEFAZOLIN PER 500 MG: Performed by: NURSE PRACTITIONER

## 2025-06-21 PROCEDURE — 85610 PROTHROMBIN TIME: CPT | Performed by: NURSE PRACTITIONER

## 2025-06-21 PROCEDURE — 80069 RENAL FUNCTION PANEL: CPT | Performed by: NURSE PRACTITIONER

## 2025-06-21 PROCEDURE — 63710000001 INSULIN LISPRO (HUMAN) PER 5 UNITS: Performed by: NURSE PRACTITIONER

## 2025-06-21 PROCEDURE — 97116 GAIT TRAINING THERAPY: CPT

## 2025-06-21 PROCEDURE — 93005 ELECTROCARDIOGRAM TRACING: CPT | Performed by: NURSE PRACTITIONER

## 2025-06-21 PROCEDURE — 94799 UNLISTED PULMONARY SVC/PX: CPT

## 2025-06-21 PROCEDURE — 97530 THERAPEUTIC ACTIVITIES: CPT

## 2025-06-21 PROCEDURE — 93010 ELECTROCARDIOGRAM REPORT: CPT | Performed by: INTERNAL MEDICINE

## 2025-06-21 PROCEDURE — 71045 X-RAY EXAM CHEST 1 VIEW: CPT

## 2025-06-21 PROCEDURE — 85025 COMPLETE CBC W/AUTO DIFF WBC: CPT | Performed by: NURSE PRACTITIONER

## 2025-06-21 PROCEDURE — 25010000002 ENOXAPARIN PER 10 MG: Performed by: NURSE PRACTITIONER

## 2025-06-21 PROCEDURE — 25010000002 MILRINONE LACTATE IN DEXTROSE 20-5 MG/100ML-% SOLUTION: Performed by: NURSE PRACTITIONER

## 2025-06-21 PROCEDURE — 99232 SBSQ HOSP IP/OBS MODERATE 35: CPT | Performed by: INTERNAL MEDICINE

## 2025-06-21 PROCEDURE — 82330 ASSAY OF CALCIUM: CPT | Performed by: NURSE PRACTITIONER

## 2025-06-21 PROCEDURE — 25010000002 FUROSEMIDE PER 20 MG: Performed by: NURSE PRACTITIONER

## 2025-06-21 PROCEDURE — 82948 REAGENT STRIP/BLOOD GLUCOSE: CPT

## 2025-06-21 PROCEDURE — 25010000002 ACETAMINOPHEN 10 MG/ML SOLUTION: Performed by: NURSE PRACTITIONER

## 2025-06-21 PROCEDURE — 97162 PT EVAL MOD COMPLEX 30 MIN: CPT

## 2025-06-21 PROCEDURE — 25010000002 METOCLOPRAMIDE PER 10 MG: Performed by: NURSE PRACTITIONER

## 2025-06-21 PROCEDURE — 83735 ASSAY OF MAGNESIUM: CPT | Performed by: NURSE PRACTITIONER

## 2025-06-21 RX ORDER — INSULIN LISPRO 100 [IU]/ML
2-9 INJECTION, SOLUTION INTRAVENOUS; SUBCUTANEOUS
Status: DISCONTINUED | OUTPATIENT
Start: 2025-06-21 | End: 2025-06-25 | Stop reason: HOSPADM

## 2025-06-21 RX ORDER — POTASSIUM CHLORIDE 1500 MG/1
20 TABLET, EXTENDED RELEASE ORAL ONCE
Status: COMPLETED | OUTPATIENT
Start: 2025-06-21 | End: 2025-06-21

## 2025-06-21 RX ORDER — GUAIFENESIN 600 MG/1
1200 TABLET, EXTENDED RELEASE ORAL EVERY 12 HOURS SCHEDULED
Status: DISCONTINUED | OUTPATIENT
Start: 2025-06-21 | End: 2025-06-25 | Stop reason: HOSPADM

## 2025-06-21 RX ORDER — GABAPENTIN 400 MG/1
400 CAPSULE ORAL EVERY 8 HOURS SCHEDULED
Status: DISCONTINUED | OUTPATIENT
Start: 2025-06-21 | End: 2025-06-25 | Stop reason: HOSPADM

## 2025-06-21 RX ORDER — IBUPROFEN 600 MG/1
1 TABLET ORAL
Status: DISCONTINUED | OUTPATIENT
Start: 2025-06-21 | End: 2025-06-25 | Stop reason: HOSPADM

## 2025-06-21 RX ORDER — NICOTINE POLACRILEX 4 MG
15 LOZENGE BUCCAL
Status: DISCONTINUED | OUTPATIENT
Start: 2025-06-21 | End: 2025-06-25 | Stop reason: HOSPADM

## 2025-06-21 RX ORDER — FUROSEMIDE 10 MG/ML
40 INJECTION INTRAMUSCULAR; INTRAVENOUS ONCE
Status: COMPLETED | OUTPATIENT
Start: 2025-06-21 | End: 2025-06-21

## 2025-06-21 RX ORDER — METOPROLOL TARTRATE 25 MG/1
25 TABLET, FILM COATED ORAL EVERY 12 HOURS SCHEDULED
Status: DISCONTINUED | OUTPATIENT
Start: 2025-06-21 | End: 2025-06-25 | Stop reason: HOSPADM

## 2025-06-21 RX ORDER — DEXTROSE MONOHYDRATE 25 G/50ML
25 INJECTION, SOLUTION INTRAVENOUS
Status: DISCONTINUED | OUTPATIENT
Start: 2025-06-21 | End: 2025-06-25 | Stop reason: HOSPADM

## 2025-06-21 RX ADMIN — HYDROCODONE BITARTRATE AND ACETAMINOPHEN 2 TABLET: 5; 325 TABLET ORAL at 14:00

## 2025-06-21 RX ADMIN — CYCLOBENZAPRINE HYDROCHLORIDE 10 MG: 10 TABLET, FILM COATED ORAL at 01:36

## 2025-06-21 RX ADMIN — BUDESONIDE AND FORMOTEROL FUMARATE DIHYDRATE 2 PUFF: 160; 4.5 AEROSOL RESPIRATORY (INHALATION) at 20:20

## 2025-06-21 RX ADMIN — BUDESONIDE AND FORMOTEROL FUMARATE DIHYDRATE 2 PUFF: 160; 4.5 AEROSOL RESPIRATORY (INHALATION) at 10:44

## 2025-06-21 RX ADMIN — IPRATROPIUM BROMIDE AND ALBUTEROL SULFATE 3 ML: .5; 3 SOLUTION RESPIRATORY (INHALATION) at 15:03

## 2025-06-21 RX ADMIN — THEOPHYLLINE 300 MG: 300 TABLET, EXTENDED RELEASE ORAL at 08:01

## 2025-06-21 RX ADMIN — MUPIROCIN 1 APPLICATION: 20 OINTMENT TOPICAL at 22:06

## 2025-06-21 RX ADMIN — CEFAZOLIN 2 G: 2 INJECTION, POWDER, FOR SOLUTION INTRAMUSCULAR; INTRAVENOUS at 11:22

## 2025-06-21 RX ADMIN — HYDROCODONE BITARTRATE AND ACETAMINOPHEN 2 TABLET: 5; 325 TABLET ORAL at 19:53

## 2025-06-21 RX ADMIN — IPRATROPIUM BROMIDE AND ALBUTEROL SULFATE 3 ML: .5; 3 SOLUTION RESPIRATORY (INHALATION) at 20:18

## 2025-06-21 RX ADMIN — GABAPENTIN 400 MG: 400 CAPSULE ORAL at 14:01

## 2025-06-21 RX ADMIN — CEFAZOLIN 2 G: 2 INJECTION, POWDER, FOR SOLUTION INTRAMUSCULAR; INTRAVENOUS at 17:17

## 2025-06-21 RX ADMIN — GUAIFENESIN 1200 MG: 600 TABLET, EXTENDED RELEASE ORAL at 08:01

## 2025-06-21 RX ADMIN — ACETAMINOPHEN 650 MG: 325 TABLET, FILM COATED ORAL at 01:10

## 2025-06-21 RX ADMIN — THEOPHYLLINE 300 MG: 300 TABLET, EXTENDED RELEASE ORAL at 22:05

## 2025-06-21 RX ADMIN — SENNOSIDES AND DOCUSATE SODIUM 2 TABLET: 50; 8.6 TABLET ORAL at 22:05

## 2025-06-21 RX ADMIN — IPRATROPIUM BROMIDE AND ALBUTEROL SULFATE 3 ML: .5; 3 SOLUTION RESPIRATORY (INHALATION) at 06:58

## 2025-06-21 RX ADMIN — GABAPENTIN 300 MG: 300 CAPSULE ORAL at 07:24

## 2025-06-21 RX ADMIN — METOCLOPRAMIDE 10 MG: 5 INJECTION, SOLUTION INTRAMUSCULAR; INTRAVENOUS at 01:10

## 2025-06-21 RX ADMIN — CYCLOBENZAPRINE HYDROCHLORIDE 10 MG: 10 TABLET, FILM COATED ORAL at 08:45

## 2025-06-21 RX ADMIN — INSULIN LISPRO 2 UNITS: 100 INJECTION, SOLUTION INTRAVENOUS; SUBCUTANEOUS at 17:16

## 2025-06-21 RX ADMIN — POTASSIUM CHLORIDE 20 MEQ: 1500 TABLET, EXTENDED RELEASE ORAL at 07:59

## 2025-06-21 RX ADMIN — ENOXAPARIN SODIUM 40 MG: 100 INJECTION SUBCUTANEOUS at 17:17

## 2025-06-21 RX ADMIN — OXYCODONE HYDROCHLORIDE 10 MG: 10 TABLET ORAL at 03:31

## 2025-06-21 RX ADMIN — ASPIRIN 81 MG: 81 TABLET, COATED ORAL at 08:01

## 2025-06-21 RX ADMIN — OXYCODONE HYDROCHLORIDE 10 MG: 10 TABLET ORAL at 08:00

## 2025-06-21 RX ADMIN — PANTOPRAZOLE SODIUM 40 MG: 40 TABLET, DELAYED RELEASE ORAL at 08:00

## 2025-06-21 RX ADMIN — METOCLOPRAMIDE 10 MG: 5 INJECTION, SOLUTION INTRAMUSCULAR; INTRAVENOUS at 08:00

## 2025-06-21 RX ADMIN — ACETAMINOPHEN 1000 MG: 1000 INJECTION, SOLUTION INTRAVENOUS at 07:25

## 2025-06-21 RX ADMIN — MILRINONE LACTATE 0.25 MCG/KG/MIN: 0.2 INJECTION, SOLUTION INTRAVENOUS at 17:17

## 2025-06-21 RX ADMIN — MUPIROCIN 1 APPLICATION: 20 OINTMENT TOPICAL at 08:02

## 2025-06-21 RX ADMIN — METOPROLOL TARTRATE 25 MG: 25 TABLET, FILM COATED ORAL at 08:01

## 2025-06-21 RX ADMIN — GABAPENTIN 400 MG: 400 CAPSULE ORAL at 22:05

## 2025-06-21 RX ADMIN — CEFAZOLIN 2 G: 2 INJECTION, POWDER, FOR SOLUTION INTRAMUSCULAR; INTRAVENOUS at 01:10

## 2025-06-21 RX ADMIN — METOPROLOL TARTRATE 25 MG: 25 TABLET, FILM COATED ORAL at 22:06

## 2025-06-21 RX ADMIN — IPRATROPIUM BROMIDE AND ALBUTEROL SULFATE 3 ML: .5; 3 SOLUTION RESPIRATORY (INHALATION) at 10:44

## 2025-06-21 RX ADMIN — CYCLOBENZAPRINE HYDROCHLORIDE 10 MG: 10 TABLET, FILM COATED ORAL at 22:05

## 2025-06-21 RX ADMIN — FUROSEMIDE 40 MG: 10 INJECTION, SOLUTION INTRAMUSCULAR; INTRAVENOUS at 07:59

## 2025-06-21 NOTE — PROGRESS NOTES
" LOS: 3 days   Patient Care Team:  Dennis Kwong MD as PCP - General (Internal Medicine)  Arslan Carlson PA-C as Physician Assistant (Physician Assistant)  Ang Peña APRN as Nurse Practitioner (Family Medicine)  Benny Ordaz MD as Surgeon (Orthopedic Surgery)  Jerrod Lagunas MD as Consulting Physician (Pulmonary Disease)  Luis Alberto Alvarenga MD as Consulting Physician (Hematology and Oncology)    Chief Complaint: CAD    Subjective      Vital Signs  Temp:  [96.1 °F (35.6 °C)-99.1 °F (37.3 °C)] 98.2 °F (36.8 °C)  Heart Rate:  [57-81] 68  Resp:  [10-21] 18  BP: ()/(64-97) 97/67  Arterial Line BP: ()/(54-75) 133/65  FiO2 (%):  [39 %-99 %] 39 %  Body mass index is 27.52 kg/m².    Intake/Output Summary (Last 24 hours) at 6/21/2025 0659  Last data filed at 6/21/2025 0600  Gross per 24 hour   Intake 4682 ml   Output 5925 ml   Net -1243 ml     I/O this shift:  In: -   Out: 1210 [Urine:1000; Chest Tube:210]    Chest tube drainage last 8 hours 70/80        06/18/25  0907 06/20/25  0500   Weight: 79.4 kg (175 lb) 77.3 kg (170 lb 8 oz)         Objective:  Vital signs: (most recent): Blood pressure 97/67, pulse 68, temperature 98.2 °F (36.8 °C), resp. rate 18, height 167.6 cm (66\"), weight 77.3 kg (170 lb 8 oz), SpO2 97%.                  Results Review:        WBC WBC   Date Value Ref Range Status   06/21/2025 6.91 3.40 - 10.80 10*3/mm3 Final   06/20/2025 7.05 3.40 - 10.80 10*3/mm3 Final   06/20/2025 10.53 3.40 - 10.80 10*3/mm3 Final   06/19/2025 3.99 3.40 - 10.80 10*3/mm3 Final      HGB Hemoglobin   Date Value Ref Range Status   06/21/2025 10.2 (L) 13.0 - 17.7 g/dL Final   06/20/2025 11.3 (L) 13.0 - 17.7 g/dL Final   06/20/2025 11.8 (L) 13.0 - 17.7 g/dL Final   06/20/2025 7.8 (C) 12.0 - 17.0 g/dL Final   06/20/2025 7.8 (C) 12.0 - 17.0 g/dL Final   06/20/2025 8.5 (L) 12.0 - 17.0 g/dL Final   06/20/2025 8.2 (L) 12.0 - 17.0 g/dL Final   06/20/2025 8.2 (L) 12.0 - 17.0 g/dL Final "   06/20/2025 8.8 (L) 12.0 - 17.0 g/dL Final   06/20/2025 10.9 (L) 12.0 - 17.0 g/dL Final   06/19/2025 12.1 (L) 13.0 - 17.7 g/dL Final   06/18/2025 10.5 (L) 12.0 - 17.0 g/dL Final   06/18/2025 10.9 (L) 12.0 - 17.0 g/dL Final      HCT Hematocrit   Date Value Ref Range Status   06/21/2025 30.5 (L) 37.5 - 51.0 % Final   06/20/2025 33.8 (L) 37.5 - 51.0 % Final   06/20/2025 36.2 (L) 37.5 - 51.0 % Final   06/20/2025 23 (L) 38 - 51 % Final   06/20/2025 23 (L) 38 - 51 % Final   06/20/2025 25 (L) 38 - 51 % Final   06/20/2025 24 (L) 38 - 51 % Final   06/20/2025 24 (L) 38 - 51 % Final   06/20/2025 26 (L) 38 - 51 % Final   06/20/2025 32 (L) 38 - 51 % Final   06/19/2025 36.5 (L) 37.5 - 51.0 % Final   06/18/2025 31 (L) 38 - 51 % Final     Comment:     Serial Number: [id number]:  [id number]   06/18/2025 32 (L) 38 - 51 % Final     Comment:     Serial Number: [id number]:  [id number]      Platelets Platelets   Date Value Ref Range Status   06/21/2025 112 (L) 140 - 450 10*3/mm3 Final   06/20/2025 105 (L) 140 - 450 10*3/mm3 Final   06/20/2025 132 (L) 140 - 450 10*3/mm3 Final   06/20/2025 140 140 - 450 10*3/mm3 Final   06/19/2025 178 140 - 450 10*3/mm3 Final        PT/INR:    Protime   Date Value Ref Range Status   06/21/2025 16.3 (H) 11.7 - 14.2 Seconds Final   06/20/2025 18.0 (H) 11.7 - 14.2 Seconds Final   06/20/2025 16.9 (H) 12.8 - 15.2 seconds Final     Comment:     Serial Number: [id number]:  [id number]   06/19/2025 15.9 (H) 11.7 - 14.2 Seconds Final   06/18/2025 20.1 (H) 11.7 - 14.2 Seconds Final   06/18/2025 13.7 12.8 - 15.2 seconds Final     Comment:     Serial Number: [id number]:  [id number]   /  INR   Date Value Ref Range Status   06/21/2025 1.31 (H) 0.90 - 1.10 Final   06/20/2025 1.49 (H) 0.90 - 1.10 Final   06/20/2025 1.7 (H) 0.8 - 1.2 Final   06/19/2025 1.27 (H) 0.90 - 1.10 Final   06/18/2025 1.71 (H) 0.90 - 1.10 Final   06/18/2025 1.4 (H) 0.8 - 1.2 Final       Sodium Sodium   Date Value Ref Range Status   06/21/2025 142 136  - 145 mmol/L Final   06/20/2025 140 136 - 145 mmol/L Final   06/20/2025 138 136 - 145 mmol/L Final   06/19/2025 138 136 - 145 mmol/L Final      Potassium Potassium   Date Value Ref Range Status   06/21/2025 4.0 3.5 - 5.2 mmol/L Final   06/20/2025 4.3 3.5 - 5.2 mmol/L Final   06/20/2025 4.6 3.5 - 5.2 mmol/L Final   06/19/2025 3.9 3.5 - 5.2 mmol/L Final      Chloride Chloride   Date Value Ref Range Status   06/21/2025 109 (H) 98 - 107 mmol/L Final   06/20/2025 107 98 - 107 mmol/L Final   06/20/2025 106 98 - 107 mmol/L Final   06/19/2025 103 98 - 107 mmol/L Final      Bicarbonate CO2   Date Value Ref Range Status   06/21/2025 21.0 (L) 22.0 - 29.0 mmol/L Final   06/20/2025 20.6 (L) 22.0 - 29.0 mmol/L Final   06/20/2025 22.1 22.0 - 29.0 mmol/L Final   06/19/2025 21.7 (L) 22.0 - 29.0 mmol/L Final      BUN BUN   Date Value Ref Range Status   06/21/2025 14.0 8.0 - 23.0 mg/dL Final   06/20/2025 14.0 8.0 - 23.0 mg/dL Final   06/20/2025 13.0 8.0 - 23.0 mg/dL Final   06/19/2025 9.0 8.0 - 23.0 mg/dL Final      Creatinine Creatinine   Date Value Ref Range Status   06/21/2025 1.06 0.76 - 1.27 mg/dL Final   06/20/2025 1.11 0.76 - 1.27 mg/dL Final   06/20/2025 0.99 0.76 - 1.27 mg/dL Final   06/19/2025 0.88 0.76 - 1.27 mg/dL Final      Calcium Calcium   Date Value Ref Range Status   06/21/2025 8.1 (L) 8.6 - 10.5 mg/dL Final   06/20/2025 8.4 (L) 8.6 - 10.5 mg/dL Final   06/20/2025 8.3 (L) 8.6 - 10.5 mg/dL Final   06/19/2025 8.8 8.6 - 10.5 mg/dL Final      Magnesium Magnesium   Date Value Ref Range Status   06/21/2025 2.6 (H) 1.6 - 2.4 mg/dL Final   06/20/2025 3.2 (H) 1.6 - 2.4 mg/dL Final   06/20/2025 3.4 (H) 1.6 - 2.4 mg/dL Final   06/19/2025 2.1 1.6 - 2.4 mg/dL Final          acetaminophen, 1,000 mg, Intravenous, Q8H  aspirin, 81 mg, Oral, Daily  budesonide-formoterol, 2 puff, Inhalation, BID  ceFAZolin, 2 g, Intravenous, Q8H  enoxaparin sodium, 40 mg, Subcutaneous, Daily  furosemide, 40 mg, Intravenous, Once  gabapentin, 300 mg,  Oral, Q8H  guaiFENesin, 1,200 mg, Oral, Q12H  ipratropium-albuterol, 3 mL, Nebulization, 4x Daily - RT  magnesium sulfate, 1 g, Intravenous, Q8H  metoclopramide, 10 mg, Intravenous, Q6H  metoprolol tartrate, 12.5 mg, Oral, Q12H  mupirocin, 1 Application, Each Nare, BID  pantoprazole, 40 mg, Oral, QAM  potassium chloride, 20 mEq, Oral, Once  senna-docusate sodium, 2 tablet, Oral, Nightly  theophylline, 300 mg, Oral, Q12H      clevidipine, 2-32 mg/hr  dexmedetomidine, 0.2-1.5 mcg/kg/hr, Last Rate: Stopped (06/20/25 1445)  DOPamine, 2-20 mcg/kg/min  EPINEPHrine, 0.02-0.1 mcg/kg/min  insulin, 0-100 Units/hr  milrinone, 0.25-0.375 mcg/kg/min, Last Rate: 0.125 mcg/kg/min (06/20/25 1529)  niCARdipine, 5-15 mg/hr  nitroglycerin, 5-200 mcg/min  norepinephrine, 0.02-0.2 mcg/kg/min, Last Rate: Stopped (06/20/25 1445)  phenylephrine, 0.2-2 mcg/kg/min  propofol, 5-50 mcg/kg/min, Last Rate: Stopped (06/20/25 1404)              Pulmonary hypertension    CAD (coronary artery disease)    Abnormal findings on diagnostic imaging of other specified body structures      Assessment & Plan    -Severe multivessel CAD- s/p urgent CABGx5 LIMA, PFO closure, MARLENE 40mm clip- [facility] 6/20/2025  -Pulmonary hypertension  -Hypertension  -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   Complaints of being a little cold and it hurts to take a breath.  Will increase his home dose of gabapentin until we get his chest tubes out.  Sinus rhythm rate in the 80s--on cardene-- increase beta blocker  PA pressures 50s--CVP 14-- plans for IV lasix this morning.  Will keep him on milrinone 0.125 today.  Discontinue swan   Encourage pulmonary toilet  Continue routine care         Ying Raines, EMILY  06/21/25  06:59 EDT

## 2025-06-21 NOTE — THERAPY EVALUATION
Patient Name: Omar Choudhary  : 1945    MRN: [id number]                              Today's Date: 2025       Admit Date: 2025    Visit Dx:     ICD-10-CM ICD-9-CM   1. Coronary artery disease involving native heart, unspecified vessel or lesion type, unspecified whether angina present  I25.10 414.01   2. Pulmonary hypertension  I27.20 416.8   3. Abnormal findings on diagnostic imaging of other specified body structures  R93.89 793.99   4. S/P CABG (coronary artery bypass graft)  Z95.1 V45.81     Patient Active Problem List   Diagnosis    Benign prostatic hyperplasia    Bursitis    Depression    Diverticulosis    Family history of malignant neoplasm of breast    Hyperlipidemia    Hypertension    Internal derangement of right knee    Knee effusion    Asthma    Obstructive sleep apnea syndrome    Osteoarthritis    Osteopenia    Pain in knee    Postnasal drip    Prepatellar bursitis    Sciatica of right side    Diaphragm paralysis    Spinal stenosis of lumbar region with neurogenic claudication    Degenerative lumbar spinal stenosis    Anxiety    Pinguecula of right eye    Lumbar radiculopathy, chronic    Bilateral pseudophakia    Hemorrhoids without complication    Lumbar degenerative disc disease    Lumbar spondylosis    Other fecal abnormalities    Rectal bleeding    Scoliosis of lumbosacral region due to degenerative disease of spine in adult    Arthritis    Acute right-sided low back pain without sciatica    Sacroiliitis    Acquired spondylolisthesis    Acute cor pulmonale    Chronic low back pain    Anemia    Acute saddle pulmonary embolism with acute cor pulmonale    Acute respiratory failure with hypoxia    Acute deep vein thrombosis (DVT) of femoral vein of right lower extremity    Pulmonary hypertension    CAD (coronary artery disease)    Abnormal findings on diagnostic imaging of other specified body structures     Past Medical History:   Diagnosis Date    ADHD (attention deficit  hyperactivity disorder) 2021    Hard to focus on tasks and follow through.    Allergic 08/20/2018    Arthritis     Asthma     Carpal tunnel syndrome     no pain    Cataract     Deep vein thrombosis 01/10/2025    Depression     Erectile dysfunction     2018 at 73 years old    Family history of malignant neoplasm of breast 09/26/2019    Hyperlipidemia 09/26/2019    Allergic to statins    Hypertension 03/01/2012    Managing w/ Losartan Potassium    Leg pain, right     Low back pain     Decompress and fusion surgery 10/4&5/2022    Neuropathy     feet    Osteopenia 11/28/2019    Poor balance     Pulmonary embolism     Acute pulmonary saddle embolism    Reactive airway disease 01/15/2016    Scoliosis 1960    Shortness of breath 2017    Sleep apnea     Substance abuse 1966    Alcoholic     Past Surgical History:   Procedure Laterality Date    CARDIAC CATHETERIZATION  1992    CARDIAC CATHETERIZATION N/A 01/11/2025    Procedure: Right Heart Cath;  Surgeon: Nancy Hdez MD;  Location:  [facility];  Service: Cardiovascular;  Laterality: N/A;    CARDIAC CATHETERIZATION  01/11/2025    Procedure: Percutaneous Manual Thrombectomy;  Surgeon: Nancy Hdez MD;  Location:  [facility];  Service: Cardiovascular;;    CARDIAC CATHETERIZATION Bilateral 01/11/2025    Procedure: Pulmonary angiography;  Surgeon: Nancy Hdez MD;  Location:  [facility];  Service: Cardiovascular;  Laterality: Bilateral;    CARDIAC CATHETERIZATION N/A 6/18/2025    Procedure: Right and Left Heart Cath;  Surgeon: Nancy Hdez MD;  Location:  [facility];  Service: Cardiovascular;  Laterality: N/A;    CARDIAC CATHETERIZATION N/A 6/18/2025    Procedure: Coronary angiography;  Surgeon: Nancy Hdez MD;  Location:  [facility];  Service: Cardiovascular;  Laterality: N/A;    COLONOSCOPY  2013    No polyps, slight diverticulitis    EYE SURGERY  2016    Cataracts     KNEE ARTHROSCOPY W/ ACL RECONSTRUCTION Right 2019    LUMBAR FUSION Bilateral 10/05/2022    Procedure: DAY 2 LUMBAR LAMINECTOMY TRANSFORAMINAL LUMBAR INTERBODY FUSION L2,L3,L4 WITH NEURO ROBOT;  Surgeon: John Do MD;  Location: [facility] MAIN OR;  Service: Robotics - Neuro;  Laterality: Bilateral;    LUMBAR FUSION N/A 10/04/2022    Procedure: DAY 1 LUMBAR LATERAL INTERBODY FUSION WITH NEURO ROBOT L2, L3.L4;  Surgeon: John Do MD;  Location: [facility] MAIN OR;  Service: Robotics - Neuro;  Laterality: N/A;  left side approach    MOUTH SURGERY      SPINE SURGERY  10/4&5/2022    Dr. John Do, [facility] group      General Information       [facility] Name 06/21/25 1346          Physical Therapy Time and Intention    Document Type evaluation  -     Mode of Treatment individual therapy;physical therapy  -[facility] Name 06/21/25 1346          General Information    Patient Profile Reviewed yes  -     Prior Level of Function independent:;gait;transfer;bed mobility  -     Existing Precautions/Restrictions cardiac;fall;spinal  -     Barriers to Rehab medically complex  -       Row Name 06/21/25 1346          Living Environment    Current Living Arrangements home  -     People in Home alone  May go stay with significant other at [place]  -       Row Name 06/21/25 1346          Home Main Entrance    Number of Stairs, Main Entrance six  -[facility] Name 06/21/25 1346          Stairs Within Home, Primary    Stairs, Within Home, Primary 14 to bedroom at his significant other's, may be able to put a bed on the main level if needed  -       Row Name 06/21/25 1346          Cognition    Orientation Status (Cognition) oriented x 4  -       Row Name 06/21/25 1346          Safety Issues/Impairments Affecting Functional Mobility    Impairments Affecting Function (Mobility) balance;endurance/activity tolerance;strength;pain;range of motion (ROM)  -               User Key  (r) = Recorded By, (t) =  Taken By, (c) = Cosigned By      Initials Name Provider Type     Consuelo Canada PT Physical Therapist                   Mobility       Row Name 06/21/25 1347          Bed Mobility    Comment, (Bed Mobility) NT - UIC  -       Row Name 06/21/25 1347          Sit-Stand Transfer    Sit-Stand [place] (Transfers) verbal cues;minimum assist (75% patient effort);1 person assist;1 person to manage equipment  -     Assistive Device (Sit-Stand Transfers) walker, front-wheeled  -       Row Name 06/21/25 1347          Gait/Stairs (Locomotion)    [place] Level (Gait) verbal cues;minimum assist (75% patient effort);1 person assist;1 person to manage equipment  -     Assistive Device (Gait) walker, front-wheeled  -     Distance in Feet (Gait) 40  -     Deviations/Abnormal Patterns (Gait) antalgic;jordy decreased;gait speed decreased;stride length decreased;festinating/shuffling  -     Bilateral Gait Deviations forward flexed posture  -               User Key  (r) = Recorded By, (t) = Taken By, (c) = Cosigned By      Initials Name Provider Type     Consuelo Canada PT Physical Therapist                   Obj/Interventions       Row Name 06/21/25 1348          Range of Motion Comprehensive    General Range of Motion bilateral lower extremity ROM WFL  -       Row Name 06/21/25 1348          Strength Comprehensive (MMT)    General Manual Muscle Testing (MMT) Assessment lower extremity strength deficits identified  -     Comment, General Manual Muscle Testing (MMT) Assessment Generalized weakness, BLE grossly 4/5  -       Row Name 06/21/25 1348          Balance    Balance Assessment sitting static balance;sitting dynamic balance;standing static balance;standing dynamic balance  -     Static Sitting Balance standby assist  -     Dynamic Sitting Balance standby assist  -     Position, Sitting Balance sitting in chair  -     Static Standing Balance contact guard  -     Dynamic Standing  Balance minimal assist  -     Position/Device Used, Standing Balance supported;walker, front-wheeled  -     Balance Interventions sitting;standing;sit to stand;supported;static;dynamic  -       Row Name 06/21/25 1348          Sensory Assessment (Somatosensory)    Sensory Assessment (Somatosensory) LE sensation intact  -               User Key  (r) = Recorded By, (t) = Taken By, (c) = Cosigned By      Initials Name Provider Type     Consuelo Canada PT Physical Therapist                   Goals/Plan       Row Name 06/21/25 1356          Problem Specific Goal 1 (PT)    Problem Specific Goal 1 (PT) Cardiac Level 3  -     Time Frame (Problem Specific Goal 1, PT) 1 week  -       Row Name 06/21/25 1354          Therapy Assessment/Plan (PT)    Planned Therapy Interventions (PT) balance training;bed mobility training;gait training;home exercise program;patient/family education;ROM (range of motion);stair training;strengthening;stretching;transfer training  -               User Key  (r) = Recorded By, (t) = Taken By, (c) = Cosigned By      Initials Name Provider Type     Consuelo Canada PT Physical Therapist                   Clinical Impression       Row Name 06/21/25 1826          Pain    Pretreatment Pain Rating 7/10  -     Posttreatment Pain Rating 8/10  -     Pain Location chest  -     Pain Side/Orientation medial  -     Pain Management Interventions exercise or physical activity utilized  -     Response to Pain Interventions activity participation with tolerable pain  -       Row Name 06/21/25 3810          Plan of Care Review    Plan of Care Reviewed With patient  -     Outcome Evaluation Pt is a 80 yo M POD 1 CABG x5. PMHx including CAD, hyperlipidemia, HTN, and PVD. Pt lives alone and reports independence at [place] with no AD. Pt states he may go stay with his significant other on [place] - 6 OSCAR with 14 steps to the bedroom, may put a bed downstairs. Pt reports no recent falls, 7/10 pain  prior to mobility. Pt sitting UIC on arrival, stood with min A x1 and ambulated 40ft with rwx and min A x1, additional person for equipment management. Pt still with swan-marcela catheter - PT taped to gown prior to mobility. Gait distance limited by fatigue, though no c/o SOA with activity - ambulated on 2L O2. Pt returned to room and back to chair, left with needs met. PT will continue to follow, anticipate DC home with HHPT and family assist.  -       Row Name 06/21/25 2022          Therapy Assessment/Plan (PT)    Patient/Family Therapy Goals Statement (PT) Return to PLOF  -     Rehab Potential (PT) good  -     Criteria for Skilled Interventions Met (PT) yes  -     Therapy Frequency (PT) daily  -       Row Name 06/21/25 1349          Vital Signs    O2 Delivery Pre Treatment supplemental O2  -     O2 Delivery Intra Treatment supplemental O2  -     O2 Delivery Post Treatment supplemental O2  -       Row Name 06/21/25 1340          Positioning and Restraints    Pre-Treatment Position sitting in chair/recliner  -     Post Treatment Position chair  -BH     In Chair notified nsg;reclined;call light within reach;encouraged to call for assist  -               User Key  (r) = Recorded By, (t) = Taken By, (c) = Cosigned By      Initials Name Provider Type     Consuelo Canada, PT Physical Therapist                   Outcome Measures       Row Name 06/21/25 1354          How much help from another person do you currently need...    Turning from your back to your side while in flat bed without using bedrails? 3  -BH     Moving from lying on back to sitting on the side of a flat bed without bedrails? 3  -BH     Moving to and from a bed to a chair (including a wheelchair)? 3  -BH     Standing up from a chair using your arms (e.g., wheelchair, bedside chair)? 3  -BH     Climbing 3-5 steps with a railing? 2  -BH     To walk in hospital room? 3  -BH     AM-PAC 6 Clicks Score (PT) 17  -     Highest Level of  Mobility Goal Stand (1 or More Minutes)-5  -       Row Name 06/21/25 1356          Functional Assessment    Outcome Measure Options AM-PAC 6 Clicks Basic Mobility (PT)  -               User Key  (r) = Recorded By, (t) = Taken By, (c) = Cosigned By      Initials Name Provider Type     Consuelo Canada PT Physical Therapist                                 Physical Therapy Education       Title: PT OT SLP Therapies (In Progress)       Topic: Physical Therapy (In Progress)       Point: Mobility training (Done)       Learning Progress Summary            Patient Acceptance, E,TB,D, VU,NR by  at 6/21/2025 1356                      Point: Home exercise program (Not Started)       Learner Progress:  Not documented in this visit.              Point: Body mechanics (Done)       Learning Progress Summary            Patient Acceptance, E,TB,D, VU,NR by  at 6/21/2025 1356                      Point: Precautions (Done)       Learning Progress Summary            Patient Acceptance, E,TB,D, VU,NR by  at 6/21/2025 1356                                      User Key       Initials Effective Dates Name Provider Type [facility] 04/08/22 -  Consuelo Canada PT Physical Therapist PT                  PT Recommendation and Plan  Planned Therapy Interventions (PT): balance training, bed mobility training, gait training, home exercise program, patient/family education, ROM (range of motion), stair training, strengthening, stretching, transfer training  Outcome Evaluation: Pt is a 80 yo M POD 1 CABG x5. PMHx including CAD, hyperlipidemia, HTN, and PVD. Pt lives alone and reports independence at [place] with no AD. Pt states he may go stay with his significant other on [place] - 6 OSCAR with 14 steps to the bedroom, may put a bed downstairs. Pt reports no recent falls, 7/10 pain prior to mobility. Pt sitting UIC on arrival, stood with min A x1 and ambulated 40ft with rwx and min A x1, additional person for equipment management. Pt still  with swan-marcela catheter - PT taped to gown prior to mobility. Gait distance limited by fatigue, though no c/o SOA with activity - ambulated on 2L O2. Pt returned to room and back to chair, left with needs met. PT will continue to follow, anticipate DC home with HHPT and family assist.     Time Calculation:   PT Evaluation Complexity  History, PT Evaluation Complexity: 1-2 personal factors and/or comorbidities  Examination of Body Systems (PT Eval Complexity): total of 3 or more elements  Clinical Presentation (PT Evaluation Complexity): evolving  Clinical Decision Making (PT Evaluation Complexity): moderate complexity  Overall Complexity (PT Evaluation Complexity): moderate complexity     PT Charges       Row Name 06/21/25 1356             Time Calculation    Start Time 0900  -      Stop Time 0935  -      Time Calculation (min) 35 min  -      PT Received On 06/21/25  -      PT - Next Appointment 06/22/25  -      PT Goal Re-Cert Due Date 06/28/25  -         Time Calculation- PT    Total Timed Code Minutes- PT 30 minute(s)  -         Timed Charges    51122 - Gait Training Minutes  10  -      51584 - PT Therapeutic Activity Minutes 20  -         Total Minutes    Timed Charges Total Minutes 30  -       Total Minutes 30  -                User Key  (r) = Recorded By, (t) = Taken By, (c) = Cosigned By      Initials Name Provider Type     Consuelo Canada PT Physical Therapist                  Therapy Charges for Today       Code Description Service Date Service Provider Modifiers Qty    02960504411 HC GAIT TRAINING EA 15 MIN 6/21/2025 Consuelo Canada, PT GP 1    19891278696 HC PT THERAPEUTIC ACT EA 15 MIN 6/21/2025 Consuelo Canada, PT GP 1    75871814625 HC PT EVAL MOD COMPLEXITY 3 6/21/2025 Consuelo Canada, PT GP 1    82271349699 HC PT THER SUPP EA 15 MIN 6/21/2025 Consuelo Canada, PT GP 1            PT G-Codes  Outcome Measure Options: AM-PAC 6 Clicks Basic Mobility (PT)  AM-PAC 6 Clicks  Score (PT): 17  PT Discharge Summary  Anticipated Discharge Disposition (PT): home with assist, home with home health    Consuelo Canada, PT  6/21/2025

## 2025-06-21 NOTE — PROGRESS NOTES
Pulmonary  We could not find  consult, Dr. Bill had him on his list, I checked with Julianna Friedman no consult needed, available if needed.

## 2025-06-21 NOTE — PROGRESS NOTES
[facility]  Cardiology Progress Note    Patient Identification:  Name: Omar Choudhary  Age: 79 y.o.  Sex: male  :  1945  MRN: [id number]                 Follow Up / Chief Complaint: CAD    Interval History:       Subjective:  Chest shortness    Objective:    Past Medical History:  Past Medical History:   Diagnosis Date    ADHD (attention deficit hyperactivity disorder)     Hard to focus on tasks and follow through.    Allergic 2018    Arthritis     Asthma     Carpal tunnel syndrome     no pain    Cataract     Deep vein thrombosis 01/10/2025    Depression     Erectile dysfunction     2018 at 73 years old    Family history of malignant neoplasm of breast 2019    Hyperlipidemia 2019    Allergic to statins    Hypertension 2012    Managing w/ Losartan Potassium    Leg pain, right     Low back pain     Decompress and fusion surgery 10/4&2022    Neuropathy     feet    Osteopenia 2019    Poor balance     Pulmonary embolism     Acute pulmonary saddle embolism    Reactive airway disease 01/15/2016    Scoliosis 1960    Shortness of breath 2017    Sleep apnea     Substance abuse 1966    Alcoholic     Past Surgical History:  Past Surgical History:   Procedure Laterality Date    CARDIAC CATHETERIZATION      CARDIAC CATHETERIZATION N/A 2025    Procedure: Right Heart Cath;  Surgeon: Nancy Hdez MD;  Location:  [facility];  Service: Cardiovascular;  Laterality: N/A;    CARDIAC CATHETERIZATION  2025    Procedure: Percutaneous Manual Thrombectomy;  Surgeon: Nancy Hdez MD;  Location:  [facility];  Service: Cardiovascular;;    CARDIAC CATHETERIZATION Bilateral 2025    Procedure: Pulmonary angiography;  Surgeon: Nancy Hdez MD;  Location:  [facility];  Service: Cardiovascular;  Laterality: Bilateral;    CARDIAC CATHETERIZATION N/A 2025    Procedure: Right and Left Heart Cath;   Surgeon: Nancy Hdez MD;  Location:  [facility];  Service: Cardiovascular;  Laterality: N/A;    CARDIAC CATHETERIZATION N/A 2025    Procedure: Coronary angiography;  Surgeon: Nancy Hdez MD;  Location:  [facility];  Service: Cardiovascular;  Laterality: N/A;    COLONOSCOPY      No polyps, slight diverticulitis    EYE SURGERY  2016    Cataracts    KNEE ARTHROSCOPY W/ ACL RECONSTRUCTION Right 2019    LUMBAR FUSION Bilateral 10/05/2022    Procedure: DAY 2 LUMBAR LAMINECTOMY TRANSFORAMINAL LUMBAR INTERBODY FUSION L2,L3,L4 WITH NEURO ROBOT;  Surgeon: John Do MD;  Location: [facility] MAIN OR;  Service: Robotics - Neuro;  Laterality: Bilateral;    LUMBAR FUSION N/A 10/04/2022    Procedure: DAY 1 LUMBAR LATERAL INTERBODY FUSION WITH NEURO ROBOT L2, L3.L4;  Surgeon: John Do MD;  Location: [facility] MAIN OR;  Service: Robotics - Neuro;  Laterality: N/A;  left side approach    MOUTH SURGERY      SPINE SURGERY  10/4&2022    Dr. John Do, [facility]        Social History:   Social History     Tobacco Use    Smoking status: Former     Current packs/day: 0.00     Average packs/day: 0.5 packs/day for 26.0 years (13.0 ttl pk-yrs)     Types: Cigarettes     Start date: 1966     Quit date: 1992     Years since quittin.4     Passive exposure: Past    Smokeless tobacco: Never   Substance Use Topics    Alcohol use: Yes     Alcohol/week: 16.0 standard drinks of alcohol     Types: 14 Glasses of wine, 2 Cans of beer per week      Family History:  Family History   Problem Relation Age of Onset    Heart disease Mother     Hypertension Mother     Breast cancer Mother     Stroke Mother         Several TIAs    Alcohol abuse Mother     Thyroid disease Mother     Arthritis Mother     Cancer Mother         Breast    Aortic aneurysm Father     Heart attack Father     Liver cancer Father     Cancer Father         Liver    Heart disease Father          "Heart attack    Early death Brother         Coronary thrombosis @ 48 years while mountain climbing.    Heart attack Brother         1991, at 47 years, fatal          Allergies:  Allergies   Allergen Reactions    Statins Myalgia     Other reaction(s): muscle soreness     Scheduled Meds:  aspirin, 81 mg, Daily  budesonide-formoterol, 2 puff, BID  ceFAZolin, 2 g, Q8H  enoxaparin sodium, 40 mg, Daily  gabapentin, 400 mg, Q8H  guaiFENesin, 1,200 mg, Q12H  insulin lispro, 2-9 Units, 4x Daily AC & at Bedtime  ipratropium-albuterol, 3 mL, 4x Daily - RT  magnesium sulfate, 1 g, Q8H  metoprolol tartrate, 25 mg, Q12H  mupirocin, 1 Application, BID  pantoprazole, 40 mg, QAM  senna-docusate sodium, 2 tablet, Nightly  theophylline, 300 mg, Q12H            INTAKE AND OUTPUT:    Intake/Output Summary (Last 24 hours) at 6/21/2025 0840  Last data filed at 6/21/2025 0600  Gross per 24 hour   Intake 4582 ml   Output 5925 ml   Net -1343 ml       ROS  Constitutional: Awake and alert, no fever. No nosebleeds  Abdomen           no abdominal pain   Cardiac             chest soreness pulmonary          no shortness of breath      /78   Pulse 70   Temp 98.1 °F (36.7 °C)   Resp 16   Ht 167.6 cm (66\")   Wt 78.4 kg (172 lb 13.5 oz)   SpO2 95%   BMI 27.90 kg/m²   General appearance: No acute changes   Neck: Trachea midline; NECK, supple, no thyromegaly or lymphadenopathy   Lungs: Normal size and shape, normal breath sounds, equal distribution of air, no rales or rhonchi   CV: S1-S2 regular, no murmurs, no rub, no gallop   Abdomen: Soft, nontender; no masses , no abnormal abdominal sounds   Extremities: No deformity , normal color , no peripheral edema   Skin: Normal temperature, turgor and texture; no rash, ulcers        Cardiographics  Telemetry:     ECG:     Echocardiogram:     Lab Review       Results from last 7 days   Lab Units 06/21/25  0244   MAGNESIUM mg/dL 2.6*     Results from last 7 days   Lab Units 06/21/25  0244   SODIUM " "mmol/L 142   POTASSIUM mmol/L 4.0   BUN mg/dL 14.0   CREATININE mg/dL 1.06   CALCIUM mg/dL 8.1*     @[id number]@  Results from last 7 days   Lab Units 06/21/25  0244 06/20/25  1527 06/20/25  1147   WBC 10*3/mm3 6.91 7.05 10.53   HEMOGLOBIN g/dL 10.2* 11.3* 11.8*   HEMATOCRIT % 30.5* 33.8* 36.2*   PLATELETS 10*3/mm3 112* 105* 132*     Results from last 7 days   Lab Units 06/21/25  0244 06/20/25  1147 06/20/25  1057 06/19/25  0406   INR  1.31* 1.49* 1.7* 1.27*   APTT seconds  --  33.0  --  32.6         Assessment:  Post CABG no significant arrhythmias      Plan:    Post CABG no arrhythmias  Extubated  Will continue to follow      )6/21/2025  MD MUNIRA Restrepo/Transcription:   \"Dictated utilizing Dragon dictation\".     "

## 2025-06-21 NOTE — PLAN OF CARE
Goal Outcome Evaluation:  Plan of Care Reviewed With: patient           Outcome Evaluation: Pt is a 80 yo M POD 1 CABG x5. PMHx including CAD, hyperlipidemia, HTN, and PVD. Pt lives alone and reports independence at BL with no AD. Pt states he may go stay with his significant other on DC - 6 OSCAR with 14 steps to the bedroom, may put a bed downstairs. Pt reports no recent falls, 7/10 pain prior to mobility. Pt sitting UIC on arrival, stood with min A x1 and ambulated 40ft with rwx and min A x1, additional person for equipment management. Pt still with swan-marcela catheter - PT taped to gown prior to mobility. Gait distance limited by fatigue, though no c/o SOA with activity - ambulated on 2L O2. Pt returned to room and back to chair, left with needs met. PT will continue to follow, anticipate DC home with HHPT and family assist.    Anticipated Discharge Disposition (PT): home with assist, home with home health

## 2025-06-22 ENCOUNTER — APPOINTMENT (OUTPATIENT)
Dept: GENERAL RADIOLOGY | Facility: HOSPITAL | Age: 80
End: 2025-06-22
Payer: MEDICARE

## 2025-06-22 LAB
ANION GAP SERPL CALCULATED.3IONS-SCNC: 11 MMOL/L (ref 5–15)
BUN SERPL-MCNC: 14 MG/DL (ref 8–23)
BUN/CREAT SERPL: 14.9 (ref 7–25)
CALCIUM SPEC-SCNC: 8.5 MG/DL (ref 8.6–10.5)
CHLORIDE SERPL-SCNC: 101 MMOL/L (ref 98–107)
CO2 SERPL-SCNC: 23 MMOL/L (ref 22–29)
CREAT SERPL-MCNC: 0.94 MG/DL (ref 0.76–1.27)
DEPRECATED RDW RBC AUTO: 48.2 FL (ref 37–54)
EGFRCR SERPLBLD CKD-EPI 2021: 83.5 ML/MIN/1.73
ERYTHROCYTE [DISTWIDTH] IN BLOOD BY AUTOMATED COUNT: 13.9 % (ref 12.3–15.4)
GLUCOSE BLDC GLUCOMTR-MCNC: 120 MG/DL (ref 70–130)
GLUCOSE BLDC GLUCOMTR-MCNC: 129 MG/DL (ref 70–130)
GLUCOSE BLDC GLUCOMTR-MCNC: 142 MG/DL (ref 70–130)
GLUCOSE BLDC GLUCOMTR-MCNC: 153 MG/DL (ref 70–130)
GLUCOSE SERPL-MCNC: 135 MG/DL (ref 65–99)
HCT VFR BLD AUTO: 30.6 % (ref 37.5–51)
HGB BLD-MCNC: 10.3 G/DL (ref 13–17.7)
MCH RBC QN AUTO: 32 PG (ref 26.6–33)
MCHC RBC AUTO-ENTMCNC: 33.7 G/DL (ref 31.5–35.7)
MCV RBC AUTO: 95 FL (ref 79–97)
PLATELET # BLD AUTO: 142 10*3/MM3 (ref 140–450)
PMV BLD AUTO: 10.4 FL (ref 6–12)
POTASSIUM SERPL-SCNC: 3.7 MMOL/L (ref 3.5–5.2)
POTASSIUM SERPL-SCNC: 4 MMOL/L (ref 3.5–5.2)
QT INTERVAL: 383 MS
QTC INTERVAL: 434 MS
RBC # BLD AUTO: 3.22 10*6/MM3 (ref 4.14–5.8)
SODIUM SERPL-SCNC: 135 MMOL/L (ref 136–145)
WBC NRBC COR # BLD AUTO: 8.93 10*3/MM3 (ref 3.4–10.8)

## 2025-06-22 PROCEDURE — 94660 CPAP INITIATION&MGMT: CPT

## 2025-06-22 PROCEDURE — 85027 COMPLETE CBC AUTOMATED: CPT | Performed by: NURSE PRACTITIONER

## 2025-06-22 PROCEDURE — 94799 UNLISTED PULMONARY SVC/PX: CPT

## 2025-06-22 PROCEDURE — 25010000002 FUROSEMIDE PER 20 MG: Performed by: NURSE PRACTITIONER

## 2025-06-22 PROCEDURE — 84132 ASSAY OF SERUM POTASSIUM: CPT | Performed by: INTERNAL MEDICINE

## 2025-06-22 PROCEDURE — 93005 ELECTROCARDIOGRAM TRACING: CPT | Performed by: NURSE PRACTITIONER

## 2025-06-22 PROCEDURE — 80048 BASIC METABOLIC PNL TOTAL CA: CPT | Performed by: NURSE PRACTITIONER

## 2025-06-22 PROCEDURE — 82948 REAGENT STRIP/BLOOD GLUCOSE: CPT

## 2025-06-22 PROCEDURE — 99232 SBSQ HOSP IP/OBS MODERATE 35: CPT | Performed by: INTERNAL MEDICINE

## 2025-06-22 PROCEDURE — 25010000002 CEFAZOLIN PER 500 MG: Performed by: NURSE PRACTITIONER

## 2025-06-22 PROCEDURE — 93010 ELECTROCARDIOGRAM REPORT: CPT | Performed by: STUDENT IN AN ORGANIZED HEALTH CARE EDUCATION/TRAINING PROGRAM

## 2025-06-22 PROCEDURE — 97116 GAIT TRAINING THERAPY: CPT

## 2025-06-22 PROCEDURE — 71045 X-RAY EXAM CHEST 1 VIEW: CPT

## 2025-06-22 PROCEDURE — 25010000002 ENOXAPARIN PER 10 MG: Performed by: NURSE PRACTITIONER

## 2025-06-22 PROCEDURE — 99024 POSTOP FOLLOW-UP VISIT: CPT | Performed by: THORACIC SURGERY (CARDIOTHORACIC VASCULAR SURGERY)

## 2025-06-22 RX ORDER — POTASSIUM CHLORIDE 1500 MG/1
20 TABLET, EXTENDED RELEASE ORAL ONCE
Status: COMPLETED | OUTPATIENT
Start: 2025-06-22 | End: 2025-06-22

## 2025-06-22 RX ORDER — FUROSEMIDE 10 MG/ML
40 INJECTION INTRAMUSCULAR; INTRAVENOUS ONCE
Status: COMPLETED | OUTPATIENT
Start: 2025-06-22 | End: 2025-06-22

## 2025-06-22 RX ADMIN — MUPIROCIN 1 APPLICATION: 20 OINTMENT TOPICAL at 21:43

## 2025-06-22 RX ADMIN — THEOPHYLLINE 300 MG: 300 TABLET, EXTENDED RELEASE ORAL at 08:43

## 2025-06-22 RX ADMIN — BUDESONIDE AND FORMOTEROL FUMARATE DIHYDRATE 2 PUFF: 160; 4.5 AEROSOL RESPIRATORY (INHALATION) at 20:02

## 2025-06-22 RX ADMIN — CEFAZOLIN 2 G: 2 INJECTION, POWDER, FOR SOLUTION INTRAMUSCULAR; INTRAVENOUS at 01:57

## 2025-06-22 RX ADMIN — GABAPENTIN 400 MG: 400 CAPSULE ORAL at 14:08

## 2025-06-22 RX ADMIN — IPRATROPIUM BROMIDE AND ALBUTEROL SULFATE 3 ML: .5; 3 SOLUTION RESPIRATORY (INHALATION) at 10:11

## 2025-06-22 RX ADMIN — SENNOSIDES AND DOCUSATE SODIUM 2 TABLET: 50; 8.6 TABLET ORAL at 21:43

## 2025-06-22 RX ADMIN — POTASSIUM CHLORIDE 20 MEQ: 1500 TABLET, EXTENDED RELEASE ORAL at 05:31

## 2025-06-22 RX ADMIN — MUPIROCIN 1 APPLICATION: 20 OINTMENT TOPICAL at 08:43

## 2025-06-22 RX ADMIN — GABAPENTIN 400 MG: 400 CAPSULE ORAL at 05:31

## 2025-06-22 RX ADMIN — IPRATROPIUM BROMIDE AND ALBUTEROL SULFATE 3 ML: .5; 3 SOLUTION RESPIRATORY (INHALATION) at 15:01

## 2025-06-22 RX ADMIN — ENOXAPARIN SODIUM 40 MG: 100 INJECTION SUBCUTANEOUS at 08:42

## 2025-06-22 RX ADMIN — POTASSIUM CHLORIDE 20 MEQ: 1500 TABLET, EXTENDED RELEASE ORAL at 10:01

## 2025-06-22 RX ADMIN — PANTOPRAZOLE SODIUM 40 MG: 40 TABLET, DELAYED RELEASE ORAL at 05:31

## 2025-06-22 RX ADMIN — HYDROCODONE BITARTRATE AND ACETAMINOPHEN 2 TABLET: 5; 325 TABLET ORAL at 21:43

## 2025-06-22 RX ADMIN — HYDROCODONE BITARTRATE AND ACETAMINOPHEN 2 TABLET: 5; 325 TABLET ORAL at 02:17

## 2025-06-22 RX ADMIN — GUAIFENESIN 1200 MG: 600 TABLET, EXTENDED RELEASE ORAL at 08:43

## 2025-06-22 RX ADMIN — IPRATROPIUM BROMIDE AND ALBUTEROL SULFATE 3 ML: .5; 3 SOLUTION RESPIRATORY (INHALATION) at 20:01

## 2025-06-22 RX ADMIN — BUDESONIDE AND FORMOTEROL FUMARATE DIHYDRATE 2 PUFF: 160; 4.5 AEROSOL RESPIRATORY (INHALATION) at 07:06

## 2025-06-22 RX ADMIN — HYDROCODONE BITARTRATE AND ACETAMINOPHEN 2 TABLET: 5; 325 TABLET ORAL at 06:38

## 2025-06-22 RX ADMIN — HYDROCODONE BITARTRATE AND ACETAMINOPHEN 2 TABLET: 5; 325 TABLET ORAL at 14:08

## 2025-06-22 RX ADMIN — ASPIRIN 81 MG: 81 TABLET, COATED ORAL at 08:43

## 2025-06-22 RX ADMIN — FUROSEMIDE 40 MG: 10 INJECTION, SOLUTION INTRAMUSCULAR; INTRAVENOUS at 10:01

## 2025-06-22 RX ADMIN — GABAPENTIN 400 MG: 400 CAPSULE ORAL at 21:43

## 2025-06-22 RX ADMIN — METOPROLOL TARTRATE 25 MG: 25 TABLET, FILM COATED ORAL at 08:43

## 2025-06-22 RX ADMIN — METOPROLOL TARTRATE 25 MG: 25 TABLET, FILM COATED ORAL at 21:43

## 2025-06-22 RX ADMIN — GUAIFENESIN 1200 MG: 600 TABLET, EXTENDED RELEASE ORAL at 21:43

## 2025-06-22 RX ADMIN — IPRATROPIUM BROMIDE AND ALBUTEROL SULFATE 3 ML: .5; 3 SOLUTION RESPIRATORY (INHALATION) at 07:06

## 2025-06-22 RX ADMIN — THEOPHYLLINE 300 MG: 300 TABLET, EXTENDED RELEASE ORAL at 21:43

## 2025-06-22 NOTE — THERAPY TREATMENT NOTE
Patient Name: Omar Choudhary  : 1945    MRN: [id number]                              Today's Date: 2025       Admit Date: 2025    Visit Dx:     ICD-10-CM ICD-9-CM   1. Coronary artery disease involving native heart, unspecified vessel or lesion type, unspecified whether angina present  I25.10 414.01   2. Pulmonary hypertension  I27.20 416.8   3. Abnormal findings on diagnostic imaging of other specified body structures  R93.89 793.99   4. S/P CABG (coronary artery bypass graft)  Z95.1 V45.81     Patient Active Problem List   Diagnosis    Benign prostatic hyperplasia    Bursitis    Depression    Diverticulosis    Family history of malignant neoplasm of breast    Hyperlipidemia    Hypertension    Internal derangement of right knee    Knee effusion    Asthma    Obstructive sleep apnea syndrome    Osteoarthritis    Osteopenia    Pain in knee    Postnasal drip    Prepatellar bursitis    Sciatica of right side    Diaphragm paralysis    Spinal stenosis of lumbar region with neurogenic claudication    Degenerative lumbar spinal stenosis    Anxiety    Pinguecula of right eye    Lumbar radiculopathy, chronic    Bilateral pseudophakia    Hemorrhoids without complication    Lumbar degenerative disc disease    Lumbar spondylosis    Other fecal abnormalities    Rectal bleeding    Scoliosis of lumbosacral region due to degenerative disease of spine in adult    Arthritis    Acute right-sided low back pain without sciatica    Sacroiliitis    Acquired spondylolisthesis    Acute cor pulmonale    Chronic low back pain    Anemia    Acute saddle pulmonary embolism with acute cor pulmonale    Acute respiratory failure with hypoxia    Acute deep vein thrombosis (DVT) of femoral vein of right lower extremity    Pulmonary hypertension    CAD (coronary artery disease)    Abnormal findings on diagnostic imaging of other specified body structures     Past Medical History:   Diagnosis Date    ADHD (attention deficit  hyperactivity disorder) 2021    Hard to focus on tasks and follow through.    Allergic 08/20/2018    Arthritis     Asthma     Carpal tunnel syndrome     no pain    Cataract     Deep vein thrombosis 01/10/2025    Depression     Erectile dysfunction     2018 at 73 years old    Family history of malignant neoplasm of breast 09/26/2019    Hyperlipidemia 09/26/2019    Allergic to statins    Hypertension 03/01/2012    Managing w/ Losartan Potassium    Leg pain, right     Low back pain     Decompress and fusion surgery 10/4&5/2022    Neuropathy     feet    Osteopenia 11/28/2019    Poor balance     Pulmonary embolism     Acute pulmonary saddle embolism    Reactive airway disease 01/15/2016    Scoliosis 1960    Shortness of breath 2017    Sleep apnea     Substance abuse 1966    Alcoholic     Past Surgical History:   Procedure Laterality Date    CARDIAC CATHETERIZATION  1992    CARDIAC CATHETERIZATION N/A 01/11/2025    Procedure: Right Heart Cath;  Surgeon: Nancy Hdez MD;  Location:  [facility];  Service: Cardiovascular;  Laterality: N/A;    CARDIAC CATHETERIZATION  01/11/2025    Procedure: Percutaneous Manual Thrombectomy;  Surgeon: Nancy Hdez MD;  Location:  [facility];  Service: Cardiovascular;;    CARDIAC CATHETERIZATION Bilateral 01/11/2025    Procedure: Pulmonary angiography;  Surgeon: Nancy Hdez MD;  Location:  [facility];  Service: Cardiovascular;  Laterality: Bilateral;    CARDIAC CATHETERIZATION N/A 6/18/2025    Procedure: Right and Left Heart Cath;  Surgeon: Nancy Hdez MD;  Location:  [facility];  Service: Cardiovascular;  Laterality: N/A;    CARDIAC CATHETERIZATION N/A 6/18/2025    Procedure: Coronary angiography;  Surgeon: Nancy Hdez MD;  Location:  [facility];  Service: Cardiovascular;  Laterality: N/A;    COLONOSCOPY  2013    No polyps, slight diverticulitis    EYE SURGERY  2016    Cataracts     KNEE ARTHROSCOPY W/ ACL RECONSTRUCTION Right 2019    LUMBAR FUSION Bilateral 10/05/2022    Procedure: DAY 2 LUMBAR LAMINECTOMY TRANSFORAMINAL LUMBAR INTERBODY FUSION L2,L3,L4 WITH NEURO ROBOT;  Surgeon: John Do MD;  Location: [facility] MAIN OR;  Service: Robotics - Neuro;  Laterality: Bilateral;    LUMBAR FUSION N/A 10/04/2022    Procedure: DAY 1 LUMBAR LATERAL INTERBODY FUSION WITH NEURO ROBOT L2, L3.L4;  Surgeon: John Do MD;  Location: [facility] MAIN OR;  Service: Robotics - Neuro;  Laterality: N/A;  left side approach    MOUTH SURGERY      SPINE SURGERY  10/4&5/2022    Dr. John Do, [facility] Neurosurgery group      General Information       [facility] Name 06/22/25 1154          Physical Therapy Time and Intention    Document Type therapy note (daily note)  -     Mode of Treatment individual therapy;physical therapy  -[facility] Name 06/22/25 1154          General Information    Patient Profile Reviewed yes  -     Existing Precautions/Restrictions cardiac;fall;spinal  -[facility] Name 06/22/25 1154          Cognition    Orientation Status (Cognition) oriented x 4  -[facility] Name 06/22/25 1154          Safety Issues/Impairments Affecting Functional Mobility    Impairments Affecting Function (Mobility) balance;endurance/activity tolerance;strength;pain;range of motion (ROM)  -               User Key  (r) = Recorded By, (t) = Taken By, (c) = Cosigned By      Initials Name Provider Type     Consuelo Canada PT Physical Therapist                   Mobility       Row Name 06/22/25 1154          Bed Mobility    Bed Mobility sit-supine  -     Sit-Supine [place] (Bed Mobility) contact guard;verbal cues  -     Assistive Device (Bed Mobility) head of bed elevated;bed rails  -       Row Name 06/22/25 1154          Sit-Stand Transfer    Sit-Stand [place] (Transfers) verbal cues;1 person to manage equipment;contact guard  -     Assistive Device (Sit-Stand Transfers) walker,  front-wheeled  -       Row Name 06/22/25 1154          Gait/Stairs (Locomotion)    Smoot Level (Gait) verbal cues;contact guard;1 person to manage equipment  -     Assistive Device (Gait) walker, front-wheeled  -     Distance in Feet (Gait) 200  -     Deviations/Abnormal Patterns (Gait) antalgic;jordy decreased;gait speed decreased;stride length decreased;festinating/shuffling  -     Bilateral Gait Deviations forward flexed posture  -     Comment, (Gait/Stairs) Several standing rest breaks 2/2 SOA  -               User Key  (r) = Recorded By, (t) = Taken By, (c) = Cosigned By      Initials Name Provider Type     Consuelo Canada PT Physical Therapist                   Obj/Interventions    No documentation.                  Goals/Plan    No documentation.                  Clinical Impression       Row Name 06/22/25 1155          Pain    Pretreatment Pain Rating 5/10  -     Posttreatment Pain Rating 5/10  -     Pain Location chest  -     Pain Side/Orientation medial  -     Pain Management Interventions exercise or physical activity utilized  -     Response to Pain Interventions activity participation with tolerable pain  -       Row Name 06/22/25 1155          Plan of Care Review    Plan of Care Reviewed With patient  -     Progress improving  -     Outcome Evaluation Pt seen for PT this AM and tolerated mobility well. Pt sitting UIC on arrival, stood with CGA, and ambulated 200ft with rwx and CGA with additional person for equipment. Pt taking several standing rest breaks d/t SOA - ambulated on 2L O2. Pt cued for PLB and to take his time especially on turns. Pt with no overt LOB, but generally unsteady today. Pt hopeful to get chest tubes out today and eager to trial ambulation with no AD as able - states he only used a cane occasionally at BL. Pt returned to room and back to bed, left with needs met. PT will continue to follow, anticipate DC home with HHPT and family assist.   -       Row Name 06/22/25 1155          Therapy Assessment/Plan (PT)    Therapy Frequency (PT) 5 times/wk  -       Row Name 06/22/25 1155          Vital Signs    O2 Delivery Pre Treatment supplemental O2  -     O2 Delivery Intra Treatment supplemental O2  -     O2 Delivery Post Treatment supplemental O2  -       Row Name 06/22/25 1155          Positioning and Restraints    Pre-Treatment Position sitting in chair/recliner  -     Post Treatment Position bed  -     In Bed notified nsg;fowlers;call light within reach;encouraged to call for assist;exit alarm on;with nsg  -               User Key  (r) = Recorded By, (t) = Taken By, (c) = Cosigned By      Initials Name Provider Type     Consuelo Canada PT Physical Therapist                   Outcome Measures       Row Name 06/22/25 1158 06/22/25 0849       How much help from another person do you currently need...    Turning from your back to your side while in flat bed without using bedrails? 4  - 3  -TH    Moving from lying on back to sitting on the side of a flat bed without bedrails? 3  - 3  -TH    Moving to and from a bed to a chair (including a wheelchair)? 3  - 3  -TH    Standing up from a chair using your arms (e.g., wheelchair, bedside chair)? 3  - 3  -TH    Climbing 3-5 steps with a railing? 2  - 2  -TH    To walk in hospital room? 3  - 3  -TH    AM-PAC 6 Clicks Score (PT) 18  - 17  -TH    Highest Level of Mobility Goal Walk 10 Steps or More-6  - Stand (1 or More Minutes)-5  -TH      Row Name 06/22/25 1158          Functional Assessment    Outcome Measure Options AM-PAC 6 Clicks Basic Mobility (PT)  -               User Key  (r) = Recorded By, (t) = Taken By, (c) = Cosigned By      Initials Name Provider Type     Chani Boucher, RN Registered Nurse     Consuelo Canada PT Physical Therapist                                 Physical Therapy Education       Title: PT OT SLP Therapies (In Progress)       Topic: Physical  Therapy (In Progress)       Point: Mobility training (Done)       Learning Progress Summary            Patient Acceptance, E,TB,D, VU,NR by  at 6/22/2025 1158    Acceptance, E,TB,D, VU,NR by  at 6/21/2025 1356                      Point: Home exercise program (Not Started)       Learner Progress:  Not documented in this visit.              Point: Body mechanics (Done)       Learning Progress Summary            Patient Acceptance, E,TB,D, VU,NR by  at 6/22/2025 1158    Acceptance, E,TB,D, VU,NR by  at 6/21/2025 1356                      Point: Precautions (Done)       Learning Progress Summary            Patient Acceptance, E,TB,D, VU,NR by  at 6/22/2025 1158    Acceptance, E,TB,D, VU,NR by  at 6/21/2025 1356                                      User Key       Initials Effective Dates Name Provider Type Discipline     04/08/22 -  Consuelo Canada, PT Physical Therapist PT                  PT Recommendation and Plan  Planned Therapy Interventions (PT): balance training, bed mobility training, gait training, home exercise program, patient/family education, ROM (range of motion), stair training, strengthening, stretching, transfer training  Progress: improving  Outcome Evaluation: Pt seen for PT this AM and tolerated mobility well. Pt sitting UIC on arrival, stood with CGA, and ambulated 200ft with rwx and CGA with additional person for equipment. Pt taking several standing rest breaks d/t SOA - ambulated on 2L O2. Pt cued for PLB and to take his time especially on turns. Pt with no overt LOB, but generally unsteady today. Pt hopeful to get chest tubes out today and eager to trial ambulation with no AD as able - states he only used a cane occasionally at BL. Pt returned to room and back to bed, left with needs met. PT will continue to follow, anticipate DC home with HHPT and family assist.     Time Calculation:   PT Evaluation Complexity  History, PT Evaluation Complexity: 1-2 personal factors and/or  comorbidities  Examination of Body Systems (PT Eval Complexity): total of 3 or more elements  Clinical Presentation (PT Evaluation Complexity): evolving  Clinical Decision Making (PT Evaluation Complexity): moderate complexity  Overall Complexity (PT Evaluation Complexity): moderate complexity     PT Charges       Row Name 06/22/25 1158             Time Calculation    Start Time 1034  -      Stop Time 1054  -      Time Calculation (min) 20 min  -      PT Received On 06/22/25  -      PT - Next Appointment 06/23/25  -         Time Calculation- PT    Total Timed Code Minutes- PT 20 minute(s)  -         Timed Charges    67360 - Gait Training Minutes  10  -BH      64544 - PT Therapeutic Activity Minutes 10  -BH         Total Minutes    Timed Charges Total Minutes 20  -BH       Total Minutes 20  -BH                User Key  (r) = Recorded By, (t) = Taken By, (c) = Cosigned By      Initials Name Provider Type     Consuelo Canada, PT Physical Therapist                  Therapy Charges for Today       Code Description Service Date Service Provider Modifiers Qty    68954442505 HC GAIT TRAINING EA 15 MIN 6/21/2025 Consuelo Canada, PT GP 1    07129323245 HC PT THERAPEUTIC ACT EA 15 MIN 6/21/2025 Consuelo Canada, PT GP 1    33460833918 HC PT EVAL MOD COMPLEXITY 3 6/21/2025 Consuelo Canada, PT GP 1    53075187997 HC PT THER SUPP EA 15 MIN 6/21/2025 Consuelo Canada, PT GP 1    52691448375 HC GAIT TRAINING EA 15 MIN 6/22/2025 Consuelo Canada, PT GP 1    57429554574 HC PT THER SUPP EA 15 MIN 6/22/2025 Consuelo Canada, PT GP 1            PT G-Codes  Outcome Measure Options: AM-PAC 6 Clicks Basic Mobility (PT)  AM-PAC 6 Clicks Score (PT): 18  PT Discharge Summary  Anticipated Discharge Disposition (PT): home with assist, home with home health    Consuelo Canada PT  6/22/2025

## 2025-06-22 NOTE — PLAN OF CARE
Goal Outcome Evaluation:  Plan of Care Reviewed With: patient        Progress: improving  Outcome Evaluation: Pt seen for PT this AM and tolerated mobility well. Pt sitting UIC on arrival, stood with CGA, and ambulated 200ft with rwx and CGA with additional person for equipment. Pt taking several standing rest breaks d/t SOA - ambulated on 2L O2. Pt cued for PLB and to take his time especially on turns. Pt with no overt LOB, but generally unsteady today. Pt hopeful to get chest tubes out today and eager to trial ambulation with no AD as able - states he only used a cane occasionally at BL. Pt returned to room and back to bed, left with needs met. PT will continue to follow, anticipate DC home with HHPT and family assist.    Anticipated Discharge Disposition (PT): home with assist, home with home health

## 2025-06-22 NOTE — PROGRESS NOTES
[facility]  Cardiology Progress Note    Patient Identification:  Name: Omar Choudhary  Age: 79 y.o.  Sex: male  :  1945  MRN: [id number]                 Follow Up / Chief Complaint: CAD    Interval History:       Subjective:  No specific complaints    Objective:    Past Medical History:  Past Medical History:   Diagnosis Date    ADHD (attention deficit hyperactivity disorder)     Hard to focus on tasks and follow through.    Allergic 2018    Arthritis     Asthma     Carpal tunnel syndrome     no pain    Cataract     Deep vein thrombosis 01/10/2025    Depression     Erectile dysfunction     2018 at 73 years old    Family history of malignant neoplasm of breast 2019    Hyperlipidemia 2019    Allergic to statins    Hypertension 2012    Managing w/ Losartan Potassium    Leg pain, right     Low back pain     Decompress and fusion surgery 10/4&2022    Neuropathy     feet    Osteopenia 2019    Poor balance     Pulmonary embolism     Acute pulmonary saddle embolism    Reactive airway disease 01/15/2016    Scoliosis 1960    Shortness of breath 2017    Sleep apnea     Substance abuse 1966    Alcoholic     Past Surgical History:  Past Surgical History:   Procedure Laterality Date    CARDIAC CATHETERIZATION      CARDIAC CATHETERIZATION N/A 2025    Procedure: Right Heart Cath;  Surgeon: Nancy Hdez MD;  Location:  [facility];  Service: Cardiovascular;  Laterality: N/A;    CARDIAC CATHETERIZATION  2025    Procedure: Percutaneous Manual Thrombectomy;  Surgeon: Nancy Hdez MD;  Location:  [facility];  Service: Cardiovascular;;    CARDIAC CATHETERIZATION Bilateral 2025    Procedure: Pulmonary angiography;  Surgeon: Nancy Hdez MD;  Location:  [facility];  Service: Cardiovascular;  Laterality: Bilateral;    CARDIAC CATHETERIZATION N/A 2025    Procedure: Right and Left Heart  Cath;  Surgeon: Nancy Hdez MD;  Location:  [facility];  Service: Cardiovascular;  Laterality: N/A;    CARDIAC CATHETERIZATION N/A 2025    Procedure: Coronary angiography;  Surgeon: Nancy Hdez MD;  Location:  [facility];  Service: Cardiovascular;  Laterality: N/A;    COLONOSCOPY      No polyps, slight diverticulitis    EYE SURGERY  2016    Cataracts    KNEE ARTHROSCOPY W/ ACL RECONSTRUCTION Right 2019    LUMBAR FUSION Bilateral 10/05/2022    Procedure: DAY 2 LUMBAR LAMINECTOMY TRANSFORAMINAL LUMBAR INTERBODY FUSION L2,L3,L4 WITH NEURO ROBOT;  Surgeon: John Do MD;  Location: [facility] MAIN OR;  Service: Robotics - Neuro;  Laterality: Bilateral;    LUMBAR FUSION N/A 10/04/2022    Procedure: DAY 1 LUMBAR LATERAL INTERBODY FUSION WITH NEURO ROBOT L2, L3.L4;  Surgeon: John Do MD;  Location: [facility] MAIN OR;  Service: Robotics - Neuro;  Laterality: N/A;  left side approach    MOUTH SURGERY      SPINE SURGERY  10/4&2022    Dr. John Do, [facility]        Social History:   Social History     Tobacco Use    Smoking status: Former     Current packs/day: 0.00     Average packs/day: 0.5 packs/day for 26.0 years (13.0 ttl pk-yrs)     Types: Cigarettes     Start date: 1966     Quit date: 1992     Years since quittin.4     Passive exposure: Past    Smokeless tobacco: Never   Substance Use Topics    Alcohol use: Yes     Alcohol/week: 16.0 standard drinks of alcohol     Types: 14 Glasses of wine, 2 Cans of beer per week      Family History:  Family History   Problem Relation Age of Onset    Heart disease Mother     Hypertension Mother     Breast cancer Mother     Stroke Mother         Several TIAs    Alcohol abuse Mother     Thyroid disease Mother     Arthritis Mother     Cancer Mother         Breast    Aortic aneurysm Father     Heart attack Father     Liver cancer Father     Cancer Father         Liver    Heart disease Father   "       Heart attack    Early death Brother         Coronary thrombosis @ 48 years while mountain climbing.    Heart attack Brother         1991, at 47 years, fatal          Allergies:  Allergies   Allergen Reactions    Statins Myalgia     Other reaction(s): muscle soreness     Scheduled Meds:  aspirin, 81 mg, Daily  budesonide-formoterol, 2 puff, BID  enoxaparin sodium, 40 mg, Daily  furosemide, 40 mg, Once  gabapentin, 400 mg, Q8H  guaiFENesin, 1,200 mg, Q12H  insulin lispro, 2-9 Units, 4x Daily AC & at Bedtime  ipratropium-albuterol, 3 mL, 4x Daily - RT  metoprolol tartrate, 25 mg, Q12H  mupirocin, 1 Application, BID  pantoprazole, 40 mg, QAM  potassium chloride, 20 mEq, Once  senna-docusate sodium, 2 tablet, Nightly  theophylline, 300 mg, Q12H            INTAKE AND OUTPUT:    Intake/Output Summary (Last 24 hours) at 6/22/2025 0908  Last data filed at 6/22/2025 0615  Gross per 24 hour   Intake 1104 ml   Output 2115 ml   Net -1011 ml       ROS  Constitutional: Awake and alert, no fever. No nosebleeds  Abdomen           no abdominal pain   Cardiac              no chest pain  Pulmonary          no shortness of breath      /85 (BP Location: Right arm, Patient Position: Sitting)   Pulse 77   Temp 98.2 °F (36.8 °C) (Oral)   Resp 18   Ht 167.6 cm (66\")   Wt 82.2 kg (181 lb 3.2 oz)   SpO2 100%   BMI 29.25 kg/m²   General appearance: No acute changes   Neck: Trachea midline; NECK, supple, no thyromegaly or lymphadenopathy   Lungs: Normal size and shape, normal breath sounds, equal distribution of air, no rales or rhonchi   CV: S1-S2 regular, no murmurs, no rub, no gallop   Abdomen: Soft, nontender; no masses , no abnormal abdominal sounds   Extremities: No deformity , normal color , no peripheral edema   Skin: Normal temperature, turgor and texture; no rash, ulcers          Cardiographics  Telemetry:     ECG:     Echocardiogram:     Lab Review       Results from last 7 days   Lab Units 06/21/25  0244 " "  MAGNESIUM mg/dL 2.6*     Results from last 7 days   Lab Units 06/22/25  0201   SODIUM mmol/L 135*   POTASSIUM mmol/L 3.7   BUN mg/dL 14.0   CREATININE mg/dL 0.94   CALCIUM mg/dL 8.5*     @[id number]@  Results from last 7 days   Lab Units 06/22/25  0201 06/21/25  0244 06/20/25  1527   WBC 10*3/mm3 8.93 6.91 7.05   HEMOGLOBIN g/dL 10.3* 10.2* 11.3*   HEMATOCRIT % 30.6* 30.5* 33.8*   PLATELETS 10*3/mm3 142 112* 105*     Results from last 7 days   Lab Units 06/21/25  0244 06/20/25  1147 06/20/25  1057 06/19/25  0406   INR  1.31* 1.49* 1.7* 1.27*   APTT seconds  --  33.0  --  32.6         Assessment:  Post CABG improving well      Plan:  Continue postop supportive measures  Labs/tests ordered for am      )6/22/2025  MD MUNIRA Restrepo/Transcription:   \"Dictated utilizing Dragon dictation\".     "

## 2025-06-22 NOTE — PLAN OF CARE
Goal Outcome Evaluation:              Outcome Evaluation: 79 yr old male AOx4 POD 2 from CBAGx 5, LAAC/PFO closure. VSS Hr 80's NSR, O2 RA @ 96% Chest tubes, IJ and F/c removed as ordered. Milrinone drip d/c, PRN pain meds give as requseted. No acute concerns at present plan of care ongoing

## 2025-06-22 NOTE — PROGRESS NOTES
" LOS: 4 days   Patient Care Team:  Dennis Kwong MD as PCP - General (Internal Medicine)  Arslan Carlson PA-C as Physician Assistant (Physician Assistant)  Ang Peña APRN as Nurse Practitioner (Family Medicine)  Benny Ordaz MD as Surgeon (Orthopedic Surgery)  Jerrod Lagunas MD as Consulting Physician (Pulmonary Disease)  Luis Alberto Alvarenga MD as Consulting Physician (Hematology and Oncology)    Chief Complaint: CAD    Subjective      Vital Signs  Temp:  [97.9 °F (36.6 °C)-99 °F (37.2 °C)] 98.1 °F (36.7 °C)  Heart Rate:  [64-93] 78  Resp:  [16-27] 18  BP: (114-154)/(71-93) 143/92  Arterial Line BP: (106-136)/(61-72) 136/67  Body mass index is 29.25 kg/m².    Intake/Output Summary (Last 24 hours) at 6/22/2025 0729  Last data filed at 6/22/2025 0615  Gross per 24 hour   Intake 1344 ml   Output 2295 ml   Net -951 ml     No intake/output data recorded.    Chest tube drainage last 8 hours 70/80        06/20/25  0500 06/21/25  0500 06/22/25  0554   Weight: 77.3 kg (170 lb 8 oz) 78.4 kg (172 lb 13.5 oz) 82.2 kg (181 lb 3.2 oz)         Objective:  Vital signs: (most recent): Blood pressure 132/85, pulse 85, temperature 98.2 °F (36.8 °C), temperature source Oral, resp. rate 18, height 167.6 cm (66\"), weight 82.2 kg (181 lb 3.2 oz), SpO2 97%.                  Results Review:        WBC WBC   Date Value Ref Range Status   06/22/2025 8.93 3.40 - 10.80 10*3/mm3 Final   06/21/2025 6.91 3.40 - 10.80 10*3/mm3 Final   06/20/2025 7.05 3.40 - 10.80 10*3/mm3 Final   06/20/2025 10.53 3.40 - 10.80 10*3/mm3 Final      HGB Hemoglobin   Date Value Ref Range Status   06/22/2025 10.3 (L) 13.0 - 17.7 g/dL Final   06/21/2025 10.2 (L) 13.0 - 17.7 g/dL Final   06/20/2025 11.3 (L) 13.0 - 17.7 g/dL Final   06/20/2025 11.8 (L) 13.0 - 17.7 g/dL Final   06/20/2025 7.8 (C) 12.0 - 17.0 g/dL Final   06/20/2025 7.8 (C) 12.0 - 17.0 g/dL Final   06/20/2025 8.5 (L) 12.0 - 17.0 g/dL Final   06/20/2025 8.2 (L) 12.0 - 17.0 " g/dL Final   06/20/2025 8.2 (L) 12.0 - 17.0 g/dL Final   06/20/2025 8.8 (L) 12.0 - 17.0 g/dL Final   06/20/2025 10.9 (L) 12.0 - 17.0 g/dL Final      HCT Hematocrit   Date Value Ref Range Status   06/22/2025 30.6 (L) 37.5 - 51.0 % Final   06/21/2025 30.5 (L) 37.5 - 51.0 % Final   06/20/2025 33.8 (L) 37.5 - 51.0 % Final   06/20/2025 36.2 (L) 37.5 - 51.0 % Final   06/20/2025 23 (L) 38 - 51 % Final   06/20/2025 23 (L) 38 - 51 % Final   06/20/2025 25 (L) 38 - 51 % Final   06/20/2025 24 (L) 38 - 51 % Final   06/20/2025 24 (L) 38 - 51 % Final   06/20/2025 26 (L) 38 - 51 % Final   06/20/2025 32 (L) 38 - 51 % Final      Platelets Platelets   Date Value Ref Range Status   06/22/2025 142 140 - 450 10*3/mm3 Final   06/21/2025 112 (L) 140 - 450 10*3/mm3 Final   06/20/2025 105 (L) 140 - 450 10*3/mm3 Final   06/20/2025 132 (L) 140 - 450 10*3/mm3 Final   06/20/2025 140 140 - 450 10*3/mm3 Final        PT/INR:    Protime   Date Value Ref Range Status   06/21/2025 16.3 (H) 11.7 - 14.2 Seconds Final   06/20/2025 18.0 (H) 11.7 - 14.2 Seconds Final   06/20/2025 16.9 (H) 12.8 - 15.2 seconds Final     Comment:     Serial Number: [id number]:  [id number]   /  INR   Date Value Ref Range Status   06/21/2025 1.31 (H) 0.90 - 1.10 Final   06/20/2025 1.49 (H) 0.90 - 1.10 Final   06/20/2025 1.7 (H) 0.8 - 1.2 Final       Sodium Sodium   Date Value Ref Range Status   06/22/2025 135 (L) 136 - 145 mmol/L Final   06/21/2025 142 136 - 145 mmol/L Final   06/20/2025 140 136 - 145 mmol/L Final   06/20/2025 138 136 - 145 mmol/L Final      Potassium Potassium   Date Value Ref Range Status   06/22/2025 3.7 3.5 - 5.2 mmol/L Final   06/21/2025 4.0 3.5 - 5.2 mmol/L Final   06/20/2025 4.3 3.5 - 5.2 mmol/L Final   06/20/2025 4.6 3.5 - 5.2 mmol/L Final      Chloride Chloride   Date Value Ref Range Status   06/22/2025 101 98 - 107 mmol/L Final   06/21/2025 109 (H) 98 - 107 mmol/L Final   06/20/2025 107 98 - 107 mmol/L Final   06/20/2025 106 98 - 107 mmol/L Final       Bicarbonate CO2   Date Value Ref Range Status   06/22/2025 23.0 22.0 - 29.0 mmol/L Final   06/21/2025 21.0 (L) 22.0 - 29.0 mmol/L Final   06/20/2025 20.6 (L) 22.0 - 29.0 mmol/L Final   06/20/2025 22.1 22.0 - 29.0 mmol/L Final      BUN BUN   Date Value Ref Range Status   06/22/2025 14.0 8.0 - 23.0 mg/dL Final   06/21/2025 14.0 8.0 - 23.0 mg/dL Final   06/20/2025 14.0 8.0 - 23.0 mg/dL Final   06/20/2025 13.0 8.0 - 23.0 mg/dL Final      Creatinine Creatinine   Date Value Ref Range Status   06/22/2025 0.94 0.76 - 1.27 mg/dL Final   06/21/2025 1.06 0.76 - 1.27 mg/dL Final   06/20/2025 1.11 0.76 - 1.27 mg/dL Final   06/20/2025 0.99 0.76 - 1.27 mg/dL Final      Calcium Calcium   Date Value Ref Range Status   06/22/2025 8.5 (L) 8.6 - 10.5 mg/dL Final   06/21/2025 8.1 (L) 8.6 - 10.5 mg/dL Final   06/20/2025 8.4 (L) 8.6 - 10.5 mg/dL Final   06/20/2025 8.3 (L) 8.6 - 10.5 mg/dL Final      Magnesium Magnesium   Date Value Ref Range Status   06/21/2025 2.6 (H) 1.6 - 2.4 mg/dL Final   06/20/2025 3.2 (H) 1.6 - 2.4 mg/dL Final   06/20/2025 3.4 (H) 1.6 - 2.4 mg/dL Final          aspirin, 81 mg, Oral, Daily  budesonide-formoterol, 2 puff, Inhalation, BID  enoxaparin sodium, 40 mg, Subcutaneous, Daily  gabapentin, 400 mg, Oral, Q8H  guaiFENesin, 1,200 mg, Oral, Q12H  insulin lispro, 2-9 Units, Subcutaneous, 4x Daily AC & at Bedtime  ipratropium-albuterol, 3 mL, Nebulization, 4x Daily - RT  metoprolol tartrate, 25 mg, Oral, Q12H  mupirocin, 1 Application, Each Nare, BID  pantoprazole, 40 mg, Oral, QAM  senna-docusate sodium, 2 tablet, Oral, Nightly  theophylline, 300 mg, Oral, Q12H      milrinone, 0.25-0.375 mcg/kg/min, Last Rate: 0.25 mcg/kg/min (06/21/25 1717)  niCARdipine, 5-15 mg/hr, Last Rate: Stopped (06/21/25 0815)              Pulmonary hypertension    CAD (coronary artery disease)    Abnormal findings on diagnostic imaging of other specified body structures      Assessment & Plan    -Severe multivessel CAD- s/p urgent CABGx5  LIMA, PFO closure, MARLENE 40mm clip- [facility] 6/20/2025  -Pulmonary hypertension  -Hypertension  -hx of PE and DVT- on coumadin  -pulmonary hypertension  -ELINOR with home cpap  -Left hemidiaphragm elevation  -post op anemia- expected acute blood loss    POD#2  Looks good this morning.   2L NC--wean as able  Sinus rhythm rate in the 80s-- tolerating bet a blocker   On milrinone 0.125-- the rate was 0.25 this morning-- will decrease it back to 0.125 and discontinue later this morning  Encourage pulmonary toilet  Discontinue doran, chest tubes and central line   Continue routine care         EMILY Sahu  06/22/25  07:29 EDT

## 2025-06-23 ENCOUNTER — APPOINTMENT (OUTPATIENT)
Dept: GENERAL RADIOLOGY | Facility: HOSPITAL | Age: 80
End: 2025-06-23
Payer: MEDICARE

## 2025-06-23 DIAGNOSIS — M48.061 DEGENERATIVE LUMBAR SPINAL STENOSIS: ICD-10-CM

## 2025-06-23 LAB
ANION GAP SERPL CALCULATED.3IONS-SCNC: 11 MMOL/L (ref 5–15)
BH BB BLOOD EXPIRATION DATE: NORMAL
BH BB BLOOD TYPE BARCODE: 6200
BH BB DISPENSE STATUS: NORMAL
BH BB PRODUCT CODE: NORMAL
BH BB UNIT NUMBER: NORMAL
BUN SERPL-MCNC: 12 MG/DL (ref 8–23)
BUN/CREAT SERPL: 13.8 (ref 7–25)
CALCIUM SPEC-SCNC: 8.4 MG/DL (ref 8.6–10.5)
CHLORIDE SERPL-SCNC: 98 MMOL/L (ref 98–107)
CO2 SERPL-SCNC: 23 MMOL/L (ref 22–29)
CREAT SERPL-MCNC: 0.87 MG/DL (ref 0.76–1.27)
CROSSMATCH INTERPRETATION: NORMAL
DEPRECATED RDW RBC AUTO: 47.4 FL (ref 37–54)
EGFRCR SERPLBLD CKD-EPI 2021: 88.9 ML/MIN/1.73
ERYTHROCYTE [DISTWIDTH] IN BLOOD BY AUTOMATED COUNT: 14 % (ref 12.3–15.4)
GLUCOSE BLDC GLUCOMTR-MCNC: 118 MG/DL (ref 70–130)
GLUCOSE BLDC GLUCOMTR-MCNC: 129 MG/DL (ref 70–130)
GLUCOSE BLDC GLUCOMTR-MCNC: 135 MG/DL (ref 70–130)
GLUCOSE BLDC GLUCOMTR-MCNC: 138 MG/DL (ref 70–130)
GLUCOSE SERPL-MCNC: 119 MG/DL (ref 65–99)
HCT VFR BLD AUTO: 27.9 % (ref 37.5–51)
HGB BLD-MCNC: 9.4 G/DL (ref 13–17.7)
INR PPP: 1.28 (ref 0.9–1.1)
MCH RBC QN AUTO: 31.9 PG (ref 26.6–33)
MCHC RBC AUTO-ENTMCNC: 33.7 G/DL (ref 31.5–35.7)
MCV RBC AUTO: 94.6 FL (ref 79–97)
PLATELET # BLD AUTO: 117 10*3/MM3 (ref 140–450)
PMV BLD AUTO: 10.7 FL (ref 6–12)
POTASSIUM SERPL-SCNC: 3.5 MMOL/L (ref 3.5–5.2)
POTASSIUM SERPL-SCNC: 4.1 MMOL/L (ref 3.5–5.2)
PROTHROMBIN TIME: 15.9 SECONDS (ref 11.7–14.2)
RBC # BLD AUTO: 2.95 10*6/MM3 (ref 4.14–5.8)
SODIUM SERPL-SCNC: 132 MMOL/L (ref 136–145)
UNIT  ABO: NORMAL
UNIT  RH: NORMAL
WBC NRBC COR # BLD AUTO: 7.12 10*3/MM3 (ref 3.4–10.8)

## 2025-06-23 PROCEDURE — 94799 UNLISTED PULMONARY SVC/PX: CPT

## 2025-06-23 PROCEDURE — 80048 BASIC METABOLIC PNL TOTAL CA: CPT | Performed by: NURSE PRACTITIONER

## 2025-06-23 PROCEDURE — 97116 GAIT TRAINING THERAPY: CPT

## 2025-06-23 PROCEDURE — 82948 REAGENT STRIP/BLOOD GLUCOSE: CPT

## 2025-06-23 PROCEDURE — 84132 ASSAY OF SERUM POTASSIUM: CPT | Performed by: INTERNAL MEDICINE

## 2025-06-23 PROCEDURE — 97110 THERAPEUTIC EXERCISES: CPT

## 2025-06-23 PROCEDURE — 99232 SBSQ HOSP IP/OBS MODERATE 35: CPT | Performed by: NURSE PRACTITIONER

## 2025-06-23 PROCEDURE — 85610 PROTHROMBIN TIME: CPT | Performed by: THORACIC SURGERY (CARDIOTHORACIC VASCULAR SURGERY)

## 2025-06-23 PROCEDURE — 85027 COMPLETE CBC AUTOMATED: CPT | Performed by: NURSE PRACTITIONER

## 2025-06-23 PROCEDURE — 94761 N-INVAS EAR/PLS OXIMETRY MLT: CPT

## 2025-06-23 PROCEDURE — 71046 X-RAY EXAM CHEST 2 VIEWS: CPT

## 2025-06-23 PROCEDURE — 90791 PSYCH DIAGNOSTIC EVALUATION: CPT

## 2025-06-23 PROCEDURE — 94660 CPAP INITIATION&MGMT: CPT

## 2025-06-23 PROCEDURE — 25010000002 ENOXAPARIN PER 10 MG: Performed by: NURSE PRACTITIONER

## 2025-06-23 PROCEDURE — 94664 DEMO&/EVAL PT USE INHALER: CPT

## 2025-06-23 RX ORDER — POTASSIUM CHLORIDE 1500 MG/1
40 TABLET, EXTENDED RELEASE ORAL EVERY 4 HOURS
Status: COMPLETED | OUTPATIENT
Start: 2025-06-23 | End: 2025-06-23

## 2025-06-23 RX ORDER — POTASSIUM CHLORIDE 1500 MG/1
20 TABLET, EXTENDED RELEASE ORAL DAILY
Status: DISCONTINUED | OUTPATIENT
Start: 2025-06-23 | End: 2025-06-25 | Stop reason: HOSPADM

## 2025-06-23 RX ORDER — WARFARIN SODIUM 5 MG/1
5 TABLET ORAL
Status: COMPLETED | OUTPATIENT
Start: 2025-06-23 | End: 2025-06-23

## 2025-06-23 RX ORDER — GABAPENTIN 300 MG/1
CAPSULE ORAL
Qty: 120 CAPSULE | Refills: 5 | OUTPATIENT
Start: 2025-06-23 | End: 2025-06-25 | Stop reason: HOSPADM

## 2025-06-23 RX ORDER — FUROSEMIDE 40 MG/1
40 TABLET ORAL DAILY
Status: DISCONTINUED | OUTPATIENT
Start: 2025-06-23 | End: 2025-06-25 | Stop reason: HOSPADM

## 2025-06-23 RX ADMIN — CYCLOBENZAPRINE HYDROCHLORIDE 10 MG: 10 TABLET, FILM COATED ORAL at 09:02

## 2025-06-23 RX ADMIN — IPRATROPIUM BROMIDE AND ALBUTEROL SULFATE 3 ML: .5; 3 SOLUTION RESPIRATORY (INHALATION) at 22:32

## 2025-06-23 RX ADMIN — GABAPENTIN 400 MG: 400 CAPSULE ORAL at 13:51

## 2025-06-23 RX ADMIN — GABAPENTIN 400 MG: 400 CAPSULE ORAL at 06:53

## 2025-06-23 RX ADMIN — BUDESONIDE AND FORMOTEROL FUMARATE DIHYDRATE 2 PUFF: 160; 4.5 AEROSOL RESPIRATORY (INHALATION) at 22:36

## 2025-06-23 RX ADMIN — GUAIFENESIN 1200 MG: 600 TABLET, EXTENDED RELEASE ORAL at 22:02

## 2025-06-23 RX ADMIN — POTASSIUM CHLORIDE 40 MEQ: 1500 TABLET, EXTENDED RELEASE ORAL at 06:52

## 2025-06-23 RX ADMIN — THEOPHYLLINE 300 MG: 300 TABLET, EXTENDED RELEASE ORAL at 22:02

## 2025-06-23 RX ADMIN — GABAPENTIN 400 MG: 400 CAPSULE ORAL at 22:02

## 2025-06-23 RX ADMIN — PANTOPRAZOLE SODIUM 40 MG: 40 TABLET, DELAYED RELEASE ORAL at 06:52

## 2025-06-23 RX ADMIN — POLYETHYLENE GLYCOL 3350 17 G: 17 POWDER, FOR SOLUTION ORAL at 09:02

## 2025-06-23 RX ADMIN — METOPROLOL TARTRATE 25 MG: 25 TABLET, FILM COATED ORAL at 08:42

## 2025-06-23 RX ADMIN — WARFARIN SODIUM 5 MG: 5 TABLET ORAL at 18:18

## 2025-06-23 RX ADMIN — SENNOSIDES AND DOCUSATE SODIUM 2 TABLET: 50; 8.6 TABLET ORAL at 22:02

## 2025-06-23 RX ADMIN — POTASSIUM CHLORIDE 20 MEQ: 1500 TABLET, EXTENDED RELEASE ORAL at 11:24

## 2025-06-23 RX ADMIN — METOPROLOL TARTRATE 25 MG: 25 TABLET, FILM COATED ORAL at 22:03

## 2025-06-23 RX ADMIN — HYDROCODONE BITARTRATE AND ACETAMINOPHEN 2 TABLET: 5; 325 TABLET ORAL at 06:52

## 2025-06-23 RX ADMIN — FUROSEMIDE 40 MG: 40 TABLET ORAL at 11:24

## 2025-06-23 RX ADMIN — IPRATROPIUM BROMIDE AND ALBUTEROL SULFATE 3 ML: .5; 3 SOLUTION RESPIRATORY (INHALATION) at 12:50

## 2025-06-23 RX ADMIN — THEOPHYLLINE 300 MG: 300 TABLET, EXTENDED RELEASE ORAL at 08:43

## 2025-06-23 RX ADMIN — POTASSIUM CHLORIDE 40 MEQ: 1500 TABLET, EXTENDED RELEASE ORAL at 08:42

## 2025-06-23 RX ADMIN — GUAIFENESIN 1200 MG: 600 TABLET, EXTENDED RELEASE ORAL at 08:42

## 2025-06-23 RX ADMIN — ASPIRIN 81 MG: 81 TABLET, COATED ORAL at 08:43

## 2025-06-23 RX ADMIN — ENOXAPARIN SODIUM 40 MG: 100 INJECTION SUBCUTANEOUS at 08:43

## 2025-06-23 RX ADMIN — MUPIROCIN 1 APPLICATION: 20 OINTMENT TOPICAL at 09:00

## 2025-06-23 NOTE — PLAN OF CARE
Goal Outcome Evaluation:  Plan of Care Reviewed With: patient     Progress: improving    Outcome Evaluation: Pt s/p CABG x5 pod 2 with midsternal dressing CDI. VSS with CPAP in use overnight. Moderate incisional chest pain treated per MAR. Will update as needed.

## 2025-06-23 NOTE — CASE MANAGEMENT/SOCIAL WORK
Continued Stay Note  [facility]     Patient Name: Omar Choudhary  MRN: [id number]  Today's Date: 6/23/2025    Admit Date: 6/18/2025    Plan: To S.O. home w/ [facility]    Discharge Plan       Row Name 06/23/25 1400       Plan    Plan To S.O. home w/ [facility]     Plan Comments S/W patient at bedside and he advised he will now be discharging to his girlfriends home at [address] [place] [address] [place] [address] upon d/c.  He chose [facility] .  [facility]                   Discharge Codes    No documentation.                 Expected Discharge Date and Time       Expected Discharge Date Expected Discharge Time    Jun 24, 2025               Ena Brewer RN

## 2025-06-23 NOTE — CONSULTS
Met with patient to discuss the benefits of cardiac rehab. Pt familiar with program, was in program prior to surgery. Provided phase II information along with the contact information for cardiac rehab at [facility]. Requested for referral to be sent. Patient plans to use home health. Explained if receiving home health would not be able to attend cardiac rehab until finished with home health.

## 2025-06-23 NOTE — PROGRESS NOTES
"    Patient Name: Omar Choudhary  :1945  79 y.o.      Patient Care Team:  Dennis Kwong MD as PCP - General (Internal Medicine)  Arslan Carlson PA-C as Physician Assistant (Physician Assistant)  Ang Peña APRN as Nurse Practitioner (Family Medicine)  Benny Ordaz MD as Surgeon (Orthopedic Surgery)  Jerrod Lagunas MD as Consulting Physician (Pulmonary Disease)  Luis Alberto Alvarenga MD as Consulting Physician (Hematology and Oncology)    Chief Complaint: follow up coronary artery disease     Interval History: feels like he can't get a good breath. Resting in bed. Got a dose of lasix this morning.        Objective   Vital Signs  Temp:  [97.5 °F (36.4 °C)-98.7 °F (37.1 °C)] 98.5 °F (36.9 °C)  Heart Rate:  [70-91] 73  Resp:  [16-18] 16  BP: (107-144)/(65-94) 124/81    Intake/Output Summary (Last 24 hours) at 2025 1343  Last data filed at 2025 1300  Gross per 24 hour   Intake 600 ml   Output 2320 ml   Net -1720 ml     Flowsheet Rows      Flowsheet Row First Filed Value   Admission Height 167.6 cm (66\") Documented at 2025 0907   Admission Weight 79.4 kg (175 lb) Documented at 2025 0907            Physical Exam:   General Appearance:    Alert, cooperative, in no acute distress   Lungs:     Clear to auscultation.  Normal respiratory effort and rate.      Heart:    Regular rhythm and normal rate, normal S1 and S2, no murmurs, gallops or rubs.     Chest Wall:    No abnormalities observed   Abdomen:     Soft, nontender, positive bowel sounds.     Extremities:   no cyanosis, clubbing or edema.  No marked joint deformities.  Adequate musculoskeletal strength.       Results Review:    Results from last 7 days   Lab Units 25  0310   SODIUM mmol/L 132*   POTASSIUM mmol/L 3.5   CHLORIDE mmol/L 98   CO2 mmol/L 23.0   BUN mg/dL 12.0   CREATININE mg/dL 0.87   GLUCOSE mg/dL 119*   CALCIUM mg/dL 8.4*         Results from last 7 days   Lab Units 25  0310   WBC " 10*3/mm3 7.12   HEMOGLOBIN g/dL 9.4*   HEMATOCRIT % 27.9*   PLATELETS 10*3/mm3 117*     Results from last 7 days   Lab Units 06/23/25  0929 06/21/25  0244 06/20/25  1147 06/20/25  1057 06/19/25  0406   INR  1.28* 1.31* 1.49*   < > 1.27*   APTT seconds  --   --  33.0  --  32.6    < > = values in this interval not displayed.     Results from last 7 days   Lab Units 06/21/25  0244   MAGNESIUM mg/dL 2.6*     Results from last 7 days   Lab Units 06/19/25  0406   CHOLESTEROL mg/dL 227*   TRIGLYCERIDES mg/dL 189*   HDL CHOL mg/dL 59   LDL CHOL mg/dL 135*               Medication Review:   aspirin, 81 mg, Oral, Daily  budesonide-formoterol, 2 puff, Inhalation, BID  enoxaparin sodium, 40 mg, Subcutaneous, Daily  furosemide, 40 mg, Oral, Daily  gabapentin, 400 mg, Oral, Q8H  guaiFENesin, 1,200 mg, Oral, Q12H  insulin lispro, 2-9 Units, Subcutaneous, 4x Daily AC & at Bedtime  ipratropium-albuterol, 3 mL, Nebulization, 4x Daily - RT  metoprolol tartrate, 25 mg, Oral, Q12H  mupirocin, 1 Application, Each Nare, BID  pantoprazole, 40 mg, Oral, QAM  potassium chloride, 20 mEq, Oral, Daily  senna-docusate sodium, 2 tablet, Oral, Nightly  theophylline, 300 mg, Oral, Q12H  warfarin, 5 mg, Oral, Once         milrinone, 0.25-0.375 mcg/kg/min, Last Rate: 0.25 mcg/kg/min (06/21/25 1717)  niCARdipine, 5-15 mg/hr, Last Rate: Stopped (06/21/25 0815)        Assessment & Plan   Severe multivessel coronary artery disease including distal left main disease by elective cath 6/18/2025. Now status post urgent CABG x 5 with LIMA to LAD, vein graft to ramus intermedius, vein graft to OM3, vein graft to RV branch and vein graft to PDA. MARLENE closure 40 mm clip. Closure of PFO, 6/20/25 - [facility]. Preserved LVEF by echo 3/6/25.   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 Warfarin restarted today.  Hypertension  Hyperlipidemia  Severe mitral annular calcification  Aortic valve sclerosis without stenosis  Statin intolerance - will need to  consider alternative therapy as outpatient.   Left hemidiaphragm paralysis  Obstructive sleep apnea   Asthma/COPD      Feels a little more short of breath. Oral lasix started this morning. He responded well to IV lasix on 6/21 and 6/22. Will reassess this afternoon.    Pharmacy to dose warfarin. Restarting today.     EMILY Evans  [facility]  06/23/25  13:43 EDT

## 2025-06-23 NOTE — CONSULTS
"Patient seen by [email or web address]] d/t depression. The patient voiced being sad and requested to speak with someone. Patient interviewed alone in room 2210 (CVU). Introduced self and role. Patient agreed to participate in evaluation. Patient is A/OX4. Patient pleasant and calm during evaluation.     The patient is a 79 y.o. male living alone but plans on staying with his S.O. after discharge to be closer to the hospital for continued treatment. The patient underwent a CABG X5 on 6/20/25 and the patient voiced the recovery process has been difficult. The patient stated he tried to stay active and eat healthy so it was difficult for him to accept his medical situation. The patient stated he was \"befuddled\" and he has been blaming himself. The patient stated he has a family history of cardiac issues and he understands that plays a role in this. The patient stated although the doctors are telling him that he is progressing good the patient does not feel like he is. The patient voiced having some feeling of helplessness.     The patient has a mental health history of depression. The patient is currently prescribed PRN trazadone and Klonipin. The patient voiced he sandoval snot use either medication very often. The patient voiced a history of couples counseling with his ex-wife. The patient denied any other mental health treatment history.     The patient voiced depression and stated he is \"quietly sad\" r/t aging, and losing ability. The patient voiced he wants to look back at how he used to function and get back to that but understand that may not be possible. The patient voiced he is learning to look forward and not look back. The patient voiced he is anxious about getting older and discussed seeing obituaries of people who are younger than him. The patient denied SI, wish to be dead, HI, or hallucinations. The patient voiced difficult sleep r/t to being in the hospital but stated he has PRN trazadone to help. The patient voiced " "appetite is fine but did report 15lb weight loss since February.     The patient reported a good support system including friends, his S.O., and a \"Monday men's group.\" The patient voiced the \"sarcasm\" great at his men's group and the patient voiced he feels sarcasm is important to coping.     The patient voiced he drinks ETOH \"almost every night\" and typically has 2 drinks/night. The patient voiced he drinks beer and wine. The patient stated he was ETOH to \"self medicate\" r/t joint pain and insomnia. The patient voiced it has been over a week since his last drink. The patient denied any issues with ETOH withdrawal. The patient denied any other substance use.     During evaluation the patient had to use the restroom than had 2 visitors show up so evaluation was ended at that time to allow patient to have time to visit with friends. The patient voiced \"this is part of my support system.\"  [facility] will follow, can gather further information during follow-up visits, and provide resources as needed.   "

## 2025-06-23 NOTE — NURSING NOTE
AV wires removed per order, pt had bm today. No complaints of pain. Pt SO to bring in home Cpap for overnight tonight.

## 2025-06-23 NOTE — PROGRESS NOTES
" LOS: 5 days   Patient Care Team:  Dennis Kwong MD as PCP - General (Internal Medicine)  Arslan Carlson PA-C as Physician Assistant (Physician Assistant)  Ang Peña APRN as Nurse Practitioner (Family Medicine)  Benny Ordaz MD as Surgeon (Orthopedic Surgery)  Jerrod Lagunas MD as Consulting Physician (Pulmonary Disease)  Luis Alberto Alvarenga MD as Consulting Physician (Hematology and Oncology)    Chief Complaint: Post-op CABG/MARLENE occlusion     Subjective    Patient physically looks good this morning but is feeling down today     Vital Signs  Temp:  [97.5 °F (36.4 °C)-98.7 °F (37.1 °C)] 98.7 °F (37.1 °C)  Heart Rate:  [70-91] 81  Resp:  [18] 18  BP: (107-144)/(65-94) 133/85  Body mass index is 28.86 kg/m².    Intake/Output Summary (Last 24 hours) at 6/23/2025 0842  Last data filed at 6/23/2025 0555  Gross per 24 hour   Intake 600 ml   Output 2520 ml   Net -1920 ml     No intake/output data recorded.          06/21/25  0500 06/22/25  0554 06/23/25  0555   Weight: 78.4 kg (172 lb 13.5 oz) 82.2 kg (181 lb 3.2 oz) 81.1 kg (178 lb 12.8 oz)         Objective:  General Appearance:  Comfortable and in no acute distress.    Vital signs: (most recent): Blood pressure 133/85, pulse 82, temperature 98.7 °F (37.1 °C), temperature source Oral, resp. rate 18, height 167.6 cm (66\"), weight 81.1 kg (178 lb 12.8 oz), SpO2 98%.  Vital signs are normal.  No fever.    Output: Producing urine.    Lungs:  Normal effort and normal respiratory rate.  There are decreased breath sounds.    Heart: Normal rate.  Regular rhythm.    Abdomen: Bowel sounds are normal.     Extremities: Normal range of motion.  There is no dependent edema.    Neurological: Patient is alert and oriented to person, place and time.    Skin:  Warm and dry.                  Results Review:        WBC WBC   Date Value Ref Range Status   06/23/2025 7.12 3.40 - 10.80 10*3/mm3 Final   06/22/2025 8.93 3.40 - 10.80 10*3/mm3 Final   06/21/2025 " 6.91 3.40 - 10.80 10*3/mm3 Final   06/20/2025 7.05 3.40 - 10.80 10*3/mm3 Final   06/20/2025 10.53 3.40 - 10.80 10*3/mm3 Final      HGB Hemoglobin   Date Value Ref Range Status   06/23/2025 9.4 (L) 13.0 - 17.7 g/dL Final   06/22/2025 10.3 (L) 13.0 - 17.7 g/dL Final   06/21/2025 10.2 (L) 13.0 - 17.7 g/dL Final   06/20/2025 11.3 (L) 13.0 - 17.7 g/dL Final   06/20/2025 11.8 (L) 13.0 - 17.7 g/dL Final   06/20/2025 7.8 (C) 12.0 - 17.0 g/dL Final   06/20/2025 7.8 (C) 12.0 - 17.0 g/dL Final   06/20/2025 8.5 (L) 12.0 - 17.0 g/dL Final   06/20/2025 8.2 (L) 12.0 - 17.0 g/dL Final   06/20/2025 8.2 (L) 12.0 - 17.0 g/dL Final   06/20/2025 8.8 (L) 12.0 - 17.0 g/dL Final      HCT Hematocrit   Date Value Ref Range Status   06/23/2025 27.9 (L) 37.5 - 51.0 % Final   06/22/2025 30.6 (L) 37.5 - 51.0 % Final   06/21/2025 30.5 (L) 37.5 - 51.0 % Final   06/20/2025 33.8 (L) 37.5 - 51.0 % Final   06/20/2025 36.2 (L) 37.5 - 51.0 % Final   06/20/2025 23 (L) 38 - 51 % Final   06/20/2025 23 (L) 38 - 51 % Final   06/20/2025 25 (L) 38 - 51 % Final   06/20/2025 24 (L) 38 - 51 % Final   06/20/2025 24 (L) 38 - 51 % Final   06/20/2025 26 (L) 38 - 51 % Final      Platelets Platelets   Date Value Ref Range Status   06/23/2025 117 (L) 140 - 450 10*3/mm3 Final   06/22/2025 142 140 - 450 10*3/mm3 Final   06/21/2025 112 (L) 140 - 450 10*3/mm3 Final   06/20/2025 105 (L) 140 - 450 10*3/mm3 Final   06/20/2025 132 (L) 140 - 450 10*3/mm3 Final   06/20/2025 140 140 - 450 10*3/mm3 Final        PT/INR:    Protime   Date Value Ref Range Status   06/21/2025 16.3 (H) 11.7 - 14.2 Seconds Final   06/20/2025 18.0 (H) 11.7 - 14.2 Seconds Final   06/20/2025 16.9 (H) 12.8 - 15.2 seconds Final     Comment:     Serial Number: [id number]:  0202   /  INR   Date Value Ref Range Status   06/21/2025 1.31 (H) 0.90 - 1.10 Final   06/20/2025 1.49 (H) 0.90 - 1.10 Final   06/20/2025 1.7 (H) 0.8 - 1.2 Final       Sodium Sodium   Date Value Ref Range Status   06/23/2025 132 (L) 136 -  145 mmol/L Final   06/22/2025 135 (L) 136 - 145 mmol/L Final   06/21/2025 142 136 - 145 mmol/L Final   06/20/2025 140 136 - 145 mmol/L Final   06/20/2025 138 136 - 145 mmol/L Final      Potassium Potassium   Date Value Ref Range Status   06/23/2025 3.5 3.5 - 5.2 mmol/L Final   06/22/2025 4.0 3.5 - 5.2 mmol/L Final     Comment:     Slight hemolysis detected by analyzer. Result may be falsely elevated.   06/22/2025 3.7 3.5 - 5.2 mmol/L Final   06/21/2025 4.0 3.5 - 5.2 mmol/L Final   06/20/2025 4.3 3.5 - 5.2 mmol/L Final   06/20/2025 4.6 3.5 - 5.2 mmol/L Final      Chloride Chloride   Date Value Ref Range Status   06/23/2025 98 98 - 107 mmol/L Final   06/22/2025 101 98 - 107 mmol/L Final   06/21/2025 109 (H) 98 - 107 mmol/L Final   06/20/2025 107 98 - 107 mmol/L Final   06/20/2025 106 98 - 107 mmol/L Final      Bicarbonate CO2   Date Value Ref Range Status   06/23/2025 23.0 22.0 - 29.0 mmol/L Final   06/22/2025 23.0 22.0 - 29.0 mmol/L Final   06/21/2025 21.0 (L) 22.0 - 29.0 mmol/L Final   06/20/2025 20.6 (L) 22.0 - 29.0 mmol/L Final   06/20/2025 22.1 22.0 - 29.0 mmol/L Final      BUN BUN   Date Value Ref Range Status   06/23/2025 12.0 8.0 - 23.0 mg/dL Final   06/22/2025 14.0 8.0 - 23.0 mg/dL Final   06/21/2025 14.0 8.0 - 23.0 mg/dL Final   06/20/2025 14.0 8.0 - 23.0 mg/dL Final   06/20/2025 13.0 8.0 - 23.0 mg/dL Final      Creatinine Creatinine   Date Value Ref Range Status   06/23/2025 0.87 0.76 - 1.27 mg/dL Final   06/22/2025 0.94 0.76 - 1.27 mg/dL Final   06/21/2025 1.06 0.76 - 1.27 mg/dL Final   06/20/2025 1.11 0.76 - 1.27 mg/dL Final   06/20/2025 0.99 0.76 - 1.27 mg/dL Final      Calcium Calcium   Date Value Ref Range Status   06/23/2025 8.4 (L) 8.6 - 10.5 mg/dL Final   06/22/2025 8.5 (L) 8.6 - 10.5 mg/dL Final   06/21/2025 8.1 (L) 8.6 - 10.5 mg/dL Final   06/20/2025 8.4 (L) 8.6 - 10.5 mg/dL Final   06/20/2025 8.3 (L) 8.6 - 10.5 mg/dL Final      Magnesium Magnesium   Date Value Ref Range Status   06/21/2025 2.6  (H) 1.6 - 2.4 mg/dL Final   06/20/2025 3.2 (H) 1.6 - 2.4 mg/dL Final   06/20/2025 3.4 (H) 1.6 - 2.4 mg/dL Final          aspirin, 81 mg, Oral, Daily  budesonide-formoterol, 2 puff, Inhalation, BID  enoxaparin sodium, 40 mg, Subcutaneous, Daily  gabapentin, 400 mg, Oral, Q8H  guaiFENesin, 1,200 mg, Oral, Q12H  insulin lispro, 2-9 Units, Subcutaneous, 4x Daily AC & at Bedtime  ipratropium-albuterol, 3 mL, Nebulization, 4x Daily - RT  metoprolol tartrate, 25 mg, Oral, Q12H  mupirocin, 1 Application, Each Nare, BID  pantoprazole, 40 mg, Oral, QAM  potassium chloride ER, 40 mEq, Oral, Q4H  senna-docusate sodium, 2 tablet, Oral, Nightly  theophylline, 300 mg, Oral, Q12H  warfarin, 5 mg, Oral, Once      milrinone, 0.25-0.375 mcg/kg/min, Last Rate: 0.25 mcg/kg/min (06/21/25 1717)  niCARdipine, 5-15 mg/hr, Last Rate: Stopped (06/21/25 0815)              Pulmonary hypertension    CAD (coronary artery disease)    Abnormal findings on diagnostic imaging of other specified body structures      Assessment & Plan    -Severe multivessel CAD- s/p urgent CABGx5 LIMA, PFO closure, MARLENE 40mm clip- [facility] 6/20/2025  -Pulmonary hypertension  -Hypertension  -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

## 2025-06-23 NOTE — PROGRESS NOTES
Contacted by radiologist regarding possibility of free air under the diaphragm. Discussed with Dr. Simon. Likely from chest tube insertion site. Abdominal exam benign. No imaging for now. Will continue to monitor.

## 2025-06-23 NOTE — PLAN OF CARE
Goal Outcome Evaluation:  Plan of Care Reviewed With: patient, significant other        Progress: improving  Outcome Evaluation: Pt seen for PT this AM and tolerated mobility well. Pt with chest tubes removed and on RA today. Stood with SBA and ambulated 200ft with min A - HHA x1. Pt agreeable to trialing no AD today, but noted increased unsteadiness and fatigues quickly. Pt taking a few standing rest breaks d/t SOA though satting >90% throughout session on RA. Pt returned to room and back to chair, lengthy seated rest break and then completed cardiac rehab exercises, left with needs met. PT will continue to follow, anticipate DC home with HHPT and family assist.    Anticipated Discharge Disposition (PT): home with assist, home with home health

## 2025-06-23 NOTE — THERAPY TREATMENT NOTE
Patient Name: Omar Choudhary  : 1945    MRN: [id number]                              Today's Date: 2025       Admit Date: 2025    Visit Dx:     ICD-10-CM ICD-9-CM   1. Coronary artery disease involving native heart, unspecified vessel or lesion type, unspecified whether angina present  I25.10 414.01   2. Pulmonary hypertension  I27.20 416.8   3. Abnormal findings on diagnostic imaging of other specified body structures  R93.89 793.99   4. S/P CABG (coronary artery bypass graft)  Z95.1 V45.81     Patient Active Problem List   Diagnosis    Benign prostatic hyperplasia    Bursitis    Depression    Diverticulosis    Family history of malignant neoplasm of breast    Hyperlipidemia    Hypertension    Internal derangement of right knee    Knee effusion    Asthma    Obstructive sleep apnea syndrome    Osteoarthritis    Osteopenia    Pain in knee    Postnasal drip    Prepatellar bursitis    Sciatica of right side    Diaphragm paralysis    Spinal stenosis of lumbar region with neurogenic claudication    Degenerative lumbar spinal stenosis    Anxiety    Pinguecula of right eye    Lumbar radiculopathy, chronic    Bilateral pseudophakia    Hemorrhoids without complication    Lumbar degenerative disc disease    Lumbar spondylosis    Other fecal abnormalities    Rectal bleeding    Scoliosis of lumbosacral region due to degenerative disease of spine in adult    Arthritis    Acute right-sided low back pain without sciatica    Sacroiliitis    Acquired spondylolisthesis    Acute cor pulmonale    Chronic low back pain    Anemia    Acute saddle pulmonary embolism with acute cor pulmonale    Acute respiratory failure with hypoxia    Acute deep vein thrombosis (DVT) of femoral vein of right lower extremity    Pulmonary hypertension    CAD (coronary artery disease)    Abnormal findings on diagnostic imaging of other specified body structures     Past Medical History:   Diagnosis Date    ADHD (attention deficit  hyperactivity disorder) 2021    Hard to focus on tasks and follow through.    Allergic 08/20/2018    Arthritis     Asthma     Carpal tunnel syndrome     no pain    Cataract     Deep vein thrombosis 01/10/2025    Depression     Erectile dysfunction     2018 at 73 years old    Family history of malignant neoplasm of breast 09/26/2019    Hyperlipidemia 09/26/2019    Allergic to statins    Hypertension 03/01/2012    Managing w/ Losartan Potassium    Leg pain, right     Low back pain     Decompress and fusion surgery 10/4&5/2022    Neuropathy     feet    Osteopenia 11/28/2019    Poor balance     Pulmonary embolism     Acute pulmonary saddle embolism    Reactive airway disease 01/15/2016    Scoliosis 1960    Shortness of breath 2017    Sleep apnea     Substance abuse 1966    Alcoholic     Past Surgical History:   Procedure Laterality Date    CARDIAC CATHETERIZATION  1992    CARDIAC CATHETERIZATION N/A 01/11/2025    Procedure: Right Heart Cath;  Surgeon: Nancy Hdez MD;  Location:  [facility];  Service: Cardiovascular;  Laterality: N/A;    CARDIAC CATHETERIZATION  01/11/2025    Procedure: Percutaneous Manual Thrombectomy;  Surgeon: Nancy Hdez MD;  Location:  [facility];  Service: Cardiovascular;;    CARDIAC CATHETERIZATION Bilateral 01/11/2025    Procedure: Pulmonary angiography;  Surgeon: Nancy Hdez MD;  Location:  [facility];  Service: Cardiovascular;  Laterality: Bilateral;    CARDIAC CATHETERIZATION N/A 6/18/2025    Procedure: Right and Left Heart Cath;  Surgeon: Nancy Hdez MD;  Location:  [facility];  Service: Cardiovascular;  Laterality: N/A;    CARDIAC CATHETERIZATION N/A 6/18/2025    Procedure: Coronary angiography;  Surgeon: Nancy Hdez MD;  Location:  [facility];  Service: Cardiovascular;  Laterality: N/A;    COLONOSCOPY  2013    No polyps, slight diverticulitis    EYE SURGERY  2016    Cataracts     KNEE ARTHROSCOPY W/ ACL RECONSTRUCTION Right 2019    LUMBAR FUSION Bilateral 10/05/2022    Procedure: DAY 2 LUMBAR LAMINECTOMY TRANSFORAMINAL LUMBAR INTERBODY FUSION L2,L3,L4 WITH NEURO ROBOT;  Surgeon: John Do MD;  Location: [facility] MAIN OR;  Service: Robotics - Neuro;  Laterality: Bilateral;    LUMBAR FUSION N/A 10/04/2022    Procedure: DAY 1 LUMBAR LATERAL INTERBODY FUSION WITH NEURO ROBOT L2, L3.L4;  Surgeon: John Do MD;  Location: [facility] MAIN OR;  Service: Robotics - Neuro;  Laterality: N/A;  left side approach    MOUTH SURGERY      SPINE SURGERY  10/4&5/2022    Dr. John Do, [facility]      General Information       Row Name 06/23/25 0913          Physical Therapy Time and Intention    Document Type therapy note (daily note)  -     Mode of Treatment individual therapy;physical therapy  -       Row Name 06/23/25 0913          General Information    Patient Profile Reviewed yes  -     Existing Precautions/Restrictions cardiac;fall;spinal  -       Row Name 06/23/25 0913          Cognition    Orientation Status (Cognition) oriented x 4  -       Row Name 06/23/25 0913          Safety Issues/Impairments Affecting Functional Mobility    Impairments Affecting Function (Mobility) balance;endurance/activity tolerance;strength;pain;range of motion (ROM)  -               User Key  (r) = Recorded By, (t) = Taken By, (c) = Cosigned By      Initials Name Provider Type     Consuelo Canada PT Physical Therapist                   Mobility       Row Name 06/23/25 0914          Bed Mobility    Sit-Supine [place] (Bed Mobility) not tested  -     Comment, (Bed Mobility) NT - [facility]  -       Row Name 06/23/25 0914          Sit-Stand Transfer    Sit-Stand [place] (Transfers) verbal cues;standby assist  -     Assistive Device (Sit-Stand Transfers) other (see comments)  No AD  -       Row Name 06/23/25 0914          Gait/Stairs (Locomotion)    [place] Level  (Gait) verbal cues;minimum assist (75% patient effort);1 person assist  -     Assistive Device (Gait) other (see comments)  HHA x1  -     Distance in Feet (Gait) 200  -     Deviations/Abnormal Patterns (Gait) antalgic;jordy decreased;gait speed decreased;stride length decreased;festinating/shuffling  -     Bilateral Gait Deviations forward flexed posture  -               User Key  (r) = Recorded By, (t) = Taken By, (c) = Cosigned By      Initials Name Provider Type     Consuelo Canada PT Physical Therapist                   Obj/Interventions       Row Name 06/23/25 0914          Motor Skills    Therapeutic Exercise --  10 reps cardiac rehab exercises  -               User Key  (r) = Recorded By, (t) = Taken By, (c) = Cosigned By      Initials Name Provider Type     Consuelo Canada PT Physical Therapist                   Goals/Plan    No documentation.                  Clinical Impression       Row Name 06/23/25 0915          Pain    Pretreatment Pain Rating 0/10 - no pain  -     Posttreatment Pain Rating 0/10 - no pain  -       Row Name 06/23/25 0915          Plan of Care Review    Plan of Care Reviewed With patient;significant other  -     Progress improving  -     Outcome Evaluation Pt seen for PT this AM and tolerated mobility well. Pt with chest tubes removed and on RA today. Stood with SBA and ambulated 200ft with min A - HHA x1. Pt agreeable to trialing no AD today, but noted increased unsteadiness and fatigues quickly. Pt taking a few standing rest breaks d/t SOA though satting >90% throughout session on RA. Pt returned to room and back to chair, lengthy seated rest break and then completed cardiac rehab exercises, left with needs met. PT will continue to follow, anticipate DC home with HHPT and family assist.  -       Row Name 06/23/25 0915          Vital Signs    O2 Delivery Pre Treatment room air  -     O2 Delivery Intra Treatment room air  -     O2 Delivery Post  Treatment room air  -       Row Name 06/23/25 0915          Positioning and Restraints    Pre-Treatment Position sitting in chair/recliner  -     Post Treatment Position chair  -     In Chair reclined;call light within reach;encouraged to call for assist;exit alarm on;with family/caregiver  -               User Key  (r) = Recorded By, (t) = Taken By, (c) = Cosigned By      Initials Name Provider Type     Consuelo Canada PT Physical Therapist                   Outcome Measures       Row Name 06/23/25 0920          How much help from another person do you currently need...    Turning from your back to your side while in flat bed without using bedrails? 4  -     Moving from lying on back to sitting on the side of a flat bed without bedrails? 4  -BH     Moving to and from a bed to a chair (including a wheelchair)? 3  -BH     Standing up from a chair using your arms (e.g., wheelchair, bedside chair)? 3  -BH     Climbing 3-5 steps with a railing? 2  -     To walk in hospital room? 3  -     AM-PAC 6 Clicks Score (PT) 19  -     Highest Level of Mobility Goal Walk 10 Steps or More-6  -       Row Name 06/23/25 0920          Functional Assessment    Outcome Measure Options AM-PAC 6 Clicks Basic Mobility (PT)  -               User Key  (r) = Recorded By, (t) = Taken By, (c) = Cosigned By      Initials Name Provider Type     Consuelo Canada PT Physical Therapist                                 Physical Therapy Education       Title: PT OT SLP Therapies (Done)       Topic: Physical Therapy (Done)       Point: Mobility training (Done)       Learning Progress Summary            Patient Acceptance, E,TB,D, VU,NR by  at 6/23/2025 0920    Acceptance, E,TB,D, VU,NR by  at 6/22/2025 1158    Acceptance, E,TB,D, VU,NR by  at 6/21/2025 1356                      Point: Home exercise program (Done)       Learning Progress Summary            Patient Acceptance, E,TB,D, VU,NR by  at 6/23/2025 0920                       Point: Body mechanics (Done)       Learning Progress Summary            Patient Acceptance, E,TB,D, VU,NR by  at 6/23/2025 0920    Acceptance, E,TB,D, VU,NR by  at 6/22/2025 1158    Acceptance, E,TB,D, VU,NR by  at 6/21/2025 1356                      Point: Precautions (Done)       Learning Progress Summary            Patient Acceptance, E,TB,D, VU,NR by  at 6/23/2025 0920    Acceptance, E,TB,D, VU,NR by  at 6/22/2025 1158    Acceptance, E,TB,D, VU,NR by  at 6/21/2025 1356                                      User Key       Initials Effective Dates Name Provider Type Discipline     04/08/22 -  Consuelo Canada, PT Physical Therapist PT                  PT Recommendation and Plan  Planned Therapy Interventions (PT): balance training, bed mobility training, gait training, home exercise program, patient/family education, ROM (range of motion), stair training, strengthening, stretching, transfer training  Progress: improving  Outcome Evaluation: Pt seen for PT this AM and tolerated mobility well. Pt with chest tubes removed and on RA today. Stood with SBA and ambulated 200ft with min A - HHA x1. Pt agreeable to trialing no AD today, but noted increased unsteadiness and fatigues quickly. Pt taking a few standing rest breaks d/t SOA though satting >90% throughout session on RA. Pt returned to room and back to chair, lengthy seated rest break and then completed cardiac rehab exercises, left with needs met. PT will continue to follow, anticipate DC home with HHPT and family assist.     Time Calculation:   PT Evaluation Complexity  History, PT Evaluation Complexity: 1-2 personal factors and/or comorbidities  Examination of Body Systems (PT Eval Complexity): total of 3 or more elements  Clinical Presentation (PT Evaluation Complexity): evolving  Clinical Decision Making (PT Evaluation Complexity): moderate complexity  Overall Complexity (PT Evaluation Complexity): moderate complexity     PT Charges        Row Name 06/23/25 0926             Time Calculation    Start Time 0903  -      Stop Time 0926  -      Time Calculation (min) 23 min  -      PT Received On 06/23/25  -      PT - Next Appointment 06/24/25  -         Time Calculation- PT    Total Timed Code Minutes- PT 23 minute(s)  -         Timed Charges    38896 - PT Therapeutic Exercise Minutes 8  -      51832 - Gait Training Minutes  10  -      32440 - PT Therapeutic Activity Minutes 5  -         Total Minutes    Timed Charges Total Minutes 23  -       Total Minutes 23  -                User Key  (r) = Recorded By, (t) = Taken By, (c) = Cosigned By      Initials Name Provider Type     Consuelo Canada, PT Physical Therapist                  Therapy Charges for Today       Code Description Service Date Service Provider Modifiers Qty    42715210395 HC GAIT TRAINING EA 15 MIN 6/22/2025 Consuelo Canada, PT GP 1    95461086949 HC PT THER SUPP EA 15 MIN 6/22/2025 Consuelo Canada, PT GP 1    86752149412 HC PT THER PROC EA 15 MIN 6/23/2025 Consuelo Canada, PT GP 1    22968778341 HC GAIT TRAINING EA 15 MIN 6/23/2025 Consuelo Canada, PT GP 1            PT G-Codes  Outcome Measure Options: AM-PAC 6 Clicks Basic Mobility (PT)  AM-PAC 6 Clicks Score (PT): 19  PT Discharge Summary  Anticipated Discharge Disposition (PT): home with assist, home with home health    Consuelo Canada, PT  6/23/2025

## 2025-06-24 ENCOUNTER — APPOINTMENT (OUTPATIENT)
Dept: GENERAL RADIOLOGY | Facility: HOSPITAL | Age: 80
End: 2025-06-24
Payer: MEDICARE

## 2025-06-24 LAB
ANION GAP SERPL CALCULATED.3IONS-SCNC: 12.5 MMOL/L (ref 5–15)
BUN SERPL-MCNC: 13 MG/DL (ref 8–23)
BUN/CREAT SERPL: 16 (ref 7–25)
CALCIUM SPEC-SCNC: 8.8 MG/DL (ref 8.6–10.5)
CHLORIDE SERPL-SCNC: 96 MMOL/L (ref 98–107)
CO2 SERPL-SCNC: 26.5 MMOL/L (ref 22–29)
CREAT SERPL-MCNC: 0.81 MG/DL (ref 0.76–1.27)
DEPRECATED RDW RBC AUTO: 47.7 FL (ref 37–54)
EGFRCR SERPLBLD CKD-EPI 2021: 90.8 ML/MIN/1.73
ERYTHROCYTE [DISTWIDTH] IN BLOOD BY AUTOMATED COUNT: 13.8 % (ref 12.3–15.4)
GLUCOSE BLDC GLUCOMTR-MCNC: 110 MG/DL (ref 70–130)
GLUCOSE BLDC GLUCOMTR-MCNC: 114 MG/DL (ref 70–130)
GLUCOSE BLDC GLUCOMTR-MCNC: 116 MG/DL (ref 70–130)
GLUCOSE BLDC GLUCOMTR-MCNC: 120 MG/DL (ref 70–130)
GLUCOSE SERPL-MCNC: 144 MG/DL (ref 65–99)
HCT VFR BLD AUTO: 29.3 % (ref 37.5–51)
HGB BLD-MCNC: 9.8 G/DL (ref 13–17.7)
INR PPP: 1.24 (ref 0.9–1.1)
MCH RBC QN AUTO: 31.9 PG (ref 26.6–33)
MCHC RBC AUTO-ENTMCNC: 33.4 G/DL (ref 31.5–35.7)
MCV RBC AUTO: 95.4 FL (ref 79–97)
PLATELET # BLD AUTO: 166 10*3/MM3 (ref 140–450)
PMV BLD AUTO: 10.5 FL (ref 6–12)
POTASSIUM SERPL-SCNC: 3.6 MMOL/L (ref 3.5–5.2)
POTASSIUM SERPL-SCNC: 3.8 MMOL/L (ref 3.5–5.2)
PROTHROMBIN TIME: 15.6 SECONDS (ref 11.7–14.2)
RBC # BLD AUTO: 3.07 10*6/MM3 (ref 4.14–5.8)
SODIUM SERPL-SCNC: 135 MMOL/L (ref 136–145)
WBC NRBC COR # BLD AUTO: 6.26 10*3/MM3 (ref 3.4–10.8)

## 2025-06-24 PROCEDURE — 80048 BASIC METABOLIC PNL TOTAL CA: CPT | Performed by: NURSE PRACTITIONER

## 2025-06-24 PROCEDURE — 97530 THERAPEUTIC ACTIVITIES: CPT

## 2025-06-24 PROCEDURE — 84132 ASSAY OF SERUM POTASSIUM: CPT | Performed by: INTERNAL MEDICINE

## 2025-06-24 PROCEDURE — 94799 UNLISTED PULMONARY SVC/PX: CPT

## 2025-06-24 PROCEDURE — 85610 PROTHROMBIN TIME: CPT | Performed by: THORACIC SURGERY (CARDIOTHORACIC VASCULAR SURGERY)

## 2025-06-24 PROCEDURE — 99232 SBSQ HOSP IP/OBS MODERATE 35: CPT | Performed by: INTERNAL MEDICINE

## 2025-06-24 PROCEDURE — 82948 REAGENT STRIP/BLOOD GLUCOSE: CPT

## 2025-06-24 PROCEDURE — 94664 DEMO&/EVAL PT USE INHALER: CPT

## 2025-06-24 PROCEDURE — 25010000002 ENOXAPARIN PER 10 MG: Performed by: NURSE PRACTITIONER

## 2025-06-24 PROCEDURE — 85027 COMPLETE CBC AUTOMATED: CPT | Performed by: NURSE PRACTITIONER

## 2025-06-24 PROCEDURE — 84132 ASSAY OF SERUM POTASSIUM: CPT | Performed by: THORACIC SURGERY (CARDIOTHORACIC VASCULAR SURGERY)

## 2025-06-24 PROCEDURE — 94761 N-INVAS EAR/PLS OXIMETRY MLT: CPT

## 2025-06-24 PROCEDURE — 71045 X-RAY EXAM CHEST 1 VIEW: CPT

## 2025-06-24 PROCEDURE — 94762 N-INVAS EAR/PLS OXIMTRY CONT: CPT

## 2025-06-24 RX ORDER — WARFARIN SODIUM 5 MG/1
5 TABLET ORAL
Status: COMPLETED | OUTPATIENT
Start: 2025-06-24 | End: 2025-06-24

## 2025-06-24 RX ORDER — TRAZODONE HYDROCHLORIDE 50 MG/1
50 TABLET ORAL ONCE
Status: COMPLETED | OUTPATIENT
Start: 2025-06-24 | End: 2025-06-24

## 2025-06-24 RX ORDER — POTASSIUM CHLORIDE 1500 MG/1
40 TABLET, EXTENDED RELEASE ORAL EVERY 4 HOURS
Status: COMPLETED | OUTPATIENT
Start: 2025-06-24 | End: 2025-06-24

## 2025-06-24 RX ORDER — POTASSIUM CHLORIDE 1500 MG/1
20 TABLET, EXTENDED RELEASE ORAL ONCE
Status: COMPLETED | OUTPATIENT
Start: 2025-06-24 | End: 2025-06-24

## 2025-06-24 RX ADMIN — BUDESONIDE AND FORMOTEROL FUMARATE DIHYDRATE 2 PUFF: 160; 4.5 AEROSOL RESPIRATORY (INHALATION) at 21:51

## 2025-06-24 RX ADMIN — IPRATROPIUM BROMIDE AND ALBUTEROL SULFATE 3 ML: .5; 3 SOLUTION RESPIRATORY (INHALATION) at 21:46

## 2025-06-24 RX ADMIN — ASPIRIN 81 MG: 81 TABLET, COATED ORAL at 08:10

## 2025-06-24 RX ADMIN — PANTOPRAZOLE SODIUM 40 MG: 40 TABLET, DELAYED RELEASE ORAL at 07:10

## 2025-06-24 RX ADMIN — POTASSIUM CHLORIDE 20 MEQ: 1500 TABLET, EXTENDED RELEASE ORAL at 07:10

## 2025-06-24 RX ADMIN — IPRATROPIUM BROMIDE AND ALBUTEROL SULFATE 3 ML: .5; 3 SOLUTION RESPIRATORY (INHALATION) at 08:20

## 2025-06-24 RX ADMIN — GUAIFENESIN 1200 MG: 600 TABLET, EXTENDED RELEASE ORAL at 08:10

## 2025-06-24 RX ADMIN — IPRATROPIUM BROMIDE AND ALBUTEROL SULFATE 3 ML: .5; 3 SOLUTION RESPIRATORY (INHALATION) at 11:46

## 2025-06-24 RX ADMIN — ENOXAPARIN SODIUM 40 MG: 100 INJECTION SUBCUTANEOUS at 08:10

## 2025-06-24 RX ADMIN — GABAPENTIN 400 MG: 400 CAPSULE ORAL at 07:10

## 2025-06-24 RX ADMIN — METOPROLOL TARTRATE 25 MG: 25 TABLET, FILM COATED ORAL at 08:10

## 2025-06-24 RX ADMIN — WARFARIN SODIUM 5 MG: 5 TABLET ORAL at 18:52

## 2025-06-24 RX ADMIN — MUPIROCIN 1 APPLICATION: 20 OINTMENT TOPICAL at 22:05

## 2025-06-24 RX ADMIN — THEOPHYLLINE 300 MG: 300 TABLET, EXTENDED RELEASE ORAL at 08:10

## 2025-06-24 RX ADMIN — THEOPHYLLINE 300 MG: 300 TABLET, EXTENDED RELEASE ORAL at 22:05

## 2025-06-24 RX ADMIN — GABAPENTIN 400 MG: 400 CAPSULE ORAL at 22:04

## 2025-06-24 RX ADMIN — POTASSIUM CHLORIDE 20 MEQ: 1500 TABLET, EXTENDED RELEASE ORAL at 08:15

## 2025-06-24 RX ADMIN — GABAPENTIN 400 MG: 400 CAPSULE ORAL at 14:26

## 2025-06-24 RX ADMIN — METOPROLOL TARTRATE 25 MG: 25 TABLET, FILM COATED ORAL at 22:04

## 2025-06-24 RX ADMIN — HYDROCODONE BITARTRATE AND ACETAMINOPHEN 2 TABLET: 5; 325 TABLET ORAL at 22:04

## 2025-06-24 RX ADMIN — SENNOSIDES AND DOCUSATE SODIUM 2 TABLET: 50; 8.6 TABLET ORAL at 22:04

## 2025-06-24 RX ADMIN — HYDROCODONE BITARTRATE AND ACETAMINOPHEN 2 TABLET: 5; 325 TABLET ORAL at 14:28

## 2025-06-24 RX ADMIN — FUROSEMIDE 40 MG: 40 TABLET ORAL at 08:10

## 2025-06-24 RX ADMIN — TRAZODONE HYDROCHLORIDE 50 MG: 50 TABLET ORAL at 22:36

## 2025-06-24 RX ADMIN — BUDESONIDE AND FORMOTEROL FUMARATE DIHYDRATE 2 PUFF: 160; 4.5 AEROSOL RESPIRATORY (INHALATION) at 08:20

## 2025-06-24 RX ADMIN — POTASSIUM CHLORIDE 40 MEQ: 1500 TABLET, EXTENDED RELEASE ORAL at 14:27

## 2025-06-24 RX ADMIN — POTASSIUM CHLORIDE 40 MEQ: 1500 TABLET, EXTENDED RELEASE ORAL at 18:52

## 2025-06-24 NOTE — PROGRESS NOTES
"Access did follow up with pt. Pt was in good spirits but stated he requested to stay another night as he does not feel ready to be discharged. Pt states he felt \"exhausted\" today. Pt declined outpt resources as he feels he has a good support system in place. Access will sign off.  "

## 2025-06-24 NOTE — NURSING NOTE
Pt discharging home with multiple stairs in the home, would like to receive PT/OT with AD for use of stairs in the home

## 2025-06-24 NOTE — PROGRESS NOTES
[facility] Follow Up    Chief Complaint: Follow up CAD    Interval History: Reports some difficulty taking a deep breath due to sternal incision.  Otherwise no significant pain or shortness of breath.  Initially reported that he felt ready to go home but after considering further felt that he needed another day in the hospital.    Objective:     Objective:  Temp:  [97.8 °F (36.6 °C)-98.7 °F (37.1 °C)] 97.8 °F (36.6 °C)  Heart Rate:  [73-91] 86  Resp:  [16] 16  BP: (109-124)/(68-92) 124/80     Intake/Output Summary (Last 24 hours) at 6/24/2025 1027  Last data filed at 6/24/2025 0818  Gross per 24 hour   Intake 838 ml   Output 2065 ml   Net -1227 ml     Body mass index is 28.34 kg/m².      06/22/25  0554 06/23/25  0555 06/24/25  0437   Weight: 82.2 kg (181 lb 3.2 oz) 81.1 kg (178 lb 12.8 oz) 79.7 kg (175 lb 9.6 oz)     Weight change: -1.452 kg (-3 lb 3.2 oz)      Physical Exam:   General : Alert, cooperative, in no acute distress.  Neuro: Alert,cooperative and oriented.  Lungs: CTAB. Normal respiratory effort and rate.  CV: Regular rate and rhythm, normal S1 and S2, no murmurs, gallops or rubs.  ABD: Soft, nontender, nondistended. Positive bowel sounds.  Extr: No edema or cyanosis, moves all extremities.    Lab Review:   Results from last 7 days   Lab Units 06/24/25  0315 06/23/25  1326 06/23/25  0310 06/20/25  1147 06/19/25  0406   SODIUM mmol/L 135*  --  132*   < > 138   POTASSIUM mmol/L 3.8 4.1 3.5   < > 3.9   CHLORIDE mmol/L 96*  --  98   < > 103   CO2 mmol/L 26.5  --  23.0   < > 21.7*   BUN mg/dL 13.0  --  12.0   < > 9.0   CREATININE mg/dL 0.81  --  0.87   < > 0.88   GLUCOSE mg/dL 144*  --  119*   < > 104*   CALCIUM mg/dL 8.8  --  8.4*   < > 8.8   AST (SGOT) U/L  --   --   --   --  18   ALT (SGPT) U/L  --   --   --   --  19    < > = values in this interval not displayed.         Results from last 7 days   Lab Units 06/24/25  0315 06/23/25  0310   WBC 10*3/mm3 6.26 7.12   HEMOGLOBIN g/dL 9.8*  9.4*   HEMATOCRIT % 29.3* 27.9*   PLATELETS 10*3/mm3 166 117*     Results from last 7 days   Lab Units 06/24/25  0315 06/23/25  0929 06/21/25  0244 06/20/25  1147 06/20/25  1057 06/19/25  0406   INR  1.24* 1.28*   < > 1.49*   < > 1.27*   APTT seconds  --   --   --  33.0  --  32.6    < > = values in this interval not displayed.     Results from last 7 days   Lab Units 06/21/25  0244 06/20/25  1527   MAGNESIUM mg/dL 2.6* 3.2*     Results from last 7 days   Lab Units 06/19/25  0406   CHOLESTEROL mg/dL 227*   TRIGLYCERIDES mg/dL 189*   HDL CHOL mg/dL 59     Results from last 7 days   Lab Units 06/19/25  0406   PROBNP pg/mL 245.0         I reviewed the patient's new clinical results.  I personally viewed and interpreted the patient's EKG  Current Medications:   Scheduled Meds:aspirin, 81 mg, Oral, Daily  budesonide-formoterol, 2 puff, Inhalation, BID  enoxaparin sodium, 40 mg, Subcutaneous, Daily  furosemide, 40 mg, Oral, Daily  gabapentin, 400 mg, Oral, Q8H  guaiFENesin, 1,200 mg, Oral, Q12H  insulin lispro, 2-9 Units, Subcutaneous, 4x Daily AC & at Bedtime  ipratropium-albuterol, 3 mL, Nebulization, 4x Daily - RT  metoprolol tartrate, 25 mg, Oral, Q12H  mupirocin, 1 Application, Each Nare, BID  pantoprazole, 40 mg, Oral, QAM  potassium chloride, 20 mEq, Oral, Daily  senna-docusate sodium, 2 tablet, Oral, Nightly  theophylline, 300 mg, Oral, Q12H  warfarin, 5 mg, Oral, Once      Continuous Infusions:milrinone, 0.25-0.375 mcg/kg/min, Last Rate: 0.25 mcg/kg/min (06/21/25 1717)  niCARdipine, 5-15 mg/hr, Last Rate: Stopped (06/21/25 0815)        Allergies:  Allergies   Allergen Reactions    Statins Myalgia     Other reaction(s): muscle soreness       Assessment/Plan:     Severe multivessel coronary artery disease.  Including distal left main disease by elective cath 6/18/2025. Now status post urgent CABG x 5 with LIMA to LAD, vein graft to ramus intermedius, vein graft to OM3, vein graft to RV branch and vein graft to PDA.  MARLENE closure 40 mm clip. Closure of PFO, 6/20/25 - [facility]. Preserved LVEF by echo 3/6/25.   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 Warfarin restarted on 6/23/2025.  Hypertension well-controlled.  Hyperlipidemia  Severe mitral annular calcification  Aortic valve sclerosis without stenosis  Statin intolerance.  will need to consider alternative therapy as outpatient.   Left hemidiaphragm paralysis  Obstructive sleep apnea   Asthma/COPD      -Continue current cardiac management.  - Possibly home in the next day or so.    Nancy Hdez MD  06/24/25  10:27 EDT

## 2025-06-24 NOTE — PLAN OF CARE
Goal Outcome Evaluation:  Plan of Care Reviewed With: patient        Progress: improving  Outcome Evaluation: Pt s/p CABGx5 LIMA LSVG LAAC & PFO closure. Pt completed overnight sleep oxymetry with home CPAP unit. Pt also states he lives in home with multiple steps, and would like for PT/OT to address those concerns before he is discharged. Lab results reviewed, electrolytes replaced per protocol. VSS, paint treated per MAR. No acute concerns at this time. Will update POC as needed

## 2025-06-24 NOTE — PLAN OF CARE
Goal Outcome Evaluation:   Pt VSS, POD 4, ambulating around nursing station with staff, K+ low , pt on K replacement, No C/O pain or SOA at this time. Plan is to discharged home 6/25 per MD. Will continue with current POC and update as needed.

## 2025-06-24 NOTE — PLAN OF CARE
Goal Outcome Evaluation:  Plan of Care Reviewed With: patient           Outcome Evaluation: Patient seen for PT session this AM. Patient sitting [facility] upon arrival. Patient performed cardiac post op exercises x10 while seated in the chair. Patient stood from the chair with SBA. Patient stood and ambulated 100ft with CGA-Butch. Gait mildly unsteady at times though patient able to self correct. Patient required multiple standing rest breaks. Patient safely navigated steps though required a standing rest break ~every 2 steps 2/2 shortness of breath. Plan remains home with assist and HHPT at d/c. Patient may benefit from an AD at d/c to improved balance and gait mechanics. Acute PT will continue to monitor.

## 2025-06-24 NOTE — THERAPY TREATMENT NOTE
Patient Name: Omar Choudhary  : 1945    MRN: [id number]                              Today's Date: 2025       Admit Date: 2025    Visit Dx:     ICD-10-CM ICD-9-CM   1. Coronary artery disease involving native heart, unspecified vessel or lesion type, unspecified whether angina present  I25.10 414.01   2. Pulmonary hypertension  I27.20 416.8   3. Abnormal findings on diagnostic imaging of other specified body structures  R93.89 793.99   4. S/P CABG (coronary artery bypass graft)  Z95.1 V45.81     Patient Active Problem List   Diagnosis    Benign prostatic hyperplasia    Bursitis    Depression    Diverticulosis    Family history of malignant neoplasm of breast    Hyperlipidemia    Hypertension    Internal derangement of right knee    Knee effusion    Asthma    Obstructive sleep apnea syndrome    Osteoarthritis    Osteopenia    Pain in knee    Postnasal drip    Prepatellar bursitis    Sciatica of right side    Diaphragm paralysis    Spinal stenosis of lumbar region with neurogenic claudication    Degenerative lumbar spinal stenosis    Anxiety    Pinguecula of right eye    Lumbar radiculopathy, chronic    Bilateral pseudophakia    Hemorrhoids without complication    Lumbar degenerative disc disease    Lumbar spondylosis    Other fecal abnormalities    Rectal bleeding    Scoliosis of lumbosacral region due to degenerative disease of spine in adult    Arthritis    Acute right-sided low back pain without sciatica    Sacroiliitis    Acquired spondylolisthesis    Acute cor pulmonale    Chronic low back pain    Anemia    Acute saddle pulmonary embolism with acute cor pulmonale    Acute respiratory failure with hypoxia    Acute deep vein thrombosis (DVT) of femoral vein of right lower extremity    Pulmonary hypertension    CAD (coronary artery disease)    Abnormal findings on diagnostic imaging of other specified body structures     Past Medical History:   Diagnosis Date    ADHD (attention deficit  hyperactivity disorder) 2021    Hard to focus on tasks and follow through.    Allergic 08/20/2018    Arthritis     Asthma     Carpal tunnel syndrome     no pain    Cataract     Deep vein thrombosis 01/10/2025    Depression     Erectile dysfunction     2018 at 73 years old    Family history of malignant neoplasm of breast 09/26/2019    Hyperlipidemia 09/26/2019    Allergic to statins    Hypertension 03/01/2012    Managing w/ Losartan Potassium    Leg pain, right     Low back pain     Decompress and fusion surgery 10/4&5/2022    Neuropathy     feet    Osteopenia 11/28/2019    Poor balance     Pulmonary embolism     Acute pulmonary saddle embolism    Reactive airway disease 01/15/2016    Scoliosis 1960    Shortness of breath 2017    Sleep apnea     Substance abuse 1966    Alcoholic     Past Surgical History:   Procedure Laterality Date    CARDIAC CATHETERIZATION  1992    CARDIAC CATHETERIZATION N/A 01/11/2025    Procedure: Right Heart Cath;  Surgeon: Nancy Hdez MD;  Location:  [facility];  Service: Cardiovascular;  Laterality: N/A;    CARDIAC CATHETERIZATION  01/11/2025    Procedure: Percutaneous Manual Thrombectomy;  Surgeon: Nancy Hdez MD;  Location:  [facility];  Service: Cardiovascular;;    CARDIAC CATHETERIZATION Bilateral 01/11/2025    Procedure: Pulmonary angiography;  Surgeon: Nancy Hdez MD;  Location:  [facility];  Service: Cardiovascular;  Laterality: Bilateral;    CARDIAC CATHETERIZATION N/A 6/18/2025    Procedure: Right and Left Heart Cath;  Surgeon: Nancy Hdez MD;  Location:  [facility];  Service: Cardiovascular;  Laterality: N/A;    CARDIAC CATHETERIZATION N/A 6/18/2025    Procedure: Coronary angiography;  Surgeon: Nancy Hdez MD;  Location:  [facility];  Service: Cardiovascular;  Laterality: N/A;    COLONOSCOPY  2013    No polyps, slight diverticulitis    CORONARY ARTERY BYPASS GRAFT N/A  6/20/2025    Procedure: SHE STERNOTOMY CORONARY ARTERY BYPASS GRAFT TIMES 5 USING LEFT INTERNAL MAMMARY ARTERY AND LEFT GREATER SAPHENOUS VEIN GRAFT PER ENDOSCOPIC VEIN HARVESTING, CLOSURE OF PFO, EXCLUSION OF LEFT ATRIAL APPENDAGE AND PRP;  Surgeon: Jr Chago Simon MD;  Location: [facility];  Service: Cardiothoracic;  Laterality: N/A;    EYE SURGERY  2016    Cataracts    KNEE ARTHROSCOPY W/ ACL RECONSTRUCTION Right 2019    LUMBAR FUSION Bilateral 10/05/2022    Procedure: DAY 2 LUMBAR LAMINECTOMY TRANSFORAMINAL LUMBAR INTERBODY FUSION L2,L3,L4 WITH NEURO ROBOT;  Surgeon: John Do MD;  Location: [facility] OR;  Service: Robotics - Neuro;  Laterality: Bilateral;    LUMBAR FUSION N/A 10/04/2022    Procedure: DAY 1 LUMBAR LATERAL INTERBODY FUSION WITH NEURO ROBOT L2, L3.L4;  Surgeon: John Do MD;  Location: [facility] OR;  Service: Robotics - Neuro;  Laterality: N/A;  left side approach    MOUTH SURGERY      SPINE SURGERY  10/4&5/2022    Dr. John Do, [facility]      General Information       Row Name 06/24/25 0940          Physical Therapy Time and Intention    Document Type therapy note (daily note)  -SM     Mode of Treatment individual therapy;physical therapy  -       Row Name 06/24/25 0939          General Information    Patient Profile Reviewed yes  -SM     Existing Precautions/Restrictions cardiac;fall  -       Row Name 06/24/25 0939          Cognition    Orientation Status (Cognition) oriented x 4  -SM       Row Name 06/24/25 0995          Safety Issues/Impairments Affecting Functional Mobility    Impairments Affecting Function (Mobility) balance;endurance/activity tolerance;strength;pain;range of motion (ROM);shortness of breath  -               User Key  (r) = Recorded By, (t) = Taken By, (c) = Cosigned By      Initials Name Provider Type    SM Елена Hanson PT Physical Therapist                   Mobility       Row Name 06/24/25 2860          Bed  Mobility    Comment, (Bed Mobility) Patient UI upon arrival  -       Row Name 06/24/25 0940          Sit-Stand Transfer    Sit-Stand [place] (Transfers) standby assist  -       Row Name 06/24/25 0940          Gait/Stairs (Locomotion)    [place] Level (Gait) contact guard;minimum assist (75% patient effort)  -     Assistive Device (Gait) --  no AD  -     Distance in Feet (Gait) 100  -     Deviations/Abnormal Patterns (Gait) jordy decreased;gait speed decreased;stride length decreased;festinating/shuffling  -     Bilateral Gait Deviations forward flexed posture  -     [place] Level (Stairs) contact guard  -     Handrail Location (Stairs) left side (ascending)  -     Number of Steps (Stairs) 12  -     Ascending Technique (Stairs) step-over-step  -     Descending Technique (Stairs) step-over-step  -     Comment, (Gait/Stairs) Gait mildly unsteady at times though patient able to self correct. Patient required multiple standing rest breaks. Patient safely navigated steps though required a standing rest break ~every 2 steps 2/2 shortness of breath.  -               User Key  (r) = Recorded By, (t) = Taken By, (c) = Cosigned By      Initials Name Provider Type     Елена Hanson PT Physical Therapist                   Obj/Interventions       Row Name 06/24/25 0942          Motor Skills    Therapeutic Exercise other (see comments)  Cardiac post op ex x10  -       Row Name 06/24/25 0942          Balance    Balance Assessment sitting dynamic balance;sitting static balance;standing static balance;standing dynamic balance  -     Static Sitting Balance supervision  -     Dynamic Sitting Balance standby assist  -     Position, Sitting Balance sitting in chair  -     Static Standing Balance standby assist  -     Dynamic Standing Balance contact guard  -     Position/Device Used, Standing Balance unsupported  -     Balance Interventions sitting;standing;sit to  stand;supported;static;dynamic  -               User Key  (r) = Recorded By, (t) = Taken By, (c) = Cosigned By      Initials Name Provider Type    Елена Szymanski PT Physical Therapist                   Goals/Plan    No documentation.                  Clinical Impression       Row Name 06/24/25 0943          Pain    Pretreatment Pain Rating 3/10  -SM     Posttreatment Pain Rating 4/10  -SM     Pain Side/Orientation generalized  -     Pain Management Interventions exercise or physical activity utilized  -     Response to Pain Interventions activity participation with tolerable pain  -SM       Row Name 06/24/25 0943          Plan of Care Review    Plan of Care Reviewed With patient  -     Outcome Evaluation Patient seen for PT session this AM. Patient sitting [facility] upon arrival. Patient performed cardiac post op exercises x10 while seated in the chair. Patient stood from the chair with SBA. Patient stood and ambulated 100ft with CGA-Butch. Gait mildly unsteady at times though patient able to self correct. Patient required multiple standing rest breaks. Patient safely navigated steps though required a standing rest break ~every 2 steps 2/2 shortness of breath. Plan remains home with assist and HHPT at d/c. Patient may benefit from an AD at d/c to improved balance and gait mechanics. Acute PT will continue to monitor.  -SM       Row Name 06/24/25 0943          Vital Signs    Pre Patient Position Sitting  -SM     Intra Patient Position Standing  -SM     Post Patient Position Sitting  -SM       Row Name 06/24/25 0943          Positioning and Restraints    Pre-Treatment Position sitting in chair/recliner  -SM     Post Treatment Position chair  -SM     In Chair notified nsg;reclined;call light within reach;encouraged to call for assist;exit alarm on  -SM               User Key  (r) = Recorded By, (t) = Taken By, (c) = Cosigned By      Initials Name Provider Type    Елена Szymanski PT Physical Therapist                    Outcome Measures       Row Name 06/24/25 0947 06/24/25 0818       How much help from another person do you currently need...    Turning from your back to your side while in flat bed without using bedrails? 4  -SM 4  -KM    Moving from lying on back to sitting on the side of a flat bed without bedrails? 4  -SM 4  -KM    Moving to and from a bed to a chair (including a wheelchair)? 3  -SM 4  -KM    Standing up from a chair using your arms (e.g., wheelchair, bedside chair)? 3  -SM 4  -KM    Climbing 3-5 steps with a railing? 3  -SM 3  -KM    To walk in hospital room? 3  -SM 3  -KM    AM-PAC 6 Clicks Score (PT) 20  -SM 22  -KM    Highest Level of Mobility Goal Walk 10 Steps or More-6  -SM Walk 25 Feet or More-7  -KM      Row Name 06/24/25 0947          Functional Assessment    Outcome Measure Options AM-PAC 6 Clicks Basic Mobility (PT)  -               User Key  (r) = Recorded By, (t) = Taken By, (c) = Cosigned By      Initials Name Provider Type     Елена Hanson, CHAYO Physical Therapist     Saida Maya, RN Registered Nurse                                 Physical Therapy Education       Title: PT OT SLP Therapies (Done)       Topic: Physical Therapy (Done)       Point: Mobility training (Done)       Learning Progress Summary            Patient Acceptance, E, VU by  at 6/24/2025 0947    Acceptance, E,TB,D, VU,NR by  at 6/23/2025 0920    Acceptance, E,TB,D, VU,NR by  at 6/22/2025 1158    Acceptance, E,TB,D, VU,NR by  at 6/21/2025 1356                      Point: Home exercise program (Done)       Learning Progress Summary            Patient Acceptance, E, VU by  at 6/24/2025 0947    Acceptance, E,TB,D, VU,NR by  at 6/23/2025 0920                      Point: Body mechanics (Done)       Learning Progress Summary            Patient Acceptance, E, VU by  at 6/24/2025 0947    Acceptance, E,TB,D, VU,NR by  at 6/23/2025 0920    Acceptance, E,TB,D, VU,NR by  at 6/22/2025 1158     Acceptance, E,TB,D, VU,NR by  at 6/21/2025 1356                      Point: Precautions (Done)       Learning Progress Summary            Patient Acceptance, E, VU by  at 6/24/2025 0947    Acceptance, E,TB,D, VU,NR by  at 6/23/2025 0920    Acceptance, E,TB,D, VU,NR by  at 6/22/2025 1158    Acceptance, E,TB,D, VU,NR by  at 6/21/2025 1356                                      User Key       Initials Effective Dates Name Provider Type Discipline     04/08/22 -  Consuelo Canada, PT Physical Therapist PT     05/02/22 -  Елена Hanson, CHAYO Physical Therapist PT                  PT Recommendation and Plan     Outcome Evaluation: Patient seen for PT session this AM. Patient sitting [facility] upon arrival. Patient performed cardiac post op exercises x10 while seated in the chair. Patient stood from the chair with SBA. Patient stood and ambulated 100ft with CGA-Butch. Gait mildly unsteady at times though patient able to self correct. Patient required multiple standing rest breaks. Patient safely navigated steps though required a standing rest break ~every 2 steps 2/2 shortness of breath. Plan remains home with assist and HHPT at d/c. Patient may benefit from an AD at d/c to improved balance and gait mechanics. Acute PT will continue to monitor.     Time Calculation:         PT Charges       Row Name 06/24/25 0949             Time Calculation    Start Time 0903  -      Stop Time 0916  -      Time Calculation (min) 13 min  -      PT Received On 06/24/25  -      PT - Next Appointment 06/25/25  -         Time Calculation- PT    Total Timed Code Minutes- PT 13 minute(s)  -         Timed Charges    02552 - PT Therapeutic Exercise Minutes 5  -SM      20674 - PT Therapeutic Activity Minutes 8  -SM         Total Minutes    Timed Charges Total Minutes 13  -SM       Total Minutes 13  -SM                User Key  (r) = Recorded By, (t) = Taken By, (c) = Cosigned By      Initials Name Provider Type     Valentin  CHAYO Christiansen Physical Therapist                  Therapy Charges for Today       Code Description Service Date Service Provider Modifiers Qty    [id number]  PT THERAPEUTIC ACT EA 15 MIN 6/24/2025 Елена Hanson, PT GP 1            PT G-Codes  Outcome Measure Options: AM-PAC 6 Clicks Basic Mobility (PT)  AM-PAC 6 Clicks Score (PT): 20       Елена Hanson PT  6/24/2025

## 2025-06-24 NOTE — PROGRESS NOTES
" LOS: 6 days   Patient Care Team:  Dennis Kwong MD as PCP - General (Internal Medicine)  Arslan Carlson PA-C as Physician Assistant (Physician Assistant)  Ang Peña APRN as Nurse Practitioner (Family Medicine)  Benny Ordaz MD as Surgeon (Orthopedic Surgery)  Jerrod Lagunas MD as Consulting Physician (Pulmonary Disease)  Luis Alberto Alvarenga MD as Consulting Physician (Hematology and Oncology)    Chief Complaint: Post-op CABG/MARLENE occlusion     Subjective    Feeling good this morning.    Vital Signs  Temp:  [97.8 °F (36.6 °C)-98.7 °F (37.1 °C)] 97.8 °F (36.6 °C)  Heart Rate:  [73-91] 86  Resp:  [16] 16  BP: (109-124)/(68-92) 124/80  Body mass index is 28.34 kg/m².    Intake/Output Summary (Last 24 hours) at 6/24/2025 0828  Last data filed at 6/24/2025 0156  Gross per 24 hour   Intake 838 ml   Output 2065 ml   Net -1227 ml     No intake/output data recorded.          06/22/25  0554 06/23/25  0555 06/24/25  0437   Weight: 82.2 kg (181 lb 3.2 oz) 81.1 kg (178 lb 12.8 oz) 79.7 kg (175 lb 9.6 oz)         Objective:  General Appearance:  Comfortable and in no acute distress.    Vital signs: (most recent): Blood pressure 124/80, pulse 86, temperature 97.8 °F (36.6 °C), temperature source Oral, resp. rate 16, height 167.6 cm (66\"), weight 79.7 kg (175 lb 9.6 oz), SpO2 97%.  Vital signs are normal.  No fever.    Output: Producing urine.    Lungs:  Normal effort and normal respiratory rate.  There are decreased breath sounds.    Heart: Normal rate.  Regular rhythm.    Abdomen: Bowel sounds are normal.     Extremities: Normal range of motion.  There is no dependent edema.    Neurological: Patient is alert and oriented to person, place and time.    Skin:  Warm and dry.                  Results Review:        WBC WBC   Date Value Ref Range Status   06/24/2025 6.26 3.40 - 10.80 10*3/mm3 Final   06/23/2025 7.12 3.40 - 10.80 10*3/mm3 Final   06/22/2025 8.93 3.40 - 10.80 10*3/mm3 Final      HGB " Hemoglobin   Date Value Ref Range Status   06/24/2025 9.8 (L) 13.0 - 17.7 g/dL Final   06/23/2025 9.4 (L) 13.0 - 17.7 g/dL Final   06/22/2025 10.3 (L) 13.0 - 17.7 g/dL Final      HCT Hematocrit   Date Value Ref Range Status   06/24/2025 29.3 (L) 37.5 - 51.0 % Final   06/23/2025 27.9 (L) 37.5 - 51.0 % Final   06/22/2025 30.6 (L) 37.5 - 51.0 % Final      Platelets Platelets   Date Value Ref Range Status   06/24/2025 166 140 - 450 10*3/mm3 Final   06/23/2025 117 (L) 140 - 450 10*3/mm3 Final   06/22/2025 142 140 - 450 10*3/mm3 Final        PT/INR:    Protime   Date Value Ref Range Status   06/24/2025 15.6 (H) 11.7 - 14.2 Seconds Final   06/23/2025 15.9 (H) 11.7 - 14.2 Seconds Final   /  INR   Date Value Ref Range Status   06/24/2025 1.24 (H) 0.90 - 1.10 Final   06/23/2025 1.28 (H) 0.90 - 1.10 Final       Sodium Sodium   Date Value Ref Range Status   06/24/2025 135 (L) 136 - 145 mmol/L Final   06/23/2025 132 (L) 136 - 145 mmol/L Final   06/22/2025 135 (L) 136 - 145 mmol/L Final      Potassium Potassium   Date Value Ref Range Status   06/24/2025 3.8 3.5 - 5.2 mmol/L Final   06/23/2025 4.1 3.5 - 5.2 mmol/L Final   06/23/2025 3.5 3.5 - 5.2 mmol/L Final   06/22/2025 4.0 3.5 - 5.2 mmol/L Final     Comment:     Slight hemolysis detected by analyzer. Result may be falsely elevated.   06/22/2025 3.7 3.5 - 5.2 mmol/L Final      Chloride Chloride   Date Value Ref Range Status   06/24/2025 96 (L) 98 - 107 mmol/L Final   06/23/2025 98 98 - 107 mmol/L Final   06/22/2025 101 98 - 107 mmol/L Final      Bicarbonate CO2   Date Value Ref Range Status   06/24/2025 26.5 22.0 - 29.0 mmol/L Final   06/23/2025 23.0 22.0 - 29.0 mmol/L Final   06/22/2025 23.0 22.0 - 29.0 mmol/L Final      BUN BUN   Date Value Ref Range Status   06/24/2025 13.0 8.0 - 23.0 mg/dL Final   06/23/2025 12.0 8.0 - 23.0 mg/dL Final   06/22/2025 14.0 8.0 - 23.0 mg/dL Final      Creatinine Creatinine   Date Value Ref Range Status   06/24/2025 0.81 0.76 - 1.27 mg/dL Final  "  06/23/2025 0.87 0.76 - 1.27 mg/dL Final   06/22/2025 0.94 0.76 - 1.27 mg/dL Final      Calcium Calcium   Date Value Ref Range Status   06/24/2025 8.8 8.6 - 10.5 mg/dL Final   06/23/2025 8.4 (L) 8.6 - 10.5 mg/dL Final   06/22/2025 8.5 (L) 8.6 - 10.5 mg/dL Final      Magnesium No results found for: \"MG\"         aspirin, 81 mg, Oral, Daily  budesonide-formoterol, 2 puff, Inhalation, BID  enoxaparin sodium, 40 mg, Subcutaneous, Daily  furosemide, 40 mg, Oral, Daily  gabapentin, 400 mg, Oral, Q8H  guaiFENesin, 1,200 mg, Oral, Q12H  insulin lispro, 2-9 Units, Subcutaneous, 4x Daily AC & at Bedtime  ipratropium-albuterol, 3 mL, Nebulization, 4x Daily - RT  metoprolol tartrate, 25 mg, Oral, Q12H  mupirocin, 1 Application, Each Nare, BID  pantoprazole, 40 mg, Oral, QAM  potassium chloride, 20 mEq, Oral, Daily  senna-docusate sodium, 2 tablet, Oral, Nightly  theophylline, 300 mg, Oral, Q12H      milrinone, 0.25-0.375 mcg/kg/min, Last Rate: 0.25 mcg/kg/min (06/21/25 1717)  niCARdipine, 5-15 mg/hr, Last Rate: Stopped (06/21/25 0815)              Pulmonary hypertension    CAD (coronary artery disease)    Abnormal findings on diagnostic imaging of other specified body structures      Assessment & Plan    -Severe multivessel CAD- s/p urgent CABGx5 LIMA, PFO closure, MARLENE 40mm clip- [facility] 6/20/2025  -Pulmonary hypertension  -Hypertension  -Hx of PE and DVT- on coumadin  -Pulmonary hypertension  -ELINOR with home cpap  -Left hemidiaphragm elevation  -Post op anemia- expected acute blood loss    POD#4     Looks good this morning.  Just ambulated the stairs  Will get a repeat CXR this morning to evaluate the questionable free air scenario.  He does not clinically look like he has an acute abdomen.   Continue beta blocker and aspirin, no statin due to allergy    Encourage IS/flutter, mobilize, continue routine care  Likely home with home health tomorrow.    EMILY Sahu  06/24/25  08:28 EDT    "

## 2025-06-25 ENCOUNTER — READMISSION MANAGEMENT (OUTPATIENT)
Dept: CALL CENTER | Facility: HOSPITAL | Age: 80
End: 2025-06-25
Payer: MEDICARE

## 2025-06-25 VITALS
SYSTOLIC BLOOD PRESSURE: 115 MMHG | WEIGHT: 174.8 LBS | RESPIRATION RATE: 18 BRPM | TEMPERATURE: 97.7 F | OXYGEN SATURATION: 95 % | DIASTOLIC BLOOD PRESSURE: 63 MMHG | HEIGHT: 66 IN | HEART RATE: 78 BPM | BODY MASS INDEX: 28.09 KG/M2

## 2025-06-25 LAB
ANION GAP SERPL CALCULATED.3IONS-SCNC: 10.8 MMOL/L (ref 5–15)
BUN SERPL-MCNC: 15 MG/DL (ref 8–23)
BUN/CREAT SERPL: 16.7 (ref 7–25)
CALCIUM SPEC-SCNC: 8.5 MG/DL (ref 8.6–10.5)
CHLORIDE SERPL-SCNC: 100 MMOL/L (ref 98–107)
CO2 SERPL-SCNC: 23.2 MMOL/L (ref 22–29)
CREAT SERPL-MCNC: 0.9 MG/DL (ref 0.76–1.27)
EGFRCR SERPLBLD CKD-EPI 2021: 88 ML/MIN/1.73
GLUCOSE BLDC GLUCOMTR-MCNC: 121 MG/DL (ref 70–130)
GLUCOSE BLDC GLUCOMTR-MCNC: 131 MG/DL (ref 70–130)
GLUCOSE SERPL-MCNC: 118 MG/DL (ref 65–99)
INR PPP: 1.25 (ref 0.9–1.1)
POTASSIUM SERPL-SCNC: 4.5 MMOL/L (ref 3.5–5.2)
POTASSIUM SERPL-SCNC: 4.5 MMOL/L (ref 3.5–5.2)
PROTHROMBIN TIME: 15.7 SECONDS (ref 11.7–14.2)
SODIUM SERPL-SCNC: 134 MMOL/L (ref 136–145)

## 2025-06-25 PROCEDURE — 94664 DEMO&/EVAL PT USE INHALER: CPT

## 2025-06-25 PROCEDURE — 99239 HOSP IP/OBS DSCHRG MGMT >30: CPT | Performed by: INTERNAL MEDICINE

## 2025-06-25 PROCEDURE — 94799 UNLISTED PULMONARY SVC/PX: CPT

## 2025-06-25 PROCEDURE — 80048 BASIC METABOLIC PNL TOTAL CA: CPT | Performed by: NURSE PRACTITIONER

## 2025-06-25 PROCEDURE — 85610 PROTHROMBIN TIME: CPT | Performed by: THORACIC SURGERY (CARDIOTHORACIC VASCULAR SURGERY)

## 2025-06-25 PROCEDURE — 82948 REAGENT STRIP/BLOOD GLUCOSE: CPT

## 2025-06-25 PROCEDURE — 94761 N-INVAS EAR/PLS OXIMETRY MLT: CPT

## 2025-06-25 PROCEDURE — 25010000002 ENOXAPARIN PER 10 MG: Performed by: NURSE PRACTITIONER

## 2025-06-25 RX ORDER — FUROSEMIDE 40 MG/1
40 TABLET ORAL DAILY
Qty: 30 TABLET | Refills: 1 | Status: SHIPPED | OUTPATIENT
Start: 2025-06-26

## 2025-06-25 RX ORDER — POTASSIUM CHLORIDE 1500 MG/1
20 TABLET, EXTENDED RELEASE ORAL ONCE
Status: DISCONTINUED | OUTPATIENT
Start: 2025-06-25 | End: 2025-06-25 | Stop reason: HOSPADM

## 2025-06-25 RX ORDER — HYDROCODONE BITARTRATE AND ACETAMINOPHEN 5; 325 MG/1; MG/1
1 TABLET ORAL EVERY 4 HOURS PRN
Qty: 42 TABLET | Refills: 0 | Status: SHIPPED | OUTPATIENT
Start: 2025-06-25 | End: 2025-07-02

## 2025-06-25 RX ORDER — FUROSEMIDE 40 MG/1
40 TABLET ORAL ONCE
Status: DISCONTINUED | OUTPATIENT
Start: 2025-06-25 | End: 2025-06-25 | Stop reason: HOSPADM

## 2025-06-25 RX ORDER — ASPIRIN 81 MG/1
81 TABLET ORAL DAILY
Qty: 30 TABLET | Refills: 11 | Status: SHIPPED | OUTPATIENT
Start: 2025-06-26

## 2025-06-25 RX ORDER — GABAPENTIN 400 MG/1
400 CAPSULE ORAL EVERY 8 HOURS SCHEDULED
Qty: 21 CAPSULE | Refills: 0 | Status: SHIPPED | OUTPATIENT
Start: 2025-06-25 | End: 2025-07-02

## 2025-06-25 RX ORDER — WARFARIN SODIUM 5 MG/1
5 TABLET ORAL
Qty: 30 TABLET | Refills: 1 | Status: SHIPPED | OUTPATIENT
Start: 2025-06-25

## 2025-06-25 RX ORDER — METOPROLOL TARTRATE 25 MG/1
25 TABLET, FILM COATED ORAL EVERY 12 HOURS SCHEDULED
Qty: 60 TABLET | Refills: 5 | Status: SHIPPED | OUTPATIENT
Start: 2025-06-25

## 2025-06-25 RX ADMIN — GABAPENTIN 400 MG: 400 CAPSULE ORAL at 07:56

## 2025-06-25 RX ADMIN — ACETAMINOPHEN 650 MG: 325 TABLET, FILM COATED ORAL at 13:36

## 2025-06-25 RX ADMIN — GUAIFENESIN 1200 MG: 600 TABLET, EXTENDED RELEASE ORAL at 08:38

## 2025-06-25 RX ADMIN — POTASSIUM CHLORIDE 20 MEQ: 1500 TABLET, EXTENDED RELEASE ORAL at 08:38

## 2025-06-25 RX ADMIN — ENOXAPARIN SODIUM 40 MG: 100 INJECTION SUBCUTANEOUS at 08:39

## 2025-06-25 RX ADMIN — FUROSEMIDE 40 MG: 40 TABLET ORAL at 08:38

## 2025-06-25 RX ADMIN — BUDESONIDE AND FORMOTEROL FUMARATE DIHYDRATE 2 PUFF: 160; 4.5 AEROSOL RESPIRATORY (INHALATION) at 08:33

## 2025-06-25 RX ADMIN — IPRATROPIUM BROMIDE AND ALBUTEROL SULFATE 3 ML: .5; 3 SOLUTION RESPIRATORY (INHALATION) at 11:38

## 2025-06-25 RX ADMIN — METOPROLOL TARTRATE 25 MG: 25 TABLET, FILM COATED ORAL at 08:39

## 2025-06-25 RX ADMIN — THEOPHYLLINE 300 MG: 300 TABLET, EXTENDED RELEASE ORAL at 08:38

## 2025-06-25 RX ADMIN — PANTOPRAZOLE SODIUM 40 MG: 40 TABLET, DELAYED RELEASE ORAL at 07:43

## 2025-06-25 RX ADMIN — ASPIRIN 81 MG: 81 TABLET, COATED ORAL at 08:39

## 2025-06-25 RX ADMIN — IPRATROPIUM BROMIDE AND ALBUTEROL SULFATE 3 ML: .5; 3 SOLUTION RESPIRATORY (INHALATION) at 08:30

## 2025-06-25 RX ADMIN — IPRATROPIUM BROMIDE AND ALBUTEROL SULFATE 3 ML: .5; 3 SOLUTION RESPIRATORY (INHALATION) at 14:56

## 2025-06-25 RX ADMIN — GABAPENTIN 400 MG: 400 CAPSULE ORAL at 14:01

## 2025-06-25 NOTE — DISCHARGE SUMMARY
Hospital Discharge    Patient Name: Omar Choudhary  Age/Sex: 79 y.o. male  : 1945  MRN: [id number]    Encounter Provider: Nancy Hdez MD  Referring Provider: Nancy Hdez MD  Place of Service: [facility] CARDIOLOGY  Patient Care Team:  Dennis Kwong MD as PCP - General (Internal Medicine)  Arslan Carlson PA-C as Physician Assistant (Physician Assistant)  Ang Peña APRN as Nurse Practitioner (Family Medicine)  Benny Ordaz MD as Surgeon (Orthopedic Surgery)  Jerrod Lagunas MD as Consulting Physician (Pulmonary Disease)  Luis Alberto Alvarenga MD as Consulting Physician (Hematology and Oncology)         Date of Discharge:  2025   Date of Admit: 2025    Discharge Condition: Stable    Discharge Diagnosis:  1.  Multivessel coronary artery disease status post x 5 on 2025  2.  Surgical left atrial appendage closure with a 40 mm atriclip  3.  Surgical closure of PFO  4.  History of unprovoked pulmonary embolism and DVT  5.  Hypertension  6.  Hyperlipidemia statin intolerance  7.  Left hemidiaphragm paralysis  8.  Asthma/COPD  9.  Obstructive sleep apnea      Hospital Course:   Omar Choudhary is a 79 y.o. male with unprovoked pulmonary embolism and DVT, paralyzed hemidiaphragm, COPD/asthma, osteoarthritis, obstructive sleep apnea on CPAP, hypertension, hyperlipidemia with statin intolerance, who presented for an elective cardiac catheterization on 2025 for symptoms of worsening dyspnea on exertion.    The patient was seen in the office on 2025 at which time he reported worsening dyspnea on exertion.  [facility] he first noted the symptoms that which included a CT of the chest that showed no evidence of recurrent pulmonary embolism.  Due to late symptoms right and left cardiac catheterization were amended performed on 2025.  This showed normalization of his pulmonary  pressures, normal left-sided filling pressures, and severe multivessel coronary artery disease including distal left main stenosis.  Based on these findings he was evaluated by Dr. Simon with cardiothoracic surgery who recommended proceeding with CABG.  Breath he underwent CABG x 5 on 6/20/2025 including BOWEN to LAD, saphenous vein graft to the ramus intermedius branch, saphenous vein graft to the obtuse marginal branch, saphenous vein graft to the RV branch, and saphenous vein graft to the posterior descending branch.  Additionally he underwent closure of the left atrial appendage with a 40 mm atriclip and closure of an inferior patent foramen ovale.    Postoperatively he did fairly well.  He did intermittently continue to complain of shortness of breath that did improve with nebulizer treatments and diuresis.  He was resumed on warfarin on 6/23/2025.  He had no significant arrhythmias postoperatively.    He was discharged in stable condition on 6/25/2025 to home with home health.  Will continue warfarin 5 mg daily for now and have the patient follow-up with INR clinic next week to make further adjustments to his warfarin.      Will arrange for 1 week follow-up with myself or Addie Lennox, APRN.  He will follow-up with Dr. Simon on 7/30/2025 as scheduled.      Objective:  Temp:  [97.6 °F (36.4 °C)-98.8 °F (37.1 °C)] 97.7 °F (36.5 °C)  Heart Rate:  [72-89] 78  Resp:  [16-18] 18  BP: (101-115)/(63-83) 115/63    Intake/Output Summary (Last 24 hours) at 6/25/2025 1349  Last data filed at 6/25/2025 1200  Gross per 24 hour   Intake 480 ml   Output 1590 ml   Net -1110 ml     Body mass index is 28.21 kg/m².      06/23/25  0555 06/24/25  0437 06/25/25  0620   Weight: 81.1 kg (178 lb 12.8 oz) 79.7 kg (175 lb 9.6 oz) 79.3 kg (174 lb 12.8 oz)     Weight change: -0.363 kg (-12.8 oz)    Physical Exam:   General Appearance:    No acute distress, alert and oriented x4   Lungs:     Clear to auscultation bilaterally     Heart:     Regular rhythm and normal rate.  No murmurs, gallops, or       rubs.   Abdomen:     Soft, non-tender, non-distended.    Extremities:   Moves all extremities well.  No clubbing, cyanosis, or edema.        Procedures Performed  Procedure(s):  SHE STERNOTOMY CORONARY ARTERY BYPASS GRAFT TIMES 5 USING LEFT INTERNAL MAMMARY ARTERY AND LEFT GREATER SAPHENOUS VEIN GRAFT PER ENDOSCOPIC VEIN HARVESTING, CLOSURE OF PFO, EXCLUSION OF LEFT ATRIAL APPENDAGE AND PRP       Consults:  Consults       Date and Time Order Name Status Description    6/20/2025 11:45 AM Inpatient Intensivist Consult Completed     6/18/2025 12:00 PM Inpatient Pulmonology Consult Completed             Pertinent Test Results:  Results from last 7 days   Lab Units 06/25/25  0349 06/24/25  2322 06/24/25  1111 06/24/25  0315 06/23/25  1326 06/23/25  0310 06/22/25  1006 06/22/25  0201 06/21/25  0244 06/20/25  1527 06/20/25  1147 06/19/25  0406   SODIUM mmol/L 134*  --   --  135*  --  132*  --  135* 142 140 138 138   POTASSIUM mmol/L 4.5 4.5 3.6 3.8 4.1 3.5 4.0 3.7 4.0 4.3 4.6 3.9   CHLORIDE mmol/L 100  --   --  96*  --  98  --  101 109* 107 106 103   CO2 mmol/L 23.2  --   --  26.5  --  23.0  --  23.0 21.0* 20.6* 22.1 21.7*   BUN mg/dL 15.0  --   --  13.0  --  12.0  --  14.0 14.0 14.0 13.0 9.0   CREATININE mg/dL 0.90  --   --  0.81  --  0.87  --  0.94 1.06 1.11 0.99 0.88   GLUCOSE mg/dL 118*  --   --  144*  --  119*  --  135* 141* 145* 134* 104*   CALCIUM mg/dL 8.5*  --   --  8.8  --  8.4*  --  8.5* 8.1* 8.4* 8.3* 8.8   AST (SGOT) U/L  --   --   --   --   --   --   --   --   --   --   --  18   ALT (SGPT) U/L  --   --   --   --   --   --   --   --   --   --   --  19         Results from last 7 days   Lab Units 06/24/25  0315 06/23/25  0310 06/22/25  0201 06/21/25  0244 06/20/25  1527 06/20/25  1147 06/20/25  1058 06/20/25  1027 06/20/25  1011 06/20/25  0747 06/19/25  0406   WBC 10*3/mm3 6.26 7.12 8.93 6.91 7.05 10.53  --   --   --   --  3.99   HEMOGLOBIN g/dL  9.8* 9.4* 10.3* 10.2* 11.3* 11.8*  --   --   --   --  12.1*   HEMOGLOBIN, POC g/dL  --   --   --   --   --   --  7.8*   < >  --    < >  --    HEMATOCRIT % 29.3* 27.9* 30.6* 30.5* 33.8* 36.2*  --   --   --   --  36.5*   HEMATOCRIT POC %  --   --   --   --   --   --  23*   < >  --    < >  --    PLATELETS 10*3/mm3 166 117* 142 112* 105* 132*  --   --  140  --  178    < > = values in this interval not displayed.     Results from last 7 days   Lab Units 06/25/25  0349 06/24/25  0315 06/23/25  0929 06/21/25  0244 06/20/25  1147 06/20/25  1057 06/19/25  0406   INR  1.25* 1.24* 1.28* 1.31* 1.49* 1.7* 1.27*   APTT seconds  --   --   --   --  33.0  --  32.6     Results from last 7 days   Lab Units 06/21/25  0244 06/20/25  1527 06/20/25  1147 06/19/25  0406   MAGNESIUM mg/dL 2.6* 3.2* 3.4* 2.1     Results from last 7 days   Lab Units 06/19/25  0406   CHOLESTEROL mg/dL 227*   TRIGLYCERIDES mg/dL 189*   HDL CHOL mg/dL 59     Results from last 7 days   Lab Units 06/19/25  0406   PROBNP pg/mL 245.0           Discharge Medications     Discharge Medications        New Medications        Instructions Start Date   aspirin 81 MG EC tablet   81 mg, Oral, Daily   Start Date: June 26, 2025     furosemide 40 MG tablet  Commonly known as: LASIX   40 mg, Oral, Daily   Start Date: June 26, 2025     gabapentin 400 MG capsule  Commonly known as: NEURONTIN   400 mg, Oral, Every 8 Hours Scheduled      HYDROcodone-acetaminophen 5-325 MG per tablet  Commonly known as: NORCO  Replaces: HYDROcodone-acetaminophen 7.5-325 MG per tablet   1 tablet, Oral, Every 4 Hours PRN      metoprolol tartrate 25 MG tablet  Commonly known as: LOPRESSOR   25 mg, Oral, Every 12 Hours Scheduled             Changes to Medications        Instructions Start Date   warfarin 5 MG tablet  Commonly known as: COUMADIN  What changed:   medication strength  how much to take  how to take this  when to take this  additional instructions   5 mg, Oral, Daily Warfarin              Continue These Medications        Instructions Start Date   acetaminophen 500 MG tablet  Commonly known as: TYLENOL   500 mg, Every 6 Hours PRN      Acetylcysteine capsule capsule   600 mg, Daily      albuterol sulfate  (90 Base) MCG/ACT inhaler  Commonly known as: PROVENTIL HFA;VENTOLIN HFA;PROAIR HFA   2 puffs, Inhalation, Every 4 Hours PRN      budesonide-formoterol 160-4.5 MCG/ACT inhaler  Commonly known as: SYMBICORT   2 puffs, Inhalation, 2 Times Daily      calcium 500 mg vitamin D 5 mcg (200 UT) 500-5 MG-MCG tablet per tablet   TAKE TWO TABLETS BY MOUTH DAILY      clonazePAM 0.5 MG tablet  Commonly known as: KlonoPIN   0.5 mg, Oral, 2 Times Daily PRN, for anxiety      coenzyme Q10 100 MG capsule   100 mg, Daily      cyclobenzaprine 10 MG tablet  Commonly known as: FLEXERIL   10 mg, Oral, 3 Times Daily PRN      EMERGEN-C BLUE PO   1 tablet, Daily      L-Arginine 500 MG capsule   1 capsule, Daily      losartan-hydrochlorothiazide 50-12.5 MG per tablet  Commonly known as: Hyzaar   1 tablet, Oral, Daily      montelukast 10 MG tablet  Commonly known as: SINGULAIR   10 mg, Oral, Daily      naloxone 4 MG/0.1ML nasal spray  Commonly known as: NARCAN   Call 911. Don't prime. Hollis in 1 nostril for overdose. Repeat in 2-3 minutes in other nostril if no or minimal breathing/responsiveness.      YURY-e 400 MG tablet   400 mg, Daily      sildenafil 20 MG tablet  Commonly known as: REVATIO   TAKE 1 TO 2 TABLETS BY MOUTH AS NEEDED FOR ERICTILE DYSFUNCTION      theophylline 300 MG 12 hr tablet  Commonly known as: THEODUR   300 mg, Oral, Daily      traZODone 50 MG tablet  Commonly known as: DESYREL    mg, Oral, Nightly      triamcinolone 0.025 % cream  Commonly known as: KENALOG   2 Times Daily      vitamin b complex capsule capsule   1 capsule, Daily             Stop These Medications      HYDROcodone-acetaminophen 7.5-325 MG per tablet  Commonly known as: NORCO  Replaced by: HYDROcodone-acetaminophen 5-325  MG per tablet              Discharge Diet:    Dietary Orders (From admission, onward)       Start     Ordered    06/21/25 1945  Diet: Cardiac; Healthy Heart (2-3 Na+); Fluid Consistency: Thin (IDDSI 0)  Diet Effective Now        References:    Diet Order Definitions   Question Answer Comment   Diets: Cardiac    Cardiac Diet: Healthy Heart (2-3 Na+)    Fluid Consistency: Thin (IDDSI 0)        06/21/25 1945                    Activity at Discharge:   As instructed    Discharge disposition: home     Discharge Instructions and Follow ups:  Future Appointments   Date Time Provider Department Center   7/2/2025 10:15 AM LAB MD [facility]   7/2/2025 10:30 AM Luis Alberto Alvarenga MD [facility]   7/10/2025 10:15 AM Gianna Chavira APRN [facility]URS[facility]   7/14/2025  9:50 AM Jerrod Lagunas MD [facility] None   7/30/2025  1:00 PM Jr Janette Simon MD [facility]     Additional Instructions for the Follow-ups that You Need to Schedule       Ambulatory Referral to Cardiac Rehab   As directed      Ambulatory Referral to Home Health   As directed      Face to Face Visit Date: 6/25/2025   Follow-up provider for Plan of Care?: I will be treating the patient on an ongoing basis.  Please send me the Plan of Care for signature.   Follow-up provider: JR JANETTE SIMON [[id number]]   Reason/Clinical Findings: post op open heart   Describe mobility limitations that make leaving home difficult: weakness   Nursing/Therapeutic Services Requested: Skilled Nursing   Skilled nursing orders: Post CABG care   Frequency: 1 Week 1        Call MD With Problems / Concerns   As directed      Instructions:  Call office at [phone] for any drainage, increased redness, or fever over 100.5    Order Comments: Instructions:  Call office at [phone] for any drainage, increased redness, or fever over 100.5         Discharge Follow-up with PCP   As directed       Currently Documented PCP:    Dennis Kwong MD    PCP  Phone Number:    [phone]     Follow Up Details: in 1 week        Discharge Follow-up with Specialty: Cardiologist EMILY/PA; 1 Week   As directed      Specialty: Cardiologist EMILY/PA   Follow Up: 1 Week   Follow Up Details: bring all prescription bottles to appointment, call for appointment        Discharge Follow-up with Specified Provider: Cardiologist; 1 Month   As directed      To: Cardiologist   Follow Up: 1 Month   Follow Up Details: call for appointment, bring all medication bottles to appointment        Discharge Follow-up with Specified Provider: Dr. Hdez or EMILY Kauffman in 1 week; 1 Week   As directed      To: Dr. Hdez or EMILY Kauffman in 1 week   Follow Up: 1 Week        Discharge Follow-up with Specified Provider: Dr. Simon; 1 Month   As directed      To: Dr. Simon   Follow Up: 1 Month   Follow Up Details: 4-6 weeks, bring all current medications to appointment        Discharge Follow-up with Specified Provider: Follow-up with Dr. Simon as scheduled on 7/30/2025   As directed      To: Follow-up with Dr. Simon as scheduled on 7/30/2025        Discharge Follow-up with Specified Provider: Follow-up with medication management clinic for INR check in 5 to 6 days   As directed      To: Follow-up with medication management clinic for INR check in 5 to 6 days               Contact information for follow-up providers       Nancy Hdez MD Follow up.    Specialty: Cardiology  Why: performed cardiac cath today  Contact information:  [address]  [facility] [place] [facility] [address]  [phone]               [facility] .    Specialty: Cardiac Rehabilitation  Contact information:  [address] [address]  [phone]             Dennis Kwong MD .    Specialty: Internal Medicine  Why: in 1 week  Contact information:  [address]  [place] [address]  [phone]                       Contact information for  after-discharge care       Home Medical Care       [facility] .    Service: Home Nursing  Contact information:  [address], [address]  [place] [id number] [phone]                                      Nancy Hdez MD  06/25/25  13:49 EDT    Time: Discharge 45 min

## 2025-06-25 NOTE — OUTREACH NOTE
Prep Survey      Flowsheet Row Responses   [facility] patient discharged from? [place]   Is LACE score < 7 ? No   Eligibility [facility]   Date of Admission 06/18/25   Date of Discharge 06/25/25   Discharge Disposition Home-Health Care Svc   Discharge diagnosis SHE STERNOTOMY CORONARY ARTERY BYPASS GRAFT TIMES 5 USING LEFT INTERNAL MAMMARY ARTERY AND LEFT GREATER SAPHENOUS VEIN GRAFT PER ENDOSCOPIC VEIN HARVESTING, CLOSURE OF PFO, EXCLUSION OF LEFT ATRIAL APPENDAGE AND PRP N   Does the patient have one of the following disease processes/diagnoses(primary or secondary)? Cardiothoracic surgery   Does the patient have Home health ordered? Yes   What is the Home health agency?  [facility]   Is there a DME ordered? No   Prep survey completed? Yes            DAYNA JAMESON - Registered Nurse

## 2025-06-25 NOTE — CASE MANAGEMENT/SOCIAL WORK
Continued Stay Note  [facility]     Patient Name: Omar Choudhary  MRN: [id number]  Today's Date: 6/25/2025    Admit Date: 6/18/2025    Plan: home with [facility]   Discharge Plan       Row Name 06/25/25 1447       Plan    Plan home with [facility]    Patient/Family in Agreement with Plan yes    Plan Comments Noted plans for dc today, notified [facility]/[place] of dc, orders are in, pt denies other dc needs, family will transport. CCP will follow- Jerrica HOBBS                   Discharge Codes    No documentation.                 Expected Discharge Date and Time       Expected Discharge Date Expected Discharge Time    Jun 25, 2025               Jerrica Avendaño RN

## 2025-06-25 NOTE — PROGRESS NOTES
" LOS: 7 days   Patient Care Team:  Dennis Kwong MD as PCP - General (Internal Medicine)  Arslan Carlson PA-C as Physician Assistant (Physician Assistant)  Ang Peña APRN as Nurse Practitioner (Family Medicine)  Benny Ordaz MD as Surgeon (Orthopedic Surgery)  Jerrod Lagunas MD as Consulting Physician (Pulmonary Disease)  Luis Alberto Alvarenga MD as Consulting Physician (Hematology and Oncology)    Chief Complaint: Post-op CABG/MARLENE occlusion     Subjective    Feeling good this morning.    Vital Signs  Temp:  [97.6 °F (36.4 °C)-98.8 °F (37.1 °C)] 97.6 °F (36.4 °C)  Heart Rate:  [74-95] 74  Resp:  [16] 16  BP: (101-124)/(71-83) 113/83  Body mass index is 28.21 kg/m².    Intake/Output Summary (Last 24 hours) at 6/25/2025 0811  Last data filed at 6/25/2025 0620  Gross per 24 hour   Intake 720 ml   Output 1040 ml   Net -320 ml     No intake/output data recorded.          06/23/25  0555 06/24/25  0437 06/25/25  0620   Weight: 81.1 kg (178 lb 12.8 oz) 79.7 kg (175 lb 9.6 oz) 79.3 kg (174 lb 12.8 oz)         Objective:  General Appearance:  Comfortable and in no acute distress.    Vital signs: (most recent): Blood pressure 115/63, pulse 75, temperature 97.7 °F (36.5 °C), temperature source Oral, resp. rate 18, height 167.6 cm (66\"), weight 79.3 kg (174 lb 12.8 oz), SpO2 95%.  Vital signs are normal.  No fever.    Output: Producing urine.    Lungs:  Normal effort and normal respiratory rate.  There are decreased breath sounds.    Heart: Normal rate.  Regular rhythm.    Abdomen: Bowel sounds are normal.     Extremities: Normal range of motion.  There is no dependent edema.    Neurological: Patient is alert and oriented to person, place and time.    Skin:  Warm and dry.                  Results Review:        WBC WBC   Date Value Ref Range Status   06/24/2025 6.26 3.40 - 10.80 10*3/mm3 Final   06/23/2025 7.12 3.40 - 10.80 10*3/mm3 Final      HGB Hemoglobin   Date Value Ref Range Status "   06/24/2025 9.8 (L) 13.0 - 17.7 g/dL Final   06/23/2025 9.4 (L) 13.0 - 17.7 g/dL Final      HCT Hematocrit   Date Value Ref Range Status   06/24/2025 29.3 (L) 37.5 - 51.0 % Final   06/23/2025 27.9 (L) 37.5 - 51.0 % Final      Platelets Platelets   Date Value Ref Range Status   06/24/2025 166 140 - 450 10*3/mm3 Final   06/23/2025 117 (L) 140 - 450 10*3/mm3 Final        PT/INR:    Protime   Date Value Ref Range Status   06/25/2025 15.7 (H) 11.7 - 14.2 Seconds Final   06/24/2025 15.6 (H) 11.7 - 14.2 Seconds Final   06/23/2025 15.9 (H) 11.7 - 14.2 Seconds Final   /  INR   Date Value Ref Range Status   06/25/2025 1.25 (H) 0.90 - 1.10 Final   06/24/2025 1.24 (H) 0.90 - 1.10 Final   06/23/2025 1.28 (H) 0.90 - 1.10 Final       Sodium Sodium   Date Value Ref Range Status   06/25/2025 134 (L) 136 - 145 mmol/L Final   06/24/2025 135 (L) 136 - 145 mmol/L Final   06/23/2025 132 (L) 136 - 145 mmol/L Final      Potassium Potassium   Date Value Ref Range Status   06/25/2025 4.5 3.5 - 5.2 mmol/L Final   06/24/2025 4.5 3.5 - 5.2 mmol/L Final   06/24/2025 3.6 3.5 - 5.2 mmol/L Final   06/24/2025 3.8 3.5 - 5.2 mmol/L Final   06/23/2025 4.1 3.5 - 5.2 mmol/L Final   06/23/2025 3.5 3.5 - 5.2 mmol/L Final   06/22/2025 4.0 3.5 - 5.2 mmol/L Final     Comment:     Slight hemolysis detected by analyzer. Result may be falsely elevated.      Chloride Chloride   Date Value Ref Range Status   06/25/2025 100 98 - 107 mmol/L Final   06/24/2025 96 (L) 98 - 107 mmol/L Final   06/23/2025 98 98 - 107 mmol/L Final      Bicarbonate CO2   Date Value Ref Range Status   06/25/2025 23.2 22.0 - 29.0 mmol/L Final   06/24/2025 26.5 22.0 - 29.0 mmol/L Final   06/23/2025 23.0 22.0 - 29.0 mmol/L Final      BUN BUN   Date Value Ref Range Status   06/25/2025 15.0 8.0 - 23.0 mg/dL Final   06/24/2025 13.0 8.0 - 23.0 mg/dL Final   06/23/2025 12.0 8.0 - 23.0 mg/dL Final      Creatinine Creatinine   Date Value Ref Range Status   06/25/2025 0.90 0.76 - 1.27 mg/dL Final  "  06/24/2025 0.81 0.76 - 1.27 mg/dL Final   06/23/2025 0.87 0.76 - 1.27 mg/dL Final      Calcium Calcium   Date Value Ref Range Status   06/25/2025 8.5 (L) 8.6 - 10.5 mg/dL Final   06/24/2025 8.8 8.6 - 10.5 mg/dL Final   06/23/2025 8.4 (L) 8.6 - 10.5 mg/dL Final      Magnesium No results found for: \"MG\"         aspirin, 81 mg, Oral, Daily  budesonide-formoterol, 2 puff, Inhalation, BID  enoxaparin sodium, 40 mg, Subcutaneous, Daily  furosemide, 40 mg, Oral, Daily  gabapentin, 400 mg, Oral, Q8H  guaiFENesin, 1,200 mg, Oral, Q12H  insulin lispro, 2-9 Units, Subcutaneous, 4x Daily AC & at Bedtime  ipratropium-albuterol, 3 mL, Nebulization, 4x Daily - RT  metoprolol tartrate, 25 mg, Oral, Q12H  mupirocin, 1 Application, Each Nare, BID  pantoprazole, 40 mg, Oral, QAM  potassium chloride, 20 mEq, Oral, Daily  senna-docusate sodium, 2 tablet, Oral, Nightly  theophylline, 300 mg, Oral, Q12H      milrinone, 0.25-0.375 mcg/kg/min, Last Rate: 0.25 mcg/kg/min (06/21/25 1717)  niCARdipine, 5-15 mg/hr, Last Rate: Stopped (06/21/25 0815)              Pulmonary hypertension    CAD (coronary artery disease)    Abnormal findings on diagnostic imaging of other specified body structures      Assessment & Plan    -Severe multivessel CAD- s/p urgent CABGx5 LIMA, PFO closure, MARLENE 40mm clip- [facility] 6/20/2025  -Pulmonary hypertension  -Hypertension  -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  Just ambulated the stairs  Continue beta blocker and aspirin, no statin due to allergy    Encourage IS/flutter, mobilize, continue routine care  Okay from our standpoint for discharge.  He is complaining of being short of breath--will give an additional PO dose of lasix this afternoon but he can do this at home  Home health and pain rx orders placed.  Follow up with Dr. Simon in a month.    1. No driving " for 2 weeks and off narcotic pain medications.  2. Shower daily. Clean incisions with warm water and antibacterial soap only. Paint incisions with betadine after showering. Do not put any lotion or ointments on incisions.  3. Ambulate for 10 minutes at least 3 times a day.  4. No heavy lifting > 10lbs until seen in office.   5. Take all medications as prescribed.       EMILY Sahu  06/25/25  08:11 EDT

## 2025-06-25 NOTE — CASE MANAGEMENT/SOCIAL WORK
Case Management Discharge Note      Final Note: home with [facility]    Provided Post Acute Provider Quality & Resource List?: Yes  Post Acute Provider Quality and Resource List: Home Health  Delivered To: Patient  Method of Delivery: In person    Selected Continued Care - Discharged on 6/25/2025 Admission date: 6/18/2025 - Discharge disposition: Home or Self Care      Destination    No services have been selected for the patient.                Durable Medical Equipment    No services have been selected for the patient.                Dialysis/Infusion    No services have been selected for the patient.                Home Medical Care Coordination complete.      Service Provider Services Address Phone Fax Patient Preferred    [facility] [address], [address] [address] [phone] --              Therapy    No services have been selected for the patient.                Community Resources    No services have been selected for the patient.                Community & DME    No services have been selected for the patient.                    Transportation Services  Transportation: Private Transportation  Private: Car    Final Discharge Disposition Code: 06 - home with home health care

## 2025-06-25 NOTE — PROGRESS NOTES
Start PACC Note    Home Health Referral    Evaluated patient on Home Care and services available. Patient offered choice of available HHC and agreeable to SN services with [facility].    Isolation Precautions: No active isolations    START PATIENT REGISTRATION INFORMATION  Order Information  Order Signing Physician: Dr. Chago Simon  Service Ordered RN?: Yes  Service Ordered PT?: No  Service Ordered OT?: No  Service Ordered ST?: No  Service Ordered MSW?: No  Service Ordered HHA?: No  Following Physician: Dr. Chago Simon  Following Physician Phone: [phone]  Overseeing Physician: Dr. Chago Simon  (Required for Residents Only)  Agreeable to Follow ? Yes  Date/Time of Call 06/25/25 14:59 EDT, Spoke with: Written order in EPIC chart    Care Coordination  Same Day SOC?: No  Primary Care Physician: Dennis Kwong MD  Primary Care Physician Phone: [phone]  Primary Care Physician Address: [address] [place] [address] [place] [facility]  Visit Instructions:   Service Discharge Location Type: Home  Service Facility Name:   Service Floor Facility:   Service Room No:     Demographics  Patient Last Name: Kenrick  Patient First Name: Omar  Language/Communication Barrier: no  Service Address: [address] [place] [address]   Service City: [place]  Service State: [place]  Service Zip: [address]  Service Home Phone: [phone]  Other Phone Numbers:   Telephone Information:   Mobile [phone]     Emergency Contact:   Extended Emergency Contact Information  Primary Emergency Contact: CHATO AYALA  Home Phone: [phone]  Mobile Phone: [phone]  Relation: Significant Other  Hearing or visual needs: None  Other needs: None  Preferred language: English   needed? No  Secondary Emergency Contact: YAYA HARMAN  Mobile Phone: [phone]  Relation: Daughter  Hearing or visual needs: None  Other needs: None  Preferred language: English   needed? No    Admission Information  Admit Date:  6/18/2025  Patient Status at Discharge: Inpatient  Admitting Diagnosis: Pulmonary hypertension [I27.20]  CAD (coronary artery disease) [I25.10]    Caregiver Information  Caregiver First Name:   Caregiver Last Name:   Caregiver Relationship to Patient:   Caregiver Phone Number:   Caregiver Notes:     HITECH  [employer] List  HIGHTECH: HI TECH - PT/INR  Order: PT/INR to be drawn with visit 6/30/25 or 7/1/25. Call result to Jackelin with [facility]/ Dr. Alvarenga # [phone].  Last three results on file:   Component  Ref Range & Units (hover) 03:49  (6/25/25) 1 d ago  (6/24/25) 2 d ago  (6/23/25) 4 d ago  (6/21/25) 5 d ago  (6/20/25) 5 d ago  (6/20/25) 6 d ago  (6/19/25)   Protime 15.7 High  15.6 High  15.9 High  16.3 High  18.0 High  16.9 High  R, CM 15.9 High    INR 1.25 High  1.24 High  1.28 High          Following Physician/Facility: Dr. Alvarenga/ [facility]      END PATIENT REGISTRATION INFORMATION    Start PACC Summary    Additional Comments:     END PACC Summary    Discharge Date: 06/25/2025    Referral Source:  [facility]    Signed By: Quita Cleaning RN, 6/25/2025, 14:59 EDT     Date/Time: 06/25/25 14:59 EDT    End PACC Note

## 2025-06-26 ENCOUNTER — TRANSITIONAL CARE MANAGEMENT TELEPHONE ENCOUNTER (OUTPATIENT)
Dept: CALL CENTER | Facility: HOSPITAL | Age: 80
End: 2025-06-26
Payer: MEDICARE

## 2025-06-26 ENCOUNTER — TELEPHONE (OUTPATIENT)
Dept: CARDIOLOGY | Age: 80
End: 2025-06-26
Payer: MEDICARE

## 2025-06-26 NOTE — TELEPHONE ENCOUNTER
S/p open heart sx discharged 6/25.  He started noticing left shoulder pain that is worse with movement.  The pain has been worse this afternoon.  He took a norco this morning which helped.  His discharge paperwork says to take the norco BID PRN per his report, but the script sent says he can take Q4H PRN and I let him know this.      He feels good otherwise.  He has some chest soreness at the incision.  He states the incision itself looks good.  No fever.    He is asking if his lasix was sent in and looks like it was sent in today and I let him know.    He also wanted to schedule his follow up with Dr. Hdez.  He has been scheduled 7/10.    I advised patient call over to Dr. Simon's office and let them know about the shoulder pain.  He verbalizes understanding and will call their office.    Jackie Hoskins RN  [facility] Triage  06/26/25 16:31 EDT

## 2025-06-26 NOTE — OUTREACH NOTE
Call Center TCM Note      Flowsheet Row Responses   [facility] patient discharged from? [facility]   Does the patient have one of the following disease processes/diagnoses(primary or secondary)? Cardiothoracic surgery   TCM attempt successful? Yes   Call start time 1519   Call end time 1535   Discharge diagnosis SHE STERNOTOMY CORONARY ARTERY BYPASS GRAFT TIMES 5 USING LEFT INTERNAL MAMMARY ARTERY AND LEFT GREATER SAPHENOUS VEIN GRAFT PER ENDOSCOPIC VEIN HARVESTING, CLOSURE OF PFO, EXCLUSION OF LEFT ATRIAL APPENDAGE AND PRP N   Person spoke with today (if not patient) and relationship pt   Meds reviewed with patient/caregiver? Yes   Is the patient having any side effects they believe may be caused by any medication additions or changes? No   Does the patient have all medications related to this admission filled (includes all antibiotics, pain medications, cardiac medications, etc.) Yes   Is the patient taking all medications as directed (includes completed medication regime)? Yes   Comments Post-op 7/30/25,  advised to make cardiac rehab, cardiology fu appts   Does the patient have an appointment with their PCP within 7-14 days of discharge? No   Nursing Interventions Patient desires to follow up with specialty only, Routed TCM call to PCP office   What is the Home health agency?  [facility]   Has home health visited the patient within 72 hours of discharge? Call prior to 72 hours   Psychosocial issues? No   Did the patient receive a copy of their discharge instructions? Yes   Nursing interventions Reviewed instructions with patient   What is the patient's perception of their health status since discharge? Improving   Nursing interventions Nurse provided patient education   Is the patient/caregiver able to teach back normal signs of recovery? Pain or discomfort at incisional site   Nursing interventions Reassured on normal signs of recovery   Is the patient /caregiver able to teach back basic post-op care?  Shower daily, No tub bath, swimming, or hot tub until instructed by MD, Use a clean wash cloth and antibacterial bar or liquid soap to clean incisions, Lifting as instructed by MD in discharge instructions   Is the patient/caregiver able to teach back signs and symptoms of incisional infection? Increased redness, swelling or pain at the incisonal site, Increased drainage or bleeding, Incisional warmth, Pus or odor from incision, Fever   Is the patient/caregiver able to teach back steps to recovery at home? Rest and rebuild strength, gradually increase activity, Eat a well-balance diet   If the patient is a current smoker, are they able to teach back resources for cessation? Not a smoker   Is the patient/caregiver able to teach back the hierarchy of who to call/visit for symptoms/problems? PCP, Specialist, Home health nurse, Urgent Care, ED, 911 Yes   TCM call completed? Yes   Wrap up additional comments Pt denies any s/s of infection at incisional sites. Reviewed AVS/meds/instructions with pt. Pt verfied Post-op, Pt advised to make cardiac rehab, cardiology fu appts   Call end time 1513   Would this patient benefit from a Referral to [facility] Social Work? No   Is the patient interested in additional calls from an ambulatory ? No            Shi PIERCE - Registered Nurse    6/26/2025, 15:36 EDT

## 2025-06-27 ENCOUNTER — TELEPHONE (OUTPATIENT)
Dept: CARDIOLOGY | Facility: CLINIC | Age: 80
End: 2025-06-27
Payer: MEDICARE

## 2025-06-27 ENCOUNTER — TRANSCRIBE ORDERS (OUTPATIENT)
Dept: CARDIAC REHAB | Facility: HOSPITAL | Age: 80
End: 2025-06-27
Payer: MEDICARE

## 2025-06-27 DIAGNOSIS — Z95.1 S/P CABG (CORONARY ARTERY BYPASS GRAFT): Primary | ICD-10-CM

## 2025-06-27 NOTE — TELEPHONE ENCOUNTER
Patient needed his furosemide refilled. Per Karla with home health. Per [facility] it is in processed to be filled. Called karla back to let her know it is in process for the patient.

## 2025-06-27 NOTE — PROGRESS NOTES
HEMATOLOGY ONCOLOGY OUTPATIENT CONSULTATION       Patient name: Omar Choudhary  : 1945  MRN: [id number]  Primary Care Physician: Dennis Kwong MD  Referring Physician: Dennis Kwong, *  Reason For Consult:       History of Present Illness:  Patient is a 79 y.o. male who has been referred to us for further evaluation and management of recent PE. Pertinent history is as follows:      The patient was admitted to [facility] from 1/10/25-25 with saddle emboli with severe respiratory failure. He was admitted to the ICU and had thrombectomy in addition to a/c. He had high oxygen needs initially optiflow but gradually O2 needs improved and he is off oxygen and had overnight oximetry as well as 6 minute walk without any need for supplemental oxygen. He has been on coumadin and INR has been therapeutic for 2 days. He will discharge to home with  and discussed should have INR check in about 2 days to continue to monitor INR and coumadin dosing. Severe pulmonary hypertension seen on [facility] and will follow with Cardiology. He's needed a low dose of norco about BID for the stay due to resolving chest pain from PE. I wrote short rx for this to have available but d/w patient trying to move towards tylenol for pain control and to avoid NSAIDs.      He had planned on flying to [place] in a couple of weeks but was recommended he postpone that trip for the time being until followed up with Cardiology and cleared for flying/altitude.       25: Bilateral LE Venous Duplex US:      Acute right lower extremity deep vein thrombosis noted in the distal femoral, popliteal and peroneal.    Chronic right lower extremity superficial thrombophlebitis noted in the great saphenous (below knee).    All other veins appeared normal bilaterally.    1/10/25: CTA chest:  IMPRESSION:   Extensive pulmonary embolism/saddle embolus.  Increased RV LV ratio 1.2 suggesting right heart strain.    1/10/25:  Echocardiogram:    The right ventricular cavity is severely dilated. Severely reduced right ventricular systolic function noted.    Left ventricular systolic function is normal. Left ventricular ejection fraction appears to be 61 - 65%.    Left ventricular diastolic function is consistent with (grade I) impaired relaxation.    The aortic valve leaflets are moderately calcified (aortic sclerosis)      Past Medical History:  Late onset asthma        Family History:   Brother  of MI at age 46,  Father  at 55 of cardiac issues.    3/3/25: Patient presents for initial consultation. Denied any respiratory/chest symptoms presently.     Patient claimed being in very good health for most of his adult life., he was a Platelet donor for several years. Had a tibial compression fracture in , he is Undergoing outpatient rehab.    Reported that he has been uptodate with his age appropriate cancer screening, Had a colonoscopy: Diverticulosis. No polyps      4/3/25: The patient presents for follow-up visit.    He reports a fluctuating response to his warfarin, with periods of both subtherapeutic and supratherapeutic levels. He has been on a regimen of warfarin 7.5 mg for 5 days a week, supplemented by half tablets on the remaining 2 days. Despite dietary modifications, including a shift from spinach to lettuce, he continues to experience these fluctuations.  His most recent INR was obtained this past Monday.   He has been receiving weekly management at [facility] and is considering transferring his care to our facility.     SUBJECTIVE:  25: Patient seen today for follow-up. No new complaints reported.  He appears to be tolerating warfarin well without any bleeding issues.        Past Medical History:   Diagnosis Date    ADHD (attention deficit hyperactivity disorder)     Hard to focus on tasks and follow through.    Allergic 2018    Arthritis     Asthma     Carpal tunnel syndrome     no pain    Cataract      Deep vein thrombosis 01/10/2025    Depression     Erectile dysfunction     2018 at 73 years old    Family history of malignant neoplasm of breast 09/26/2019    Hyperlipidemia 09/26/2019    Allergic to statins    Hypertension 03/01/2012    Managing w/ Losartan Potassium    Leg pain, right     Low back pain     Decompress and fusion surgery 10/4&5/2022    Neuropathy     feet    Osteopenia 11/28/2019    Poor balance     Pulmonary embolism     Acute pulmonary saddle embolism    Reactive airway disease 01/15/2016    Scoliosis 1960    Shortness of breath 2017    Sleep apnea     Substance abuse 1966    Alcoholic       Past Surgical History:   Procedure Laterality Date    CARDIAC CATHETERIZATION  1992    CARDIAC CATHETERIZATION N/A 01/11/2025    Procedure: Right Heart Cath;  Surgeon: Nancy Hdez MD;  Location:  [facility];  Service: Cardiovascular;  Laterality: N/A;    CARDIAC CATHETERIZATION  01/11/2025    Procedure: Percutaneous Manual Thrombectomy;  Surgeon: Nancy Hdez MD;  Location:  [facility];  Service: Cardiovascular;;    CARDIAC CATHETERIZATION Bilateral 01/11/2025    Procedure: Pulmonary angiography;  Surgeon: Nancy Hdez MD;  Location:  [facility];  Service: Cardiovascular;  Laterality: Bilateral;    CARDIAC CATHETERIZATION N/A 6/18/2025    Procedure: Right and Left Heart Cath;  Surgeon: Nancy Hdez MD;  Location:  [facility];  Service: Cardiovascular;  Laterality: N/A;    CARDIAC CATHETERIZATION N/A 6/18/2025    Procedure: Coronary angiography;  Surgeon: Nancy Hdez MD;  Location:  [facility];  Service: Cardiovascular;  Laterality: N/A;    COLONOSCOPY  2013    No polyps, slight diverticulitis    CORONARY ARTERY BYPASS GRAFT N/A 6/20/2025    Procedure: SHE STERNOTOMY CORONARY ARTERY BYPASS GRAFT TIMES 5 USING LEFT INTERNAL MAMMARY ARTERY AND LEFT GREATER SAPHENOUS VEIN GRAFT PER ENDOSCOPIC VEIN  HARVESTING, CLOSURE OF PFO, EXCLUSION OF LEFT ATRIAL APPENDAGE AND PRP;  Surgeon: Jr Chago Simon MD;  Location: [facility];  Service: Cardiothoracic;  Laterality: N/A;    EYE SURGERY  2016    Cataracts    KNEE ARTHROSCOPY W/ ACL RECONSTRUCTION Right 2019    LUMBAR FUSION Bilateral 10/05/2022    Procedure: DAY 2 LUMBAR LAMINECTOMY TRANSFORAMINAL LUMBAR INTERBODY FUSION L2,L3,L4 WITH NEURO ROBOT;  Surgeon: John Do MD;  Location: [facility];  Service: Robotics - Neuro;  Laterality: Bilateral;    LUMBAR FUSION N/A 10/04/2022    Procedure: DAY 1 LUMBAR LATERAL INTERBODY FUSION WITH NEURO ROBOT L2, L3.L4;  Surgeon: John Do MD;  Location: [facility];  Service: Robotics - Neuro;  Laterality: N/A;  left side approach    MOUTH SURGERY      SPINE SURGERY  10/4&5/2022    Dr. John Do, [facility], [place], operated by [facility]         Current Outpatient Medications:     acetaminophen (TYLENOL) 500 MG tablet, Take 1 tablet by mouth Every 6 (Six) Hours As Needed for Mild Pain., Disp: , Rfl:     Acetylcysteine capsule capsule, Take 1 capsule by mouth Daily., Disp: , Rfl:     albuterol sulfate  (90 Base) MCG/ACT inhaler, Inhale 2 puffs Every 4 (Four) Hours As Needed for Wheezing or Shortness of Air., Disp: 18 g, Rfl: 0    aspirin 81 MG EC tablet, Take 1 tablet by mouth Daily., Disp: 30 tablet, Rfl: 11    B Complex Vitamins (VITAMIN B COMPLEX) capsule capsule, Take 1 capsule by mouth Daily. LD 9-27, Disp: , Rfl:     budesonide-formoterol (SYMBICORT) 160-4.5 MCG/ACT inhaler, Inhale 2 puffs 2 (Two) Times a Day., Disp: 30.6 g, Rfl: 1    Calcium Carb-Cholecalciferol (Oyster Shell Calcium w/D) 500-5 MG-MCG tablet, TAKE TWO TABLETS BY MOUTH DAILY, Disp: 180 tablet, Rfl: 0    clonazePAM (KlonoPIN) 0.5 MG tablet, Take 1 tablet by mouth 2 (Two) Times a Day As Needed for Anxiety. for anxiety, Disp: 30 tablet, Rfl: 2    coenzyme Q10 100 MG capsule, Take 1 capsule by mouth Daily., Disp: , Rfl:     cyclobenzaprine  (FLEXERIL) 10 MG tablet, Take 1 tablet by mouth 3 (Three) Times a Day As Needed for Muscle Spasms., Disp: 30 tablet, Rfl: 1    furosemide (LASIX) 40 MG tablet, Take 1 tablet by mouth Daily., Disp: 30 tablet, Rfl: 1    gabapentin (NEURONTIN) 400 MG capsule, Take 1 capsule by mouth Every 8 (Eight) Hours for 7 days., Disp: 21 capsule, Rfl: 0    HYDROcodone-acetaminophen (NORCO) 5-325 MG per tablet, Take 1 tablet by mouth Every 4 (Four) Hours As Needed for Moderate Pain for up to 7 days., Disp: 42 tablet, Rfl: 0    L-Arginine 500 MG capsule, Take 1 capsule by mouth Daily., Disp: , Rfl:     losartan-hydrochlorothiazide (Hyzaar) 50-12.5 MG per tablet, Take 1 tablet by mouth Daily., Disp: 90 tablet, Rfl: 1    metoprolol tartrate (LOPRESSOR) 25 MG tablet, Take 1 tablet by mouth Every 12 (Twelve) Hours., Disp: 60 tablet, Rfl: 5    montelukast (SINGULAIR) 10 MG tablet, TAKE 1 TABLET BY MOUTH ONCE DAILY, Disp: 90 tablet, Rfl: 0    Multiple Vitamins-Minerals (EMERGEN-C BLUE PO), Take 1 tablet by mouth Daily. LD 9-27, Disp: , Rfl:     naloxone (NARCAN) 4 MG/0.1ML nasal spray, Call 911. Don't prime. Lennon in 1 nostril for overdose. Repeat in 2-3 minutes in other nostril if no or minimal breathing/responsiveness., Disp: 2 each, Rfl: 0    S-Adenosylmethionine (YURY-e) 400 MG tablet, Take 400 mg by mouth Daily., Disp: , Rfl:     sildenafil (REVATIO) 20 MG tablet, TAKE 1 TO 2 TABLETS BY MOUTH AS NEEDED FOR ERICTILE DYSFUNCTION, Disp: 50 tablet, Rfl: 0    theophylline (THEODUR) 300 MG 12 hr tablet, Take 1 tablet by mouth Daily., Disp: 90 tablet, Rfl: 3    traZODone (DESYREL) 50 MG tablet, Take 1-2 tablets by mouth Every Night., Disp: 180 tablet, Rfl: 3    triamcinolone (KENALOG) 0.025 % cream, Apply 1 Application topically to the appropriate area as directed 2 (Two) Times a Day., Disp: , Rfl:     warfarin (COUMADIN) 5 MG tablet, Take 1 tablet by mouth Daily., Disp: 30 tablet, Rfl: 1    Allergies   Allergen Reactions    Statins Myalgia      Other reaction(s): muscle soreness       Family History   Problem Relation Age of Onset    Heart disease Mother     Hypertension Mother     Breast cancer Mother     Stroke Mother         Several TIAs    Alcohol abuse Mother     Thyroid disease Mother     Arthritis Mother     Cancer Mother         Breast    Aortic aneurysm Father     Heart attack Father     Liver cancer Father     Cancer Father         Liver    Heart disease Father         Heart attack    Early death Brother         Coronary thrombosis @ 48 years while mountain climbing.    Heart attack Brother         , at 47 years, fatal       Cancer-related family history includes Breast cancer in his mother; Cancer in his father and mother; Liver cancer in his father.      Social History     Tobacco Use    Smoking status: Former     Current packs/day: 0.00     Average packs/day: 0.5 packs/day for 26.0 years (13.0 ttl pk-yrs)     Types: Cigarettes     Start date: 1966     Quit date: 1992     Years since quittin.5     Passive exposure: Past    Smokeless tobacco: Never   Vaping Use    Vaping status: Never Used   Substance Use Topics    Alcohol use: Yes     Alcohol/week: 16.0 standard drinks of alcohol     Types: 14 Glasses of wine, 2 Cans of beer per week    Drug use: Never     Comment: CBD gummies- stop now for surgery     Social History     Social History Narrative    Not on file       ROS:   Review of Systems   Constitutional: Negative.    HENT: Negative.     Eyes: Negative.    Respiratory: Negative.     Cardiovascular: Negative.    Gastrointestinal: Negative.    Endocrine: Negative.    Genitourinary: Negative.    Musculoskeletal: Negative.    Skin: Negative.    Allergic/Immunologic: Negative.    Neurological: Negative.    Hematological: Negative.    Psychiatric/Behavioral: Negative.           Objective:    Vital Signs:  There were no vitals filed for this visit.      There is no height or weight on file to calculate BMI.    ECOG  (1)  Restricted in physically strenuous activity, ambulatory and able to do work of light nature    Physical Exam:   Physical Exam  Constitutional:       Appearance: Normal appearance. He is normal weight.   HENT:      Head: Normocephalic and atraumatic.      Right Ear: External ear normal.      Left Ear: External ear normal.      Nose: Nose normal.      Mouth/Throat:      Mouth: Mucous membranes are moist.      Pharynx: Oropharynx is clear.   Eyes:      Extraocular Movements: Extraocular movements intact.      Conjunctiva/sclera: Conjunctivae normal.      Pupils: Pupils are equal, round, and reactive to light.   Cardiovascular:      Rate and Rhythm: Normal rate.      Pulses: Normal pulses.   Pulmonary:      Effort: Pulmonary effort is normal.   Abdominal:      General: Abdomen is flat.      Palpations: Abdomen is soft.   Musculoskeletal:         General: Normal range of motion.      Cervical back: Normal range of motion and neck supple.   Skin:     General: Skin is warm.   Neurological:      Mental Status: He is alert.   Psychiatric:         Mood and Affect: Mood normal.         Behavior: Behavior normal.         Thought Content: Thought content normal.         Judgment: Judgment normal.         Lab Results - Last 18 Months   Lab Units 06/24/25  0315 06/23/25  0310 06/22/25  0201   WBC 10*3/mm3 6.26 7.12 8.93   HEMOGLOBIN g/dL 9.8* 9.4* 10.3*   HEMATOCRIT % 29.3* 27.9* 30.6*   PLATELETS 10*3/mm3 166 117* 142   MCV fL 95.4 94.6 95.0     Lab Results - Last 18 Months   Lab Units 06/25/25  0349 06/24/25  2322 06/24/25  1111 06/24/25  0315 06/23/25  1326 06/23/25  0310 06/20/25  1147 06/19/25  0406 05/26/25  1924 04/22/25  1218   SODIUM mmol/L 134*  --   --  135*  --  132*   < > 138 147* 139   POTASSIUM mmol/L 4.5 4.5 3.6 3.8   < > 3.5   < > 3.9 4.2 4.2   CHLORIDE mmol/L 100  --   --  96*  --  98   < > 103 109* 107   TOTAL CO2 mmol/L  --   --   --   --   --   --   --   --  24  --    CO2 mmol/L 23.2  --   --  26.5  --  23.0    "< > 21.7*  --  25.1   BUN mg/dL 15.0  --   --  13.0  --  12.0   < > 9.0 13 15   CREATININE mg/dL 0.90  --   --  0.81  --  0.87   < > 0.88 0.96 0.99   CALCIUM mg/dL 8.5*  --   --  8.8  --  8.4*   < > 8.8 9.1 8.9   BILIRUBIN mg/dL  --   --   --   --   --   --   --  1.3* 0.4 1.3*   ALK PHOS U/L  --   --   --   --   --   --   --  56 57 61   ALT (SGPT) U/L  --   --   --   --   --   --   --  19 44 29   AST (SGOT) U/L  --   --   --   --   --   --   --  18 50* 26   GLUCOSE mg/dL 118*  --   --  144*  --  119*   < > 104*  --  100*    < > = values in this interval not displayed.       Lab Results   Component Value Date    GLUCOSE 118 (H) 06/25/2025    BUN 15.0 06/25/2025    CREATININE 0.90 06/25/2025    EGFRIFNONA 69 11/24/2021    BCR 16.7 06/25/2025    K 4.5 06/25/2025    CO2 23.2 06/25/2025    CALCIUM 8.5 (L) 06/25/2025    ALBUMIN 4.1 06/21/2025    AST 18 06/19/2025    ALT 19 06/19/2025       Lab Results - Last 18 Months   Lab Units 06/25/25  0349 06/24/25  0315 06/23/25  0929 06/21/25  0244 06/20/25  1147 06/20/25  1057 06/19/25  0406 01/14/25  0357 01/13/25  0330   INR  1.25* 1.24* 1.28*   < > 1.49*   < > 1.27*   < >  --    APTT seconds  --   --   --   --  33.0  --  32.6  --  35.9*    < > = values in this interval not displayed.       Lab Results   Component Value Date    IRON 51 (L) 01/14/2025    TIBC 267 (L) 01/14/2025    FERRITIN 225.00 01/14/2025       Lab Results   Component Value Date    FOLATE 13.10 01/16/2025       No results found for: \"OCCULTBLD\"    Lab Results   Component Value Date    RETICCTPCT 3.10 (H) 01/15/2025     Lab Results   Component Value Date    OUZAIIHZ34 317 04/22/2025     No results found for: \"SPEP\", \"UPEP\"  No results found for: \"LDH\", \"URICACID\"  No results found for: \"SUZANNE\", \"RF\", \"SEDRATE\"  Lab Results   Component Value Date    FIBRINOGEN 295 06/20/2025     Lab Results   Component Value Date    PTT 33.0 06/20/2025    INR 1.25 (H) 06/25/2025     No results found for: \"\"  No results found " "for: \"CEA\"  No components found for: \"CA-19-9\"  Lab Results   Component Value Date    PSA 0.377 04/22/2025     No results found for: \"SEDRATE\"       Assessment & Plan     Saddle Pulmonary Embolism:  Right LE Extensive DVT:  -recent imaging and hospitalization records were reviewed as above.  CT angiogram chest consistent with a large saddle pulmonary embolism.  -No clear risk factors were identified, however relative immobilization following tibial fracture and recent road trip for 5 hours could be weak risk factors. patient denied any recent acute illness.  -Lower extremity venous duplex ultrasound was reported positive for Extensive DVT involving RLE proximal and distal veins.  -Discussed with patient/family about management of pulmonary embolism and evaluation for risk factors  Given no clear provoking factors,   thrombophilia workup was ordered as this may  have implications for testing for family members.   -Thrombophilia workup is reported negative, low protein C and protein S levels are likely secondary to warfarin use.  -Given Saddle PE and extensive DVT, likely candidate for indefinite anticoagulation.   -currently on Warfarin for long term anticoagulation. Denied any bleeding issues presently.  -Hold off on any elective procedures for 6 months.  -Given wild fluctuation in INR levels, discussed with patient regarding switching to Eliquis, he is concerned about his high co-pay with Eliquis, will request financial counselor evaluation to look into this.  -Patient has established care with the [facility] anticoagulation clinic, INR continues to be therapeutic.  - No bleeding issues reported    Right heart Strain: noted during hospital admission. Pt is following with Cardiology, planned for repeat Echo next week.     4-month follow-up with repeat labs, sooner as needed      Thank you very much for providing the opportunity to participate in this patient’s care. Please do not hesitate to call if there are any " other questions.

## 2025-06-30 ENCOUNTER — TELEPHONE (OUTPATIENT)
Dept: CARDIAC REHAB | Facility: HOSPITAL | Age: 80
End: 2025-06-30
Payer: MEDICARE

## 2025-06-30 NOTE — TELEPHONE ENCOUNTER
Patient is interested in cardiac rehab. Patient doing home health, and will call with discharge date. Order signed, will run insurance when patient calls back.

## 2025-07-01 ENCOUNTER — ANTICOAGULATION VISIT (OUTPATIENT)
Dept: ONCOLOGY | Facility: HOSPITAL | Age: 80
End: 2025-07-01
Payer: MEDICARE

## 2025-07-01 DIAGNOSIS — I26.02 ACUTE SADDLE PULMONARY EMBOLISM WITH ACUTE COR PULMONALE: Primary | ICD-10-CM

## 2025-07-01 LAB — INR PPP: 2.2

## 2025-07-01 NOTE — PROGRESS NOTES
Anticoagulation Clinic Progress Note    Patient's visit was held via phone today.    Anticoagulation Summary  As of 2025      INR goal:  2.0-3.0   TTR:  29.2% (4.7 mo)   INR used for dosin.20 (2025)   Warfarin maintenance plan:  5 mg (5 mg x 1) every day   Weekly warfarin total:  35 mg   Plan last modified:  Ramona Sauceda RPH (2025)   Next INR check:  2025   Target end date:  --    Indications    Acute saddle pulmonary embolism with acute cor pulmonale [I26.02]                 Anticoagulation Episode Summary       INR check location:  --    Preferred lab:  --    Send INR reminders to:  [facility] CBC ANTICOAG POOL    Comments:  --          Anticoagulation Care Providers       Provider Role Specialty Phone number    Kenny Jennifer PERRIN, EMILY Referring Nurse Practitioner [phone]            INR History:      2025    11:47 AM 2025     2:44 AM 2025     9:29 AM 2025     3:15 AM 2025     3:49 AM 2025    12:00 AM 2025     2:57 PM   Anticoagulation Monitoring   INR       2.20   INR Date       2025   INR Goal       2.0-3.0   Trend       Down   Last Week Total       45 mg   Next Week Total       35 mg   Sun       5 mg   Mon       5 mg   Tue       5 mg   Wed       5 mg   Thu       5 mg   Fri       5 mg   Sat       5 mg   Historical INR 1.49  1.31  1.28  1.24  1.25  2.20            This result is from an external source.       Plan:  1. INR is Therapeutic today- see above in Anticoagulation Summary.   Provided instructions to Shalini with [facility] to Continue their warfarin regimen- see above in Anticoagulation Summary.  2. Follow up in 1 week      Ramona Sauceda RPH

## 2025-07-02 ENCOUNTER — OFFICE VISIT (OUTPATIENT)
Dept: ONCOLOGY | Facility: CLINIC | Age: 80
End: 2025-07-02
Payer: MEDICARE

## 2025-07-02 ENCOUNTER — LAB (OUTPATIENT)
Dept: LAB | Facility: HOSPITAL | Age: 80
End: 2025-07-02
Payer: MEDICARE

## 2025-07-02 VITALS
OXYGEN SATURATION: 98 % | BODY MASS INDEX: 28.9 KG/M2 | DIASTOLIC BLOOD PRESSURE: 77 MMHG | WEIGHT: 179.8 LBS | HEIGHT: 66 IN | SYSTOLIC BLOOD PRESSURE: 110 MMHG | TEMPERATURE: 98 F | HEART RATE: 55 BPM

## 2025-07-02 DIAGNOSIS — I26.02 ACUTE SADDLE PULMONARY EMBOLISM WITH ACUTE COR PULMONALE: Primary | ICD-10-CM

## 2025-07-02 DIAGNOSIS — D64.9 ANEMIA, UNSPECIFIED TYPE: ICD-10-CM

## 2025-07-02 DIAGNOSIS — I82.4Y1 ACUTE DEEP VEIN THROMBOSIS (DVT) OF PROXIMAL VEIN OF RIGHT LOWER EXTREMITY: ICD-10-CM

## 2025-07-02 LAB
BASOPHILS # BLD AUTO: 0.02 10*3/MM3 (ref 0–0.2)
BASOPHILS NFR BLD AUTO: 0.4 % (ref 0–1.5)
D DIMER PPP FEU-MCNC: 5.2 MCGFEU/ML (ref 0–0.77)
DEPRECATED RDW RBC AUTO: 49.3 FL (ref 37–54)
EOSINOPHIL # BLD AUTO: 0.22 10*3/MM3 (ref 0–0.4)
EOSINOPHIL NFR BLD AUTO: 4.1 % (ref 0.3–6.2)
ERYTHROCYTE [DISTWIDTH] IN BLOOD BY AUTOMATED COUNT: 13.8 % (ref 12.3–15.4)
FERRITIN SERPL-MCNC: 340 NG/ML (ref 30–400)
HCT VFR BLD AUTO: 36 % (ref 37.5–51)
HGB BLD-MCNC: 11.1 G/DL (ref 13–17.7)
HOLD SPECIMEN: NORMAL
IRON 24H UR-MRATE: 39 MCG/DL (ref 59–158)
IRON SATN MFR SERPL: 12 % (ref 20–50)
LYMPHOCYTES # BLD AUTO: 0.9 10*3/MM3 (ref 0.7–3.1)
LYMPHOCYTES NFR BLD AUTO: 16.8 % (ref 19.6–45.3)
MCH RBC QN AUTO: 31 PG (ref 26.6–33)
MCHC RBC AUTO-ENTMCNC: 30.8 G/DL (ref 31.5–35.7)
MCV RBC AUTO: 100.6 FL (ref 79–97)
MONOCYTES # BLD AUTO: 0.66 10*3/MM3 (ref 0.1–0.9)
MONOCYTES NFR BLD AUTO: 12.3 % (ref 5–12)
NEUTROPHILS NFR BLD AUTO: 3.56 10*3/MM3 (ref 1.7–7)
NEUTROPHILS NFR BLD AUTO: 66.4 % (ref 42.7–76)
PLATELET # BLD AUTO: 314 10*3/MM3 (ref 140–450)
PMV BLD AUTO: 9.2 FL (ref 6–12)
RBC # BLD AUTO: 3.58 10*6/MM3 (ref 4.14–5.8)
TIBC SERPL-MCNC: 314 MCG/DL (ref 298–536)
TRANSFERRIN SERPL-MCNC: 211 MG/DL (ref 200–360)
WBC NRBC COR # BLD AUTO: 5.36 10*3/MM3 (ref 3.4–10.8)

## 2025-07-02 PROCEDURE — 82728 ASSAY OF FERRITIN: CPT | Performed by: STUDENT IN AN ORGANIZED HEALTH CARE EDUCATION/TRAINING PROGRAM

## 2025-07-02 PROCEDURE — 36415 COLL VENOUS BLD VENIPUNCTURE: CPT

## 2025-07-02 PROCEDURE — 85025 COMPLETE CBC W/AUTO DIFF WBC: CPT

## 2025-07-02 PROCEDURE — 84466 ASSAY OF TRANSFERRIN: CPT | Performed by: STUDENT IN AN ORGANIZED HEALTH CARE EDUCATION/TRAINING PROGRAM

## 2025-07-02 PROCEDURE — 85379 FIBRIN DEGRADATION QUANT: CPT | Performed by: STUDENT IN AN ORGANIZED HEALTH CARE EDUCATION/TRAINING PROGRAM

## 2025-07-02 PROCEDURE — 83540 ASSAY OF IRON: CPT | Performed by: STUDENT IN AN ORGANIZED HEALTH CARE EDUCATION/TRAINING PROGRAM

## 2025-07-07 ENCOUNTER — READMISSION MANAGEMENT (OUTPATIENT)
Dept: CALL CENTER | Facility: HOSPITAL | Age: 80
End: 2025-07-07
Payer: MEDICARE

## 2025-07-07 NOTE — OUTREACH NOTE
CT Surgery Week 2 Survey      Flowsheet Row Responses   [facility] patient discharged from? [place]   Does the patient have one of the following disease processes/diagnoses(primary or secondary)? Cardiothoracic surgery   Week 2 attempt successful? No   Unsuccessful attempts Attempt 1   Revoke Jeronimo SHAIKH - Registered Nurse

## 2025-07-07 NOTE — PROGRESS NOTES
Subjective   History of Present Illness: Omar Choudhary is a 79 y.o. male is here today for follow-up.  Patient was last followed up with myself on 1/2/2025 for acute back and right buttock pain after a fall.  He is status post L2 L4 lateral fusion with Dr. Do in 2022.  Patient was felt to be suffering from some musculoskeletal's muscle strain pain.  Flexion-extension imaging was ordered to review hardware look for any worsening spondylolisthesis.  He was continued to be treated conservative for his inflammation and placed on a course of steroids and undergo physical therapy.  He is here today after undergoing his recommended imaging and to see how he is doing. Patient reports falling on Thanksgiving day and is concerned about his hardware.  Sacroiliitis?  Patient here today with no acute complaints or concerns.  He continues with some right-sided low back pain with no worsening.  He did undergo recent CABG 3 weeks ago.  He does have quite a bit of sternal pain from that.  He denies any radicular pain with this or sensation loss.  Patient is mainly concerned about his hardware.    History of Present Illness    The following portions of the patient's history were reviewed and updated as appropriate: allergies, current medications, past family history, past medical history, past social history, past surgical history, and problem list.    Review of Systems   Constitutional:  Positive for activity change.   HENT: Negative.     Eyes: Negative.    Respiratory: Negative.     Cardiovascular: Negative.    Gastrointestinal: Negative.    Endocrine: Negative.    Musculoskeletal:  Positive for arthralgias, back pain (lower right) and myalgias.   Skin: Negative.    Allergic/Immunologic: Negative.    Neurological: Negative.    Hematological: Negative.    Psychiatric/Behavioral: Negative.     All other systems reviewed and are negative.      Objective     .BP 95/50 (BP Location: Left arm, Patient Position: Sitting)   Pulse  "76   Ht 167.6 cm (65.98\")   Wt 77.6 kg (171 lb)   SpO2 96%   BMI 27.62 kg/m²    Body mass index is 27.62 kg/m².    Vitals:    07/10/25 1002   PainSc: 6           Physical Exam  Neurological Exam      Assessment & Plan   Independent Review of Radiographic Studies:      I personally reviewed the images from the following studies.    Lumbar x-rays with flexion-extension 6/11/2025    Postoperative changes noted with lateral interbody fusion and spacer devices.  Appearance of hardware appears overtly similar to prior imaging in 23.  There is a continued anterolisthesis at L5 and S1 with no significant change on flexion-extension maneuvers.    Medical Decision Making:      Omar Barron a 79 y.o. male following up today for his recent right-sided back pain after a fall in January.  His back pain continues but unsure if it is better or he is just more symptomatic from his recent open heart surgery that is taking his mind off of his back.  He continues with no radicular pain.  X-rays reviewed demonstrated overt stable hardware findings.  He is continued known subluxation of L5-S1.  Patient 3 weeks out of recent CABG and priority is his cardiac rehabilitation at this time.  There is no urgent imaging needed at this time and recommend that once patient is fully recovered from his recent heart surgery that he contact the office if his symptoms are still bothering him and that we could get advanced imaging for further evaluation.  Of note, patient's blood pressure was found to be extremely low and taken several times.  He denied any shortness of air or symptoms such as dizziness or lightheadedness but I did feel he was a little short of air as he was taking several deep breaths when he was trying to talk with me.  I was concerned about him driving home and recommended someone pick him up but he was insisting that he was okay to drive.  I did recommend patient notify his cardiologist and/or primary care provider " regarding his blood pressure today and he verbally acknowledged.  I did also send secure chat with his cardiologist and primary care provider with my concerns.  Patient is agreeable to plan of care and wishes to proceed.        Diagnoses and all orders for this visit:    1. Chronic right-sided low back pain without sciatica (Primary)      Return if symptoms worsen or fail to improve.    This patient was examined wearing appropriate personal protective equipment.     Omar Choudhary  reports that he quit smoking about 33 years ago. His smoking use included cigarettes. He started smoking about 59 years ago. He has a 13 pack-year smoking history. He has been exposed to tobacco smoke. He has never used smokeless tobacco.   Body mass index is 27.62 kg/m².  BMI is >= 25 and <30. (Overweight) Recommendations for exercise counseling/recommendations and nutrition counseling/recommendations     Patient's blood pressure was reviewed.  Recommendations for  a low-salt diet and exercise to maintain/improve BP in addition to taking any presribed medications.    Advance Care Planning   ACP discussion was held with the patient during this visit. Patient has an advance directive in EMR which is still valid.          Gianna Chavira DNP, APRN  07/10/25  10:53 EDT

## 2025-07-10 ENCOUNTER — APPOINTMENT (OUTPATIENT)
Dept: GENERAL RADIOLOGY | Facility: HOSPITAL | Age: 80
End: 2025-07-10
Payer: MEDICARE

## 2025-07-10 ENCOUNTER — TELEPHONE (OUTPATIENT)
Dept: FAMILY MEDICINE CLINIC | Facility: CLINIC | Age: 80
End: 2025-07-10
Payer: MEDICARE

## 2025-07-10 ENCOUNTER — HOSPITAL ENCOUNTER (OUTPATIENT)
Facility: HOSPITAL | Age: 80
Setting detail: OBSERVATION
Discharge: HOME | End: 2025-07-11
Attending: EMERGENCY MEDICINE | Admitting: EMERGENCY MEDICINE
Payer: MEDICARE

## 2025-07-10 ENCOUNTER — HOSPITAL ENCOUNTER (OUTPATIENT)
Dept: CARDIOLOGY | Facility: HOSPITAL | Age: 80
Discharge: HOME | End: 2025-07-10
Payer: MEDICARE

## 2025-07-10 ENCOUNTER — OFFICE VISIT (OUTPATIENT)
Dept: NEUROSURGERY | Facility: CLINIC | Age: 80
End: 2025-07-10
Payer: MEDICARE

## 2025-07-10 ENCOUNTER — OFFICE VISIT (OUTPATIENT)
Age: 80
End: 2025-07-10
Payer: MEDICARE

## 2025-07-10 VITALS
OXYGEN SATURATION: 97 % | BODY MASS INDEX: 27.48 KG/M2 | SYSTOLIC BLOOD PRESSURE: 76 MMHG | RESPIRATION RATE: 16 BRPM | DIASTOLIC BLOOD PRESSURE: 48 MMHG | HEIGHT: 66 IN | HEART RATE: 67 BPM | WEIGHT: 171 LBS

## 2025-07-10 VITALS
OXYGEN SATURATION: 96 % | HEIGHT: 66 IN | SYSTOLIC BLOOD PRESSURE: 95 MMHG | DIASTOLIC BLOOD PRESSURE: 50 MMHG | HEART RATE: 76 BPM | BODY MASS INDEX: 27.48 KG/M2 | WEIGHT: 171 LBS

## 2025-07-10 DIAGNOSIS — G47.33 OBSTRUCTIVE SLEEP APNEA SYNDROME: ICD-10-CM

## 2025-07-10 DIAGNOSIS — R55 NEAR SYNCOPE: Primary | ICD-10-CM

## 2025-07-10 DIAGNOSIS — I25.10 CORONARY ARTERY DISEASE INVOLVING NATIVE CORONARY ARTERY OF NATIVE HEART WITHOUT ANGINA PECTORIS: ICD-10-CM

## 2025-07-10 DIAGNOSIS — Z86.711 HISTORY OF PULMONARY EMBOLISM: ICD-10-CM

## 2025-07-10 DIAGNOSIS — I10 PRIMARY HYPERTENSION: ICD-10-CM

## 2025-07-10 DIAGNOSIS — D64.9 ANEMIA, UNSPECIFIED TYPE: ICD-10-CM

## 2025-07-10 DIAGNOSIS — R55 NEAR SYNCOPE: ICD-10-CM

## 2025-07-10 DIAGNOSIS — E78.2 MIXED HYPERLIPIDEMIA: ICD-10-CM

## 2025-07-10 DIAGNOSIS — I95.9 HYPOTENSION, UNSPECIFIED HYPOTENSION TYPE: ICD-10-CM

## 2025-07-10 DIAGNOSIS — G89.29 CHRONIC RIGHT-SIDED LOW BACK PAIN WITHOUT SCIATICA: Primary | ICD-10-CM

## 2025-07-10 DIAGNOSIS — M54.50 CHRONIC RIGHT-SIDED LOW BACK PAIN WITHOUT SCIATICA: Primary | ICD-10-CM

## 2025-07-10 DIAGNOSIS — Z95.1 S/P CABG X 5: ICD-10-CM

## 2025-07-10 DIAGNOSIS — I95.9 HYPOTENSION, UNSPECIFIED HYPOTENSION TYPE: Primary | ICD-10-CM

## 2025-07-10 DIAGNOSIS — Z98.890 POST-OPERATIVE STATE: ICD-10-CM

## 2025-07-10 PROBLEM — I27.20 PULMONARY HYPERTENSION: Status: RESOLVED | Noted: 2025-03-06 | Resolved: 2025-07-10

## 2025-07-10 LAB
ANION GAP SERPL CALCULATED.3IONS-SCNC: 14.1 MMOL/L (ref 5–15)
BASOPHILS # BLD AUTO: 0.05 10*3/MM3 (ref 0–0.2)
BASOPHILS NFR BLD AUTO: 0.6 % (ref 0–1.5)
BH CV ECHO MEAS - EDV(CUBED): 74.1 ML
BH CV ECHO MEAS - EDV(MOD-SP2): 102 ML
BH CV ECHO MEAS - EDV(MOD-SP4): 84 ML
BH CV ECHO MEAS - EF(MOD-SP2): 63.7 %
BH CV ECHO MEAS - EF(MOD-SP4): 60.7 %
BH CV ECHO MEAS - ESV(CUBED): 29.9 ML
BH CV ECHO MEAS - ESV(MOD-SP2): 37 ML
BH CV ECHO MEAS - ESV(MOD-SP4): 33 ML
BH CV ECHO MEAS - FS: 26.1 %
BH CV ECHO MEAS - IVS/LVPW: 0.92 CM
BH CV ECHO MEAS - IVSD: 1.1 CM
BH CV ECHO MEAS - LA 3D VOL INDEX: 46
BH CV ECHO MEAS - LAT PEAK E' VEL: 13.8 CM/SEC
BH CV ECHO MEAS - LV DIASTOLIC VOL/BSA (35-75): 44.9 CM2
BH CV ECHO MEAS - LV MASS(C)D: 167.4 GRAMS
BH CV ECHO MEAS - LV SYSTOLIC VOL/BSA (12-30): 17.6 CM2
BH CV ECHO MEAS - LVIDD: 4.2 CM
BH CV ECHO MEAS - LVIDS: 3.1 CM
BH CV ECHO MEAS - LVPWD: 1.2 CM
BH CV ECHO MEAS - MED PEAK E' VEL: 5.4 CM/SEC
BH CV ECHO MEAS - MV A MAX VEL: 58.5 CM/SEC
BH CV ECHO MEAS - MV DEC SLOPE: 500.2 CM/SEC2
BH CV ECHO MEAS - MV DEC TIME: 0.23 SEC
BH CV ECHO MEAS - MV E MAX VEL: 121 CM/SEC
BH CV ECHO MEAS - MV E/A: 2.07
BH CV ECHO MEAS - MV MAX PG: 7.7 MMHG
BH CV ECHO MEAS - MV MEAN PG: 2.44 MMHG
BH CV ECHO MEAS - MV P1/2T: 76 MSEC
BH CV ECHO MEAS - MV V2 VTI: 41.3 CM
BH CV ECHO MEAS - MVA(P1/2T): 2.9 CM2
BH CV ECHO MEAS - RAP SYSTOLE: 3 MMHG
BH CV ECHO MEAS - RVSP: 16.1 MMHG
BH CV ECHO MEAS - SV(MOD-SP2): 65 ML
BH CV ECHO MEAS - SV(MOD-SP4): 51 ML
BH CV ECHO MEAS - SVI(MOD-SP2): 34.7 ML/M2
BH CV ECHO MEAS - SVI(MOD-SP4): 27.3 ML/M2
BH CV ECHO MEAS - TR MAX PG: 13.1 MMHG
BH CV ECHO MEAS - TR MAX VEL: 181.1 CM/SEC
BH CV ECHO MEASUREMENTS AVERAGE E/E' RATIO: 12.6
BH CV XLRA - TDI S': 7.2 CM/SEC
BUN SERPL-MCNC: 26 MG/DL (ref 8–23)
BUN/CREAT SERPL: 16.1 (ref 7–25)
CALCIUM SPEC-SCNC: 9.4 MG/DL (ref 8.6–10.5)
CHLORIDE SERPL-SCNC: 101 MMOL/L (ref 98–107)
CO2 SERPL-SCNC: 21.9 MMOL/L (ref 22–29)
CREAT SERPL-MCNC: 1.61 MG/DL (ref 0.76–1.27)
DEPRECATED RDW RBC AUTO: 47.8 FL (ref 37–54)
EGFRCR SERPLBLD CKD-EPI 2021: 43.8 ML/MIN/1.73
EOSINOPHIL # BLD AUTO: 0.14 10*3/MM3 (ref 0–0.4)
EOSINOPHIL NFR BLD AUTO: 1.7 % (ref 0.3–6.2)
ERYTHROCYTE [DISTWIDTH] IN BLOOD BY AUTOMATED COUNT: 13.4 % (ref 12.3–15.4)
GEN 5 1HR TROPONIN T REFLEX: 283 NG/L
GLUCOSE SERPL-MCNC: 93 MG/DL (ref 65–99)
HCT VFR BLD AUTO: 38 % (ref 37.5–51)
HGB BLD-MCNC: 12.2 G/DL (ref 13–17.7)
HOLD SPECIMEN: NORMAL
HOLD SPECIMEN: NORMAL
IMM GRANULOCYTES # BLD AUTO: 0.04 10*3/MM3 (ref 0–0.05)
IMM GRANULOCYTES NFR BLD AUTO: 0.5 % (ref 0–0.5)
INR PPP: 2.31 (ref 0.9–1.1)
INR PPP: 2.33 (ref 0.9–1.1)
LEFT ATRIUM VOLUME INDEX: 38.3 ML/M2
LV EF 3D SEGMENTATION: 54 %
LV EF BIPLANE MOD: 63.7 %
LYMPHOCYTES # BLD AUTO: 0.98 10*3/MM3 (ref 0.7–3.1)
LYMPHOCYTES NFR BLD AUTO: 11.9 % (ref 19.6–45.3)
MCH RBC QN AUTO: 30.7 PG (ref 26.6–33)
MCHC RBC AUTO-ENTMCNC: 32.1 G/DL (ref 31.5–35.7)
MCV RBC AUTO: 95.7 FL (ref 79–97)
MONOCYTES # BLD AUTO: 0.8 10*3/MM3 (ref 0.1–0.9)
MONOCYTES NFR BLD AUTO: 9.7 % (ref 5–12)
NEUTROPHILS NFR BLD AUTO: 6.21 10*3/MM3 (ref 1.7–7)
NEUTROPHILS NFR BLD AUTO: 75.6 % (ref 42.7–76)
NRBC BLD AUTO-RTO: 0 /100 WBC (ref 0–0.2)
PLATELET # BLD AUTO: 309 10*3/MM3 (ref 140–450)
PMV BLD AUTO: 9.1 FL (ref 6–12)
POTASSIUM SERPL-SCNC: 3.6 MMOL/L (ref 3.5–5.2)
PROTHROMBIN TIME: 25.6 SECONDS (ref 11.7–14.2)
PROTHROMBIN TIME: 25.8 SECONDS (ref 11.7–14.2)
QT INTERVAL: 417 MS
QTC INTERVAL: 433 MS
RBC # BLD AUTO: 3.97 10*6/MM3 (ref 4.14–5.8)
SODIUM SERPL-SCNC: 137 MMOL/L (ref 136–145)
TROPONIN T % DELTA: -21
TROPONIN T NUMERIC DELTA: -75 NG/L
TROPONIN T SERPL HS-MCNC: 358 NG/L
WBC NRBC COR # BLD AUTO: 8.22 10*3/MM3 (ref 3.4–10.8)
WHOLE BLOOD HOLD COAG: NORMAL
WHOLE BLOOD HOLD SPECIMEN: NORMAL

## 2025-07-10 PROCEDURE — 99285 EMERGENCY DEPT VISIT HI MDM: CPT

## 2025-07-10 PROCEDURE — G0378 HOSPITAL OBSERVATION PER HR: HCPCS

## 2025-07-10 PROCEDURE — 93321 DOPPLER ECHO F-UP/LMTD STD: CPT

## 2025-07-10 PROCEDURE — 96365 THER/PROPH/DIAG IV INF INIT: CPT

## 2025-07-10 PROCEDURE — 84484 ASSAY OF TROPONIN QUANT: CPT | Performed by: EMERGENCY MEDICINE

## 2025-07-10 PROCEDURE — 36415 COLL VENOUS BLD VENIPUNCTURE: CPT

## 2025-07-10 PROCEDURE — 80048 BASIC METABOLIC PNL TOTAL CA: CPT | Performed by: INTERNAL MEDICINE

## 2025-07-10 PROCEDURE — 85610 PROTHROMBIN TIME: CPT | Performed by: EMERGENCY MEDICINE

## 2025-07-10 PROCEDURE — 85025 COMPLETE CBC W/AUTO DIFF WBC: CPT | Performed by: STUDENT IN AN ORGANIZED HEALTH CARE EDUCATION/TRAINING PROGRAM

## 2025-07-10 PROCEDURE — 93005 ELECTROCARDIOGRAM TRACING: CPT | Performed by: EMERGENCY MEDICINE

## 2025-07-10 PROCEDURE — 93010 ELECTROCARDIOGRAM REPORT: CPT | Performed by: INTERNAL MEDICINE

## 2025-07-10 PROCEDURE — 99214 OFFICE O/P EST MOD 30 MIN: CPT | Performed by: INTERNAL MEDICINE

## 2025-07-10 PROCEDURE — 25510000001 PERFLUTREN 6.52 MG/ML SUSPENSION 2 ML VIAL: Performed by: INTERNAL MEDICINE

## 2025-07-10 PROCEDURE — 94760 N-INVAS EAR/PLS OXIMETRY 1: CPT

## 2025-07-10 PROCEDURE — 93000 ELECTROCARDIOGRAM COMPLETE: CPT | Performed by: INTERNAL MEDICINE

## 2025-07-10 PROCEDURE — 93308 TTE F-UP OR LMTD: CPT

## 2025-07-10 PROCEDURE — 25810000003 SODIUM CHLORIDE 0.9 % SOLUTION: Performed by: INTERNAL MEDICINE

## 2025-07-10 PROCEDURE — 25810000003 SODIUM CHLORIDE 0.9 % SOLUTION: Performed by: EMERGENCY MEDICINE

## 2025-07-10 PROCEDURE — 93325 DOPPLER ECHO COLOR FLOW MAPG: CPT

## 2025-07-10 PROCEDURE — 71045 X-RAY EXAM CHEST 1 VIEW: CPT

## 2025-07-10 RX ORDER — ACETAMINOPHEN 500 MG
500 TABLET ORAL EVERY 6 HOURS PRN
Status: DISCONTINUED | OUTPATIENT
Start: 2025-07-10 | End: 2025-07-11 | Stop reason: HOSPADM

## 2025-07-10 RX ORDER — CYCLOBENZAPRINE HCL 10 MG
10 TABLET ORAL 3 TIMES DAILY PRN
Status: DISCONTINUED | OUTPATIENT
Start: 2025-07-10 | End: 2025-07-11 | Stop reason: HOSPADM

## 2025-07-10 RX ORDER — TRAZODONE HYDROCHLORIDE 50 MG/1
50 TABLET ORAL NIGHTLY PRN
Status: DISCONTINUED | OUTPATIENT
Start: 2025-07-10 | End: 2025-07-11 | Stop reason: HOSPADM

## 2025-07-10 RX ORDER — WARFARIN SODIUM 5 MG/1
5 TABLET ORAL
Status: DISCONTINUED | OUTPATIENT
Start: 2025-07-10 | End: 2025-07-11 | Stop reason: HOSPADM

## 2025-07-10 RX ORDER — ALBUTEROL SULFATE 0.83 MG/ML
2.5 SOLUTION RESPIRATORY (INHALATION) EVERY 6 HOURS PRN
Status: DISCONTINUED | OUTPATIENT
Start: 2025-07-10 | End: 2025-07-11 | Stop reason: HOSPADM

## 2025-07-10 RX ORDER — MONTELUKAST SODIUM 10 MG/1
10 TABLET ORAL DAILY
Status: DISCONTINUED | OUTPATIENT
Start: 2025-07-11 | End: 2025-07-11 | Stop reason: HOSPADM

## 2025-07-10 RX ORDER — FUROSEMIDE 40 MG/1
40 TABLET ORAL DAILY
Status: DISCONTINUED | OUTPATIENT
Start: 2025-07-11 | End: 2025-07-11

## 2025-07-10 RX ORDER — NITROGLYCERIN 0.4 MG/1
0.4 TABLET SUBLINGUAL
Status: DISCONTINUED | OUTPATIENT
Start: 2025-07-10 | End: 2025-07-11 | Stop reason: HOSPADM

## 2025-07-10 RX ORDER — SODIUM CHLORIDE 9 MG/ML
1000 INJECTION, SOLUTION INTRAVENOUS ONCE
Status: COMPLETED | OUTPATIENT
Start: 2025-07-10 | End: 2025-07-10

## 2025-07-10 RX ORDER — AMOXICILLIN 250 MG
2 CAPSULE ORAL 2 TIMES DAILY
Status: DISCONTINUED | OUTPATIENT
Start: 2025-07-10 | End: 2025-07-11 | Stop reason: HOSPADM

## 2025-07-10 RX ORDER — BUDESONIDE AND FORMOTEROL FUMARATE DIHYDRATE 160; 4.5 UG/1; UG/1
2 AEROSOL RESPIRATORY (INHALATION) 2 TIMES DAILY
Status: DISCONTINUED | OUTPATIENT
Start: 2025-07-10 | End: 2025-07-11 | Stop reason: HOSPADM

## 2025-07-10 RX ORDER — LOSARTAN POTASSIUM 50 MG/1
50 TABLET ORAL
Status: DISCONTINUED | OUTPATIENT
Start: 2025-07-11 | End: 2025-07-11 | Stop reason: HOSPADM

## 2025-07-10 RX ORDER — THEOPHYLLINE 300 MG/1
300 TABLET, EXTENDED RELEASE ORAL DAILY
Status: DISCONTINUED | OUTPATIENT
Start: 2025-07-11 | End: 2025-07-11 | Stop reason: HOSPADM

## 2025-07-10 RX ORDER — POLYETHYLENE GLYCOL 3350 17 G/17G
17 POWDER, FOR SOLUTION ORAL DAILY PRN
Status: DISCONTINUED | OUTPATIENT
Start: 2025-07-10 | End: 2025-07-11 | Stop reason: HOSPADM

## 2025-07-10 RX ORDER — SODIUM CHLORIDE 0.9 % (FLUSH) 0.9 %
10 SYRINGE (ML) INJECTION EVERY 12 HOURS SCHEDULED
Status: DISCONTINUED | OUTPATIENT
Start: 2025-07-10 | End: 2025-07-11 | Stop reason: HOSPADM

## 2025-07-10 RX ORDER — SODIUM CHLORIDE 9 MG/ML
40 INJECTION, SOLUTION INTRAVENOUS AS NEEDED
Status: DISCONTINUED | OUTPATIENT
Start: 2025-07-10 | End: 2025-07-11 | Stop reason: HOSPADM

## 2025-07-10 RX ORDER — GABAPENTIN 400 MG/1
400 CAPSULE ORAL EVERY 8 HOURS SCHEDULED
Status: DISCONTINUED | OUTPATIENT
Start: 2025-07-10 | End: 2025-07-11 | Stop reason: HOSPADM

## 2025-07-10 RX ORDER — SODIUM CHLORIDE 0.9 % (FLUSH) 0.9 %
10 SYRINGE (ML) INJECTION AS NEEDED
Status: DISCONTINUED | OUTPATIENT
Start: 2025-07-10 | End: 2025-07-11 | Stop reason: HOSPADM

## 2025-07-10 RX ORDER — SODIUM CHLORIDE 9 MG/ML
75 INJECTION, SOLUTION INTRAVENOUS ONCE
Status: COMPLETED | OUTPATIENT
Start: 2025-07-10 | End: 2025-07-10

## 2025-07-10 RX ORDER — HYDROCHLOROTHIAZIDE 12.5 MG/1
12.5 TABLET ORAL
Status: DISCONTINUED | OUTPATIENT
Start: 2025-07-10 | End: 2025-07-11

## 2025-07-10 RX ORDER — ASPIRIN 81 MG/1
81 TABLET ORAL DAILY
Status: DISCONTINUED | OUTPATIENT
Start: 2025-07-11 | End: 2025-07-11 | Stop reason: HOSPADM

## 2025-07-10 RX ORDER — BISACODYL 5 MG/1
5 TABLET, DELAYED RELEASE ORAL DAILY PRN
Status: DISCONTINUED | OUTPATIENT
Start: 2025-07-10 | End: 2025-07-11 | Stop reason: HOSPADM

## 2025-07-10 RX ORDER — BISACODYL 10 MG
10 SUPPOSITORY, RECTAL RECTAL DAILY PRN
Status: DISCONTINUED | OUTPATIENT
Start: 2025-07-10 | End: 2025-07-11 | Stop reason: HOSPADM

## 2025-07-10 RX ORDER — CLONAZEPAM 0.5 MG/1
0.5 TABLET ORAL 2 TIMES DAILY PRN
Status: DISCONTINUED | OUTPATIENT
Start: 2025-07-10 | End: 2025-07-11 | Stop reason: HOSPADM

## 2025-07-10 RX ORDER — METOPROLOL TARTRATE 25 MG/1
25 TABLET, FILM COATED ORAL EVERY 12 HOURS SCHEDULED
Status: DISCONTINUED | OUTPATIENT
Start: 2025-07-10 | End: 2025-07-11 | Stop reason: HOSPADM

## 2025-07-10 RX ADMIN — SODIUM CHLORIDE 1000 ML: 9 INJECTION, SOLUTION INTRAVENOUS at 14:16

## 2025-07-10 RX ADMIN — WARFARIN SODIUM 5 MG: 5 TABLET ORAL at 21:07

## 2025-07-10 RX ADMIN — SENNOSIDES AND DOCUSATE SODIUM 2 TABLET: 50; 8.6 TABLET ORAL at 21:07

## 2025-07-10 RX ADMIN — PERFLUTREN 2 ML: 6.52 INJECTION, SUSPENSION INTRAVENOUS at 15:19

## 2025-07-10 RX ADMIN — SODIUM CHLORIDE 75 ML/HR: 9 INJECTION, SOLUTION INTRAVENOUS at 20:04

## 2025-07-10 RX ADMIN — Medication 10 ML: at 21:10

## 2025-07-10 RX ADMIN — METOPROLOL TARTRATE 25 MG: 25 TABLET, FILM COATED ORAL at 21:07

## 2025-07-10 RX ADMIN — GABAPENTIN 400 MG: 400 CAPSULE ORAL at 21:07

## 2025-07-10 NOTE — TELEPHONE ENCOUNTER
Patient was at the cardiologist office this morning and his bp was low at 90/55. He said he really isn't having any symptoms or isn't feeling faint.    Patient said he originally was taking plain Losartan 50mg once daily. At his Medicare Wellness Exam at the end of April 2025, you changed him to Losartan/HCTZ 50/12.5mg once daily. Following his heart surgery he was placed on Lasix 40mg once daily. Patient is wondering if he is on too many diuretics with the HCTZ and Lasix. Patient said he still has some plain Losartan 50mg at home. Would you want him to change back to it? He does have an appt with you on 07/30/2025.    Please advise.

## 2025-07-10 NOTE — PROGRESS NOTES
Subjective:     Encounter Date:07/10/2025      Patient ID: Omar Choudhary is a 79 y.o. male.    Chief Complaint:  History of Present Illness    This is a 79-year-old with asthma, paralyzed hemidiaphragm, osteoarthritis, hypertension, bilateral pulmonary embolism status post bilateral pulmonary thrombectomy, right lower extremity DVT, severe pulmonary hypertension, obstructive sleep apnea on BiPAP, coronary artery disease status post CABG x 5, PFO status post surgical closure 6/2025, who presents for follow-up.     I saw the patient last in follow-up in 3/2025 at which time he reported that his breathing had returned to his baseline.  And repeat echocardiogram showed improvement in his right ventricular size and function although there was still some residual borderline dilation.  Echocardiogram at that time did not assess his pulmonary pressures.    He returned in 6/2025 and was seen by Addie Lennox, APRN.  At that office visit he reported worsening dyspnea on exertion.  He noted the symptoms while he was in [place] for graduation.  He reported that he had to frequently stop just taking 100 steps at a time.  He ended up going to the emergency room there and his workup was unremarkable including a CT angiogram of the chest showing no evidence of recurrent pulmonary embolism.  They felt his symptoms were due to an upper respiratory infection he was started on a 5-day course of prednisone.  He did not really feel like the symptoms really improved with the prednisone.  Ultimately a right and left cardiac catheterization was recommended.    He presented on 6/18/2025 and underwent right and left cardiac catheterization.  This showed essentially normal pulmonary pressures and normal left-sided filling pressures.  He did have evidence of severe multivessel coronary artery disease including 50 to 60% stenosis of the distal left main.  Based on these findings recommended proceeding with CABG referral for surgery.    He  "was evaluated by Dr. Simon who recommended proceeding with CABG.  He underwent CABG x 5 on 6/20/2025 including BOWEN to LAD, saphenous vein graft to the ramus intermedius, saphenous vein graft to the obtuse marginal branch, saphenous vein graft to the marginal branch, endoscopic evidence vein graft to the posterior descending branch.  He additionally underwent closure of the left atrial appendage with a 40 mm atrial clip and closure of an inferior patent foramen ovale.  Postoperatively he did fairly well.  He intermittently complained of shortness of breath that seem to improve with nebulizer treatments and diuresis.  He was resumed on warfarin postoperatively.    The patient presented today for routine hospital follow-up.  Patient had a routine appointment with neurosurgery this morning.  I would notified by the APRN that evaluated him this morning that his blood pressures were quite low with systolics less than 100.  When questioned the patient reported that he was feeling well although it was felt that the patient appeared to be out of breath.  The patient contacted Dr. Johnston's office about his low blood pressures and he was advised to stop his furosemide.    When the patient arrived for his appointment today he was walking from the waiting room to the exam room and became lightheaded and near syncopal and almost fell.  on further questioning the patient reports that he just had a feeling lightheaded today.  He does not recall feeling lightheaded like this before today.  He does report that he has been \"lazy\" since he was discharged home from the hospital following his bypass surgery.  He reports that today was the first day he is really gotten out of the house.  He denies any chest discomfort or shortness of breath.  Denies any palpitations or lower extremity swelling.    The patient was sent to the [facility] where his initial systolic pressure was 69.  He was started on IV fluids.  After 500 mL of fluid his repeat " systolic pressure was still low at 76.  An echocardiogram was performed showing no evidence of pericardial effusion and normal left ventricular systolic function and normal right ventricular size and function.  A CBC and a BMP were checked and CBC showed his hemoglobin was stable at 12.  His creatinine was elevated 1.6 (his baseline creatinine is normal).    Prior History:  I saw the patient initially in 1/2025 when he presented to the hospital with complaints of worsening shortness of breath.  He was noted to be hypoxic with saturations in the 50s on EMS arrival.  He was placed on BiPAP and admitted to the ICU.  Hemodynamics are stable.  CT scan showed bilateral pulmonary embolism with evidence of saddle embolism and elevated RV to LV ratio.  Echocardiogram confirmed findings of severe right ventricular enlargement and dysfunction.  Recommended proceeding with thrombectomy.  This was performed on 1/11/2025.  He was noted to have severe pulmonary hypertension with a pulmonary artery pressure of 74 mmHg and a mean pressure of 44 mmHg.  There is no significant improvement at the end of the procedure despite successful bilateral pulmonary artery thrombectomy.  He also underwent lower extremity venous Doppler ultrasound which did show evidence of acute right lower extremity DVT involving the distal femoral, popliteal and peroneal veins.  The patient slowly improved over the course of his hospitalization and were able to wean down his oxygen.  He was started on warfarin for anticoagulation.     He saw Addie Lennox, APRN in follow up on 1/24/2025.  He was doing well on the warfarin.  Unfortunately he had not been set up with the medication management clinic.  This was arranged at that office visit.  He had his INR checked that day and was noted to be elevated.  He was asked to hold the warfarin for a couple of days and restart after that.  Fortunately since then he has been established in the anticoagulation clinic.  It  also appears that he has followed up with his pulmonologist and was referred to hematology who he saw earlier this week for hypercoagulable workup.             Review of Systems   Constitutional: Negative for malaise/fatigue.   HENT:  Negative for hearing loss, hoarse voice, nosebleeds and sore throat.    Eyes:  Negative for pain.   Cardiovascular:  Positive for near-syncope. Negative for chest pain, claudication, cyanosis, dyspnea on exertion, irregular heartbeat, leg swelling, orthopnea, palpitations, paroxysmal nocturnal dyspnea and syncope.   Respiratory:  Negative for shortness of breath and snoring.    Endocrine: Negative for cold intolerance, heat intolerance, polydipsia, polyphagia and polyuria.   Skin:  Negative for itching and rash.   Musculoskeletal:  Negative for arthritis, falls, joint pain, joint swelling, muscle cramps, muscle weakness and myalgias.   Gastrointestinal:  Negative for constipation, diarrhea, dysphagia, heartburn, hematemesis, hematochezia, melena, nausea and vomiting.   Genitourinary:  Negative for frequency, hematuria and hesitancy.   Neurological:  Positive for light-headedness. Negative for excessive daytime sleepiness, dizziness, headaches, numbness and weakness.   Psychiatric/Behavioral:  Negative for depression. The patient is not nervous/anxious.        No current facility-administered medications for this visit.    Current Outpatient Medications:     acetaminophen (TYLENOL) 500 MG tablet, Take 1 tablet by mouth Every 6 (Six) Hours As Needed for Mild Pain., Disp: , Rfl:     Acetylcysteine capsule capsule, Take 1 capsule by mouth Daily., Disp: , Rfl:     albuterol sulfate  (90 Base) MCG/ACT inhaler, Inhale 2 puffs Every 4 (Four) Hours As Needed for Wheezing or Shortness of Air., Disp: 18 g, Rfl: 0    aspirin 81 MG EC tablet, Take 1 tablet by mouth Daily., Disp: 30 tablet, Rfl: 11    B Complex Vitamins (VITAMIN B COMPLEX) capsule capsule, Take 1 capsule by mouth Daily. ADRIAN  9-27, Disp: , Rfl:     budesonide-formoterol (SYMBICORT) 160-4.5 MCG/ACT inhaler, Inhale 2 puffs 2 (Two) Times a Day., Disp: 30.6 g, Rfl: 1    Calcium Carb-Cholecalciferol (Oyster Shell Calcium w/D) 500-5 MG-MCG tablet, TAKE TWO TABLETS BY MOUTH DAILY, Disp: 180 tablet, Rfl: 0    clonazePAM (KlonoPIN) 0.5 MG tablet, Take 1 tablet by mouth 2 (Two) Times a Day As Needed for Anxiety. for anxiety, Disp: 30 tablet, Rfl: 2    coenzyme Q10 100 MG capsule, Take 1 capsule by mouth Daily., Disp: , Rfl:     cyclobenzaprine (FLEXERIL) 10 MG tablet, Take 1 tablet by mouth 3 (Three) Times a Day As Needed for Muscle Spasms., Disp: 30 tablet, Rfl: 1    furosemide (LASIX) 40 MG tablet, Take 1 tablet by mouth Daily., Disp: 30 tablet, Rfl: 1    gabapentin (NEURONTIN) 400 MG capsule, Take 1 capsule by mouth Every 8 (Eight) Hours for 7 days., Disp: 21 capsule, Rfl: 0    L-Arginine 500 MG capsule, Take 1 capsule by mouth Daily., Disp: , Rfl:     losartan-hydrochlorothiazide (Hyzaar) 50-12.5 MG per tablet, Take 1 tablet by mouth Daily., Disp: 90 tablet, Rfl: 1    metoprolol tartrate (LOPRESSOR) 25 MG tablet, Take 1 tablet by mouth Every 12 (Twelve) Hours., Disp: 60 tablet, Rfl: 5    montelukast (SINGULAIR) 10 MG tablet, TAKE 1 TABLET BY MOUTH ONCE DAILY, Disp: 90 tablet, Rfl: 0    Multiple Vitamins-Minerals (EMERGEN-C BLUE PO), Take 1 tablet by mouth Daily. LD 9-27, Disp: , Rfl:     naloxone (NARCAN) 4 MG/0.1ML nasal spray, Call 911. Don't prime. [place] in 1 nostril for overdose. Repeat in 2-3 minutes in other nostril if no or minimal breathing/responsiveness., Disp: 2 each, Rfl: 0    S-Adenosylmethionine (YURY-e) 400 MG tablet, Take 400 mg by mouth Daily., Disp: , Rfl:     sildenafil (REVATIO) 20 MG tablet, TAKE 1 TO 2 TABLETS BY MOUTH AS NEEDED FOR ERICTILE DYSFUNCTION, Disp: 50 tablet, Rfl: 0    theophylline (THEODUR) 300 MG 12 hr tablet, Take 1 tablet by mouth Daily., Disp: 90 tablet, Rfl: 3    traZODone (DESYREL) 50 MG tablet, Take 1-2  tablets by mouth Every Night., Disp: 180 tablet, Rfl: 3    triamcinolone (KENALOG) 0.025 % cream, Apply 1 Application topically to the appropriate area as directed 2 (Two) Times a Day., Disp: , Rfl:     warfarin (COUMADIN) 5 MG tablet, Take 1 tablet by mouth Daily., Disp: 30 tablet, Rfl: 1    Facility-Administered Medications Ordered in Other Visits:     sodium chloride 0.9 % flush 10 mL, 10 mL, Intravenous, Q12H, Nancy Hdez MD    sodium chloride 0.9 % flush 10 mL, 10 mL, Intravenous, PRN, Nancy Hdez MD    Past Medical History:   Diagnosis Date    ADHD (attention deficit hyperactivity disorder) 2021    Hard to focus on tasks and follow through.    Allergic 08/20/2018    Arthritis     Asthma     Carpal tunnel syndrome     no pain    Cataract     Deep vein thrombosis 01/10/2025    Depression     Erectile dysfunction     2018 at 73 years old    Family history of malignant neoplasm of breast 09/26/2019    Hyperlipidemia 09/26/2019    Allergic to statins    Hypertension 03/01/2012    Managing w/ Losartan Potassium    Leg pain, right     Low back pain     Decompress and fusion surgery 10/4&5/2022    Neuropathy     feet    Osteopenia 11/28/2019    Poor balance     Pulmonary embolism     Acute pulmonary saddle embolism    Reactive airway disease 01/15/2016    Scoliosis 1960    Shortness of breath 2017    Sleep apnea     Substance abuse 1966    Alcoholic       Past Surgical History:   Procedure Laterality Date    CARDIAC CATHETERIZATION  1992    CARDIAC CATHETERIZATION N/A 01/11/2025    Procedure: Right Heart Cath;  Surgeon: Nancy Hdez MD;  Location:  [facility] CATH INVASIVE LOCATION;  Service: Cardiovascular;  Laterality: N/A;    CARDIAC CATHETERIZATION  01/11/2025    Procedure: Percutaneous Manual Thrombectomy;  Surgeon: Nancy Hdez MD;  Location:  [facility] CATH INVASIVE [facility]ATION;  Service: Cardiovascular;;    CARDIAC CATHETERIZATION Bilateral 01/11/2025    Procedure: Pulmonary angiography;  Surgeon: Ketty  MD Nancy;  Location: [facility] CATH INVASIVE LOCATION;  Service: Cardiovascular;  Laterality: Bilateral;    CARDIAC CATHETERIZATION N/A 6/18/2025    Procedure: Right and Left Heart Cath;  Surgeon: Nancy Hdez MD;  Location: [facility] CATH INVASIVE LOCATION;  Service: Cardiovascular;  Laterality: N/A;    CARDIAC CATHETERIZATION N/A 6/18/2025    Procedure: Coronary angiography;  Surgeon: Nancy Hdez MD;  Location: [facility] CATH INVASIVE LOCATION;  Service: Cardiovascular;  Laterality: N/A;    COLONOSCOPY  2013    No polyps, slight diverticulitis    CORONARY ARTERY BYPASS GRAFT N/A 6/20/2025    Procedure: SHE STERNOTOMY CORONARY ARTERY BYPASS GRAFT TIMES 5 USING LEFT INTERNAL MAMMARY ARTERY AND LEFT GREATER SAPHENOUS VEIN GRAFT PER ENDOSCOPIC VEIN HARVESTING, CLOSURE OF PFO, EXCLUSION OF LEFT ATRIAL APPENDAGE AND PRP;  Surgeon: Jr Chago Simon MD;  Location: [facility] CVOR;  Service: Cardiothoracic;  Laterality: N/A;    EYE SURGERY  2016    Cataracts    KNEE ARTHROSCOPY W/ ACL RECONSTRUCTION Right 2019    LUMBAR FUSION Bilateral 10/05/2022    Procedure: DAY 2 LUMBAR LAMINECTOMY TRANSFORAMINAL LUMBAR INTERBODY FUSION L2,L3,L4 WITH NEURO ROBOT;  Surgeon: John Do MD;  Location: [facility] MAIN OR;  Service: [facility] - Neuro;  Laterality: Bilateral;    LUMBAR FUSION N/A 10/04/2022    Procedure: DAY 1 LUMBAR LATERAL INTERBODY FUSION WITH NEURO ROBOT L2, L3.L4;  Surgeon: John Do MD;  Location: [facility] MAIN OR;  Service: Rob[facility] - Neuro;  Laterality: N/A;  left side approach    MOUTH SURGERY      SPINE SURGERY  10/4&5/2022    Dr. John Do, [facility]       Family History   Problem Relation Age of Onset    Heart disease Mother     Hypertension Mother     Breast cancer Mother     Stroke Mother         Several TIAs    Alcohol abuse Mother     Thyroid disease Mother     Arthritis Mother     Cancer Mother         Breast    Aortic aneurysm Father     Heart attack Father     Liver cancer  Father     Cancer Father         Liver    Heart disease Father         Heart attack    Early death Brother         Coronary thrombosis @ 48 years while mountain climbing.    Heart attack Brother         , at 47 years, fatal       Social History     Tobacco Use    Smoking status: Former     Current packs/day: 0.00     Average packs/day: 0.5 packs/day for 26.0 years (13.0 ttl pk-yrs)     Types: Cigarettes     Start date: 1966     Quit date: 1992     Years since quittin.5     Passive exposure: Past    Smokeless tobacco: Never   Vaping Use    Vaping status: Never Used   Substance Use Topics    Alcohol use: Yes     Alcohol/week: 16.0 standard drinks of alcohol     Types: 14 Glasses of wine, 2 Cans of beer per week    Drug use: Never     Comment: CBD gummies- stop now for surgery         ECG 12 Lead Other; hypotension, dizziness    Date/Time: 7/10/2025 4:00 PM  Performed by: Nancy Hdez MD    Authorized by: Nancy Hdez MD  Comparison: compared with previous ECG   Comparison to previous ECG: Mild ST elevations have improved  Rhythm: sinus rhythm  T inversion: II, V5 and V6  T flattening: III and aVF             Objective:     There were no vitals taken for this visit.      Constitutional:       Appearance: Normal appearance. Well-developed.   HENT:      Head: Normocephalic and atraumatic.   Neck:      Vascular: No carotid bruit or JVD.   Pulmonary:      Effort: Pulmonary effort is normal.      Breath sounds: Normal breath sounds.   Cardiovascular:      Normal rate. Regular rhythm.      No gallop.    Pulses:     Radial: 2+ bilaterally.  Edema:     Peripheral edema absent.   Abdominal:      Palpations: Abdomen is soft.   Skin:     General: Skin is warm and dry.   Neurological:      Mental Status: Alert and oriented to person, place, and time.           Assessment:          Diagnosis Plan   1. Hypotension, unspecified hypotension type  Basic Metabolic Panel    CBC (No Diff)      2. Near syncope         3. Coronary artery disease involving native coronary artery of native heart without angina pectoris        4. S/P CABG x 5        5. Primary hypertension        6. Mixed hyperlipidemia        7. History of pulmonary embolism        8. Obstructive sleep apnea syndrome               Plan:       1.  Hypotension.  Suspect this is due to volume depletion.  This is supported by his elevated creatinine of 1.6.  An echocardiogram was performed which showed no evidence of pericardial effusion and normal left and right ventricular function and size.  EKG was unremarkable.  I have a low suspicion for pulmonary embolism in light of his lack of hypoxia, tachycardia, and a normal right ventricle.  Blood pressures were starting to improve after a liter of fluid.  I agree that we should stop his furosemide going forward.  Will likely need further adjustments of his antihypertensive medications depending on his blood pressure trend off of IV fluids.  2.  Coronary artery disease.  Status post CABG x 5 on 6/25.  LV function on echocardiogram and EKG both unremarkable.  No symptoms suggestive of angina at this time.  3.  History of pulmonary embolism.  Unprovoked.  On chronic anticoagulation with warfarin which was resumed following his CABG.  Most recent INR was therapeutic.  4.  Hyperlipidemia.  With statin intolerance.  5.  Obstructive sleep apnea.  6.  Asthma/COPD  7.  Left hemidiaphragm paralysis    Recommended transferring the patient to the emergency room for further evaluation and likely admission to the observation unit to monitor his blood pressures and make further adjustments to his medications.  Discussed with Dr. Saenz in the ER.

## 2025-07-10 NOTE — H&P
"[facility] [place]    Patient Name: Omar Choudhary  : 1945  MRN: [id number]  Primary Care Physician:  Dennis Kwong MD  Date of admission: 7/10/2025    Subjective   Subjective     Chief Complaint:   Chief Complaint   Patient presents with    Syncope    Hypotension         HPI:    Omar Choudhary is a 79 y.o. male with past medical history of hypertension, bilateral PE status post thrombectomy, history of DVT, severe pulmonary hypertension, ELINOR on BiPAP, CAD status post CABG x 5, PFO status postsurgical closure 2025, and history of asthma who was being seen at his outpatient cardiology appointment for general follow-up and as he was called to come back for his appointment he had a near syncopal episode and fell into a chair without hitting his head or losing consciousness.  Patient overall reports that he had been feeling \"slow\" and some general lightheadedness.  Cardiology sent the patient over to the [facility] where patient was found to have a low systolic blood pressure of 69 and they started IV fluids with mild improvement of his systolic to 76.  Cardiology repeated an echo which was negative for any evidence of pericardial effusion and normal left ventricle systolic function normal right ventricle size and function.  They ordered labs which showed a stable hemoglobin and an elevated serum creatinine of 1.6.  Cardiology sent the patient over to the ER for further hydration and BP monitoring and recommends holding diuretics.  Patient denies any chest pain or shortness of breath or palpitations associated with this episode.  He reports that he has been feeling generally sluggish over the last few days but denies any fevers or chills heart unilateral weakness.    Review of Systems   All systems were reviewed and negative except for: All pertinent findings listed in the above HPI    Personal History     Past Medical History:   Diagnosis Date    ADHD (attention deficit hyperactivity " disorder) 2021    Hard to focus on tasks and follow through.    Allergic 08/20/2018    Arthritis     Asthma     Carpal tunnel syndrome     no pain    Cataract     Deep vein thrombosis 01/10/2025    Depression     Erectile dysfunction     2018 at 73 years old    Family history of malignant neoplasm of breast 09/26/2019    Hyperlipidemia 09/26/2019    Allergic to statins    Hypertension 03/01/2012    Managing w/ Losartan Potassium    Leg pain, right     Low back pain     Decompress and fusion surgery 10/4&5/2022    Neuropathy     feet    Osteopenia 11/28/2019    Poor balance     Pulmonary embolism     Acute pulmonary saddle embolism    Reactive airway disease 01/15/2016    Scoliosis 1960    Shortness of breath 2017    Sleep apnea     Substance abuse 1966    Alcoholic       Past Surgical History:   Procedure Laterality Date    CARDIAC CATHETERIZATION  1992    CARDIAC CATHETERIZATION N/A 01/11/2025    Procedure: Right Heart Cath;  Surgeon: Nancy Hdez MD;  Location:  [facility];  Service: Cardiovascular;  Laterality: N/A;    CARDIAC CATHETERIZATION  01/11/2025    Procedure: Percutaneous Manual Thrombectomy;  Surgeon: Nancy Hdez MD;  Location:  [facility];  Service: Cardiovascular;;    CARDIAC CATHETERIZATION Bilateral 01/11/2025    Procedure: Pulmonary angiography;  Surgeon: Nancy Hdez MD;  Location:  [facility];  Service: Cardiovascular;  Laterality: Bilateral;    CARDIAC CATHETERIZATION N/A 6/18/2025    Procedure: Right and Left Heart Cath;  Surgeon: Nancy Hdez MD;  Location:  [facility];  Service: Cardiovascular;  Laterality: N/A;    CARDIAC CATHETERIZATION N/A 6/18/2025    Procedure: Coronary angiography;  Surgeon: Nancy Hdez MD;  Location:  [facility];  Service: Cardiovascular;  Laterality: N/A;    COLONOSCOPY  2013    No polyps, slight diverticulitis    CORONARY ARTERY BYPASS GRAFT N/A 6/20/2025     Procedure: SHE STERNOTOMY CORONARY ARTERY BYPASS GRAFT TIMES 5 USING LEFT INTERNAL MAMMARY ARTERY AND LEFT GREATER SAPHENOUS VEIN GRAFT PER ENDOSCOPIC VEIN HARVESTING, CLOSURE OF PFO, EXCLUSION OF LEFT ATRIAL APPENDAGE AND PRP;  Surgeon: Jr Chago Simon MD;  Location: [facility];  Service: Cardiothoracic;  Laterality: N/A;    EYE SURGERY  2016    Cataracts    KNEE ARTHROSCOPY W/ ACL RECONSTRUCTION Right 2019    LUMBAR FUSION Bilateral 10/05/2022    Procedure: DAY 2 LUMBAR LAMINECTOMY TRANSFORAMINAL LUMBAR INTERBODY FUSION L2,L3,L4 WITH NEURO ROBOT;  Surgeon: John Do MD;  Location: [facility] MAIN OR;  Service: Robotics - Neuro;  Laterality: Bilateral;    LUMBAR FUSION N/A 10/04/2022    Procedure: DAY 1 LUMBAR LATERAL INTERBODY FUSION WITH NEURO ROBOT L2, L3.L4;  Surgeon: John Do MD;  Location: [facility] MAIN OR;  Service: Robotics - Neuro;  Laterality: N/A;  left side approach    MOUTH SURGERY      SPINE SURGERY  10/4&5/2022    Dr. John Do, [facility]       Family History: family history includes Alcohol abuse in his mother; Aortic aneurysm in his father; Arthritis in his mother; Breast cancer in his mother; Cancer in his father and mother; Early death in his brother; Heart attack in his brother and father; Heart disease in his father and mother; Hypertension in his mother; Liver cancer in his father; Stroke in his mother; Thyroid disease in his mother. Otherwise pertinent FHx was reviewed and not pertinent to current issue.    Social History:  reports that he quit smoking about 33 years ago. His smoking use included cigarettes. He started smoking about 59 years ago. He has a 13 pack-year smoking history. He has been exposed to tobacco smoke. He has never used smokeless tobacco. He reports current alcohol use of about 16.0 standard drinks of alcohol per week. He reports that he does not use drugs.    Home Medications:  Acetylcysteine, L-Arginine, Multiple Vitamins-Minerals,  YURY-e, acetaminophen, albuterol sulfate HFA, aspirin, budesonide-formoterol, calcium 500 mg vitamin D 5 mcg (200 UT), clonazePAM, coenzyme Q10, cyclobenzaprine, furosemide, gabapentin, losartan-hydrochlorothiazide, metoprolol tartrate, montelukast, naloxone, sildenafil, theophylline, traZODone, triamcinolone, vitamin b complex, and warfarin    Allergies:  Allergies   Allergen Reactions    Statins Myalgia     Other reaction(s): muscle soreness       Objective   Objective     Vitals:   Temp:  [97.7 °F (36.5 °C)] 97.7 °F (36.5 °C)  Heart Rate:  [64-76] 73  Resp:  [15-18] 15  BP: ()/(43-98) 118/59  Physical Exam    Constitutional: Awake, alert, nontoxic-appearing male   Eyes: PERRL, sclerae anicteric, no conjunctival injection, EOMI   HENT: NCAT, mucous membranes moist, normal hearing   Neck: Supple, nontender, trachea midline   Respiratory: Clear to auscultation bilaterally, nonlabored respirations on room air   Cardiovascular: RRR, no murmurs, surgical scar along sternum appears to be healing well no signs of acute infection, palpable pedal pulses bilaterally   Gastrointestinal: Positive bowel sounds, soft, nontender, nondistended   Musculoskeletal: No bilateral ankle edema, no clubbing or cyanosis to extremities   Psychiatric: Appropriate affect, cooperative   Neurologic: Oriented x 3, strength symmetric in all extremities, Cranial Nerves grossly intact to confrontation, speech clear   Skin: No rashes     Result Review    Result Review:  I have personally reviewed the results from the time of this admission to 7/10/2025 18:20 EDT and agree with these findings:  [x]  Laboratory list / accordion  []  Microbiology  []  Radiology  [x]  EKG/Telemetry   [x]  Cardiology/Vascular   []  Pathology  []  Old records  []  Other:  Most notable findings include: Troponin 358 then 283, 3.6, serum creatinine 1.61, INR 2.33, WBC 8.22, hemoglobin 12.2.  EKG sinus rhythm without any acute ST changes.  Echo shows normal EF  63.7%.      Assessment & Plan   The 10-year ASCVD risk score (Suzie MONCADA, et al., 2019) is: 27.8%    Values used to calculate the score:      Age: 79 years      Sex: Male      Is Non- : No      Diabetic: No      Tobacco smoker: No      Systolic Blood Pressure: 118 mmHg      Is BP treated: Yes      HDL Cholesterol: 59 mg/dL      Total Cholesterol: 227 mg/dL    Assessment / Plan     Brief Patient Summary:  Omar Choudhary is a 79 y.o. male who is admitted for near syncope    Active Hospital Problems:  Active Hospital Problems    Diagnosis     **Near syncope      Plan:     Near syncope:  -Cardiology sent the patient over to the [facility] where patient was found to have a low systolic blood pressure of 69 and they started IV fluids with mild improvement of his systolic to 76.    - Cardiology repeated an echo which was negative for any evidence of pericardial effusion and normal left ventricle systolic function normal right ventricle size and function.  They ordered labs which showed a stable hemoglobin and an elevated serum creatinine of 1.6.    -Cardiology sent the patient over to the ER for further hydration and BP monitoring and recommends holding diuretics.  - Gentle IV fluids ordered and will recheck labs in the morning    CAD status post CABG  Hypertension  Hyperlipidemia  History of PFO with closure in June 2025  History of PEs and DVTs:  - Continue home regimen other than diuretics    ELINOR  Pulmonary hypertension:  -Continue home BiPAP or supplemental O2 as needed      VTE Prophylaxis:  Mechanical VTE prophylaxis orders are present.        CODE STATUS:    Code Status (Patient has no pulse and is not breathing): CPR (Attempt to Resuscitate)  Medical Interventions (Patient has pulse or is breathing): Full Support  Level Of Support Discussed With: Patient    Admission Status:  I believe this patient meets observation status.    Electronically signed by Radha Willard PA-C, 07/10/25, 6:20 PM  EDT.        75 minutes has been spent by [facility] providers in the care of this patient while under observation status on this date 07/10/25        I have worn appropriate PPE during this patient encounter, sanitized my hands both with entering and exiting patient's room.    I have discussed plan of care with patient including advance care plan and/or surrogate decision maker.  Patient advises that their significant other Batsheva will be their primary surrogate decision maker

## 2025-07-10 NOTE — ED TRIAGE NOTES
Sent here from cardiologist S/P syncopal episode while there. Patient was hypotensive and C/O dizziness

## 2025-07-10 NOTE — ED NOTES
Pt via wheelchair from Dr. Delacruz for near syncope episode, hypotension (60s/?, given 500 LR in office and brought him up to 70s), elevated Cr    S/p CABG x5 end of last month;

## 2025-07-10 NOTE — ED NOTES
Nursing report ED to floor  Omar Choudhary  79 y.o.  male    HPI :  HPI  Stated Reason for Visit: see note  History Obtained From: patient    Chief Complaint  Chief Complaint   Patient presents with    Syncope    Hypotension       Admitting doctor:   Espinoza Mcdonald MD    Admitting diagnosis:   The primary encounter diagnosis was Near syncope. Diagnoses of Hypotension, unspecified hypotension type and Post-operative state were also pertinent to this visit.    Code status:   Current Code Status       Date Active Code Status Order ID Comments User Context       7/10/2025 1716 CPR (Attempt to Resuscitate) [id number]  Radha Willard PA-C ED        Question Answer    Code Status (Patient has no pulse and is not breathing) CPR (Attempt to Resuscitate)    Medical Interventions (Patient has pulse or is breathing) Full Support    Level Of Support Discussed With Patient                    Allergies:   Statins    Isolation:   No active isolations    Intake and Output  No intake or output data in the 24 hours ending 07/10/25 1739    Weight:   There were no vitals filed for this visit.    Most recent vitals:   Vitals:    07/10/25 1653 07/10/25 1730 07/10/25 1731 07/10/25 1737   BP: 114/98 118/59     Patient Position: Standing      Pulse: 72 72 72 73   Resp:       Temp:       TempSrc:       SpO2:   99% 96%       Active LDAs/IV Access:   Lines, Drains & Airways       Active LDAs       Name Placement date Placement time Site Days    Peripheral IV 07/10/25 1400 20 G Anterior;Distal;Right;Upper Arm 07/10/25  1400  Arm  less than 1                    Labs (abnormal labs have a star):   Labs Reviewed   PROTIME-INR - Abnormal; Notable for the following components:       Result Value    Protime 25.8 (*)     INR 2.33 (*)     All other components within normal limits   TROPONIN - Abnormal; Notable for the following components:    HS Troponin T 358 (*)     All other components within normal limits    Narrative:     High Sensitive  Troponin T Reference Range:  <14.0 ng/L- Negative Female for AMI  <22.0 ng/L- Negative Male for AMI  >=14 - Abnormal Female indicating possible myocardial injury.  >=22 - Abnormal Male indicating possible myocardial injury.   Clinicians would have to utilize clinical acumen, EKG, Troponin, and serial changes to determine if it is an Acute Myocardial Infarction or myocardial injury due to an underlying chronic condition.        HIGH SENSITIVITIY TROPONIN T 1HR - Abnormal; Notable for the following components:    HS Troponin T 283 (*)     All other components within normal limits    Narrative:     High Sensitive Troponin T Reference Range:  <14.0 ng/L- Negative Female for AMI  <22.0 ng/L- Negative Male for AMI  >=14 - Abnormal Female indicating possible myocardial injury.  >=22 - Abnormal Male indicating possible myocardial injury.   Clinicians would have to utilize clinical acumen, EKG, Troponin, and serial changes to determine if it is an Acute Myocardial Infarction or myocardial injury due to an underlying chronic condition.        RAINBOW DRAW    Narrative:     The following orders were created for panel order [place] Draw.  Procedure                               Abnormality         Status                     ---------                               -----------         ------                     Green Top (Gel)[[id number]]                                  Final result               Lavender Top[[id number]]                                     Final result               Adame Top[[id number]]                                         Final result               Light Blue Top[[id number]]                                   Final result                 Please view results for these tests on the individual orders.   GREEN TOP   LAVENDER TOP   GRAY TOP   LIGHT BLUE TOP       EKG:   ECG 12 Lead Syncope   Preliminary Result   HEART RATE=65  bpm   RR Xbxrqlws=828  ms   OR Qhpgjdoc=008  ms   P Horizontal Axis=247  deg   P Front  Axis=224  deg   QRSD Klbvfohe=356  ms   QT Gkippzts=089  ms   FVmM=728  ms   QRS Axis=10  deg   T Wave Axis=243  deg   - ABNORMAL ECG -   Sinus or ectopic atrial rhythm   Nonspecific T abnormalities, diffuse leads   Date and Time of Study:2025-07-10 15:39:03          Meds given in ED:   Medications   nitroglycerin (NITROSTAT) SL tablet 0.4 mg (has no administration in time range)   sodium chloride 0.9 % flush 10 mL (has no administration in time range)   sodium chloride 0.9 % flush 10 mL (has no administration in time range)   sodium chloride 0.9 % infusion 40 mL (has no administration in time range)   sennosides-docusate (PERICOLACE) 8.6-50 MG per tablet 2 tablet (has no administration in time range)     And   polyethylene glycol (MIRALAX) packet 17 g (has no administration in time range)     And   bisacodyl (DULCOLAX) EC tablet 5 mg (has no administration in time range)     And   bisacodyl (DULCOLAX) suppository 10 mg (has no administration in time range)   sodium chloride 0.9 % infusion (has no administration in time range)       Imaging results:  No radiology results for the last day    Ambulatory status:   - standby assist     Social issues:   Social History     Socioeconomic History    Marital status: Single   Tobacco Use    Smoking status: Former     Current packs/day: 0.00     Average packs/day: 0.5 packs/day for 26.0 years (13.0 ttl pk-yrs)     Types: Cigarettes     Start date: 1966     Quit date: 1992     Years since quittin.5     Passive exposure: Past    Smokeless tobacco: Never   Vaping Use    Vaping status: Never Used   Substance and Sexual Activity    Alcohol use: Yes     Alcohol/week: 16.0 standard drinks of alcohol     Types: 14 Glasses of wine, 2 Cans of beer per week    Drug use: Never     Comment: CBD gummies- stop now for surgery    Sexual activity: Defer     Partners: Female     Birth control/protection: Post-menopausal     Comment: We're both >70 years old       Peripheral  Neurovascular  Peripheral Neurovascular (Adult)  Peripheral Neurovascular WDL: WDL    Neuro Cognitive  Neuro Cognitive (Adult)  Cognitive/Neuro/Behavioral WDL: level of consciousness, orientation  Level of Consciousness: Alert  Orientation: oriented x 4  Pupils  Pupil PERRLA: yes    Learning  Learning Assessment  Learning Readiness and Ability: no barriers identified  Education Provided  Person Taught: patient  Teaching Method: verbal instruction  Teaching Focus: symptom/problem overview, medical device/equipment use, diagnostic test  Education Outcome Evaluation: acceptance expressed    Respiratory  Respiratory WDL  Respiratory WDL: WDL    Abdominal Pain       Pain Assessments  Pain (Adult)  (0-10) Pain Rating: Rest: 0    NIH Stroke Scale       Kaia Mustafa RN  07/10/25 17:39 EDT

## 2025-07-10 NOTE — TELEPHONE ENCOUNTER
I spoke with patient and let him know that Dr. Kwong would be interested for him to stop lasix. I told him he needs to see if his cardiologist is okay with that per Dr. Kwong. He expressed understanding.

## 2025-07-11 ENCOUNTER — READMISSION MANAGEMENT (OUTPATIENT)
Dept: CALL CENTER | Facility: HOSPITAL | Age: 80
End: 2025-07-11
Payer: MEDICARE

## 2025-07-11 VITALS
RESPIRATION RATE: 18 BRPM | TEMPERATURE: 98.1 F | WEIGHT: 170 LBS | BODY MASS INDEX: 27.32 KG/M2 | DIASTOLIC BLOOD PRESSURE: 88 MMHG | HEART RATE: 75 BPM | HEIGHT: 66 IN | OXYGEN SATURATION: 93 % | SYSTOLIC BLOOD PRESSURE: 126 MMHG

## 2025-07-11 LAB
ANION GAP SERPL CALCULATED.3IONS-SCNC: 11.5 MMOL/L (ref 5–15)
BUN SERPL-MCNC: 25 MG/DL (ref 8–23)
BUN/CREAT SERPL: 20.5 (ref 7–25)
CALCIUM SPEC-SCNC: 8.9 MG/DL (ref 8.6–10.5)
CHLORIDE SERPL-SCNC: 104 MMOL/L (ref 98–107)
CO2 SERPL-SCNC: 24.5 MMOL/L (ref 22–29)
CREAT SERPL-MCNC: 1.22 MG/DL (ref 0.76–1.27)
DEPRECATED RDW RBC AUTO: 47.9 FL (ref 37–54)
EGFRCR SERPLBLD CKD-EPI 2021: 61.1 ML/MIN/1.73
ERYTHROCYTE [DISTWIDTH] IN BLOOD BY AUTOMATED COUNT: 13.5 % (ref 12.3–15.4)
GLUCOSE SERPL-MCNC: 94 MG/DL (ref 65–99)
HCT VFR BLD AUTO: 36.3 % (ref 37.5–51)
HGB BLD-MCNC: 11.5 G/DL (ref 13–17.7)
INR PPP: 2.23 (ref 0.9–1.1)
MAGNESIUM SERPL-MCNC: 2.4 MG/DL (ref 1.6–2.4)
MCH RBC QN AUTO: 30.4 PG (ref 26.6–33)
MCHC RBC AUTO-ENTMCNC: 31.7 G/DL (ref 31.5–35.7)
MCV RBC AUTO: 96 FL (ref 79–97)
PLATELET # BLD AUTO: 240 10*3/MM3 (ref 140–450)
PMV BLD AUTO: 8.8 FL (ref 6–12)
POTASSIUM SERPL-SCNC: 3.7 MMOL/L (ref 3.5–5.2)
PROTHROMBIN TIME: 24.9 SECONDS (ref 11.7–14.2)
RBC # BLD AUTO: 3.78 10*6/MM3 (ref 4.14–5.8)
SODIUM SERPL-SCNC: 140 MMOL/L (ref 136–145)
WBC NRBC COR # BLD AUTO: 5.14 10*3/MM3 (ref 3.4–10.8)

## 2025-07-11 PROCEDURE — 94640 AIRWAY INHALATION TREATMENT: CPT

## 2025-07-11 PROCEDURE — 94799 UNLISTED PULMONARY SVC/PX: CPT

## 2025-07-11 PROCEDURE — 94761 N-INVAS EAR/PLS OXIMETRY MLT: CPT

## 2025-07-11 PROCEDURE — 99214 OFFICE O/P EST MOD 30 MIN: CPT | Performed by: INTERNAL MEDICINE

## 2025-07-11 PROCEDURE — G0378 HOSPITAL OBSERVATION PER HR: HCPCS

## 2025-07-11 PROCEDURE — 83735 ASSAY OF MAGNESIUM: CPT | Performed by: EMERGENCY MEDICINE

## 2025-07-11 PROCEDURE — 85027 COMPLETE CBC AUTOMATED: CPT | Performed by: EMERGENCY MEDICINE

## 2025-07-11 PROCEDURE — 85610 PROTHROMBIN TIME: CPT | Performed by: EMERGENCY MEDICINE

## 2025-07-11 PROCEDURE — 80048 BASIC METABOLIC PNL TOTAL CA: CPT | Performed by: EMERGENCY MEDICINE

## 2025-07-11 RX ADMIN — BUDESONIDE AND FORMOTEROL FUMARATE DIHYDRATE 2 PUFF: 160; 4.5 AEROSOL RESPIRATORY (INHALATION) at 08:14

## 2025-07-11 RX ADMIN — Medication 10 ML: at 09:28

## 2025-07-11 RX ADMIN — ASPIRIN 81 MG: 81 TABLET, COATED ORAL at 09:29

## 2025-07-11 RX ADMIN — GABAPENTIN 400 MG: 400 CAPSULE ORAL at 14:19

## 2025-07-11 RX ADMIN — MONTELUKAST 10 MG: 10 TABLET, FILM COATED ORAL at 09:29

## 2025-07-11 RX ADMIN — THEOPHYLLINE 300 MG: 300 TABLET, EXTENDED RELEASE ORAL at 09:29

## 2025-07-11 RX ADMIN — METOPROLOL TARTRATE 25 MG: 25 TABLET, FILM COATED ORAL at 09:29

## 2025-07-11 RX ADMIN — GABAPENTIN 400 MG: 400 CAPSULE ORAL at 06:02

## 2025-07-11 NOTE — CONSULTS
[facility] Consult    Patient Name: Omar Choudhary  Age/Sex: 79 y.o. male  : 1945  MRN: [id number]    Date of Admission: 7/10/2025  Date of Encounter Visit: 25  Encounter Provider: Nancy Hdez MD  Referring Provider: Espinoza Mcdonald MD  Place of Service: [facility] CARDIOLOGY  Patient Care Team:  Dennis Kwong MD as PCP - General (Internal Medicine)  Arslan Carlson PA-C as Physician Assistant (Physician Assistant)  Ang Peña APRN as Nurse Practitioner (Family Medicine)  Benny Ordaz MD as Surgeon (Orthopedic Surgery)  Jerrod Lagunas MD as Consulting Physician (Pulmonary Disease)  Luis Alberto Alvarenga MD as Consulting Physician (Hematology and Oncology)    Subjective:     Consulted for: Symptomatic hypotension    Chief Complaint: Symptomatic hypotension    History of Present Illness:  Omar Choudhary is a 79 y.o. male with asthma, paralyzed hemidiaphragm, osteoarthritis, hypertension, bilateral pulmonary embolism status post bilateral pulmonary thrombectomy, right lower extremity DVT, severe pulmonary hypertension, obstructive sleep apnea on BiPAP, coronary artery disease status post CABG x 5, PFO status post surgical closure 2025, who is admitted for symptomatic hypotension.    Please see my office note from 7/10/2025 prior to his referral to the ER.    In summary the patient presented for routine follow-up following his recent hospitalization and CABG x 5 on 2025.  Patient had routine follow-up appointment with his neurosurgeon earlier that morning.  Apparently his blood pressures are noted to be relatively low in the high 90s.  Patient denies any symptoms at that time.  Following that office appointment he contacted Dr. Kwong' office and was told to stop his furosemide.      He then presented for his already scheduled appointment with me yesterday afternoon.  When he arrived for his appointment  the patient stood up to walk to the exam room when he became lightheaded and near syncopal and almost fell.  On further questioning he reported that he had been feeling lightheaded all day.  He did not really recall feeling lightheaded prior to that.  He did not believe that his blood pressures were running low prior to that day as checked by himself and home health.  He did admit that he had not been up and out of the house much since his surgery and that this was the first day he has left the house.  He denied any other symptoms.    The patient was sent to the [facility] where his initial systolic pressure was 69. He was started on IV fluids. After 500 mL of fluid his repeat systolic pressure was still low at 76. An echocardiogram was performed showing no evidence of pericardial effusion and normal left ventricular systolic function and normal right ventricular size and function. A CBC and a BMP were checked and CBC showed his hemoglobin was stable at 12. His creatinine was elevated 1.6 (his baseline creatinine is normal).  I recommended transfer to the ER for further workup followed by possible admission to the observation unit.    Following his arrival to the ER his blood pressures had not improved with systolics in the 100s.  Repeat orthostatic vital signs were performed showing no evidence of orthostasis.  Repeat lab work continue to show an elevated creatinine and stable hemoglobin.  D-dimer is only elevated but in light of the lack of hypoxia, tachycardia, and normal looking RV a significant pulmonary embolism was felt to be unlikely.  INR supratherapeutic.  Troponin was elevated but trending down and likely reflective of his recent open heart surgery.    He was admitted to the observation unit and continue to receive IV fluids.  This morning his creatinine has normalized.  Hemoglobin remained stable.  Blood pressures have improved.  He has received his metoprolol but his losartan-hydrochlorothiazide and furosemide  have been on hold.  He reports that he feels well this morning without any significant complaints.        Past Medical History:  Past Medical History:   Diagnosis Date    ADHD (attention deficit hyperactivity disorder) 2021    Hard to focus on tasks and follow through.    Allergic 08/20/2018    Arthritis     Asthma     Carpal tunnel syndrome     no pain    Cataract     Deep vein thrombosis 01/10/2025    Depression     Erectile dysfunction     2018 at 73 years old    Family history of malignant neoplasm of breast 09/26/2019    Hyperlipidemia 09/26/2019    Allergic to statins    Hypertension 03/01/2012    Managing w/ Losartan Potassium    Leg pain, right     Low back pain     Decompress and fusion surgery 10/4&5/2022    Neuropathy     feet    Osteopenia 11/28/2019    Poor balance     Pulmonary embolism     Acute pulmonary saddle embolism    Reactive airway disease 01/15/2016    Scoliosis 1960    Shortness of breath 2017    Sleep apnea     Substance abuse 1966    Alcoholic       Past Surgical History:   Procedure Laterality Date    CARDIAC CATHETERIZATION  1992    CARDIAC CATHETERIZATION N/A 01/11/2025    Procedure: Right Heart Cath;  Surgeon: Nancy Hdez MD;  Location:  [facility]AS[facility];  Service: Cardiovascular;  Laterality: N/A;    CARDIAC CATHETERIZATION  01/11/2025    Procedure: Percutaneous Manual Thrombectomy;  Surgeon: Nancy Hdez MD;  Location:  [facility];  Service: Cardiovascular;;    CARDIAC CATHETERIZATION Bilateral 01/11/2025    Procedure: Pulmonary angiography;  Surgeon: Nancy Hdez MD;  Location:  [facility];  Service: Cardiovascular;  Laterality: Bilateral;    CARDIAC CATHETERIZATION N/A 6/18/2025    Procedure: Right and Left Heart Cath;  Surgeon: Nancy Hdez MD;  Location:  [facility]VASIVE [facility];  Service: Cardiovascular;  Laterality: N/A;    CARDIAC CATHETERIZATION N/A 6/18/2025    Procedure: Coronary angiography;  Surgeon:  Nancy Hdez MD;  Location: [facility] CATH INVASIVE LOCATION;  Service: Cardiovascular;  Laterality: N/A;    COLONOSCOPY  2013    No polyps, slight diverticulitis    CORONARY ARTERY BYPASS GRAFT N/A 6/20/2025    Procedure: SHE STERNOTOMY CORONARY ARTERY BYPASS GRAFT TIMES 5 USING LEFT INTERNAL MAMMARY ARTERY AND LEFT GREATER SAPHENOUS VEIN GRAFT PER ENDOSCOPIC VEIN HARVESTING, CLOSURE OF PFO, EXCLUSION OF LEFT ATRIAL APPENDAGE AND PRP;  Surgeon: Jr Chago Simon MD;  Location: [facility] CVOR;  Service: Cardiothoracic;  Laterality: N/A;    EYE SURGERY  2016    Cataracts    KNEE ARTHROSCOPY W/ ACL RECONSTRUCTION Right 2019    LUMBAR FUSION Bilateral 10/05/2022    Procedure: DAY 2 LUMBAR LAMINECTOMY TRANSFORAMINAL LUMBAR INTERBODY FUSION L2,L3,L4 WITH NEURO ROBOT;  Surgeon: John Do MD;  Location: [facility] MAIN OR;  Service: Robotics - Neuro;  Laterality: Bilateral;    LUMBAR FUSION N/A 10/04/2022    Procedure: DAY 1 LUMBAR LATERAL INTERBODY FUSION WITH NEURO ROBOT L2, L3.L4;  Surgeon: John Do MD;  Location: [facility] MAIN OR;  Service: Robotics - Neuro;  Laterality: N/A;  left side approach    MOUTH SURGERY      SPINE SURGERY  10/4&5/2022    Dr. John Do, [facility]       Home Medications:   Medications Prior to Admission   Medication Sig Dispense Refill Last Dose/Taking    aspirin 81 MG EC tablet Take 1 tablet by mouth Daily. 30 tablet 11 7/10/2025    B Complex Vitamins (VITAMIN B COMPLEX) capsule capsule Take 1 capsule by mouth Daily. LD 9-27   7/9/2025    clonazePAM (KlonoPIN) 0.5 MG tablet Take 1 tablet by mouth 2 (Two) Times a Day As Needed for Anxiety. for anxiety 30 tablet 2 Past Month    cyclobenzaprine (FLEXERIL) 10 MG tablet Take 1 tablet by mouth 3 (Three) Times a Day As Needed for Muscle Spasms. 30 tablet 1 7/10/2025    furosemide (LASIX) 40 MG tablet Take 1 tablet by mouth Daily. 30 tablet 1 7/10/2025    gabapentin (NEURONTIN) 400 MG capsule Take 1 capsule by mouth Every  8 (Eight) Hours for 7 days. 21 capsule 0 7/10/2025    losartan-hydrochlorothiazide (Hyzaar) 50-12.5 MG per tablet Take 1 tablet by mouth Daily. 90 tablet 1 7/10/2025    metoprolol tartrate (LOPRESSOR) 25 MG tablet Take 1 tablet by mouth Every 12 (Twelve) Hours. 60 tablet 5 7/10/2025    montelukast (SINGULAIR) 10 MG tablet TAKE 1 TABLET BY MOUTH ONCE DAILY 90 tablet 0 7/10/2025    Multiple Vitamins-Minerals (EMERGEN-C BLUE PO) Take 1 tablet by mouth Daily. LD 9-27   7/9/2025    theophylline (THEODUR) 300 MG 12 hr tablet Take 1 tablet by mouth Daily. 90 tablet 3 7/10/2025    traZODone (DESYREL) 50 MG tablet Take 1-2 tablets by mouth Every Night. 180 tablet 3 Past Week    warfarin (COUMADIN) 5 MG tablet Take 1 tablet by mouth Daily. 30 tablet 1 7/9/2025    acetaminophen (TYLENOL) 500 MG tablet Take 1 tablet by mouth Every 6 (Six) Hours As Needed for Mild Pain.       Acetylcysteine capsule capsule Take 1 capsule by mouth Daily.       albuterol sulfate  (90 Base) MCG/ACT inhaler Inhale 2 puffs Every 4 (Four) Hours As Needed for Wheezing or Shortness of Air. 18 g 0     budesonide-formoterol (SYMBICORT) 160-4.5 MCG/ACT inhaler Inhale 2 puffs 2 (Two) Times a Day. 30.6 g 1     Calcium Carb-Cholecalciferol (Oyster Shell Calcium w/D) 500-5 MG-MCG tablet TAKE TWO TABLETS BY MOUTH DAILY 180 tablet 0 Unknown    coenzyme Q10 100 MG capsule Take 1 capsule by mouth Daily.   Unknown    L-Arginine 500 MG capsule Take 1 capsule by mouth Daily.       naloxone (NARCAN) 4 MG/0.1ML nasal spray Call 911. Don't prime. [place] in 1 nostril for overdose. Repeat in 2-3 minutes in other nostril if no or minimal breathing/responsiveness. 2 each 0     S-Adenosylmethionine (YURY-e) 400 MG tablet Take 400 mg by mouth Daily.   Unknown    sildenafil (REVATIO) 20 MG tablet TAKE 1 TO 2 TABLETS BY MOUTH AS NEEDED FOR ERICTILE DYSFUNCTION 50 tablet 0 Unknown    triamcinolone (KENALOG) 0.025 % cream Apply 1 Application topically to the appropriate area  as directed 2 (Two) Times a Day.          Allergies:  Allergies   Allergen Reactions    Statins Myalgia     Other reaction(s): muscle soreness       Past Social History:  Social History     Socioeconomic History    Marital status: Single   Tobacco Use    Smoking status: Former     Current packs/day: 0.00     Average packs/day: 0.5 packs/day for 26.0 years (13.0 ttl pk-yrs)     Types: Cigarettes     Start date: 1966     Quit date: 1992     Years since quittin.5     Passive exposure: Past    Smokeless tobacco: Never   Vaping Use    Vaping status: Never Used   Substance and Sexual Activity    Alcohol use: Yes     Alcohol/week: 16.0 standard drinks of alcohol     Types: 14 Glasses of wine, 2 Cans of beer per week    Drug use: Never     Comment: CBD gummies- stop now for surgery    Sexual activity: Defer     Partners: Female     Birth control/protection: Post-menopausal     Comment: We're both >70 years old       Past Family History:  Family History   Problem Relation Age of Onset    Heart disease Mother     Hypertension Mother     Breast cancer Mother     Stroke Mother         Several TIAs    Alcohol abuse Mother     Thyroid disease Mother     Arthritis Mother     Cancer Mother         Breast    Aortic aneurysm Father     Heart attack Father     Liver cancer Father     Cancer Father         Liver    Heart disease Father         Heart attack    Early death Brother         Coronary thrombosis @ 48 years while mountain climbing.    Heart attack Brother         , at 47 years, fatal       Review of Systems:   All systems reviewed. Pertinent positives identified in HPI. All other systems are negative.    Objective:   Temp:  [97.5 °F (36.4 °C)-98.2 °F (36.8 °C)] 97.5 °F (36.4 °C)  Heart Rate:  [64-80] 74  Resp:  [15-18] 16  BP: ()/(43-98) 119/80   No intake or output data in the 24 hours ending 25 0748  Body mass index is 27.44 kg/m².      07/10/25  190   Weight: 77.1 kg (170 lb)     Weight  change:     Physical Exam:   General Appearance:    Alert, cooperative, in no acute distress   Head:    Normocephalic, without obvious abnormality, atraumatic   Eyes:            Lids and lashes normal, conjunctivae and sclerae normal, no   icterus, no pallor, corneas clear, PERRLA   Ears:    Ears appear intact with no abnormalities noted   Neck:   No adenopathy, supple, trachea midline, no thyromegaly, no   carotid bruit, no JVD   Lungs:     Clear to auscultation,respirations regular, even and unlabored    Heart:    Regular rhythm and normal rate, normal S1 and S2, no murmur, no gallop, no rub, no click   Chest Wall:    No abnormalities observed   Abdomen:     Normal bowel sounds, no masses, no organomegaly, soft        non-tender, non-distended, no guarding, no rebound  tenderness   Extremities:   Moves all extremities well, no edema, no cyanosis, no redness   Pulses:   Pulses palpable and equal bilaterally. Normal radial, carotid, femoral, dorsalis pedis and posterior tibial pulses bilaterally. Normal abdominal aorta   Skin:  Psychiatric:   No bleeding, bruising or rash    Alert and oriented x 3, normal mood and affect       Lab Review:   Results from last 7 days   Lab Units 07/11/25  0539 07/10/25  1411   SODIUM mmol/L 140 137   POTASSIUM mmol/L 3.7 3.6   CHLORIDE mmol/L 104 101   CO2 mmol/L 24.5 21.9*   BUN mg/dL 25.0* 26.0*   CREATININE mg/dL 1.22 1.61*   GLUCOSE mg/dL 94 93   CALCIUM mg/dL 8.9 9.4     Results from last 7 days   Lab Units 07/10/25  1639 07/10/25  1411   HSTROP T ng/L 283* 358*     Results from last 7 days   Lab Units 07/11/25  0539 07/10/25  1411   WBC 10*3/mm3 5.14 8.22   HEMOGLOBIN g/dL 11.5* 12.2*   HEMATOCRIT % 36.3* 38.0   PLATELETS 10*3/mm3 240 309     Results from last 7 days   Lab Units 07/11/25  0539 07/10/25  2001 07/10/25  1544   INR  2.23* 2.31* 2.33*     Results from last 7 days   Lab Units 07/11/25  0539   MAGNESIUM mg/dL 2.4         Imaging:  Imaging Results (Most Recent)        Procedure Component Value Units Date/Time    XR Chest 1 View [[id number]] Collected: 07/10/25 1643     Updated: 07/10/25 1647    Narrative:      ONE-VIEW PORTABLE CHEST AT 4:10 P.M.     HISTORY: Hypotension. Syncope.     FINDINGS: There is elevation of the left hemidiaphragm with some vague  atelectasis at the left base as also noted on the chest CT scan dated  1/10/2025 and also unchanged from the portable chest x-ray dated  6/22/2025. The heart remains enlarged with sternal wires from previous  cardiac surgery and no acute abnormality is seen.     This report was finalized on 7/10/2025 4:44 PM by Dr. Js Newman M.D  on Workstation: [employer]               I personally viewed and interpreted the patient's EKG    Assessment/Plan:     1.  Symptomatic hypotension.  Likely due to volume depletion.  Improved with IV fluids and holding diuretics follow some of his antihypertensive medications.  2.  Acute kidney injury.  Resolved with IV hydration.  3.  Coronary artery disease.  Status post CABG x 5 on 6/25.  LV function on echocardiogram and EKG both unremarkable.  No symptoms suggestive of angina at this time.  4.  History of pulmonary embolism.  Unprovoked.  On chronic anticoagulation with warfarin which was resumed following his CABG.  Most recent INR was therapeutic.  5.  Hyperlipidemia.  With statin intolerance.  6.  Obstructive sleep apnea.  7.  Asthma/COPD  8.  Left hemidiaphragm paralysis    -Discontinue IV fluids and observe blood pressures off of IV fluids.  - Discontinue furosemide and hydrochlorothiazide.  - Hold losartan for now.  Depending on his blood pressures off the IV fluids and after receiving his morning dose of metoprolol we will determine if there is any need to resume the losartan.  -Continue metoprolol for now.  -Depending on how his blood pressures do this morning off the IV fluids and after his morning dose of metoprolol we will make final recommendations regarding his blood pressure  regimen.  Otherwise I think he can be discharged home later today.      Thank you for allowing me to participate in the care of Omar PERRIN Darnellmars. Feel free to contact me directly with any further questions or concerns.    Nancy Hdez MD  [facility]  07/11/25  07:48 EDT    EMR Dragon/Transcription disclaimer:   Part of this note may be an electronic transcription/translation of spoken language to printed text using the Dragon dictation system.

## 2025-07-11 NOTE — PROGRESS NOTES
Pt admitted to the observation unit from the emergency department with complaints of a syncopal episode while at cardiology office, had been dealing with some low blood pressure at that time.  Today feels much better has not really been up moving around a lot but blood pressure has improved overnight.     On exam,   General: No acute distress, nontoxic  HEENT: Mucous membranes moist, atraumatic, EOMI  Neck: Full ROM  Pulm: Symmetric chest rise, nonlabored, lungs CTAB  Cardiovascular: Regular rate and rhythm, intact distal pulses  GI: Soft, nontender, nondistended, no rebound, no guarding, bowel sounds present  MSK: Full ROM, no deformity  Skin: Warm, dry  Neuro: Awake, alert, oriented x 4, GCS 15, moving all extremities, no focal deficits  Psych: Calm, cooperative          Plan: Patient feels much better today, cardiology has evaluated and plan to hold blood pressure medications and IV fluids and monitor through the day.  Potential for discharge later today if orthostatics resolved and no further hypotensive events.  Creatinine improving from 1.6 yesterday down to 1.22 today, no significant electrolyte disturbances.  All questions and concerns addressed.         MD Attestation Note    SHARED VISIT: This visit was performed by BOTH a physician and an APC. The substantive portion of the medical decision making was performed by this attesting physician who made or approved the management plan and takes responsibility for patient management. All studies in the APC note (if performed) were independently interpreted by me.

## 2025-07-11 NOTE — PLAN OF CARE
Goal Outcome Evaluation:      Pt is awake, aox4, respirations are even and unlabored on room air. Pt provided with written and verbal discharge instructions. IV's removed, pt tolerated well aeb no c/o pain or discomfort. Pt will d/c home via private vehicle.

## 2025-07-11 NOTE — DISCHARGE SUMMARY
ED OBSERVATION PROGRESS/DISCHARGE SUMMARY    Date of Admission: 7/10/2025   LOS: 0 days   PCP: Dennis Kwong MD    Final diagnosis: Symptomatic hypotension    Hospital Outcome:     79-year-old male with a past medical history including but not limited to bilateral PEs status post thrombectomy, history of GIST DVT, severe pulmonary hypertension, coronary artery disease status post CABG x 5, PFO status post closure 6/2025, asthma, hypertension, and ELINOR on BiPAP who was admitted to the observation unit after a near syncopal episode with noted hypotension while in the cardiology office.  Of note patient was started on HCTZ about 5 weeks ago and was recently started on Lasix.  In the ER, creatinine noted elevated at 1.61.      High-sensitivity troponins elevated 358 and 283.  Hemoglobin stable at 12.2. Chest x-ray notes no acute abnormality seen.  Cardiology wanted patient admitted overnight thinking episodes secondary to overdiuresis.  An echocardiogram was done in the office that shows LVEF 63.7%, indeterminate diastolic function, severe mitral annular calcification with mild mitral valve stenosis, no pericardial effusion and right ventricle normal.  Patient was started on IV fluid hydration.      7/11/25  Cardiology has seen patient.  Orthostatics negative.  He has been able to ambulate without difficulty today.  Blood pressure remains on the low normal side.  They recommend continuing his beta-blocker.  Will hold his diuretics and ARB.  They will arrange for follow-up with their office in 2 to 4-week.  Advised patient to monitor his blood pressure at home.  He should follow-up with his primary care doctor as well.    ROS:  General: no fevers, chills  Respiratory: no cough, dyspnea  Cardiovascular: no chest pain, palpitations  Abdomen: No abdominal pain, nausea, vomiting, or diarrhea  Neurologic: No focal weakness    Objective   Physical Exam:  I have reviewed the vital signs.  Temp:  [97.4 °F (36.3 °C)-98.2  °F (36.8 °C)] 98.1 °F (36.7 °C)  Heart Rate:  [64-80] 75  Resp:  [15-18] 18  BP: ()/(48-98) 126/88  General Appearance:    Alert, cooperative, no distress  Head:    Normocephalic, atraumatic  Eyes:    Sclerae anicteric  Neck:   Supple, no mass  Lungs: Clear to auscultation bilaterally, respirations unlabored  Heart: Regular rate and rhythm, S1 and S2 normal, no murmur, rub or gallop  Abdomen:  Soft, nontender, bowel sounds active, nondistended  Extremities: No clubbing, cyanosis, or edema to lower extremities  Pulses:  2+ and symmetric in distal lower extremities  Skin: No rashes   Neurologic: Oriented x3, Normal strength to extremities    Results Review:    I have reviewed the labs, radiology results and diagnostic studies.    Results from last 7 days   Lab Units 07/11/25  0539   WBC 10*3/mm3 5.14   HEMOGLOBIN g/dL 11.5*   HEMATOCRIT % 36.3*   PLATELETS 10*3/mm3 240     Results from last 7 days   Lab Units 07/11/25  0539 07/10/25  1411   SODIUM mmol/L 140 137   POTASSIUM mmol/L 3.7 3.6   CHLORIDE mmol/L 104 101   CO2 mmol/L 24.5 21.9*   BUN mg/dL 25.0* 26.0*   CREATININE mg/dL 1.22 1.61*   CALCIUM mg/dL 8.9 9.4   GLUCOSE mg/dL 94 93     Imaging Results (Last 24 Hours)       Procedure Component Value Units Date/Time    XR Chest 1 View [[id number]] Collected: 07/10/25 1643     Updated: 07/10/25 1647    Narrative:      ONE-VIEW PORTABLE CHEST AT 4:10 P.M.     HISTORY: Hypotension. Syncope.     FINDINGS: There is elevation of the left hemidiaphragm with some vague  atelectasis at the left base as also noted on the chest CT scan dated  1/10/2025 and also unchanged from the portable chest x-ray dated  6/22/2025. The heart remains enlarged with sternal wires from previous  cardiac surgery and no acute abnormality is seen.     This report was finalized on 7/10/2025 4:44 PM by Dr. Js Newman M.D  on Workstation: [id number]               I have reviewed the medications.     Discharge Medications        PAUSE  taking these medications        Instructions Start Date   losartan-hydrochlorothiazide 50-12.5 MG per tablet  Wait to take this until your doctor or other care provider tells you to start again.  Hold until instructed to resume by your physician  Commonly known as: Hyzaar   1 tablet, Oral, Daily             Continue These Medications        Instructions Start Date   acetaminophen 500 MG tablet  Commonly known as: TYLENOL   500 mg, Every 6 Hours PRN      Acetylcysteine capsule capsule   600 mg, Daily      albuterol sulfate  (90 Base) MCG/ACT inhaler  Commonly known as: PROVENTIL HFA;VENTOLIN HFA;PROAIR HFA   2 puffs, Inhalation, Every 4 Hours PRN      aspirin 81 MG EC tablet   81 mg, Oral, Daily      budesonide-formoterol 160-4.5 MCG/ACT inhaler  Commonly known as: SYMBICORT   2 puffs, Inhalation, 2 Times Daily      calcium 500 mg vitamin D 5 mcg (200 UT) 500-5 MG-MCG tablet per tablet   TAKE TWO TABLETS BY MOUTH DAILY      clonazePAM 0.5 MG tablet  Commonly known as: KlonoPIN   0.5 mg, Oral, 2 Times Daily PRN, for anxiety      coenzyme Q10 100 MG capsule   100 mg, Daily      cyclobenzaprine 10 MG tablet  Commonly known as: FLEXERIL   10 mg, Oral, 3 Times Daily PRN      EMERGEN-C BLUE PO   1 tablet, Daily      gabapentin 400 MG capsule  Commonly known as: NEURONTIN   400 mg, Oral, Every 8 Hours Scheduled      L-Arginine 500 MG capsule   1 capsule, Daily      metoprolol tartrate 25 MG tablet  Commonly known as: LOPRESSOR   25 mg, Oral, Every 12 Hours Scheduled      montelukast 10 MG tablet  Commonly known as: SINGULAIR   10 mg, Oral, Daily      naloxone 4 MG/0.1ML nasal spray  Commonly known as: NARCAN   Call 911. Don't prime. [place] in 1 nostril for overdose. Repeat in 2-3 minutes in other nostril if no or minimal breathing/responsiveness.      YURY-e 400 MG tablet   400 mg, Daily      sildenafil 20 MG tablet  Commonly known as: REVATIO   TAKE 1 TO 2 TABLETS BY MOUTH AS NEEDED FOR ERICTILE DYSFUNCTION       theophylline 300 MG 12 hr tablet  Commonly known as: THEODUR   300 mg, Oral, Daily      traZODone 50 MG tablet  Commonly known as: DESYREL    mg, Oral, Nightly      triamcinolone 0.025 % cream  Commonly known as: KENALOG   2 Times Daily      vitamin b complex capsule capsule   1 capsule, Daily      warfarin 5 MG tablet  Commonly known as: COUMADIN   5 mg, Oral, Daily Warfarin             Stop These Medications      furosemide 40 MG tablet  Commonly known as: LASIX            ---------------------------------------------------------------------------------------------  Assessment & Plan   Assessment/Problem List    Near syncope    Plan:    Near syncope  Symptomatic hypotension  Cardiology sent the patient over to the [facility] where patient was found to have a low systolic blood pressure of 69 and they started IV fluids with mild improvement of his systolic to 76.    Cardiology repeated an echo which was negative for any evidence of pericardial effusion and normal left ventricle systolic function normal right ventricle size and function.  They ordered labs which showed a stable hemoglobin and an elevated serum creatinine of 1.6.    Cardiology sent the patient over to the ER for further hydration and BP monitoring and recommends holding diuretics.  Gentle IV fluids ordered   Repeat orthostatics negative  Continue metoprolol  Stop furosemide  Continue to hold losartan-HCTZ for now  Cardiology to arrange for follow-up in 2 to 4-week     CAD status post CABG  Hypertension  Hyperlipidemia  History of PFO with closure in June 2025  History of PEs and DVTs  Continue beta-blocker, aspirin, warfarin     ELINOR  Pulmonary hypertension:  Continue home BiPAP or supplemental O2 as needed    Disposition: Home    Follow-up after Discharge: Primary care, cardiology    This note will serve as discharge summary    32 minutes have been spent by [facility] providers in the care of this patient while under  observation status.    Appropriate PPE worn during patient encounter.  Hand hygeine performed before and after seeing the patient.    Padmini Bradley, APRN 07/11/25 14:13 EDT

## 2025-07-11 NOTE — OUTREACH NOTE
Prep Survey      Flowsheet Row Responses   [facility] patient discharged from? [facility]   Is LACE score < 7 ? Yes   Eligibility [facility]   Date of Admission 07/10/25   Date of Discharge 07/11/25   Discharge Disposition Home-Health Care Sv   Discharge diagnosis Near syncope:   Does the patient have one of the following disease processes/diagnoses(primary or secondary)? Other   Does the patient have Home health ordered? No   Is there a DME ordered? No   Prep survey completed? Yes            DAYNA JAMESON - Registered Nurse

## 2025-07-11 NOTE — PROGRESS NOTES
[facility] Clinical Pharmacy Services: Warfarin Dosing/Monitoring Consult    Omar Choudhary is a 79 y.o. male, estimated creatinine clearance is 37.6 mL/min (A) (by C-G formula based on SCr of 1.61 mg/dL (H)). weighing 77.1 kg (170 lb).    Results from last 7 days   Lab Units 07/10/25  1544 07/10/25  1411   INR  2.33*  --    HEMOGLOBIN g/dL  --  12.2*   HEMATOCRIT %  --  38.0   PLATELETS 10*3/mm3  --  309     Prior to admission anticoagulation: His most recent regimen (post-discharge from here on 6/25) was 5 mg qd. INR at last appointment on 7/1 was therapeutic at 2.2. Had mentioned in a few notes his INR can be labile.     Hospital Anticoagulation:  Consulting provider: Radha Willard PA-C  Start date: 7/10  Indication: history of DVT/PE  Target INR: 2 - 3  Expected duration: Indefinite   Bridge Therapy: No     Potential food or drug interactions: Aspirin    Education complete?/Date: N/A; home medication    Assessment/Plan:  Last dose of warfarin noted to be taken yesterday. INR therapeutic on admission- will continue with warfarin 5 mg daily.   Monitor for any signs or symptoms of bleeding  Follow up daily INRs and dose adjustments    Pharmacy will continue to follow until discharge or discontinuation of warfarin.     Cristhian Granger [facility]  Clinical Pharmacist

## 2025-07-11 NOTE — PLAN OF CARE
Goal Outcome Evaluation:                    Patient came in for a near syncopal episode. He's creatinine was elevated but is now stable. His vital signs are stable, alert and oriented, has cardiology consult in the morning. Showing NSR  with 1st degree block on the monitor.

## 2025-07-11 NOTE — PROGRESS NOTES
[facility] Clinical Pharmacy Services: Warfarin Dosing/Monitoring Consult    Omar Choudhary is a 79 y.o. male, estimated creatinine clearance is 49.6 mL/min (by C-G formula based on SCr of 1.22 mg/dL). weighing 77.1 kg (170 lb).    Results from last 7 days   Lab Units 07/11/25  0539 07/10/25  2001 07/10/25  1544 07/10/25  1411   INR  2.23* 2.31* 2.33*  --    HEMOGLOBIN g/dL 11.5*  --   --  12.2*   HEMATOCRIT % 36.3*  --   --  38.0   PLATELETS 10*3/mm3 240  --   --  309     Prior to admission anticoagulation: His most recent regimen (post-discharge from here on 6/25) was 5 mg qd. INR at last appointment on 7/1 was therapeutic at 2.2. Had mentioned in a few notes his INR can be labile.     Hospital Anticoagulation:  Consulting provider: Radha Willard PA-C  Start date: 7/10, but pt on warfarin prior to admission  Indication: history of DVT/PE  Target INR: 2 - 3  Expected duration: Indefinite   Bridge Therapy: No     Potential food or drug interactions: Aspirin    Education complete?/Date: N/A; home medication    Assessment/Plan:  INR remains therapeutic; will continue with warfarin 5 mg daily.   Monitor for any signs or symptoms of bleeding  Follow up daily INRs and dose adjustments    Pharmacy will continue to follow until discharge or discontinuation of warfarin.     Li Tucker, PharmD, BCPS, [facility]  Clinical Pharmacy Specialist, Emergency Medicine   Phone: [phone]

## 2025-07-14 ENCOUNTER — TRANSITIONAL CARE MANAGEMENT TELEPHONE ENCOUNTER (OUTPATIENT)
Dept: CALL CENTER | Facility: HOSPITAL | Age: 80
End: 2025-07-14
Payer: MEDICARE

## 2025-07-14 ENCOUNTER — OFFICE VISIT (OUTPATIENT)
Dept: PULMONOLOGY | Facility: HOSPITAL | Age: 80
End: 2025-07-14
Payer: MEDICARE

## 2025-07-14 ENCOUNTER — TELEPHONE (OUTPATIENT)
Age: 80
End: 2025-07-14
Payer: MEDICARE

## 2025-07-14 VITALS
HEART RATE: 61 BPM | DIASTOLIC BLOOD PRESSURE: 89 MMHG | RESPIRATION RATE: 14 BRPM | SYSTOLIC BLOOD PRESSURE: 139 MMHG | BODY MASS INDEX: 27.48 KG/M2 | WEIGHT: 171 LBS | OXYGEN SATURATION: 99 % | HEIGHT: 66 IN

## 2025-07-14 DIAGNOSIS — J45.909 ASTHMA, UNSPECIFIED ASTHMA SEVERITY, UNSPECIFIED WHETHER COMPLICATED, UNSPECIFIED WHETHER PERSISTENT: Primary | ICD-10-CM

## 2025-07-14 DIAGNOSIS — G47.33 OBSTRUCTIVE SLEEP APNEA SYNDROME: ICD-10-CM

## 2025-07-14 PROCEDURE — G0463 HOSPITAL OUTPT CLINIC VISIT: HCPCS

## 2025-07-14 NOTE — PROGRESS NOTES
PULMONARY/ CRITICAL CARE/ SLEEP MEDICINE OUTPATIENT CONSULT/ FOLLOW UP NOTE        Patient Name:  Omar Choudhary    :  1945    Medical Record:  [id number]    PRIMARY CARE PHYSICIAN     Dennis Kwong MD    REASON FOR CONSULTATION    Omar Choudhary is a 79 y.o. male who is referred for consultation for ELINOR/Asthma  REVIEW OF SYSTEMS    Constitutional:  Denies fever or chills   Eyes:  Denies change in visual acuity   HENT:  Denies nasal congestion or sore throat   Respiratory:  Denies cough or shortness of breath   Cardiovascular:  Denies chest pain or edema   GI:  Denies abdominal pain, nausea, vomiting, bloody stools or diarrhea   :  Denies dysuria   Musculoskeletal:  Denies back pain or joint pain   Integument:  Denies rash   Neurologic:  Denies headache, focal weakness or sensory changes   Endocrine:  Denies polyuria or polydipsia   Lymphatic:  Denies swollen glands   Psychiatric:  Denies depression or anxiety     MEDICAL HISTORY    Past Medical History:   Diagnosis Date    ADHD (attention deficit hyperactivity disorder)     Hard to focus on tasks and follow through.    Allergic 2018    Arthritis     Asthma     Carpal tunnel syndrome     no pain    Cataract     Deep vein thrombosis 01/10/2025    Depression     Erectile dysfunction     2018 at 73 years old    Family history of malignant neoplasm of breast 2019    Hyperlipidemia 2019    Allergic to statins    Hypertension 2012    Managing w/ Losartan Potassium    Leg pain, right     Low back pain     Decompress and fusion surgery 10/4&2022    Neuropathy     feet    Osteopenia 2019    Poor balance     Pulmonary embolism     Acute pulmonary saddle embolism    Reactive airway disease 01/15/2016    Scoliosis 1960    Shortness of breath 2017    Sleep apnea     Substance abuse 1966    Alcoholic        SURGICAL HISTORY    Past Surgical History:   Procedure Laterality Date    CARDIAC CATHETERIZATION      CARDIAC  CATHETERIZATION N/A 01/11/2025    Procedure: Right Heart Cath;  Surgeon: Nancy Hdez MD;  Location: [facility] CATH [facility]VASIVE LOCATION;  Service: Cardiovascular;  Laterality: N/A;    CARDIAC CATHETERIZATION  01/11/2025    Procedure: Percutaneous Manual Thrombectomy;  Surgeon: Nancy Hdez MD;  Location:  [facility] CATH INVASIVE LOCATION;  Service: Cardiovascular;;    CARDIAC CATHETERIZATION Bilateral 01/11/2025    Procedure: Pulmonary angiography;  Surgeon: Nancy Hdez MD;  Location: [facility] CATH INVASIVE LOCATION;  Service: Cardiovascular;  Laterality: Bilateral;    CARDIAC CATHETERIZATION N/A 6/18/2025    Procedure: Right and Left Heart Cath;  Surgeon: Nancy Hdez MD;  Location:  [facility] CATH INVASIVE LOCATION;  Service: Cardiovascular;  Laterality: N/A;    CARDIAC CATHETERIZATION N/A 6/18/2025    Procedure: Coronary angiography;  Surgeon: Nancy Hdez MD;  Location: [facility] CATH INVASIVE LOCATION;  Service: Cardiovascular;  Laterality: N/A;    COLONOSCOPY  2013    No polyps, slight diverticulitis    CORONARY ARTERY BYPASS GRAFT N/A 6/20/2025    Procedure: SHE STERNOTOMY CORONARY ARTERY BYPASS GRAFT TIMES 5 USING LEFT INTERNAL MAMMARY ARTERY AND LEFT GREATER SAPHENOUS VEIN GRAFT PER ENDOSCOPIC VEIN HARVESTING, CLOSURE OF PFO, EXCLUSION OF LEFT ATRIAL APPENDAGE AND PRP;  Surgeon: Jr Chago Simon MD;  Location: [facility];  Service: Cardiothoracic;  Laterality: N/A;    EYE SURGERY  2016    Cataracts    KNEE ARTHROSCOPY W/ ACL RECONSTRUCTION Right 2019    LUMBAR FUSION Bilateral 10/05/2022    Procedure: DAY 2 LUMBAR LAMINECTOMY TRANSFORAMINAL LUMBAR INTERBODY FUSION L2,L3,L4 WITH NEURO ROBOT;  Surgeon: John Do MD;  Location: [facility] MAIN OR;  Service: Robotics - Neuro;  Laterality: Bilateral;    LUMBAR FUSION N/A 10/04/2022    Procedure: DAY 1 LUMBAR LATERAL INTERBODY FUSION WITH NEURO ROBOT L2, L3.L4;  Surgeon: John Do MD;  Location: [facility] MAIN OR;  Service: Robotics - Neuro;   Laterality: N/A;  left side approach    MOUTH SURGERY      SPINE SURGERY  10/4&2022    Dr. John Do, [facility]        FAMILY HISTORY    Family History   Problem Relation Age of Onset    Heart disease Mother     Hypertension Mother     Breast cancer Mother     Stroke Mother         Several TIAs    Alcohol abuse Mother     Thyroid disease Mother     Arthritis Mother     Cancer Mother         Breast    Aortic aneurysm Father     Heart attack Father     Liver cancer Father     Cancer Father         Liver    Heart disease Father         Heart attack    Early death Brother         Coronary thrombosis @ 48 years while mountain climbing.    Heart attack Brother         , at 47 years, fatal       SOCIAL HISTORY    Social History     Tobacco Use    Smoking status: Former     Current packs/day: 0.00     Average packs/day: 0.5 packs/day for 26.0 years (13.0 ttl pk-yrs)     Types: Cigarettes     Start date: 1966     Quit date: 1992     Years since quittin.5     Passive exposure: Past    Smokeless tobacco: Never   Substance Use Topics    Alcohol use: Yes     Alcohol/week: 16.0 standard drinks of alcohol     Types: 14 Glasses of wine, 2 Cans of beer per week        ALLERGIES    Allergies   Allergen Reactions    Statins Myalgia     Other reaction(s): muscle soreness         MEDICATIONS    Current Outpatient Medications on File Prior to Visit   Medication Sig Dispense Refill    acetaminophen (TYLENOL) 500 MG tablet Take 1 tablet by mouth Every 6 (Six) Hours As Needed for Mild Pain.      Acetylcysteine capsule capsule Take 1 capsule by mouth Daily.      albuterol sulfate  (90 Base) MCG/ACT inhaler Inhale 2 puffs Every 4 (Four) Hours As Needed for Wheezing or Shortness of Air. 18 g 0    aspirin 81 MG EC tablet Take 1 tablet by mouth Daily. 30 tablet 11    B Complex Vitamins (VITAMIN B COMPLEX) capsule capsule Take 1 capsule by mouth Daily. LD 9-27      budesonide-formoterol (SYMBICORT)  160-4.5 MCG/ACT inhaler Inhale 2 puffs 2 (Two) Times a Day. 30.6 g 1    Calcium Carb-Cholecalciferol (Oyster Shell Calcium w/D) 500-5 MG-MCG tablet TAKE TWO TABLETS BY MOUTH DAILY 180 tablet 0    clonazePAM (KlonoPIN) 0.5 MG tablet Take 1 tablet by mouth 2 (Two) Times a Day As Needed for Anxiety. for anxiety 30 tablet 2    coenzyme Q10 100 MG capsule Take 1 capsule by mouth Daily.      cyclobenzaprine (FLEXERIL) 10 MG tablet Take 1 tablet by mouth 3 (Three) Times a Day As Needed for Muscle Spasms. 30 tablet 1    L-Arginine 500 MG capsule Take 1 capsule by mouth Daily.      [Paused] losartan-hydrochlorothiazide (Hyzaar) 50-12.5 MG per tablet Take 1 tablet by mouth Daily. 90 tablet 1    metoprolol tartrate (LOPRESSOR) 25 MG tablet Take 1 tablet by mouth Every 12 (Twelve) Hours. 60 tablet 5    montelukast (SINGULAIR) 10 MG tablet TAKE 1 TABLET BY MOUTH ONCE DAILY 90 tablet 0    Multiple Vitamins-Minerals (EMERGEN-C BLUE PO) Take 1 tablet by mouth Daily. LD 9-27      naloxone (NARCAN) 4 MG/0.1ML nasal spray Call 911. Don't prime. [place] in 1 nostril for overdose. Repeat in 2-3 minutes in other nostril if no or minimal breathing/responsiveness. 2 each 0    S-Adenosylmethionine (YURY-e) 400 MG tablet Take 400 mg by mouth Daily.      sildenafil (REVATIO) 20 MG tablet TAKE 1 TO 2 TABLETS BY MOUTH AS NEEDED FOR ERICTILE DYSFUNCTION 50 tablet 0    theophylline (THEODUR) 300 MG 12 hr tablet Take 1 tablet by mouth Daily. 90 tablet 3    traZODone (DESYREL) 50 MG tablet Take 1-2 tablets by mouth Every Night. 180 tablet 3    triamcinolone (KENALOG) 0.025 % cream Apply 1 Application topically to the appropriate area as directed 2 (Two) Times a Day.      warfarin (COUMADIN) 5 MG tablet Take 1 tablet by mouth Daily. 30 tablet 1    gabapentin (NEURONTIN) 400 MG capsule Take 1 capsule by mouth Every 8 (Eight) Hours for 7 days. 21 capsule 0     No current facility-administered medications on file prior to visit.       PHYSICAL EXAM   "  Vitals:    07/14/25 0937   BP: 139/89   BP Location: Left arm   Patient Position: Sitting   Cuff Size: Adult   Pulse: 61   Resp: 14   SpO2: 99%   Weight: 77.6 kg (171 lb)   Height: 167.6 cm (66\")        Constitutional:  Well developed, well nourished, no acute distress, non-toxic appearance   Eyes:  PERRL, conjunctiva normal   HENT:  Atraumatic, external ears normal, nose normal, oropharynx moist, no pharyngeal exudates. mallampatti   Neck- normal range of motion, no tenderness, supple   Respiratory:  No respiratory distress, normal breath sounds, no rales, no wheezing   Cardiovascular:  Normal rate, normal rhythm, no murmurs, no gallops, no rubs   GI:  Soft, nondistended, normal bowel sounds, nontender, no organomegaly, no mass, no rebound, no guarding   :  No costovertebral angle tenderness   Musculoskeletal:  No edema, no tenderness, no deformities. Back- no tenderness  Integument:  Well hydrated, no rash   Lymphatic:  No lymphadenopathy noted   Neurologic:  Alert & oriented x 3, CN 2-12 normal, normal motor function, normal sensory function, no focal deficits noted   Psychiatric:  Speech and behavior appropriate     XR Chest 1 View  Result Date: 6/24/2025  As described.  This report was finalized on 6/24/2025 4:40 PM by Dr. Beka Bowden M.D on Workstation: [id number]      XR Chest PA & Lateral  Result Date: 6/23/2025  New lucencies under the right greater than left hemidiaphragms. Lucency under the right diaphragm and tracks posteriorly on lateral projection. Lucency under the left hemidiaphragm appears separate from the gastric bubble. Lucencies are very concerning for free intraperitoneal air. Recommend further evaluation with a CT abdomen pelvis with IV contrast.  Interval removal of left-sided chest tube. Postsurgical changes of CABG and left atrial appendage clipping. Stable normal size cardiomediastinal silhouette. Stable left hemidiaphragm elevation. No focal consolidation. Small bilateral pleural " effusions. No measurable pneumothorax. Partially visualized lumbar spine postsurgical changes.   Above findings and recommendations were discussed with Trena DIAZ at the time of dictation.    This report was finalized on 6/23/2025 8:18 AM by Dr. Andres Paula M.D on Workstation: [id number]      XR Chest 1 View  Result Date: 6/22/2025   1. Interval removal of left chest tube. No pneumothorax. 2. Stable ill-defined left lung base opacity.  This report was finalized on 6/22/2025 3:06 PM by Dr. Gil Jackson M.D on Workstation: [id number]      XR Chest 1 View  Result Date: 6/21/2025  Persistent vascular congestion and bibasilar atelectasis.  This report was finalized on 6/21/2025 5:01 AM by Dr. Riana Bright M.D on Workstation: [id number]      XR Chest 1 View  Result Date: 6/20/2025  Postsurgical changes.  This report was finalized on 6/20/2025 12:59 PM by Dr. Beka Bowden M.D on Workstation: [id number]      XR Chest PA & Lateral  Result Date: 6/18/2025   Negative chest, no acute abnormality.  This report was finalized on 6/18/2025 10:00 PM by Dr. Esdras Ruelas M.D on Workstation: [id number]      XR Spine Lumbar Complete With Flex & Ext  Result Date: 6/18/2025  Impression: 1. Negative for acute osseous abnormality. 2. Postoperative changes of lumbar fixation at L2-L4. 3. Anterolisthesis of L5 on S1 measuring 10 mm not significantly changed with flexion/extension. 4. Advanced lumbar spondylosis above. Electronically Signed: Javier Bourgeois MD  6/18/2025 1:33 PM EDT  Workstation ID: [id number]    Cardiac Catheterization/Vascular Study  Addendum Date: 6/18/2025  CONCLUSION: 1.  Severe multivessel coronary artery disease: *50-60% distal left main stenosis *70% severely calcified ostial LAD stenosis *95% ostial ramus intermedius stenosis *95% proximal and serial distal left circumflex artery stenosis *100% chronic total occlusion of the mid right coronary artery with left-to-right collateral filling 2.   Normal to mildly elevated pulmonary pressures with a mean pulmonary pressure of 20 mmHg 3.  Normal left atrial filling pressure with a pulmonary wedge pressure of 10 mmHg RECOMMENDATIONS: Consult cardiothoracic surgery regarding CABG. will keep the patient as an inpatient.  Consult pulmonary for preop evaluation before surgery.  Start aspirin.  Hold off on statin since he reports statin intolerance. FINDINGS: RIGHT HEART HEMODYNAMICS: 1.  Pulmonary wedge pressure: 15/14, 10 2.  Pulmonary artery pressure: 36/11, 20 3.  Right ventricular pressure: 29/80, 7 4.  Right atrial pressure: 10/8, 7 5.  Pulmonary artery saturation: 63% on room air 6.  Right radial artery saturation: 96% on room air 7.  Kimberly calculated cardiac output 4.18 L/min, cardiac index 2.21 L/min/m² SELECTIVE CORONARY ANGIOGRAMS: 1.  Left main artery: Large-caliber vessel with severe calcification especially the distal vessel  with an associated 50 to 60% stenosis.  The vessel trifurcates into left anterior descending, ramus intermedius, and left circumflex arteries. 2.  Left anterior descending artery: Small caliber vessel with.  The ostial vessel is difficult to visualize but there appears to be at least 70% severely calcified ostial stenosis.  Remainder of the proximal vessel has about 30% stenosis.  Gives rise to a small caliber first diagonal branch with no significant disease.  Mid LAD remains a small caliber giving rise to small caliber 2nd and 3rd diagonal branches with no significant stenosis.  Distal LAD tapers to a small caliber with no significant disease. 3.  Ramus intermedius: Small caliber vessel with 95% ostial stenosis. 4.  Left circumflex artery: Small caliber vessel with no apparent ostial disease.  Proximal vessel has 95% stenosis.  The mid vessel remains a small caliber vessel giving rise to a small caliber first obtuse marginal branch with no significant disease.  The distal circumflex has serial 90 to 95% stenosis before giving  rise to a small caliber second obtuse marginal branch with no significant disease. 5.  Right coronary artery: Small caliber vessel with 100% chronic total occlusion of the mid vessel.  There is right to right collateral filling via bridging collaterals as well as left-to-right collateral filling of the distal vessel. PROCEDURE: After informed consent was obtained the patient brought to the cardiac catheterization laboratory where his right arm was prepped and draped in a sterile manner.  1 mL of 2% lidocaine was infused subcutaneously to the right antecubital region.  The previously placed peripheral IV was exchanged over wire for 5 Kyrgyz glide sheath. Right heart catheterization was performed using a 5 Kyrgyz balloontipped tip [place]-Malachi catheter.  Arterial samples drawn for the pulmonary artery for saturation measurement.  Following completion of the right heart catheterization the [place]-Malachi catheter was removed. Turned my attention next to the right radial artery access.  1 mL of 2% lidocaine was infused subcutaneously into the right wrist.  Access to the right radial artery is obtained using a micropuncture needle followed by placement of a 5/6 Kyrgyz slender glide sheath.  Arterial samples drawn from the radial artery for saturation measurement. Proceeded next with selective coronary angiograms.  The patient had severe innominate artery tortuosity and required using a glide wire in order to advance into the ascending aorta.  Selective coronary angiograms were then performed using a 5 Kyrgyz FL 3.5 and a 5 Kyrgyz FR4 diagnostic catheters.  I attempted to perform a left heart catheterization using a 5 Kyrgyz radial pigtail catheter.  Attempts were difficult due to the innominate artery tortuosity.  In my attempts I unfortunately both kinked the distal catheter and managed to wrap the pigtail catheter around itself.  I attempted to straighten out the wire using the J-wire both the front and back and but was  unsuccessful in straightening out the catheter.  I then used a Glidewire again both the front and back and but again was unsuccessful but did manage to poke the wire through either a sidehole or by creating a new the puncture.  I then attempted using a BMW wire and a mailman wire which were both unsuccessful in getting past the kink.  I do not feel comfortable pulling back the pigtail catheter due to the inability to straighten it out.  At this point I felt my best option was to try to snare the end of the catheter via femoral approach to straighten out the catheter and remove it safely. The right groin which had already been thoroughly prepped and draped.  Ultrasound evaluation however revealed a plaque throughout the femoral artery without any area that I felt comfortable accessing.  Instead I had the left femoral groin prepped and draped.  Send evaluation also showed evidence of plaque within the femoral artery but there was an area was able to access.  10 mg of 2% lidocaine was infused subcutaneously into the left groin.  Access to the left common femoral artery was obtained using a micropuncture needle and under ultrasound guidance followed by placement of a 4 German angiocatheter.  Selective angiograms performed showing access above the bifurcation and below the origin of the inferior epigastric artery.  The 4 German angiocatheter was then exchanged for 7 German sheath. I gave the patient additional unfractionated heparin to keep his ACT above 250.  I then advanced a 7 German JR4 guiding catheter into the ascending aorta.  Through that I advanced the 7 German Ensnare catheter.  After several attempts I was able to use the snare to capture the end of the pigtail catheter.  I was able then to pull on the snare in order to untangled the pigtail catheter.  Around the same time I advanced the J-wire which initially would not advance through the endhole of the pigtail catheter.  However after untangling the pigtail  catheter and with some difficulty I was able to advance the wire through the lumen of the catheter through the endhole.  With this I was able to straighten out the pigtail catheter and remove through the radial artery without any issues.  The snare catheter was then pulled out and replaced with a table wire.  The guiding catheter was then removed.  All the sheaths were aspirated and flushed.  The brachial sheath was removed, manual pressure held, and a pressure dressing placed.  The radial sheath was removed and a TR band placed.  The left femoral sheath was aspirated and flushed.  The patient was then transferred to the recovery area in stable condition.     CT Angiogram Chest  Result Date: 5/27/2025  1. Limited examination for pulmonary embolism in the left lower lobe due to poor contrast opacification. Otherwise no evidence of pulmonary embolism. 2. Cardiomegaly.    DEXA Bone Density Axial  Result Date: 5/19/2025  Normal Bone Mineral Density.   Copies of the computerized summary reports can be obtained from [employer] via the health information department of [facility].   5/19/2025 11:56 AM by John Bentley on Workstation: [id number]       Results for orders placed during the hospital encounter of 03/06/25    Adult Transthoracic Echo Complete w/ Color, Spectral and Contrast if Necessary Per Protocol    Interpretation Summary    Left ventricular systolic function is normal. Calculated left ventricular EF = 58.5%    The left ventricular cavity is mildly dilated.    Left ventricular diastolic function is consistent with (grade I) impaired relaxation.    RV mildly dilated with grossly normal function.    Aortic valve sclerosis without hemodynamically significant stenosis.  Mild aortic regurgitation.    Severe mitral annular calcification (MAC).    The left atrial cavity is mildly dilated.  Mild IVC dilatation.    Mild dilation of the proximal aorta is present (4.1 cm).      ASSESSMENT & PLAN:      S/p hosp  discharge 1/19/25 : saddle emboli with severe respiratory failure. S/p thrombectomy ,on warfarin    bilateral PEs status post thrombectomy, history of GIST DVT, severe pulmonary hypertension, coronary artery disease status post CABG x 5, PFO status post closure 6/2025, asthma, hypertension,      Asthma (J45.909)  paralyzed Left hemidiapghram     Impression: CXR 1/19: Elevated L Hemidiaphragm with bibasilar atelectasis  IgE 5/18: 765  RAST Panel 5/18: +ve  PFts 4/18:  (FEV1 78%, TLC 82%, DLCO 76, DL/%  Repeat Lab 3/19: WBC 5.3, Eosinophil count normal. Mold Panel:negative.       IgE: 515  Xolair weekly injections vs allergy shots  Feels better allergy injections     Tests at [facility]; PFts 8/19: Non specific ventilatory defect. FEV1 70%, + D response, %, DLCO 82%   Methacholine chalenge test 8/19: negative  HRCT : No ILD  VQ: Negative except decrease in LLL related to paralyzed diaphragm  Metabolic cardiopulmonary stress test: negative  Echo: EF 60%, RVSP 31 min  Tried Breo and Bevespi with no improvement in symptoms  Remains on Singulair,   symbicort  expensive and albuterol.   -Also on allergy shots now     Obstructive sleep apnea (G47.33)  Has new ResMed machine,  CPAP at 8-20  Average use 8hrs 27 min. Average AHI 1.1. compliance 97% > 4 hrs,   Patient is on AutoSet 8-20 pressure   Patient is compliant with using CPAP/BiPAP therapy and is benefitting from PAP therapy.     Hypertension (I10)           PLAN:        continue on Symbicort and albuterol  Montelukast and theophylline     Discussed with patient utilizing Xolair for elevated IgE as well as either Fasenra or Nucala for elevated Eosinophills but he deferred due to lack of medication insurance coverage    F/u Hematology consult for w/u      Continue AutoSet 8-20 pressure      SAFETY DRIVING  ADEQUATE SLEEP HYGEINE  DISCUSSED CARDIOVASCULAR & METABOLIC SIDE EFFECTS OF UNTREATED ELINOR  PATIENT IS COMPLIANT USING MACHINE AND BENEFITS  FROM THERAPY, , PATIENT HAS NASAL DRYNESS AND WILL NEED HEATED HUMIDITY WITH PAP                This document has been electronically signed by  Jerrod Lagunas MD  10:00 EDT

## 2025-07-14 NOTE — OUTREACH NOTE
Call Center TCM Note      Flowsheet Row Responses   [facility] patient discharged from? [place]   Does the patient have one of the following disease processes/diagnoses(primary or secondary)? Other   TCM attempt successful? Yes   Call start time 1243   Call end time 1248   Discharge diagnosis Near syncope:   Meds reviewed with patient/caregiver? Yes   Is the patient having any side effects they believe may be caused by any medication additions or changes? No   Does the patient have all medications ordered at discharge? Yes   Is the patient taking all medications as directed (includes completed medication regime)? Yes   Medication comments /89   Comments HOSP DC FU appt listed as 7/30/25 930 am. This appt does not meet TCM guidelines on time frame. TCM call complete.   Does the patient have an appointment with their PCP within 7-14 days of discharge? No   Nursing Interventions Routed TCM call to PCP office   What is the Home health agency?  [facility]    Has home health visited the patient within 72 hours of discharge? Yes   Psychosocial issues? No   Did the patient receive a copy of their discharge instructions? Yes   Nursing interventions Reviewed instructions with patient   What is the patient's perception of their health status since discharge? Improving   Is the patient/caregiver able to teach back the hierarchy of who to call/visit for symptoms/problems? PCP, Specialist, Home health nurse, Urgent Care, ED, 911 Yes   TCM call completed? Yes   Wrap up additional comments Pt reports surgical site looks fine. No lightheadedness. Pt saw pulmo today and has FU with cardio as well.   Call end time 1248            KATHERINE SHAIKH - Registered Nurse    7/14/2025, 12:49 EDT

## 2025-07-16 ENCOUNTER — OFFICE VISIT (OUTPATIENT)
Age: 80
End: 2025-07-16
Payer: MEDICARE

## 2025-07-16 VITALS
SYSTOLIC BLOOD PRESSURE: 130 MMHG | DIASTOLIC BLOOD PRESSURE: 70 MMHG | BODY MASS INDEX: 28.28 KG/M2 | HEART RATE: 54 BPM | HEIGHT: 66 IN | WEIGHT: 176 LBS

## 2025-07-16 DIAGNOSIS — I25.10 CORONARY ARTERY DISEASE INVOLVING NATIVE CORONARY ARTERY OF NATIVE HEART WITHOUT ANGINA PECTORIS: ICD-10-CM

## 2025-07-16 DIAGNOSIS — E78.2 MIXED HYPERLIPIDEMIA: ICD-10-CM

## 2025-07-16 DIAGNOSIS — I10 PRIMARY HYPERTENSION: ICD-10-CM

## 2025-07-16 DIAGNOSIS — Z95.1 S/P CABG X 5: Primary | ICD-10-CM

## 2025-07-16 DIAGNOSIS — I26.09 ACUTE COR PULMONALE: ICD-10-CM

## 2025-07-16 NOTE — PROGRESS NOTES
Subjective:     Encounter Date:2025      Patient ID: Omar Choudhary is a 79 y.o. male.    Chief Complaint:  History of Present Illness  This is a 79-year-old man who follows with Dr. Hdez and is known to me.  He has a past medical history of asthma, paralyzed hemidiaphragm, osteoarthritis, hypertension he was recently hospitalized with a pulmonary embolism.      I saw him in  and he reported having to go to the emergency room in [place] due to some shortness of breath.  His workup there was unremarkable.  He was treated with a 5-day course of prednisone and sent home.  By the time of our visit he reported that he was still having shortness of breath.  We opted to proceed with a right heart cath to reassess the pressures in the right side of his heart.  He had mention at that visit that his brother had  of a sudden MI at the age of 46.  Based on his symptoms and family history we decided to proceed with a left heart cath as well.    He underwent cardiac catheterization on 2025 and this showed severe multivessel coronary artery disease leading distal left main stenosis.  Dr. Wilkes evaluated the patient and recommended proceeding with CABG.  On  he underwent 5 vessel bypass graft with a LIMA to the LAD, SVG to the ramus intermedius, SVG to the obtuse marginal, SVG to the RV branch, and SVG to the posterior descending branch.  He also underwent closure of the left atrial appendage and closure of an inferior patent foramen ovale.  He had a very uncomplicated postop course and was eventually discharged home on .    He presented to follow-up with Dr. Hdez on July 10.  He had been at his neurosurgeons office earlier that morning and Dr. Hdez was contacted regarding his low blood pressures.  He reported that he was feeling okay despite this.  He contacted his PCPs office and they instructed him to stop his furosemide.  When he was walking in from the waiting room to the exam room for his  appointment with Dr. Hdez he became lightheaded and near syncopal.  Dr. Hdez recommended that he be seen in the [facility].  He was given a 500 cc bolus of fluid and his systolic blood pressure was still 76.  Hemoglobin was stable.  Creatinine was very mildly elevated.  She recommended that he go to the emergency room for further evaluation.  It was felt that his syncope was due to volume depletion.  Losartan, hydrochlorothiazide and furosemide were all stopped.  He was continued on metoprolol.  He was discharged home that day.    He is here today for follow-up visit.  He reports that he is feeling much better now.  Blood pressures have been better controlled.  He has a little discomfort when he takes deep breaths but no reported shortness of breath.  He says his biggest pain is in his left scapula and left arm.  This was the arm that was extended during his surgery and he says it is hurt since.  He is feeling better overall and would like to start cardiac rehab.    Prior history :  He presented to the hospital on 1/10/2025 with complaints of worsening shortness of breath.  His shortness of breath became so severe while cleaning snow off his car that he opted to call EMS.  When EMS arrived his oxygen saturations were in the 50s.  He was placed on supplemental oxygen and eventually placed on BiPAP when he was admitted to the ICU.  proBNP was 3412, lactic acid 3.6, troponin 115 and white count 16.87.  A CT of the chest was obtained that showed bilateral pulmonary embolism including a saddle embolism with evidence of elevated RV to LV ratio of 1:2 an echocardiogram was also performed which showed evidence of severe right ventricular enlargement and dysfunction but no significant evidence of pulmonary hypertension although it was felt this may be underestimated. Left ventricular function was normal and there was no evidence of thrombus in transit. He was started on heparin infusion and admitted to the ICU.     On January 11  he underwent bilateral pulmonary artery thrombectomy and was noted to have only a small to moderate amount of residual thrombus by the time the procedure had ended.  He was also found to have acute right lower extremity DVT in the peroneal, popliteal and distal femoral veins.  He was noted to have severe pulmonary hypertension with systolic pressures in the 70s to 80s and a mean PA of 44 to 46 mmHg noted on right heart cath before and following the thrombectomy.  It was felt that this was likely chronic.  He was transition to Coumadin with a Lovenox bridge as DOAC's were cost prohibitive.  He did remain in the hospital for about a week due to high O2 requirements as well as trying to get his INR therapeutic.  What is expected walk test prior to discharge and was able to maintain O2 sats above 93% by the end of the walk.  He was ultimately discharged home on 1/18/2025.    I saw him in January for hospital follow-up.  He was feeling well.  He had not been set up with the anticoagulation clinic so we got him started with that.  He then followed up with Dr. Hdez in March with an echocardiogram performed at that visit.  It did show some improvement in his right ventricular size and function but there is still some borderline dilatation.  They were unable to assess his right ventricular systolic pressure due to insufficient TR.  She recommended at that visit that we may need to consider reassessment of his pulmonary hypertension with a repeat right heart cath in the future.    I have reviewed and updated as appropriate allergies, current medications, past family history, past medical history, past surgical history and problem list.    Review of Systems   Constitutional: Negative for fever, malaise/fatigue, weight gain and weight loss.   HENT:  Negative for congestion, hoarse voice and sore throat.    Eyes:  Negative for blurred vision and double vision.   Cardiovascular:  Negative for chest pain, dyspnea on exertion, leg  swelling, orthopnea, palpitations and syncope.   Respiratory:  Positive for shortness of breath. Negative for cough and wheezing.    Gastrointestinal:  Negative for abdominal pain, hematemesis, hematochezia and melena.   Genitourinary:  Negative for dysuria and hematuria.   Neurological:  Negative for dizziness, headaches, light-headedness and numbness.   Psychiatric/Behavioral:  Negative for depression. The patient is not nervous/anxious.          Current Outpatient Medications:     acetaminophen (TYLENOL) 500 MG tablet, Take 1 tablet by mouth Every 6 (Six) Hours As Needed for Mild Pain., Disp: , Rfl:     Acetylcysteine capsule capsule, Take 1 capsule by mouth Daily., Disp: , Rfl:     albuterol sulfate  (90 Base) MCG/ACT inhaler, Inhale 2 puffs Every 4 (Four) Hours As Needed for Wheezing or Shortness of Air., Disp: 18 g, Rfl: 0    aspirin 81 MG EC tablet, Take 1 tablet by mouth Daily., Disp: 30 tablet, Rfl: 11    B Complex Vitamins (VITAMIN B COMPLEX) capsule capsule, Take 1 capsule by mouth Daily. LD 9-27, Disp: , Rfl:     budesonide-formoterol (SYMBICORT) 160-4.5 MCG/ACT inhaler, Inhale 2 puffs 2 (Two) Times a Day., Disp: 30.6 g, Rfl: 1    Calcium Carb-Cholecalciferol (Oyster Shell Calcium w/D) 500-5 MG-MCG tablet, TAKE TWO TABLETS BY MOUTH DAILY, Disp: 180 tablet, Rfl: 0    clonazePAM (KlonoPIN) 0.5 MG tablet, Take 1 tablet by mouth 2 (Two) Times a Day As Needed for Anxiety. for anxiety, Disp: 30 tablet, Rfl: 2    coenzyme Q10 100 MG capsule, Take 1 capsule by mouth Daily., Disp: , Rfl:     cyclobenzaprine (FLEXERIL) 10 MG tablet, Take 1 tablet by mouth 3 (Three) Times a Day As Needed for Muscle Spasms., Disp: 30 tablet, Rfl: 1    L-Arginine 500 MG capsule, Take 1 capsule by mouth Daily., Disp: , Rfl:     [Paused] losartan-hydrochlorothiazide (Hyzaar) 50-12.5 MG per tablet, Take 1 tablet by mouth Daily., Disp: 90 tablet, Rfl: 1    metoprolol tartrate (LOPRESSOR) 25 MG tablet, Take 1 tablet by mouth Every  12 (Twelve) Hours., Disp: 60 tablet, Rfl: 5    montelukast (SINGULAIR) 10 MG tablet, TAKE 1 TABLET BY MOUTH ONCE DAILY, Disp: 90 tablet, Rfl: 0    Multiple Vitamins-Minerals (EMERGEN-C BLUE PO), Take 1 tablet by mouth Daily. LD 9-27, Disp: , Rfl:     naloxone (NARCAN) 4 MG/0.1ML nasal spray, Call 911. Don't prime. [employer] in 1 nostril for overdose. Repeat in 2-3 minutes in other nostril if no or minimal breathing/responsiveness., Disp: 2 each, Rfl: 0    S-Adenosylmethionine (YURY-e) 400 MG tablet, Take 400 mg by mouth Daily., Disp: , Rfl:     sildenafil (REVATIO) 20 MG tablet, TAKE 1 TO 2 TABLETS BY MOUTH AS NEEDED FOR ERICTILE DYSFUNCTION, Disp: 50 tablet, Rfl: 0    theophylline (THEODUR) 300 MG 12 hr tablet, Take 1 tablet by mouth Daily., Disp: 90 tablet, Rfl: 3    traZODone (DESYREL) 50 MG tablet, Take 1-2 tablets by mouth Every Night., Disp: 180 tablet, Rfl: 3    triamcinolone (KENALOG) 0.025 % cream, Apply 1 Application topically to the appropriate area as directed 2 (Two) Times a Day., Disp: , Rfl:     warfarin (COUMADIN) 5 MG tablet, Take 1 tablet by mouth Daily., Disp: 30 tablet, Rfl: 1    gabapentin (NEURONTIN) 400 MG capsule, Take 1 capsule by mouth Every 8 (Eight) Hours for 7 days., Disp: 21 capsule, Rfl: 0    Past Medical History:   Diagnosis Date    ADHD (attention deficit hyperactivity disorder) 2021    Hard to focus on tasks and follow through.    Allergic 08/20/2018    Arthritis     Asthma     Carpal tunnel syndrome     no pain    Cataract     Deep vein thrombosis 01/10/2025    Depression     Erectile dysfunction     2018 at 73 years old    Family history of malignant neoplasm of breast 09/26/2019    Hyperlipidemia 09/26/2019    Allergic to statins    Hypertension 03/01/2012    Managing w/ Losartan Potassium    Leg pain, right     Low back pain     Decompress and fusion surgery 10/4&5/2022    Neuropathy     feet    Osteopenia 11/28/2019    Poor balance     Pulmonary embolism     Acute pulmonary saddle  embolism    Reactive airway disease 01/15/2016    Scoliosis 1960    Shortness of breath 2017    Sleep apnea     Substance abuse 1966    Alcoholic       Past Surgical History:   Procedure Laterality Date    CARDIAC CATHETERIZATION  1992    CARDIAC CATHETERIZATION N/A 01/11/2025    Procedure: Right Heart Cath;  Surgeon: Nancy Hdez MD;  Location:  [facility] CATH [facility]VASIVE [facility];  Service: Cardiovascular;  Laterality: N/A;    CARDIAC CATHETERIZATION  01/11/2025    Procedure: Percutaneous Manual Thrombectomy;  Surgeon: Nancy Hdez MD;  Location[facility];  Service: Cardiovascular;;    CARDIAC CATHETERIZATION Bilateral 01/11/2025    Procedure: Pulmonary angiography;  Surgeon: Nancy Hdez MD;  Location:  [facility] CATH INVASIVE LOCATION;  Service: Cardiovascular;  Laterality: Bilateral;    CARDIAC CATHETERIZATION N/A 6/18/2025    Procedure: Right and Left Heart Cath;  Surgeon: Nancy Hdez MD;  Location:  [facility] CATH INVASIVE [facility]ATION;  Service: Cardiovascular;  Laterality: N/A;    CARDIAC CATHETERIZATION N/A 6/18/2025    Procedure: Coronary angiography;  Surgeon: Nancy Hdez MD;  [facility];  Service: Cardiovascular;  Laterality: N/A;    COLONOSCOPY  2013    No polyps, slight diverticulitis    CORONARY ARTERY BYPASS GRAFT N/A 6/20/2025    Procedure: SHE STERNOTOMY CORONARY ARTERY BYPASS GRAFT TIMES 5 USING LEFT INTERNAL MAMMARY ARTERY AND LEFT GREATER SAPHENOUS VEIN GRAFT PER ENDOSCOPIC VEIN HARVESTING, CLOSURE OF PFO, EXCLUSION OF LEFT ATRIAL APPENDAGE AND PRP;  Surgeon: Jr Chago Simon MD;  Location: [facility];  Service: Cardiothoracic;  Laterality: N/A;    EYE SURGERY  2016    Cataracts    KNEE ARTHROSCOPY W/ ACL RECONSTRUCTION Right 2019    LUMBAR FUSION Bilateral 10/05/2022    Procedure: DAY 2 LUMBAR LAMINECTOMY TRANSFORAMINAL LUMBAR INTERBODY FUSION L2,L3,L4 WITH NEURO ROBOT;  Surgeon: John Do MD;  Location: [facility];   Service: Robotics - Neuro;  Laterality: Bilateral;    LUMBAR FUSION N/A 10/04/2022    Procedure: DAY 1 LUMBAR LATERAL INTERBODY FUSION WITH NEURO ROBOT L2, L3.L4;  Surgeon: John Do MD;  Location: [facility] MAIN OR;  Service: Robotics - Neuro;  Laterality: N/A;  left side approach    MOUTH SURGERY      SPINE SURGERY  10/4&2022    Dr. John Do, [facility]       Family History   Problem Relation Age of Onset    Heart disease Mother     Hypertension Mother     Breast cancer Mother     Stroke Mother         Several TIAs    Alcohol abuse Mother     Thyroid disease Mother     Arthritis Mother     Cancer Mother         Breast    Aortic aneurysm Father     Heart attack Father     Liver cancer Father     Cancer Father         Liver    Heart disease Father         Heart attack    Early death Brother         Coronary thrombosis @ 48 years while mountain climbing.    Heart attack Brother         , at 47 years, fatal       Social History     Tobacco Use    Smoking status: Former     Current packs/day: 0.00     Average packs/day: 0.5 packs/day for 26.0 years (13.0 ttl pk-yrs)     Types: Cigarettes     Start date: 1966     Quit date: 1992     Years since quittin.5     Passive exposure: Past    Smokeless tobacco: Never   Vaping Use    Vaping status: Never Used   Substance Use Topics    Alcohol use: Yes     Alcohol/week: 16.0 standard drinks of alcohol     Types: 14 Glasses of wine, 2 Cans of beer per week    Drug use: Never     Comment: CBD gummies- stop now for surgery         ECG 12 Lead    Date/Time: 2025 3:59 PM  Performed by: Jennifer Cox APRN    Authorized by: Jennifer Cox APRN  Comparison: compared with previous ECG from 7/10/2025  Similar to previous ECG  Rhythm: sinus rhythm  Conduction: 1st degree AV block  T inversion: III, II, aVF, V5 and V6             Objective:     Visit Vitals  /70 (BP Location: Right arm, Patient Position: Sitting, Cuff Size: Adult)  "  Pulse 54   Ht 167.6 cm (66\")   Wt 79.8 kg (176 lb)   BMI 28.41 kg/m²             Physical Exam  Constitutional:       Appearance: Normal appearance. He is normal weight.   HENT:      Head: Normocephalic.   Neck:      Vascular: No carotid bruit.   Cardiovascular:      Rate and Rhythm: Normal rate and regular rhythm.      Chest Wall: PMI is not displaced.      Pulses: Normal pulses.           Radial pulses are 2+ on the right side and 2+ on the left side.        Posterior tibial pulses are 2+ on the right side and 2+ on the left side.      Heart sounds: Normal heart sounds. No murmur heard.     No friction rub. No gallop.   Pulmonary:      Effort: Pulmonary effort is normal.      Breath sounds: Normal breath sounds.   Chest:       Abdominal:      General: Bowel sounds are normal. There is no distension.      Palpations: Abdomen is soft.   Musculoskeletal:      Right lower leg: No edema.      Left lower leg: No edema.   Skin:     General: Skin is warm and dry.      Capillary Refill: Capillary refill takes less than 2 seconds.          Neurological:      Mental Status: He is alert and oriented to person, place, and time.   Psychiatric:         Mood and Affect: Mood normal.         Behavior: Behavior normal.         Thought Content: Thought content normal.          Lab Review:   Lipid Panel          4/22/2025    12:18 6/19/2025    04:06   Lipid Panel   Total Cholesterol 253  227    Triglycerides 136  189    HDL Cholesterol 95  59    VLDL Cholesterol 23  33    LDL Cholesterol  135  135    LDL/HDL Ratio 1.38  2.21          Cardiac Procedures:       Assessment:         There are no diagnoses linked to this encounter.          Plan:       CAD: multivessel CAD s/p CABG x 5 with Dr. Simon. He is recovering well from this and is eager to start cardiac rehab. I believe he is good to go to cardiac rehab now and he plans to reach back out to them. No anginal symptoms reported. EKG is stable. Continue current medical " management.  Pulmonary embolism/DVT: with evidence of RV strain. S/p thrombectomy. On Coumadin, managed by AC clinic. INR stable. He needs to get back in with his AC clinic.   Pulmonary HTN:  Remained elevated despite thrombectomy which leads us to believe this is likely chronic. RHC showed normal to mildly elevated pulmonary pressures  MAC: continue statin therapy  HTN: blood pressure stable at home. No changes.  ELINOR: on CPAP at home  Paralyzed hemidiaphragm    Thank you for allowing me to participate in this patient's care. Please call with any questions or concerns. Follow up with Dr. Hdez in 3 months.          Your medication list            Accurate as of July 16, 2025  1:06 PM. If you have any questions, ask your nurse or doctor.                PAUSE taking these medications        Instructions Last Dose Given Next Dose Due   losartan-hydrochlorothiazide 50-12.5 MG per tablet  Wait to take this until your doctor or other care provider tells you to start again.  Hold until instructed to resume by your physician  Commonly known as: Hyzaar      Take 1 tablet by mouth Daily.              CONTINUE taking these medications        Instructions Last Dose Given Next Dose Due   acetaminophen 500 MG tablet  Commonly known as: TYLENOL      Take 1 tablet by mouth Every 6 (Six) Hours As Needed for Mild Pain.       Acetylcysteine capsule capsule      Take 1 capsule by mouth Daily.       albuterol sulfate  (90 Base) MCG/ACT inhaler  Commonly known as: PROVENTIL HFA;VENTOLIN HFA;PROAIR HFA      Inhale 2 puffs Every 4 (Four) Hours As Needed for Wheezing or Shortness of Air.       aspirin 81 MG EC tablet      Take 1 tablet by mouth Daily.       budesonide-formoterol 160-4.5 MCG/ACT inhaler  Commonly known as: SYMBICORT      Inhale 2 puffs 2 (Two) Times a Day.       calcium 500 mg vitamin D 5 mcg (200 UT) 500-5 MG-MCG tablet per tablet      TAKE TWO TABLETS BY MOUTH DAILY       clonazePAM 0.5 MG tablet  Commonly known as:  KlonoPIN      Take 1 tablet by mouth 2 (Two) Times a Day As Needed for Anxiety. for anxiety       coenzyme Q10 100 MG capsule      Take 1 capsule by mouth Daily.       cyclobenzaprine 10 MG tablet  Commonly known as: FLEXERIL      Take 1 tablet by mouth 3 (Three) Times a Day As Needed for Muscle Spasms.       EMERGEN-C BLUE PO      Take 1 tablet by mouth Daily. LD 9-27       gabapentin 400 MG capsule  Commonly known as: NEURONTIN      Take 1 capsule by mouth Every 8 (Eight) Hours for 7 days.       L-Arginine 500 MG capsule      Take 1 capsule by mouth Daily.       metoprolol tartrate 25 MG tablet  Commonly known as: LOPRESSOR      Take 1 tablet by mouth Every 12 (Twelve) Hours.       montelukast 10 MG tablet  Commonly known as: SINGULAIR      TAKE 1 TABLET BY MOUTH ONCE DAILY       naloxone 4 MG/0.1ML nasal spray  Commonly known as: NARCAN      Call 911. Don't prime. [place] in 1 nostril for overdose. Repeat in 2-3 minutes in other nostril if no or minimal breathing/responsiveness.       YURY-e 400 MG tablet      Take 400 mg by mouth Daily.       sildenafil 20 MG tablet  Commonly known as: REVATIO      TAKE 1 TO 2 TABLETS BY MOUTH AS NEEDED FOR ERICTILE DYSFUNCTION       theophylline 300 MG 12 hr tablet  Commonly known as: THEODUR      Take 1 tablet by mouth Daily.       traZODone 50 MG tablet  Commonly known as: DESYREL      Take 1-2 tablets by mouth Every Night.       triamcinolone 0.025 % cream  Commonly known as: KENALOG      Apply 1 Application topically to the appropriate area as directed 2 (Two) Times a Day.       vitamin b complex capsule capsule      Take 1 capsule by mouth Daily. LD 9-27       warfarin 5 MG tablet  Commonly known as: COUMADIN      Take 1 tablet by mouth Daily.                  Jennifer Cox, EMILY  07/16/25  2:41 PM EST

## 2025-07-17 ENCOUNTER — OFFICE VISIT (OUTPATIENT)
Dept: CARDIAC REHAB | Facility: HOSPITAL | Age: 80
End: 2025-07-17
Payer: MEDICARE

## 2025-07-17 DIAGNOSIS — Z95.1 S/P CABG X 5: Primary | ICD-10-CM

## 2025-07-17 PROCEDURE — 93798 PHYS/QHP OP CAR RHAB W/ECG: CPT

## 2025-07-17 NOTE — PROGRESS NOTES
Initial assessment cardiac rehab. Oriented to department, equipment. Pre-outcome measurement tools explained and completed per patient. Six minute walk without difficulty. Goals obtained. Placed on schedule. See scanned session report. RADHA Nolasco

## 2025-07-18 DIAGNOSIS — G89.29 CHRONIC LOW BACK PAIN, UNSPECIFIED BACK PAIN LATERALITY, UNSPECIFIED WHETHER SCIATICA PRESENT: ICD-10-CM

## 2025-07-18 DIAGNOSIS — M54.50 CHRONIC LOW BACK PAIN, UNSPECIFIED BACK PAIN LATERALITY, UNSPECIFIED WHETHER SCIATICA PRESENT: ICD-10-CM

## 2025-07-18 DIAGNOSIS — M15.9 OSTEOARTHRITIS OF MULTIPLE JOINTS, UNSPECIFIED OSTEOARTHRITIS TYPE: ICD-10-CM

## 2025-07-18 RX ORDER — CYCLOBENZAPRINE HCL 10 MG
10 TABLET ORAL 3 TIMES DAILY PRN
Qty: 60 TABLET | Refills: 0 | Status: SHIPPED | OUTPATIENT
Start: 2025-07-18

## 2025-07-21 ENCOUNTER — APPOINTMENT (OUTPATIENT)
Dept: CARDIAC REHAB | Facility: HOSPITAL | Age: 80
End: 2025-07-21
Payer: MEDICARE

## 2025-07-22 ENCOUNTER — TREATMENT (OUTPATIENT)
Dept: CARDIAC REHAB | Facility: HOSPITAL | Age: 80
End: 2025-07-22
Payer: MEDICARE

## 2025-07-22 ENCOUNTER — ANTICOAGULATION VISIT (OUTPATIENT)
Dept: PHARMACY | Facility: HOSPITAL | Age: 80
End: 2025-07-22
Payer: MEDICARE

## 2025-07-22 ENCOUNTER — LAB (OUTPATIENT)
Dept: LAB | Facility: HOSPITAL | Age: 80
End: 2025-07-22
Payer: MEDICARE

## 2025-07-22 DIAGNOSIS — I26.02 ACUTE SADDLE PULMONARY EMBOLISM WITH ACUTE COR PULMONALE: ICD-10-CM

## 2025-07-22 DIAGNOSIS — Z86.711 HISTORY OF PULMONARY EMBOLISM: Primary | ICD-10-CM

## 2025-07-22 DIAGNOSIS — Z95.1 S/P CABG X 5: Primary | ICD-10-CM

## 2025-07-22 LAB — INR PPP: 1.6 (ref 0.8–1.2)

## 2025-07-22 PROCEDURE — 93798 PHYS/QHP OP CAR RHAB W/ECG: CPT

## 2025-07-22 PROCEDURE — 85610 PROTHROMBIN TIME: CPT

## 2025-07-22 RX ORDER — WARFARIN SODIUM 5 MG/1
5 TABLET ORAL
Qty: 30 TABLET | Refills: 1 | Status: SHIPPED | OUTPATIENT
Start: 2025-07-22

## 2025-07-22 NOTE — PROGRESS NOTES
Anticoagulation Clinic Progress Note    Patient's visit was held in office today.    Anticoagulation Summary  As of 2025      INR goal:  2.0-3.0   TTR:  34.2% (5.4 mo)   INR used for dosin.60 (2025)   Warfarin maintenance plan:  5 mg (5 mg x 1) every day   Weekly warfarin total:  35 mg   Plan last modified:  Ramona Sauceda RPH (2025)   Next INR check:  2025   Target end date:  --    Indications    History of pulmonary embolism [Z86.711]                 Anticoagulation Episode Summary       INR check location:  --    Preferred lab:  --    Send INR reminders to:  [facility] CBC ANTICOAG POOL    Comments:  --          Anticoagulation Care Providers       Provider Role Specialty Phone number    Jennifer Cox APRN Referring Nurse Practitioner [phone]            Clinic Interview:  Patient Findings     Positives:  Missed doses        INR History:      Latest Ref Rng & Units 2025    12:00 AM 2025     2:57 PM 7/10/2025     3:44 PM 7/10/2025     8:01 PM 2025     5:39 AM 2025     1:42 PM 2025     1:45 PM   Anticoagulation Monitoring   INR   2.20     1.60   INR Date   2025   INR Goal   2.0-3.0     2.0-3.0   Trend   Down     Same   Last Week Total   45 mg     35 mg   Next Week Total   35 mg     37.5 mg   Sun   5 mg     5 mg   Mon   5 mg     5 mg   Tue   5 mg     7.5 mg ()   Wed   5 mg     5 mg   Thu   5 mg     5 mg   Fri   5 mg     5 mg   Sat   5 mg     5 mg   Historical INR 0.80 - 1.20 2.20      2.33  2.31  2.23  1.60     Visit Report    Report    Report Report    Report          This result is from an external source.       Plan:  1. INR is Subtherapeutic today- see above in Anticoagulation Summary. Patient thinks he likely missed a dose.   Will instruct Omar Choudhary to Change their warfarin regimen- see above in Anticoagulation Summary. Boost to 7.5 mg, then   2. Follow up in 1 week  3. Patient desires warfarin refills.  4. Verbal and written  information provided. Patient expresses understanding and has no further questions at this time.    Ramona Sauceda, [facility]

## 2025-07-23 ENCOUNTER — APPOINTMENT (OUTPATIENT)
Dept: CARDIAC REHAB | Facility: HOSPITAL | Age: 80
End: 2025-07-23
Payer: MEDICARE

## 2025-07-23 ENCOUNTER — OFFICE VISIT (OUTPATIENT)
Dept: FAMILY MEDICINE CLINIC | Facility: CLINIC | Age: 80
End: 2025-07-23
Payer: MEDICARE

## 2025-07-23 VITALS
BODY MASS INDEX: 28.51 KG/M2 | OXYGEN SATURATION: 96 % | HEIGHT: 66 IN | DIASTOLIC BLOOD PRESSURE: 88 MMHG | WEIGHT: 177.4 LBS | SYSTOLIC BLOOD PRESSURE: 142 MMHG | RESPIRATION RATE: 18 BRPM | HEART RATE: 75 BPM

## 2025-07-23 DIAGNOSIS — I10 PRIMARY HYPERTENSION: ICD-10-CM

## 2025-07-23 DIAGNOSIS — Z86.711 HISTORY OF PULMONARY EMBOLISM: ICD-10-CM

## 2025-07-23 DIAGNOSIS — I25.10 CORONARY ARTERY DISEASE INVOLVING NATIVE CORONARY ARTERY OF NATIVE HEART WITHOUT ANGINA PECTORIS: Primary | ICD-10-CM

## 2025-07-23 RX ORDER — LOSARTAN POTASSIUM 25 MG/1
25 TABLET ORAL DAILY
Qty: 90 TABLET | Refills: 1 | Status: SHIPPED | OUTPATIENT
Start: 2025-07-23

## 2025-07-23 NOTE — PROGRESS NOTES
Transitional Care Follow Up Visit  Subjective     Omar Choudhary is a 79 y.o. male who presents for a transitional care management visit.    Within 48 business hours after discharge our office contacted him via telephone to coordinate his care and needs.      I reviewed and discussed the details of that call along with the discharge summary, hospital problems, inpatient lab results, inpatient diagnostic studies, and consultation reports with Omar.     Current outpatient and discharge medications have been reconciled for the patient.  Reviewed by: Dennis Kwong MD        7/11/2025     4:50 PM   Date of TCM Phone Call   [facility]   Date of Admission 7/10/2025   Date of Discharge 7/11/2025   Discharge Disposition Home-Health Care [facility] [place]     Risk for Readmission (LACE) Score: 4 (7/11/2025  6:00 AM)    History of Present Illness   Course During Hospital Stay: Hospital records reviewed    Patient admitted 6/18/25 to 6/25/25. He initially presented for worsening SANCHEZ. He underwent RHC and LHC due to hx of recurrent PE w/ elevated pulmonary pressures. However, RHC revealed normalized pulmonary pressures. LHC revealed severe multivessel disease w/ distal L main stenosis. He subsequently underwent CABG x5 on 6/20/25 w/ LIMA to LAD, saphenous graft to ramus intermedius, obtuse marginal, RV branch and posterior descending branch as well as L atrial appendage and inferior PFO closure. Patient would improve post-operatively and was discharged home on 6/25/25 w/ home health.      Patient returned to [facility] on 7/10/25 w/ near syncope and hypotension. He had been seen at neurosurgery office and found to be hypotensive. He was subsequently seen by SARITA Hernandez) who directed him to ER for admission. He was found to have hypotension and KIRAN, felt to be 2/2 over-diuresis.  His diuretics and ARB were held. KIRAN and BP improved and he was discharged home on 7/11/25    CAD: s/p 5V CABG 6/2025. He is currently  "attending cardiac rehab 2 days/week. Maintained on ASA 81mg daily, metoprolol 25 BID. No statin 2/2 intolerance. Reports he only gets CP w/ deep breathing. No CP w/ exertion. SOB improving. Follows w/ CARDS- Ketty    HTN: 142/88 today. Maintained on metoprolol 25 BID. No LH/dizziness, CP or SOB.     PE: dx 1/2025 w/ severe pulm HTN. S/p thrombectomy by Ketty at that time. 6/2025 RHC w/ normalized pulmonary pressures. 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 Maintained on warfarin 5mg daily     The following portions of the patient's history were reviewed and updated as appropriate: allergies, current medications, past family history, past medical history, past social history, past surgical history, and problem list.    Review of Systems    Objective   /88   Pulse 75   Resp 18   Ht 167.6 cm (66\")   Wt 80.5 kg (177 lb 6.4 oz)   SpO2 96%   BMI 28.63 kg/m²   Physical Exam  Constitutional:       General: He is not in acute distress.     Appearance: He is obese.   HENT:      Head: Normocephalic and atraumatic.      Right Ear: External ear normal.      Left Ear: External ear normal.      Nose: Nose normal.   Eyes:      General: No scleral icterus.        Right eye: No discharge.         Left eye: No discharge.      Extraocular Movements: Extraocular movements intact.      Conjunctiva/sclera: Conjunctivae normal.   Cardiovascular:      Rate and Rhythm: Normal rate and regular rhythm.      Heart sounds: Normal heart sounds. No murmur heard.  Pulmonary:      Effort: Pulmonary effort is normal. No respiratory distress.      Breath sounds: Normal breath sounds. No rales.   Abdominal:      General: Bowel sounds are normal. There is no distension.      Palpations: Abdomen is soft.      Tenderness: There is no abdominal tenderness.   Musculoskeletal:         General: No deformity. Normal range of motion.      Cervical back: Normal range of motion.   Skin:     " General: Skin is warm and dry.      Findings: No rash.   Neurological:      General: No focal deficit present.      Mental Status: He is alert. Mental status is at baseline.   Psychiatric:         Mood and Affect: Mood normal.         Behavior: Behavior normal.     Assessment & Plan   Diagnoses and all orders for this visit:    1. Coronary artery disease involving native coronary artery of native heart without angina pectoris (Primary): s/p 5V CABG 6/2025  - Cont home ASA 81mg daily and metoprolol 25 BID  - Cont CARDS f/u- [place]  - Recommend Repatha   -- Pt would like to try red yeast rice first and repeat labs    2. Primary hypertension: 142/88 today  -     Start losartan (Cozaar) 25 MG tablet; Take 1 tablet by mouth Daily.  Dispense: 90 tablet; Refill: 1  - Cont home metoprolol 25 BID    3. History of pulmonary embolism: dx 1/2025. No further pulmonary HTN per 6/2025 RHC. Indefinite anticoagulation given work-up +C&S deficiency   - Cont warfarin 5mg daily    Discussed Repatha  Would like to try red yeast rice first

## 2025-07-23 NOTE — PROGRESS NOTES
Chief Complaint   Patient presents with    Hospital Follow Up Visit     [facility]  Omar Choudhary is a 79 y.o. male that presents for   Chief Complaint   Patient presents with    Hospital Follow Up Visit     Review of Systems  Pertinent positives of ROS documented in [facility]    The following portions of the patient's history were reviewed and updated as appropriate: problem list, past medical history, past surgical history, allergies, current medications, past social history and past family history.    Problem List Tab  Patient History Tab  Immunizations Tab  Medications Tab  Chart Review Tab  Care Everywhere Tab  Synopsis Tab    PE  There were no vitals filed for this visit.  There is no height or weight on file to calculate BMI.  General: Well nourished, NAD  Head: AT/NC  Eyes: EOMI, anicteric sclera  ENT: MMM w/o erythema. TM clear bilaterally  Neck: Supple, no thyromegaly or LAD. No carotid bruit  Resp: CTAB, SCR, BS equal  CV: RRR w/o m/r/g; 2+ pulses  GI: Soft, NT/ND, +BS  MSK: FROM, no deformity, no edema  Skin: Warm, dry, intact  Neuro: Alert and oriented. No focal deficits  Psych: Appropriate mood and affect    Imaging  XR Chest 1 View  Result Date: 6/24/2025  As described.  This report was finalized on 6/24/2025 4:40 PM by Dr. Beka Bowden M.D on Workstation: [employer] ([place])      XR Chest PA & Lateral  Result Date: 6/23/2025  New lucencies under the right greater than left hemidiaphragms. Lucency under the right diaphragm and tracks posteriorly on lateral projection. Lucency under the left hemidiaphragm appears separate from the gastric bubble. Lucencies are very concerning for free intraperitoneal air. Recommend further evaluation with a CT abdomen pelvis with IV contrast.  Interval removal of left-sided chest tube. Postsurgical changes of CABG and left atrial appendage clipping. Stable normal size cardiomediastinal silhouette. Stable left hemidiaphragm elevation. No focal consolidation. Small bilateral pleural  effusions. No measurable pneumothorax. Partially visualized lumbar spine postsurgical changes.   Above findings and recommendations were discussed with Trena DIAZ at the time of dictation.    This report was finalized on 6/23/2025 8:18 AM by Dr. Andres Paula M.D on Workstation: [id number]      XR Chest 1 View  Result Date: 6/22/2025   1. Interval removal of left chest tube. No pneumothorax. 2. Stable ill-defined left lung base opacity.  This report was finalized on 6/22/2025 3:06 PM by Dr. Gil Jackson M.D on Workstation: [id number]      XR Chest 1 View  Result Date: 6/21/2025  Persistent vascular congestion and bibasilar atelectasis.  This report was finalized on 6/21/2025 5:01 AM by Dr. Riana Bright M.D on Workstation: [id number]      XR Chest 1 View  Result Date: 6/20/2025  Postsurgical changes.  This report was finalized on 6/20/2025 12:59 PM by Dr. Beka Bowden M.D on Workstation: [id number]      XR Chest PA & Lateral  Result Date: 6/18/2025   Negative chest, no acute abnormality.  This report was finalized on 6/18/2025 10:00 PM by Dr. Esdras Ruelas M.D on Workstation: [id number]      XR Spine Lumbar Complete With Flex & Ext  Result Date: 6/18/2025  Impression: 1. Negative for acute osseous abnormality. 2. Postoperative changes of lumbar fixation at L2-L4. 3. Anterolisthesis of L5 on S1 measuring 10 mm not significantly changed with flexion/extension. 4. Advanced lumbar spondylosis above. Electronically Signed: Javier Bourgeois MD  6/18/2025 1:33 PM EDT  Workstation ID: [id number]    Cardiac Catheterization/Vascular Study  Addendum Date: 6/18/2025  CONCLUSION: 1.  Severe multivessel coronary artery disease: *50-60% distal left main stenosis *70% severely calcified ostial LAD stenosis *95% ostial ramus intermedius stenosis *95% proximal and serial distal left circumflex artery stenosis *100% chronic total occlusion of the mid right coronary artery with left-to-right collateral filling 2.   Normal to mildly elevated pulmonary pressures with a mean pulmonary pressure of 20 mmHg 3.  Normal left atrial filling pressure with a pulmonary wedge pressure of 10 mmHg RECOMMENDATIONS: Consult cardiothoracic surgery regarding CABG. will keep the patient as an inpatient.  Consult pulmonary for preop evaluation before surgery.  Start aspirin.  Hold off on statin since he reports statin intolerance. FINDINGS: RIGHT HEART HEMODYNAMICS: 1.  Pulmonary wedge pressure: 15/14, 10 2.  Pulmonary artery pressure: 36/11, 20 3.  Right ventricular pressure: 29/80, 7 4.  Right atrial pressure: 10/8, 7 5.  Pulmonary artery saturation: 63% on room air 6.  Right radial artery saturation: 96% on room air 7.  Kimberly calculated cardiac output 4.18 L/min, cardiac index 2.21 L/min/m² SELECTIVE CORONARY ANGIOGRAMS: 1.  Left main artery: Large-caliber vessel with severe calcification especially the distal vessel  with an associated 50 to 60% stenosis.  The vessel trifurcates into left anterior descending, ramus intermedius, and left circumflex arteries. 2.  Left anterior descending artery: Small caliber vessel with.  The ostial vessel is difficult to visualize but there appears to be at least 70% severely calcified ostial stenosis.  Remainder of the proximal vessel has about 30% stenosis.  Gives rise to a small caliber first diagonal branch with no significant disease.  Mid LAD remains a small caliber giving rise to small caliber 2nd and 3rd diagonal branches with no significant stenosis.  Distal LAD tapers to a small caliber with no significant disease. 3.  Ramus intermedius: Small caliber vessel with 95% ostial stenosis. 4.  Left circumflex artery: Small caliber vessel with no apparent ostial disease.  Proximal vessel has 95% stenosis.  The mid vessel remains a small caliber vessel giving rise to a small caliber first obtuse marginal branch with no significant disease.  The distal circumflex has serial 90 to 95% stenosis before giving  rise to a small caliber second obtuse marginal branch with no significant disease. 5.  Right coronary artery: Small caliber vessel with 100% chronic total occlusion of the mid vessel.  There is right to right collateral filling via bridging collaterals as well as left-to-right collateral filling of the distal vessel. PROCEDURE: After informed consent was obtained the patient brought to the cardiac catheterization laboratory where his right arm was prepped and draped in a sterile manner.  1 mL of 2% lidocaine was infused subcutaneously to the right antecubital region.  The previously placed peripheral IV was exchanged over wire for 5 Iraqi glide sheath. Right heart catheterization was performed using a 5 Iraqi balloontipped tip Austin-Malachi catheter.  Arterial samples drawn for the pulmonary artery for saturation measurement.  Following completion of the right heart catheterization the Austin-Malachi catheter was removed. Turned my attention next to the right radial artery access.  1 mL of 2% lidocaine was infused subcutaneously into the right wrist.  Access to the right radial artery is obtained using a micropuncture needle followed by placement of a 5/6 Iraqi slender glide sheath.  Arterial samples drawn from the radial artery for saturation measurement. Proceeded next with selective coronary angiograms.  The patient had severe innominate artery tortuosity and required using a glide wire in order to advance into the ascending aorta.  Selective coronary angiograms were then performed using a 5 Iraqi FL 3.5 and a 5 Iraqi FR4 diagnostic catheters.  I attempted to perform a left heart catheterization using a 5 Iraqi radial pigtail catheter.  Attempts were difficult due to the innominate artery tortuosity.  In my attempts I unfortunately both kinked the distal catheter and managed to wrap the pigtail catheter around itself.  I attempted to straighten out the wire using the J-wire both the front and back and but was  unsuccessful in straightening out the catheter.  I then used a Glidewire again both the front and back and but again was unsuccessful but did manage to poke the wire through either a sidehole or by creating a new the puncture.  I then attempted using a BMW wire and a mailman wire which were both unsuccessful in getting past the kink.  I do not feel comfortable pulling back the pigtail catheter due to the inability to straighten it out.  At this point I felt my best option was to try to snare the end of the catheter via femoral approach to straighten out the catheter and remove it safely. The right groin which had already been thoroughly prepped and draped.  Ultrasound evaluation however revealed a plaque throughout the femoral artery without any area that I felt comfortable accessing.  Instead I had the left femoral groin prepped and draped.  Send evaluation also showed evidence of plaque within the femoral artery but there was an area was able to access.  10 mg of 2% lidocaine was infused subcutaneously into the left groin.  Access to the left common femoral artery was obtained using a micropuncture needle and under ultrasound guidance followed by placement of a 4 Congolese angiocatheter.  Selective angiograms performed showing access above the bifurcation and below the origin of the inferior epigastric artery.  The 4 Congolese angiocatheter was then exchanged for 7 Congolese sheath. I gave the patient additional unfractionated heparin to keep his ACT above 250.  I then advanced a 7 Congolese JR4 guiding catheter into the ascending aorta.  Through that I advanced the 7 Congolese Ensnare catheter.  After several attempts I was able to use the snare to capture the end of the pigtail catheter.  I was able then to pull on the snare in order to untangled the pigtail catheter.  Around the same time I advanced the J-wire which initially would not advance through the endhole of the pigtail catheter.  However after untangling the pigtail  catheter and with some difficulty I was able to advance the wire through the lumen of the catheter through the endhole.  With this I was able to straighten out the pigtail catheter and remove through the radial artery without any issues.  The snare catheter was then pulled out and replaced with a table wire.  The guiding catheter was then removed.  All the sheaths were aspirated and flushed.  The brachial sheath was removed, manual pressure held, and a pressure dressing placed.  The radial sheath was removed and a TR band placed.  The left femoral sheath was aspirated and flushed.  The patient was then transferred to the recovery area in stable condition.     CT Angiogram Chest  Result Date: 5/27/2025  1. Limited examination for pulmonary embolism in the left lower lobe due to poor contrast opacification. Otherwise no evidence of pulmonary embolism. 2. Cardiomegaly.    DEXA Bone Density Axial  Result Date: 5/19/2025  Normal Bone Mineral Density.   Copies of the computerized summary reports can be obtained from [employer] via the health information department of [facility].   5/19/2025 11:56 AM by John Bentley on Workstation: [id number]        Assessment & Plan   Omar Choudhary is a 79 y.o. male that presents for   Chief Complaint   Patient presents with    Hospital Follow Up Visit     There are no diagnoses linked to this encounter.           No follow-ups on file.

## 2025-07-28 ENCOUNTER — APPOINTMENT (OUTPATIENT)
Dept: CARDIAC REHAB | Facility: HOSPITAL | Age: 80
End: 2025-07-28
Payer: MEDICARE

## 2025-07-29 ENCOUNTER — ANTICOAGULATION VISIT (OUTPATIENT)
Dept: PHARMACY | Facility: HOSPITAL | Age: 80
End: 2025-07-29
Payer: MEDICARE

## 2025-07-29 ENCOUNTER — TREATMENT (OUTPATIENT)
Dept: CARDIAC REHAB | Facility: HOSPITAL | Age: 80
End: 2025-07-29
Payer: MEDICARE

## 2025-07-29 ENCOUNTER — LAB (OUTPATIENT)
Dept: LAB | Facility: HOSPITAL | Age: 80
End: 2025-07-29
Payer: MEDICARE

## 2025-07-29 DIAGNOSIS — I26.02 ACUTE SADDLE PULMONARY EMBOLISM WITH ACUTE COR PULMONALE: ICD-10-CM

## 2025-07-29 DIAGNOSIS — Z95.1 S/P CABG X 5: Primary | ICD-10-CM

## 2025-07-29 DIAGNOSIS — Z86.711 HISTORY OF PULMONARY EMBOLISM: Primary | ICD-10-CM

## 2025-07-29 LAB — INR PPP: 2.2 (ref 0.8–1.2)

## 2025-07-29 PROCEDURE — 85610 PROTHROMBIN TIME: CPT

## 2025-07-29 PROCEDURE — 93798 PHYS/QHP OP CAR RHAB W/ECG: CPT

## 2025-07-29 NOTE — PROGRESS NOTES
Anticoagulation Clinic Progress Note    Patient's visit was held in office today.    Anticoagulation Summary  As of 7/29/2025      INR goal:  2.0-3.0   TTR:  34.1% (5.6 mo)   INR used for dosing:  --   Warfarin maintenance plan:  5 mg (5 mg x 1) every day   Weekly warfarin total:  35 mg   Plan last modified:  Ramona Sauceda RPH (7/1/2025)   Next INR check:  --   Target end date:  --    Indications    History of pulmonary embolism [Z86.711]                 Anticoagulation Episode Summary       INR check location:  --    Preferred lab:  --    Send INR reminders to:  [facility] ONC CBC ANTICOAG POOL    Comments:  --          Anticoagulation Care Providers       Provider Role Specialty Phone number    KennyJennifer mckeon APRN Referring Nurse Practitioner [phone]            Clinic Interview:      INR History:      Latest Ref Rng & Units 7/10/2025     3:44 PM 7/10/2025     8:01 PM 7/11/2025     5:39 AM 7/22/2025     1:42 PM 7/22/2025     1:45 PM 7/29/2025    10:00 AM 7/29/2025    10:09 AM   Anticoagulation Monitoring   INR      1.60 --    INR Date      7/22/2025     INR Goal      2.0-3.0 2.0-3.0    Trend      Same Same    Last Week Total      35 mg 37.5 mg    Next Week Total      37.5 mg 37.5 mg    Sun      5 mg 5 mg    Mon      5 mg 5 mg    Tue      7.5 mg (7/22) 7.5 mg (7/29)    Wed      5 mg 5 mg    Thu      5 mg 5 mg    Fri      5 mg 5 mg    Sat      5 mg 5 mg    Historical INR 0.80 - 1.20 2.33  2.31  2.23  1.60    2.20    Visit Report  Report    Report Report    Report            Plan:  1. INR is Therapeutic today- see above in Anticoagulation Summary.  Will instruct Omar PERRIN Kenrick to Continue their warfarin regimen- see above in Anticoagulation Summary.  2. Follow up in 1 week  3. Patient declines warfarin refills.  4. Verbal and written information provided. Patient expresses understanding and has no further questions at this time.    Ramona Sauceda RPH

## 2025-07-30 ENCOUNTER — APPOINTMENT (OUTPATIENT)
Dept: CARDIAC REHAB | Facility: HOSPITAL | Age: 80
End: 2025-07-30
Payer: MEDICARE

## 2025-07-30 ENCOUNTER — OFFICE VISIT (OUTPATIENT)
Dept: CARDIAC SURGERY | Facility: CLINIC | Age: 80
End: 2025-07-30
Payer: MEDICARE

## 2025-07-30 VITALS
HEART RATE: 59 BPM | SYSTOLIC BLOOD PRESSURE: 105 MMHG | DIASTOLIC BLOOD PRESSURE: 68 MMHG | WEIGHT: 175 LBS | OXYGEN SATURATION: 98 % | RESPIRATION RATE: 18 BRPM | TEMPERATURE: 97.1 F | HEIGHT: 66 IN | BODY MASS INDEX: 28.12 KG/M2

## 2025-07-30 DIAGNOSIS — Z95.1 S/P CABG X 5: Primary | ICD-10-CM

## 2025-07-30 NOTE — PROGRESS NOTES
CARDIOVASCULAR SURGERY FOLLOW-UP PROGRESS NOTE  Chief Complaint: Post-op follow-up appointment     HPI:   Dear Dr. Kwong and Colleagues:    It was my pleasure to see your patient Omar Choudhary in the office today.  As you know, he is a very pleasant 80 y.o. male with a past medical history of DVT, PE (on coumadin), arthritis, HTN, HLD, and CAD who underwent CABG x 5/PFO closure/MARLENE clip by Dr. Simon on 6/20/2025. He did well postoperatively and was discharged shortly after surgery. He comes in today for routine post-operative follow-up.  His activity level has been good.  His pain level has been mild, mainly left shoulder pain that is improving. He has had follow-up with his PCP and cardiology.       Medications:    Current Outpatient Medications:     acetaminophen (TYLENOL) 500 MG tablet, Take 1 tablet by mouth Every 6 (Six) Hours As Needed for Mild Pain., Disp: , Rfl:     Acetylcysteine capsule capsule, Take 1 capsule by mouth Daily., Disp: , Rfl:     albuterol sulfate  (90 Base) MCG/ACT inhaler, Inhale 2 puffs Every 4 (Four) Hours As Needed for Wheezing or Shortness of Air., Disp: 18 g, Rfl: 0    aspirin 81 MG EC tablet, Take 1 tablet by mouth Daily., Disp: 30 tablet, Rfl: 11    B Complex Vitamins (VITAMIN B COMPLEX) capsule capsule, Take 1 capsule by mouth Daily. LD 9-27, Disp: , Rfl:     budesonide-formoterol (SYMBICORT) 160-4.5 MCG/ACT inhaler, Inhale 2 puffs 2 (Two) Times a Day., Disp: 30.6 g, Rfl: 1    Calcium Carb-Cholecalciferol (Oyster Shell Calcium w/D) 500-5 MG-MCG tablet, TAKE TWO TABLETS BY MOUTH DAILY, Disp: 180 tablet, Rfl: 0    clonazePAM (KlonoPIN) 0.5 MG tablet, Take 1 tablet by mouth 2 (Two) Times a Day As Needed for Anxiety. for anxiety, Disp: 30 tablet, Rfl: 2    coenzyme Q10 100 MG capsule, Take 1 capsule by mouth Daily., Disp: , Rfl:     cyclobenzaprine (FLEXERIL) 10 MG tablet, TAKE ONE TABLET BY MOUTH THREE TIMES DAILY AS NEEDED FOR MUSCLE SPASMS, Disp: 60 tablet, Rfl: 0     "L-Arginine 500 MG capsule, Take 1 capsule by mouth Daily., Disp: , Rfl:     losartan (Cozaar) 25 MG tablet, Take 1 tablet by mouth Daily., Disp: 90 tablet, Rfl: 1    metoprolol tartrate (LOPRESSOR) 25 MG tablet, Take 1 tablet by mouth Every 12 (Twelve) Hours., Disp: 60 tablet, Rfl: 5    montelukast (SINGULAIR) 10 MG tablet, TAKE 1 TABLET BY MOUTH ONCE DAILY, Disp: 90 tablet, Rfl: 0    Multiple Vitamins-Minerals (EMERGEN-C BLUE PO), Take 1 tablet by mouth Daily. LD 9-27, Disp: , Rfl:     naloxone (NARCAN) 4 MG/0.1ML nasal spray, Call 911. Don't prime. [place] in 1 nostril for overdose. Repeat in 2-3 minutes in other nostril if no or minimal breathing/responsiveness., Disp: 2 each, Rfl: 0    S-Adenosylmethionine (YURY-e) 400 MG tablet, Take 400 mg by mouth Daily., Disp: , Rfl:     sildenafil (REVATIO) 20 MG tablet, TAKE 1 TO 2 TABLETS BY MOUTH AS NEEDED FOR ERICTILE DYSFUNCTION, Disp: 50 tablet, Rfl: 0    theophylline (THEODUR) 300 MG 12 hr tablet, Take 1 tablet by mouth Daily., Disp: 90 tablet, Rfl: 3    traZODone (DESYREL) 50 MG tablet, Take 1-2 tablets by mouth Every Night., Disp: 180 tablet, Rfl: 3    triamcinolone (KENALOG) 0.025 % cream, Apply 1 Application topically to the appropriate area as directed 2 (Two) Times a Day., Disp: , Rfl:     warfarin (COUMADIN) 5 MG tablet, Take 1 tablet by mouth Daily., Disp: 30 tablet, Rfl: 1    gabapentin (NEURONTIN) 400 MG capsule, Take 1 capsule by mouth Every 8 (Eight) Hours for 7 days., Disp: 21 capsule, Rfl: 0     Physical Exam:         /68 (BP Location: Left arm, Patient Position: Sitting, Cuff Size: Large Adult)   Pulse 59   Temp 97.1 °F (36.2 °C) (Skin)   Resp 18   Ht 167.6 cm (66\")   Wt 79.4 kg (175 lb)   SpO2 98%   BMI 28.25 kg/m²   Heart:  regular rate and rhythm, S1, S2 normal, no murmur, click, rub or gallop  Lungs:  clear to auscultation bilaterally  Extremities:  no edema, peripheral pulses 2+ and symmetric  Incision(s):  mid chest healing well, no " significant drainage, no dehiscence, no significant erythema; Sternum stable to palpation.    Assessment/Plan:     CABG x 5/PFO closure/MARLENE clip by Dr. Simon on 6/20/2025  No significant post-op complications    Keep incisions clean and dry  OK to drive if not taking narcotic pain medicine  OK to begin cardiac rehab  Follow-up as scheduled with cardiology  Follow-up as scheduled with PCP  Follow-up with CT surgery prn    Continue sternal precautions including no lifting more than 10 lbs until 6 weeks post-operatively and then no more than 50 lbs until 12 weeks post-operatively.   Avoid excessive jarring or twisting motions until 12 weeks from the date of surgery.     Overall, he is doing quite well. He has started cardiac rehab and is remaining active. His energy level is good. His incision is healing well with no scabbing. At this time I do not believe we need to see them for any further follow-up.  I have encouraged the patient to reach out to our office with any questions or concerns.     Thank you for allowing me to participate in the care of your patient. If you have any questions or concerns feel free to contact myself or Dr. Simon.    Regards,  EMILY Hayes

## 2025-08-01 ENCOUNTER — TREATMENT (OUTPATIENT)
Dept: CARDIAC REHAB | Facility: HOSPITAL | Age: 80
End: 2025-08-01
Payer: MEDICARE

## 2025-08-01 DIAGNOSIS — Z95.1 S/P CABG X 5: Primary | ICD-10-CM

## 2025-08-01 PROCEDURE — 93798 PHYS/QHP OP CAR RHAB W/ECG: CPT

## 2025-08-05 ENCOUNTER — TREATMENT (OUTPATIENT)
Dept: CARDIAC REHAB | Facility: HOSPITAL | Age: 80
End: 2025-08-05
Payer: MEDICARE

## 2025-08-05 ENCOUNTER — LAB (OUTPATIENT)
Dept: LAB | Facility: HOSPITAL | Age: 80
End: 2025-08-05
Payer: MEDICARE

## 2025-08-05 ENCOUNTER — ANTICOAGULATION VISIT (OUTPATIENT)
Dept: PHARMACY | Facility: HOSPITAL | Age: 80
End: 2025-08-05
Payer: MEDICARE

## 2025-08-05 DIAGNOSIS — Z95.1 S/P CABG X 5: Primary | ICD-10-CM

## 2025-08-05 DIAGNOSIS — I26.02 ACUTE SADDLE PULMONARY EMBOLISM WITH ACUTE COR PULMONALE: ICD-10-CM

## 2025-08-05 DIAGNOSIS — Z86.711 HISTORY OF PULMONARY EMBOLISM: Primary | ICD-10-CM

## 2025-08-05 LAB — INR PPP: 2.8 (ref 0.8–1.2)

## 2025-08-05 PROCEDURE — 85610 PROTHROMBIN TIME: CPT

## 2025-08-05 PROCEDURE — 93798 PHYS/QHP OP CAR RHAB W/ECG: CPT

## 2025-08-14 ENCOUNTER — TREATMENT (OUTPATIENT)
Dept: CARDIAC REHAB | Facility: HOSPITAL | Age: 80
End: 2025-08-14
Payer: MEDICARE

## 2025-08-14 DIAGNOSIS — Z95.1 S/P CABG X 5: Primary | ICD-10-CM

## 2025-08-14 PROCEDURE — 93798 PHYS/QHP OP CAR RHAB W/ECG: CPT

## 2025-08-14 RX ORDER — METOPROLOL TARTRATE 25 MG/1
25 TABLET, FILM COATED ORAL EVERY 12 HOURS SCHEDULED
Qty: 180 TABLET | Refills: 1 | Status: SHIPPED | OUTPATIENT
Start: 2025-08-14

## 2025-08-19 ENCOUNTER — LAB (OUTPATIENT)
Dept: LAB | Facility: HOSPITAL | Age: 80
End: 2025-08-19
Payer: MEDICARE

## 2025-08-19 ENCOUNTER — ANTICOAGULATION VISIT (OUTPATIENT)
Dept: PHARMACY | Facility: HOSPITAL | Age: 80
End: 2025-08-19
Payer: MEDICARE

## 2025-08-19 DIAGNOSIS — Z86.711 HISTORY OF PULMONARY EMBOLISM: Primary | ICD-10-CM

## 2025-08-19 DIAGNOSIS — I26.02 ACUTE SADDLE PULMONARY EMBOLISM WITH ACUTE COR PULMONALE: ICD-10-CM

## 2025-08-19 LAB — INR PPP: 3.1 (ref 0.8–1.2)

## 2025-08-19 PROCEDURE — 85610 PROTHROMBIN TIME: CPT

## 2025-08-19 RX ORDER — WARFARIN SODIUM 5 MG/1
5 TABLET ORAL
Qty: 30 TABLET | Refills: 1 | Status: SHIPPED | OUTPATIENT
Start: 2025-08-19

## 2025-08-29 ENCOUNTER — TREATMENT (OUTPATIENT)
Dept: CARDIAC REHAB | Facility: HOSPITAL | Age: 80
End: 2025-08-29
Payer: MEDICARE

## 2025-08-29 DIAGNOSIS — Z95.1 S/P CABG X 5: Primary | ICD-10-CM

## 2025-08-29 PROCEDURE — 93798 PHYS/QHP OP CAR RHAB W/ECG: CPT

## (undated) DEVICE — UNDRGLV SURG BIOGEL PIMICROINDICATOR SYNTH SZ7 LF STRL

## (undated) DEVICE — ABSORBABLE HEMOSTAT (OXIDIZED REGENERATED CELLULOSE)
Type: IMPLANTABLE DEVICE | Site: CHEST | Status: NON-FUNCTIONAL
Brand: SURGICEL
Removed: 2025-06-20

## (undated) DEVICE — TR BAND RADIAL ARTERY COMPRESSION DEVICE: Brand: TR BAND

## (undated) DEVICE — ELECTRD BLD EZ CLN MOD 4IN

## (undated) DEVICE — SYR LL TP 10ML STRL

## (undated) DEVICE — GW AMPLTZ SUPERSTIFF SHT/TPR STR .035IN 260CM

## (undated) DEVICE — SOLUTION,WATER,IRRIGATION,1000ML,STERILE: Brand: MEDLINE

## (undated) DEVICE — CLAMP INSERT: Brand: STEALTH® CLAMP INSERT

## (undated) DEVICE — DRAPE,LAPAROTOMY,PCH,STERILE: Brand: MEDLINE

## (undated) DEVICE — DRP C/ARMOR

## (undated) DEVICE — SPNG GZ WOVN 4X4IN 12PLY 10/BX STRL

## (undated) DEVICE — SPONGE,NEURO,1"X1",XR,STRL,LF,10/PK: Brand: MEDLINE

## (undated) DEVICE — UNDERGLV SURG BIOGEL INDICATOR LF PF 7.5

## (undated) DEVICE — SLV SCD CALF HEMOFORCE DVT THERP REPROC MD

## (undated) DEVICE — CONN REDUCING CANN/PUMP 1/2X3/8X3/8

## (undated) DEVICE — CATH VENT MIV RADL PIG ST TIP 5F 110CM

## (undated) DEVICE — INTRI24™ INTRODUCER SHEATH (SHEATH): Brand: INTRI24 INTRODUCER SHEATH

## (undated) DEVICE — Device

## (undated) DEVICE — DRSNG WND GEL FIBR OPTICELL AG PLS W/SLV LF 4X5IN  STRL

## (undated) DEVICE — BLAKE SILICONE DRAINS CARDIO CONNECTOR 2:1: Brand: BLAKE

## (undated) DEVICE — CATH DIAG IMPULSE FL3.5 5F 100CM

## (undated) DEVICE — SOL IRRIG NACL 1000ML

## (undated) DEVICE — GAUZE,SPONGE,FLUFF,6"X6.75",STRL,5/TRAY: Brand: MEDLINE

## (undated) DEVICE — SNAR VASC RETRV ENSNARE STD SYS 7F18X30MM 120CM

## (undated) DEVICE — 3.0MM PRECISION NEURO (MATCH HEAD)

## (undated) DEVICE — DRN WND EVAC BULB 100CC

## (undated) DEVICE — PK PERFUS CUST W/CARDIOPLEGIA

## (undated) DEVICE — SUT SILK 2/0 SH 30IN K833H

## (undated) DEVICE — GLV SURG BIOGEL M LTX PF 7 1/2

## (undated) DEVICE — DRSNG WND GZ PAD BORDERED 4X8IN STRL

## (undated) DEVICE — TRIEVER 20 CATHETER, 20 FR, 105 CM: Brand: TRIEVER 20 CURVE

## (undated) DEVICE — CVR PROB 96IN LF STRL

## (undated) DEVICE — EXOFIN PRECISION PEN HIGH VISCOSITY TOPICAL SKIN ADHESIVE: Brand: EXOFIN PRECISION PEN, 1G

## (undated) DEVICE — KWIRE MARS3VL THRD BLNT EA/2
Type: IMPLANTABLE DEVICE | Site: SPINE LUMBAR | Status: NON-FUNCTIONAL
Removed: 2022-10-04

## (undated) DEVICE — SOL ISO/ALC 70PCT 4OZ

## (undated) DEVICE — SYS PERFUS SEP PLATLT W TIPS CUST

## (undated) DEVICE — DRP OPMI 326071

## (undated) DEVICE — DRP SLUSH WARMR MACH CIR 44X44IN

## (undated) DEVICE — PINNACLE INTRODUCER SHEATH: Brand: PINNACLE

## (undated) DEVICE — DRN WND CH RND FUL/FLUT NO/TROC 3/8IN 28F

## (undated) DEVICE — ELECTRD BLD EZ CLN MOD 6.5IN

## (undated) DEVICE — GLV SURG SENSICARE SLT PF LF 7 STRL

## (undated) DEVICE — PENCL HND ROCKRSWTCH HOLSTR EZ CLEAN TP CRD 10FT

## (undated) DEVICE — SYS ILLUM MARS3V 1P/U

## (undated) DEVICE — GW HITORQUE/BAL MID/WT J W/HCOAT .014 3X190CM

## (undated) DEVICE — HEMOCONCENTRATOR PERFUS LPS06

## (undated) DEVICE — GLV SURG BIOGEL LTX PF 6 1/2

## (undated) DEVICE — DECANTER: Brand: UNBRANDED

## (undated) DEVICE — 32 FR RIGHT ANGLE – SOFT PVC CATHETER: Brand: PVC THORACIC CATHETERS

## (undated) DEVICE — ADHS SKIN PREMIERPRO EXOFIN TOPICAL HI/VISC .5ML

## (undated) DEVICE — OASIS DRAIN, SINGLE, INLINE & ATS COMPATIBLE: Brand: OASIS

## (undated) DEVICE — C-ARM: Brand: UNBRANDED

## (undated) DEVICE — CELLERATERX SURGICAL HYDROLYZED COLLAGEN 1G TYPE I BOVINE COLLAGEN FOR CHRONIC AND ACUTE WOUNDS, PARTIAL AND FULL THICKNESS WOUNDS, PRESSURE INJURIES I-IV, VENOUS STASIS ULCERS, ARTERIAL ULCERS, DIABETIC ULCERS, TRAUMATIC WOUNDS, FIRST AND SECOND DEGREE BURNS, SUPERFICIAL WOUNDS AND SURGICAL WOUNDS. STERILE UNTIL OPENED.  STORE AT ROOM TEMPERATURE. FOR USE ON MODERATELY TO HEAVILY EXUDATIVE WOUNDS. CLEANSE THE WOUND PER FACILITY PROTOCOL.  APPLY CELLERATERX POWDER OVER THE ENTIRE WOUND BED AND THE EDGES OF THE WOUND.  COVER WITH AN APPROPRIATE DRESSING.  REAPPLY 2-3 TIMES A WEEK, OR WITH EACH DRESSING CHANGE, TAKING CARE NOT TO DISRUPT WOUND SITE. HTTPS://SANARAMEDTECH.COM/SURGICAL/CELLERATERX-SURGICAL/: Brand: CELLERATERX SURGICAL

## (undated) DEVICE — COVADERM: Brand: DEROYAL

## (undated) DEVICE — Device: Brand: TRIEVER24

## (undated) DEVICE — SUT VIC 0 CT1 CR8 18IN J840D

## (undated) DEVICE — SENSR CERBRL O2 PK/2

## (undated) DEVICE — SMOKE EVACUATION TUBING WITH 7/8 IN TO 1/4 IN REDUCER: Brand: BUFFALO FILTER

## (undated) DEVICE — PK ATS CUST W CARDIOTOMY RESEVOIR

## (undated) DEVICE — STERILE PACKAGE WITH CANNULA: Brand: LITE BIO DELIVERY SYSTEM

## (undated) DEVICE — UNDERGLV SURG BIOGEL INDICAT PI SZ8 BLU

## (undated) DEVICE — BIT DRL EXCELSIUS/GPS HI/SPD 3.5MM DISP

## (undated) DEVICE — DRP ARM EXCELSIUS/GPS DISP

## (undated) DEVICE — CORONARY ARTERY BYPASS GRAFT MARKERS, STAINLESS STEEL, DISTAL, WITHOUT HOLDER: Brand: ANASTOMARK CORONARY ARTERY BYPASS GRAFT MARKERS, STAINLESS STEEL, DISTAL

## (undated) DEVICE — SUT VIC 2/0 CT1 CR8 18IN J839D

## (undated) DEVICE — LOU OPEN HEART DR POLLOCK: Brand: MEDLINE INDUSTRIES, INC.

## (undated) DEVICE — LARGE BORE 60 CC SYRINGE: Brand: LARGE BORE 60 CC SYRINGE

## (undated) DEVICE — FLOWSAVER: Brand: FLOWSAVER

## (undated) DEVICE — 1LYRTR 16FR10ML100%SILTMPS SNP: Brand: MEDLINE INDUSTRIES, INC.

## (undated) DEVICE — INTENDED FOR TISSUE SEPARATION, AND OTHER PROCEDURES THAT REQUIRE A SHARP SURGICAL BLADE TO PUNCTURE OR CUT.: Brand: BARD-PARKER ® CARBON RIB-BACK BLADES

## (undated) DEVICE — BG TRANSF W/COUPLER SPK 600ML

## (undated) DEVICE — KT SPINE SURG TOP W/PRONEVIEW

## (undated) DEVICE — CATH IV INSYTE AUTOGARD 14G 1 1/2IN ORNG

## (undated) DEVICE — BLOWER/MISTER AXIOUS OPCAB W/TBG

## (undated) DEVICE — CATH DIAG IMPULSE FR4 5F 100CM

## (undated) DEVICE — PK CATH CARD 40

## (undated) DEVICE — CATH DIAG IMPULSE FR4 5F 125CM

## (undated) DEVICE — CONN TBG Y 6 IN 1 LF STRL

## (undated) DEVICE — LP VESL MAXI 2.5X1MM RED 2PK

## (undated) DEVICE — ENDOPATH 5MM ENDOSCOPIC BLUNT TIP DISSECTORS (12 POUCHES CONTAINING 3 DISSECTORS EACH): Brand: ENDOPATH

## (undated) DEVICE — GC 7F 078 JR 4: Brand: VISTA BRITE TIP

## (undated) DEVICE — HI-TORQUE SUPRA CORE .035 PERIPHERAL GUIDE WIRE .035 X 190 CM: Brand: HI-TORQUE SUPRA CORE

## (undated) DEVICE — GLIDESHEATH SLENDER STAINLESS STEEL KIT: Brand: GLIDESHEATH SLENDER

## (undated) DEVICE — CATH PULM WEDGE PRESS 7F

## (undated) DEVICE — CREO® THREADED 5.5 X 45MM POLYAXIAL SCREW
Type: IMPLANTABLE DEVICE | Site: SPINE LUMBAR | Status: NON-FUNCTIONAL
Brand: CREO
Removed: 2022-10-05

## (undated) DEVICE — KNIF BAYO ANNULOTOMY

## (undated) DEVICE — PK HEART OPN 40

## (undated) DEVICE — SUT VIC 2/0 CP2 CR8 18IN J762D

## (undated) DEVICE — KT SURG TURNOVER 050

## (undated) DEVICE — ROTATING SURGICAL PUNCHES, 1 PER POUCH: Brand: A&E MEDICAL / ROTATING SURGICAL PUNCHES

## (undated) DEVICE — DECANTER BAG 9": Brand: MEDLINE INDUSTRIES, INC.

## (undated) DEVICE — CANN VENI DLP SGL/STAGE DLP RT/ANGL EXT TP 24F

## (undated) DEVICE — 1010 S-DRAPE TOWEL DRAPE 10/BX: Brand: STERI-DRAPE™

## (undated) DEVICE — TBG INSUFFLATION LUER LOCK: Brand: MEDLINE INDUSTRIES, INC.

## (undated) DEVICE — GLV SURG BIOGEL LTX PF 7

## (undated) DEVICE — RADIFOCUS GLIDEWIRE: Brand: GLIDEWIRE

## (undated) DEVICE — SPNG LAP PREWSH SFTPK 18X18IN STRL PK/5

## (undated) DEVICE — THE MILL DISPOSABLE - FINE

## (undated) DEVICE — CANN VESL FREE FLO 2MM

## (undated) DEVICE — KT MANIFLD CARDIAC

## (undated) DEVICE — DIL COONS/TPR .038IN 22F 20CM

## (undated) DEVICE — CANN ART EOPA 3D NV W/CONN 20F

## (undated) DEVICE — TUBING, SUCTION, 1/4" X 12', STRAIGHT: Brand: MEDLINE

## (undated) DEVICE — DGW .035 FC J3MM 260CM TEF: Brand: EMERALD

## (undated) DEVICE — GLV SURG BIOGEL LTX PF 8

## (undated) DEVICE — PK BASIC SPINE 50

## (undated) DEVICE — THE SAPHENA MEDICAL ONEPASS ENDOSCOPIC VESSEL HARVESTING SYSTEM IS INDICATED FOR USE IN MINIMALLY INVASIVE SURGERY ALLOWING ACCESS FOR VESSEL HARVESTING, AND IS PRIMARILY INDICATED FOR PATIENTS UNDERGOING ENDOSCOPIC SURGERY FOR ARTERIAL BYPASS. IT IS INDICATED FOR CUTTING TISSUE AND CONTROLLING BLEEDING THROUGH COAGULATION, AND FOR PATIENTS REQUIRING BLUNT DISSECTION OF TISSUE INCLUDING DISSECTION OF BLOOD VESSELS, DISSECTION OF BLOOD VESSELS OF THE EXTREMITIES, DISSECTION OF DUCTS AND OTHER STRUCTURES IN THE EXTRA PERITONEALL OR SUBCUTANEOUS EXTREMITY AND THORACIC SPACE. EXTREMITY PROCEDURES INCLUDE TISSUE DISSECTION ALONG THE SAPHENOUS VEIN FOR USE IN CORONARY ARTERY BYPASS GRAFTING AND PERIPHERAL ARTERY BYPASS OR RADIAL ARTERY FOR USE IN CORONARY ARTERY BYPASS GRAFTING. THORASCOPIC PROCEDURES INCLUDE EXPOSURE AND DISSECTION OF STRUCTURES EXTERNAL TOTHE PARIETAL PLEURA, INCLUDING NERVES, BLOOD VESSELS, AND OTHER TISSUES OF THE CHEST WALL.: Brand: VENAPAX

## (undated) DEVICE — INTENDED FOR TISSUE SEPARATION, AND OTHER PROCEDURES THAT REQUIRE A SHARP SURGICAL BLADE TO PUNCTURE OR CUT.: Brand: BARD-PARKER ® STAINLESS STEEL BLADES

## (undated) DEVICE — SPHR MARKR STEALTH STATION

## (undated) DEVICE — GLIDESHEATH BASIC HYDROPHILIC COATED INTRODUCER SHEATH: Brand: GLIDESHEATH

## (undated) DEVICE — CANN VENI DLP SGL/STAGE RT/ANGL MTL/TP 28F

## (undated) DEVICE — 3M™ TEGADERM™ CHG DRESSING 25/CARTON 4 CARTONS/CASE 1658: Brand: TEGADERM™

## (undated) DEVICE — DRN WND FLT FUL PERF NO/TROC 7MM

## (undated) DEVICE — DRP MONTR EXCELSIUS/GPS DISP

## (undated) DEVICE — CONN TBG Y 5 IN 1 LF STRL

## (undated) DEVICE — BALN PRESS WEDGE 5F 110CM

## (undated) DEVICE — GLV SURG SIGNATURE ESSENTIAL PF LTX SZ7.5

## (undated) DEVICE — DRSNG SURESITE123 6X8IN

## (undated) DEVICE — CP INARI VTE